# Patient Record
Sex: FEMALE | Race: WHITE | NOT HISPANIC OR LATINO | Employment: OTHER | URBAN - METROPOLITAN AREA
[De-identification: names, ages, dates, MRNs, and addresses within clinical notes are randomized per-mention and may not be internally consistent; named-entity substitution may affect disease eponyms.]

---

## 2017-01-03 ENCOUNTER — TELEPHONE (OUTPATIENT)
Dept: PREOP/PACU | Facility: CLINIC | Age: 72
End: 2017-01-03

## 2017-01-03 NOTE — TELEPHONE ENCOUNTER
"01/03/17      Call not required pass age guideline      Canh \"Gabrielle\" Isaias  Central Scheduler    "

## 2017-02-24 ENCOUNTER — DOCUMENTATION ONLY (OUTPATIENT)
Dept: FAMILY MEDICINE | Facility: CLINIC | Age: 72
End: 2017-02-24

## 2017-03-09 ENCOUNTER — TRANSFERRED RECORDS (OUTPATIENT)
Dept: HEALTH INFORMATION MANAGEMENT | Facility: CLINIC | Age: 72
End: 2017-03-09

## 2017-04-05 ENCOUNTER — DOCUMENTATION ONLY (OUTPATIENT)
Dept: FAMILY MEDICINE | Facility: CLINIC | Age: 72
End: 2017-04-05

## 2017-04-05 NOTE — PROGRESS NOTES
Recd records from [facility] 04/05/2017 and forwarded to Natasha Braun for review and scanning

## 2017-04-14 ENCOUNTER — DOCUMENTATION ONLY (OUTPATIENT)
Dept: OTHER | Facility: CLINIC | Age: 72
End: 2017-04-14

## 2017-04-14 DIAGNOSIS — Z71.89 ADVANCED DIRECTIVES, COUNSELING/DISCUSSION: Chronic | ICD-10-CM

## 2017-05-31 ENCOUNTER — TELEPHONE (OUTPATIENT)
Dept: PEDIATRICS | Facility: CLINIC | Age: 72
End: 2017-05-31

## 2017-05-31 NOTE — TELEPHONE ENCOUNTER
Patient calls.  She developed cough and SOB while in [place] in January.  She came back to [place] and the cough and chest tightness continued. She attributed her symptoms to possible allergies in the spring, but the symptoms never got better.  She has some post nasal drip and congestion in general. Unsure if it is allergy-related, or if there are other causes.  Appointment was advised to get evaluated by provider. Patient agreed, scheduled for patient's next available:    Next 5 appointments (look out 90 days)     Jun 06, 2017  7:30 AM CDT   SHORT with Kelly Clay PA-C   [facility] ([facility])    [address] [address] [phone]                Mariana Dubois RN  Message handled by Nurse Triage.

## 2017-06-06 ENCOUNTER — RADIANT APPOINTMENT (OUTPATIENT)
Dept: GENERAL RADIOLOGY | Facility: CLINIC | Age: 72
End: 2017-06-06
Attending: PHYSICIAN ASSISTANT
Payer: COMMERCIAL

## 2017-06-06 ENCOUNTER — OFFICE VISIT (OUTPATIENT)
Dept: FAMILY MEDICINE | Facility: CLINIC | Age: 72
End: 2017-06-06
Payer: COMMERCIAL

## 2017-06-06 VITALS
TEMPERATURE: 97.8 F | DIASTOLIC BLOOD PRESSURE: 60 MMHG | WEIGHT: 120.13 LBS | HEIGHT: 65 IN | SYSTOLIC BLOOD PRESSURE: 122 MMHG | HEART RATE: 55 BPM | BODY MASS INDEX: 20.01 KG/M2 | OXYGEN SATURATION: 100 % | RESPIRATION RATE: 16 BRPM

## 2017-06-06 DIAGNOSIS — R06.02 SOB (SHORTNESS OF BREATH): ICD-10-CM

## 2017-06-06 DIAGNOSIS — G44.219 EPISODIC TENSION-TYPE HEADACHE, NOT INTRACTABLE: ICD-10-CM

## 2017-06-06 DIAGNOSIS — R00.2 PALPITATIONS: ICD-10-CM

## 2017-06-06 DIAGNOSIS — J30.2 SEASONAL ALLERGIC RHINITIS, UNSPECIFIED ALLERGIC RHINITIS TRIGGER: ICD-10-CM

## 2017-06-06 DIAGNOSIS — R06.02 SOB (SHORTNESS OF BREATH): Primary | ICD-10-CM

## 2017-06-06 PROCEDURE — 71020 XR CHEST 2 VW: CPT

## 2017-06-06 PROCEDURE — 93000 ELECTROCARDIOGRAM COMPLETE: CPT | Performed by: PHYSICIAN ASSISTANT

## 2017-06-06 PROCEDURE — 99214 OFFICE O/P EST MOD 30 MIN: CPT | Performed by: PHYSICIAN ASSISTANT

## 2017-06-06 RX ORDER — ALBUTEROL SULFATE 90 UG/1
2 AEROSOL, METERED RESPIRATORY (INHALATION) EVERY 6 HOURS PRN
Qty: 1 INHALER | Refills: 0 | Status: SHIPPED | OUTPATIENT
Start: 2017-06-06 | End: 2018-04-30

## 2017-06-06 NOTE — PROGRESS NOTES
SUBJECTIVE:                                                    Flora Hogan is a 71 year old female who presents to clinic today for the following health issues:      ALLERGIES      Duration: Ongoing    Description:   Nasal congestion: YES  Sneezing: no   Red, itchy eyes: YES- itching    Accompanying signs and symptoms: Headache, Cough, Intermittent Wheezing & Shortness Of Breath    History (similar episodes/allergy testing): Previous Exam For Shortness Of Breath, Has Seen Cardiology    Precipitating or alleviating factors: None    Therapies tried and outcome: Warm Washcloth On Face, Claritin           Problem list and histories reviewed & adjusted, as indicated.  Additional history: as documented    Patient Active Problem List   Diagnosis     Seasonal allergies     CARDIOVASCULAR SCREENING; LDL GOAL LESS THAN 160     Osteopenia     Advance Care Planning     Peripheral neuropathy (HCC)     Nonintractable headache, unspecified chronicity pattern, unspecified headache type     Claudication of lower extremity (HCC)     Chest tightness     SOB (shortness of breath)     Past Surgical History:   Procedure Laterality Date     CATARACT IOL, RT/LT  2015     COLONOSCOPY  10/14    no repeat needed due to age     COLONOSCOPY N/A 10/7/2014    Procedure: COLONOSCOPY;  Surgeon: Daryl Hanson MD;  Location:  GI       Social History   Substance Use Topics     Smoking status: Never Smoker     Smokeless tobacco: Never Used     Alcohol use Yes      Comment: social      Family History   Problem Relation Age of Onset     Musculoskeletal Disorder Mother      edematous legs     Obesity Mother      Musculoskeletal Disorder Maternal Grandmother      edematous legs; did have amputation     Obesity Maternal Grandmother      Myocardial Infarction Maternal Grandmother          Current Outpatient Prescriptions   Medication Sig Dispense Refill     psyllium 0.52 G capsule Take 1 capsule (0.52 g) by mouth daily 540 capsule       "loratadine (CLARITIN REDITABS) 10 MG dispersible tablet Take 10 mg by mouth daily       multivitamin, therapeutic with minerals (MULTI-VITAMIN) TABS Take 1 tablet by mouth daily 100 tablet 3     Omega-3 Fatty Acids (FISH OIL) 600 MG CAPS Take by mouth 2 times daily       calcium carbonate-vitamin D (CALTRATE 600+D) 600-400 MG-UNIT CHEW Take 1 chew tab by mouth 2 times daily 180 tablet 3     magnesium 250 MG tablet Take 1 tablet by mouth daily 30 tablet      Allergies   Allergen Reactions     Penicillin V      Fredy-1 [Lidocaine] Unknown       Reviewed and updated as needed this visit by clinical staff       Reviewed and updated as needed this visit by Provider         ROS:  Constitutional, HEENT, cardiovascular, pulmonary, gi and gu systems are negative, except as otherwise noted.    OBJECTIVE:                                                    /60 (BP Location: Right arm, Patient Position: Chair, Cuff Size: Adult Regular)  Pulse 55  Temp 97.8  F (36.6  C) (Tympanic)  Resp 16  Ht 5' 5\" (1.651 m)  Wt 120 lb 2 oz (54.5 kg)  SpO2 100%  Breastfeeding? No  BMI 19.99 kg/m2  Body mass index is 19.99 kg/(m^2).  GENERAL: healthy, alert and no distress  EYES: Eyes grossly normal to inspection, PERRL and conjunctivae and sclerae normal  HENT: ear canals and TM's normal, nose and mouth without ulcers or lesions  NECK: no adenopathy, no asymmetry, masses, or scars and thyroid normal to palpation  RESP: lungs clear to auscultation - no rales, rhonchi or wheezes  CV: regular rate and rhythm, normal S1 S2, no S3 or S4, no murmur, click or rub, no peripheral edema and peripheral pulses strong  MS: no gross musculoskeletal defects noted, no edema    Diagnostic Test Results:  Xray - normal  EKG - negative     ASSESSMENT/PLAN:                                                            1. SOB (shortness of breath)  New problem, may be underlying reactive airway, CXR and EKG done today and were normal. Recommend trial of " albuterol inhaler to see if symptoms improve. Also recommend use of allergy medicine and Flonase to see if symptoms improve. Recommend f/u if persistent issue.  - XR Chest 2 Views; Future  - EKG 12-lead complete w/read - Clinics    2. Seasonal allergic rhinitis, unspecified allergic rhinitis trigger  New problem, recommend use of allergy medicine and Flonase to see if symptoms improve. Recommend f/u if persistent issue.    3. Episodic tension-type headache, not intractable  Ongoing issue, new problem to me, may be related to untreated allergies. Recommend treatment of allergies to see if improves, offered Neurology referral, patient declined.    4. Palpitations  Chronic issue, new to me. Am concerned given this has been persistent issue since June, recommend further cardiac work up and evaluation, patient wants to think about it.      Risks, benefits and alternatives were discussed with patient. Agreeable to the plan of care.      Kelly Clay PA-C  [facility]

## 2017-06-06 NOTE — NURSING NOTE
"Chief Complaint   Patient presents with     Cough       Initial /60 (BP Location: Right arm, Patient Position: Chair, Cuff Size: Adult Regular)  Pulse 55  Temp 97.8  F (36.6  C) (Tympanic)  Resp 16  Ht 5' 5\" (1.651 m)  Wt 120 lb 2 oz (54.5 kg)  SpO2 100%  Breastfeeding? No  BMI 19.99 kg/m2 Estimated body mass index is 19.99 kg/(m^2) as calculated from the following:    Height as of this encounter: 5' 5\" (1.651 m).    Weight as of this encounter: 120 lb 2 oz (54.5 kg).  Medication Reconciliation: complete     Patient would like to be notified at the following phone number for results from this visit   [phone] OK to leave message or Juan Souza CMA (AAMA) 6/6/2017 7:37 AM      "

## 2017-06-06 NOTE — MR AVS SNAPSHOT
After Visit Summary   6/6/2017    Flora Hogan    MRN: [id number]           Patient Information     Date Of Birth          1945        Visit Information        Provider Department      6/6/2017 7:30 AM Kelly Clay PA-C [facility]        Today's Diagnoses     SOB (shortness of breath)    -  1    Seasonal allergic rhinitis, unspecified allergic rhinitis trigger        Episodic tension-type headache, not intractable        Palpitations           Follow-ups after your visit        Who to contact     If you have questions or need follow up information about today's clinic visit or your schedule please contact [facility] directly at [phone].  Normal or non-critical lab and imaging results will be communicated to you by MyChart, letter or phone within 4 business days after the clinic has received the results. If you do not hear from us within 7 days, please contact the clinic through [facility] or phone. If you have a critical or abnormal lab result, we will notify you by phone as soon as possible.  Submit refill requests through [facility] or call your pharmacy and they will forward the refill request to us. Please allow 3 business days for your refill to be completed.          Additional Information About Your Visit        MyChart Information     [facility] gives you secure access to your electronic health record. If you see a primary care provider, you can also send messages to your care team and make appointments. If you have questions, please call your primary care clinic.  If you do not have a primary care provider, please call [phone] and they will assist you.        Care EveryWhere ID     This is your Care EveryWhere ID. This could be used by other organizations to access your [place] medical records  [id number]        Your Vitals Were     Pulse Temperature Respirations Height Pulse Oximetry Breastfeeding?    55 97.8  F (36.6  C) (Tympanic)  "16 5' 5\" (1.651 m) 100% No    BMI (Body Mass Index)                   19.99 kg/m2            Blood Pressure from Last 3 Encounters:   06/06/17 122/60   06/01/16 104/64   04/27/16 116/70    Weight from Last 3 Encounters:   06/06/17 120 lb 2 oz (54.5 kg)   06/01/16 122 lb (55.3 kg)   04/27/16 123 lb 12.8 oz (56.2 kg)              We Performed the Following     EKG 12-lead complete w/read - Clinics          Today's Medication Changes          These changes are accurate as of: 6/6/17  8:14 AM.  If you have any questions, ask your nurse or doctor.               Start taking these medicines.        Dose/Directions    albuterol 108 (90 BASE) MCG/ACT Inhaler   Commonly known as:  PROAIR HFA/PROVENTIL HFA/VENTOLIN HFA   Used for:  SOB (shortness of breath)   Started by:  Kelly Clay PA-C        Dose:  2 puff   Inhale 2 puffs into the lungs every 6 hours as needed for shortness of breath / dyspnea or wheezing   Quantity:  1 Inhaler   Refills:  0            Where to get your medicines      [facility] [address] [facility] [address] [address] [place] [address] & [facility]  [address] [facility] [address]     Phone:  [phone]     albuterol 108 (90 BASE) MCG/ACT Inhaler                Primary Care Provider Office Phone # Fax #    Natasha Braun [phone]       [facility] [address] [facility] [address]        Thank you!     Thank you for choosing [facility]  for your care. Our goal is always to provide you with excellent care. Hearing back from our patients is one way we can continue to improve our services. Please take a few minutes to complete the written survey that you may receive in the mail after your visit with us. Thank you!             Your Updated Medication List - Protect others around you: Learn how to safely use, store and throw away your medicines at www.disposemymeds.org.        "   This list is accurate as of: 6/6/17  8:14 AM.  Always use your most recent med list.                   Brand Name Dispense Instructions for use    albuterol 108 (90 BASE) MCG/ACT Inhaler    PROAIR HFA/PROVENTIL HFA/VENTOLIN HFA    1 Inhaler    Inhale 2 puffs into the lungs every 6 hours as needed for shortness of breath / dyspnea or wheezing       CALTRATE 600+D 600-400 MG-UNIT Chew   Generic drug:  calcium carbonate-vitamin D     180 tablet    Take 1 chew tab by mouth 2 times daily       Fish Oil 600 MG Caps      Take by mouth 2 times daily       loratadine 10 MG ODT tab    CLARITIN REDITABS     Take 10 mg by mouth daily       magnesium 250 MG tablet     30 tablet    Take 1 tablet by mouth daily       Multi-vitamin Tabs tablet     100 tablet    Take 1 tablet by mouth daily       psyllium 0.52 G capsule     540 capsule    Take 1 capsule (0.52 g) by mouth daily

## 2017-09-27 ENCOUNTER — OFFICE VISIT (OUTPATIENT)
Dept: FAMILY MEDICINE | Facility: CLINIC | Age: 72
End: 2017-09-27
Payer: COMMERCIAL

## 2017-09-27 VITALS
DIASTOLIC BLOOD PRESSURE: 82 MMHG | WEIGHT: 121.8 LBS | BODY MASS INDEX: 20.27 KG/M2 | OXYGEN SATURATION: 96 % | RESPIRATION RATE: 14 BRPM | SYSTOLIC BLOOD PRESSURE: 122 MMHG | TEMPERATURE: 97.8 F | HEART RATE: 65 BPM

## 2017-09-27 DIAGNOSIS — R42 DIZZINESS: Primary | ICD-10-CM

## 2017-09-27 DIAGNOSIS — R09.82 POST-NASAL DRIP: ICD-10-CM

## 2017-09-27 LAB — HGB BLD-MCNC: 14 G/DL (ref 11.7–15.7)

## 2017-09-27 PROCEDURE — 36415 COLL VENOUS BLD VENIPUNCTURE: CPT | Performed by: PHYSICIAN ASSISTANT

## 2017-09-27 PROCEDURE — 99214 OFFICE O/P EST MOD 30 MIN: CPT | Performed by: PHYSICIAN ASSISTANT

## 2017-09-27 PROCEDURE — 85018 HEMOGLOBIN: CPT | Performed by: PHYSICIAN ASSISTANT

## 2017-09-27 NOTE — PROGRESS NOTES
"  SUBJECTIVE:   Flora Hogan is a 72 year old female who presents to clinic today for the following health issues:      Dizziness/Chills      Duration: today    Description (location/character/radiation): pt is feeling dizziness, very cold, has had ongoing problem of SOB; did pass out at the clinic about 3 years ago    Intensity:  moderate, severe    Accompanying signs and symptoms: patient has not felt herself the last couple months, mostly due to SOB/low BP    History (similar episodes/previous evaluation): None    Precipitating or alleviating factors: None    Therapies tried and outcome: None     Patient is here today complaining of intense chills that started today  She also notes ongoing dizziness and lightheadedness that occurred today, though she truly has had issues with this since early this summer  She admits to her head being stuffy, and her voice being \"goofy\" over the last few weeks  No syncope, no racing heart, no fever, + ringing in the ears and buzzing.  She denies a sore throat, has a slight cough  She just states she hasn't felt her self the last few months  No taking any medication for this  Had been on Albuterol, Flonase and allergy medication over the summer for the dizziness and symptoms which she attributed to allergies.      Problem list and histories reviewed & adjusted, as indicated.  Additional history: as documented    Patient Active Problem List   Diagnosis     Seasonal allergies     CARDIOVASCULAR SCREENING; LDL GOAL LESS THAN 160     Osteopenia     Advance Care Planning     Peripheral neuropathy (HCC)     Nonintractable headache, unspecified chronicity pattern, unspecified headache type     Claudication of lower extremity (HCC)     Chest tightness     SOB (shortness of breath)     Past Surgical History:   Procedure Laterality Date     CATARACT IOL, RT/LT  2015     COLONOSCOPY  10/14    no repeat needed due to age     COLONOSCOPY N/A 10/7/2014    Procedure: COLONOSCOPY;  Surgeon: " Daryl Hanson MD;  Location:  GI       Social History   Substance Use Topics     Smoking status: Never Smoker     Smokeless tobacco: Never Used     Alcohol use Yes      Comment: social      Family History   Problem Relation Age of Onset     Musculoskeletal Disorder Mother      edematous legs     Obesity Mother      Musculoskeletal Disorder Maternal Grandmother      edematous legs; did have amputation     Obesity Maternal Grandmother      Myocardial Infarction Maternal Grandmother          Current Outpatient Prescriptions   Medication Sig Dispense Refill     psyllium 0.52 G capsule Take 1 capsule (0.52 g) by mouth daily 540 capsule      loratadine (CLARITIN REDITABS) 10 MG dispersible tablet Take 10 mg by mouth daily       multivitamin, therapeutic with minerals (MULTI-VITAMIN) TABS Take 1 tablet by mouth daily 100 tablet 3     Omega-3 Fatty Acids (FISH OIL) 600 MG CAPS Take by mouth 2 times daily       calcium carbonate-vitamin D (CALTRATE 600+D) 600-400 MG-UNIT CHEW Take 1 chew tab by mouth 2 times daily 180 tablet 3     magnesium 250 MG tablet Take 1 tablet by mouth daily 30 tablet      albuterol (PROAIR HFA/PROVENTIL HFA/VENTOLIN HFA) 108 (90 BASE) MCG/ACT Inhaler Inhale 2 puffs into the lungs every 6 hours as needed for shortness of breath / dyspnea or wheezing (Patient not taking: Reported on 9/27/2017) 1 Inhaler 0     Allergies   Allergen Reactions     Penicillin V      Fredy-1 [Lidocaine] Unknown         Reviewed and updated as needed this visit by clinical staffTobacco  Allergies  Med Hx  Surg Hx  Fam Hx  Soc Hx      Reviewed and updated as needed this visit by Provider         ROS:  Constitutional, HEENT, cardiovascular, pulmonary, gi and gu systems are negative, except as otherwise noted.      OBJECTIVE:   /82 (BP Location: Right arm, Patient Position: Standing, Cuff Size: Adult Regular)  Pulse 65  Temp 97.8  F (36.6  C) (Oral)  Resp 14  Wt 121 lb 12.8 oz (55.2 kg)  SpO2 96%   Breastfeeding? No  BMI 20.27 kg/m2  Body mass index is 20.27 kg/(m^2).  GENERAL: healthy, alert and no distress  EYES: Eyes grossly normal to inspection, PERRL and conjunctivae and sclerae normal  HENT: ear canals and TM's normal, nose and mouth without ulcers or lesions  NECK: no adenopathy, no asymmetry, masses, or scars and thyroid normal to palpation  RESP: lungs clear to auscultation - no rales, rhonchi or wheezes  CV: regular rate and rhythm, normal S1 S2, no S3 or S4, no murmur, click or rub, no peripheral edema and peripheral pulses strong  ABDOMEN: soft, nontender, no hepatosplenomegaly, no masses and bowel sounds normal  MS: no gross musculoskeletal defects noted, no edema  SKIN: no suspicious lesions or rashes  NEURO: Normal strength and tone, mentation intact and speech normal    Diagnostic Test Results:  Results for orders placed or performed in visit on 09/27/17   Hemoglobin   Result Value Ref Range    Hemoglobin 14.0 11.7 - 15.7 g/dL       ASSESSMENT/PLAN:             1. Dizziness  - Hemoglobin  - OTOLARYNGOLOGY REFERRAL    2. Post-nasal drip  - OTOLARYNGOLOGY REFERRAL    Chronic issue, though recently worsening today. Reviewed chart and she truly has been in a few times for dizziness over the last few years. She has had a cardiac work up that was negative and continues to have symptoms of dizziness. She has tried and failed treatment for allergies, unclear cause of her symptoms. Orthostatic BP today was normal as was her hemoglobin. Recommend ENT referral. If symptoms are persistent despite ENT recommendations, would then consider Neurology referral or [facility].    Advised patient to recheck in 2 weeks or after ENT visit if symptoms persist.    Risks, benefits and alternatives were discussed with patient. Agreeable to the plan of care.      Kelly Clay PA-C  [facility]

## 2017-09-27 NOTE — MR AVS SNAPSHOT
After Visit Summary   9/27/2017    Flora Hogan    MRN: [id number]           Patient Information     Date Of Birth          1945        Visit Information        Provider Department      9/27/2017 3:10 PM Kelly Clay PA-C [facility]        Today's Diagnoses     Dizziness    -  1    Post-nasal drip           Follow-ups after your visit        Additional Services     OTOLARYNGOLOGY REFERRAL       Your provider has referred you to: N: [facility] [phone]   [email or web address]    Please be aware that coverage of these services is subject to the terms and limitations of your health insurance plan.  Call member services at your health plan with any benefit or coverage questions.      Please bring the following to your appointment:  >>   Any x-rays, CTs or MRIs which have been performed.  Contact the facility where they were done to arrange for  prior to your scheduled appointment.  Any new CT, MRI or other procedures ordered by your specialist must be performed at a [facility] facility or coordinated by your clinic's referral office.    >>   List of current medications   >>   This referral request   >>   Any documents/labs given to you for this referral                  Who to contact     If you have questions or need follow up information about today's clinic visit or your schedule please contact [facility] directly at [phone].  Normal or non-critical lab and imaging results will be communicated to you by MyChart, letter or phone within 4 business days after the clinic has received the results. If you do not hear from us within 7 days, please contact the clinic through MyChart or phone. If you have a critical or abnormal lab result, we will notify you by phone as soon as possible.  Submit refill requests through [employer] or call your pharmacy and they will forward the refill request to us. Please  allow 3 business days for your refill to be completed.          Additional Information About Your Visit        MyChart Information     [facility] gives you secure access to your electronic health record. If you see a primary care provider, you can also send messages to your care team and make appointments. If you have questions, please call your primary care clinic.  If you do not have a primary care provider, please call [phone] and they will assist you.        Care EveryWhere ID     This is your Care EveryWhere ID. This could be used by other organizations to access your [place] medical records  [id number]        Your Vitals Were     Pulse Temperature Respirations Pulse Oximetry Breastfeeding? BMI (Body Mass Index)    65 97.8  F (36.6  C) (Oral) 14 96% No 20.27 kg/m2       Blood Pressure from Last 3 Encounters:   09/27/17 122/82   06/06/17 122/60   06/01/16 104/64    Weight from Last 3 Encounters:   09/27/17 121 lb 12.8 oz (55.2 kg)   06/06/17 120 lb 2 oz (54.5 kg)   06/01/16 122 lb (55.3 kg)              We Performed the Following     Hemoglobin     OTOLARYNGOLOGY REFERRAL        Primary Care Provider Office Phone # Fax #    Natasha Braun [phone] [address] [address] [facility] [address]        Equal Access to Services     [facility]: Hadii aad ku hadasho Soomaali, waaxda luqadaha, qaybta kaalmada adeegyada, waxay idiin hayaan zunilda awad . So [facility] [phone].    ATENCIÓN: Si habla español, tiene a bashir disposición servicios gratuitos de asistencia lingüística. Llame al [phone].    We comply with applicable federal civil rights laws and [place] laws. We do not discriminate on the basis of race, color, national origin, age, disability sex, sexual orientation or gender identity.            Thank you!     Thank you for choosing [facility]  for your care. Our goal is always to provide you with excellent care. Hearing back from our patients is one way we  can continue to improve our services. Please take a few minutes to complete the written survey that you may receive in the mail after your visit with us. Thank you!             Your Updated Medication List - Protect others around you: Learn how to safely use, store and throw away your medicines at www.disposemymeds.org.          This list is accurate as of: 9/27/17  4:18 PM.  Always use your most recent med list.                   Brand Name Dispense Instructions for use Diagnosis    albuterol 108 (90 BASE) MCG/ACT Inhaler    PROAIR HFA/PROVENTIL HFA/VENTOLIN HFA    1 Inhaler    Inhale 2 puffs into the lungs every 6 hours as needed for shortness of breath / dyspnea or wheezing    SOB (shortness of breath)       CALTRATE 600+D 600-400 MG-UNIT Chew   Generic drug:  calcium carbonate-vitamin D     180 tablet    Take 1 chew tab by mouth 2 times daily        Fish Oil 600 MG Caps      Take by mouth 2 times daily        loratadine 10 MG ODT tab    CLARITIN REDITABS     Take 10 mg by mouth daily        magnesium 250 MG tablet     30 tablet    Take 1 tablet by mouth daily        Multi-vitamin Tabs tablet     100 tablet    Take 1 tablet by mouth daily        psyllium 0.52 G capsule     540 capsule    Take 1 capsule (0.52 g) by mouth daily

## 2017-09-27 NOTE — NURSING NOTE
"Chief Complaint   Patient presents with     Dizziness     Chills       Initial /80 (BP Location: Right arm, Patient Position: Chair, Cuff Size: Adult Regular)  Pulse 52  Temp 97.8  F (36.6  C) (Oral)  Resp 14  Wt 121 lb 12.8 oz (55.2 kg)  SpO2 96%  Breastfeeding? No  BMI 20.27 kg/m2 Estimated body mass index is 20.27 kg/(m^2) as calculated from the following:    Height as of 6/6/17: 5' 5\" (1.651 m).    Weight as of this encounter: 121 lb 12.8 oz (55.2 kg).  Medication Reconciliation: complete   Tita Jordan MA      "

## 2017-10-02 ENCOUNTER — MYC MEDICAL ADVICE (OUTPATIENT)
Dept: FAMILY MEDICINE | Facility: CLINIC | Age: 72
End: 2017-10-02

## 2017-10-02 ENCOUNTER — TELEPHONE (OUTPATIENT)
Dept: FAMILY MEDICINE | Facility: CLINIC | Age: 72
End: 2017-10-02

## 2017-10-02 NOTE — TELEPHONE ENCOUNTER
Panel Management Review      Patient has the following on her problem list: None      Composite cancer screening  Chart review shows that this patient is due/due soon for the following Mammogram  Summary:    Patient is due/failing the following:   MAMMOGRAM    Action needed:   Patient needs office visit for mammo.    Type of outreach:    Sent zoidu message.    Questions for provider review:    None                                                                                                                                 Tita Jordan MA    Chart routed to Care Team.

## 2017-10-16 ENCOUNTER — TRANSFERRED RECORDS (OUTPATIENT)
Dept: HEALTH INFORMATION MANAGEMENT | Facility: CLINIC | Age: 72
End: 2017-10-16

## 2017-11-09 ENCOUNTER — OFFICE VISIT (OUTPATIENT)
Dept: FAMILY MEDICINE | Facility: CLINIC | Age: 72
End: 2017-11-09
Payer: COMMERCIAL

## 2017-11-09 VITALS
TEMPERATURE: 98 F | OXYGEN SATURATION: 100 % | DIASTOLIC BLOOD PRESSURE: 66 MMHG | RESPIRATION RATE: 16 BRPM | HEIGHT: 65 IN | HEART RATE: 59 BPM | SYSTOLIC BLOOD PRESSURE: 128 MMHG | WEIGHT: 123.6 LBS | BODY MASS INDEX: 20.59 KG/M2

## 2017-11-09 DIAGNOSIS — Z71.89 ADVANCED DIRECTIVES, COUNSELING/DISCUSSION: Primary | ICD-10-CM

## 2017-11-09 DIAGNOSIS — Z23 NEED FOR PROPHYLACTIC VACCINATION AND INOCULATION AGAINST INFLUENZA: ICD-10-CM

## 2017-11-09 PROCEDURE — 90662 IIV NO PRSV INCREASED AG IM: CPT | Performed by: FAMILY MEDICINE

## 2017-11-09 PROCEDURE — G0008 ADMIN INFLUENZA VIRUS VAC: HCPCS | Performed by: FAMILY MEDICINE

## 2017-11-09 PROCEDURE — 99213 OFFICE O/P EST LOW 20 MIN: CPT | Mod: 25 | Performed by: FAMILY MEDICINE

## 2017-11-09 NOTE — PROGRESS NOTES
SUBJECTIVE:   Flora Hogan is a 72 year old female who presents to clinic today for the following health issues:      Here to discuss filling out the POLST        Problem list and histories reviewed & adjusted, as indicated.  Additional history:     Patient Active Problem List   Diagnosis     Seasonal allergies     CARDIOVASCULAR SCREENING; LDL GOAL LESS THAN 160     Osteopenia     Advance Care Planning     Peripheral neuropathy (HCC)     Nonintractable headache, unspecified chronicity pattern, unspecified headache type     Claudication of lower extremity (HCC)     Chest tightness     SOB (shortness of breath)     Current Outpatient Prescriptions   Medication Sig Dispense Refill     psyllium 0.52 G capsule Take 1 capsule (0.52 g) by mouth daily 540 capsule      loratadine (CLARITIN REDITABS) 10 MG dispersible tablet Take 10 mg by mouth as needed        multivitamin, therapeutic with minerals (MULTI-VITAMIN) TABS Take 1 tablet by mouth daily 100 tablet 3     Omega-3 Fatty Acids (FISH OIL) 600 MG CAPS Take by mouth 2 times daily       calcium carbonate-vitamin D (CALTRATE 600+D) 600-400 MG-UNIT CHEW Take 1 chew tab by mouth 2 times daily 180 tablet 3     magnesium 250 MG tablet Take 1 tablet by mouth daily 30 tablet      albuterol (PROAIR HFA/PROVENTIL HFA/VENTOLIN HFA) 108 (90 BASE) MCG/ACT Inhaler Inhale 2 puffs into the lungs every 6 hours as needed for shortness of breath / dyspnea or wheezing (Patient not taking: Reported on 9/27/2017) 1 Inhaler 0           Reviewed and updated as needed this visit by clinical staff  Allergies       Reviewed and updated as needed this visit by Provider         ROS:  Here with .    Constitutional, HEENT, cardiovascular, pulmonary, gi and gu systems are negative, except as otherwise noted.    Updates on health care directive.   Thinking of a DNR order.       OBJECTIVE:     /66 (BP Location: Right arm, Cuff Size: Adult Regular)  Pulse 59  Temp 98  F (36.7  C)  "(Oral)  Resp 16  Ht 5' 5\" (1.651 m)  Wt 123 lb 9.6 oz (56.1 kg)  SpO2 100%  BMI 20.57 kg/m2  Body mass index is 20.57 kg/(m^2).  GENERAL APPEARANCE: alert and no distress  CV: regular rates and rhythm  PSYCH: mentation appears normal and affect normal/bright    Reviewed some of her advanced directive papers.    ASSESSMENT/PLAN:     Advanced directives, counseling/discussion  Counseling around this. Discussed some scenarios.   It appears she does not want to be real aggressive if not much hope of recovery, but she might be willing to go through some things if it would. She will discuss more with her significant other, who his her agent.   She has discussed with her family as well.  Discussed what a POLST is.   She can contact myself or Digna (who I believe she has met with previously) for questions.     Need for prophylactic vaccination and inoculation against influenza    - FLU VACCINE, INCREASED ANTIGEN, PRESV FREE, AGE 65+ [59491]  - Vaccine Administration, Initial [95141]    Follow up prn or as previously directed.    There are no Patient Instructions on file for this visit.    Natasha Braun MD, MD  [facility]      Injectable Influenza Immunization Documentation    1.  Is the person to be vaccinated sick today?   No    2. Does the person to be vaccinated have an allergy to a component   of the vaccine?   No  Egg Allergy Algorithm Link    3. Has the person to be vaccinated ever had a serious reaction   to influenza vaccine in the past?   No    4. Has the person to be vaccinated ever had Guillain-Barré syndrome?   No    Form completed by Joi Johns [facility]           "

## 2017-11-09 NOTE — NURSING NOTE
"Chief Complaint   Patient presents with     POLST       Initial /66 (BP Location: Right arm, Cuff Size: Adult Regular)  Pulse 59  Temp 98  F (36.7  C) (Oral)  Resp 16  Ht 5' 5\" (1.651 m)  Wt 123 lb 9.6 oz (56.1 kg)  SpO2 100%  BMI 20.57 kg/m2 Estimated body mass index is 20.57 kg/(m^2) as calculated from the following:    Height as of this encounter: 5' 5\" (1.651 m).    Weight as of this encounter: 123 lb 9.6 oz (56.1 kg).  Medication Reconciliation: complete   Joi Johns, PASTOR    "

## 2017-11-09 NOTE — MR AVS SNAPSHOT
"              After Visit Summary   11/9/2017    Anali Hogan    MRN: [id number]           Patient Information     Date Of Birth          1945        Visit Information        Provider Department      11/9/2017 1:30 PM Natasha Braun MD [place] [facility]        Today's Diagnoses     Need for prophylactic vaccination and inoculation against influenza    -  1       Follow-ups after your visit        Who to contact     If you have questions or need follow up information about today's clinic visit or your schedule please contact [facility] directly at [phone].  Normal or non-critical lab and imaging results will be communicated to you by InVisioneerhart, letter or phone within 4 business days after the clinic has received the results. If you do not hear from us within 7 days, please contact the clinic through Whatsert or phone. If you have a critical or abnormal lab result, we will notify you by phone as soon as possible.  Submit refill requests through Wummelkiste or call your pharmacy and they will forward the refill request to us. Please allow 3 business days for your refill to be completed.          Additional Information About Your Visit        MyChart Information     Wummelkiste gives you secure access to your electronic health record. If you see a primary care provider, you can also send messages to your care team and make appointments. If you have questions, please call your primary care clinic.  If you do not have a primary care provider, please call [phone] and they will assist you.        Care EveryWhere ID     This is your Care EveryWhere ID. This could be used by other organizations to access your [place] medical records  [id number]        Your Vitals Were     Pulse Temperature Respirations Height Pulse Oximetry BMI (Body Mass Index)    59 98  F (36.7  C) (Oral) 16 5' 5\" (1.651 m) 100% 20.57 kg/m2       Blood Pressure from Last 3 Encounters:   11/09/17 128/66   09/27/17 122/82 "   06/06/17 122/60    Weight from Last 3 Encounters:   11/09/17 123 lb 9.6 oz (56.1 kg)   09/27/17 121 lb 12.8 oz (55.2 kg)   06/06/17 120 lb 2 oz (54.5 kg)              We Performed the Following     FLU VACCINE, INCREASED ANTIGEN, PRESV FREE, AGE 65+ [19423]     Vaccine Administration, Initial [57239]        Primary Care Provider Office Phone # Fax #    Natasha Braun [phone] [address] [facility] [address]        Equal Access to Services     [facility]: Hadii aad ku hadasho Soomaali, waaxda luqadaha, qaybta kaalmada adesherryyajudson, reji awad . So [facility] [phone].    ATENCIÓN: Si habla español, tiene a bashir disposición servicios gratuitos de asistencia lingüística. Ll[facility] [phone].    We comply with applicable federal civil rights laws and [place] laws. We do not discriminate on the basis of race, color, national origin, age, disability, sex, sexual orientation, or gender identity.            Thank you!     Thank you for choosing [facility]  for your care. Our goal is always to provide you with excellent care. Hearing back from our patients is one way we can continue to improve our services. Please take a few minutes to complete the written survey that you may receive in the mail after your visit with us. Thank you!             Your Updated Medication List - Protect others around you: Learn how to safely use, store and throw away your medicines at www.disposemymeds.org.          This list is accurate as of: 11/9/17  2:37 PM.  Always use your most recent med list.                   Brand Name Dispense Instructions for use Diagnosis    albuterol 108 (90 BASE) MCG/ACT Inhaler    PROAIR HFA/PROVENTIL HFA/VENTOLIN HFA    1 Inhaler    Inhale 2 puffs into the lungs every 6 hours as needed for shortness of breath / dyspnea or wheezing    SOB (shortness of breath)       CALTRATE 600+D 600-400 MG-UNIT Chew   Generic drug:  calcium  carbonate-vitamin D     180 tablet    Take 1 chew tab by mouth 2 times daily        Fish Oil 600 MG Caps      Take by mouth 2 times daily        loratadine 10 MG ODT tab    CLARITIN REDITABS     Take 10 mg by mouth as needed        magnesium 250 MG tablet     30 tablet    Take 1 tablet by mouth daily        Multi-vitamin Tabs tablet     100 tablet    Take 1 tablet by mouth daily        psyllium 0.52 G capsule     540 capsule    Take 1 capsule (0.52 g) by mouth daily

## 2018-01-01 NOTE — PROGRESS NOTES
Panel Management Review      Patient has the following on her problem list: None      Composite cancer screening  Chart review shows that this patient is due/due soon for the following Mammogram  Summary:    Patient is due/failing the following:   MAMMOGRAM    Action needed:   Patient needs referral/order: Mammogram    Type of outreach:    Sent [employer] message.    Questions for provider review:    None                                                                                                                                    Jenise Souza CMA (AAMA) 2/24/2017 3:49 PM       Chart routed to  .          
[id number]

## 2018-04-09 ENCOUNTER — TELEPHONE (OUTPATIENT)
Dept: FAMILY MEDICINE | Facility: CLINIC | Age: 73
End: 2018-04-09

## 2018-04-09 ENCOUNTER — MYC MEDICAL ADVICE (OUTPATIENT)
Dept: FAMILY MEDICINE | Facility: CLINIC | Age: 73
End: 2018-04-09

## 2018-04-09 NOTE — TELEPHONE ENCOUNTER
Panel Management Review      Patient has the following on her problem list: None      Composite cancer screening  Chart review shows that this patient is due/due soon for the following Mammogram  Summary:    Patient is due/failing the following:   MAMMOGRAM    Action needed:   Patient needs office visit for mammogram.    Type of outreach:    Sent [employer] message.    Questions for provider review:    None                                                                                                                                 Tita Jordan CMA (AAMA)     Chart routed to Care Team.

## 2018-04-10 ENCOUNTER — TRANSFERRED RECORDS (OUTPATIENT)
Dept: HEALTH INFORMATION MANAGEMENT | Facility: CLINIC | Age: 73
End: 2018-04-10

## 2018-04-20 ENCOUNTER — MYC MEDICAL ADVICE (OUTPATIENT)
Dept: FAMILY MEDICINE | Facility: CLINIC | Age: 73
End: 2018-04-20

## 2018-04-20 NOTE — TELEPHONE ENCOUNTER
2nd attempt, pt is active on [employer].  Sent message.  Tita Jordan CMA ([facility])

## 2018-04-30 ENCOUNTER — OFFICE VISIT (OUTPATIENT)
Dept: FAMILY MEDICINE | Facility: CLINIC | Age: 73
End: 2018-04-30
Payer: COMMERCIAL

## 2018-04-30 VITALS
TEMPERATURE: 97.4 F | RESPIRATION RATE: 14 BRPM | DIASTOLIC BLOOD PRESSURE: 58 MMHG | HEIGHT: 65 IN | SYSTOLIC BLOOD PRESSURE: 110 MMHG | BODY MASS INDEX: 19.83 KG/M2 | HEART RATE: 59 BPM | OXYGEN SATURATION: 99 % | WEIGHT: 119 LBS

## 2018-04-30 DIAGNOSIS — R06.02 SOB (SHORTNESS OF BREATH): Primary | ICD-10-CM

## 2018-04-30 DIAGNOSIS — L01.00 IMPETIGO: ICD-10-CM

## 2018-04-30 PROCEDURE — 99214 OFFICE O/P EST MOD 30 MIN: CPT | Performed by: PHYSICIAN ASSISTANT

## 2018-04-30 RX ORDER — ALBUTEROL SULFATE 90 UG/1
2 AEROSOL, METERED RESPIRATORY (INHALATION) EVERY 6 HOURS PRN
Qty: 1 INHALER | Refills: 0 | Status: SHIPPED | OUTPATIENT
Start: 2018-04-30 | End: 2019-10-29

## 2018-04-30 RX ORDER — MUPIROCIN 20 MG/G
OINTMENT TOPICAL 3 TIMES DAILY
Qty: 22 G | Refills: 1 | Status: SHIPPED | OUTPATIENT
Start: 2018-04-30 | End: 2018-05-05

## 2018-04-30 NOTE — MR AVS SNAPSHOT
After Visit Summary   4/30/2018    Anali Hogan    MRN: [id number]           Patient Information     Date Of Birth          1945        Visit Information        Provider Department      4/30/2018 11:10 AM Kelly Clay PA-C [facility]        Today's Diagnoses     Impetigo    -  1    SOB (shortness of breath)           Follow-ups after your visit        Additional Services     PULMONARY MEDICINE REFERRAL       Your provider has referred you to: N: [facility] [phone]   [email or web address]    Please be aware that coverage of these services is subject to the terms and limitations of your health insurance plan.  Call member services at your health plan with any benefit or coverage questions.      Please bring the following to your appointment:  Any x-rays, CTs or MRIs which have been performed.  Contact the facility where they were done to arrange for  prior to your scheduled appointment.  Any new CT, MRI or other procedures ordered by your specialist must be performed at a [facility] facility or coordinated by your clinic's referral office.    List of current medications   This referral request   Any documents/labs given to you for this referral                  Follow-up notes from your care team     Return in about 3 months (around 7/30/2018) for shortness of breath.      Who to contact     If you have questions or need follow up information about today's clinic visit or your schedule please contact [facility] directly at [phone].  Normal or non-critical lab and imaging results will be communicated to you by MyChart, letter or phone within 4 business days after the clinic has received the results. If you do not hear from us within 7 days, please contact the clinic through MyChart or phone. If you have a critical or abnormal lab result, we will notify you by phone as soon as possible.  Submit refill requests  "through [employer] or call your pharmacy and they will forward the refill request to us. Please allow 3 business days for your refill to be completed.          Additional Information About Your Visit        Blaze healthhart Information     [employer] gives you secure access to your electronic health record. If you see a primary care provider, you can also send messages to your care team and make appointments. If you have questions, please call your primary care clinic.  If you do not have a primary care provider, please call [phone] and they will assist you.        Care EveryWhere ID     This is your Care EveryWhere ID. This could be used by other organizations to access your [place] medical records  [id number]        Your Vitals Were     Pulse Temperature Respirations Height Last Period Pulse Oximetry    59 97.4  F (36.3  C) (Oral) 14 5' 5\" (1.651 m) (LMP Unknown) 99%    Breastfeeding? BMI (Body Mass Index)                No 19.8 kg/m2           Blood Pressure from Last 3 Encounters:   04/30/18 110/58   11/09/17 128/66   09/27/17 122/82    Weight from Last 3 Encounters:   04/30/18 119 lb (54 kg)   11/09/17 123 lb 9.6 oz (56.1 kg)   09/27/17 121 lb 12.8 oz (55.2 kg)              We Performed the Following     PULMONARY MEDICINE REFERRAL          Today's Medication Changes          These changes are accurate as of 4/30/18 11:36 AM.  If you have any questions, ask your nurse or doctor.               Start taking these medicines.        Dose/Directions    mupirocin 2 % ointment   Commonly known as:  BACTROBAN   Used for:  Impetigo   Started by:  Kelly Clay PA-C        Apply topically 3 times daily for 5 days   Quantity:  22 g   Refills:  1            Where to get your medicines      These medications were sent to [facility] [id number]  [place] [address] [facility] [address] [facility]  [address] [place] [address]     Phone:  [phone]     albuterol 108 (90 Base) " MCG/ACT Inhaler    mupirocin 2 % ointment                Primary Care Provider Office Phone # Fax #    Natasha Braun [phone]       [address] [facility] [address]        Equal Access to Services     AFSHANSUSANNE DANNY : Hadii aad ku hadsavannao Soomaali, waaxda luqadaha, qaybta kaalmada adeegyada, waxyohana idiin haylbn adesherry lai latimothycarey doug. So [facility] [phone].    ATENCIÓN: Si habla español, tiene a bashir disposición servicios gratuitos de asistencia lingüística. Llame al [phone].    We comply with applicable federal civil rights laws and [place] laws. We do not discriminate on the basis of race, color, national origin, age, disability, sex, sexual orientation, or gender identity.            Thank you!     Thank you for choosing [facility]  for your care. Our goal is always to provide you with excellent care. Hearing back from our patients is one way we can continue to improve our services. Please take a few minutes to complete the written survey that you may receive in the mail after your visit with us. Thank you!             Your Updated Medication List - Protect others around you: Learn how to safely use, store and throw away your medicines at www.disposemymeds.org.          This list is accurate as of 4/30/18 11:36 AM.  Always use your most recent med list.                   Brand Name Dispense Instructions for use Diagnosis    albuterol 108 (90 Base) MCG/ACT Inhaler    PROAIR HFA/PROVENTIL HFA/VENTOLIN HFA    1 Inhaler    Inhale 2 puffs into the lungs every 6 hours as needed for shortness of breath / dyspnea or wheezing    SOB (shortness of breath)       CALTRATE 600+D 600-400 MG-UNIT Chew   Generic drug:  calcium carbonate-vitamin D     180 tablet    Take 1 chew tab by mouth 2 times daily        Fish Oil 600 MG Caps      Take by mouth 2 times daily        loratadine 10 MG ODT tab    CLARITIN REDITABS     Take 10 mg by mouth as needed        magnesium 250 MG tablet     30  tablet    Take 1 tablet by mouth daily        Multi-vitamin Tabs tablet     100 tablet    Take 1 tablet by mouth daily        mupirocin 2 % ointment    BACTROBAN    22 g    Apply topically 3 times daily for 5 days    Impetigo       psyllium 0.52 g capsule     540 capsule    Take 1 capsule (0.52 g) by mouth daily

## 2018-04-30 NOTE — PROGRESS NOTES
SUBJECTIVE:   Flora Hogan is a 72 year old female who presents to clinic today for the following health issues:      1. Shortness of Breath      Duration: started around December 2017    Description (location/character/radiation): has been getting worse, feels SOB when walking and exercising but also just sitting and not doing anything; is sporadic    Intensity:  Mild-moderate    Accompanying signs and symptoms: stuffy nose, stuffy head, phlegm in throat, occ chest tightness, occ light-headedness    History (similar episodes/previous evaluation): None    Precipitating or alleviating factors: None    Therapies tried and outcome: claritin helps a little but not much     Patient is here today for evaluation of shortness of breath  Ongoing for years, but seems to be coming on more frequent  Lasts for a few minutes  Occurs at rest and with activity  No trouble laying flat  Admits to some chest tightness when this occurs  No wheezing, coughing, weight gain or leg swelling  Initially saw cardiology and has tried inhalers without relief  She is concerned because it is occurring daily  She denies racing heart, syncope associate with the shortness of breath    2. Nose problem      Duration: 4 days    Description (location/character/radiation): dryness and scabbing beneath right nostril    Intensity:  mild    Accompanying signs and symptoms: mild burning    History (similar episodes/previous evaluation): has had the same symptoms 3 other times that started in her nose and became staph infections    Precipitating or alleviating factors: none    Therapies tried and outcome: using hydrocortisone cream which helps with the dryness and possibly slowing the spread     Patient is also concerned about red, yellow scab colored lesion under right nostril  Ongoing for 4 days  Has had this before, told she had staph infection  No fever, chills, some mild yellow/white discharge  Treated with hydrocortisone cream without  relief    Problem list and histories reviewed & adjusted, as indicated.  Additional history: as documented    Patient Active Problem List   Diagnosis     Seasonal allergies     CARDIOVASCULAR SCREENING; LDL GOAL LESS THAN 160     Osteopenia     Advance Care Planning     Peripheral neuropathy (HCC)     Nonintractable headache, unspecified chronicity pattern, unspecified headache type     Claudication of lower extremity (HCC)     Chest tightness     SOB (shortness of breath)     Past Surgical History:   Procedure Laterality Date     CATARACT IOL, RT/LT  2015     COLONOSCOPY  10/14    no repeat needed due to age     COLONOSCOPY N/A 10/7/2014    Procedure: COLONOSCOPY;  Surgeon: Daryl Hanson MD;  Location:  GI       Social History   Substance Use Topics     Smoking status: Never Smoker     Smokeless tobacco: Never Used     Alcohol use Yes      Comment: social      Family History   Problem Relation Age of Onset     Musculoskeletal Disorder Mother      edematous legs     Obesity Mother      Musculoskeletal Disorder Maternal Grandmother      edematous legs; did have amputation     Obesity Maternal Grandmother      Myocardial Infarction Maternal Grandmother          Current Outpatient Prescriptions   Medication Sig Dispense Refill     albuterol (PROAIR HFA/PROVENTIL HFA/VENTOLIN HFA) 108 (90 Base) MCG/ACT Inhaler Inhale 2 puffs into the lungs every 6 hours as needed for shortness of breath / dyspnea or wheezing 1 Inhaler 0     calcium carbonate-vitamin D (CALTRATE 600+D) 600-400 MG-UNIT CHEW Take 1 chew tab by mouth 2 times daily 180 tablet 3     loratadine (CLARITIN REDITABS) 10 MG dispersible tablet Take 10 mg by mouth as needed        magnesium 250 MG tablet Take 1 tablet by mouth daily 30 tablet      multivitamin, therapeutic with minerals (MULTI-VITAMIN) TABS Take 1 tablet by mouth daily 100 tablet 3     mupirocin (BACTROBAN) 2 % ointment Apply topically 3 times daily for 5 days 22 g 1     Omega-3 Fatty Acids  "(FISH OIL) 600 MG CAPS Take by mouth 2 times daily       psyllium 0.52 G capsule Take 1 capsule (0.52 g) by mouth daily 540 capsule      [DISCONTINUED] albuterol (PROAIR HFA/PROVENTIL HFA/VENTOLIN HFA) 108 (90 BASE) MCG/ACT Inhaler Inhale 2 puffs into the lungs every 6 hours as needed for shortness of breath / dyspnea or wheezing (Patient not taking: Reported on 9/27/2017) 1 Inhaler 0     Allergies   Allergen Reactions     Penicillin V      Fredy-1 [Lidocaine] Unknown       Reviewed and updated as needed this visit by clinical staff  Tobacco  Allergies  Meds  Med Hx  Surg Hx  Fam Hx  Soc Hx      Reviewed and updated as needed this visit by Provider         ROS:  Constitutional, HEENT, cardiovascular, pulmonary, gi and gu systems are negative, except as otherwise noted.    OBJECTIVE:     /58 (BP Location: Right arm, Patient Position: Chair, Cuff Size: Adult Regular)  Pulse 59  Temp 97.4  F (36.3  C) (Oral)  Resp 14  Ht 5' 5\" (1.651 m)  Wt 119 lb (54 kg)  LMP  (LMP Unknown)  SpO2 99%  Breastfeeding? No  BMI 19.8 kg/m2  Body mass index is 19.8 kg/(m^2).  GENERAL: healthy, alert and no distress  HENT: normal cephalic/atraumatic, ear canals and TM's normal, nose and mouth without ulcers or lesions, oropharynx clear, oral mucous membranes moist and beneath right nare is small yellow scabbed lesion consistent with impetigo  NECK: no adenopathy, no asymmetry, masses, or scars and thyroid normal to palpation  RESP: lungs clear to auscultation - no rales, rhonchi or wheezes  CV: regular rate and rhythm, normal S1 S2, no S3 or S4, no murmur, click or rub, no peripheral edema and peripheral pulses strong  MS: no gross musculoskeletal defects noted, no edema    Diagnostic Test Results:  none     ASSESSMENT/PLAN:             1. SOB (shortness of breath)  Chronic issue, unclear cause.  Has had work up by cardiology, and CXR and EKG which were normal.  Would recommend f/u with Pulmonology to further " assess.  Refill given of inhaler.  Discussed if chest pain, syncope, irregular heart rate needs to be seen.  - PULMONARY MEDICINE REFERRAL  - albuterol (PROAIR HFA/PROVENTIL HFA/VENTOLIN HFA) 108 (90 Base) MCG/ACT Inhaler; Inhale 2 puffs into the lungs every 6 hours as needed for shortness of breath / dyspnea or wheezing  Dispense: 1 Inhaler; Refill: 0    2. Impetigo  New problem, will treat with Bactroban.  F/U if symptoms worsen or do not improve.  - mupirocin (BACTROBAN) 2 % ointment; Apply topically 3 times daily for 5 days  Dispense: 22 g; Refill: 1    Risks, benefits and alternatives were discussed with patient. Agreeable to the plan of care.      Kelly Clay PA-C  [facility]

## 2018-06-12 ENCOUNTER — HEALTH MAINTENANCE LETTER (OUTPATIENT)
Age: 73
End: 2018-06-12

## 2018-06-20 ENCOUNTER — TRANSFERRED RECORDS (OUTPATIENT)
Dept: HEALTH INFORMATION MANAGEMENT | Facility: CLINIC | Age: 73
End: 2018-06-20

## 2018-06-27 ENCOUNTER — TRANSFERRED RECORDS (OUTPATIENT)
Dept: HEALTH INFORMATION MANAGEMENT | Facility: CLINIC | Age: 73
End: 2018-06-27

## 2018-07-31 ENCOUNTER — OFFICE VISIT (OUTPATIENT)
Dept: FAMILY MEDICINE | Facility: CLINIC | Age: 73
End: 2018-07-31
Payer: COMMERCIAL

## 2018-07-31 VITALS
SYSTOLIC BLOOD PRESSURE: 122 MMHG | DIASTOLIC BLOOD PRESSURE: 74 MMHG | BODY MASS INDEX: 20.29 KG/M2 | HEART RATE: 58 BPM | OXYGEN SATURATION: 100 % | HEIGHT: 65 IN | WEIGHT: 121.8 LBS | RESPIRATION RATE: 16 BRPM | TEMPERATURE: 97.4 F

## 2018-07-31 DIAGNOSIS — R06.02 SOB (SHORTNESS OF BREATH): ICD-10-CM

## 2018-07-31 DIAGNOSIS — J30.2 SEASONAL ALLERGIC RHINITIS, UNSPECIFIED CHRONICITY, UNSPECIFIED TRIGGER: Primary | ICD-10-CM

## 2018-07-31 PROCEDURE — 99213 OFFICE O/P EST LOW 20 MIN: CPT | Performed by: PHYSICIAN ASSISTANT

## 2018-07-31 NOTE — MR AVS SNAPSHOT
After Visit Summary   7/31/2018    Flora Hogan    MRN: [id number]           Patient Information     Date Of Birth          1945        Visit Information        Provider Department      7/31/2018 7:10 AM Kelly Clay PA-C [facility]        Today's Diagnoses     Seasonal allergic rhinitis, unspecified chronicity, unspecified trigger    -  1    SOB (shortness of breath)           Follow-ups after your visit        Follow-up notes from your care team     Return in about 1 year (around 7/31/2019) for Physical Exam.      Who to contact     If you have questions or need follow up information about today's clinic visit or your schedule please contact [facility] directly at [phone].  Normal or non-critical lab and imaging results will be communicated to you by shopatplaceshart, letter or phone within 4 business days after the clinic has received the results. If you do not hear from us within 7 days, please contact the clinic through shopatplaceshart or phone. If you have a critical or abnormal lab result, we will notify you by phone as soon as possible.  Submit refill requests through Flashstarts or call your pharmacy and they will forward the refill request to us. Please allow 3 business days for your refill to be completed.          Additional Information About Your Visit        MyChart Information     Flashstarts gives you secure access to your electronic health record. If you see a primary care provider, you can also send messages to your care team and make appointments. If you have questions, please call your primary care clinic.  If you do not have a primary care provider, please call [phone] and they will assist you.        Care EveryWhere ID     This is your Care EveryWhere ID. This could be used by other organizations to access your [facility] medical records  [id number]        Your Vitals Were     Pulse Temperature Respirations Height Last Period Pulse Oximetry  "   58 97.4  F (36.3  C) (Oral) 16 5' 5\" (1.651 m) (LMP Unknown) 100%    Breastfeeding? BMI (Body Mass Index)                No 20.27 kg/m2           Blood Pressure from Last 3 Encounters:   07/31/18 122/74   04/30/18 110/58   11/09/17 128/66    Weight from Last 3 Encounters:   07/31/18 121 lb 12.8 oz (55.2 kg)   04/30/18 119 lb (54 kg)   11/09/17 123 lb 9.6 oz (56.1 kg)              Today, you had the following     No orders found for display       Primary Care Provider Office Phone # Fax #    Natasha Braun [phone]       [address]  [facility] [address]        Equal Access to Services     [facility] : Hadii ceci ash hadasho Soalejandro, waaxda luqadaha, qaybta kaalmada adeegyada, reji awad . So [facility] [phone].    ATENCIÓN: Si habla español, tiene a bashir disposición servicios gratuitos de asistencia lingüística. Cynthia al [phone].    We comply with applicable federal civil rights laws and [place] laws. We do not discriminate on the basis of race, color, national origin, age, disability, sex, sexual orientation, or gender identity.            Thank you!     Thank you for choosing [facility]  for your care. Our goal is always to provide you with excellent care. Hearing back from our patients is one way we can continue to improve our services. Please take a few minutes to complete the written survey that you may receive in the mail after your visit with us. Thank you!             Your Updated Medication List - Protect others around you: Learn how to safely use, store and throw away your medicines at www.disposemymeds.org.          This list is accurate as of 7/31/18  7:24 AM.  Always use your most recent med list.                   Brand Name Dispense Instructions for use Diagnosis    albuterol 108 (90 Base) MCG/ACT Inhaler    PROAIR HFA/PROVENTIL HFA/VENTOLIN HFA    1 Inhaler    Inhale 2 puffs into the lungs every 6 hours as needed for " shortness of breath / dyspnea or wheezing    SOB (shortness of breath)       CALTRATE 600+D 600-400 MG-UNIT Chew   Generic drug:  calcium carbonate-vitamin D     180 tablet    Take 1 chew tab by mouth 2 times daily        Fish Oil 600 MG Caps      Take by mouth 2 times daily        loratadine 10 MG ODT tab    CLARITIN REDITABS     Take 10 mg by mouth as needed        magnesium 250 MG tablet     30 tablet    Take 1 tablet by mouth daily        Multi-vitamin Tabs tablet     100 tablet    Take 1 tablet by mouth daily        psyllium 0.52 g capsule     540 capsule    Take 1 capsule (0.52 g) by mouth daily

## 2018-08-07 ENCOUNTER — TELEPHONE (OUTPATIENT)
Dept: FAMILY MEDICINE | Facility: CLINIC | Age: 73
End: 2018-08-07

## 2018-08-07 NOTE — TELEPHONE ENCOUNTER
Panel Management Review      Patient has the following on her problem list: None      Composite cancer screening  Chart review shows that this patient is due/due soon for the following Mammogram  Summary:    Patient is due/failing the following:   MAMMOGRAM    Action needed:   Patient needs office visit for mammogram.    Type of outreach:    Phone, left message for patient to call back.     Questions for provider review:    None                                                                                                                                    Leyla CHA     Chart routed to  .

## 2018-12-11 ENCOUNTER — MYC MEDICAL ADVICE (OUTPATIENT)
Dept: FAMILY MEDICINE | Facility: CLINIC | Age: 73
End: 2018-12-11

## 2018-12-11 ENCOUNTER — TELEPHONE (OUTPATIENT)
Dept: FAMILY MEDICINE | Facility: CLINIC | Age: 73
End: 2018-12-11

## 2018-12-11 NOTE — LETTER
December 31, 2018      Flora Hogan  [address] [facility] [address]        Dear Ms. Flora Hogan,      We care about your health and have reviewed your health plan including your medical conditions, medications, and lab results.  Based on this review, it is recommended that you follow up regarding the following health topic(s):     MAMMO Q1 YR due on 07[birth year]26/1964   DEXA SCAN SCREENING (SYSTEM ASSIGNED) due on 07/26/2009   ZOSTER IMMUNIZATION(2 of 3) due on 12/24/2014   INFLUENZA VACCINE(1) due on 09/01/2018     We recommend you take the following action(s):   -schedule a MAMMOGRAM.  This is an important screening for breast cancer.  Please disregard this reminder if you have had this exam elsewhere within the last year.  It would be helpful for us to receive a copy of these results so we can update your records.   To schedule a mammogram, please call our services at [phone].     -schedule a NURSE ONLY APPOINTMENT.  This is an appointment that does not involve a provider.  The needs you currently have can be handled by one of our LPNs or CMAs and is usually much quicker than a regular office visit.         Please call us at the [facility] - [phone] (or use Lingorami) to address the above recommendations.   Thank you for trusting [facility] and we appreciate the opportunity to serve you.  We look forward to supporting your healthcare needs in the future.     Sincerely,   Tita Jordan CMA (MA)

## 2018-12-11 NOTE — TELEPHONE ENCOUNTER
Type of outreach:  Sent Capt'nSocial message.  Health Maintenance Due   Topic Date Due     MAMMO Q1 YR  07/26/1963     DEXA SCAN SCREENING (SYSTEM ASSIGNED)  07/26/2009     ZOSTER IMMUNIZATION (2 of 3) 12/24/2014     DTAP/TDAP/TD IMMUNIZATION (3 - Td) 03/12/2015     FALL RISK ASSESSMENT  06/06/2018     PHQ-2 Q1 YR  06/06/2018     INFLUENZA VACCINE (1) 09/01/2018     Needs to schedule mammo, nurse only for flu vaccine.  Tita Jordan CMA (AAMA)

## 2019-04-12 ENCOUNTER — TELEPHONE (OUTPATIENT)
Dept: FAMILY MEDICINE | Facility: CLINIC | Age: 74
End: 2019-04-12

## 2019-04-15 ENCOUNTER — TRANSFERRED RECORDS (OUTPATIENT)
Dept: HEALTH INFORMATION MANAGEMENT | Facility: CLINIC | Age: 74
End: 2019-04-15

## 2019-04-19 NOTE — TELEPHONE ENCOUNTER
2nd attempt, called and talked to patient.  She Is very busy right now and will call in a couple weeks to schedule.  Tita Jordan CMA ([facility])

## 2019-04-23 ENCOUNTER — TRANSFERRED RECORDS (OUTPATIENT)
Dept: HEALTH INFORMATION MANAGEMENT | Facility: CLINIC | Age: 74
End: 2019-04-23

## 2019-05-06 ENCOUNTER — HOSPITAL ENCOUNTER (OUTPATIENT)
Dept: GENERAL RADIOLOGY | Facility: CLINIC | Age: 74
Discharge: HOME | End: 2019-05-06
Attending: INTERNAL MEDICINE | Admitting: INTERNAL MEDICINE
Payer: COMMERCIAL

## 2019-05-06 DIAGNOSIS — R19.4 CHANGE IN BOWEL HABITS: ICD-10-CM

## 2019-05-06 PROCEDURE — 74019 RADEX ABDOMEN 2 VIEWS: CPT

## 2019-05-07 ENCOUNTER — TRANSFERRED RECORDS (OUTPATIENT)
Dept: HEALTH INFORMATION MANAGEMENT | Facility: CLINIC | Age: 74
End: 2019-05-07

## 2019-05-10 ENCOUNTER — TRANSFERRED RECORDS (OUTPATIENT)
Dept: HEALTH INFORMATION MANAGEMENT | Facility: CLINIC | Age: 74
End: 2019-05-10

## 2019-07-02 ENCOUNTER — TELEPHONE (OUTPATIENT)
Dept: FAMILY MEDICINE | Facility: CLINIC | Age: 74
End: 2019-07-02

## 2019-07-02 DIAGNOSIS — K58.9 IRRITABLE BOWEL SYNDROME, UNSPECIFIED TYPE: Primary | ICD-10-CM

## 2019-07-02 NOTE — TELEPHONE ENCOUNTER
Pt is scheduled to see a Registered Dietician but doesn't have an order.  An order is required for a Nutrition consult, please advise.      [facility] Specialist  Diabetes & Nutrition Scheduling  [address] [place] [address] [phone]

## 2019-07-18 ENCOUNTER — TELEPHONE (OUTPATIENT)
Dept: FAMILY MEDICINE | Facility: CLINIC | Age: 74
End: 2019-07-18

## 2019-07-18 ENCOUNTER — OFFICE VISIT (OUTPATIENT)
Dept: FAMILY MEDICINE | Facility: CLINIC | Age: 74
End: 2019-07-18
Payer: COMMERCIAL

## 2019-07-18 VITALS
TEMPERATURE: 98.2 F | HEIGHT: 66 IN | RESPIRATION RATE: 16 BRPM | OXYGEN SATURATION: 97 % | BODY MASS INDEX: 18.48 KG/M2 | DIASTOLIC BLOOD PRESSURE: 67 MMHG | SYSTOLIC BLOOD PRESSURE: 102 MMHG | HEART RATE: 61 BPM | WEIGHT: 115 LBS

## 2019-07-18 DIAGNOSIS — A49.01 STAPH AUREUS INFECTION: Primary | ICD-10-CM

## 2019-07-18 PROCEDURE — 99213 OFFICE O/P EST LOW 20 MIN: CPT | Performed by: PHYSICIAN ASSISTANT

## 2019-07-18 RX ORDER — MUPIROCIN 20 MG/G
OINTMENT TOPICAL 3 TIMES DAILY
Qty: 30 G | Refills: 0 | Status: SHIPPED | OUTPATIENT
Start: 2019-07-18 | End: 2020-04-19

## 2019-07-18 ASSESSMENT — MIFFLIN-ST. JEOR: SCORE: 1038.39

## 2019-07-18 NOTE — PROGRESS NOTES
"Subjective     Flora Hogan is a 73 year old female who presents to clinic today for the following health issues:    HPI   Patient states that she has a new lesion under her nose, symptoms appeared yesterday.   She has had similar symptoms previously   She is unsure how/why she has had regular occurrence  Symptoms came on last night while working in the yard   -does not think she had any injury or cut to the area  It is not painful  She did put some homemade soap which seemed to dry things out      Patient Active Problem List   Diagnosis     Seasonal allergies     CARDIOVASCULAR SCREENING; LDL GOAL LESS THAN 160     Osteopenia     Advance Care Planning     Peripheral neuropathy (HCC)     Nonintractable headache, unspecified chronicity pattern, unspecified headache type     Chest tightness     SOB (shortness of breath)     Past Surgical History:   Procedure Laterality Date     CATARACT IOL, RT/LT  2015     COLONOSCOPY  10/14    no repeat needed due to age     COLONOSCOPY N/A 10/7/2014    Procedure: COLONOSCOPY;  Surgeon: Daryl Hanson MD;  Location:  GI     COLONOSCOPY  04/23/2019    no further screening       Social History     Tobacco Use     Smoking status: Never Smoker     Smokeless tobacco: Never Used   Substance Use Topics     Alcohol use: Yes     Comment: social      Family History   Problem Relation Age of Onset     Musculoskeletal Disorder Mother         edematous legs     Obesity Mother      Musculoskeletal Disorder Maternal Grandmother         edematous legs; did have amputation     Obesity Maternal Grandmother      Myocardial Infarction Maternal Grandmother              Reviewed and updated as needed this visit by Provider         Review of Systems   ROS COMP: Constitutional, HEENT, cardiovascular, pulmonary, gi and gu systems are negative, except as otherwise noted.      Objective    /67   Pulse 61   Temp 98.2  F (36.8  C) (Tympanic)   Resp 16   Ht 1.676 m (5' 6\")   Wt 52.2 kg (115 " lb)   LMP  (LMP Unknown)   SpO2 97%   BMI 18.56 kg/m    Body mass index is 18.56 kg/m .  Physical Exam   GENERAL: healthy, alert and no distress  SKIN: there is a small honey colored crust at the border of the right nare.    Diagnostic Test Results:  Labs reviewed in Epic        Assessment & Plan     1. Staph aureus infection  Start below. Reviewed consideration of also using within bilateral nares x 5-7 days for attempted eradication. Follow up if not improving  - mupirocin (BACTROBAN) 2 % external ointment; Apply topically 3 times daily  Dispense: 30 g; Refill: 0       Return in about 1 week (around 7/25/2019).    Marcial Holden PA-C  [facility]

## 2019-07-18 NOTE — TELEPHONE ENCOUNTER
Type of outreach:  Discussed with patient at OV on 7/18/19.  Health Maintenance Due   Topic Date Due     DEXA  1945     ZOSTER IMMUNIZATION (2 of 3) 12/24/2014     MEDICARE ANNUAL WELLNESS VISIT  09/12/2015     FALL RISK ASSESSMENT  06/06/2018     MAMMO SCREENING  10/20/2018     Patient is no longer getting mammograms done.   Leyla Johnson MA

## 2019-08-20 ENCOUNTER — OFFICE VISIT (OUTPATIENT)
Dept: NUTRITION | Facility: CLINIC | Age: 74
End: 2019-08-20
Payer: COMMERCIAL

## 2019-08-20 DIAGNOSIS — K58.9 IBS (IRRITABLE BOWEL SYNDROME): Primary | ICD-10-CM

## 2019-08-20 PROCEDURE — 97802 MEDICAL NUTRITION INDIV IN: CPT

## 2019-08-20 NOTE — PROGRESS NOTES
MEDICAL NUTRITION THERAPY  Visit Type:Initial assessment and intervention    SUBJECTIVE:   Flora Hogan presents today for MNT and education related to bowel irregularities.   She is accompanied by self.     PATIENT STATED GOAL(S) FOR THIS VISIT: I am not convinced I have IBS, but want to know if I am missing anything    EATING HABITS:   Generally a high fiber diet    Misses meals? no  Eats out:  never     Previous diet education:  No     Food allergies/intolerances: no    EXERCISE: 4-6 mile walk most days, also 45 minute stretching video daily    SOCIO/ECONOMIC:   Lives with: significant other    OBJECTIVE:   Vitals: LMP  (LMP Unknown)     Weight Change: stable    ASSESSMENT:   Flora is frustrated that she has had two significant episodes of bowel leakage in 2 years, and no one has been able to give her clear cut answers. She has seen IM and GI doctors, but they tell her both a low fiber or a high fiber diet could make her feel better. She feels that she eats a high fiber diet most of the time, and did go 9 months between these bouts of bowel leakage. For this reason, not sure that diet is the trigger for these episodes, as she states that her diet never varies. Colonoscopy and blood work has been negative. Did have some general nutrition questions as well.     Diet is high in: fiber, fruits and vegetables  Diet is low in: calories      VERBAL AND WRITTEN INFORMATION GIVEN TO SUPPORT:  Discussed: general nutrition guidelines and fiber.  Education Materials Provided: Offered IBS nutrition therapy, patient declined  PLAN:   Discussed her diet and nutrition questions, however she will continue her regular high fiber diet at this time    FOLLOW UP:   Follow up with RD as needed.    Allyson Andrade RD LD CDE  Time spent in minutes: 45  Encounter: Individual

## 2019-10-29 ENCOUNTER — OFFICE VISIT (OUTPATIENT)
Dept: FAMILY MEDICINE | Facility: CLINIC | Age: 74
End: 2019-10-29
Payer: COMMERCIAL

## 2019-10-29 VITALS
TEMPERATURE: 97.7 F | BODY MASS INDEX: 19.48 KG/M2 | SYSTOLIC BLOOD PRESSURE: 110 MMHG | HEIGHT: 66 IN | WEIGHT: 121.2 LBS | DIASTOLIC BLOOD PRESSURE: 70 MMHG | OXYGEN SATURATION: 100 % | HEART RATE: 61 BPM | RESPIRATION RATE: 16 BRPM

## 2019-10-29 DIAGNOSIS — R07.89 CHEST TIGHTNESS: ICD-10-CM

## 2019-10-29 DIAGNOSIS — R15.9 INCONTINENCE OF FECES, UNSPECIFIED FECAL INCONTINENCE TYPE: ICD-10-CM

## 2019-10-29 DIAGNOSIS — R06.02 SOB (SHORTNESS OF BREATH): ICD-10-CM

## 2019-10-29 DIAGNOSIS — F43.9 STRESS: ICD-10-CM

## 2019-10-29 DIAGNOSIS — Z00.00 ENCOUNTER FOR MEDICARE ANNUAL WELLNESS EXAM: Primary | ICD-10-CM

## 2019-10-29 DIAGNOSIS — R42 DIZZINESS: ICD-10-CM

## 2019-10-29 DIAGNOSIS — Z91.89 AT RISK FOR CARDIOVASCULAR EVENT: ICD-10-CM

## 2019-10-29 DIAGNOSIS — R25.2 LEG CRAMPS: ICD-10-CM

## 2019-10-29 PROCEDURE — 99213 OFFICE O/P EST LOW 20 MIN: CPT | Mod: 25 | Performed by: FAMILY MEDICINE

## 2019-10-29 PROCEDURE — 99397 PER PM REEVAL EST PAT 65+ YR: CPT | Performed by: FAMILY MEDICINE

## 2019-10-29 RX ORDER — ALBUTEROL SULFATE 90 UG/1
2 AEROSOL, METERED RESPIRATORY (INHALATION) EVERY 6 HOURS PRN
Qty: 1 INHALER | Refills: 0 | COMMUNITY
Start: 2019-10-29 | End: 2022-05-20

## 2019-10-29 ASSESSMENT — ENCOUNTER SYMPTOMS
FREQUENCY: 0
HEMATURIA: 0
ABDOMINAL PAIN: 0
NERVOUS/ANXIOUS: 1
HEMATOCHEZIA: 0
DIZZINESS: 1
DIARRHEA: 0
CONSTIPATION: 1
EYE PAIN: 0
COUGH: 0
FEVER: 0
CHILLS: 1

## 2019-10-29 ASSESSMENT — ACTIVITIES OF DAILY LIVING (ADL): CURRENT_FUNCTION: NO ASSISTANCE NEEDED

## 2019-10-29 ASSESSMENT — MIFFLIN-ST. JEOR: SCORE: 1061.51

## 2019-10-29 NOTE — PATIENT INSTRUCTIONS
Patient Education   Personalized Prevention Plan  You are due for the preventive services outlined below.  Your care team is available to assist you in scheduling these services.  If you have already completed any of these items, please share that information with your care team to update in your medical record.  Health Maintenance Due   Topic Date Due     Osteoporosis Screening  1945     Zoster (Shingles) Vaccine (2 of 3) 12/24/2014     Annual Wellness Visit  09/12/2015     Mammogram  10/20/2018     Flu Vaccine (1) 09/01/2019       Signs of Hearing Loss     Hearing much better with one ear can be a sign of hearing loss.     Hearing loss is a problem shared by many people. In fact, it is one of the most common health conditions, particularly as people age. Most people over age 65 have some hearing loss, and by age 80, almost everyone does. Because hearing loss usually occurs slowly over the years, you may not realize your hearing ability has gotten worse.  Have your hearing checked  Contact your healthcare provider if you:    Have to strain to hear normal conversation    Have to watch other people s faces very carefully to follow what they re saying    Need to ask people to repeat what they ve said    Often misunderstand what people are saying    Turn the volume of the television or radio up so high that others complain    Feel that people are mumbling when they re talking to you    Find that the effort to hear leaves you feeling tired and irritated    Notice, when using the phone, that you hear better with one ear than the other  Date Last Reviewed: 12/1/2016 2000-2018 [employer] [address] [place] [address], [place] [address]. All rights reserved. This information is not intended as a substitute for professional medical care. Always follow your healthcare professional's instructions.

## 2019-10-29 NOTE — PROGRESS NOTES
"SUBJECTIVE:   Flora Hogan is a 74 year old female who presents for Preventive Visit.      Are you in the first 12 months of your Medicare coverage?  No    Healthy Habits:     In general, how would you rate your overall health?  Good    Frequency of exercise:  4-5 days/week    Duration of exercise:  45-60 minutes    Do you usually eat at least 4 servings of fruit and vegetables a day, include whole grains    & fiber and avoid regularly eating high fat or \"junk\" foods?  Yes    Taking medications regularly:  Yes    Medication side effects:  Other    Ability to successfully perform activities of daily living:  No assistance needed    Home Safety:  Lack of grab bars in the bathroom    Hearing Impairment:  Difficulty following a conversation in a noisy restaurant or crowded room    In the past 6 months, have you been bothered by leaking of urine?  No    In general, how would you rate your overall mental or emotional health?  Good      PHQ-2 Total Score: 0    Additional concerns today:  Yes    Do you feel safe in your environment? Yes    Do you have a Health Care Directive? Yes: Patient states has Advance Directive and will bring in a copy to clinic.      Fall risk  Fallen 2 or more times in the past year?: No  Any fall with injury in the past year?: No    Cognitive Screening   1) Repeat 3 items (Leader, Season, Table)    2) Clock draw: NORMAL  3) 3 item recall: Recalls 2 objects   Results: NORMAL clock, 1-2 items recalled: COGNITIVE IMPAIRMENT LESS LIKELY    Mini-CogTM Copyright ROBERT Devlin. Licensed by the author for use in [facility]; reprinted with permission ([email or web address]@.[facility]). All rights reserved.      Do you have sleep apnea, excessive snoring or daytime drowsiness?: no    Reviewed and updated as needed this visit by clinical staff         Reviewed and updated as needed this visit by Provider        Social History     Tobacco Use     Smoking status: Never Smoker     Smokeless tobacco: Never Used "   Substance Use Topics     Alcohol use: Yes     Comment: social          Alcohol Use 10/29/2019   Prescreen: >3 drinks/day or >7 drinks/week? No   Prescreen: >3 drinks/day or >7 drinks/week? -               Current providers sharing in care for this patient include:   Patient Care Team:  Natasha Braun MD as PCP - General (Family Practice)  Marcial Holden PA-C as Assigned PCP    The following health maintenance items are reviewed in Epic and correct as of today:  Health Maintenance   Topic Date Due     DEXA  1945     ZOSTER IMMUNIZATION (2 of 3) 12/24/2014     MEDICARE ANNUAL WELLNESS VISIT  09/12/2015     MAMMO SCREENING  10/20/2018     INFLUENZA VACCINE (1) 09/01/2019     LIPID  05/26/2020     FALL RISK ASSESSMENT  07/18/2020     ADVANCE CARE PLANNING  11/09/2022     DTAP/TDAP/TD IMMUNIZATION (2 - Td) 09/12/2024     COLONOSCOPY  04/23/2029     PHQ-2  Completed     PNEUMOCOCCAL IMMUNIZATION 65+ LOW/MEDIUM RISK  Completed     IPV IMMUNIZATION  Aged Out     MENINGITIS IMMUNIZATION  Aged Out       Patient Active Problem List   Diagnosis     Seasonal allergies     CARDIOVASCULAR SCREENING; LDL GOAL LESS THAN 160     Osteopenia     Advance Care Planning     Peripheral neuropathy (HCC)     Nonintractable headache, unspecified chronicity pattern, unspecified headache type     Chest tightness     SOB (shortness of breath)       Past Medical History:   Diagnosis Date     Chest tightness     had suspected allergies     Seasonal allergies      SOB (shortness of breath)        Past Surgical History:   Procedure Laterality Date     CATARACT IOL, RT/LT  2015     COLONOSCOPY  10/14    no repeat needed due to age     COLONOSCOPY N/A 10/7/2014    Procedure: COLONOSCOPY;  Surgeon: Daryl Hanson MD;  Location:  GI     COLONOSCOPY  04/23/2019    no further screening       OB History   No obstetric history on file.       Current Outpatient Medications   Medication Sig Dispense Refill     calcium carbonate-vitamin D  (CALTRATE 600+D) 600-400 MG-UNIT CHEW Take 1 chew tab by mouth 2 times daily 180 tablet 3     loratadine (CLARITIN REDITABS) 10 MG dispersible tablet Take 10 mg by mouth as needed        magnesium 250 MG tablet Take 1 tablet by mouth daily 30 tablet      multivitamin, therapeutic with minerals (MULTI-VITAMIN) TABS Take 1 tablet by mouth daily 100 tablet 3     Omega-3 Fatty Acids (FISH OIL) 600 MG CAPS Take by mouth 2 times daily       POTASSIUM PO        psyllium 0.52 G capsule Take 1 capsule (0.52 g) by mouth daily 540 capsule      albuterol (PROAIR HFA/PROVENTIL HFA/VENTOLIN HFA) 108 (90 Base) MCG/ACT inhaler Inhale 2 puffs into the lungs every 6 hours as needed for shortness of breath / dyspnea or wheezing 1 Inhaler 0     mupirocin (BACTROBAN) 2 % external ointment Apply topically 3 times daily 30 g 0       Family History   Problem Relation Age of Onset     Musculoskeletal Disorder Mother         edematous legs     Obesity Mother      Musculoskeletal Disorder Maternal Grandmother         edematous legs; did have amputation     Obesity Maternal Grandmother      Myocardial Infarction Maternal Grandmother        Social History     Tobacco Use     Smoking status: Never Smoker     Smokeless tobacco: Never Used   Substance Use Topics     Alcohol use: Yes     Comment: social        Immunization History   Administered Date(s) Administered     DT (PEDS <7y) 09/14/1989     Hep B, Peds or Adolescent 06/24/1997     Influenza (High Dose) 3 valent vaccine 12/08/2015, 10/17/2016, 11/09/2017, 10/04/2019     Influenza (IIV3) PF 10/16/2013, 10/29/2014     Pneumo Conj 13-V (2010&after) 05/19/2015     Pneumococcal 23 valent 01/12/2011     TDAP Vaccine (Adacel) 09/12/2014     Td (Adult), Adsorbed 12/27/1999     Zoster vaccine recombinant adjuvanted (SHINGRIX) 10/04/2019     Zoster vaccine, live 10/29/2014         Review of Systems   Constitutional: Positive for chills (Always cold; not new.). Negative for fever.   HENT: Negative for  "congestion and ear pain.    Eyes: Negative for pain.   Respiratory: Negative for cough.    Cardiovascular: Positive for chest pain (See below.).   Gastrointestinal: Positive for constipation (see below). Negative for abdominal pain, diarrhea and hematochezia.   Genitourinary: Negative for frequency and hematuria.   Neurological: Positive for dizziness (See below).   Psychiatric/Behavioral: The patient is nervous/anxious.      Having chest tightness more frequently. Not so sure related to allergy as in the past.  Does walk almost 4 miles per day. Will notice the tightness when walking up hill. Also short of breath at the time. Feels the chest tightness is related to exertion.  Stress will sometimes do this as well.    Stressed with folks asking her to do things and needs to drop what she is doing to attend to that.     Will have a bm q 3 days. Sometimes strains. Works at getting in fluids. Does eat a lot of fiber.    Dizziness for about a year.   Recent light headed when walking.  There are other times, will feel short of breath and lightheaded. Needs to sit for a minute before getting up; more at night.    She notes she has done stress test a couple times.  Also had worn a monitor in the past; things were OK.    She notes when in [place], towards the end of the trip; has some bowel leakage. Saw some doctors there.   In Winter.  She now believes it is the water    ? Vitamins. Are they necessary    Leg cramps; notes someone suggested Vinegar; she notes this did not help.  Was told to take magnesium. Then when picked this up, pharmacist told should also take potassium        OBJECTIVE:   /70 (BP Location: Right arm, Cuff Size: Adult Regular)   Pulse 61   Temp 97.7  F (36.5  C) (Oral)   Resp 16   Ht 1.676 m (5' 6\")   Wt 55 kg (121 lb 3.2 oz)   LMP  (LMP Unknown)   SpO2 100%   BMI 19.56 kg/m   Estimated body mass index is 18.56 kg/m  as calculated from the following:    Height as of 7/18/19: 1.676 m (5' 6\").    " Weight as of 7/18/19: 52.2 kg (115 lb).  Physical Exam  GENERAL APPEARANCE: alert and no distress  EYES: Eyes grossly normal to inspection, PERRL and conjunctivae and sclerae normal  HENT: ear canals and TM's normal, nose and mouth without ulcers or lesions, oropharynx clear and oral mucous membranes moist  NECK: no adenopathy, no asymmetry, masses, or scars and thyroid normal to palpation  RESP: lungs clear to auscultation - no rales, rhonchi or wheezes  BREAST: normal without masses, tenderness or nipple discharge and no palpable axillary masses or adenopathy  CV: regular rates and rhythm, no peripheral edema and peripheral pulses strong  ABDOMEN: soft, nontender, no hepatosplenomegaly, no masses and bowel sounds normal  MS: no musculoskeletal defects are noted and gait is age appropriate without ataxia  SKIN: no suspicious lesions or rashes  NEURO: Normal strength and tone, sensory exam grossly normal, mentation intact and speech normal  PSYCH: mentation appears normal and affect normal/bright    TSH   Date Value Ref Range Status   04/28/2016 0.88 0.40 - 4.00 mU/L Final     Recent Labs   Lab Test 05/26/15  0915   CHOL 163   HDL 75   LDL 78   TRIG 48   CHOLHDLRATIO 2.2     The ASCVD Risk score (Leonel DC Jr., et al., 2013) failed to calculate for the following reasons:    Cannot find a previous HDL lab    Cannot find a previous total cholesterol lab     12% = CV risk from gokul. Using above values.        ASSESSMENT / PLAN:   1. Encounter for Medicare annual wellness exam  Defers mammo and DXA.    2. Chest tightness  There is some concern for an anginal equivalent. Discussed consideration for stress test or visit with Cardiology. She defers at this time. Discussed with exertion, there is cardiac concern. She is aware, but not ready to pursue currently; concerns around increasing overall costs of health care, especially at her age.   Did discuss to be seen emergently if symptoms do not go away, if they progress;  "urgently if increasing.     3. SOB (shortness of breath)  As above.   She notes she will take the inhaler with her to [place], but has not used.   - albuterol (PROAIR HFA/PROVENTIL HFA/VENTOLIN HFA) 108 (90 Base) MCG/ACT inhaler; Inhale 2 puffs into the lungs every 6 hours as needed for shortness of breath / dyspnea or wheezing  Dispense: 1 Inhaler; Refill: 0    4. Dizziness  Uncertain etiology. More of a light headedness.  Discussed taking time to get up. Keep hydrated.    5. Leg cramps  Discussed I do check potassium, magnesium, calcium. Would treat if low, but not recommending otherwise.   Encourage fluids. Discussed stretching.     6. Incontinence of feces, unspecified fecal incontinence type  She feels it may be the water in [place] towards the end of her stay.   Also suggested perhaps it is some leakage around constipation; she does eat differently there.     7. At risk for cardiovascular event  We did discuss around possible consideration for statin. She defers.     8. Stress  We did discuss some.     Discussed vitamins; my thoughts are to go with vitamin D.   Discussed calcium; she does no dairy; may be good to take one. Better in diet.  Regarding magnesium and potassium, would check levels to see if truly low. She will consider if leg cramps continue.  MVI; discussed no study has confirmed beneficial, especially if eating a healthy diet.      End of Life Planning:  Patient currently has an advanced directive: Yes.  Practitioner is supportive of decision.    COUNSELING:  Reviewed preventive health counseling, as reflected in patient instructions       Regular exercise       Healthy diet/nutrition       Vision screening       Dental care    Estimated body mass index is 19.56 kg/m  as calculated from the following:    Height as of this encounter: 1.676 m (5' 6\").    Weight as of this encounter: 55 kg (121 lb 3.2 oz).    Weight management plan noted, stable and monitoring     reports that she has never smoked. She has " never used smokeless tobacco.      Appropriate preventive services were discussed with this patient, including applicable screening as appropriate for cardiovascular disease, diabetes, osteopenia/osteoporosis, and glaucoma.  As appropriate for age/gender, discussed screening for colorectal cancer, prostate cancer, breast cancer, and cervical cancer. Checklist reviewing preventive services available has been given to the patient.    Reviewed patients plan of care and provided an AVS. The Basic Care Plan (routine screening as documented in Health Maintenance) for Flora meets the Care Plan requirement. This Care Plan has been established and reviewed with the Patient.    Counseling Resources:  ATP IV Guidelines  Pooled Cohorts Equation Calculator  Breast Cancer Risk Calculator  FRAX Risk Assessment  ICSI Preventive Guidelines  Dietary Guidelines for Americans, 2010  USDA's MyPlate  ASA Prophylaxis  Lung CA Screening    Natasha Braun MD, MD  [facility]    Identified Health Risks:    The patient was provided with written information regarding signs of hearing loss.

## 2019-12-05 ENCOUNTER — DOCUMENTATION ONLY (OUTPATIENT)
Dept: OTHER | Facility: CLINIC | Age: 74
End: 2019-12-05

## 2020-03-02 ENCOUNTER — HEALTH MAINTENANCE LETTER (OUTPATIENT)
Age: 75
End: 2020-03-02

## 2020-04-08 ENCOUNTER — TRANSFERRED RECORDS (OUTPATIENT)
Dept: HEALTH INFORMATION MANAGEMENT | Facility: CLINIC | Age: 75
End: 2020-04-08

## 2020-04-19 ENCOUNTER — HOSPITAL ENCOUNTER (INPATIENT)
Facility: CLINIC | Age: 75
LOS: 2 days | Discharge: HOME | DRG: 445 | End: 2020-04-21
Attending: EMERGENCY MEDICINE | Admitting: INTERNAL MEDICINE
Payer: COMMERCIAL

## 2020-04-19 ENCOUNTER — APPOINTMENT (OUTPATIENT)
Dept: CT IMAGING | Facility: CLINIC | Age: 75
DRG: 445 | End: 2020-04-19
Attending: EMERGENCY MEDICINE
Payer: COMMERCIAL

## 2020-04-19 DIAGNOSIS — C80.1 BILIARY OBSTRUCTION DUE TO MALIGNANT NEOPLASM (H): ICD-10-CM

## 2020-04-19 DIAGNOSIS — R91.8 PULMONARY NODULES: ICD-10-CM

## 2020-04-19 DIAGNOSIS — R74.8 ELEVATED LIVER ENZYMES: ICD-10-CM

## 2020-04-19 DIAGNOSIS — R17 JAUNDICE: ICD-10-CM

## 2020-04-19 DIAGNOSIS — K83.1 BILIARY OBSTRUCTION DUE TO MALIGNANT NEOPLASM (H): ICD-10-CM

## 2020-04-19 LAB
ALBUMIN SERPL-MCNC: 3.6 G/DL (ref 3.4–5)
ALBUMIN UR-MCNC: NEGATIVE MG/DL
ALP SERPL-CCNC: 633 U/L (ref 40–150)
ALT SERPL W P-5'-P-CCNC: 775 U/L (ref 0–50)
ANION GAP SERPL CALCULATED.3IONS-SCNC: 3 MMOL/L (ref 3–14)
APPEARANCE UR: CLEAR
AST SERPL W P-5'-P-CCNC: 363 U/L (ref 0–45)
BASOPHILS # BLD AUTO: 0.1 10E9/L (ref 0–0.2)
BASOPHILS NFR BLD AUTO: 1.4 %
BILIRUB DIRECT SERPL-MCNC: 8.3 MG/DL (ref 0–0.2)
BILIRUB SERPL-MCNC: 11 MG/DL (ref 0.2–1.3)
BILIRUB UR QL STRIP: ABNORMAL
BUN SERPL-MCNC: 11 MG/DL (ref 7–30)
CALCIUM SERPL-MCNC: 9.2 MG/DL (ref 8.5–10.1)
CHLORIDE SERPL-SCNC: 103 MMOL/L (ref 94–109)
CO2 SERPL-SCNC: 30 MMOL/L (ref 20–32)
COLOR UR AUTO: YELLOW
CREAT SERPL-MCNC: 0.87 MG/DL (ref 0.52–1.04)
DIFFERENTIAL METHOD BLD: ABNORMAL
EOSINOPHIL # BLD AUTO: 0.1 10E9/L (ref 0–0.7)
EOSINOPHIL NFR BLD AUTO: 3.1 %
ERYTHROCYTE [DISTWIDTH] IN BLOOD BY AUTOMATED COUNT: 14.8 % (ref 10–15)
GFR SERPL CREATININE-BSD FRML MDRD: 65 ML/MIN/{1.73_M2}
GLUCOSE SERPL-MCNC: 86 MG/DL (ref 70–99)
GLUCOSE UR STRIP-MCNC: NEGATIVE MG/DL
HCT VFR BLD AUTO: 42.5 % (ref 35–47)
HGB BLD-MCNC: 13.9 G/DL (ref 11.7–15.7)
HGB UR QL STRIP: NEGATIVE
IMM GRANULOCYTES # BLD: 0 10E9/L (ref 0–0.4)
IMM GRANULOCYTES NFR BLD: 0 %
KETONES UR STRIP-MCNC: NEGATIVE MG/DL
LEUKOCYTE ESTERASE UR QL STRIP: NEGATIVE
LIPASE SERPL-CCNC: 393 U/L (ref 73–393)
LYMPHOCYTES # BLD AUTO: 1.1 10E9/L (ref 0.8–5.3)
LYMPHOCYTES NFR BLD AUTO: 31.8 %
MCH RBC QN AUTO: 29.1 PG (ref 26.5–33)
MCHC RBC AUTO-ENTMCNC: 32.7 G/DL (ref 31.5–36.5)
MCV RBC AUTO: 89 FL (ref 78–100)
MONOCYTES # BLD AUTO: 0.4 10E9/L (ref 0–1.3)
MONOCYTES NFR BLD AUTO: 10.3 %
MUCOUS THREADS #/AREA URNS LPF: PRESENT /LPF
NEUTROPHILS # BLD AUTO: 1.9 10E9/L (ref 1.6–8.3)
NEUTROPHILS NFR BLD AUTO: 53.4 %
NITRATE UR QL: NEGATIVE
NRBC # BLD AUTO: 0 10*3/UL
NRBC BLD AUTO-RTO: 0 /100
PH UR STRIP: 6.5 PH (ref 5–7)
PLATELET # BLD AUTO: 218 10E9/L (ref 150–450)
POTASSIUM SERPL-SCNC: 4.1 MMOL/L (ref 3.4–5.3)
PROT SERPL-MCNC: 7.4 G/DL (ref 6.8–8.8)
RBC # BLD AUTO: 4.78 10E12/L (ref 3.8–5.2)
RBC #/AREA URNS AUTO: <1 /HPF (ref 0–2)
SODIUM SERPL-SCNC: 136 MMOL/L (ref 133–144)
SOURCE: ABNORMAL
SP GR UR STRIP: 1.01 (ref 1–1.03)
TROPONIN I SERPL-MCNC: <0.015 UG/L (ref 0–0.04)
UROBILINOGEN UR STRIP-MCNC: NORMAL MG/DL (ref 0–2)
WBC # BLD AUTO: 3.6 10E9/L (ref 4–11)
WBC #/AREA URNS AUTO: 1 /HPF (ref 0–5)

## 2020-04-19 PROCEDURE — 99285 EMERGENCY DEPT VISIT HI MDM: CPT | Mod: 25

## 2020-04-19 PROCEDURE — 81001 URINALYSIS AUTO W/SCOPE: CPT | Performed by: EMERGENCY MEDICINE

## 2020-04-19 PROCEDURE — 86301 IMMUNOASSAY TUMOR CA 19-9: CPT | Performed by: EMERGENCY MEDICINE

## 2020-04-19 PROCEDURE — 99207 ZZC CDG-CHARGE REQUIRED MANUAL ENTRY: CPT | Performed by: INTERNAL MEDICINE

## 2020-04-19 PROCEDURE — 25000128 H RX IP 250 OP 636: Performed by: EMERGENCY MEDICINE

## 2020-04-19 PROCEDURE — 85025 COMPLETE CBC W/AUTO DIFF WBC: CPT | Performed by: EMERGENCY MEDICINE

## 2020-04-19 PROCEDURE — 74177 CT ABD & PELVIS W/CONTRAST: CPT

## 2020-04-19 PROCEDURE — 25000132 ZZH RX MED GY IP 250 OP 250 PS 637: Performed by: INTERNAL MEDICINE

## 2020-04-19 PROCEDURE — 99207 ZZC APP CREDIT; MD BILLING SHARED VISIT: CPT | Performed by: PHYSICIAN ASSISTANT

## 2020-04-19 PROCEDURE — 93005 ELECTROCARDIOGRAM TRACING: CPT

## 2020-04-19 PROCEDURE — 80048 BASIC METABOLIC PNL TOTAL CA: CPT | Performed by: EMERGENCY MEDICINE

## 2020-04-19 PROCEDURE — 25000125 ZZHC RX 250: Performed by: EMERGENCY MEDICINE

## 2020-04-19 PROCEDURE — 83690 ASSAY OF LIPASE: CPT | Performed by: EMERGENCY MEDICINE

## 2020-04-19 PROCEDURE — 84484 ASSAY OF TROPONIN QUANT: CPT | Performed by: EMERGENCY MEDICINE

## 2020-04-19 PROCEDURE — 12000000 ZZH R&B MED SURG/OB

## 2020-04-19 PROCEDURE — 80076 HEPATIC FUNCTION PANEL: CPT | Performed by: EMERGENCY MEDICINE

## 2020-04-19 PROCEDURE — 99223 1ST HOSP IP/OBS HIGH 75: CPT | Mod: AI | Performed by: INTERNAL MEDICINE

## 2020-04-19 RX ORDER — IBUPROFEN 600 MG/1
600 TABLET, FILM COATED ORAL EVERY 6 HOURS PRN
Status: DISCONTINUED | OUTPATIENT
Start: 2020-04-19 | End: 2020-04-21 | Stop reason: HOSPADM

## 2020-04-19 RX ORDER — NALOXONE HYDROCHLORIDE 0.4 MG/ML
.1-.4 INJECTION, SOLUTION INTRAMUSCULAR; INTRAVENOUS; SUBCUTANEOUS
Status: DISCONTINUED | OUTPATIENT
Start: 2020-04-19 | End: 2020-04-21 | Stop reason: HOSPADM

## 2020-04-19 RX ORDER — TEMAZEPAM 7.5 MG/1
7.5 CAPSULE ORAL
Status: DISCONTINUED | OUTPATIENT
Start: 2020-04-19 | End: 2020-04-21 | Stop reason: HOSPADM

## 2020-04-19 RX ORDER — AMOXICILLIN 250 MG
2 CAPSULE ORAL 2 TIMES DAILY PRN
Status: DISCONTINUED | OUTPATIENT
Start: 2020-04-19 | End: 2020-04-21 | Stop reason: HOSPADM

## 2020-04-19 RX ORDER — PROCHLORPERAZINE 25 MG
12.5 SUPPOSITORY, RECTAL RECTAL EVERY 12 HOURS PRN
Status: DISCONTINUED | OUTPATIENT
Start: 2020-04-19 | End: 2020-04-21 | Stop reason: HOSPADM

## 2020-04-19 RX ORDER — ONDANSETRON 4 MG/1
4 TABLET, ORALLY DISINTEGRATING ORAL EVERY 6 HOURS PRN
Status: DISCONTINUED | OUTPATIENT
Start: 2020-04-19 | End: 2020-04-21 | Stop reason: HOSPADM

## 2020-04-19 RX ORDER — ONDANSETRON 2 MG/ML
4 INJECTION INTRAMUSCULAR; INTRAVENOUS EVERY 6 HOURS PRN
Status: DISCONTINUED | OUTPATIENT
Start: 2020-04-19 | End: 2020-04-21 | Stop reason: HOSPADM

## 2020-04-19 RX ORDER — ALBUTEROL SULFATE 90 UG/1
2 AEROSOL, METERED RESPIRATORY (INHALATION) EVERY 6 HOURS PRN
Status: DISCONTINUED | OUTPATIENT
Start: 2020-04-19 | End: 2020-04-19 | Stop reason: RX

## 2020-04-19 RX ORDER — AMOXICILLIN 250 MG
1 CAPSULE ORAL 2 TIMES DAILY PRN
Status: DISCONTINUED | OUTPATIENT
Start: 2020-04-19 | End: 2020-04-21 | Stop reason: HOSPADM

## 2020-04-19 RX ORDER — POLYETHYLENE GLYCOL 3350 17 G/17G
17 POWDER, FOR SOLUTION ORAL DAILY PRN
Status: DISCONTINUED | OUTPATIENT
Start: 2020-04-19 | End: 2020-04-21 | Stop reason: HOSPADM

## 2020-04-19 RX ORDER — IOPAMIDOL 755 MG/ML
500 INJECTION, SOLUTION INTRAVASCULAR ONCE
Status: COMPLETED | OUTPATIENT
Start: 2020-04-19 | End: 2020-04-19

## 2020-04-19 RX ORDER — PROCHLORPERAZINE MALEATE 5 MG
5 TABLET ORAL EVERY 6 HOURS PRN
Status: DISCONTINUED | OUTPATIENT
Start: 2020-04-19 | End: 2020-04-21 | Stop reason: HOSPADM

## 2020-04-19 RX ORDER — LIDOCAINE 40 MG/G
CREAM TOPICAL
Status: DISCONTINUED | OUTPATIENT
Start: 2020-04-19 | End: 2020-04-21 | Stop reason: HOSPADM

## 2020-04-19 RX ADMIN — IOPAMIDOL 61 ML: 755 INJECTION, SOLUTION INTRAVENOUS at 08:33

## 2020-04-19 RX ADMIN — Medication 5 MG: at 22:42

## 2020-04-19 RX ADMIN — SODIUM CHLORIDE 55 ML: 9 INJECTION, SOLUTION INTRAVENOUS at 08:33

## 2020-04-19 ASSESSMENT — ENCOUNTER SYMPTOMS
FEVER: 0
UNEXPECTED WEIGHT CHANGE: 0
COLOR CHANGE: 1
CONSTIPATION: 1

## 2020-04-19 ASSESSMENT — ACTIVITIES OF DAILY LIVING (ADL)
ADLS_ACUITY_SCORE: 10
ADLS_ACUITY_SCORE: 10

## 2020-04-19 NOTE — ED NOTES
[facility]  ED Nurse Handoff Report    Flora Hogan is a 74 year old female   ED Chief complaint: Jaundice  . 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 Activity Level - Current:   Independent. Lift room needed: No. Bariatric: No   Needed: No   Isolation: No. Infection: Not Applicable.     Vital Signs:   Vitals:    04/19/20 0719 04/19/20 0815 04/19/20 0900   BP: 125/79 (!) 129/96 125/83   Pulse: 65 51 (!) 49   Resp: 18     Temp: 97.7  F (36.5  C)     TempSrc: Oral     SpO2: 99% 100% 100%       Cardiac Rhythm:  ,   Cardiac  Cardiac Rhythm: Normal sinus rhythm  Pain level:    Patient confused: No. Patient Falls Risk: No.   Elimination Status: Has voided   Patient Report - Initial Complaint: jaundice. Focused Assessment: pt reports noticing her skin and eyes looking jaundiced yesterday. Denies abd. Pain, denies any hx of liver problems.   Tests Performed: labs, CT chest/abd./pelvis. Abnormal Results:   Abnormal Labs Reviewed   CBC WITH PLATELETS DIFFERENTIAL - Abnormal; Notable for the following components:       Result Value    WBC 3.6 (*)     All other components within normal limits   ROUTINE UA WITH MICROSCOPIC - Abnormal; Notable for the following components:    Bilirubin Urine Small (*)     Mucous Urine Present (*)     All other components within normal limits   HEPATIC PANEL - Abnormal; Notable for the following components:    Bilirubin Direct 8.3 (*)     Bilirubin Total 11.0 (*)     Alkaline Phosphatase 633 (*)      (*)      (*)     All other components within normal limits     CT Chest/Abdomen/Pelvis w Contrast   Final Result   IMPRESSION:   1.  Occluded distal common bile duct with dilation of the intra and   extrahepatic  "biliary tree along with the pancreatic duct. Although   imaging is limited, the \"birds beak appearance\" of the occlusion with   surrounding 1.2 cm hypodensity in the pancreatic head is concerning   for malignancy. Gastroenterology referral recommended.   2.  0.8 cm enhancing nodule inferior right lobe of the liver is   nonspecific, but likely related to a flash filling hemangioma.   3.  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   4.  Noncalcified 3 mm pulmonary nodule.      Recommendations for one or multiple incidental lung nodules < 6mm :     Low risk patients: No routine follow-up.     High risk patients: Optional follow-up CT at 12 months; if   unchanged, no further follow-up.      *Low Risk: Minimal or absent history of smoking or other known risk   factors.   *Nonsolid (ground glass) or partly solid nodules may require longer   follow-up to exclude indolent adenocarcinoma.   *Recommendations based on Guidelines for the Management of Incidental   Pulmonary Nodules Detected at CT: From the Fleischner Society 2017,   Radiology 2017.      CHEMA LI MD      .   Treatments provided: observation  Family Comments: n/a  OBS brochure/video discussed/provided to patient:  n/a  ED Medications:   Medications   CT Scan Flush (55 mLs Intravenous Given 4/19/20 0833)   iopamidol (ISOVUE-370) solution 500 mL (61 mLs Intravenous Given 4/19/20 0833)     Drips infusing:  No  For the majority of the shift, the patient's behavior Green. Interventions performed were n/a.    Sepsis treatment initiated: No       ED Nurse Name/Phone Number: Va Gracia RN,   9:18 AM    RECEIVING UNIT ED HANDOFF REVIEW    Above ED Nurse Handoff Report was reviewed: Yes  Reviewed by: Mercedes Nunez RN on April 19, 2020 at 11:48 AM     "

## 2020-04-19 NOTE — ED TRIAGE NOTES
ABCs intact. Pt c/o yellowing eyes, dark urine for the past few weeks. Denies fever, cough, sob, sore throat, diarrhea.

## 2020-04-19 NOTE — PLAN OF CARE
VS bp soft. HR bradycardic (50s) No pain reported. Jaundice in the eyes and skin. LS-clear. Continent of bowel and bladder. Regular diet w/ appetite. NPO @ midnight for EUS tomorrow. Pt transfers independently in room. No bed alarm. Left forearm PIV saline locked. GI following. Melatonin given for sleep.

## 2020-04-19 NOTE — H&P
"[facility]    Hospitalist History and Physical    Name: Flora Hogan    MRN: [id number]  YOB: 1945    Age: 74 year old  Date of Admission:  4/19/2020  Date of Service (when I saw the patient): 04/19/20    Assessment & Plan   Flora Hogan is a 74 year old female with PMH significant for chronic constipation and seasonal allergies who presents for evaluation of jaundice.     ED workup reveals: bradycardic otherwise VSS, BMP unremarkable, total bilirubin of 11.0 with direct of 8.3, alkaline phosphatase of 633, ALT of 775, AST is 363, normal lipase, negative troponin, leukopenia of 3.6, UA shows small bilirubin otherwise unremarkable, EKG shows rate of 49 bpm in sinus bradycardia with left axis deviation, and CT of chest/abdomen/pelvis shows fluid in distal common bile duct with dilation of the intra-and extrahepatic biliary tree along with the pancreatic duct, although imaging is limited, the \"birds peak appearance\" of the occlusion with a surrounding 1.2 cm hypodensity in the pancreatic head is concerning for malignancy, 0.8 cm enhancing nodule inferior right lobe of the liver is nonspecific but likely related to a flash filling hemangioma, noncalcified 3 mm pulmonary nodule of left lower lobe.    #Painless jaundice, concern for obstruction from pancreatitic/biliary tumor: worsening abdominal cramping, pressure, and bloating over the last 3-4 days in setting of what patient thought was constipation and new jaundice as well scleral icterus she noticed over the last 2 days. Most recent stool has approved tan in color. No recent weight loss. Total bilirubin of 11.0 with direct of 8.3, alkaline phosphatase of 633, ALT of 775, and AST of 363. CT of abdomen/pelvis shows fluid in distal common bile duct with dilation of the intra-and extrahepatic biliary tree along with the pancreatic duct, although imaging is limited, the \"birds peak appearance\" of the occlusion with a surrounding 1.2 " "cm hypodensity in the pancreatic head is concerning for malignancy. No personal or family history of malignancy.   - Plan for EUS in AM per GI  - NPO at midnight  - Follow LFTs  - Add on   - GI saw patient in ED, consulted and following   - Oncology consult will likely be needed after EUS    #Chronic constipation: intermittent issue over the last year with most recent episode starting on 3/21 that the patient underwent two separate cleanses with use of Gatorade, Miralax, and Bisacodyl based on recommendations by her GI provider without improvement. She just recently used 3 soap suds enemas at home with some relief of oatmeal colored stool.   - PRN laxatives, enemas, and suppository     #Left lower lobe pulmonary nodule: 3 mm in size, incidental finding on CT scan of chest today, no prior history of tobacco abuse. No routine follow up since no risk factors but may reconsider with possible malignancy.     DVT Prophylaxis: Pneumatic Compression Devices  Code Status: Full, per discussion with patient   Disposition: Expected stay >2 midnights, will admit to inpatient     Primary Care Physician   Natasha Braun MD    Chief Complaint   Jaundice    History obtained from discussion with ED provider, chart review, and interview with patient.    History of Present Illness   Flora Hogan is a 74 year old female who presents for evaluation of jaundice.  The patient has a history of treatment constipation and \"leaky bowel\" for the past year. The patient states that she has been dealing with increased constipation since 3/21.  She has been in contact with her GI provider with recommendation to undergo cleanse and use Gatorade, MiraLAX, and bisacodyl in order to attempt to have a bowel movement.  The patient states that she is gone through this twice with only able to pass very small hard stool.  The patient has tried 3 enemas back-to-back which gave her some results of soft grainy, \"oatmeal\" colored stools.  With her " "increased constipation she reports feeling as if her abdomen has been \"rock hard\" some intermittent abdominal cramping, pressure, and bloating for the last 3-4 days.  Since having stool output this is improved.  Patient states that yesterday she just noticed that her skin appeared jaundiced as well as she had new scleral icterus.  She initially question if this was related to lighting in the room she was in but noticed that it got progressively darker as the day went on.  She notes recent increase fatigue and lightheadedness with position changes.  She has some chronic shortness of breath that is unchanged.  She notes an ongoing cough with allergies that has not changed. Denies associated nausea, vomiting, diarrhea, weight loss, fever, chills, chest pain, or urinary symptoms.  Patient states she last traveled to [place] and returned to [place] on 3/21. Denies any known exposure to COVID-19.     Past Medical History    Past Medical History:   Diagnosis Date     Chest tightness     had suspected allergies     Seasonal allergies      SOB (shortness of breath)      Past Surgical History   Past Surgical History:   Procedure Laterality Date     CATARACT IOL, RT/LT  2015     COLONOSCOPY  10/14    no repeat needed due to age     COLONOSCOPY N/A 10/7/2014    Procedure: COLONOSCOPY;  Surgeon: Daryl Hanson MD;  Location:  GI     COLONOSCOPY  04/23/2019    no further screening     Prior to Admission Medications   Prior to Admission Medications   Prescriptions Last Dose Informant Patient Reported? Taking?   albuterol (PROAIR HFA/PROVENTIL HFA/VENTOLIN HFA) 108 (90 Base) MCG/ACT inhaler prn  Yes Yes   Sig: Inhale 2 puffs into the lungs every 6 hours as needed for shortness of breath / dyspnea or wheezing   calcium carbonate-vitamin D (CALTRATE 600+D) 600-400 MG-UNIT chewable tablet 4/18/2020 at 2000  Yes Yes   Sig: Take 1 chew tab by mouth 2 times daily    loratadine (CLARITIN REDITABS) 10 MG dispersible tablet prn  Yes " Yes   Sig: Take 10 mg by mouth as needed       Facility-Administered Medications: None     Allergies   Allergies   Allergen Reactions     Penicillin V      Fredy-1 [Lidocaine] Unknown     Social History   Social History     Tobacco Use     Smoking status: Never Smoker     Smokeless tobacco: Never Used   Substance Use Topics     Alcohol use: Yes     Comment: social      Social History     Social History Narrative     Not on file     Family History   Family history reviewed with patient and is noncontributory.    Review of Systems   A Comprehensive greater than 10 system review of systems was carried out.  Pertinent positives and negatives are noted above.  Otherwise negative for contributory information.    Physical Exam   Temp: 97.7  F (36.5  C) Temp src: Oral BP: (!) 154/88 Pulse: 59 Heart Rate: 50 Resp: 18 SpO2: 90 % O2 Device: None (Room air)    Vital Signs with Ranges  Temp:  [97.7  F (36.5  C)] 97.7  F (36.5  C)  Pulse:  [49-65] 59  Heart Rate:  [50] 50  Resp:  [18] 18  BP: (123-154)/(79-96) 154/88  SpO2:  [90 %-100 %] 90 %  0 lbs 0 oz    GEN:  Alert, oriented x 3, appears comfortable, no overt distress  HEENT:  Normocephalic/atraumatic, bilateral scleral icterus, no nasal discharge, mouth moist.  CV:  Regular rate and rhythm, no murmur or JVD.  S1 + S2 noted, no S3 or S4.  LUNGS:  Clear to auscultation bilaterally without rales/rhonchi/wheezing/retractions.  Symmetric chest rise on inhalation noted.  ABD:  Active bowel sounds, soft, non-tender/non-distended.  No rebound/guarding/rigidity.  EXT:  No edema.  No cyanosis.  No acute joint synovitis noted.  SKIN:  Dry to touch, jaundice  NEURO:  Symmetric muscle strength, sensation to touch grossly intact.  Coordination symmetric on general exam.  No new focal deficits appreciated.    Data   Data reviewed today:  I personally reviewed the EKG tracing showing rate of 49 bpm in sinus bradycardia with left axis deviation .    Results for orders placed or performed  "during the hospital encounter of 04/19/20   CT Chest/Abdomen/Pelvis w Contrast     Status: None    Narrative    CT CHEST/ABDOMEN/PELVIS WITH CONTRAST April 19, 2020 8:43 AM    CLINICAL HISTORY: Jaundice, chest pain/abdominal pain.    TECHNIQUE: CT scan of the chest, abdomen, and pelvis was performed  following injection of IV contrast. Multiplanar reformats were  obtained. Dose reduction techniques were used.     CONTRAST: 61mL Isovue-370    COMPARISON: None.    FINDINGS:   LUNGS AND PLEURA: 3 mm noncalcified pulmonary nodule in the left lower  lobe (series 5, image 256). No other noncalcified pulmonary nodule or  mass. No pneumothorax or pleural effusion.    MEDIASTINUM/AXILLAE: No pathologically enlarged thoracic or axillary  lymph nodes.    HEPATOBILIARY: There is intrahepatic and extrahepatic biliary ductal  dilation. The common hepatic duct measures up to 1.4 cm. The dilated  common bile duct is abruptly occluded in the head of the pancreas with  a \"bird's beak\" appearance (series 7, image 30). The gallbladder is  also distended. There are a few simple cysts in the liver. An  enhancing nodule is noted in the inferior right lobe measuring 0.8 cm  (series 2, image 73).    PANCREAS: 1.2 cm hypodensity surrounding the initial narrowed  component of the common bile duct (series 2, image 69) concerning for  mass. The pancreatic duct is mildly dilated measuring 4 mm.    SPLEEN: Normal.    ADRENAL GLANDS: Normal.    KIDNEYS/BLADDER: Subcentimeter hypodensities in the kidneys are  statistically likely to be benign and require no further follow-up. No  hydronephrosis or evidence of enhancing renal mass.    BOWEL: No evidence of bowel obstruction. There is a large volume of  retained colonic stool. No free intraperitoneal air.    PELVIC ORGANS: Parametrial varicosities.    ADDITIONAL FINDINGS: No ascites.    MUSCULOSKELETAL: Degenerative changes without suspicious lytic or  blastic lesion.      Impression    " "IMPRESSION:  1.  Occluded distal common bile duct with dilation of the intra and  extrahepatic biliary tree along with the pancreatic duct. Although  imaging is limited, the \"birds beak appearance\" of the occlusion with  surrounding 1.2 cm hypodensity in the pancreatic head is concerning  for malignancy. Gastroenterology referral recommended.  2.  0.8 cm enhancing nodule inferior right lobe of the liver is  nonspecific, but likely related to a flash filling hemangioma.  3.  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  4.  Noncalcified 3 mm pulmonary nodule.    Recommendations for one or multiple incidental lung nodules < 6mm :    Low risk patients: No routine follow-up.    High risk patients: Optional follow-up CT at 12 months; if  unchanged, no further follow-up.    *Low Risk: Minimal or absent history of smoking or other known risk  factors.  *Nonsolid (ground glass) or partly solid nodules may require longer  follow-up to exclude indolent adenocarcinoma.  *Recommendations based on Guidelines for the Management of Incidental  Pulmonary Nodules Detected at CT: From the Fleischner Society 2017,  Radiology 2017.    CHEMA LI MD   CBC + differential     Status: Abnormal   Result Value Ref Range    WBC 3.6 (L) 4.0 - 11.0 10e9/L    RBC Count 4.78 3.8 - 5.2 10e12/L    Hemoglobin 13.9 11.7 - 15.7 g/dL    Hematocrit 42.5 35.0 - 47.0 %    MCV 89 78 - 100 fl    MCH 29.1 26.5 - 33.0 pg    MCHC 32.7 31.5 - 36.5 g/dL    RDW 14.8 10.0 - 15.0 %    Platelet Count 218 150 - 450 10e9/L    Diff Method Automated Method     % Neutrophils 53.4 %    % Lymphocytes 31.8 %    % Monocytes 10.3 %    % Eosinophils 3.1 %    % Basophils 1.4 %    % Immature Granulocytes 0.0 %    Nucleated RBCs 0 0 /100    Absolute Neutrophil 1.9 1.6 - 8.3 10e9/L    Absolute Lymphocytes 1.1 0.8 - 5.3 10e9/L    Absolute Monocytes 0.4 0.0 - 1.3 10e9/L    Absolute Eosinophils 0.1 0.0 - 0.7 10e9/L    " Absolute Basophils 0.1 0.0 - 0.2 10e9/L    Abs Immature Granulocytes 0.0 0 - 0.4 10e9/L    Absolute Nucleated RBC 0.0    Basic metabolic panel (BMP)     Status: None   Result Value Ref Range    Sodium 136 133 - 144 mmol/L    Potassium 4.1 3.4 - 5.3 mmol/L    Chloride 103 94 - 109 mmol/L    Carbon Dioxide 30 20 - 32 mmol/L    Anion Gap 3 3 - 14 mmol/L    Glucose 86 70 - 99 mg/dL    Urea Nitrogen 11 7 - 30 mg/dL    Creatinine 0.87 0.52 - 1.04 mg/dL    GFR Estimate 65 >60 mL/min/[1.73_m2]    GFR Estimate If Black 75 >60 mL/min/[1.73_m2]    Calcium 9.2 8.5 - 10.1 mg/dL   UA with Microscopic     Status: Abnormal   Result Value Ref Range    Color Urine Yellow     Appearance Urine Clear     Glucose Urine Negative NEG^Negative mg/dL    Bilirubin Urine Small (A) NEG^Negative    Ketones Urine Negative NEG^Negative mg/dL    Specific Gravity Urine 1.009 1.003 - 1.035    Blood Urine Negative NEG^Negative    pH Urine 6.5 5.0 - 7.0 pH    Protein Albumin Urine Negative NEG^Negative mg/dL    Urobilinogen mg/dL Normal 0.0 - 2.0 mg/dL    Nitrite Urine Negative NEG^Negative    Leukocyte Esterase Urine Negative NEG^Negative    Source Midstream Urine     WBC Urine 1 0 - 5 /HPF    RBC Urine <1 0 - 2 /HPF    Mucous Urine Present (A) NEG^Negative /LPF   Troponin I     Status: None   Result Value Ref Range    Troponin I ES <0.015 0.000 - 0.045 ug/L   Hepatic panel     Status: Abnormal   Result Value Ref Range    Bilirubin Direct 8.3 (H) 0.0 - 0.2 mg/dL    Bilirubin Total 11.0 (H) 0.2 - 1.3 mg/dL    Albumin 3.6 3.4 - 5.0 g/dL    Protein Total 7.4 6.8 - 8.8 g/dL    Alkaline Phosphatase 633 (H) 40 - 150 U/L     (HH) 0 - 50 U/L     (H) 0 - 45 U/L   Lipase     Status: None   Result Value Ref Range    Lipase 393 73 - 393 U/L   EKG 12 lead     Status: None (Preliminary result)   Result Value Ref Range    Interpretation ECG Click View Image link to view waveform and result      Va Medeiros PA-C    I discussed the patient with Dr. Salas  and she agrees with the above plan.

## 2020-04-19 NOTE — PLAN OF CARE
Pt admitted to the floor at 1300. Denies pain. Up - independently. Diet - regular. NPO after midnight for endoscopic ultrasound tomorrow. VSS ex bradycardic. A&Ox4. Discharge plan TBD.

## 2020-04-19 NOTE — PHARMACY-ADMISSION MEDICATION HISTORY
Admission medication history interview status for this patient is complete. See [facility] admission navigator for allergy information, prior to admission medications and immunization status.     Medication history interview source(s):Patient  Medication history resources (including written lists, pill bottles, clinic record):None  [facility]    Changes made to PTA medication list:  Added:  None  Deleted:  None  Changed:  Ca+D daily ---> BID    Actions taken by pharmacist (provider contacted, etc):None     Additional medication history information:None    Medication reconciliation/reorder completed by provider prior to medication history?  No    For patients on insulin therapy:  No    Prior to Admission medications    Medication Sig Last Dose Taking? Auth Provider   albuterol (PROAIR HFA/PROVENTIL HFA/VENTOLIN HFA) 108 (90 Base) MCG/ACT inhaler Inhale 2 puffs into the lungs every 6 hours as needed for shortness of breath / dyspnea or wheezing prn Yes Natasha Braun MD   calcium carbonate-vitamin D (CALTRATE 600+D) 600-400 MG-UNIT chewable tablet Take 1 chew tab by mouth 2 times daily  4/18/2020 at 2000 Yes Natasha Braun MD   loratadine (CLARITIN REDITABS) 10 MG dispersible tablet Take 10 mg by mouth as needed  prn Yes Reported, Patient

## 2020-04-19 NOTE — CONSULTS
"[facility] Consultation    Consultation Assessment/Plan:    1.) Painless Jaundice:  - recent onset jaundice with minimal symptoms (non-specific dyspeptic complaints, concurrent chronic constipation)  - labs and imaging concerning for pancreatic/biliary tumor  - she will benefit from EUS, as she is going to be admitted to the hospital we will tentatively plan for this tomorrow  - findings and plan discussed with the patient and her  (via phone)  - NPO after midnight mare Foy MD  [facility]    ---------------------------------------------------------------------------------------------------------------------------  Patient Name: Flora Hogan      YOB: 1945 (Age: 74 year old)   Medical Record #: [id number]       Primary Physician: Natasha Braun   Referring Provider: Ceferino Randall MD   Admit Date/Time: 4/19/2020  7:23 AM       I was asked to see this patient by Ceferino Randall MD for evaluation of painless jaundice.    History of Present Illness:  73 y/o woman with a history of constipation who presents with painless jaundice over the past 2 days.  She has been struggling with constipation over the past year or so.  Over the past 1 month or so she's been experiencing epigastric discomfort or fullness.  More recently stools have been lighter in color.  Her weight has been stable.  She denies nausea/vomiting.    Upon presentation to ER she was noted to have significantly elevated LFTs and CT noting distal CBD obstruction with dilated bile ducts, gallbladder and pancreatic duct.  There was a \"birds beak appearance\" of the occlusion with hypodensity in the surrounding pancreatic head.  Findings concerning for obstructing tumor.    CT Abd (4/19/20):  1.  Occluded distal common bile duct with dilation of the intra and extrahepatic biliary tree along with the pancreatic duct. Although imaging is limited, the \"birds beak appearance\" of " the occlusion with surrounding 1.2 cm hypodensity in the pancreatic head is concerning for malignancy. Gastroenterology referral recommended.  2.  0.8 cm enhancing nodule inferior right lobe of the liver is nonspecific, but likely related to a flash filling hemangioma.  3.  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  4.  Noncalcified 3 mm pulmonary nodule.    Colonoscopy (April 2019):  -indication: change in bowel habits  1.) Sigmoid diverticulosis, moderate  2.) Otherwise normal exam to the level of the terminal ileum    Past Medical History:  Past Medical History:   Diagnosis Date     Chest tightness     had suspected allergies     Seasonal allergies      SOB (shortness of breath)      Past Surgical History:   Procedure Laterality Date     CATARACT IOL, RT/LT  2015     COLONOSCOPY  10/14    no repeat needed due to age     COLONOSCOPY N/A 10/7/2014    Procedure: COLONOSCOPY;  Surgeon: Daryl Hanson MD;  Location:  GI     COLONOSCOPY  04/23/2019    no further screening       Review of Systems: A comprehensive review of systems was negative except for items noted in HPI/Subjective.    Current Medications:  Current Outpatient Medications   Medication Sig Dispense Refill     albuterol (PROAIR HFA/PROVENTIL HFA/VENTOLIN HFA) 108 (90 Base) MCG/ACT inhaler Inhale 2 puffs into the lungs every 6 hours as needed for shortness of breath / dyspnea or wheezing 1 Inhaler 0     calcium carbonate-vitamin D (CALTRATE 600+D) 600-400 MG-UNIT chewable tablet Take 1 chew tab by mouth daily 180 tablet 3     loratadine (CLARITIN REDITABS) 10 MG dispersible tablet Take 10 mg by mouth as needed          Allergies/Sensitivities:   Allergies   Allergen Reactions     Penicillin V      Fredy-1 [Lidocaine] Unknown          Social History:   Social History     Socioeconomic History     Marital status:      Spouse name: Not on file     Number of children: Not on file      Years of education: Not on file     Highest education level: Not on file   Occupational History     Employer: RETIRED     Comment: previous taught special ed   Social Needs     Financial resource strain: Not on file     Food insecurity     Worry: Not on file     Inability: Not on file     Transportation needs     Medical: Not on file     Non-medical: Not on file   Tobacco Use     Smoking status: Never Smoker     Smokeless tobacco: Never Used   Substance and Sexual Activity     Alcohol use: Yes     Comment: social      Drug use: No     Sexual activity: Yes   Lifestyle     Physical activity     Days per week: Not on file     Minutes per session: Not on file     Stress: Not on file   Relationships     Social connections     Talks on phone: Not on file     Gets together: Not on file     Attends [employer] service: Not on file     Active member of club or organization: Not on file     Attends meetings of clubs or organizations: Not on file     Relationship status: Not on file     Intimate partner violence     Fear of current or ex partner: Not on file     Emotionally abused: Not on file     Physically abused: Not on file     Forced sexual activity: Not on file   Other Topics Concern     Parent/sibling w/ CABG, MI or angioplasty before 65F 55M? Not Asked      Service Not Asked     Blood Transfusions Not Asked     Caffeine Concern Yes     Comment: 2 cups     Occupational Exposure Not Asked     Hobby Hazards Not Asked     Sleep Concern Not Asked     Stress Concern Not Asked     Weight Concern Not Asked     Special Diet No     Back Care Not Asked     Exercise Yes     Comment: walking workout      Bike Helmet Not Asked     Seat Belt Not Asked     Self-Exams Not Asked   Social History Narrative     Not on file     Family History:   Family History   Problem Relation Age of Onset     Musculoskeletal Disorder Mother         edematous legs     Obesity Mother      Musculoskeletal Disorder Maternal Grandmother          edematous legs; did have amputation     Obesity Maternal Grandmother      Myocardial Infarction Maternal Grandmother        Physical Exam:  /83   Pulse (!) 49   Temp 97.7  F (36.5  C) (Oral)   Resp 18   LMP  (LMP Unknown)   SpO2 100%    General Appearance: Comfortable, laying in bed  Eyes: Normal  HEENT: Normal  Neck: Normal, no palpable lymph nodes  Respiratory: Normal  Cardiovascular: Normal  Gastrointestinal: Normal bowel sounds  Musculoskeletal: Normal  Lymphatic: Normal  Skin: Normal  Neurologic: Normal     Labs/Imaging:  Recent Labs   Lab Test 04/19/20  0748 09/27/17  1610 04/28/16  0907 05/26/15  0915   WBC 3.6*  --  3.2* 3.0*   HGB 13.9 14.0 13.8 13.8   MCV 89  --  88 88     --  179 176     No lab results found.    Invalid input(s): FERRITIN  Recent Labs   Lab Test 04/19/20  0748 04/28/16  0907 05/26/15  0915   POTASSIUM 4.1 4.1 3.9   CHLORIDE 103 103 104   BUN 11 12 15     Recent Labs   Lab Test 04/19/20  0830 04/19/20  0748 04/28/16  0907   PROTEIN Negative  --   --    ALBUMIN  --  3.6 3.9   BILITOTAL  --  11.0* 0.7   AST  --  363* 15   ALT  --  775* 24   LIPASE  --  393  --

## 2020-04-19 NOTE — ED PROVIDER NOTES
History     Chief Complaint:  Angel Hogan is a 74 year old female with a history of chronic constipation who presents with juandice of the skin and eyes over the last 2 days. The patient states that on 3/22 she noticed some abdominal pain and bloating describing her abdomen as rock hard. She states that she was dealing with her constipation over the last month and treating with enemas, and magnesium citrate. She states that she was having very pale stools when having a bowel movement. The patient also reports a burning sensation in her chest and a foul taste in her mouth. The patient denies any weight change or other changes. She denies any fever, cough. She denies any alcohol or drug use. The patient denies any surgical history apart from a colonoscopy her last on 4/23/2019.     Allergies:  penicillin   Lidocaine      Medications:    The patient is not currently taking any prescribed medications.      Past Medical History:    Seasonal allergies   Chronic constipation     Past Surgical History:    The patient does not have any pertinent past surgical history.     Family History:    No past pertinent family history.    Social History:  Smoking Status: Never Smoker  Smokeless Tobacco: Never Used  Alcohol Use: Positive  Marital Status:      Review of Systems   Constitutional: Negative for fever and unexpected weight change.   Cardiovascular: Positive for chest pain.   Gastrointestinal: Positive for constipation.        Pale stools    Skin: Positive for color change.   All other systems reviewed and are negative.      Physical Exam     Patient Vitals for the past 24 hrs:   BP Temp Temp src Pulse Heart Rate Resp SpO2 Weight   04/19/20 1604 104/61 98.7  F (37.1  C) Oral -- 50 16 100 % --   04/19/20 1246 121/72 97.3  F (36.3  C) Oral -- 59 16 100 % --   04/19/20 1230 -- -- -- -- -- -- -- 52.7 kg (116 lb 1.6 oz)   04/19/20 1130 128/79 -- -- 61 -- -- 92 % --   04/19/20 1100 126/77 -- -- 53  "-- -- 100 % --   04/19/20 1030 (!) 154/88 -- -- 59 -- -- 90 % --   04/19/20 0930 123/80 -- -- 51 -- -- 100 % --   04/19/20 0900 125/83 -- -- (!) 49 -- -- 100 % --   04/19/20 0815 (!) 129/96 -- -- 51 50 -- 100 % --   04/19/20 0719 125/79 97.7  F (36.5  C) Oral 65 -- 18 99 % --       Physical Exam  General: Alert, no acute distress; nontoxic appearing  Neuro:  PERRL.  EOMI.  No focal deficits  HEENT:  Moist mucous membranes.  Posterior oropharynx clear, no exudates.  Scleral icterus bilaterally.   CV:  RRR, no m/r/g, skin warm and well perfused  Pulm:  CTAB, no wheezes/ronchi/rales.  No acute distress, breathing comfortably  GI:  Soft, nontender, nondistended.  No rebound or guarding.  Normal bowel sounds  MSK:  Moving all extremities.  No focal areas of edema, erythema, or tenderness  Skin:  WWP, no rashes, no lower extremity edema, skin color jaundiced, no diaphoresis  Psych:  Well-appearing, normal affect, regular speech    Emergency Department Course     ECG:  ECG taken at 0804, ECG read at 0807  Sinus bradycardia   Left axis deviation   abnormal ECG  Rate 49 bpm. WA interval 162 ms. QRS duration 90 ms. QT/QTc 464/419 ms. P-R-T axes 66 -30 31.    Imaging:  Radiology findings were communicated with the patient who voiced understanding of the findings.    CT Chest/ abdomen/ Pelvis w contrast:   1.  Occluded distal common bile duct with dilation of the intra and   extrahepatic biliary tree along with the pancreatic duct. Although   imaging is limited, the \"birds beak appearance\" of the occlusion with   surrounding 1.2 cm hypodensity in the pancreatic head is concerning   for malignancy. Gastroenterology referral recommended.   2.  0.8 cm enhancing nodule inferior right lobe of the liver is   nonspecific, but likely related to a flash filling hemangioma.   3.  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   4.  Noncalcified 3 mm pulmonary nodule. "     Recommendations for one or multiple incidental lung nodules < 6mm :     Low risk patients: No routine follow-up.     High risk patients: Optional follow-up CT at 12 months; if   unchanged, no further follow-up.     *Low Risk: Minimal or absent history of smoking or other known risk   factors.   *Nonsolid (ground glass) or partly solid nodules may require longer   follow-up to exclude indolent adenocarcinoma.   *Recommendations based on Guidelines for the Management of Incidental   Pulmonary Nodules Detected at CT: From the Fleischner Society 2017,   Radiology 2017.   Reading per radiology    Laboratory:  Laboratory findings were communicated with the patient who voiced understanding of the findings.    UA with micro: urinebilrubin small (A) mucus present (A) o/w negative    CBC: WBC 3.6(L), HGB 13.9,   BMP: WNL (Creatinine 0.87)    Troponin 0748: <0.015     Hepatic panel: bilirubin 8.3 (H) bilirubin total 11.0 (H) alkphos 633 (H)  (HH)  (H)    Lipase: 393     Emergency Department Course:     Nursing notes and vitals reviewed.    0740 I performed an exam of the patient as documented above.     0748 IV was inserted and blood was drawn for laboratory testing, results above.    0830 The patient provided a urine sample here in the emergency department. This was sent for laboratory testing, findings above.    0831 The patient was sent for a CT while in the emergency department, results above.     0943 I returned to check on patient.  I personally reviewed the laboratory and imaging results with the patient and answered all related questions prior to admit.     0956  I spoke with Va Medeiros of the Hospitalist service from [facility] regarding patient's presentation, findings, and plan of care.    1048 I spoke with Dr. Foy of [facility] regarding patient's presentation, findings, and plan of care.     1107 I returned to check on patient.      Impression & Plan      Medical Decision  Making:  Flora Hogan is a 74 year old female without any significant past medical history who presents to the emergency department today for evaluation of painless juandice. She has been having off and on abdominal pain for the last few weeks. Her workup is concerning for mechanical biliary obstruction with high suspicion for malignant neoplasm. Her laboratory studies are consistent with an obstructive pattern. The labs and imaging studies were discussed with the patient at bedside.  We will admit to medicine for continued monitoring and care.  Also spoke with Dr. Foy of GI who also came to see patient with plans to do EUS tomorrow.    Diagnosis:    ICD-10-CM    1. 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 Jaundice  R17    3. Pulmonary nodules  R91.8    4. Elevated liver enzymes  R74.8      Disposition:   Findings and plan explained to the Patient who consents to admission. Discussed the patient with Dr. Salas, who will admit the patient to a medical bed for further monitoring, evaluation, and treatment.    Scribe Disclosure:  Ning OLMSTEAD, am serving as a scribe at 7:29 AM on 4/19/2020 to document services personally performed by Ceferino Randall MD based on my observations and the provider's statements to me.    [facility] EMERGENCY DEPARTMENT       Ceferino Randall MD  04/19/20 1951

## 2020-04-20 ENCOUNTER — ANESTHESIA (OUTPATIENT)
Dept: SURGERY | Facility: CLINIC | Age: 75
DRG: 445 | End: 2020-04-20
Payer: COMMERCIAL

## 2020-04-20 ENCOUNTER — ANESTHESIA EVENT (OUTPATIENT)
Dept: SURGERY | Facility: CLINIC | Age: 75
DRG: 445 | End: 2020-04-20
Payer: COMMERCIAL

## 2020-04-20 ENCOUNTER — APPOINTMENT (OUTPATIENT)
Dept: INTERVENTIONAL RADIOLOGY/VASCULAR | Facility: CLINIC | Age: 75
DRG: 445 | End: 2020-04-20
Attending: INTERNAL MEDICINE
Payer: COMMERCIAL

## 2020-04-20 LAB
ALBUMIN SERPL-MCNC: 3 G/DL (ref 3.4–5)
ALP SERPL-CCNC: 552 U/L (ref 40–150)
ALT SERPL W P-5'-P-CCNC: 653 U/L (ref 0–50)
ANION GAP SERPL CALCULATED.3IONS-SCNC: 2 MMOL/L (ref 3–14)
AST SERPL W P-5'-P-CCNC: 303 U/L (ref 0–45)
BASOPHILS # BLD AUTO: 0.1 10E9/L (ref 0–0.2)
BASOPHILS NFR BLD AUTO: 1.5 %
BILIRUB SERPL-MCNC: 9.5 MG/DL (ref 0.2–1.3)
BUN SERPL-MCNC: 11 MG/DL (ref 7–30)
CALCIUM SERPL-MCNC: 8.8 MG/DL (ref 8.5–10.1)
CHLORIDE SERPL-SCNC: 106 MMOL/L (ref 94–109)
CO2 SERPL-SCNC: 29 MMOL/L (ref 20–32)
CREAT SERPL-MCNC: 1.02 MG/DL (ref 0.52–1.04)
DIFFERENTIAL METHOD BLD: ABNORMAL
EOSINOPHIL # BLD AUTO: 0.2 10E9/L (ref 0–0.7)
EOSINOPHIL NFR BLD AUTO: 3.8 %
ERCP: NORMAL
ERYTHROCYTE [DISTWIDTH] IN BLOOD BY AUTOMATED COUNT: 15.3 % (ref 10–15)
GFR SERPL CREATININE-BSD FRML MDRD: 53 ML/MIN/{1.73_M2}
GLUCOSE SERPL-MCNC: 90 MG/DL (ref 70–99)
HCT VFR BLD AUTO: 40 % (ref 35–47)
HGB BLD-MCNC: 13 G/DL (ref 11.7–15.7)
IMM GRANULOCYTES # BLD: 0 10E9/L (ref 0–0.4)
IMM GRANULOCYTES NFR BLD: 0.3 %
INR PPP: 0.95 (ref 0.86–1.14)
INTERPRETATION ECG - MUSE: NORMAL
LYMPHOCYTES # BLD AUTO: 1 10E9/L (ref 0.8–5.3)
LYMPHOCYTES NFR BLD AUTO: 24.3 %
MCH RBC QN AUTO: 29.5 PG (ref 26.5–33)
MCHC RBC AUTO-ENTMCNC: 32.5 G/DL (ref 31.5–36.5)
MCV RBC AUTO: 91 FL (ref 78–100)
MONOCYTES # BLD AUTO: 0.4 10E9/L (ref 0–1.3)
MONOCYTES NFR BLD AUTO: 8.9 %
NEUTROPHILS # BLD AUTO: 2.4 10E9/L (ref 1.6–8.3)
NEUTROPHILS NFR BLD AUTO: 61.2 %
NRBC # BLD AUTO: 0 10*3/UL
NRBC BLD AUTO-RTO: 0 /100
PLATELET # BLD AUTO: 204 10E9/L (ref 150–450)
POTASSIUM SERPL-SCNC: 4.7 MMOL/L (ref 3.4–5.3)
PROT SERPL-MCNC: 6.5 G/DL (ref 6.8–8.8)
RBC # BLD AUTO: 4.4 10E12/L (ref 3.8–5.2)
SODIUM SERPL-SCNC: 137 MMOL/L (ref 133–144)
UPPER EUS: NORMAL
WBC # BLD AUTO: 4 10E9/L (ref 4–11)

## 2020-04-20 PROCEDURE — 25000132 ZZH RX MED GY IP 250 OP 250 PS 637: Performed by: INTERNAL MEDICINE

## 2020-04-20 PROCEDURE — C1726 CATH, BAL DIL, NON-VASCULAR: HCPCS | Performed by: INTERNAL MEDICINE

## 2020-04-20 PROCEDURE — 88172 CYTP DX EVAL FNA 1ST EA SITE: CPT | Performed by: INTERNAL MEDICINE

## 2020-04-20 PROCEDURE — 40000799 ZZH STATISTIC EUS (OR PROCEDURE): Performed by: INTERNAL MEDICINE

## 2020-04-20 PROCEDURE — C1894 INTRO/SHEATH, NON-LASER: HCPCS | Performed by: INTERNAL MEDICINE

## 2020-04-20 PROCEDURE — C1877 STENT, NON-COAT/COV W/O DEL: HCPCS | Performed by: INTERNAL MEDICINE

## 2020-04-20 PROCEDURE — 0F9G3ZX DRAINAGE OF PANCREAS, PERCUTANEOUS APPROACH, DIAGNOSTIC: ICD-10-PCS | Performed by: INTERNAL MEDICINE

## 2020-04-20 PROCEDURE — 36415 COLL VENOUS BLD VENIPUNCTURE: CPT | Performed by: PHYSICIAN ASSISTANT

## 2020-04-20 PROCEDURE — 80053 COMPREHEN METABOLIC PANEL: CPT | Performed by: PHYSICIAN ASSISTANT

## 2020-04-20 PROCEDURE — 36000060 ZZH SURGERY LEVEL 3 W FLUORO 1ST 30 MIN: Performed by: INTERNAL MEDICINE

## 2020-04-20 PROCEDURE — 0F998ZX DRAINAGE OF COMMON BILE DUCT, VIA NATURAL OR ARTIFICIAL OPENING ENDOSCOPIC, DIAGNOSTIC: ICD-10-PCS | Performed by: INTERNAL MEDICINE

## 2020-04-20 PROCEDURE — 37000009 ZZH ANESTHESIA TECHNICAL FEE, EACH ADDTL 15 MIN: Performed by: INTERNAL MEDICINE

## 2020-04-20 PROCEDURE — 88305 TISSUE EXAM BY PATHOLOGIST: CPT | Performed by: INTERNAL MEDICINE

## 2020-04-20 PROCEDURE — 37000008 ZZH ANESTHESIA TECHNICAL FEE, 1ST 30 MIN: Performed by: INTERNAL MEDICINE

## 2020-04-20 PROCEDURE — 40000306 ZZH STATISTIC PRE PROC ASSESS II: Performed by: INTERNAL MEDICINE

## 2020-04-20 PROCEDURE — 25800030 ZZH RX IP 258 OP 636: Performed by: NURSE ANESTHETIST, CERTIFIED REGISTERED

## 2020-04-20 PROCEDURE — 25000132 ZZH RX MED GY IP 250 OP 250 PS 637: Performed by: PHYSICIAN ASSISTANT

## 2020-04-20 PROCEDURE — 99232 SBSQ HOSP IP/OBS MODERATE 35: CPT | Performed by: INTERNAL MEDICINE

## 2020-04-20 PROCEDURE — 88112 CYTOPATH CELL ENHANCE TECH: CPT | Mod: 26 | Performed by: INTERNAL MEDICINE

## 2020-04-20 PROCEDURE — 36415 COLL VENOUS BLD VENIPUNCTURE: CPT | Performed by: INTERNAL MEDICINE

## 2020-04-20 PROCEDURE — 12000000 ZZH R&B MED SURG/OB

## 2020-04-20 PROCEDURE — 25000125 ZZHC RX 250: Performed by: INTERNAL MEDICINE

## 2020-04-20 PROCEDURE — 25800030 ZZH RX IP 258 OP 636: Performed by: ANESTHESIOLOGY

## 2020-04-20 PROCEDURE — 0F798DZ DILATION OF COMMON BILE DUCT WITH INTRALUMINAL DEVICE, VIA NATURAL OR ARTIFICIAL OPENING ENDOSCOPIC: ICD-10-PCS | Performed by: INTERNAL MEDICINE

## 2020-04-20 PROCEDURE — 00000155 ZZHCL STATISTIC H-CELL BLOCK W/STAIN: Performed by: INTERNAL MEDICINE

## 2020-04-20 PROCEDURE — 85610 PROTHROMBIN TIME: CPT | Performed by: INTERNAL MEDICINE

## 2020-04-20 PROCEDURE — 40000067 ZZH STATISTIC ERCP (OR PROCEDURE): Performed by: INTERNAL MEDICINE

## 2020-04-20 PROCEDURE — 36000058 ZZH SURGERY LEVEL 3 EA 15 ADDTL MIN: Performed by: INTERNAL MEDICINE

## 2020-04-20 PROCEDURE — 25000128 H RX IP 250 OP 636: Performed by: PHYSICIAN ASSISTANT

## 2020-04-20 PROCEDURE — C1769 GUIDE WIRE: HCPCS | Performed by: INTERNAL MEDICINE

## 2020-04-20 PROCEDURE — 88342 IMHCHEM/IMCYTCHM 1ST ANTB: CPT | Performed by: INTERNAL MEDICINE

## 2020-04-20 PROCEDURE — 25000125 ZZHC RX 250: Performed by: NURSE ANESTHETIST, CERTIFIED REGISTERED

## 2020-04-20 PROCEDURE — 85025 COMPLETE CBC W/AUTO DIFF WBC: CPT | Performed by: PHYSICIAN ASSISTANT

## 2020-04-20 PROCEDURE — 88173 CYTOPATH EVAL FNA REPORT: CPT | Performed by: INTERNAL MEDICINE

## 2020-04-20 PROCEDURE — 88112 CYTOPATH CELL ENHANCE TECH: CPT | Performed by: INTERNAL MEDICINE

## 2020-04-20 PROCEDURE — 00000102 ZZHCL STATISTIC CYTO WRIGHT STAIN TC: Performed by: INTERNAL MEDICINE

## 2020-04-20 PROCEDURE — 27210794 ZZH OR GENERAL SUPPLY STERILE: Performed by: INTERNAL MEDICINE

## 2020-04-20 PROCEDURE — 25000128 H RX IP 250 OP 636: Performed by: NURSE ANESTHETIST, CERTIFIED REGISTERED

## 2020-04-20 PROCEDURE — 99207 ZZC CDG-MDM COMPONENT: MEETS LOW - DOWN CODED: CPT | Performed by: INTERNAL MEDICINE

## 2020-04-20 PROCEDURE — 71000012 ZZH RECOVERY PHASE 1 LEVEL 1 FIRST HR: Performed by: INTERNAL MEDICINE

## 2020-04-20 PROCEDURE — 0F7D8DZ DILATION OF PANCREATIC DUCT WITH INTRALUMINAL DEVICE, VIA NATURAL OR ARTIFICIAL OPENING ENDOSCOPIC: ICD-10-PCS | Performed by: INTERNAL MEDICINE

## 2020-04-20 DEVICE — STENT COTTON LEUNG (AMSTERDAM) BIL 10FRX07CM CLSO-10-7: Type: IMPLANTABLE DEVICE | Site: BILE DUCT | Status: FUNCTIONAL

## 2020-04-20 DEVICE — STENT GEENEN PANCREA SOF-FLEX 5FRX5CM G49669: Type: IMPLANTABLE DEVICE | Site: PANCREATIC DUCT | Status: FUNCTIONAL

## 2020-04-20 RX ORDER — ONDANSETRON 2 MG/ML
4 INJECTION INTRAMUSCULAR; INTRAVENOUS EVERY 30 MIN PRN
Status: DISCONTINUED | OUTPATIENT
Start: 2020-04-20 | End: 2020-04-20 | Stop reason: HOSPADM

## 2020-04-20 RX ORDER — SODIUM CHLORIDE, SODIUM LACTATE, POTASSIUM CHLORIDE, CALCIUM CHLORIDE 600; 310; 30; 20 MG/100ML; MG/100ML; MG/100ML; MG/100ML
INJECTION, SOLUTION INTRAVENOUS CONTINUOUS
Status: DISCONTINUED | OUTPATIENT
Start: 2020-04-20 | End: 2020-04-20 | Stop reason: HOSPADM

## 2020-04-20 RX ORDER — ONDANSETRON 4 MG/1
4 TABLET, ORALLY DISINTEGRATING ORAL EVERY 30 MIN PRN
Status: DISCONTINUED | OUTPATIENT
Start: 2020-04-20 | End: 2020-04-20 | Stop reason: HOSPADM

## 2020-04-20 RX ORDER — MEPERIDINE HYDROCHLORIDE 50 MG/ML
12.5 INJECTION INTRAMUSCULAR; INTRAVENOUS; SUBCUTANEOUS
Status: DISCONTINUED | OUTPATIENT
Start: 2020-04-20 | End: 2020-04-20 | Stop reason: HOSPADM

## 2020-04-20 RX ORDER — NALOXONE HYDROCHLORIDE 0.4 MG/ML
.1-.4 INJECTION, SOLUTION INTRAMUSCULAR; INTRAVENOUS; SUBCUTANEOUS
Status: DISCONTINUED | OUTPATIENT
Start: 2020-04-20 | End: 2020-04-20 | Stop reason: HOSPADM

## 2020-04-20 RX ORDER — PROPOFOL 10 MG/ML
INJECTION, EMULSION INTRAVENOUS PRN
Status: DISCONTINUED | OUTPATIENT
Start: 2020-04-20 | End: 2020-04-20

## 2020-04-20 RX ORDER — FENTANYL CITRATE 50 UG/ML
25-50 INJECTION, SOLUTION INTRAMUSCULAR; INTRAVENOUS EVERY 5 MIN PRN
Status: DISCONTINUED | OUTPATIENT
Start: 2020-04-20 | End: 2020-04-20 | Stop reason: HOSPADM

## 2020-04-20 RX ORDER — FENTANYL CITRATE 50 UG/ML
INJECTION, SOLUTION INTRAMUSCULAR; INTRAVENOUS PRN
Status: DISCONTINUED | OUTPATIENT
Start: 2020-04-20 | End: 2020-04-20

## 2020-04-20 RX ORDER — GLYCOPYRROLATE 0.2 MG/ML
INJECTION, SOLUTION INTRAMUSCULAR; INTRAVENOUS PRN
Status: DISCONTINUED | OUTPATIENT
Start: 2020-04-20 | End: 2020-04-20

## 2020-04-20 RX ORDER — INDOMETHACIN 50 MG/1
SUPPOSITORY RECTAL PRN
Status: DISCONTINUED | OUTPATIENT
Start: 2020-04-20 | End: 2020-04-20 | Stop reason: HOSPADM

## 2020-04-20 RX ORDER — ALBUTEROL SULFATE 0.83 MG/ML
2.5 SOLUTION RESPIRATORY (INHALATION) EVERY 4 HOURS PRN
Status: DISCONTINUED | OUTPATIENT
Start: 2020-04-20 | End: 2020-04-20 | Stop reason: HOSPADM

## 2020-04-20 RX ORDER — FENTANYL CITRATE 50 UG/ML
25-50 INJECTION, SOLUTION INTRAMUSCULAR; INTRAVENOUS
Status: DISCONTINUED | OUTPATIENT
Start: 2020-04-20 | End: 2020-04-20 | Stop reason: HOSPADM

## 2020-04-20 RX ORDER — NALOXONE HYDROCHLORIDE 0.4 MG/ML
.1-.4 INJECTION, SOLUTION INTRAMUSCULAR; INTRAVENOUS; SUBCUTANEOUS
Status: DISCONTINUED | OUTPATIENT
Start: 2020-04-20 | End: 2020-04-21 | Stop reason: HOSPADM

## 2020-04-20 RX ORDER — FLUMAZENIL 0.1 MG/ML
0.2 INJECTION, SOLUTION INTRAVENOUS
Status: ACTIVE | OUTPATIENT
Start: 2020-04-20 | End: 2020-04-21

## 2020-04-20 RX ORDER — DEXAMETHASONE SODIUM PHOSPHATE 4 MG/ML
INJECTION, SOLUTION INTRA-ARTICULAR; INTRALESIONAL; INTRAMUSCULAR; INTRAVENOUS; SOFT TISSUE PRN
Status: DISCONTINUED | OUTPATIENT
Start: 2020-04-20 | End: 2020-04-20

## 2020-04-20 RX ORDER — HYDROMORPHONE HYDROCHLORIDE 1 MG/ML
.3-.5 INJECTION, SOLUTION INTRAMUSCULAR; INTRAVENOUS; SUBCUTANEOUS EVERY 10 MIN PRN
Status: DISCONTINUED | OUTPATIENT
Start: 2020-04-20 | End: 2020-04-20 | Stop reason: HOSPADM

## 2020-04-20 RX ORDER — NEOSTIGMINE METHYLSULFATE 1 MG/ML
VIAL (ML) INJECTION PRN
Status: DISCONTINUED | OUTPATIENT
Start: 2020-04-20 | End: 2020-04-20

## 2020-04-20 RX ADMIN — PHENYLEPHRINE HYDROCHLORIDE 100 MCG: 10 INJECTION INTRAVENOUS at 15:06

## 2020-04-20 RX ADMIN — IBUPROFEN 600 MG: 600 TABLET, FILM COATED ORAL at 03:16

## 2020-04-20 RX ADMIN — PHENYLEPHRINE HYDROCHLORIDE 150 MCG: 10 INJECTION INTRAVENOUS at 15:27

## 2020-04-20 RX ADMIN — ROCURONIUM BROMIDE 30 MG: 10 INJECTION INTRAVENOUS at 14:44

## 2020-04-20 RX ADMIN — PHENYLEPHRINE HYDROCHLORIDE 100 MCG: 10 INJECTION INTRAVENOUS at 15:13

## 2020-04-20 RX ADMIN — Medication 2 MG: at 16:09

## 2020-04-20 RX ADMIN — PROPOFOL 150 MG: 10 INJECTION, EMULSION INTRAVENOUS at 14:43

## 2020-04-20 RX ADMIN — FENTANYL CITRATE 100 MCG: 50 INJECTION, SOLUTION INTRAMUSCULAR; INTRAVENOUS at 14:43

## 2020-04-20 RX ADMIN — Medication 1 LOZENGE: at 19:43

## 2020-04-20 RX ADMIN — PHENYLEPHRINE HYDROCHLORIDE 100 MCG: 10 INJECTION INTRAVENOUS at 15:18

## 2020-04-20 RX ADMIN — PHENOL 1 ML: 1.5 LIQUID ORAL at 19:43

## 2020-04-20 RX ADMIN — PHENYLEPHRINE HYDROCHLORIDE 100 MCG: 10 INJECTION INTRAVENOUS at 16:01

## 2020-04-20 RX ADMIN — SODIUM CHLORIDE, POTASSIUM CHLORIDE, SODIUM LACTATE AND CALCIUM CHLORIDE: 600; 310; 30; 20 INJECTION, SOLUTION INTRAVENOUS at 15:07

## 2020-04-20 RX ADMIN — GLYCOPYRROLATE 0.2 MG: 0.2 INJECTION, SOLUTION INTRAMUSCULAR; INTRAVENOUS at 14:57

## 2020-04-20 RX ADMIN — PHENYLEPHRINE HYDROCHLORIDE 100 MCG: 10 INJECTION INTRAVENOUS at 15:52

## 2020-04-20 RX ADMIN — SODIUM CHLORIDE, POTASSIUM CHLORIDE, SODIUM LACTATE AND CALCIUM CHLORIDE: 600; 310; 30; 20 INJECTION, SOLUTION INTRAVENOUS at 14:39

## 2020-04-20 RX ADMIN — ONDANSETRON 4 MG: 2 INJECTION INTRAMUSCULAR; INTRAVENOUS at 14:44

## 2020-04-20 RX ADMIN — FENTANYL CITRATE 50 MCG: 50 INJECTION, SOLUTION INTRAMUSCULAR; INTRAVENOUS at 15:45

## 2020-04-20 RX ADMIN — PHENYLEPHRINE HYDROCHLORIDE 100 MCG: 10 INJECTION INTRAVENOUS at 15:36

## 2020-04-20 RX ADMIN — DEXAMETHASONE SODIUM PHOSPHATE 4 MG: 4 INJECTION, SOLUTION INTRA-ARTICULAR; INTRALESIONAL; INTRAMUSCULAR; INTRAVENOUS; SOFT TISSUE at 14:44

## 2020-04-20 RX ADMIN — GLYCOPYRROLATE 0.3 MG: 0.2 INJECTION, SOLUTION INTRAMUSCULAR; INTRAVENOUS at 16:09

## 2020-04-20 RX ADMIN — PHENYLEPHRINE HYDROCHLORIDE 100 MCG: 10 INJECTION INTRAVENOUS at 14:53

## 2020-04-20 SDOH — HEALTH STABILITY: MENTAL HEALTH: CURRENT SMOKER: 0

## 2020-04-20 ASSESSMENT — ACTIVITIES OF DAILY LIVING (ADL)
ADLS_ACUITY_SCORE: 10

## 2020-04-20 ASSESSMENT — ENCOUNTER SYMPTOMS: SEIZURES: 0

## 2020-04-20 NOTE — ANESTHESIA PREPROCEDURE EVALUATION
Anesthesia Pre-Procedure Evaluation    Patient: Flora Hogan   MRN: [id number] : 1945          Preoperative Diagnosis: Jaundice [R17]  Mass [R22.9]    Procedure(s):  ESOPHAGOGASTRODUODENOSCOPY, WITH FINE NEEDLE ASPIRATION BIOPSY, WITH ENDOSCOPIC ULTRASOUND GUIDANCE, Endoscopic retrograde cholangiopancreatography  ENDOSCOPIC RETROGRADE CHOLANGIOPANCREATOGRAPHY    Past Medical History:   Diagnosis Date     Chest tightness     had suspected allergies     Seasonal allergies      SOB (shortness of breath)      Past Surgical History:   Procedure Laterality Date     CATARACT IOL, RT/LT       COLONOSCOPY  10/14    no repeat needed due to age     COLONOSCOPY N/A 10/7/2014    Procedure: COLONOSCOPY;  Surgeon: Daryl Hanson MD;  Location:  GI     COLONOSCOPY  2019    no further screening     Anesthesia Evaluation     . Pt has had prior anesthetic. Type: MAC    No history of anesthetic complications          ROS/MED HX    ENT/Pulmonary:     (+)Intermittent asthma ( seasonal with allergies) , . .    Neurologic:  - neg neurologic ROS    (-) seizures, Other neuro hx and Neuropathy   Cardiovascular:  - neg cardiovascular ROS      (-) hypertension, CAD, taking anticoagulants/antiplatelets and syncope   METS/Exercise Tolerance:  >4 METS   Hematologic:  - neg hematologic  ROS       Musculoskeletal:  - neg musculoskeletal ROS       GI/Hepatic:     (+) Other GI/Hepatic jaundice, ? pancreatic mass     (-) GERD   Renal/Genitourinary:     (+) chronic renal disease, type: CRI, Pt does not require dialysis, Pt has no history of transplant,       Endo:  - neg endo ROS       Psychiatric:         Infectious Disease:  - neg infectious disease ROS       Malignancy:   (+) Malignancy History of GI  GI CA Active status post,         Other:    (+) No chance of pregnancy no H/O Chronic Pain,                        Physical Exam  Normal systems: cardiovascular, pulmonary and dental    Airway   Mallampati: I  TM distance:  ">3 FB  Neck ROM: full    Dental     Cardiovascular       Pulmonary             Lab Results   Component Value Date    WBC 4.0 04/20/2020    HGB 13.0 04/20/2020    HCT 40.0 04/20/2020     04/20/2020     04/20/2020    POTASSIUM 4.7 04/20/2020    CHLORIDE 106 04/20/2020    CO2 29 04/20/2020    BUN 11 04/20/2020    CR 1.02 04/20/2020    GLC 90 04/20/2020    CAMERON 8.8 04/20/2020    MAG 2.1 05/26/2015    ALBUMIN 3.0 (L) 04/20/2020    PROTTOTAL 6.5 (L) 04/20/2020     (HH) 04/20/2020     (H) 04/20/2020    ALKPHOS 552 (H) 04/20/2020    BILITOTAL 9.5 (H) 04/20/2020    LIPASE 393 04/19/2020    INR 0.95 04/20/2020    TSH 0.88 04/28/2016       Preop Vitals  BP Readings from Last 3 Encounters:   04/20/20 118/74   10/29/19 110/70   07/18/19 102/67    Pulse Readings from Last 3 Encounters:   04/20/20 62   10/29/19 61   07/18/19 61      Resp Readings from Last 3 Encounters:   04/20/20 16   10/29/19 16   07/18/19 16    SpO2 Readings from Last 3 Encounters:   04/20/20 100%   10/29/19 100%   07/18/19 97%      Temp Readings from Last 1 Encounters:   04/20/20 97.5  F (36.4  C) (Temporal)    Ht Readings from Last 1 Encounters:   10/29/19 1.676 m (5' 6\")      Wt Readings from Last 1 Encounters:   04/19/20 52.7 kg (116 lb 1.6 oz)    Estimated body mass index is 18.74 kg/m  as calculated from the following:    Height as of 10/29/19: 1.676 m (5' 6\").    Weight as of this encounter: 52.7 kg (116 lb 1.6 oz).       Anesthesia Plan      History & Physical Review  History and physical reviewed and following examination; no interval change.    ASA Status:  2 .    NPO Status:  > 8 hours    Plan for General (Possible MAC if no ERCP) with Intravenous induction. Maintenance will be Balanced.    PONV prophylaxis:  Ondansetron (or other 5HT-3) and Dexamethasone or Solumedrol    The patient is not a current smoker  and patient did not smoke on day of surgery     Postoperative Care  Postoperative pain management:  IV analgesics.  "     Consents  Anesthetic plan, risks, benefits and alternatives discussed with:  Patient..                 Que Pace MD                    .

## 2020-04-20 NOTE — PLAN OF CARE
/61 (BP Location: Right arm)   Pulse 61   Temp 96.7  F (35.9  C) (Oral)   Resp 16   Wt 52.7 kg (116 lb 1.6 oz)   LMP  (LMP Unknown)   SpO2 99%   BMI 18.74 kg/m       A0x4. Complaining of headache, Ibuprofen given.  NPO at Midnight, ambulating to the bathroom, aware of AM  EUS. No SOB complains.

## 2020-04-20 NOTE — ANESTHESIA CARE TRANSFER NOTE
Patient: Flora Hogan    Procedure(s):  ESOPHAGOGASTRODUODENOSCOPY, WITH FINE NEEDLE ASPIRATION BIOPSY, WITH ENDOSCOPIC ULTRASOUND GUIDANCE, Endoscopic retrograde cholangiopancreatography  ENDOSCOPIC RETROGRADE CHOLANGIOPANCREATOGRAPHY, PLACEMENT OF PANCREATIC STENT, PLACEMENT OF BILIARY STENT    Diagnosis: Jaundice [R17]  Mass [R22.9]  Diagnosis Additional Information: No value filed.    Anesthesia Type:   General     Note:  Airway :Face Mask  Patient transferred to:PACU  Comments: VSS.Handoff Report: Identifed the Patient, Identified the Reponsible Provider, Reviewed the pertinent medical history, Discussed the surgical course, Reviewed Intra-OP anesthesia mangement and issues during anesthesia, Set expectations for post-procedure period and Allowed opportunity for questions and acknowledgement of understanding      Vitals: (Last set prior to Anesthesia Care Transfer)    CRNA VITALS  4/20/2020 1601 - 4/20/2020 1637      4/20/2020             Pulse:  53    SpO2:  100 %                Electronically Signed By: MICAELA Campo CRNA  April 20, 2020  4:37 PM

## 2020-04-20 NOTE — ANESTHESIA POSTPROCEDURE EVALUATION
Patient: Flora Hogan    Procedure(s):  ESOPHAGOGASTRODUODENOSCOPY, WITH FINE NEEDLE ASPIRATION BIOPSY, WITH ENDOSCOPIC ULTRASOUND GUIDANCE, Endoscopic retrograde cholangiopancreatography  ENDOSCOPIC RETROGRADE CHOLANGIOPANCREATOGRAPHY, PLACEMENT OF PANCREATIC STENT, PLACEMENT OF BILIARY STENT    Diagnosis:Jaundice [R17]  Mass [R22.9]  Diagnosis Additional Information: No value filed.    Anesthesia Type:  General    Note:  Anesthesia Post Evaluation    Patient location during evaluation: PACU  Patient participation: Able to fully participate in evaluation  Level of consciousness: awake and alert  Pain management: adequate  Airway patency: patent  Cardiovascular status: acceptable  Respiratory status: acceptable  Hydration status: acceptable  PONV: controlled             Last vitals:  Vitals:    04/20/20 0834 04/20/20 1110 04/20/20 1116   BP: 108/66 118/74    Pulse:  62    Resp: 16 16    Temp: 96.3  F (35.7  C) 97.5  F (36.4  C)    SpO2: 98%  100%         Electronically Signed By: Que Pace MD  April 20, 2020  4:36 PM

## 2020-04-20 NOTE — PLAN OF CARE
A&Ox4. VSS ex bradycardic. Denies pain. Up - independently. NPO prior to endoscopic ultrasound. Yellow skin and sclera. No tele. Possible discharge 1-2 days.

## 2020-04-20 NOTE — PROGRESS NOTES
"Patient requested that wallet be sent to security for the safe during her stay.  Has her \"purse with her\" but black colored wallet with gold colored zipper sent to security in envelope.  Pt given yellow copy of valuables inventory in envelope.    "

## 2020-04-20 NOTE — PROGRESS NOTES
[facility]  Hospitalist Progress Note  Melodie Salas MD 04/20/2020    Reason for Stay (Diagnosis):          Assessment and Plan:      Summary of Stay: Flora Hogan is a 74 year old female with a history of constipation, 1-2 year hx of vague abdominal symptoms admitted on 4/19/2020 with progressive painless jaundice with e/o biliary obstruction concerning for pancreatic neoplasm     She underwent EUS and FNA 4/20/20, CA 19-9 ordered on admission and results still pending at this time    She underwent EUS and ERCP today with findings of a cystic mass, T2 NO staging by EUS for presumed pancreatic malignancy.  FNA obtained. ERCP completed showing dilated common biliary ducts 2/2 diffusely irregular pancreatic duct, she under went sphincterotomy and pancreatic stent placement, cells collected and sent for cytology.         Problem List:   1. Painless jaundice concerning for pancreatic mass complicated by biliary obstruction: EUS consistent with obstructed bilary obstruction and pancreatic mass.  Then underwent ERCP for sphincterotomy and biliary and temporary pancreatic stent placement.  CA 19-9 still pending.  At discharge will need KUB in 2 weeks to ensure pancreatic stent passage, and f/u ERCP in one month for biliary stent exchange. Clears tonight.  2. Constipation:  I wonder if this and some of her vague GI complaints were actually related to above.   DVT Prophylaxis: Pneumatic Compression Devices  Code Status: Full Code  Functional Status:independent  Diet:regular diet  Arnett:not required      Disposition Plan   Expected discharge in tbd days to prior living arrangement once plan in place for biliary obstruction .     Entered: Melodie Salas 04/20/2020, 3:35 PM               Interval History (Subjective):      Doing ok after procedures although with some sore throat.  Denies any nausea or abdominal pain.  No cp or sob                  Physical Exam:      Last Vital Signs:  /74   Pulse 62    Temp 97.5  F (36.4  C) (Temporal)   Resp 16   Wt 52.7 kg (116 lb 1.6 oz)   LMP  (LMP Unknown)   SpO2 100%   BMI 18.74 kg/m      Pleasant nad looks stated age head nc/at sclera clear lungs ctab nl effort rrr no mrg no le edema skin w/d no c/c alert and oriented affect appropriate albarran belly s/nt/nd           Medications:      All current medications were reviewed with changes reflected in problem list.         Data:      All new lab and imaging data was reviewed.   Labs:  Recent Labs   Lab 04/20/20  0709      POTASSIUM 4.7   CHLORIDE 106   CO2 29   ANIONGAP 2*   GLC 90   BUN 11   CR 1.02   GFRESTIMATED 53*   GFRESTBLACK 62   CAMERON 8.8     Recent Labs   Lab 04/20/20  0709   WBC 4.0   HGB 13.0   HCT 40.0   MCV 91        Recent Labs   Lab 04/20/20  0709 04/19/20  0748   * 363*   * 775*   ALKPHOS 552* 633*   BILITOTAL 9.5* 11.0*      Imaging:   EUS 4/20/20  No gross lesions in the stomach.                             - Normal ampulla.                             - There was dilation in the common bile duct which                             measured up to 14 mm with abrupt tapering in                             supra-papillary area where there was a poorly                             defined hypoechoic area measuring 20 x 19 mm. This                             was staged T2 N0 Mx by endosonographic criteria.                             The staging applies if malignancy is confirmed.                             Fine needle aspiration performed x 6 (                             differentials: pancreatic cancer vs                             cholangiocarcinoma vs autoimmune pancreatitis vs                             chronic pancreatitis.                             - A 5 x 3.5 mm cystic lesion was seen in the                             pancreatic body. Area of celiac trunc appeared                             normal.                             - Normal visualized mediastinum.                              - The pancreatic duct had a mildly dilated                             endosonographic appearance in the genu of the                             pancreas, body of the pancreas and tail of the                             pancreas. The pancreatic duct measured up to 4.5 mm     ERCP 4/20/20                          - The entire main bile duct was markedly dilated,                             secondary to a stricture at suprapapillary area.                             - Mild dilatation and irregularity of the entire                             opacified area of the pancreatic duct was found                             diffusely.                             - A biliary sphincterotomy was performed.                             - Cells for cytology obtained in the lower third of                             the main duct.                             - One temporary 5 Fr Sof-Flex stent was placed into                             the ventral pancreatic duct.                             - One 10 Fr x 7 cm plastic stent was placed into                             the common bile duct. Dark bile flowed.   Recommendation:           - Return patient to hospital [facility] for ongoing care.                             - Clear liquid diet today.                             - Observe patient's clinical course.                             - Await cytology results.                             - KUB in 2 weeks to ensure passage of the                             pancreatic stent.                             - Return to endoscopist for stent exchange at ERCP                             in 1 month.

## 2020-04-20 NOTE — PROGRESS NOTES
GI progress note:      Obstructive jaundice: No evidence of cholangitis, ? Pancreatic head mass vs ampullary lesion. Bilirubin > 10 mg/dl.     -- EUS/FNA + ERCP today.  -- Check INR.   -- NPO.     S:Denies pain, N/V.     Physical Exam:  GEN:NAD  HEENT: + Scelera icteric  CVS: RRR  Lungs: non-labored respiration  Abd: ND  Ext: No deformity.         Yarely Vann MD

## 2020-04-21 ENCOUNTER — TELEPHONE (OUTPATIENT)
Dept: FAMILY MEDICINE | Facility: CLINIC | Age: 75
End: 2020-04-21

## 2020-04-21 VITALS
OXYGEN SATURATION: 97 % | HEART RATE: 47 BPM | BODY MASS INDEX: 18.74 KG/M2 | RESPIRATION RATE: 16 BRPM | WEIGHT: 116.1 LBS | TEMPERATURE: 97.6 F | DIASTOLIC BLOOD PRESSURE: 65 MMHG | SYSTOLIC BLOOD PRESSURE: 115 MMHG

## 2020-04-21 LAB
ALBUMIN SERPL-MCNC: 2.8 G/DL (ref 3.4–5)
ALP SERPL-CCNC: 499 U/L (ref 40–150)
ALT SERPL W P-5'-P-CCNC: 558 U/L (ref 0–50)
ANION GAP SERPL CALCULATED.3IONS-SCNC: 3 MMOL/L (ref 3–14)
AST SERPL W P-5'-P-CCNC: 213 U/L (ref 0–45)
BASOPHILS # BLD AUTO: 0 10E9/L (ref 0–0.2)
BASOPHILS NFR BLD AUTO: 0.4 %
BILIRUB SERPL-MCNC: 4.8 MG/DL (ref 0.2–1.3)
BUN SERPL-MCNC: 11 MG/DL (ref 7–30)
CALCIUM SERPL-MCNC: 8.8 MG/DL (ref 8.5–10.1)
CANCER AG19-9 SERPL-ACNC: 109 U/ML (ref 0–37)
CHLORIDE SERPL-SCNC: 103 MMOL/L (ref 94–109)
CO2 SERPL-SCNC: 29 MMOL/L (ref 20–32)
CREAT SERPL-MCNC: 0.94 MG/DL (ref 0.52–1.04)
DIFFERENTIAL METHOD BLD: NORMAL
EOSINOPHIL # BLD AUTO: 0 10E9/L (ref 0–0.7)
EOSINOPHIL NFR BLD AUTO: 0.3 %
ERYTHROCYTE [DISTWIDTH] IN BLOOD BY AUTOMATED COUNT: 14.7 % (ref 10–15)
GFR SERPL CREATININE-BSD FRML MDRD: 59 ML/MIN/{1.73_M2}
GLUCOSE SERPL-MCNC: 96 MG/DL (ref 70–99)
HCT VFR BLD AUTO: 38.6 % (ref 35–47)
HGB BLD-MCNC: 12.6 G/DL (ref 11.7–15.7)
IMM GRANULOCYTES # BLD: 0 10E9/L (ref 0–0.4)
IMM GRANULOCYTES NFR BLD: 0.3 %
LYMPHOCYTES # BLD AUTO: 1.2 10E9/L (ref 0.8–5.3)
LYMPHOCYTES NFR BLD AUTO: 17.6 %
MCH RBC QN AUTO: 29 PG (ref 26.5–33)
MCHC RBC AUTO-ENTMCNC: 32.6 G/DL (ref 31.5–36.5)
MCV RBC AUTO: 89 FL (ref 78–100)
MONOCYTES # BLD AUTO: 0.6 10E9/L (ref 0–1.3)
MONOCYTES NFR BLD AUTO: 8.3 %
NEUTROPHILS # BLD AUTO: 5 10E9/L (ref 1.6–8.3)
NEUTROPHILS NFR BLD AUTO: 73.1 %
NRBC # BLD AUTO: 0 10*3/UL
NRBC BLD AUTO-RTO: 0 /100
PLATELET # BLD AUTO: 206 10E9/L (ref 150–450)
POTASSIUM SERPL-SCNC: 4.4 MMOL/L (ref 3.4–5.3)
PROT SERPL-MCNC: 6.2 G/DL (ref 6.8–8.8)
RBC # BLD AUTO: 4.34 10E12/L (ref 3.8–5.2)
SODIUM SERPL-SCNC: 135 MMOL/L (ref 133–144)
WBC # BLD AUTO: 6.9 10E9/L (ref 4–11)

## 2020-04-21 PROCEDURE — 36415 COLL VENOUS BLD VENIPUNCTURE: CPT | Performed by: INTERNAL MEDICINE

## 2020-04-21 PROCEDURE — 80053 COMPREHEN METABOLIC PANEL: CPT | Performed by: INTERNAL MEDICINE

## 2020-04-21 PROCEDURE — 85025 COMPLETE CBC W/AUTO DIFF WBC: CPT | Performed by: INTERNAL MEDICINE

## 2020-04-21 PROCEDURE — 99239 HOSP IP/OBS DSCHRG MGMT >30: CPT | Performed by: INTERNAL MEDICINE

## 2020-04-21 ASSESSMENT — ACTIVITIES OF DAILY LIVING (ADL)
ADLS_ACUITY_SCORE: 10

## 2020-04-21 NOTE — PLAN OF CARE
Patient discharged home via private car, transported with support staff.  Patient verbalized and received copy of discharge instructions.  Personal belongings gathered and sent with patient. Wallet received back from security.   VSS. IV removed prior to discharge.

## 2020-04-21 NOTE — TELEPHONE ENCOUNTER
Patient calling to setup a hospital F/U with PCP. Please advise what type of appointment would be appropriate for patient (e-visit, video or telephone or in clinic).  -Hannah Rawls

## 2020-04-21 NOTE — DISCHARGE SUMMARY
[facility]  Hospitalist Discharge Summary      Date of Admission:  4/19/2020  Date of Discharge:  4/21/2020  Discharging Provider: Shruti Rodríguez MD      Discharge Diagnoses     1. Pain less jaundice due to pancreatic mass and biliary obstruction s/p EUS and ERCP with biliary + pancreatic stent placement.     Follow-ups Needed After Discharge   Follow-up Appointments     Follow-up and recommended labs and tests       Follow up with primary care provider, Natasha Braun MD, within 7 days for   hospital follow- up.  The following labs/tests are recommended: f/u of CT   C/A/P and results of biopsy.  Follow up with [facility] as scheduled.             Unresulted Labs Ordered in the Past 30 Days of this Admission     Date and Time Order Name Status Description    4/20/2020 1551 Cytology non gyn In process     4/20/2020 1504 Fine needle aspiration In process       These results will be followed up by PCP/[facility].    Discharge Disposition   Discharged to home  Condition at discharge: Stable      Hospital Course      Flora Hogan is a 74 year old female with a history of constipation, 1-2 year hx of vague abdominal symptoms admitted on 4/19/2020 with progressive painless jaundice with e/o biliary obstruction concerning for pancreatic neoplasm      She underwent EUS and FNA 4/20/20, CA 19-9 ordered on admission and results still pending at this time     She underwent EUS and ERCP today with findings of a cystic mass, T2 NO staging by EUS for presumed pancreatic malignancy.  FNA obtained. ERCP completed showing dilated common biliary ducts 2/2 diffusely irregular pancreatic duct, she under went sphincterotomy and pancreatic stent placement, cells collected and sent for cytology.       Afebrile. Will need KUB in 2 weeks to ensure pancreatic stent passage and repeat ERCP in 1 month for biliary stent exchange.    Consultations This Hospital Stay   GASTROENTEROLOGY IP CONSULT    Code Status   Full Code    Time Spent  on this Encounter   I, Shruti Rodríguez MD, personally saw the patient today and spent greater than 30 minutes discharging this patient.       Shruti Rodríguez MD  [facility]  ______________________________________________________________________    Physical Exam   Vital Signs: Temp: 97.6  F (36.4  C) Temp src: Oral BP: 115/65 Pulse: (!) 47 Heart Rate: 52 Resp: 16 SpO2: 97 % O2 Device: None (Room air) Oxygen Delivery: 10 LPM  Weight: 116 lbs 1.6 oz    Gen - AAO x 3 in NAD.  Lungs - CTA B.  Heart - RR,S1+S2 nml, no m/g/r.  Abd - soft, NT, ND, + BS.  Ext - no edema.       Primary Care Physician   Natasha Braun MD    Discharge Orders      Follow-up and recommended labs and tests     Follow up with primary care provider, Natasha Braun MD, within 7 days for hospital follow- up.  The following labs/tests are recommended: f/u of CT C/A/P and results of biopsy.  Follow up with [facility] as scheduled.     Activity    Your activity upon discharge: activity as tolerated     Full Code     Diet    Follow this diet upon discharge: Advance to a regular diet as tolerated       Significant Results and Procedures   Results for orders placed or performed during the hospital encounter of 04/19/20   CT Chest/Abdomen/Pelvis w Contrast    Narrative    CT CHEST/ABDOMEN/PELVIS WITH CONTRAST April 19, 2020 8:43 AM    CLINICAL HISTORY: Jaundice, chest pain/abdominal pain.    TECHNIQUE: CT scan of the chest, abdomen, and pelvis was performed  following injection of IV contrast. Multiplanar reformats were  obtained. Dose reduction techniques were used.     CONTRAST: 61mL [id number]-370    COMPARISON: None.    FINDINGS:   LUNGS AND PLEURA: 3 mm noncalcified pulmonary nodule in the left lower  lobe (series 5, image 256). No other noncalcified pulmonary nodule or  mass. No pneumothorax or pleural effusion.    MEDIASTINUM/AXILLAE: No pathologically enlarged thoracic or axillary  lymph nodes.    HEPATOBILIARY: There is intrahepatic and  "extrahepatic biliary ductal  dilation. The common hepatic duct measures up to 1.4 cm. The dilated  common bile duct is abruptly occluded in the head of the pancreas with  a \"bird's beak\" appearance (series 7, image 30). The gallbladder is  also distended. There are a few simple cysts in the liver. An  enhancing nodule is noted in the inferior right lobe measuring 0.8 cm  (series 2, image 73).    PANCREAS: 1.2 cm hypodensity surrounding the initial narrowed  component of the common bile duct (series 2, image 69) concerning for  mass. The pancreatic duct is mildly dilated measuring 4 mm.    SPLEEN: Normal.    ADRENAL GLANDS: Normal.    KIDNEYS/BLADDER: Subcentimeter hypodensities in the kidneys are  statistically likely to be benign and require no further follow-up. No  hydronephrosis or evidence of enhancing renal mass.    BOWEL: No evidence of bowel obstruction. There is a large volume of  retained colonic stool. No free intraperitoneal air.    PELVIC ORGANS: Parametrial varicosities.    ADDITIONAL FINDINGS: No ascites.    MUSCULOSKELETAL: Degenerative changes without suspicious lytic or  blastic lesion.      Impression    IMPRESSION:  1.  Occluded distal common bile duct with dilation of the intra and  extrahepatic biliary tree along with the pancreatic duct. Although  imaging is limited, the \"birds beak appearance\" of the occlusion with  surrounding 1.2 cm hypodensity in the pancreatic head is concerning  for malignancy. Gastroenterology referral recommended.  2.  0.8 cm enhancing nodule inferior right lobe of the liver is  nonspecific, but likely related to a flash filling hemangioma.  3.  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  4.  Noncalcified 3 mm pulmonary nodule.    Recommendations for one or multiple incidental lung nodules < 6mm :    Low risk patients: No routine follow-up.    High risk patients: Optional follow-up CT at 12 months; " if  unchanged, no further follow-up.    *Low Risk: Minimal or absent history of smoking or other known risk  factors.  *Nonsolid (ground glass) or partly solid nodules may require longer  follow-up to exclude indolent adenocarcinoma.  *Recommendations based on Guidelines for the Management of Incidental  Pulmonary Nodules Detected at CT: From the Fleischner Society 2017,  Radiology 2017.    CHEMA LI MD   XR ERCP    Narrative    This exam was marked as non-reportable because it will not be read by a   radiologist or a [place] non-radiologist provider.               Discharge Medications   Current Discharge Medication List      CONTINUE these medications which have NOT CHANGED    Details   albuterol (PROAIR HFA/PROVENTIL HFA/VENTOLIN HFA) 108 (90 Base) MCG/ACT inhaler Inhale 2 puffs into the lungs every 6 hours as needed for shortness of breath / dyspnea or wheezing  Qty: 1 Inhaler, Refills: 0    Comments: Pharmacy may dispense brand covered by insurance (Proair, or proventil or ventolin or generic albuterol inhaler)  Associated Diagnoses: SOB (shortness of breath)      calcium carbonate-vitamin D (CALTRATE 600+D) 600-400 MG-UNIT chewable tablet Take 1 chew tab by mouth 2 times daily   Qty: 180 tablet, Refills: 3      loratadine (CLARITIN REDITABS) 10 MG dispersible tablet Take 10 mg by mouth as needed            Allergies   Allergies   Allergen Reactions     Penicillin V      Fredy-1 [Lidocaine] Unknown

## 2020-04-21 NOTE — PLAN OF CARE
Pt alert and oriented.  At 17:20 came back to [facility] from ERCP with temporary pancreatic stent placement.  Clear liquid diet.  Had mild throat discomfort after procedure.  MD ordered lozenges and throat spray.  Pt also had ice chips, popscicle and warm broth. Ambulating independently in room. Lungs are clear.  97% RA. No tele.  Has c/o chronic constipation.  No nausea. Voiding without difficulty.  PIV saline locked.  Skin  and sclera are jaundiced.

## 2020-04-21 NOTE — PROGRESS NOTES
GI progress note:     Obstructive jaundice s/p EUS/FNA and ERCP/S with biliary and pancreatic stent placement. Bilirubin is trending down. No evidence of post-procedure complication.     -- ADAT.  -- Patient can be discharged home today.  -- I will call her when I received the Bx results.     S:Doing well.     Physical Exam:  GEN:NAD  HEENT: Scelera icteric  CVS: RRR  Lungs: non-labored respiration  Abd: ND   Ext: No deformity.     Assessment and Plan    Yarely Vann MD

## 2020-04-21 NOTE — TELEPHONE ENCOUNTER
My preference if she is willing is to check in office...    I am thinking it may be good to recheck some lab work.  Not sure when her appointment at [facility] is supposed to be for and if they will do it.    But if she really does not want to come in, we can do virutally; video preferred.   Biopsy results are not there yet; it might be good to wait a couple days for that to come in.    Thanks!

## 2020-04-21 NOTE — PLAN OF CARE
/59 (BP Location: Right arm)   Pulse (!) 47   Temp 98.1  F (36.7  C) (Oral)   Resp 16   Wt 52.7 kg (116 lb 1.6 oz)   LMP  (LMP Unknown)   SpO2 97%   BMI 18.74 kg/m      AOx4. Denies pain, resting, no SOB, lungs are clear, requested to have PIV removed.  Moderate throught discomfort, sips of water, offered broth, but refused. Ambulating w/o dizziness, jaundice skin appearance. States she is passing flatus, voiding  Adequately. Continue POC.

## 2020-04-22 LAB — COPATH REPORT: NORMAL

## 2020-04-24 NOTE — TELEPHONE ENCOUNTER
Pt returned call stating they will be speaking with the surgeon first today, then calling back to schedule the follow up. They prefer a video visit.    Cecile Hernández on 4/24/2020 at 9:24 AM

## 2020-04-27 ENCOUNTER — TRANSCRIBE ORDERS (OUTPATIENT)
Dept: OTHER | Age: 75
End: 2020-04-27

## 2020-04-27 DIAGNOSIS — K86.89 PANCREATIC MASS: Primary | ICD-10-CM

## 2020-04-27 LAB — COPATH REPORT: NORMAL

## 2020-04-28 ENCOUNTER — TELEPHONE (OUTPATIENT)
Dept: SURGERY | Facility: CLINIC | Age: 75
End: 2020-04-28

## 2020-04-28 ENCOUNTER — HOSPITAL ENCOUNTER (OUTPATIENT)
Facility: CLINIC | Age: 75
End: 2020-04-28
Attending: INTERNAL MEDICINE | Admitting: INTERNAL MEDICINE
Payer: COMMERCIAL

## 2020-04-28 DIAGNOSIS — R17 PAINLESS JAUNDICE: ICD-10-CM

## 2020-04-28 DIAGNOSIS — Z20.822 ENCOUNTER FOR LABORATORY TESTING FOR COVID-19 VIRUS: Primary | ICD-10-CM

## 2020-04-28 DIAGNOSIS — K76.89 LIVER NODULE: Primary | ICD-10-CM

## 2020-04-28 NOTE — TELEPHONE ENCOUNTER
Oncology/Surgical Oncology Referral Request:     Specialty Requested: Hepatobiliary / GI Cancers    Referring Provider: Stefanie Vann MD    Referring Clinic/Organization: Other - [facility]    Records location: Faxed     Requested Provider (if specified): Dr.Eric Chaves

## 2020-04-28 NOTE — TELEPHONE ENCOUNTER
Surgical Oncology RN Care Coordination Note:     Referral reviewed, initial biopsies show no malignancy, pt is scheduled for repeat EUS with Dr. Rosario and repeat ERCP as well pending results for possible stent exchange.     Also mention of enhancing liver nodule, nonspecific but likely flash filling hemanioma.     Message sent to referring MD as well as Dr. Rosario regarding referral to confirm timing of consult with surgery and will confirm with Dr. Chaves if any additional imaging just as MRI needed to further evaluation the liver. Will await their response regarding timing of visit.     Nay Pace, RN, BSN  Care Coordinator   [phone]

## 2020-04-30 ENCOUNTER — DOCUMENTATION ONLY (OUTPATIENT)
Dept: OTHER | Facility: CLINIC | Age: 75
End: 2020-04-30

## 2020-04-30 ENCOUNTER — PATIENT OUTREACH (OUTPATIENT)
Dept: SURGERY | Facility: CLINIC | Age: 75
End: 2020-04-30

## 2020-04-30 NOTE — TELEPHONE ENCOUNTER
Surgical Oncology RN Care Coordination Note:     Called and spoke with patient regarding referral and plan to see her after the repeat EUS procedure. Discussed the need for the MRI prior to the visit and that we will try and arrange for this to be done at [facility] but if unable to she may have to come to the  for the scan. Informed her that we would plan for a virtual video visit on 5/11 at 930 am and that our scheduling team would reach out to there to discuss the instructions and confirm appointment. Informed her that they will also arrange for the MRI to be done as well. All questions answered and patient has my direct line should she have any questions prior to appointment.     Nay Pace, RN, BSN  Care Coordinator   [phone]

## 2020-04-30 NOTE — TELEPHONE ENCOUNTER
Surgical Oncology RN Care Coordination Note:     Per review with Dr. Chaves, ok to schedule for consult, preferably after repeat EUS but ok if prior. Will need MRI with eovist to be completed prior to consult d/t indeterminate nodule noted on the CT scan. Orders placed and will plan for patient to see Dr. Chaves on 5/11.     04/30/2020 10:25 AM - Called and left a message for patient informing her that we received the referral and that we are just awaiting the further work up with GI. Informed her that our team will be in touch regarding scheduling an appointment. Left my direct number for patient to call with questions.      Nay Pace, RN, BSN  Care Coordinator   [phone]

## 2020-05-05 ENCOUNTER — HOSPITAL ENCOUNTER (OUTPATIENT)
Facility: CLINIC | Age: 75
Discharge: HOME | End: 2020-05-05
Attending: INTERNAL MEDICINE | Admitting: INTERNAL MEDICINE
Payer: COMMERCIAL

## 2020-05-05 ENCOUNTER — ANESTHESIA EVENT (OUTPATIENT)
Dept: GASTROENTEROLOGY | Facility: CLINIC | Age: 75
End: 2020-05-05
Payer: COMMERCIAL

## 2020-05-05 ENCOUNTER — ANESTHESIA (OUTPATIENT)
Dept: GASTROENTEROLOGY | Facility: CLINIC | Age: 75
End: 2020-05-05
Payer: COMMERCIAL

## 2020-05-05 VITALS
BODY MASS INDEX: 18.74 KG/M2 | HEIGHT: 66 IN | RESPIRATION RATE: 18 BRPM | DIASTOLIC BLOOD PRESSURE: 69 MMHG | SYSTOLIC BLOOD PRESSURE: 111 MMHG | HEART RATE: 44 BPM | OXYGEN SATURATION: 100 %

## 2020-05-05 LAB
ALBUMIN SERPL-MCNC: 3.5 G/DL (ref 3.4–5)
ALP SERPL-CCNC: 235 U/L (ref 40–150)
ALT SERPL W P-5'-P-CCNC: 70 U/L (ref 0–50)
AST SERPL W P-5'-P-CCNC: 40 U/L (ref 0–45)
BILIRUB DIRECT SERPL-MCNC: 0.9 MG/DL (ref 0–0.2)
BILIRUB SERPL-MCNC: 1.9 MG/DL (ref 0.2–1.3)
PROT SERPL-MCNC: 7.3 G/DL (ref 6.8–8.8)
UPPER EUS: NORMAL

## 2020-05-05 PROCEDURE — 25000125 ZZHC RX 250: Performed by: NURSE ANESTHETIST, CERTIFIED REGISTERED

## 2020-05-05 PROCEDURE — 88172 CYTP DX EVAL FNA 1ST EA SITE: CPT | Performed by: INTERNAL MEDICINE

## 2020-05-05 PROCEDURE — 00000155 ZZHCL STATISTIC H-CELL BLOCK W/STAIN: Performed by: INTERNAL MEDICINE

## 2020-05-05 PROCEDURE — 37000009 ZZH ANESTHESIA TECHNICAL FEE, EACH ADDTL 15 MIN: Performed by: INTERNAL MEDICINE

## 2020-05-05 PROCEDURE — 88305 TISSUE EXAM BY PATHOLOGIST: CPT | Mod: 26 | Performed by: INTERNAL MEDICINE

## 2020-05-05 PROCEDURE — 25800030 ZZH RX IP 258 OP 636: Performed by: NURSE ANESTHETIST, CERTIFIED REGISTERED

## 2020-05-05 PROCEDURE — 25000128 H RX IP 250 OP 636: Performed by: INTERNAL MEDICINE

## 2020-05-05 PROCEDURE — 86301 IMMUNOASSAY TUMOR CA 19-9: CPT | Performed by: INTERNAL MEDICINE

## 2020-05-05 PROCEDURE — 88173 CYTOPATH EVAL FNA REPORT: CPT | Mod: 26 | Performed by: INTERNAL MEDICINE

## 2020-05-05 PROCEDURE — 37000008 ZZH ANESTHESIA TECHNICAL FEE, 1ST 30 MIN: Performed by: INTERNAL MEDICINE

## 2020-05-05 PROCEDURE — 80076 HEPATIC FUNCTION PANEL: CPT | Performed by: INTERNAL MEDICINE

## 2020-05-05 PROCEDURE — 82784 ASSAY IGA/IGD/IGG/IGM EACH: CPT | Performed by: INTERNAL MEDICINE

## 2020-05-05 PROCEDURE — 40000010 ZZH STATISTIC ANES STAT CODE-CRNA PER MINUTE: Performed by: INTERNAL MEDICINE

## 2020-05-05 PROCEDURE — 82787 IGG 1 2 3 OR 4 EACH: CPT | Performed by: INTERNAL MEDICINE

## 2020-05-05 PROCEDURE — 88172 CYTP DX EVAL FNA 1ST EA SITE: CPT | Mod: 26 | Performed by: INTERNAL MEDICINE

## 2020-05-05 PROCEDURE — 43242 EGD US FINE NEEDLE BX/ASPIR: CPT | Performed by: INTERNAL MEDICINE

## 2020-05-05 PROCEDURE — 88173 CYTOPATH EVAL FNA REPORT: CPT | Performed by: INTERNAL MEDICINE

## 2020-05-05 PROCEDURE — 25000128 H RX IP 250 OP 636: Performed by: NURSE ANESTHETIST, CERTIFIED REGISTERED

## 2020-05-05 PROCEDURE — 88305 TISSUE EXAM BY PATHOLOGIST: CPT | Performed by: INTERNAL MEDICINE

## 2020-05-05 RX ORDER — FENTANYL CITRATE 50 UG/ML
25-50 INJECTION, SOLUTION INTRAMUSCULAR; INTRAVENOUS
Status: DISCONTINUED | OUTPATIENT
Start: 2020-05-05 | End: 2020-05-05 | Stop reason: HOSPADM

## 2020-05-05 RX ORDER — EPHEDRINE SULFATE 50 MG/ML
INJECTION, SOLUTION INTRAMUSCULAR; INTRAVENOUS; SUBCUTANEOUS PRN
Status: DISCONTINUED | OUTPATIENT
Start: 2020-05-05 | End: 2020-05-05

## 2020-05-05 RX ORDER — SODIUM CHLORIDE, SODIUM LACTATE, POTASSIUM CHLORIDE, CALCIUM CHLORIDE 600; 310; 30; 20 MG/100ML; MG/100ML; MG/100ML; MG/100ML
INJECTION, SOLUTION INTRAVENOUS CONTINUOUS
Status: DISCONTINUED | OUTPATIENT
Start: 2020-05-05 | End: 2020-05-05 | Stop reason: HOSPADM

## 2020-05-05 RX ORDER — NALOXONE HYDROCHLORIDE 0.4 MG/ML
.1-.4 INJECTION, SOLUTION INTRAMUSCULAR; INTRAVENOUS; SUBCUTANEOUS
Status: DISCONTINUED | OUTPATIENT
Start: 2020-05-05 | End: 2020-05-05 | Stop reason: HOSPADM

## 2020-05-05 RX ORDER — ONDANSETRON 2 MG/ML
INJECTION INTRAMUSCULAR; INTRAVENOUS PRN
Status: DISCONTINUED | OUTPATIENT
Start: 2020-05-05 | End: 2020-05-05

## 2020-05-05 RX ORDER — ONDANSETRON 2 MG/ML
4 INJECTION INTRAMUSCULAR; INTRAVENOUS EVERY 30 MIN PRN
Status: DISCONTINUED | OUTPATIENT
Start: 2020-05-05 | End: 2020-05-05 | Stop reason: HOSPADM

## 2020-05-05 RX ORDER — HYDROMORPHONE HYDROCHLORIDE 1 MG/ML
.3-.5 INJECTION, SOLUTION INTRAMUSCULAR; INTRAVENOUS; SUBCUTANEOUS EVERY 10 MIN PRN
Status: DISCONTINUED | OUTPATIENT
Start: 2020-05-05 | End: 2020-05-05 | Stop reason: HOSPADM

## 2020-05-05 RX ORDER — SODIUM CHLORIDE, SODIUM LACTATE, POTASSIUM CHLORIDE, CALCIUM CHLORIDE 600; 310; 30; 20 MG/100ML; MG/100ML; MG/100ML; MG/100ML
INJECTION, SOLUTION INTRAVENOUS CONTINUOUS PRN
Status: DISCONTINUED | OUTPATIENT
Start: 2020-05-05 | End: 2020-05-05

## 2020-05-05 RX ORDER — PROPOFOL 10 MG/ML
INJECTION, EMULSION INTRAVENOUS CONTINUOUS PRN
Status: DISCONTINUED | OUTPATIENT
Start: 2020-05-05 | End: 2020-05-05

## 2020-05-05 RX ORDER — ONDANSETRON 2 MG/ML
INJECTION INTRAMUSCULAR; INTRAVENOUS PRN
Status: DISCONTINUED | OUTPATIENT
Start: 2020-05-05 | End: 2020-05-05 | Stop reason: HOSPADM

## 2020-05-05 RX ORDER — ONDANSETRON 4 MG/1
4 TABLET, ORALLY DISINTEGRATING ORAL EVERY 30 MIN PRN
Status: DISCONTINUED | OUTPATIENT
Start: 2020-05-05 | End: 2020-05-05 | Stop reason: HOSPADM

## 2020-05-05 RX ORDER — MEPERIDINE HYDROCHLORIDE 25 MG/ML
12.5 INJECTION INTRAMUSCULAR; INTRAVENOUS; SUBCUTANEOUS
Status: DISCONTINUED | OUTPATIENT
Start: 2020-05-05 | End: 2020-05-05 | Stop reason: HOSPADM

## 2020-05-05 RX ORDER — FLUMAZENIL 0.1 MG/ML
0.2 INJECTION, SOLUTION INTRAVENOUS
Status: DISCONTINUED | OUTPATIENT
Start: 2020-05-05 | End: 2020-05-05 | Stop reason: HOSPADM

## 2020-05-05 RX ADMIN — Medication 5 MG: at 12:27

## 2020-05-05 RX ADMIN — DEXMEDETOMIDINE HYDROCHLORIDE 12 MCG: 100 INJECTION, SOLUTION INTRAVENOUS at 12:33

## 2020-05-05 RX ADMIN — DEXMEDETOMIDINE HYDROCHLORIDE 8 MCG: 100 INJECTION, SOLUTION INTRAVENOUS at 12:23

## 2020-05-05 RX ADMIN — PROPOFOL 150 MCG/KG/MIN: 10 INJECTION, EMULSION INTRAVENOUS at 12:23

## 2020-05-05 RX ADMIN — SODIUM CHLORIDE, POTASSIUM CHLORIDE, SODIUM LACTATE AND CALCIUM CHLORIDE: 600; 310; 30; 20 INJECTION, SOLUTION INTRAVENOUS at 12:20

## 2020-05-05 RX ADMIN — ONDANSETRON 4 MG: 2 INJECTION INTRAMUSCULAR; INTRAVENOUS at 12:23

## 2020-05-05 SDOH — HEALTH STABILITY: MENTAL HEALTH: CURRENT SMOKER: 0

## 2020-05-05 ASSESSMENT — ENCOUNTER SYMPTOMS: SEIZURES: 0

## 2020-05-05 ASSESSMENT — LIFESTYLE VARIABLES: TOBACCO_USE: 0

## 2020-05-05 NOTE — ANESTHESIA CARE TRANSFER NOTE
Patient: Flora Hogan    Procedure(s):  ESOPHAGOGASTRODUODENOSCOPY, WITH FINE NEEDLE ASPIRATION BIOPSY, WITH ENDOSCOPIC ULTRASOUND GUIDANCE    Diagnosis: Pancreatic mass [K86.89]  Diagnosis Additional Information: No value filed.    Anesthesia Type:   General     Note:  Airway :Nasal Cannula  Patient transferred to:Phase II  Comments: At end of procedure, spontaneous respirations, patient alert to voice, able to follow commands. Oxygen via nasal cannula at 3 liters per minute to PACU. Oxygen tubing connected to wall O2 in PACU, SpO2, NiBP, and EKG monitors and alarms on and functioning, Sergio Hugger warmer connected to patient gown, report on patient's clinical status given to PACU RN, RN questions answered.  Handoff Report: Identifed the Patient, Identified the Reponsible Provider, Reviewed the pertinent medical history, Discussed the surgical course, Reviewed Intra-OP anesthesia mangement and issues during anesthesia, Set expectations for post-procedure period and Allowed opportunity for questions and acknowledgement of understanding      Vitals: (Last set prior to Anesthesia Care Transfer)    CRNA VITALS  5/5/2020 1249 - 5/5/2020 1323      5/5/2020             Pulse:  56    SpO2:  99 %    Resp Rate (set):  10                Electronically Signed By: MICAELA Abdalla CRNA  May 5, 2020  1:23 PM

## 2020-05-05 NOTE — ANESTHESIA PREPROCEDURE EVALUATION
Anesthesia Pre-Procedure Evaluation    Patient: Flora Hogan   MRN: [id number] : 1945          Preoperative Diagnosis: Pancreatic mass [K86.89]    Procedure(s):  ESOPHAGOGASTRODUODENOSCOPY, WITH FINE NEEDLE ASPIRATION BIOPSY, WITH ENDOSCOPIC ULTRASOUND GUIDANCE    Past Medical History:   Diagnosis Date     Chest tightness     had suspected allergies     Seasonal allergies      SOB (shortness of breath)      Past Surgical History:   Procedure Laterality Date     CATARACT IOL, RT/LT       COLONOSCOPY  10/14    no repeat needed due to age     COLONOSCOPY N/A 10/7/2014    Procedure: COLONOSCOPY;  Surgeon: Daryl Hanson MD;  Location:  GI     COLONOSCOPY  2019    no further screening     ENDOSCOPIC RETROGRADE CHOLANGIOPANCREATOGRAM N/A 2020    Procedure: ENDOSCOPIC RETROGRADE CHOLANGIOPANCREATOGRAPHY, PLACEMENT OF PANCREATIC STENT, PLACEMENT OF BILIARY STENT;  Surgeon: Yarely Vann MD;  Location:  OR     ESOPHAGOSCOPY, GASTROSCOPY, DUODENOSCOPY (EGD), COMBINED N/A 2020    Procedure: ESOPHAGOGASTRODUODENOSCOPY, WITH FINE NEEDLE ASPIRATION BIOPSY, WITH ENDOSCOPIC ULTRASOUND GUIDANCE, Endoscopic retrograde cholangiopancreatography;  Surgeon: Yarely Vann MD;  Location:  OR       Anesthesia Evaluation     . Pt has had prior anesthetic. Type: MAC    No history of anesthetic complications          ROS/MED HX    ENT/Pulmonary:     (+)allergic rhinitis, Intermittent asthma ( seasonal with allergies) , . .   (-) tobacco use   Neurologic:     (+)neuropathy - Peripheral,    (-) seizures, CVA and Other neuro hx   Cardiovascular:  - neg cardiovascular ROS   (+) ----. : . . . :. . Previous cardiac testing date:results:date: results:ECG reviewed date:20 results:Sinus mikael date: results:         (-) hypertension, CAD, taking anticoagulants/antiplatelets and syncope   METS/Exercise Tolerance:  >4 METS   Hematologic:  - neg hematologic  ROS       Musculoskeletal:  - neg  "musculoskeletal ROS       GI/Hepatic:     (+) Other GI/Hepatic jaundice; pancreatic mass     (-) GERD   Renal/Genitourinary:     (+) chronic renal disease, type: CRI, Pt does not require dialysis, Pt has no history of transplant,       Endo:  - neg endo ROS       Psychiatric:         Infectious Disease:  - neg infectious disease ROS       Malignancy:   (+) Malignancy History of GI  GI CA Active status post,         Other:    (+) No chance of pregnancy no H/O Chronic Pain,                        Physical Exam  Normal systems: cardiovascular, pulmonary and dental    Airway   Mallampati: II  TM distance: >3 FB  Neck ROM: full    Dental     Cardiovascular       Pulmonary             Lab Results   Component Value Date    WBC 6.9 04/21/2020    HGB 12.6 04/21/2020    HCT 38.6 04/21/2020     04/21/2020     04/21/2020    POTASSIUM 4.4 04/21/2020    CHLORIDE 103 04/21/2020    CO2 29 04/21/2020    BUN 11 04/21/2020    CR 0.94 04/21/2020    GLC 96 04/21/2020    CAMERON 8.8 04/21/2020    MAG 2.1 05/26/2015    ALBUMIN 2.8 (L) 04/21/2020    PROTTOTAL 6.2 (L) 04/21/2020     (HH) 04/21/2020     (H) 04/21/2020    ALKPHOS 499 (H) 04/21/2020    BILITOTAL 4.8 (H) 04/21/2020    LIPASE 393 04/19/2020    INR 0.95 04/20/2020    TSH 0.88 04/28/2016       Preop Vitals  BP Readings from Last 3 Encounters:   04/21/20 115/65   10/29/19 110/70   07/18/19 102/67    Pulse Readings from Last 3 Encounters:   04/20/20 (!) 47   10/29/19 61   07/18/19 61      Resp Readings from Last 3 Encounters:   04/21/20 16   10/29/19 16   07/18/19 16    SpO2 Readings from Last 3 Encounters:   04/21/20 97%   10/29/19 100%   07/18/19 97%      Temp Readings from Last 1 Encounters:   04/21/20 36.4  C (97.6  F) (Oral)    Ht Readings from Last 1 Encounters:   10/29/19 1.676 m (5' 6\")      Wt Readings from Last 1 Encounters:   04/19/20 52.7 kg (116 lb 1.6 oz)    Estimated body mass index is 18.74 kg/m  as calculated from the following:    Height as " "of 10/29/19: 1.676 m (5' 6\").    Weight as of 4/19/20: 52.7 kg (116 lb 1.6 oz).       Anesthesia Plan      History & Physical Review  History and physical reviewed and following examination; no interval change.    ASA Status:  2 .    NPO Status:  > 8 hours    Plan for General with Intravenous induction.   PONV prophylaxis:  Ondansetron (or other 5HT-3)    The patient is not a current smoker  and patient did not smoke on day of surgery     Postoperative Care  Postoperative pain management:  IV analgesics.      Consents  Anesthetic plan, risks, benefits and alternatives discussed with:  Patient..                 Rich Astudillo MD  "

## 2020-05-05 NOTE — ANESTHESIA POSTPROCEDURE EVALUATION
Patient: Flora Hogan    Procedure(s):  ESOPHAGOGASTRODUODENOSCOPY, WITH FINE NEEDLE ASPIRATION BIOPSY, WITH ENDOSCOPIC ULTRASOUND GUIDANCE    Diagnosis:Pancreatic mass [K86.89]  Diagnosis Additional Information: No value filed.    Anesthesia Type:  General    Note:  Anesthesia Post Evaluation    Patient location during evaluation: PACU  Patient participation: Able to fully participate in evaluation  Level of consciousness: awake  Pain management: adequate  Airway patency: patent  Cardiovascular status: acceptable  Respiratory status: acceptable  Hydration status: acceptable  PONV: none             Last vitals:  Vitals:    05/05/20 1112 05/05/20 1321 05/05/20 1326   BP:  111/76    Pulse:  59    Resp:   (!) 7   SpO2: 96%  100%         Electronically Signed By: Evelio Tan MD  May 5, 2020  1:34 PM

## 2020-05-05 NOTE — TELEPHONE ENCOUNTER
Currently in hospital again today.  Will follow-up after that visit.  Tita Jordan CMA ([facility])

## 2020-05-06 ENCOUNTER — HOSPITAL ENCOUNTER (OUTPATIENT)
Dept: MRI IMAGING | Facility: CLINIC | Age: 75
Discharge: HOME | End: 2020-05-06
Attending: SURGERY | Admitting: SURGERY
Payer: COMMERCIAL

## 2020-05-06 DIAGNOSIS — K76.89 LIVER NODULE: ICD-10-CM

## 2020-05-06 DIAGNOSIS — R17 PAINLESS JAUNDICE: ICD-10-CM

## 2020-05-06 LAB
CANCER AG19-9 SERPL-ACNC: 93 U/ML (ref 0–37)
CREAT BLD-MCNC: 1.1 MG/DL (ref 0.52–1.04)
GFR SERPL CREATININE-BSD FRML MDRD: 48 ML/MIN/{1.73_M2}

## 2020-05-06 PROCEDURE — 25500064 ZZH RX 255 OP 636: Performed by: SURGERY

## 2020-05-06 PROCEDURE — 74183 MRI ABD W/O CNTR FLWD CNTR: CPT

## 2020-05-06 PROCEDURE — A9581 GADOXETATE DISODIUM INJ: HCPCS | Performed by: SURGERY

## 2020-05-06 PROCEDURE — 82565 ASSAY OF CREATININE: CPT

## 2020-05-06 RX ADMIN — GADOXETATE DISODIUM 8 ML: 181.43 INJECTION, SOLUTION INTRAVENOUS at 10:11

## 2020-05-07 LAB — COPATH REPORT: NORMAL

## 2020-05-08 LAB
IGG SERPL-MCNC: 912 MG/DL (ref 610–1616)
IGG1 SER-MCNC: 371 MG/DL (ref 382–929)
IGG2 SER-MCNC: 475 MG/DL (ref 242–700)
IGG3 SER-MCNC: 66 MG/DL (ref 22–176)
IGG4 SER-MCNC: 35 MG/DL (ref 4–86)

## 2020-05-11 ENCOUNTER — VIRTUAL VISIT (OUTPATIENT)
Dept: SURGERY | Facility: CLINIC | Age: 75
End: 2020-05-11
Attending: SURGERY
Payer: COMMERCIAL

## 2020-05-11 ENCOUNTER — TELEPHONE (OUTPATIENT)
Dept: SURGERY | Facility: CLINIC | Age: 75
End: 2020-05-11

## 2020-05-11 DIAGNOSIS — Z11.59 ENCOUNTER FOR SCREENING FOR OTHER VIRAL DISEASES: Primary | ICD-10-CM

## 2020-05-11 DIAGNOSIS — K86.89 MASS OF PANCREAS: ICD-10-CM

## 2020-05-11 DIAGNOSIS — R17 PAINLESS JAUNDICE: Primary | ICD-10-CM

## 2020-05-11 PROCEDURE — 40000114 ZZH STATISTIC NO CHARGE CLINIC VISIT

## 2020-05-11 RX ORDER — MULTIVITAMIN WITH IRON
1 TABLET ORAL 2 TIMES DAILY
COMMUNITY

## 2020-05-11 NOTE — TELEPHONE ENCOUNTER
Surgery is scheduled with Dr. Chaves on 5/22 at [place].  Scheduled per availability.    H&P: to be completed by PCP.  POST-OP: 6/15 at 9:30 AM    The RN completed the education regarding the surgery.     The surgery packet was provided that contains surgical instructions and a map.     They are aware that they will receive a call  ~2 days prior to the scheduled procedure and will be given an exact arrival/start time.    I contacted the patient and was able to confirm the scheduled dates.

## 2020-05-11 NOTE — PROGRESS NOTES
"Flora Hogan is a 74 year old female who is being evaluated via a billable video visit.      The patient has been notified of following:     \"This video visit will be conducted via a call between you and your physician/provider. We have found that certain health care needs can be provided without the need for an in-person physical exam.  This service lets us provide the care you need with a video conversation.  If a prescription is necessary we can send it directly to your pharmacy.  If lab work is needed we can place an order for that and you can then stop by our lab to have the test done at a later time.    Video visits are billed at different rates depending on your insurance coverage.  Please reach out to your insurance provider with any questions.    If during the course of the call the physician/provider feels a video visit is not appropriate, you will not be charged for this service.\"    Patient has given verbal consent for Video visit? Yes    How would you like to obtain your AVS? MyChart    Patient would like the video invitation sent by: Text to cell phone: [phone]    Will anyone else be joining your video visit? No         Secondary Video Option (Doximity), send text message to:  [phone]    I have reviewed and updated the patient's allergies and medication list.    Concerns: Patient has no new concerns.    Refills: None      CARINE Brown          Video-Visit Details    Type of service:  Video Visit    Video Start Time: 9:31 AM  Video End Time: 10:19 AM    Originating Location (pt. Location): Home    Distant Location (provider location):  [facility]     Platform used for Video Visit: [email or web address]    VIDEO VISIT       REASON FOR EVALUATION:  Pancreatic mass with double duct obstruction.      HISTORY OF PRESENT ILLNESS:  Ms. Hogan is a 74-year-old female who is generally in good health who recently developed jaundice.  She was evaluated with an endoscopic ultrasound " and FNA, which revealed atypical cells, but no definitive evidence of malignancy.  She also underwent biliary stenting for decompression of her biliary tree.  She had a second EUS performed in an effort to obtain a definitive diagnosis, but again revealed atypical cells.  I was asked to see Ms. Hogan for consideration of surgical therapy in light of an obstructing mass in the head of her pancreas, despite the fact that we have not been able to prove malignancy.  She has had a CT scan as well as an MRI.  The MRI shows multiple benign lesions in her liver; however, there is one area in segment 7/8, which is indeterminate.  Additionally, she has a , which is 93.      I met with Ms. Hogan and her  via video visit today.  We spent a total of approximately 50 minutes on our phone call today.  I discussed with Ms. Hogan that we are not certain about the etiology of her obstruction and the mass in her pancreas.  I discussed that the most common etiology is pancreatic cancer; however, benign things can also look like this on rare occasions.  Given the fact that she has double duct obstruction, I discussed that surgical resection is in her best interest even in the event that this turns out to be something benign, as it is not likely to resolve on its own.      I recommended that Ms. Hogan undergo a diagnostic laparoscopy with intraoperative ultrasound to evaluate the lesion in segment 7/8 of her liver.  I offered that we would plan to do frozen sections on biopsies and then proceed with an open Whipple, assuming that no evidence of metastatic malignancy is identified.      I spent the majority of our conversation discussing the details of the planned Whipple procedure.  I discussed risks that include, but are not limited to, bleeding, infection, anastomotic leak, delayed gastric emptying, diabetes, pancreatic insufficiency, venous thromboembolism.  Overall, Ms. Hogan seems to be in quite  good shape.  She does have a history of COPD, but she walks at least 6 miles a day with her  and really has no limitations.      We will get Ms. Hogan set up in the operating room certainly within the next 4 weeks for a planned exploratory laparoscopy with intraoperative ultrasound and liver biopsy followed by an open Whipple procedure.      A total of over an hour was spent on this case.  More than half that time was in video time with the patient and her , and the remainder was in review of her records, imaging and coordination of her care.  This should be scheduled as a tier 2 case and must be done within the next 4 weeks, preferably much sooner than that, ideally within the next 2-3 weeks.         Duarte Chaves MD

## 2020-05-13 ENCOUNTER — OFFICE VISIT (OUTPATIENT)
Dept: FAMILY MEDICINE | Facility: CLINIC | Age: 75
End: 2020-05-13
Payer: COMMERCIAL

## 2020-05-13 VITALS
TEMPERATURE: 97.7 F | RESPIRATION RATE: 14 BRPM | WEIGHT: 113.1 LBS | HEIGHT: 64 IN | BODY MASS INDEX: 19.31 KG/M2 | HEART RATE: 60 BPM | DIASTOLIC BLOOD PRESSURE: 76 MMHG | SYSTOLIC BLOOD PRESSURE: 122 MMHG | OXYGEN SATURATION: 100 %

## 2020-05-13 DIAGNOSIS — K86.89 PANCREATIC MASS: ICD-10-CM

## 2020-05-13 DIAGNOSIS — R79.89 ELEVATED SERUM CREATININE: ICD-10-CM

## 2020-05-13 DIAGNOSIS — Z01.818 PREOP GENERAL PHYSICAL EXAM: Primary | ICD-10-CM

## 2020-05-13 PROCEDURE — 36415 COLL VENOUS BLD VENIPUNCTURE: CPT | Performed by: PHYSICIAN ASSISTANT

## 2020-05-13 PROCEDURE — 80048 BASIC METABOLIC PNL TOTAL CA: CPT | Performed by: PHYSICIAN ASSISTANT

## 2020-05-13 PROCEDURE — 99214 OFFICE O/P EST MOD 30 MIN: CPT | Performed by: PHYSICIAN ASSISTANT

## 2020-05-13 ASSESSMENT — MIFFLIN-ST. JEOR: SCORE: 993.02

## 2020-05-13 NOTE — PROGRESS NOTES
[facility]  [address] [id number] [phone]  Dept: [phone]    PRE-OP EVALUATION:  Today's date: 2020    Flora Hogan (: 1945) presents for pre-operative evaluation assessment as requested by Dr. Yarely Vann and Dr. Duarte Chaves ().  She requires evaluation and anesthesia risk assessment prior to undergoing surgery/procedure for treatment of   1. ENDOSCOPIC RETROGRADE CHOLANGIOPANCREATOGRAPHY   2. Diagnostic Laparoscopy with Ultrasound Guided Liver Biopsy  3. open Whipple procedure     Proposed Surgery/ Procedure: : ENDOSCOPIC RETROGRADE CHOLANGIOPANCREATOGRAPHY [[id number]]   : Diagnostic Laparoscopy with Ultrasound Guided Liver Biopsy; open Whipple procedure   Date of Surgery/ Procedure:   Time of Surgery/ Procedure: : 10:00 AM  Hospital/Surgical Facility: : [facility]  Is available electronically, does not need to be faxed  Primary Physician: Natasha Braun  Type of Anesthesia Anticipated: : Combined General with Block and General    Patient has a Health Care Directive or Living Will:  YES    1. NO - Do you have a history of heart attack, stroke, stent, bypass or surgery on an artery in the head, neck, heart or legs?  2. YES - Do you ever have any pain or discomfort in your chest?  3. NO - Do you have a history of  Heart Failure?  4. YES- Are you troubled by shortness of breath when: walking on the level, up a slight hill or at night?  5. NO - Do you currently have a cold, bronchitis or other respiratory infection?  6. NO - Do you have a cough, shortness of breath or wheezing?  7. NO - Do you sometimes get pains in the calves of your legs when you walk?  8. NO - Do you or anyone in your family have previous history of blood clots?  9. NO - Do you or does anyone in your family have a serious bleeding problem such as prolonged bleeding following surgeries or cuts?  10. NO - Have you ever had problems with anemia or been  told to take iron pills?  11. NO - Have you had any abnormal blood loss such as black, tarry or bloody stools, or abnormal vaginal bleeding?  12. NO - Have you ever had a blood transfusion?  13. NO - Have you or any of your relatives ever had problems with anesthesia?  14.YES - Do you have sleep apnea, excessive snoring or daytime drowsiness?  15. NO - Do you have any prosthetic heart valves?  16. NO - Do you have prosthetic joints?  17. NO - Is there any chance that you may be pregnant?      HPI:     HPI related to upcoming procedure: Patient with acute jaundice noted in mid April. Imaging showed pancreatic mass; labs abnormal. Will be undergoing open whipple, diagnostic laparoscopy and likely liver biopsy      See problem list for active medical problems.  Problems all longstanding and stable, except as noted/documented.  See ROS for pertinent symptoms related to these conditions.      MEDICAL HISTORY:     Patient Active Problem List    Diagnosis Date Noted     Mass of pancreas 05/11/2020     Priority: Medium     Added automatically from request for surgery [id number]       Painless jaundice 04/19/2020     Priority: Medium     Chest tightness      Priority: Medium     SOB (shortness of breath)      Priority: Medium     Peripheral neuropathy (HCC) 04/28/2016     Priority: Medium     Nonintractable headache, unspecified chronicity pattern, unspecified headache type 04/28/2016     Priority: Medium     Osteopenia 09/13/2014     Priority: Medium     CARDIOVASCULAR SCREENING; LDL GOAL LESS THAN 160 09/12/2014     Priority: Medium     Seasonal allergies      Priority: Medium      Past Medical History:   Diagnosis Date     Chest tightness     had suspected allergies     Seasonal allergies      SOB (shortness of breath)      Past Surgical History:   Procedure Laterality Date     CATARACT IOL, RT/LT  2015     COLONOSCOPY  10/14    no repeat needed due to age     COLONOSCOPY N/A 10/7/2014    Procedure: COLONOSCOPY;  Surgeon:  Daryl Hanson MD;  Location:  GI     COLONOSCOPY  04/23/2019    no further screening     ENDOSCOPIC RETROGRADE CHOLANGIOPANCREATOGRAM N/A 4/20/2020    Procedure: ENDOSCOPIC RETROGRADE CHOLANGIOPANCREATOGRAPHY, PLACEMENT OF PANCREATIC STENT, PLACEMENT OF BILIARY STENT;  Surgeon: Yarely Vann MD;  Location:  OR     ESOPHAGOSCOPY, GASTROSCOPY, DUODENOSCOPY (EGD), COMBINED N/A 4/20/2020    Procedure: ESOPHAGOGASTRODUODENOSCOPY, WITH FINE NEEDLE ASPIRATION BIOPSY, WITH ENDOSCOPIC ULTRASOUND GUIDANCE, Endoscopic retrograde cholangiopancreatography;  Surgeon: Yarely Vann MD;  Location:  OR     ESOPHAGOSCOPY, GASTROSCOPY, DUODENOSCOPY (EGD), COMBINED N/A 5/5/2020    Procedure: ESOPHAGOGASTRODUODENOSCOPY, WITH FINE NEEDLE ASPIRATION BIOPSY, WITH ENDOSCOPIC ULTRASOUND GUIDANCE;  Surgeon: Romina Rosario MD;  Location:  GI     Current Outpatient Medications   Medication Sig Dispense Refill     albuterol (PROAIR HFA/PROVENTIL HFA/VENTOLIN HFA) 108 (90 Base) MCG/ACT inhaler Inhale 2 puffs into the lungs every 6 hours as needed for shortness of breath / dyspnea or wheezing 1 Inhaler 0     calcium carbonate-vitamin D (CALTRATE 600+D) 600-400 MG-UNIT chewable tablet Take 1 chew tab by mouth 2 times daily  180 tablet 3     loratadine (CLARITIN REDITABS) 10 MG dispersible tablet Take 10 mg by mouth as needed        magnesium 250 MG tablet Take 1 tablet by mouth daily       OTC products: None, except as noted above    Allergies   Allergen Reactions     Penicillin V      Fredy-1 [Lidocaine] Unknown      Latex Allergy: NO    Social History     Tobacco Use     Smoking status: Never Smoker     Smokeless tobacco: Never Used   Substance Use Topics     Alcohol use: Yes     Comment: social      History   Drug Use No       REVIEW OF SYSTEMS:   Constitutional, neuro, ENT, endocrine, pulmonary, cardiac, gastrointestinal, genitourinary, musculoskeletal, integument and psychiatric systems are negative,  "except as otherwise noted.    She does note feeling BLOATED, some dull ache/burn in the abdomen    EXAM:   /76 (BP Location: Right arm, Patient Position: Chair, Cuff Size: Adult Regular)   Pulse 60   Temp 97.7  F (36.5  C) (Oral)   Resp 14   Ht 1.626 m (5' 4\")   Wt 51.3 kg (113 lb 1.6 oz)   LMP  (LMP Unknown)   SpO2 100%   BMI 19.41 kg/m      GENERAL APPEARANCE: healthy, alert and no distress     EYES: EOMI, PERRL     HENT: ear canals and TM's normal and nose and mouth without ulcers or lesions     NECK: no adenopathy, no asymmetry, masses, or scars and thyroid normal to palpation     RESP: lungs clear to auscultation - no rales, rhonchi or wheezes     CV: regular rates and rhythm, normal S1 S2, no S3 or S4 and no murmur, click or rub     ABDOMEN:  soft, nontender, no HSM or masses and bowel sounds normal     MS: extremities normal- no gross deformities noted, no evidence of inflammation in joints, FROM in all extremities.     SKIN: no suspicious lesions or rashes     NEURO: Normal strength and tone, sensory exam grossly normal, mentation intact and speech normal     PSYCH: mentation appears normal. and affect normal/bright     LYMPHATICS: No cervical adenopathy    DIAGNOSTICS:   Labs/EKG from recent ED/HOSP visits reviewed. Planning on BMP repeat today. COVID test later this week    Recent Labs   Lab Test 04/21/20  0628 04/20/20  1017 04/20/20  0709   HGB 12.6  --  13.0     --  204   INR  --  0.95  --      --  137   POTASSIUM 4.4  --  4.7   CR 0.94  --  1.02        IMPRESSION:   Reason for surgery/procedure: Whipple/Diag lap  Diagnosis/reason for consult: pre-operative consult    The proposed surgical procedure is considered INTERMEDIATE risk.    REVISED CARDIAC RISK INDEX  The patient has the following serious cardiovascular risks for perioperative complications such as (MI, PE, VFib and 3  AV Block):  No serious cardiac risks  INTERPRETATION: 0 risks: Class I (very low risk - 0.4% " complication rate)    The patient has the following additional risks for perioperative complications:  No identified additional risks      ICD-10-CM    1. Preop general physical exam  Z01.818    2. Pancreatic mass  K86.89    3. Elevated serum creatinine  R79.89 Basic metabolic panel       RECOMMENDATIONS:     --Consult hospital rounder / IM to assist post-op medical management    --Patient is on no chronic medications    APPROVAL GIVEN to proceed with proposed procedure, without further diagnostic evaluation       Signed Electronically by: Marcial Holden PA-C    Copy of this evaluation report is provided to requesting physician.    [facility] Preop Guidelines    Revised Cardiac Risk Index

## 2020-05-14 LAB
ANION GAP SERPL CALCULATED.3IONS-SCNC: 6 MMOL/L (ref 3–14)
BUN SERPL-MCNC: 11 MG/DL (ref 7–30)
CALCIUM SERPL-MCNC: 9.1 MG/DL (ref 8.5–10.1)
CHLORIDE SERPL-SCNC: 101 MMOL/L (ref 94–109)
CO2 SERPL-SCNC: 29 MMOL/L (ref 20–32)
CREAT SERPL-MCNC: 0.82 MG/DL (ref 0.52–1.04)
GFR SERPL CREATININE-BSD FRML MDRD: 70 ML/MIN/{1.73_M2}
GLUCOSE SERPL-MCNC: 96 MG/DL (ref 70–99)
POTASSIUM SERPL-SCNC: 3.9 MMOL/L (ref 3.4–5.3)
SODIUM SERPL-SCNC: 136 MMOL/L (ref 133–144)

## 2020-05-14 PROCEDURE — 99000 SPECIMEN HANDLING OFFICE-LAB: CPT | Performed by: INTERNAL MEDICINE

## 2020-05-14 PROCEDURE — 87635 SARS-COV-2 COVID-19 AMP PRB: CPT | Performed by: INTERNAL MEDICINE

## 2020-05-15 ENCOUNTER — PATIENT OUTREACH (OUTPATIENT)
Dept: ONCOLOGY | Facility: CLINIC | Age: 75
End: 2020-05-15

## 2020-05-15 ENCOUNTER — TELEPHONE (OUTPATIENT)
Dept: SURGERY | Facility: CLINIC | Age: 75
End: 2020-05-15

## 2020-05-15 RX ORDER — CEFOXITIN 2 G/1
2 INJECTION, POWDER, FOR SOLUTION INTRAVENOUS
Status: CANCELLED | OUTPATIENT
Start: 2020-05-15

## 2020-05-15 NOTE — TELEPHONE ENCOUNTER
Surgical Oncology RN Care Coordination Note:     Called and spoke with patient regarding questions about pre operative clearance. Confirmed with patient she relayed concerns and discussed with her PCP during preop. She verbalized understanding and had no further questions.     Also informed her I left a message with [facility] that we sent a message earlier in the week that procedure should be canceled.       Nay Pace RN, BSN  Care Coordinator   [phone]

## 2020-05-15 NOTE — TELEPHONE ENCOUNTER
Patient called in and left a message wondering about her surgery with Anastacio on 5/22. She noted on 5/11 during her virtual visit with Dr. Chaves, he told her to cancel her 5/19 surgery because he would put in a new stent on 5/22 during his Whipple procedure. Patient noted she received a call to talk about her 5/19 surgery, and was confused because she thought it was going to be cancelled. Sent staff message to RN to follow up.         RN reached out to the team handling her surgery on 5/19 and asked them to cancel it. Writer reached back out to the patient and let her know her surgery on 5/19 will be cancelled. Patient let the writer know she had some concerns that she wanted the writer to pass on to the nurse/MD.    1. Patient is having leg cramping in the middle of the night. She said it can happen up to 4x a night and she is unsure if this would be an issue when she is admitted into the hospital.    2. Patient noted someone asked her if she has sleep apnea. She said she has never seen a doctor for it, but she is noticing that she is getting tightness in her chest, and yesterday while out for a walk she started to feel the tightness in her chest as well.     Patient thinks the tightness possibly was caused by allergies. She is concerned these two things may interfere with her surgery and would like a call back. Routed to RN again.

## 2020-05-21 ENCOUNTER — OFFICE VISIT (OUTPATIENT)
Dept: URGENT CARE | Facility: URGENT CARE | Age: 75
End: 2020-05-21
Attending: SURGERY
Payer: COMMERCIAL

## 2020-05-21 ENCOUNTER — ANESTHESIA EVENT (OUTPATIENT)
Dept: SURGERY | Facility: CLINIC | Age: 75
DRG: 406 | End: 2020-05-21
Payer: COMMERCIAL

## 2020-05-21 DIAGNOSIS — Z11.59 ENCOUNTER FOR SCREENING FOR OTHER VIRAL DISEASES: ICD-10-CM

## 2020-05-21 LAB
SARS-COV-2 PCR COMMENT: NORMAL
SARS-COV-2 RNA SPEC QL NAA+PROBE: NEGATIVE
SARS-COV-2 RNA SPEC QL NAA+PROBE: NORMAL
SPECIMEN SOURCE: NORMAL
SPECIMEN SOURCE: NORMAL

## 2020-05-21 PROCEDURE — U0003 INFECTIOUS AGENT DETECTION BY NUCLEIC ACID (DNA OR RNA); SEVERE ACUTE RESPIRATORY SYNDROME CORONAVIRUS 2 (SARS-COV-2) (CORONAVIRUS DISEASE [COVID-19]), AMPLIFIED PROBE TECHNIQUE, MAKING USE OF HIGH THROUGHPUT TECHNOLOGIES AS DESCRIBED BY CMS-2020-01-R: HCPCS | Performed by: SURGERY

## 2020-05-21 PROCEDURE — 99207 ZZC NO BILLABLE SERVICE THIS VISIT: CPT

## 2020-05-21 RX ORDER — GABAPENTIN 300 MG/1
300 CAPSULE ORAL ONCE
Status: CANCELLED | OUTPATIENT
Start: 2020-05-21 | End: 2020-05-21

## 2020-05-22 ENCOUNTER — ANCILLARY PROCEDURE (OUTPATIENT)
Dept: ULTRASOUND IMAGING | Facility: CLINIC | Age: 75
End: 2020-05-22
Payer: COMMERCIAL

## 2020-05-22 ENCOUNTER — ANESTHESIA (OUTPATIENT)
Dept: SURGERY | Facility: CLINIC | Age: 75
DRG: 406 | End: 2020-05-22
Payer: COMMERCIAL

## 2020-05-22 ENCOUNTER — HOSPITAL ENCOUNTER (INPATIENT)
Facility: CLINIC | Age: 75
LOS: 4 days | Discharge: HOME | DRG: 406 | End: 2020-05-26
Attending: SURGERY | Admitting: SURGERY
Payer: COMMERCIAL

## 2020-05-22 DIAGNOSIS — Z78.9 DEEP VEIN THROMBOSIS (DVT) PROPHYLAXIS PRESCRIBED AT DISCHARGE: Primary | ICD-10-CM

## 2020-05-22 DIAGNOSIS — K86.89 MASS OF PANCREAS: ICD-10-CM

## 2020-05-22 DIAGNOSIS — G89.18 POSTOPERATIVE PAIN: ICD-10-CM

## 2020-05-22 DIAGNOSIS — R17 PAINLESS JAUNDICE: ICD-10-CM

## 2020-05-22 LAB
ABO + RH BLD: NORMAL
ABO + RH BLD: NORMAL
ALBUMIN SERPL-MCNC: 2.5 G/DL (ref 3.4–5)
ALP SERPL-CCNC: 243 U/L (ref 40–150)
ALT SERPL W P-5'-P-CCNC: 202 U/L (ref 0–50)
ANION GAP SERPL CALCULATED.3IONS-SCNC: 6 MMOL/L (ref 3–14)
AST SERPL W P-5'-P-CCNC: 107 U/L (ref 0–45)
BASE DEFICIT BLDA-SCNC: 1.8 MMOL/L
BASE DEFICIT BLDA-SCNC: 3.1 MMOL/L
BILIRUB SERPL-MCNC: 2.2 MG/DL (ref 0.2–1.3)
BLD GP AB SCN SERPL QL: NORMAL
BLOOD BANK CMNT PATIENT-IMP: NORMAL
BUN SERPL-MCNC: 11 MG/DL (ref 7–30)
CA-I BLD-MCNC: 4.4 MG/DL (ref 4.4–5.2)
CA-I BLD-MCNC: 4.5 MG/DL (ref 4.4–5.2)
CALCIUM SERPL-MCNC: 7.8 MG/DL (ref 8.5–10.1)
CHLORIDE SERPL-SCNC: 103 MMOL/L (ref 94–109)
CO2 SERPL-SCNC: 24 MMOL/L (ref 20–32)
CREAT SERPL-MCNC: 0.74 MG/DL (ref 0.52–1.04)
ERYTHROCYTE [DISTWIDTH] IN BLOOD BY AUTOMATED COUNT: 12.8 % (ref 10–15)
GFR SERPL CREATININE-BSD FRML MDRD: 79 ML/MIN/{1.73_M2}
GLUCOSE BLD-MCNC: 112 MG/DL (ref 70–99)
GLUCOSE BLD-MCNC: 130 MG/DL (ref 70–99)
GLUCOSE BLDC GLUCOMTR-MCNC: 84 MG/DL (ref 70–99)
GLUCOSE SERPL-MCNC: 138 MG/DL (ref 70–99)
GRAM STN SPEC: ABNORMAL
GRAM STN SPEC: NORMAL
GRAM STN SPEC: NORMAL
HCO3 BLD-SCNC: 21 MMOL/L (ref 21–28)
HCO3 BLD-SCNC: 22 MMOL/L (ref 21–28)
HCT VFR BLD AUTO: 32.8 % (ref 35–47)
HGB BLD-MCNC: 11.1 G/DL (ref 11.7–15.7)
HGB BLD-MCNC: 12 G/DL (ref 11.7–15.7)
HGB BLD-MCNC: 12.5 G/DL (ref 11.7–15.7)
HGB BLD-MCNC: 12.6 G/DL (ref 11.7–15.7)
INR PPP: 1.34 (ref 0.86–1.14)
LACTATE BLD-SCNC: 0.6 MMOL/L (ref 0.7–2)
LACTATE BLD-SCNC: 0.7 MMOL/L (ref 0.7–2)
Lab: NORMAL
MAGNESIUM SERPL-MCNC: 1.8 MG/DL (ref 1.6–2.3)
MCH RBC QN AUTO: 29.1 PG (ref 26.5–33)
MCHC RBC AUTO-ENTMCNC: 33.8 G/DL (ref 31.5–36.5)
MCV RBC AUTO: 86 FL (ref 78–100)
O2/TOTAL GAS SETTING VFR VENT: 40 %
O2/TOTAL GAS SETTING VFR VENT: 40 %
PCO2 BLD: 33 MM HG (ref 35–45)
PCO2 BLD: 35 MM HG (ref 35–45)
PH BLD: 7.41 PH (ref 7.35–7.45)
PH BLD: 7.41 PH (ref 7.35–7.45)
PLATELET # BLD AUTO: 128 10E9/L (ref 150–450)
PO2 BLD: 147 MM HG (ref 80–105)
PO2 BLD: 177 MM HG (ref 80–105)
POTASSIUM BLD-SCNC: 3.2 MMOL/L (ref 3.4–5.3)
POTASSIUM BLD-SCNC: 3.5 MMOL/L (ref 3.4–5.3)
POTASSIUM SERPL-SCNC: 3.6 MMOL/L (ref 3.4–5.3)
PROT SERPL-MCNC: 5.2 G/DL (ref 6.8–8.8)
RBC # BLD AUTO: 3.81 10E12/L (ref 3.8–5.2)
SODIUM BLD-SCNC: 133 MMOL/L (ref 133–144)
SODIUM BLD-SCNC: 134 MMOL/L (ref 133–144)
SODIUM SERPL-SCNC: 133 MMOL/L (ref 133–144)
SPECIMEN EXP DATE BLD: NORMAL
SPECIMEN SOURCE: ABNORMAL
SPECIMEN SOURCE: NORMAL
WBC # BLD AUTO: 7.2 10E9/L (ref 4–11)

## 2020-05-22 PROCEDURE — 85610 PROTHROMBIN TIME: CPT | Performed by: NEUROLOGICAL SURGERY

## 2020-05-22 PROCEDURE — 87070 CULTURE OTHR SPECIMN AEROBIC: CPT | Performed by: SURGERY

## 2020-05-22 PROCEDURE — 37000009 ZZH ANESTHESIA TECHNICAL FEE, EACH ADDTL 15 MIN: Performed by: SURGERY

## 2020-05-22 PROCEDURE — 88342 IMHCHEM/IMCYTCHM 1ST ANTB: CPT | Performed by: SURGERY

## 2020-05-22 PROCEDURE — 40000014 ZZH STATISTIC ARTERIAL MONITORING DAILY

## 2020-05-22 PROCEDURE — 25000128 H RX IP 250 OP 636: Performed by: NURSE ANESTHETIST, CERTIFIED REGISTERED

## 2020-05-22 PROCEDURE — 88305 TISSUE EXAM BY PATHOLOGIST: CPT | Performed by: SURGERY

## 2020-05-22 PROCEDURE — C1877 STENT, NON-COAT/COV W/O DEL: HCPCS | Performed by: SURGERY

## 2020-05-22 PROCEDURE — 85018 HEMOGLOBIN: CPT | Performed by: ANESTHESIOLOGY

## 2020-05-22 PROCEDURE — 25800030 ZZH RX IP 258 OP 636: Performed by: ANESTHESIOLOGY

## 2020-05-22 PROCEDURE — 25000125 ZZHC RX 250: Performed by: NURSE ANESTHETIST, CERTIFIED REGISTERED

## 2020-05-22 PROCEDURE — 85027 COMPLETE CBC AUTOMATED: CPT | Performed by: NEUROLOGICAL SURGERY

## 2020-05-22 PROCEDURE — 87075 CULTR BACTERIA EXCEPT BLOOD: CPT | Performed by: SURGERY

## 2020-05-22 PROCEDURE — 36000070 ZZH SURGERY LEVEL 5 EA 15 ADDTL MIN - UMMC: Performed by: SURGERY

## 2020-05-22 PROCEDURE — 84132 ASSAY OF SERUM POTASSIUM: CPT

## 2020-05-22 PROCEDURE — 25000128 H RX IP 250 OP 636: Performed by: ANESTHESIOLOGY

## 2020-05-22 PROCEDURE — 88307 TISSUE EXAM BY PATHOLOGIST: CPT | Performed by: SURGERY

## 2020-05-22 PROCEDURE — 25000128 H RX IP 250 OP 636: Performed by: NEUROLOGICAL SURGERY

## 2020-05-22 PROCEDURE — 40000170 ZZH STATISTIC PRE-PROCEDURE ASSESSMENT II: Performed by: SURGERY

## 2020-05-22 PROCEDURE — 71000015 ZZH RECOVERY PHASE 1 LEVEL 2 EA ADDTL HR: Performed by: SURGERY

## 2020-05-22 PROCEDURE — 83605 ASSAY OF LACTIC ACID: CPT

## 2020-05-22 PROCEDURE — 25800030 ZZH RX IP 258 OP 636: Performed by: NURSE ANESTHETIST, CERTIFIED REGISTERED

## 2020-05-22 PROCEDURE — 25000128 H RX IP 250 OP 636

## 2020-05-22 PROCEDURE — 88313 SPECIAL STAINS GROUP 2: CPT | Performed by: SURGERY

## 2020-05-22 PROCEDURE — 37000008 ZZH ANESTHESIA TECHNICAL FEE, 1ST 30 MIN: Performed by: SURGERY

## 2020-05-22 PROCEDURE — 86901 BLOOD TYPING SEROLOGIC RH(D): CPT | Performed by: ANESTHESIOLOGY

## 2020-05-22 PROCEDURE — 36415 COLL VENOUS BLD VENIPUNCTURE: CPT | Performed by: ANESTHESIOLOGY

## 2020-05-22 PROCEDURE — 00000146 ZZHCL STATISTIC GLUCOSE BY METER IP

## 2020-05-22 PROCEDURE — 84295 ASSAY OF SERUM SODIUM: CPT

## 2020-05-22 PROCEDURE — 25800030 ZZH RX IP 258 OP 636: Performed by: SURGERY

## 2020-05-22 PROCEDURE — P9041 ALBUMIN (HUMAN),5%, 50ML: HCPCS | Performed by: NURSE ANESTHETIST, CERTIFIED REGISTERED

## 2020-05-22 PROCEDURE — 86900 BLOOD TYPING SEROLOGIC ABO: CPT | Performed by: ANESTHESIOLOGY

## 2020-05-22 PROCEDURE — 25000132 ZZH RX MED GY IP 250 OP 250 PS 637

## 2020-05-22 PROCEDURE — 83735 ASSAY OF MAGNESIUM: CPT | Performed by: SURGERY

## 2020-05-22 PROCEDURE — 88309 TISSUE EXAM BY PATHOLOGIST: CPT | Performed by: SURGERY

## 2020-05-22 PROCEDURE — 82330 ASSAY OF CALCIUM: CPT

## 2020-05-22 PROCEDURE — 88331 PATH CONSLTJ SURG 1 BLK 1SPC: CPT | Performed by: SURGERY

## 2020-05-22 PROCEDURE — 71000014 ZZH RECOVERY PHASE 1 LEVEL 2 FIRST HR: Performed by: SURGERY

## 2020-05-22 PROCEDURE — 25000125 ZZHC RX 250: Performed by: SURGERY

## 2020-05-22 PROCEDURE — 12000001 ZZH R&B MED SURG/OB UMMC

## 2020-05-22 PROCEDURE — 86850 RBC ANTIBODY SCREEN: CPT | Performed by: ANESTHESIOLOGY

## 2020-05-22 PROCEDURE — 88304 TISSUE EXAM BY PATHOLOGIST: CPT | Performed by: SURGERY

## 2020-05-22 PROCEDURE — 40000275 ZZH STATISTIC RCP TIME EA 10 MIN

## 2020-05-22 PROCEDURE — 82947 ASSAY GLUCOSE BLOOD QUANT: CPT

## 2020-05-22 PROCEDURE — 82803 BLOOD GASES ANY COMBINATION: CPT

## 2020-05-22 PROCEDURE — 87186 SC STD MICRODIL/AGAR DIL: CPT | Performed by: SURGERY

## 2020-05-22 PROCEDURE — C9290 INJ, BUPIVACAINE LIPOSOME: HCPCS | Performed by: ANESTHESIOLOGY

## 2020-05-22 PROCEDURE — 27210794 ZZH OR GENERAL SUPPLY STERILE: Performed by: SURGERY

## 2020-05-22 PROCEDURE — 25000565 ZZH ISOFLURANE, EA 15 MIN: Performed by: SURGERY

## 2020-05-22 PROCEDURE — 88341 IMHCHEM/IMCYTCHM EA ADD ANTB: CPT | Performed by: SURGERY

## 2020-05-22 PROCEDURE — 25800030 ZZH RX IP 258 OP 636: Performed by: NEUROLOGICAL SURGERY

## 2020-05-22 PROCEDURE — 87205 SMEAR GRAM STAIN: CPT | Performed by: SURGERY

## 2020-05-22 PROCEDURE — 87102 FUNGUS ISOLATION CULTURE: CPT | Performed by: SURGERY

## 2020-05-22 PROCEDURE — 87077 CULTURE AEROBIC IDENTIFY: CPT | Performed by: SURGERY

## 2020-05-22 PROCEDURE — 87076 CULTURE ANAEROBE IDENT EACH: CPT | Performed by: SURGERY

## 2020-05-22 PROCEDURE — 88332 PATH CONSLTJ SURG EA ADD BLK: CPT | Performed by: SURGERY

## 2020-05-22 PROCEDURE — 80053 COMPREHEN METABOLIC PANEL: CPT | Performed by: NEUROLOGICAL SURGERY

## 2020-05-22 PROCEDURE — 36000068 ZZH SURGERY LEVEL 5 1ST 30 MIN - UMMC: Performed by: SURGERY

## 2020-05-22 DEVICE — STENT FREEMAN PANCREA FLEX 5FRX5CM SGL PIGTAIL: Type: IMPLANTABLE DEVICE | Site: BILE DUCT | Status: FUNCTIONAL

## 2020-05-22 RX ORDER — ACETAMINOPHEN 325 MG/1
975 TABLET ORAL ONCE
Status: COMPLETED | OUTPATIENT
Start: 2020-05-22 | End: 2020-05-22

## 2020-05-22 RX ORDER — ONDANSETRON 2 MG/ML
INJECTION INTRAMUSCULAR; INTRAVENOUS PRN
Status: DISCONTINUED | OUTPATIENT
Start: 2020-05-22 | End: 2020-05-22

## 2020-05-22 RX ORDER — NALOXONE HYDROCHLORIDE 0.4 MG/ML
.1-.4 INJECTION, SOLUTION INTRAMUSCULAR; INTRAVENOUS; SUBCUTANEOUS
Status: DISCONTINUED | OUTPATIENT
Start: 2020-05-22 | End: 2020-05-22 | Stop reason: HOSPADM

## 2020-05-22 RX ORDER — ALBUMIN, HUMAN INJ 5% 5 %
SOLUTION INTRAVENOUS CONTINUOUS PRN
Status: DISCONTINUED | OUTPATIENT
Start: 2020-05-22 | End: 2020-05-22

## 2020-05-22 RX ORDER — CEFAZOLIN SODIUM 1 G/3ML
INJECTION, POWDER, FOR SOLUTION INTRAMUSCULAR; INTRAVENOUS EVERY 12 HOURS
Status: CANCELLED | OUTPATIENT
Start: 2020-05-22

## 2020-05-22 RX ORDER — FLUMAZENIL 0.1 MG/ML
0.2 INJECTION, SOLUTION INTRAVENOUS
Status: DISCONTINUED | OUTPATIENT
Start: 2020-05-22 | End: 2020-05-22 | Stop reason: HOSPADM

## 2020-05-22 RX ORDER — HYDROMORPHONE HYDROCHLORIDE 1 MG/ML
.3-.5 INJECTION, SOLUTION INTRAMUSCULAR; INTRAVENOUS; SUBCUTANEOUS EVERY 5 MIN PRN
Status: DISCONTINUED | OUTPATIENT
Start: 2020-05-22 | End: 2020-05-22 | Stop reason: HOSPADM

## 2020-05-22 RX ORDER — CEFOXITIN 2 G/1
INJECTION, POWDER, FOR SOLUTION INTRAVENOUS PRN
Status: DISCONTINUED | OUTPATIENT
Start: 2020-05-22 | End: 2020-05-22

## 2020-05-22 RX ORDER — SODIUM CHLORIDE, SODIUM LACTATE, POTASSIUM CHLORIDE, CALCIUM CHLORIDE 600; 310; 30; 20 MG/100ML; MG/100ML; MG/100ML; MG/100ML
INJECTION, SOLUTION INTRAVENOUS CONTINUOUS
Status: DISCONTINUED | OUTPATIENT
Start: 2020-05-22 | End: 2020-05-23

## 2020-05-22 RX ORDER — PROPOFOL 10 MG/ML
INJECTION, EMULSION INTRAVENOUS PRN
Status: DISCONTINUED | OUTPATIENT
Start: 2020-05-22 | End: 2020-05-22

## 2020-05-22 RX ORDER — EPHEDRINE SULFATE 50 MG/ML
INJECTION, SOLUTION INTRAMUSCULAR; INTRAVENOUS; SUBCUTANEOUS PRN
Status: DISCONTINUED | OUTPATIENT
Start: 2020-05-22 | End: 2020-05-22

## 2020-05-22 RX ORDER — ONDANSETRON 4 MG/1
4 TABLET, ORALLY DISINTEGRATING ORAL EVERY 6 HOURS PRN
Status: DISCONTINUED | OUTPATIENT
Start: 2020-05-22 | End: 2020-05-26 | Stop reason: HOSPADM

## 2020-05-22 RX ORDER — FENTANYL CITRATE 50 UG/ML
INJECTION, SOLUTION INTRAMUSCULAR; INTRAVENOUS PRN
Status: DISCONTINUED | OUTPATIENT
Start: 2020-05-22 | End: 2020-05-22

## 2020-05-22 RX ORDER — LEVOFLOXACIN 5 MG/ML
INJECTION, SOLUTION INTRAVENOUS PRN
Status: DISCONTINUED | OUTPATIENT
Start: 2020-05-22 | End: 2020-05-22

## 2020-05-22 RX ORDER — LIDOCAINE HYDROCHLORIDE 20 MG/ML
INJECTION, SOLUTION INFILTRATION; PERINEURAL PRN
Status: DISCONTINUED | OUTPATIENT
Start: 2020-05-22 | End: 2020-05-22

## 2020-05-22 RX ORDER — FENTANYL CITRATE 50 UG/ML
25-50 INJECTION, SOLUTION INTRAMUSCULAR; INTRAVENOUS
Status: DISCONTINUED | OUTPATIENT
Start: 2020-05-22 | End: 2020-05-22 | Stop reason: HOSPADM

## 2020-05-22 RX ORDER — HYDRALAZINE HYDROCHLORIDE 20 MG/ML
2.5-5 INJECTION INTRAMUSCULAR; INTRAVENOUS EVERY 10 MIN PRN
Status: DISCONTINUED | OUTPATIENT
Start: 2020-05-22 | End: 2020-05-22 | Stop reason: HOSPADM

## 2020-05-22 RX ORDER — ONDANSETRON 2 MG/ML
4 INJECTION INTRAMUSCULAR; INTRAVENOUS EVERY 6 HOURS PRN
Status: DISCONTINUED | OUTPATIENT
Start: 2020-05-22 | End: 2020-05-26 | Stop reason: HOSPADM

## 2020-05-22 RX ORDER — NALOXONE HYDROCHLORIDE 0.4 MG/ML
.1-.4 INJECTION, SOLUTION INTRAMUSCULAR; INTRAVENOUS; SUBCUTANEOUS
Status: DISCONTINUED | OUTPATIENT
Start: 2020-05-22 | End: 2020-05-26 | Stop reason: HOSPADM

## 2020-05-22 RX ORDER — ONDANSETRON 4 MG/1
4 TABLET, ORALLY DISINTEGRATING ORAL EVERY 30 MIN PRN
Status: DISCONTINUED | OUTPATIENT
Start: 2020-05-22 | End: 2020-05-22 | Stop reason: HOSPADM

## 2020-05-22 RX ORDER — LIDOCAINE 40 MG/G
CREAM TOPICAL
Status: DISCONTINUED | OUTPATIENT
Start: 2020-05-22 | End: 2020-05-22 | Stop reason: HOSPADM

## 2020-05-22 RX ORDER — SODIUM CHLORIDE, SODIUM LACTATE, POTASSIUM CHLORIDE, CALCIUM CHLORIDE 600; 310; 30; 20 MG/100ML; MG/100ML; MG/100ML; MG/100ML
INJECTION, SOLUTION INTRAVENOUS CONTINUOUS
Status: DISCONTINUED | OUTPATIENT
Start: 2020-05-22 | End: 2020-05-22 | Stop reason: HOSPADM

## 2020-05-22 RX ORDER — BUPIVACAINE HYDROCHLORIDE 2.5 MG/ML
INJECTION, SOLUTION EPIDURAL; INFILTRATION; INTRACAUDAL PRN
Status: DISCONTINUED | OUTPATIENT
Start: 2020-05-22 | End: 2020-05-22

## 2020-05-22 RX ORDER — DEXMEDETOMIDINE HYDROCHLORIDE 4 UG/ML
0.2-1.2 INJECTION, SOLUTION INTRAVENOUS CONTINUOUS
Status: DISCONTINUED | OUTPATIENT
Start: 2020-05-22 | End: 2020-05-22 | Stop reason: HOSPADM

## 2020-05-22 RX ORDER — HYDROMORPHONE HYDROCHLORIDE 1 MG/ML
.3-.5 INJECTION, SOLUTION INTRAMUSCULAR; INTRAVENOUS; SUBCUTANEOUS
Status: DISCONTINUED | OUTPATIENT
Start: 2020-05-22 | End: 2020-05-26 | Stop reason: HOSPADM

## 2020-05-22 RX ORDER — LABETALOL 20 MG/4 ML (5 MG/ML) INTRAVENOUS SYRINGE
10
Status: DISCONTINUED | OUTPATIENT
Start: 2020-05-22 | End: 2020-05-22 | Stop reason: HOSPADM

## 2020-05-22 RX ORDER — ONDANSETRON 2 MG/ML
4 INJECTION INTRAMUSCULAR; INTRAVENOUS EVERY 30 MIN PRN
Status: DISCONTINUED | OUTPATIENT
Start: 2020-05-22 | End: 2020-05-22 | Stop reason: HOSPADM

## 2020-05-22 RX ORDER — NALOXONE HYDROCHLORIDE 0.4 MG/ML
.1-.4 INJECTION, SOLUTION INTRAMUSCULAR; INTRAVENOUS; SUBCUTANEOUS
Status: DISCONTINUED | OUTPATIENT
Start: 2020-05-22 | End: 2020-05-22

## 2020-05-22 RX ADMIN — FENTANYL CITRATE 50 MCG: 50 INJECTION, SOLUTION INTRAMUSCULAR; INTRAVENOUS at 08:47

## 2020-05-22 RX ADMIN — PHENYLEPHRINE HYDROCHLORIDE 100 MCG: 10 INJECTION INTRAVENOUS at 15:44

## 2020-05-22 RX ADMIN — FENTANYL CITRATE 50 MCG: 50 INJECTION, SOLUTION INTRAMUSCULAR; INTRAVENOUS at 08:21

## 2020-05-22 RX ADMIN — ROCURONIUM BROMIDE 20 MG: 10 INJECTION INTRAVENOUS at 12:35

## 2020-05-22 RX ADMIN — SODIUM CHLORIDE, POTASSIUM CHLORIDE, SODIUM LACTATE AND CALCIUM CHLORIDE: 600; 310; 30; 20 INJECTION, SOLUTION INTRAVENOUS at 17:00

## 2020-05-22 RX ADMIN — BUPIVACAINE 20 ML: 13.3 INJECTION, SUSPENSION, LIPOSOMAL INFILTRATION at 08:31

## 2020-05-22 RX ADMIN — METRONIDAZOLE 500 MG: 500 INJECTION, SOLUTION INTRAVENOUS at 09:17

## 2020-05-22 RX ADMIN — FENTANYL CITRATE 25 MCG: 50 INJECTION, SOLUTION INTRAMUSCULAR; INTRAVENOUS at 18:05

## 2020-05-22 RX ADMIN — BUPIVACAINE HYDROCHLORIDE 40 ML: 2.5 INJECTION, SOLUTION EPIDURAL; INFILTRATION; INTRACAUDAL; PERINEURAL at 08:31

## 2020-05-22 RX ADMIN — ONDANSETRON 4 MG: 2 INJECTION INTRAMUSCULAR; INTRAVENOUS at 15:57

## 2020-05-22 RX ADMIN — FENTANYL CITRATE 25 MCG: 50 INJECTION, SOLUTION INTRAMUSCULAR; INTRAVENOUS at 18:00

## 2020-05-22 RX ADMIN — FENTANYL CITRATE 50 MCG: 50 INJECTION, SOLUTION INTRAMUSCULAR; INTRAVENOUS at 09:21

## 2020-05-22 RX ADMIN — FENTANYL CITRATE 25 MCG: 50 INJECTION, SOLUTION INTRAMUSCULAR; INTRAVENOUS at 18:09

## 2020-05-22 RX ADMIN — HYDROMORPHONE HYDROCHLORIDE 0.5 MG: 1 INJECTION, SOLUTION INTRAMUSCULAR; INTRAVENOUS; SUBCUTANEOUS at 11:57

## 2020-05-22 RX ADMIN — HYDROMORPHONE HYDROCHLORIDE 0.5 MG: 1 INJECTION, SOLUTION INTRAMUSCULAR; INTRAVENOUS; SUBCUTANEOUS at 19:04

## 2020-05-22 RX ADMIN — METRONIDAZOLE 500 MG: 500 INJECTION, SOLUTION INTRAVENOUS at 15:18

## 2020-05-22 RX ADMIN — FENTANYL CITRATE 50 MCG: 50 INJECTION, SOLUTION INTRAMUSCULAR; INTRAVENOUS at 11:09

## 2020-05-22 RX ADMIN — SODIUM CHLORIDE, POTASSIUM CHLORIDE, SODIUM LACTATE AND CALCIUM CHLORIDE: 600; 310; 30; 20 INJECTION, SOLUTION INTRAVENOUS at 08:35

## 2020-05-22 RX ADMIN — Medication 10 MG: at 09:14

## 2020-05-22 RX ADMIN — ACETAMINOPHEN 975 MG: 325 TABLET, FILM COATED ORAL at 08:14

## 2020-05-22 RX ADMIN — PROPOFOL 50 MG: 10 INJECTION, EMULSION INTRAVENOUS at 08:47

## 2020-05-22 RX ADMIN — PHENYLEPHRINE HYDROCHLORIDE 100 MCG: 10 INJECTION INTRAVENOUS at 10:20

## 2020-05-22 RX ADMIN — DEXMEDETOMIDINE 0.5 MCG/KG/HR: 100 INJECTION, SOLUTION, CONCENTRATE INTRAVENOUS at 11:30

## 2020-05-22 RX ADMIN — ROCURONIUM BROMIDE 20 MG: 10 INJECTION INTRAVENOUS at 09:16

## 2020-05-22 RX ADMIN — FENTANYL CITRATE 50 MCG: 50 INJECTION, SOLUTION INTRAMUSCULAR; INTRAVENOUS at 09:20

## 2020-05-22 RX ADMIN — HYDROMORPHONE HYDROCHLORIDE 0.5 MG: 1 INJECTION, SOLUTION INTRAMUSCULAR; INTRAVENOUS; SUBCUTANEOUS at 18:19

## 2020-05-22 RX ADMIN — ROCURONIUM BROMIDE 20 MG: 10 INJECTION INTRAVENOUS at 11:07

## 2020-05-22 RX ADMIN — ALBUMIN HUMAN: 0.05 INJECTION, SOLUTION INTRAVENOUS at 16:08

## 2020-05-22 RX ADMIN — HYDROMORPHONE HYDROCHLORIDE 0.5 MG: 1 INJECTION, SOLUTION INTRAMUSCULAR; INTRAVENOUS; SUBCUTANEOUS at 22:20

## 2020-05-22 RX ADMIN — ENOXAPARIN SODIUM 40 MG: 40 INJECTION SUBCUTANEOUS at 08:14

## 2020-05-22 RX ADMIN — FENTANYL CITRATE 25 MCG: 50 INJECTION, SOLUTION INTRAMUSCULAR; INTRAVENOUS at 18:15

## 2020-05-22 RX ADMIN — SUGAMMADEX 100 MG: 100 INJECTION, SOLUTION INTRAVENOUS at 16:00

## 2020-05-22 RX ADMIN — LEVOFLOXACIN 500 MG: 5 INJECTION, SOLUTION INTRAVENOUS at 09:17

## 2020-05-22 RX ADMIN — FENTANYL CITRATE 50 MCG: 50 INJECTION, SOLUTION INTRAMUSCULAR; INTRAVENOUS at 15:36

## 2020-05-22 RX ADMIN — ROCURONIUM BROMIDE 50 MG: 10 INJECTION INTRAVENOUS at 08:47

## 2020-05-22 RX ADMIN — LIDOCAINE HYDROCHLORIDE 60 MG: 20 INJECTION, SOLUTION INFILTRATION; PERINEURAL at 08:47

## 2020-05-22 RX ADMIN — CEFOXITIN SODIUM 0.5 G: 2 POWDER, FOR SOLUTION INTRAVENOUS at 09:09

## 2020-05-22 RX ADMIN — PHENYLEPHRINE HYDROCHLORIDE 100 MCG: 10 INJECTION INTRAVENOUS at 10:04

## 2020-05-22 RX ADMIN — ROCURONIUM BROMIDE 20 MG: 10 INJECTION INTRAVENOUS at 13:59

## 2020-05-22 RX ADMIN — PHENYLEPHRINE HYDROCHLORIDE 200 MCG: 10 INJECTION INTRAVENOUS at 15:53

## 2020-05-22 RX ADMIN — ONDANSETRON 4 MG: 2 INJECTION INTRAMUSCULAR; INTRAVENOUS at 17:57

## 2020-05-22 ASSESSMENT — PAIN DESCRIPTION - DESCRIPTORS: DESCRIPTORS: ACHING;SORE

## 2020-05-22 ASSESSMENT — MIFFLIN-ST. JEOR: SCORE: 986

## 2020-05-22 ASSESSMENT — LIFESTYLE VARIABLES: TOBACCO_USE: 0

## 2020-05-22 ASSESSMENT — ACTIVITIES OF DAILY LIVING (ADL): ADLS_ACUITY_SCORE: 13

## 2020-05-22 NOTE — ANESTHESIA POSTPROCEDURE EVALUATION
Anesthesia POST Procedure Evaluation    Patient: Flora Hogan   MRN:     [id number] Gender:   female   Age:    74 year old :      1945        Preoperative Diagnosis: Painless jaundice [R17]  Mass of pancreas [K86.89]   Procedure(s):  Diagnostic Laparoscopy with intraoperative ultrasound and Liver Biopsy X2  Non Pylorus Preserving Whipple procedure   Postop Comments: No value filed.     Anesthesia Type: General          Postop Pain Control: Uneventful            Sign Out: Well controlled pain   PONV: No   Neuro/Psych: Uneventful            Sign Out: Acceptable/Baseline neuro status   Airway/Respiratory: Uneventful            Sign Out: Acceptable/Baseline resp. status   CV/Hemodynamics: Uneventful            Sign Out: Acceptable CV status   Other NRE: NONE   DID A NON-ROUTINE EVENT OCCUR? No         Last Anesthesia Record Vitals:  CRNA VITALS  2020 1608 - 2020 1708      2020             NIBP:  108/69    Ht Rate:  58          Last PACU Vitals:  Vitals Value Taken Time   /77 2020  6:30 PM   Temp 36.8  C (98.3  F) 2020  5:30 PM   Pulse 59 2020  6:30 PM   Resp 8 2020  6:32 PM   SpO2 99 % 2020  6:32 PM   Temp src     NIBP     Pulse     SpO2     Resp     Temp     Ht Rate     Temp 2     Vitals shown include unvalidated device data.      Electronically Signed By: Maggy San MD, May 22, 2020, 6:34 PM

## 2020-05-22 NOTE — ANESTHESIA PROCEDURE NOTES
Peripheral Nerve Block Procedure Note  Staff -   Anesthesiologist:  Esteban Fowler MD  Resident/Fellow: Destin Hernandez MD    Performed By: resident        Location: Pre-op  Procedure Start/Stop TImes:     patient identified, IV checked, site marked, risks and benefits discussed, informed consent, monitors and equipment checked, pre-op evaluation, at physician/surgeon's request and post-op pain management      Correct Patient: Yes      Correct Position: Yes      Correct Site: Yes      Correct Procedure: Yes      Correct Laterality:  Yes    Site Marked:  Yes  Procedure details:     Procedure:  TAP (4 quadrant tap block )    Diagnosis:  Post operative pain    Laterality:  Bilateral    Position:  Supine    Sterile Prep: chloraprep, mask and sterile gloves      Local skin infiltration:  None    Needle:  Short bevel    Needle gauge:  21    Needle length (mm):  110    Ultrasound: Yes      Ultrasound used to identify targeted nerve, plexus, or vascular structure and placed a needle adjacent to it      Permanent Image entered into patiient's record      Abnormal pain on injection: No      Blood Aspirated: No      Paresthesias:  No    Bleeding at site: No      Bolus via:  Needle    Infusion Method:  Single Shot    Complications:  None  Assessment/Narrative:     Injection made incrementally with aspirations every (mL):  5

## 2020-05-22 NOTE — ANESTHESIA PREPROCEDURE EVALUATION
"Anesthesia Pre-Procedure Evaluation    Patient: Flora Hogan   MRN:     [id number] Gender:   female   Age:    74 year old :      1945        Preoperative Diagnosis: Painless jaundice [R17]  Mass of pancreas [K86.89]   Procedure(s):  Diagnostic Laparoscopy with Ultrasound Guided Liver Biopsy  open Whipple procedure     LABS:  CBC:   Lab Results   Component Value Date    WBC 6.9 2020    WBC 4.0 2020    HGB 12.6 2020    HGB 13.0 2020    HCT 38.6 2020    HCT 40.0 2020     2020     2020     BMP:   Lab Results   Component Value Date     2020     2020    POTASSIUM 3.9 2020    POTASSIUM 4.4 2020    CHLORIDE 101 2020    CHLORIDE 103 2020    CO2 29 2020    CO2 29 2020    BUN 11 2020    BUN 11 2020    CR 0.82 2020    CR 0.94 2020    GLC 96 2020    GLC 96 2020     COAGS:   Lab Results   Component Value Date    INR 0.95 2020     POC:   Lab Results   Component Value Date    BGM 84 2020     OTHER:   Lab Results   Component Value Date    CAMERON 9.1 2020    MAG 2.1 2015    ALBUMIN 3.5 2020    PROTTOTAL 7.3 2020    ALT 70 (H) 2020    AST 40 2020    ALKPHOS 235 (H) 2020    BILITOTAL 1.9 (H) 2020    LIPASE 393 2020    TSH 0.88 2016        Preop Vitals    BP Readings from Last 3 Encounters:   20 106/65   20 122/76   20 111/69    Pulse Readings from Last 3 Encounters:   20 60   20 (!) 44   20 (!) 47      Resp Readings from Last 3 Encounters:   20 16   20 14   20 18    SpO2 Readings from Last 3 Encounters:   20 98%   20 100%   20 100%      Temp Readings from Last 1 Encounters:   20 37.1  C (98.7  F) (Oral)    Ht Readings from Last 1 Encounters:   20 1.626 m (5' 4\")      Wt Readings from Last 1 Encounters:   20 50.6 " "kg (111 lb 8.8 oz)    Estimated body mass index is 19.15 kg/m  as calculated from the following:    Height as of this encounter: 1.626 m (5' 4\").    Weight as of this encounter: 50.6 kg (111 lb 8.8 oz).     LDA:  ETT (Active)   Number of days: 0        Past Medical History:   Diagnosis Date     Chest tightness     had suspected allergies     Seasonal allergies      SOB (shortness of breath)       Past Surgical History:   Procedure Laterality Date     CATARACT IOL, RT/LT  2015     COLONOSCOPY  10/14    no repeat needed due to age     COLONOSCOPY N/A 10/7/2014    Procedure: COLONOSCOPY;  Surgeon: Daryl Hanson MD;  Location:  GI     COLONOSCOPY  04/23/2019    no further screening     ENDOSCOPIC RETROGRADE CHOLANGIOPANCREATOGRAM N/A 4/20/2020    Procedure: ENDOSCOPIC RETROGRADE CHOLANGIOPANCREATOGRAPHY, PLACEMENT OF PANCREATIC STENT, PLACEMENT OF BILIARY STENT;  Surgeon: Yarely Vann MD;  Location:  OR     ESOPHAGOSCOPY, GASTROSCOPY, DUODENOSCOPY (EGD), COMBINED N/A 4/20/2020    Procedure: ESOPHAGOGASTRODUODENOSCOPY, WITH FINE NEEDLE ASPIRATION BIOPSY, WITH ENDOSCOPIC ULTRASOUND GUIDANCE, Endoscopic retrograde cholangiopancreatography;  Surgeon: Yarely Vann MD;  Location:  OR     ESOPHAGOSCOPY, GASTROSCOPY, DUODENOSCOPY (EGD), COMBINED N/A 5/5/2020    Procedure: ESOPHAGOGASTRODUODENOSCOPY, WITH FINE NEEDLE ASPIRATION BIOPSY, WITH ENDOSCOPIC ULTRASOUND GUIDANCE;  Surgeon: Romina Rosario MD;  Location:  GI      Allergies   Allergen Reactions     Penicillin V      Fredy-1 [Lidocaine] Unknown        Anesthesia Evaluation     . Pt has had prior anesthetic. Type: General    No history of anesthetic complications          ROS/MED HX    ENT/Pulmonary: Comment: Recent seasonal allergies causing coughing     (-) tobacco use and HILTON risk factors   Neurologic:  - neg neurologic ROS     Cardiovascular:  - neg cardiovascular ROS       METS/Exercise Tolerance:     Hematologic:  - neg " hematologic  ROS       Musculoskeletal:  - neg musculoskeletal ROS       GI/Hepatic: Comment: Pancreatic mass        Renal/Genitourinary:  - ROS Renal section negative       Endo:  - neg endo ROS       Psychiatric:  - neg psychiatric ROS       Infectious Disease:         Malignancy:         Other:                         PHYSICAL EXAM:   Mental Status/Neuro: A/A/O   Airway: Facies: Feasible  Mallampati: I  Mouth/Opening: Full  TM distance: > 6 cm  Neck ROM: Full   Respiratory: Auscultation: CTAB     Resp. Rate: Normal     Resp. Effort: Normal      CV: Rhythm: Regular  Rate: Age appropriate  Heart: Normal Sounds  Edema: None   Comments:      Dental: Normal Dentition                Assessment:   ASA SCORE: 3    H&P: History and physical reviewed and following examination; no interval change.   Smoking Status:  Non-Smoker/Unknown   NPO Status: NPO Appropriate     Plan:   Anes. Type:  General   Pre-Medication: None   Induction:  IV (Standard)   Airway: ETT; Oral   Access/Monitoring: PIV; 2nd PIV; A-Line   Maintenance: Balanced     Postop Plan:   Postop Pain: Opioids  Postop Sedation/Airway: Not planned     PONV Management:   Adult Risk Factors: Female, Non-Smoker, Postop Opioids   Prevention: Ondansetron, Dexamethasone     CONSENT: Direct conversation   Plan and risks discussed with: Patient   Blood Products: Consented (ALL Blood Products)                   Michelle Soriano MD

## 2020-05-22 NOTE — OR NURSING
Dr Ramsay here at bedside. Labs are still pending, Dr Ramsay stated no need to call with labs unless abnormal. VSS, handoff to Moe Barrett RN.

## 2020-05-22 NOTE — ANESTHESIA PROCEDURE NOTES
Peripheral Nerve Block Procedure Note  Staff -   Anesthesiologist:  Esteban Fowler MD  Resident/Fellow: Destin Hernandez MD    Performed By: resident        Location: Pre-op  Procedure Start/Stop TImes:     patient identified, IV checked, site marked, risks and benefits discussed, informed consent, monitors and equipment checked, pre-op evaluation, at physician/surgeon's request and post-op pain management      Correct Patient: Yes      Correct Position: Yes      Correct Site: Yes      Correct Procedure: Yes      Correct Laterality:  Yes    Site Marked:  Yes  Procedure details:     Procedure:  Rectus Sheath    Diagnosis:  Post operative pain    Laterality:  Bilateral    Position:  Supine    Sterile Prep: chloraprep, mask and sterile gloves      Local skin infiltration:  None    Needle:  Short bevel    Needle gauge:  21    Needle length (mm):  110    Ultrasound: Yes      Ultrasound used to identify targeted nerve, plexus, or vascular structure and placed a needle adjacent to it      Permanent Image entered into patiient's record      Abnormal pain on injection: No      Blood Aspirated: No      Paresthesias:  No    Bleeding at site: No      Bolus via:  Needle    Infusion Method:  Single Shot    Complications:  None  Assessment/Narrative:     Injection made incrementally with aspirations every (mL):  5

## 2020-05-22 NOTE — ANESTHESIA PROCEDURE NOTES
Arterial Line Procedure Note  Staff -   Anesthesiologist:  Michelle Soriano MD      Performed By: anesthesiologist          Location: In OR After Induction  Procedure Start/Stop Times:     patient identified, IV checked, site marked, risks and benefits discussed, informed consent, monitors and equipment checked, pre-op evaluation and at physician/surgeon's request      Correct Patient: Yes      Correct Position: Yes      Correct Site: Yes      Correct Procedure: Yes      Correct Laterality:  Yes    Site Marked:  Yes  Line Placement:     Procedure:  Arterial Line    Insertion Site:  Radial    Insertion laterality:  Left    Skin Prep: Chloraprep      Patient Prep: patient draped      Ultrasound Guided?: No      Cath secured with: suture      Dressing:  Tegaderm    Complications:  None obvious    Arterial waveform: Yes      IBP within 10% of NIBP: Yes

## 2020-05-22 NOTE — PROGRESS NOTES
RECOVERY RN NOTE:  Assigned as pt nurse report from Melodie @ pt bedside. Take over pt care @ 1800  Dr. San called for sign out @ 1815. States she will place one when she has time.   Art line removed from left radial site @ 1820. Manual pressure held. No bleeding or hematoma noted. Transparent dressing placed.   Pt has mask on properly   Pt denies nausea. Aromatherapy utilized for prevention   Pt lying in bed wakes and opens eyes spontaneously, appears to drift off to sleep and wake intermittently states she has minimal pain when breathing in but it well controled at this time and has improved with medication.   Pt appears comfortable   Tana ROCA given report. Will not transfer pt until after 1930. Plan for NST to transfer pt @ 1930.  Mitul  called and updated. Provided 7C phone number. States he will call unit around 10pm for update.     TOTAL MEDS GIVEN  4 Zofran   100 Fentanyl   1 mg of Dilaudid last dose 0.5 @ 1904    Pt pain has improved significantly with IV pain medication administration from a 7/10 @ 1800 to 2/10 by 1930    Delfina ROCA on 7C given update @ 1938

## 2020-05-22 NOTE — ANESTHESIA CARE TRANSFER NOTE
Patient: Flora Hogan    Procedure(s):  Diagnostic Laparoscopy with intraoperative ultrasound and Liver Biopsy X2  Non Pylorus Preserving Whipple procedure    Diagnosis: Painless jaundice [R17]  Mass of pancreas [K86.89]  Diagnosis Additional Information: No value filed.    Anesthesia Type:   General     Note:  Airway :Face Mask  Patient transferred to:PACU  Handoff Report: Identifed the Patient, Identified the Reponsible Provider, Reviewed the pertinent medical history, Discussed the surgical course, Reviewed Intra-OP anesthesia mangement and issues during anesthesia, Set expectations for post-procedure period and Allowed opportunity for questions and acknowledgement of understanding      Vitals: (Last set prior to Anesthesia Care Transfer)    CRNA VITALS  5/22/2020 1608 - 5/22/2020 1639      5/22/2020             SpO2:  100 %    Resp Rate (observed):  16    EKG:  NSR                Electronically Signed By: MICAELA Degroot CRNA  May 22, 2020  4:39 PM

## 2020-05-22 NOTE — BRIEF OP NOTE
[facility], [place]    Brief Operative Note    Pre-operative diagnosis: Painless jaundice [R17]  Mass of pancreas [K86.89]  Post-operative diagnosis Same as pre-operative diagnosis    Procedure: Procedure(s):  Diagnostic Laparoscopy with intraoperative ultrasound and Liver Biopsy X2  Non Pylorus Preserving Whipple procedure  Surgeon: Surgeon(s) and Role:     * Duarte Chaves MD - Primary     * Figueroa Ramsay MD - Resident - Assisting     * Neena Kirby MD - Resident - Assisting  Anesthesia: Combined General with Block   Estimated blood loss: 200 ml  Drains: Frank-Suarez  Specimens:   ID Type Source Tests Collected by Time Destination   1 : Bile duct stent Other (specify in comments) Other ANAEROBIC BACTERIAL CULTURE, GRAM STAIN, MISCELLANEOUS CULTURE AEROBIC BACTERIAL Duarte Chaves MD 5/22/2020 11:22 AM    2 : Bile for culture Fluid Other ANAEROBIC BACTERIAL CULTURE, FLUID CULTURE AEROBIC BACTERIAL, FUNGUS CULTURE, GRAM STAIN Duarte Chaves MD 5/22/2020  1:16 PM    A : Liver Mass Segment 8 Tissue Liver SURGICAL PATHOLOGY EXAM Duarte Chaves MD 5/22/2020  9:26 AM    B : Segment 3 Liver Lesion Tissue Liver SURGICAL PATHOLOGY EXAM Duarte Chaves MD 5/22/2020  9:30 AM    C : gallbladder Tissue Gallbladder and Contents SURGICAL PATHOLOGY EXAM Duarte Chaves MD 5/22/2020 11:05 AM    D : Non-Pylorus preserving Whipple Tissue Pancreas SURGICAL PATHOLOGY EXAM Duarte Chaves MD 5/22/2020 11:48 AM    E : Jejunum Tissue Small Intestine, Jejunum SURGICAL PATHOLOGY EXAM Duarte Chaves MD 5/22/2020  1:03 PM    F : Final Pancreas Margin Tissue Pancreas SURGICAL PATHOLOGY EXAM Duarte Chaves MD 5/22/2020  1:07 PM      Findings:   2 nodules visible on surface of the liver. Biopsied but appeared benign on pathology. Laparoscopic US notable for deep cystic nodule. Whipple performed. 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 Negative frozen  section margins.   Complications: None.  Implants:   Implant Name Type Inv. Item Serial No.  Lot No. LRB No. Used Action   STENT ALLISON PANCREA FLEX [id number] SGL PIGTAIL Stent STENT ALLISON PANCREA FLEX [id number] SGL PIGTAIL  [employer] [id number] N/A 1 Implanted

## 2020-05-23 ENCOUNTER — APPOINTMENT (OUTPATIENT)
Dept: OCCUPATIONAL THERAPY | Facility: CLINIC | Age: 75
DRG: 406 | End: 2020-05-23
Attending: NEUROLOGICAL SURGERY
Payer: COMMERCIAL

## 2020-05-23 LAB
ALBUMIN SERPL-MCNC: 2.6 G/DL (ref 3.4–5)
ALP SERPL-CCNC: 237 U/L (ref 40–150)
ALT SERPL W P-5'-P-CCNC: 170 U/L (ref 0–50)
AMYLASE FLD-CCNC: 268 U/L
AMYLASE FLD-CCNC: 327 U/L
AMYLASE SERPL-CCNC: 61 U/L (ref 30–110)
ANION GAP SERPL CALCULATED.3IONS-SCNC: 6 MMOL/L (ref 3–14)
AST SERPL W P-5'-P-CCNC: 62 U/L (ref 0–45)
BASOPHILS # BLD AUTO: 0 10E9/L (ref 0–0.2)
BASOPHILS NFR BLD AUTO: 0.2 %
BILIRUB SERPL-MCNC: 1.6 MG/DL (ref 0.2–1.3)
BUN SERPL-MCNC: 12 MG/DL (ref 7–30)
CALCIUM SERPL-MCNC: 8.4 MG/DL (ref 8.5–10.1)
CHLORIDE SERPL-SCNC: 103 MMOL/L (ref 94–109)
CO2 SERPL-SCNC: 25 MMOL/L (ref 20–32)
CREAT SERPL-MCNC: 0.76 MG/DL (ref 0.52–1.04)
DIFFERENTIAL METHOD BLD: ABNORMAL
EOSINOPHIL # BLD AUTO: 0 10E9/L (ref 0–0.7)
EOSINOPHIL NFR BLD AUTO: 0 %
ERYTHROCYTE [DISTWIDTH] IN BLOOD BY AUTOMATED COUNT: 12.9 % (ref 10–15)
GFR SERPL CREATININE-BSD FRML MDRD: 76 ML/MIN/{1.73_M2}
GLUCOSE BLDC GLUCOMTR-MCNC: 113 MG/DL (ref 70–99)
GLUCOSE BLDC GLUCOMTR-MCNC: 115 MG/DL (ref 70–99)
GLUCOSE BLDC GLUCOMTR-MCNC: 126 MG/DL (ref 70–99)
GLUCOSE BLDC GLUCOMTR-MCNC: 137 MG/DL (ref 70–99)
GLUCOSE BLDC GLUCOMTR-MCNC: 137 MG/DL (ref 70–99)
GLUCOSE BLDC GLUCOMTR-MCNC: 138 MG/DL (ref 70–99)
GLUCOSE SERPL-MCNC: 103 MG/DL (ref 70–99)
HBA1C MFR BLD: 5.5 % (ref 0–5.6)
HCT VFR BLD AUTO: 37.8 % (ref 35–47)
HGB BLD-MCNC: 12.6 G/DL (ref 11.7–15.7)
IMM GRANULOCYTES # BLD: 0 10E9/L (ref 0–0.4)
IMM GRANULOCYTES NFR BLD: 0.4 %
LYMPHOCYTES # BLD AUTO: 0.8 10E9/L (ref 0.8–5.3)
LYMPHOCYTES NFR BLD AUTO: 7.5 %
MAGNESIUM SERPL-MCNC: 1.8 MG/DL (ref 1.6–2.3)
MCH RBC QN AUTO: 29.3 PG (ref 26.5–33)
MCHC RBC AUTO-ENTMCNC: 33.3 G/DL (ref 31.5–36.5)
MCV RBC AUTO: 88 FL (ref 78–100)
MONOCYTES # BLD AUTO: 0.7 10E9/L (ref 0–1.3)
MONOCYTES NFR BLD AUTO: 6.1 %
NEUTROPHILS # BLD AUTO: 9.1 10E9/L (ref 1.6–8.3)
NEUTROPHILS NFR BLD AUTO: 85.8 %
NRBC # BLD AUTO: 0 10*3/UL
NRBC BLD AUTO-RTO: 0 /100
PHOSPHATE SERPL-MCNC: 3 MG/DL (ref 2.5–4.5)
PLATELET # BLD AUTO: 164 10E9/L (ref 150–450)
POTASSIUM SERPL-SCNC: 3.7 MMOL/L (ref 3.4–5.3)
PROT SERPL-MCNC: 5.7 G/DL (ref 6.8–8.8)
RBC # BLD AUTO: 4.3 10E12/L (ref 3.8–5.2)
SODIUM SERPL-SCNC: 134 MMOL/L (ref 133–144)
SPECIMEN SOURCE FLD: NORMAL
SPECIMEN SOURCE FLD: NORMAL
WBC # BLD AUTO: 10.6 10E9/L (ref 4–11)

## 2020-05-23 PROCEDURE — 84100 ASSAY OF PHOSPHORUS: CPT | Performed by: NEUROLOGICAL SURGERY

## 2020-05-23 PROCEDURE — 83036 HEMOGLOBIN GLYCOSYLATED A1C: CPT | Performed by: NEUROLOGICAL SURGERY

## 2020-05-23 PROCEDURE — 97535 SELF CARE MNGMENT TRAINING: CPT | Mod: GO

## 2020-05-23 PROCEDURE — 97165 OT EVAL LOW COMPLEX 30 MIN: CPT | Mod: GO

## 2020-05-23 PROCEDURE — 82150 ASSAY OF AMYLASE: CPT | Performed by: NEUROLOGICAL SURGERY

## 2020-05-23 PROCEDURE — C9113 INJ PANTOPRAZOLE SODIUM, VIA: HCPCS | Performed by: NEUROLOGICAL SURGERY

## 2020-05-23 PROCEDURE — 00000146 ZZHCL STATISTIC GLUCOSE BY METER IP

## 2020-05-23 PROCEDURE — 25000132 ZZH RX MED GY IP 250 OP 250 PS 637: Performed by: STUDENT IN AN ORGANIZED HEALTH CARE EDUCATION/TRAINING PROGRAM

## 2020-05-23 PROCEDURE — 25800030 ZZH RX IP 258 OP 636: Performed by: NEUROLOGICAL SURGERY

## 2020-05-23 PROCEDURE — 25000128 H RX IP 250 OP 636: Performed by: SURGERY

## 2020-05-23 PROCEDURE — 25800030 ZZH RX IP 258 OP 636: Performed by: SURGERY

## 2020-05-23 PROCEDURE — 85025 COMPLETE CBC W/AUTO DIFF WBC: CPT | Performed by: NEUROLOGICAL SURGERY

## 2020-05-23 PROCEDURE — 83735 ASSAY OF MAGNESIUM: CPT | Performed by: NEUROLOGICAL SURGERY

## 2020-05-23 PROCEDURE — 12000001 ZZH R&B MED SURG/OB UMMC

## 2020-05-23 PROCEDURE — 25000128 H RX IP 250 OP 636: Performed by: NEUROLOGICAL SURGERY

## 2020-05-23 PROCEDURE — 80053 COMPREHEN METABOLIC PANEL: CPT | Performed by: NEUROLOGICAL SURGERY

## 2020-05-23 PROCEDURE — 36415 COLL VENOUS BLD VENIPUNCTURE: CPT | Performed by: NEUROLOGICAL SURGERY

## 2020-05-23 RX ORDER — DEXTROSE MONOHYDRATE, SODIUM CHLORIDE, AND POTASSIUM CHLORIDE 50; 1.49; 4.5 G/1000ML; G/1000ML; G/1000ML
INJECTION, SOLUTION INTRAVENOUS CONTINUOUS
Status: DISCONTINUED | OUTPATIENT
Start: 2020-05-23 | End: 2020-05-24

## 2020-05-23 RX ORDER — DEXTROSE MONOHYDRATE 25 G/50ML
25-50 INJECTION, SOLUTION INTRAVENOUS
Status: DISCONTINUED | OUTPATIENT
Start: 2020-05-23 | End: 2020-05-26 | Stop reason: HOSPADM

## 2020-05-23 RX ORDER — NICOTINE POLACRILEX 4 MG
15-30 LOZENGE BUCCAL
Status: DISCONTINUED | OUTPATIENT
Start: 2020-05-23 | End: 2020-05-26 | Stop reason: HOSPADM

## 2020-05-23 RX ORDER — ALBUTEROL SULFATE 90 UG/1
2 AEROSOL, METERED RESPIRATORY (INHALATION) EVERY 6 HOURS PRN
Status: DISCONTINUED | OUTPATIENT
Start: 2020-05-23 | End: 2020-05-26 | Stop reason: HOSPADM

## 2020-05-23 RX ORDER — KETOROLAC TROMETHAMINE 30 MG/ML
15 INJECTION, SOLUTION INTRAMUSCULAR; INTRAVENOUS EVERY 6 HOURS PRN
Status: DISCONTINUED | OUTPATIENT
Start: 2020-05-23 | End: 2020-05-26

## 2020-05-23 RX ADMIN — KETOROLAC TROMETHAMINE 15 MG: 30 INJECTION, SOLUTION INTRAMUSCULAR at 09:11

## 2020-05-23 RX ADMIN — SODIUM CHLORIDE, POTASSIUM CHLORIDE, SODIUM LACTATE AND CALCIUM CHLORIDE: 600; 310; 30; 20 INJECTION, SOLUTION INTRAVENOUS at 03:13

## 2020-05-23 RX ADMIN — KETOROLAC TROMETHAMINE 15 MG: 30 INJECTION, SOLUTION INTRAMUSCULAR at 15:26

## 2020-05-23 RX ADMIN — KETOROLAC TROMETHAMINE 15 MG: 30 INJECTION, SOLUTION INTRAMUSCULAR at 21:36

## 2020-05-23 RX ADMIN — HYDROMORPHONE HYDROCHLORIDE 0.2 MG: 1 INJECTION, SOLUTION INTRAMUSCULAR; INTRAVENOUS; SUBCUTANEOUS at 23:55

## 2020-05-23 RX ADMIN — HYDROMORPHONE HYDROCHLORIDE 0.5 MG: 1 INJECTION, SOLUTION INTRAMUSCULAR; INTRAVENOUS; SUBCUTANEOUS at 03:16

## 2020-05-23 RX ADMIN — HYDROMORPHONE HYDROCHLORIDE 0.3 MG: 1 INJECTION, SOLUTION INTRAMUSCULAR; INTRAVENOUS; SUBCUTANEOUS at 23:33

## 2020-05-23 RX ADMIN — PANTOPRAZOLE SODIUM 40 MG: 40 INJECTION, POWDER, FOR SOLUTION INTRAVENOUS at 08:11

## 2020-05-23 RX ADMIN — POTASSIUM CHLORIDE, DEXTROSE MONOHYDRATE AND SODIUM CHLORIDE: 150; 5; 450 INJECTION, SOLUTION INTRAVENOUS at 19:19

## 2020-05-23 RX ADMIN — POTASSIUM CHLORIDE, DEXTROSE MONOHYDRATE AND SODIUM CHLORIDE: 150; 5; 450 INJECTION, SOLUTION INTRAVENOUS at 09:08

## 2020-05-23 RX ADMIN — Medication 1 MG: at 21:36

## 2020-05-23 ASSESSMENT — PAIN DESCRIPTION - DESCRIPTORS
DESCRIPTORS: DISCOMFORT
DESCRIPTORS: CRAMPING;BURNING
DESCRIPTORS: SHARP
DESCRIPTORS: SHARP
DESCRIPTORS: CRAMPING;BURNING
DESCRIPTORS: SHARP

## 2020-05-23 ASSESSMENT — MIFFLIN-ST. JEOR: SCORE: 1012.07

## 2020-05-23 ASSESSMENT — ACTIVITIES OF DAILY LIVING (ADL)
ADLS_ACUITY_SCORE: 13
ADLS_ACUITY_SCORE: 15
ADLS_ACUITY_SCORE: 14
PREVIOUS_RESPONSIBILITIES: MEAL PREP;HOUSEKEEPING;LAUNDRY;SHOPPING;YARDWORK;MEDICATION MANAGEMENT;FINANCES;DRIVING
ADLS_ACUITY_SCORE: 14
ADLS_ACUITY_SCORE: 15
ADLS_ACUITY_SCORE: 14

## 2020-05-23 NOTE — PLAN OF CARE
VSS on room air. Patient complaining of sharp left shoulder pain-MD aware. Pain tolerable with toradol. NPO except ice chips and hard candy. Blood sugars stable, no insulin needed today. Urine output adequate via adler. Not passing gas or stool. Bilateral abdominal JPs with serosanguinous output. Midline incision CDI and STEPHEN. Up with SBA.

## 2020-05-23 NOTE — PLAN OF CARE
7C PT Defer: per OT patient is functioning near baseline for all mobility and will be appropriate to discharge home when medically stable with support from spouse. PT to sign off at this time, OT to continue to follow in house.     PT -   Discharge Planner PT   Patient plan for discharge: home with SO  Current status: near baseline for functional mobility  Barriers to return to prior living situation: medical status  Recommendations for discharge: defer to OT  Rationale for recommendations: per OT, no acute PT needs  Entered by: Santos Ramirze 05/23/2020 9:03 AM

## 2020-05-23 NOTE — PROGRESS NOTES
Admitted/transferred from:   2 RN full   skin assessment completed by Delfina San, ABELINO and Ani Parada RN.  Skin assessment finding: skin intact, no problems   Interventions/actions:  None at this time.    Will continue to monitor.

## 2020-05-23 NOTE — PROGRESS NOTES
"CROSSCOVER    Called for sharp left shoulder pain, new after working with PT. Patient worked with PT earlier today and then upon getting back in bed noticed she had new sharp left anterior shoulder pain. Thinks may have hurt it when she boosted herself up with her elbows. No prior similar episodes, no worsening pain with active ROM. No chest pain, shortness of breath, nausea, diaphoresis.     BP (!) 147/81 (BP Location: Right arm)   Pulse 58   Temp 98.1  F (36.7  C) (Oral)   Resp 20   Ht 1.626 m (5' 4\")   Wt 53.2 kg (117 lb 4.8 oz)   LMP  (LMP Unknown)   SpO2 96%   BMI 20.13 kg/m    NAD  RRR  Non-cyanotic on RA  No pain with active or passive ROM. Mild point tenderness to palpation over anterior shoulder. Worsening pain with resistance to internal rotation.     Patient is POD1 s/p whipple. Hemodynamically normal. Pain is likely musculoskeletal in nature given history and exam. No concern for more insidious cardiac process. Continue to monitor. If continues to have pain, recommend shoulder exercises per PT.    Elina Elias MD  Surgery, PGY1  [phone]  "

## 2020-05-23 NOTE — CONSULTS
Brief Social Work Note:    SW consult for discharge planning received and acknowledged. PT/OT have recommended a discharge to home with assist from spouse and indicate this is pt's plan. RNCC will assist with any home discharge needs, therefore SW will sign off at this time. Please re-consult SW if additional SW needs arise.     ALAN Reddy, [facility]     Units 4A, 4C, 4E, 5A, 5B pager [phone]  Units 6A, 6B, 6C, 6D pager [phone]  Units 7A, 7B, 7C, 7D pager [phone]

## 2020-05-23 NOTE — PLAN OF CARE
POD 1 s/p diagnostic laparoscopy with US guided liver biopsy and open whipple procedure (per MD note). VSS on 2L NC, capno in place, alert and oriented x4. Midline incision intact with liquid bandaged and open to air. Lap site C/D/I with liquid bandage. Bilateral abdominal JPs C/D/I with bloody output, to bulb suction. Pain managed with PRN IV dilaudid. Arnett in place with adequate UOP. BS hypoactive, pt denies passing flatus. NPO, denies nausea/vomiting. Q4hr BG monitorin and 115. C/o dizziness, improved with time. Pt tolerated dangling and standing at bedside well. PCDs in place. Continue with POC.

## 2020-05-23 NOTE — PROGRESS NOTES
05/23/20 0959   Quick Adds   Type of Visit Initial Occupational Therapy Evaluation   Living Environment   Lives With spouse   Living Arrangements house   Home Accessibility no concerns   Transportation Anticipated car, drives self;family or friend will provide   Living Environment Comment Pt lives with spouse, walk in shower present. All needs available on main floor. Spouse retired, able to assist as needed.    Self-Care   Usual Activity Tolerance good   Current Activity Tolerance moderate   Regular Exercise Yes   Activity/Exercise Type walking   Exercise Amount/Frequency greater than 1 hr;3-5 times/wk   Equipment Currently Used at Home none   Activity/Exercise/Self-Care Comment Pt walks 5 miles per day, weather permitting. IND with ADLs at baseline   Functional Level   Ambulation 0-->independent   Transferring 0-->independent   Toileting 0-->independent   Bathing 0-->independent   Dressing 0-->independent   Eating 0-->independent   Communication 0-->understands/communicates without difficulty   Swallowing 0-->swallows foods/liquids without difficulty   Cognition 0 - no cognition issues reported   Fall history within last six months no   Which of the above functional risks had a recent onset or change? ambulation;transferring;toileting;bathing;dressing   Prior Functional Level Comment Pt IND at baseline   General Information   Onset of Illness/Injury or Date of Surgery - Date 05/22/20   Referring Physician Figueroa Ramsay MD    Patient/Family Goals Statement to return to [facility]   Additional Occupational Profile Info/Pertinent History of Current Problem 75 yo female POD 1 s/p diagnostic laparoscopy with US guided liver biopsy and open whipple procedure    Precautions/Limitations abdominal precautions   Weight-Bearing Status - LUE other (see comments)  (10# lifting restriction)   Weight-Bearing Status - RUE other (see comments)  (10# lifting restriction)   Weight-Bearing Status - LLE full weight-bearing    Weight-Bearing Status - RLE full weight-bearing   Heart Disease Risk Factors Age;Medical history   General Observations Pt pleasant and agreeable to therapy   General Info Comments Activity: up with assist   Cognitive Status Examination   Orientation orientation to person, place and time   Level of Consciousness alert   Follows Commands (Cognition) WNL   Memory intact   Attention No deficits were identified   Cognitive Comment Pt alert, oriented x 4 and appropriate during conversation. 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 Will continue to monitor.   Visual Perception   Visual Perception Wears glasses   Sensory Examination   Sensory Quick Adds No deficits were identified   Pain Assessment   Patient Currently in Pain Yes, see Vital Sign flowsheet   Range of Motion (ROM)   ROM Quick Adds No deficits were identified   Strength   Strength Comments NT 2/2 precautions   Hand Strength   Hand Strength Comments WNL   Instrumental Activities of Daily Living (IADL)   Previous Responsibilities meal prep;housekeeping;laundry;shopping;yardwork;medication management;finances;driving   IADL Comments Pt IND with all IADLs at baseline   Activities of Daily Living Analysis   Impairments Contributing to Impaired Activities of Daily Living fear and anxiety;pain;post surgical precautions;strength decreased   General Therapy Interventions   Planned Therapy Interventions ADL retraining;IADL retraining;risk factor education;progressive activity/exercise;home program guidelines;strengthening   Clinical Impression   Criteria for Skilled Therapeutic Interventions Met yes, treatment indicated   OT Diagnosis decreased IND with ADLs   Influenced by the following impairments pain, post surgical precautions, deconditioning   Assessment of Occupational Performance 3-5 Performance Deficits   Identified Performance Deficits home management, dressing, bathing, leisure   Clinical Decision Making (Complexity) Low complexity  "  Therapy Frequency 5x/week   Predicted Duration of Therapy Intervention (days/wks) 1 week   Anticipated Equipment Needs at Discharge   (TBD)   Anticipated Discharge Disposition Home with Assist   Risks and Benefits of Treatment have been explained. Yes   Patient, Family & other staff in agreement with plan of care Yes   [facility] TM \"6 Clicks\"   2016, Trustees of [facility], under license to [employer].  All rights reserved.   6 Clicks Short Forms Daily Activity Inpatient Short Form   [facility]  \"6 Clicks\" Daily Activity Inpatient Short Form   1. Putting on and taking off regular lower body clothing? 2 - A Lot   2. Bathing (including washing, rinsing, drying)? 3 - A Little   3. Toileting, which includes using toilet, bedpan or urinal? 3 - A Little   4. Putting on and taking off regular upper body clothing? 3 - A Little   5. Taking care of personal grooming such as brushing teeth? 4 - None   6. Eating meals? 4 - None   Daily Activity Raw Score (Score out of 24.Lower scores equate to lower levels of function) 19   Total Evaluation Time   Total Evaluation Time (Minutes) 5     "

## 2020-05-23 NOTE — PLAN OF CARE
Discharge Planner OT   Patient plan for discharge: home with A from spouse  Current status: Pt min A supine > sitting EOB. Pt SBA for functional transfers and mobility. Pt ambulating ~30ft in room with SBA. Pt reporting weakness and lightheadedness upon standing that resolved quickly. Pt max A for LB dressing, completing basic g/h seated in chair with set up. All VSS on 2L O2 throughout session.  Barriers to return to prior living situation: deconditioning, pain, post surgical precautions, medical status  Recommendations for discharge: home with A from spouse  Rationale for recommendations: Pt with good support at home for assist as needed, pt to require assist for ADL/IADLs requiring lifting >10lbs.        Entered by: Mela Mejias 05/23/2020 10:18 AM

## 2020-05-23 NOTE — PROGRESS NOTES
"Surgical Oncology Progress Note  05/23/2020    Stable overnight. Pain controlled most of the time. No n/v. Adequate UOP. Dangled yesterday.     BP (!) 143/84 (BP Location: Right arm)   Pulse 58   Temp 98.2  F (36.8  C) (Oral)   Resp 15   Ht 1.626 m (5' 4\")   Wt 50.6 kg (111 lb 8.8 oz)   LMP  (LMP Unknown)   SpO2 95%   BMI 19.15 kg/m      PE  NAD  NLB on RA  Abd soft, nondistended, URSULA drains serosanguinous  Ext wwp    Labs  WBC 10.6  Hg 12.6  Glucose   Tbili 1.6 from 2.2  Alk phos 237 from 243   from 202  AST 62 from 107  Amylase 61    A/P: 74F with pancreatic mass, POD1 s/p open Whipple procedure. Stable. Whipple ERAS.     -Tylenol 100 TID, Toradol   -D5W1/2NS20K@100  -ice chips, hard candy  -daily CBC, CMP  -protonix 40 IV  -continue adler for today  -lovenox ppx, needs teaching  -appreciate PT/OT  -drain amylase today  -encourage ambulation as tolerated    Seen with Dr. Acharya.    Neena Martin MD PGY3  General Surgery      "

## 2020-05-24 ENCOUNTER — APPOINTMENT (OUTPATIENT)
Dept: GENERAL RADIOLOGY | Facility: CLINIC | Age: 75
DRG: 406 | End: 2020-05-24
Attending: NEUROLOGICAL SURGERY
Payer: COMMERCIAL

## 2020-05-24 LAB
ALBUMIN SERPL-MCNC: 2.3 G/DL (ref 3.4–5)
ALP SERPL-CCNC: 185 U/L (ref 40–150)
ALT SERPL W P-5'-P-CCNC: 112 U/L (ref 0–50)
ANION GAP SERPL CALCULATED.3IONS-SCNC: 3 MMOL/L (ref 3–14)
AST SERPL W P-5'-P-CCNC: 34 U/L (ref 0–45)
BILIRUB SERPL-MCNC: 1.2 MG/DL (ref 0.2–1.3)
BUN SERPL-MCNC: 8 MG/DL (ref 7–30)
CALCIUM SERPL-MCNC: 8.2 MG/DL (ref 8.5–10.1)
CHLORIDE SERPL-SCNC: 102 MMOL/L (ref 94–109)
CO2 SERPL-SCNC: 27 MMOL/L (ref 20–32)
CREAT SERPL-MCNC: 0.67 MG/DL (ref 0.52–1.04)
ERYTHROCYTE [DISTWIDTH] IN BLOOD BY AUTOMATED COUNT: 12.9 % (ref 10–15)
GFR SERPL CREATININE-BSD FRML MDRD: 85 ML/MIN/{1.73_M2}
GLUCOSE BLDC GLUCOMTR-MCNC: 112 MG/DL (ref 70–99)
GLUCOSE BLDC GLUCOMTR-MCNC: 118 MG/DL (ref 70–99)
GLUCOSE BLDC GLUCOMTR-MCNC: 129 MG/DL (ref 70–99)
GLUCOSE BLDC GLUCOMTR-MCNC: 133 MG/DL (ref 70–99)
GLUCOSE BLDC GLUCOMTR-MCNC: 150 MG/DL (ref 70–99)
GLUCOSE SERPL-MCNC: 133 MG/DL (ref 70–99)
HCT VFR BLD AUTO: 35 % (ref 35–47)
HGB BLD-MCNC: 11.5 G/DL (ref 11.7–15.7)
MCH RBC QN AUTO: 28.9 PG (ref 26.5–33)
MCHC RBC AUTO-ENTMCNC: 32.9 G/DL (ref 31.5–36.5)
MCV RBC AUTO: 88 FL (ref 78–100)
PLATELET # BLD AUTO: 198 10E9/L (ref 150–450)
POTASSIUM SERPL-SCNC: 3.9 MMOL/L (ref 3.4–5.3)
PROT SERPL-MCNC: 5.4 G/DL (ref 6.8–8.8)
RBC # BLD AUTO: 3.98 10E12/L (ref 3.8–5.2)
SODIUM SERPL-SCNC: 132 MMOL/L (ref 133–144)
WBC # BLD AUTO: 7 10E9/L (ref 4–11)

## 2020-05-24 PROCEDURE — 25000131 ZZH RX MED GY IP 250 OP 636 PS 637: Performed by: NEUROLOGICAL SURGERY

## 2020-05-24 PROCEDURE — C9113 INJ PANTOPRAZOLE SODIUM, VIA: HCPCS | Performed by: NEUROLOGICAL SURGERY

## 2020-05-24 PROCEDURE — 25000128 H RX IP 250 OP 636: Performed by: SURGERY

## 2020-05-24 PROCEDURE — 25000128 H RX IP 250 OP 636: Performed by: NEUROLOGICAL SURGERY

## 2020-05-24 PROCEDURE — 85027 COMPLETE CBC AUTOMATED: CPT | Performed by: SURGERY

## 2020-05-24 PROCEDURE — 25800030 ZZH RX IP 258 OP 636: Performed by: NEUROLOGICAL SURGERY

## 2020-05-24 PROCEDURE — 80053 COMPREHEN METABOLIC PANEL: CPT | Performed by: SURGERY

## 2020-05-24 PROCEDURE — 36415 COLL VENOUS BLD VENIPUNCTURE: CPT | Performed by: SURGERY

## 2020-05-24 PROCEDURE — 25800030 ZZH RX IP 258 OP 636: Performed by: SURGERY

## 2020-05-24 PROCEDURE — 74018 RADEX ABDOMEN 1 VIEW: CPT

## 2020-05-24 PROCEDURE — 25800030 ZZH RX IP 258 OP 636: Performed by: STUDENT IN AN ORGANIZED HEALTH CARE EDUCATION/TRAINING PROGRAM

## 2020-05-24 PROCEDURE — 25000132 ZZH RX MED GY IP 250 OP 250 PS 637: Performed by: NEUROLOGICAL SURGERY

## 2020-05-24 PROCEDURE — 12000001 ZZH R&B MED SURG/OB UMMC

## 2020-05-24 PROCEDURE — 00000146 ZZHCL STATISTIC GLUCOSE BY METER IP

## 2020-05-24 RX ORDER — BISACODYL 10 MG
10 SUPPOSITORY, RECTAL RECTAL ONCE
Status: COMPLETED | OUTPATIENT
Start: 2020-05-24 | End: 2020-05-24

## 2020-05-24 RX ADMIN — INSULIN ASPART 1 UNITS: 100 INJECTION, SOLUTION INTRAVENOUS; SUBCUTANEOUS at 03:57

## 2020-05-24 RX ADMIN — BISACODYL 10 MG: 10 SUPPOSITORY RECTAL at 09:56

## 2020-05-24 RX ADMIN — KETOROLAC TROMETHAMINE 15 MG: 30 INJECTION, SOLUTION INTRAMUSCULAR at 12:04

## 2020-05-24 RX ADMIN — POTASSIUM CHLORIDE, DEXTROSE MONOHYDRATE AND SODIUM CHLORIDE: 150; 5; 450 INJECTION, SOLUTION INTRAVENOUS at 05:22

## 2020-05-24 RX ADMIN — KETOROLAC TROMETHAMINE 15 MG: 30 INJECTION, SOLUTION INTRAMUSCULAR at 23:59

## 2020-05-24 RX ADMIN — DEXTROSE AND SODIUM CHLORIDE: 5; 900 INJECTION, SOLUTION INTRAVENOUS at 09:55

## 2020-05-24 RX ADMIN — KETOROLAC TROMETHAMINE 15 MG: 30 INJECTION, SOLUTION INTRAMUSCULAR at 18:01

## 2020-05-24 RX ADMIN — HYDROMORPHONE HYDROCHLORIDE 0.5 MG: 1 INJECTION, SOLUTION INTRAMUSCULAR; INTRAVENOUS; SUBCUTANEOUS at 06:48

## 2020-05-24 RX ADMIN — DEXTROSE AND SODIUM CHLORIDE: 5; 900 INJECTION, SOLUTION INTRAVENOUS at 20:03

## 2020-05-24 RX ADMIN — KETOROLAC TROMETHAMINE 15 MG: 30 INJECTION, SOLUTION INTRAMUSCULAR at 03:48

## 2020-05-24 RX ADMIN — HYDROMORPHONE HYDROCHLORIDE 0.5 MG: 1 INJECTION, SOLUTION INTRAMUSCULAR; INTRAVENOUS; SUBCUTANEOUS at 17:40

## 2020-05-24 RX ADMIN — ENOXAPARIN SODIUM 40 MG: 40 INJECTION SUBCUTANEOUS at 08:38

## 2020-05-24 RX ADMIN — PANTOPRAZOLE SODIUM 40 MG: 40 INJECTION, POWDER, FOR SOLUTION INTRAVENOUS at 08:11

## 2020-05-24 RX ADMIN — SODIUM CHLORIDE 1000 ML: 9 INJECTION, SOLUTION INTRAVENOUS at 18:29

## 2020-05-24 ASSESSMENT — ACTIVITIES OF DAILY LIVING (ADL)
ADLS_ACUITY_SCORE: 14

## 2020-05-24 ASSESSMENT — PAIN DESCRIPTION - DESCRIPTORS
DESCRIPTORS: CRAMPING;TIGHTNESS
DESCRIPTORS: CRAMPING;TIGHTNESS
DESCRIPTORS: SHARP
DESCRIPTORS: TIGHTNESS
DESCRIPTORS: BURNING;TIGHTNESS

## 2020-05-24 NOTE — PROGRESS NOTES
Surg Onc note    Doing ok. Having periods of crampy abdominal pain. No nausea. Not yet passing flatus. Burping.    AF, VS WNL on RA  UOP: 180/975  Drain: R 30/350; L 0/60 Serosanguinous output    NAD laying in bed  Abd soft, moderately distended, mildly tender, Incision c/d/i with skin glue overlying.   WWP    Labs: Pending.    A/P: 74 year-old now POD 2 from whipple for pancreatic mass. Doing ok awaiting return of bowel function.  -Will obtain an AXR this AM to look for a distended stomach  -Keep on sips of clears today. Ok for a popcicle. Advised patient to go slow  -Pain control with tylenol/toradol/dilaudid  -PT/OT  -Drain amylases tomorrow  -Arnett out today  -PPx lovenox    Figueroa Ramsay  PGY 7

## 2020-05-24 NOTE — PLAN OF CARE
[id number]  VSS on room air. Up with SBA NPO with ice chips and hard candy. Pain noted in left shoulder and abdomen, Toradol given for pain management. Arnett in place, adequate output. Jpx2 to lower abdomen, serosanguinous output. Waiting for return of bowel function. Resting between cares. Continue with POC.

## 2020-05-24 NOTE — PROVIDER NOTIFICATION
Notified MD at 6:00 PM regarding low urine output.      Spoke with: Dr. Elias    Orders were obtained.    Comments: Patient has only voided 60 ml since adler was removed this morning at 0830. Bladder scan showed 3 ml. 1000 ml bolus ordered.

## 2020-05-24 NOTE — PLAN OF CARE
VSS on room air. Not getting much pain relief from toradol and dilaudid. NPO except ice chips and sips of clears. Blood sugars stable, no insulin needed today. Has only voided 60 ml since adler was removed this morning at 0830. 1000 ml bolus currently infusing. Not passing gas or stool. Abdomen firm and distended. Bilateral abdominal JPs with serosanguinous output. Midline incision CDI and STEPHEN. Ambulating with SBA.

## 2020-05-24 NOTE — PLAN OF CARE
[id number] Midline abdominal incision C/D/I with dermabond. Ecchymosis around midline incision. Bilateral URSULA drains to thumbprint suction. Abdomen distended, MD aware. +bowel sounds. Denies passing flatus, no BM. Pt reporting cramping, sharp pain and gas discomfort. Pain managed with IV Toradol, IV Dilaudid, and heat. NPO and ice chips, denies nausea. BG q 4 hrs, sliding scale insulin given per parameters. Arnett in place with marginal urine output. Up with SBA, ambulated hallways x1 overnight. VSS on room air.

## 2020-05-24 NOTE — PROGRESS NOTES
Cross-cover note: 11:30 PM 5/23/2020 05/23/2020    General Surgery Cross Cover Note    Called by nursing regarding abdominal distention and firmness    Per patient feels bloated, with discomfort moving up underneath ribs and possibly exacerbating L shoulder pain.  Very rarely becomes nauseated, not nauseous right now, but has been belching.  No flatus yet.      B/P: 129/78, T: 98, P: 58, R: 18    PE:  A&O x3, NAD  Breathing non-labored  Abd soft, appropriately tender, distended and tympanic worst bilateral lower quadrants.  No guarding, rebound, rigidity.    Incisions clean, dry, intact, drains serosanguinous     Plan:  Likely postop ileus, given pt is POD 1 from [facility] to continue ice chips but will make strict NPO and reevaluate for NGT if becomes nauseated     Brianne Stark MD  Surgery Resident PGY-1  Pg [phone]

## 2020-05-24 NOTE — PROVIDER NOTIFICATION
Writer paged provider Brianne Stark regarding patients report of abdominal bloating and discomfort. Patient denies nausea and reports belching. Abdomen is rounded and firm to touch.    Waiting for reply.    Call returned, Brianne Satrk will come to assess patient at bedside.

## 2020-05-25 LAB
ALBUMIN SERPL-MCNC: 2.4 G/DL (ref 3.4–5)
ALP SERPL-CCNC: 194 U/L (ref 40–150)
ALT SERPL W P-5'-P-CCNC: 98 U/L (ref 0–50)
AMYLASE FLD-CCNC: 47 U/L
AMYLASE FLD-CCNC: 48 U/L
AMYLASE SERPL-CCNC: 44 U/L (ref 30–110)
ANION GAP SERPL CALCULATED.3IONS-SCNC: 6 MMOL/L (ref 3–14)
AST SERPL W P-5'-P-CCNC: 46 U/L (ref 0–45)
BILIRUB SERPL-MCNC: 1 MG/DL (ref 0.2–1.3)
BUN SERPL-MCNC: 3 MG/DL (ref 7–30)
CALCIUM SERPL-MCNC: 8.3 MG/DL (ref 8.5–10.1)
CHLORIDE SERPL-SCNC: 107 MMOL/L (ref 94–109)
CO2 SERPL-SCNC: 25 MMOL/L (ref 20–32)
CREAT SERPL-MCNC: 0.69 MG/DL (ref 0.52–1.04)
ERYTHROCYTE [DISTWIDTH] IN BLOOD BY AUTOMATED COUNT: 12.9 % (ref 10–15)
GFR SERPL CREATININE-BSD FRML MDRD: 85 ML/MIN/{1.73_M2}
GLUCOSE BLDC GLUCOMTR-MCNC: 105 MG/DL (ref 70–99)
GLUCOSE BLDC GLUCOMTR-MCNC: 106 MG/DL (ref 70–99)
GLUCOSE BLDC GLUCOMTR-MCNC: 116 MG/DL (ref 70–99)
GLUCOSE BLDC GLUCOMTR-MCNC: 77 MG/DL (ref 70–99)
GLUCOSE BLDC GLUCOMTR-MCNC: 80 MG/DL (ref 70–99)
GLUCOSE BLDC GLUCOMTR-MCNC: 83 MG/DL (ref 70–99)
GLUCOSE BLDC GLUCOMTR-MCNC: 87 MG/DL (ref 70–99)
GLUCOSE SERPL-MCNC: 85 MG/DL (ref 70–99)
HCT VFR BLD AUTO: 35.6 % (ref 35–47)
HGB BLD-MCNC: 11.1 G/DL (ref 11.7–15.7)
MCH RBC QN AUTO: 27.8 PG (ref 26.5–33)
MCHC RBC AUTO-ENTMCNC: 31.2 G/DL (ref 31.5–36.5)
MCV RBC AUTO: 89 FL (ref 78–100)
PLATELET # BLD AUTO: 218 10E9/L (ref 150–450)
POTASSIUM SERPL-SCNC: 3.5 MMOL/L (ref 3.4–5.3)
PROT SERPL-MCNC: 5.8 G/DL (ref 6.8–8.8)
RBC # BLD AUTO: 3.99 10E12/L (ref 3.8–5.2)
SODIUM SERPL-SCNC: 138 MMOL/L (ref 133–144)
SPECIMEN SOURCE FLD: NORMAL
SPECIMEN SOURCE FLD: NORMAL
WBC # BLD AUTO: 4.9 10E9/L (ref 4–11)

## 2020-05-25 PROCEDURE — C9113 INJ PANTOPRAZOLE SODIUM, VIA: HCPCS | Performed by: NEUROLOGICAL SURGERY

## 2020-05-25 PROCEDURE — 85027 COMPLETE CBC AUTOMATED: CPT | Performed by: SURGERY

## 2020-05-25 PROCEDURE — 36415 COLL VENOUS BLD VENIPUNCTURE: CPT | Performed by: SURGERY

## 2020-05-25 PROCEDURE — 80053 COMPREHEN METABOLIC PANEL: CPT | Performed by: SURGERY

## 2020-05-25 PROCEDURE — 25800030 ZZH RX IP 258 OP 636: Performed by: NEUROLOGICAL SURGERY

## 2020-05-25 PROCEDURE — 25000132 ZZH RX MED GY IP 250 OP 250 PS 637: Performed by: SURGERY

## 2020-05-25 PROCEDURE — 25000128 H RX IP 250 OP 636: Performed by: NEUROLOGICAL SURGERY

## 2020-05-25 PROCEDURE — 82150 ASSAY OF AMYLASE: CPT | Performed by: NEUROLOGICAL SURGERY

## 2020-05-25 PROCEDURE — 00000146 ZZHCL STATISTIC GLUCOSE BY METER IP

## 2020-05-25 PROCEDURE — 82150 ASSAY OF AMYLASE: CPT | Performed by: SURGERY

## 2020-05-25 PROCEDURE — 25000128 H RX IP 250 OP 636: Performed by: SURGERY

## 2020-05-25 PROCEDURE — 12000001 ZZH R&B MED SURG/OB UMMC

## 2020-05-25 PROCEDURE — 25000132 ZZH RX MED GY IP 250 OP 250 PS 637: Performed by: NEUROLOGICAL SURGERY

## 2020-05-25 RX ORDER — PANTOPRAZOLE SODIUM 40 MG/1
40 TABLET, DELAYED RELEASE ORAL
Status: DISCONTINUED | OUTPATIENT
Start: 2020-05-26 | End: 2020-05-26 | Stop reason: HOSPADM

## 2020-05-25 RX ORDER — POLYETHYLENE GLYCOL 3350 17 G/17G
17 POWDER, FOR SOLUTION ORAL DAILY
Status: DISCONTINUED | OUTPATIENT
Start: 2020-05-25 | End: 2020-05-26 | Stop reason: HOSPADM

## 2020-05-25 RX ORDER — OXYCODONE HYDROCHLORIDE 5 MG/1
5-10 TABLET ORAL EVERY 4 HOURS PRN
Status: DISCONTINUED | OUTPATIENT
Start: 2020-05-25 | End: 2020-05-26 | Stop reason: HOSPADM

## 2020-05-25 RX ORDER — ACETAMINOPHEN 325 MG/1
975 TABLET ORAL EVERY 8 HOURS
Status: DISCONTINUED | OUTPATIENT
Start: 2020-05-25 | End: 2020-05-25

## 2020-05-25 RX ADMIN — KETOROLAC TROMETHAMINE 15 MG: 30 INJECTION, SOLUTION INTRAMUSCULAR at 05:43

## 2020-05-25 RX ADMIN — PANTOPRAZOLE SODIUM 40 MG: 40 INJECTION, POWDER, FOR SOLUTION INTRAVENOUS at 08:00

## 2020-05-25 RX ADMIN — POLYETHYLENE GLYCOL 3350 17 G: 17 POWDER, FOR SOLUTION ORAL at 11:57

## 2020-05-25 RX ADMIN — ENOXAPARIN SODIUM 40 MG: 40 INJECTION SUBCUTANEOUS at 08:00

## 2020-05-25 RX ADMIN — KETOROLAC TROMETHAMINE 15 MG: 30 INJECTION, SOLUTION INTRAMUSCULAR at 20:06

## 2020-05-25 RX ADMIN — ACETAMINOPHEN 975 MG: 325 TABLET, FILM COATED ORAL at 08:00

## 2020-05-25 RX ADMIN — DEXTROSE AND SODIUM CHLORIDE: 5; 900 INJECTION, SOLUTION INTRAVENOUS at 05:43

## 2020-05-25 ASSESSMENT — ACTIVITIES OF DAILY LIVING (ADL)
ADLS_ACUITY_SCORE: 14
ADLS_ACUITY_SCORE: 14
ADLS_ACUITY_SCORE: 15
ADLS_ACUITY_SCORE: 14

## 2020-05-25 ASSESSMENT — PAIN DESCRIPTION - DESCRIPTORS
DESCRIPTORS: ACHING;DISCOMFORT;SORE
DESCRIPTORS: TIGHTNESS
DESCRIPTORS: ACHING;DISCOMFORT

## 2020-05-25 NOTE — PROGRESS NOTES
"Surgical Oncology Progress Note  05/25/20    Stable overnight. Has passed gas. Had low UOP but responded well to bolus.     BP (!) 140/81 (BP Location: Right arm)   Pulse 58   Temp 98.7  F (37.1  C) (Oral)   Resp 16   Ht 1.626 m (5' 4\")   Wt 53.2 kg (117 lb 4.8 oz)   LMP  (LMP Unknown)   SpO2 97%   BMI 20.13 kg/m      UOP 1120, 700 since midnight  URSULA , 20 since midnight  URSULA R 15, 10 since midnight    PE  NAD  NLB RA  Abd soft, distended, incision cdi  URSULA drains serosanguinous  Ext no edema    Labs   CBC, CMP pending  Drain amylase 47, 48    A/P: 74F POD3 s/p Whipple procedure for pancreatic mass.    -continue dilaudid, toradol, Tylenol, oxycodone  -discontinue IVF, OK for clears  -will discuss abx  -R mid URSULA drain removed  -protonix  -lovenox  -encourage ambulation    Discussed with staff.    Neena Martin MD PGY3  General Surgery    "

## 2020-05-25 NOTE — PROGRESS NOTES
CLINICAL NUTRITION SERVICES - ASSESSMENT NOTE     Nutrition Prescription    RECOMMENDATIONS FOR MDs/PROVIDERS TO ORDER:  1. Recommend slow diet advancement to low fiber + small frequent meals. Would defer fat restriction unless pancreatic insufficiency strongly suspected, may need simple carbohydrate restriction as pt had Non Pylorus Preserving Whipple procedure.    2. Consider MVI w/ minerals to meet patients micronutrient needs    3. Once patient diet advance to full liquid patient may order any supplements available.    4. If patient's diet unable to advance to full liquids within the next 2-3 days recommend starting nutrition support to meet needs (TPN).     Malnutrition Status:    Non-severe malnutrition in the context of acute on chronic condition     Recommendations already ordered by Registered Dietitian (RD):  None today     Future/Additional Recommendations:  1. Post gastrectomy / whipple diet ed.   2. Once diet advances to full liquids, consider Boost glucose control with meals        REASON FOR ASSESSMENT  Flora Hogan is a/an 74 year old female assessed by the dietitian for Provider Order - Post partial gastrectomy diet counseling , Positive risk screen for inadequate oral intake over the past month     Obtained from chart review, MD note/ surgery note:  --POD3 s/p Whipple procedure for pancreatic mass, ( Diagnostic Laparoscopy with intraoperative ultrasound and Liver Biopsy X2, Non Pylorus Preserving Whipple procedure on 5/22/20).    NUTRITION HISTORY  Unable to obtain, attempted twice.     CURRENT NUTRITION ORDERS  Diet: Clear Liquid ( Awaiting return of bowel function post op status)  Intake/Tolerance: Kept NPO/CL diet status since admit 5/22 ( 4 days )   Factors affecting nutrition intake include: slow diet advancement post-whipple, bloating     LABS  BUN: 3 (L) - may reflect inadequate recent protein intake  LFT's: elevated  K+: normal today  Glucose: 77  "(normal)    MEDICATIONS  D5W1/2NS20K@100 for Whipple ERAS -> discontinued today  Started on insulin, Protonix    ANTHROPOMETRICS  Height: 162.6 cm (5' 4\")  Most Recent Weight: 50.6 kg dry admit wt on 5/22/20  IBW: 54.6 kg ( 93% IBW)  BMI: 19.1 kg/m2  Normal BMI  Weight History: 2.1 kg wt loss over the past month ( 3.9% net wt loss)  Wt Readings from Last 10 Encounters:   05/23/20 53.2 kg (117 lb 4.8 oz)   05/13/20 51.3 kg (113 lb 1.6 oz)   04/19/20 52.7 kg (116 lb 1.6 oz)   10/29/19 55 kg (121 lb 3.2 oz)   07/18/19 52.2 kg (115 lb)   07/31/18 55.2 kg (121 lb 12.8 oz)   04/30/18 54 kg (119 lb)   11/09/17 56.1 kg (123 lb 9.6 oz)   09/27/17 55.2 kg (121 lb 12.8 oz)   06/06/17 54.5 kg (120 lb 2 oz)       Dosing Weight: 51 kg dry admit wt     ASSESSED NUTRITION NEEDS  Estimated Energy Needs: 1606-2690 kcals/day (25 - 30 ++ kcals/kg)  Justification: Maintenance, ++ post op repletion  Estimated Protein Needs: 61-77 grams protein/day (1.2 - 1.5 grams of pro/kg)  Justification: Post-op  Estimated Fluid Needs: (1 mL/kcal)   Justification: Maintenance    PHYSICAL FINDINGS  Ext no edema    MALNUTRITION  % Intake: </= 50% for >/= 5 days (severe)  % Weight Loss: Up to 5% in 1 month (non-severe)  Subcutaneous Fat Loss: Unable to assess  Muscle Loss: Unable to assess  Fluid Accumulation/Edema: None noted  Malnutrition Diagnosis: Non-severe malnutrition in the context of acute on chronic condition     NUTRITION DIAGNOSIS  Inadequate oral intake related to pre and post op status with slow diet advancement post whipple, as evidenced by Kept NPO/CL diet status since admit x 4 days      INTERVENTIONS  Implementation  Nutrition Education: Received provider consult for nutrition education with comments post partial Gastrectomy diet. Patient S/P whipple on 5/22.   --Unable to contact patient, attempted x2. Nutrition education to be completed as able/appropriate.     Medical food supplement therapy- none today     Goals  Diet adv v " nutrition support within 2-3 days.     Monitoring/Evaluation  Progress toward goals will be monitored and evaluated per protocol.    Jacey Jung RD/BENJIE  Weekend Pager [phone]

## 2020-05-25 NOTE — PLAN OF CARE
VSS on room air. Pain tolerable with toradol. Tolerating clear liquids. Blood sugars stable, no insulin needed today. Voiding with adequate urine output. Passing gas. Abdomen still distended. Abdominal URSULA with serosanguinous output. Old drain site leaking-covered with gauze. Midline incision CDI and STEPHEN. Ambulating independently.

## 2020-05-25 NOTE — PLAN OF CARE
"BP (!) 140/82 (BP Location: Right arm)   Pulse 70   Temp 97.9  F (36.6  C) (Oral)   Resp 16   Ht 1.626 m (5' 4\")   Wt 53.2 kg (117 lb 4.8 oz)   LMP  (LMP Unknown)   SpO2 98%   BMI 20.13 kg/m      VSS on room air. Up with SBA. NPO with ice chips and sips of clears. Pain noted in left shoulder and abdomen, Toradol given for pain management. Adequate urinary output. JPx2 to lower abdomen, serosanguinous output. Waiting for return of bowel function. Abdomen distended, denies flatus. Resting between cares. Continue with POC.        "

## 2020-05-26 ENCOUNTER — PATIENT OUTREACH (OUTPATIENT)
Dept: CARE COORDINATION | Facility: CLINIC | Age: 75
End: 2020-05-26

## 2020-05-26 ENCOUNTER — APPOINTMENT (OUTPATIENT)
Dept: OCCUPATIONAL THERAPY | Facility: CLINIC | Age: 75
DRG: 406 | End: 2020-05-26
Attending: SURGERY
Payer: COMMERCIAL

## 2020-05-26 VITALS
WEIGHT: 117.3 LBS | BODY MASS INDEX: 20.03 KG/M2 | SYSTOLIC BLOOD PRESSURE: 124 MMHG | HEIGHT: 64 IN | TEMPERATURE: 95.2 F | RESPIRATION RATE: 16 BRPM | HEART RATE: 58 BPM | DIASTOLIC BLOOD PRESSURE: 66 MMHG | OXYGEN SATURATION: 97 %

## 2020-05-26 LAB
ALBUMIN SERPL-MCNC: 2.2 G/DL (ref 3.4–5)
ALP SERPL-CCNC: 185 U/L (ref 40–150)
ALT SERPL W P-5'-P-CCNC: 80 U/L (ref 0–50)
ANION GAP SERPL CALCULATED.3IONS-SCNC: 6 MMOL/L (ref 3–14)
AST SERPL W P-5'-P-CCNC: 41 U/L (ref 0–45)
BACTERIA SPEC CULT: ABNORMAL
BILIRUB SERPL-MCNC: 1 MG/DL (ref 0.2–1.3)
BUN SERPL-MCNC: 5 MG/DL (ref 7–30)
CALCIUM SERPL-MCNC: 8.4 MG/DL (ref 8.5–10.1)
CHLORIDE SERPL-SCNC: 107 MMOL/L (ref 94–109)
CO2 SERPL-SCNC: 26 MMOL/L (ref 20–32)
CREAT SERPL-MCNC: 0.76 MG/DL (ref 0.52–1.04)
ERYTHROCYTE [DISTWIDTH] IN BLOOD BY AUTOMATED COUNT: 13 % (ref 10–15)
GFR SERPL CREATININE-BSD FRML MDRD: 77 ML/MIN/{1.73_M2}
GLUCOSE BLDC GLUCOMTR-MCNC: 76 MG/DL (ref 70–99)
GLUCOSE BLDC GLUCOMTR-MCNC: 86 MG/DL (ref 70–99)
GLUCOSE SERPL-MCNC: 75 MG/DL (ref 70–99)
HCT VFR BLD AUTO: 32.8 % (ref 35–47)
HGB BLD-MCNC: 10.7 G/DL (ref 11.7–15.7)
MCH RBC QN AUTO: 28.7 PG (ref 26.5–33)
MCHC RBC AUTO-ENTMCNC: 32.6 G/DL (ref 31.5–36.5)
MCV RBC AUTO: 88 FL (ref 78–100)
PLATELET # BLD AUTO: 206 10E9/L (ref 150–450)
POTASSIUM SERPL-SCNC: 3.4 MMOL/L (ref 3.4–5.3)
PROT SERPL-MCNC: 5.3 G/DL (ref 6.8–8.8)
RBC # BLD AUTO: 3.73 10E12/L (ref 3.8–5.2)
SODIUM SERPL-SCNC: 138 MMOL/L (ref 133–144)
SPECIMEN SOURCE: ABNORMAL
WBC # BLD AUTO: 4.5 10E9/L (ref 4–11)

## 2020-05-26 PROCEDURE — 0FB24ZX EXCISION OF LEFT LOBE LIVER, PERCUTANEOUS ENDOSCOPIC APPROACH, DIAGNOSTIC: ICD-10-PCS | Performed by: SURGERY

## 2020-05-26 PROCEDURE — 0FB14ZX EXCISION OF RIGHT LOBE LIVER, PERCUTANEOUS ENDOSCOPIC APPROACH, DIAGNOSTIC: ICD-10-PCS | Performed by: SURGERY

## 2020-05-26 PROCEDURE — 00000146 ZZHCL STATISTIC GLUCOSE BY METER IP

## 2020-05-26 PROCEDURE — 0FBG0ZZ EXCISION OF PANCREAS, OPEN APPROACH: ICD-10-PCS | Performed by: SURGERY

## 2020-05-26 PROCEDURE — 0FB90ZZ EXCISION OF COMMON BILE DUCT, OPEN APPROACH: ICD-10-PCS | Performed by: SURGERY

## 2020-05-26 PROCEDURE — 0FT40ZZ RESECTION OF GALLBLADDER, OPEN APPROACH: ICD-10-PCS | Performed by: SURGERY

## 2020-05-26 PROCEDURE — 25000128 H RX IP 250 OP 636: Performed by: NEUROLOGICAL SURGERY

## 2020-05-26 PROCEDURE — 25000132 ZZH RX MED GY IP 250 OP 250 PS 637: Performed by: NEUROLOGICAL SURGERY

## 2020-05-26 PROCEDURE — 99024 POSTOP FOLLOW-UP VISIT: CPT | Performed by: PHYSICIAN ASSISTANT

## 2020-05-26 PROCEDURE — 80053 COMPREHEN METABOLIC PANEL: CPT | Performed by: SURGERY

## 2020-05-26 PROCEDURE — 97530 THERAPEUTIC ACTIVITIES: CPT | Mod: GO

## 2020-05-26 PROCEDURE — 85027 COMPLETE CBC AUTOMATED: CPT | Performed by: SURGERY

## 2020-05-26 PROCEDURE — 36415 COLL VENOUS BLD VENIPUNCTURE: CPT | Performed by: SURGERY

## 2020-05-26 PROCEDURE — 97535 SELF CARE MNGMENT TRAINING: CPT | Mod: GO

## 2020-05-26 PROCEDURE — 25000128 H RX IP 250 OP 636: Performed by: SURGERY

## 2020-05-26 RX ORDER — POLYETHYLENE GLYCOL 3350 17 G/17G
17 POWDER, FOR SOLUTION ORAL DAILY PRN
Qty: 12 PACKET | Refills: 0 | Status: SHIPPED | OUTPATIENT
Start: 2020-05-26 | End: 2021-03-25

## 2020-05-26 RX ORDER — IBUPROFEN 200 MG
200 TABLET ORAL EVERY 6 HOURS PRN
Status: DISCONTINUED | OUTPATIENT
Start: 2020-05-26 | End: 2020-05-26 | Stop reason: HOSPADM

## 2020-05-26 RX ORDER — PANTOPRAZOLE SODIUM 40 MG/1
40 TABLET, DELAYED RELEASE ORAL
Qty: 90 TABLET | Refills: 5 | Status: SHIPPED | OUTPATIENT
Start: 2020-05-26 | End: 2020-10-06

## 2020-05-26 RX ORDER — ACETAMINOPHEN 325 MG/1
325-650 TABLET ORAL EVERY 6 HOURS PRN
Qty: 50 TABLET | Refills: 0 | Status: SHIPPED | OUTPATIENT
Start: 2020-05-26

## 2020-05-26 RX ORDER — ACETAMINOPHEN 325 MG/1
325-650 TABLET ORAL EVERY 6 HOURS PRN
Status: DISCONTINUED | OUTPATIENT
Start: 2020-05-26 | End: 2020-05-26 | Stop reason: HOSPADM

## 2020-05-26 RX ORDER — OXYCODONE HYDROCHLORIDE 5 MG/1
5-10 TABLET ORAL EVERY 6 HOURS PRN
Qty: 20 TABLET | Refills: 0 | Status: SHIPPED | OUTPATIENT
Start: 2020-05-26 | End: 2021-05-03

## 2020-05-26 RX ADMIN — POLYETHYLENE GLYCOL 3350 17 G: 17 POWDER, FOR SOLUTION ORAL at 08:18

## 2020-05-26 RX ADMIN — KETOROLAC TROMETHAMINE 15 MG: 30 INJECTION, SOLUTION INTRAMUSCULAR at 11:23

## 2020-05-26 RX ADMIN — ENOXAPARIN SODIUM 40 MG: 40 INJECTION SUBCUTANEOUS at 09:15

## 2020-05-26 RX ADMIN — HYDROMORPHONE HYDROCHLORIDE 0.5 MG: 1 INJECTION, SOLUTION INTRAMUSCULAR; INTRAVENOUS; SUBCUTANEOUS at 00:01

## 2020-05-26 RX ADMIN — PANTOPRAZOLE SODIUM 40 MG: 40 TABLET, DELAYED RELEASE ORAL at 08:18

## 2020-05-26 ASSESSMENT — ACTIVITIES OF DAILY LIVING (ADL)
ADLS_ACUITY_SCORE: 15

## 2020-05-26 ASSESSMENT — PAIN DESCRIPTION - DESCRIPTORS: DESCRIPTORS: ACHING

## 2020-05-26 NOTE — DISCHARGE SUMMARY
[facility] Discharge Summary    Flora Hogan MRN# [id number]   Age: 74 year old YOB: 1945     Date of Admission:  5/22/2020  Date of Discharge::  5/26/20  Admitting Physician:  Duarte Chaves MD  Discharge Physician:  Duarte Chaves MD     PCP:  Natasha Braun    Disposition: Patient discharged to home in stable condition.    Admission Diagnosis:  Painless jaundice, mass of pancrease  Peripheral neuropathy  Osteopenia  Seasonal allergies    Discharge Diagnosis:  Painless jaundice, mass of pancrease  Ileus  Non-severe malnutrition  Peripheral neuropathy  Osteopenia  Seasonal allergies    Discharge medications  Current Discharge Medication List      START taking these medications    Details   enoxaparin ANTICOAGULANT (LOVENOX) 40 MG/0.4ML syringe Inject 0.4 mLs (40 mg) Subcutaneous every 24 hours for 24 days  Qty: 9.6 mL, Refills: 0    Associated Diagnoses: Deep vein thrombosis (DVT) prophylaxis prescribed at discharge      oxyCODONE (ROXICODONE) 5 MG tablet Take 1-2 tablets (5-10 mg) by mouth every 6 hours as needed for moderate to severe pain  Qty: 20 tablet, Refills: 0    Associated Diagnoses: Postoperative pain      pantoprazole (PROTONIX) 40 MG EC tablet Take 1 tablet (40 mg) by mouth every morning (before breakfast)  Qty: 90 tablet, Refills: 5    Associated Diagnoses: Mass of pancreas      polyethylene glycol (MIRALAX) 17 g packet Take 17 g by mouth daily as needed for constipation  Qty: 12 packet, Refills: 0    Associated Diagnoses: Mass of pancreas         CONTINUE these medications which have NOT CHANGED    Details   albuterol (PROAIR HFA/PROVENTIL HFA/VENTOLIN HFA) 108 (90 Base) MCG/ACT inhaler Inhale 2 puffs into the lungs every 6 hours as needed for shortness of breath / dyspnea or wheezing  Qty: 1 Inhaler, Refills: 0    Comments: Pharmacy may dispense brand covered by insurance (Proair, or proventil or ventolin or generic albuterol inhaler)  Associated  Diagnoses: SOB (shortness of breath)      calcium carbonate-vitamin D (CALTRATE 600+D) 600-400 MG-UNIT chewable tablet Take 1 chew tab by mouth 2 times daily   Qty: 180 tablet, Refills: 3      loratadine (CLARITIN REDITABS) 10 MG dispersible tablet Take 10 mg by mouth as needed       magnesium 250 MG tablet Take 1 tablet by mouth daily           Follow up, Special Instructions:  After Care     Future Labs/Procedures    Activity     Comments:    Your activity upon discharge: No lifting more than 10-15 lbs for 6 weeks. You should ambulate frequently.    Diet     Comments:    Follow this diet upon discharge: Full liquid diet    Discharge Instructions     Comments:    If your travel plans upon discharge include prolonged periods of sitting (a lengthy car or plane ride), it is highly beneficial to get up and walk at least once per hour to help prevent swelling and blood clots.     You may shower after operation, let warm soapy water run over incision and pat dry. Do not submerge, soak, or scrub incision.    Take incentive spirometer home for continued frequent use    You will be discharged with narcotic pain medications. Please take them only as needed and do not operate a car or heavy machinery for 24 hours after taking them.  Narcotic pain medications like oxycodone are constipating. Please ensure that you are drinking adequate amounts of fluids and taking stool softeners while you are taking narcotics. Take Miralax as needed for constipation.     Do not drive until you can with stand pressing the brake pedal quickly and fully without pain and you are not distracted by pain.     Call for fever greater than 101.5, chills, red skin around incision, drainage from incision, increased swelling from the incision, bleeding from the incision that is not controlled with light pressure, inability to tolerate diet,new nausea/vomiting or other new/worsening symptoms.   You may also call the surgical oncology nurse care coordinator  "from 8:00am-4:30pm WENRobertoANGELA Pace at [phone]. For after hours questions or concerns you can contact the on-call surgical oncology resident (nights and weekends [phone] and ask \"I would like to page the Surgical Oncology Resident on call.\"). In emergencies, call 911    Diet:  - 4-6 small meals a day. You will need to snack throughout the day rather than 3 big meals a day.   - Once you begin to feel full, you should STOP. Do not over eat, this can lead to abdominal pain and vomiting.   - Make sure to sit up when you are eating. Do not lay down after eating for at least 1 hour.   - Any time you are eating you should be sitting up in a chair, do not eat in bed.     - Avoid red meat at this time. This is difficult to digest.   - If you do get nauseous during eating or after you should stop. Give it time and only proceed with liquid diet for a day before trying solids again.     If patient is sent home on liquid diet:   - Daily nutrition shakes, high in protein, high in calorie. These help meet daily calorie intake.   - Broths/soups. If soup as noodles, vegetables or meats he should avoid these. Eat broth only. - Be cautious with broths as they can cause reflux.   - Make sure to drink plenty of water. Best to carry a water bottle with you and sip throughout the day.   - Food examples: Cream of wheat, broths, juices, smoothies, nutritions shakes, teas, popsicles, ice cream.     If patient is sent home on modified whipple diet/soft diet:   - Daily nutrition shakes, high in protein, high in calorie. These help meet daily calorie intake.   - Can try everything included in the liquid diet above as well.   - Stick to foods that can be mashed with a fork EASILY.   - Avoid foods that are high in acidity or spices. These are high risk for causing reflux.   - Avoid raw vegetables. These are difficult to digest.   Examples: Mashed potatoes, eggs, pancakes, bread, toast, salmon/fish (not fried), steamed veggies, " puddings, ice cream, oatmeal.     Follow Up:  Follow up in clinic with Dr. Chaves in 2 weeks. You should be called to make an appointment within 3 business days. If you are not contacted, call [phone] to make an appointment.        Procedures:  5/22/20 Dr. Chaves  Diagnostic Laparoscopy with intraoperative ultrasound and Liver Biopsy X2  Non Pylorus Preserving Whipple procedure    Consultations:  OCCUPATIONAL THERAPY ADULT IP CONSULT  PHYSICAL THERAPY ADULT IP CONSULT  SOCIAL WORK IP CONSULT  NUTRITION SERVICES ADULT IP CONSULT  NUTRITION SERVICES ADULT IP CONSULT    Brief HPI:  Flora Hogan is a 74 year old female who developed painless jaundice and was found to have a mass in the head of the pancreas.     Hospital Course:  The patient was admitted and underwent the above procedure. The patient tolerated the procedure well. The patient recovered well with no post-operative complications. She did have an abdominal xray that demonstrated a colonic ileus. Her diet was advanced as bowel function returned and as tolerated to a full liquid diet. Protein supplement were started once she was tolerating a diet as she did have evidence of non-severe malnutrition. Nutrition was consulted and she received education about a post gastrectomy diet. She will continue a full liquid diet with protein supplements at discharge. Will follow up with her as an outpatient for further diet advancement. Prior to discharge pain was controlled with oral pain medication and the patient was able to ambulate and void without difficulty. The patient received appropriate education post operatively including Lovenox teaching. On POD #4 the patient was discharged to home. She will continue Lovenox for a total of 28 days for DVT prophylaxis.     Surgical pathology  Pending     Jeanna Bassett PA-C

## 2020-05-26 NOTE — PROGRESS NOTES
Care Coordinator Progress Note    Admission Date/Time:  5/22/2020  Attending MD:  Duarte Chaves MD    Data  Chart reviewed, discussed with interdisciplinary team.   Patient was admitted for:    Mass of pancreas  Painless jaundice  Mass of pancreas  Deep vein thrombosis (DVT) prophylaxis prescribed at discharge  Postoperative pain.    Concerns with insurance coverage for discharge needs: None.  Current Living Situation: Patient lives with family.  Support System: Supportive  Services Involved: None  Transportation at Discharge: Car and Family or friend will provide  Transportation to Medical Appointments:   - Not applicable  Barriers to Discharge: None     Assessment  D: Plan of care discussed with Medical Team at Interdisciplinary Rounds, plan for patient to discharge today..      I/A: Chart reviewed; spoke with patient via phone and introduced role. Writer confirmed home support, living arrangements and transportation.     IMM reviewed with patient by phone on 5/26/20 by KALA Young       P: No further RNCC needs at this time. Care Coordinator will remain available for discharge needs that may arise.     Plan  Anticipated Discharge Date:  5/26/20  Anticipated Discharge Plan:  Home    Patience Sanchez RN, A  Care Coordinator  Phone: [phone] Pager: [phone]  [facility]     To contact Weekend RNCC, page [phone]

## 2020-05-26 NOTE — PLAN OF CARE
OT/7C:   Discharge Planner OT   Patient plan for discharge: Home with assist from family today.   Current status: Session focused on thorough education on abdominal precautions and application/modifications for ADL, functional transfers, etc. Progressing flat bed mobility x3; initially with Min A and fair from. Improves to SBA with good form. Patient declines need for shower assessment. Verbalizes understanding of incision safety and plans to take shower upon return home.   Barriers to return to prior living situation: Medical Status   Recommendations for discharge: Home with family assist.   Rationale for recommendations: Patient completing ADL tasks SBA-IND. Has good support at home and understanding of modifications for abdominal precautions. Would benefit from assist for heavy ADL/IADL to maintain precautions.        Entered by: Di Fu 05/26/2020 9:28 AM

## 2020-05-26 NOTE — PLAN OF CARE
Abdominal incision dry/intact with bruising, good pain relief with Toradol. Voiding spont, passing gas. Tolerating full liquid diet, patient gave self Lovenox injection with supervision, did well.Up ad tl, scant drainage from old URSULA sites, dressing supplies given to patient. Discharge instructions reviewed with patient and discharged home.

## 2020-05-26 NOTE — OP NOTE
Procedure Date: 05/22/2020      PREOPERATIVE DIAGNOSIS:  Pancreatic mass with obstruction of the bile duct as well as pancreatic duct.      POSTOPERATIVE DIAGNOSIS:  Adenocarcinoma of the pancreas.      PROCEDURES PERFORMED:     1.  Diagnostic  laparoscopy.    2.  Laparoscopic ultrasound of the liver.     3.  Laparoscopic wedge liver biopsy x 2.   4.  Open non-pylorus preserving Whipple procedure.      ANESTHESIA:  General.      ESTIMATED BLOOD LOSS:  200 mL.      SPECIMENS:   1.  Liver mass segment 8.   2.  Liver mass segment 3.   3.  Gallbladder.   4.  Non-pylorus-preserving Whipple procedure.   5.  Jejunum.   6.  Final pancreas margin.   7.  Biliary stent for culture.   8.  Bile fluid for culture.      FINDINGS:   1.  Two nodules were visualized on the surface of the liver.  These were biopsied and found to be benign on pathology.   2.  Laparoscopic ultrasound also noted hepatic cysts.        Based on the benign frozen section of the liver lesion, open Whipple procedure was pursued today.      DESCRIPTION OF PROCEDURE:  Ms. Hogan was put to sleep by Anesthesia, prepped and draped sterilely.  Pause for the cause was performed and was accurate.  We entered the abdomen using a Veress technique and insufflated the abdomen without difficulty.  We inspected the serosal surfaces of the abdomen laparoscopically and generally the peritoneum looked free of disease.  The liver itself was dark consistent with biliary stasis from her known biliary pathology.  In addition, however, there were a couple of white nodules on the liver, one in segment 8 and one in segment 3.  These were concerning for possible metastatic disease.  A small wedge of tissue was removed from both areas and sent for frozen section.  These both returned findings consistent with inflammation, potentially microabscess.  No evidence of malignancy was seen.        Satisfied with that, we went ahead with our plans for an open Whipple procedure.  We  opened the midline fascia, placed a self-retaining retractor and began with kocherization of the duodenum.  Once the duodenum was widely kocherized, we were able to feel the head of the pancreas which did feel like it had a mass within it.  We then opened the gastrocolic ligament using the LigaSure device and followed the gastroepiploic vein down to the confluence with the superior mesenteric vein.  We then followed the superior mesenteric vein under the neck of the pancreas and created our plane there.        Once that was accomplished, we turned our attention to the portal dissection.  We identified the hepatic artery, dissected it along its course, identified the gastroduodenal artery, encircled it, ligated it twice proximally, once distally and then divided it.  This was after ensuring distal hepatic arterial flow with obstruction of the GDA.  We then dissected out the cystic duct and artery.  We ligated them both twice proximally, once distally and then divided them.  The gallbladder was removed from the fossa and sent for permanent pathologic evaluation.  We then were able to [place] the common bile duct just distal to the confluence with the cystic duct.  We placed a yellow vessel loop around it.  At this point, this tumor did appear to be resectable and therefore, we began dividing critical structures.  We divided the gastroepiploic trunk using a white load stapling device adjacent to the pylorus.  We then divided the stomach just proximal to the pylorus using a green load stapling device and finally we ligated the right gastric artery using 2-0 silk sutures.  The stomach was packed up into the left upper quadrant.  We then divided the neck of the pancreas after placing 4-quadrant 3-0 silk sutures. We then divided the common bile duct.  We placed a bulldog clamp on the proximal duct and oversewed the distal duct using 3-0 silk suture.  We then divided the proximal jejunum using a blue load stapling device and  took down the short mesenteric vessels using the LigaSure device.  We then retracted the patient's duodenum and jejunum to the patient's right after passing them behind the mesentery.  We then were able to finalize our portal dissection.  The superior mesenteric vein and portal vein were dissected free of the mass in the head of the pancreas without too much difficulty.  We extended this dissection all the way to the level of the superior mesenteric artery beyond the uncinate process.  We then divided the final retroperitoneal tissues after firing a TA-60 blue load stapling device.  The specimen was passed off for frozen section of the bile duct as well as the pancreatic neck.  The frozen sections to go over an hour, but ultimately revealed a few atypical cells at the margin even though the main tumor which was confirmed to be adenocarcinoma appeared well away from the margin.  Given this concern, I did resect additional pancreas which was resent for frozen section and confirmed to be without any evidence of malignancy.        Satisfied with that, we went ahead with reconstruction.  We brought up our proximal jejunum in a gentle C loop in a retromesenteric fashion.  We created a 2-layer end-to-side pancreatojejunostomy in my usual fashion using interrupted 3-0 silk suture followed by 4-0 duct to mucosa anastomosis.  We left a 5-Welsh 5 cm Bone stent across the anastomosis with its flange cut off.  We then created an end-to-side hepaticojejunostomy using interrupted 4-0 PDS sutures.  Our anastomosis also included the cystic duct, which was alongside the common duct where we had transected it.  That anastomosis measured about 1 cm in size.  The pancreatic duct was quite small in this patient measuring perhaps 3 mm across.  The pancreatic parenchyma itself was extremely soft.  We then created a side-to-side antecolic gastrojejunostomy using an Endo-MURTAZA blue load stapling device.  We placed a crotch stitch and then  closed the common enterotomy using interrupted 3-0 silk suture.  The previous gastric staple line was oversewn using interrupted 3-0 silk suture as well.  The abdomen was then thoroughly irrigated.  Hemostasis was excellent throughout the case.  Of note, when we divided the bile duct, we also did remove the stent as well as took a fluid sample for culture.  Because of the patient's extremely soft pancreas, I placed two 15 fluted drains, 1 from the left side, 1 from the right.  The right-sided drain was placed posterior to our reconstructions and the left-sided drain was placed anterior.  They were both tacked in place using 3-0 nylon suture.        The midline fascia was then closed using 0 looped PDS.  The subcutaneous tissues were irrigated thoroughly, and the skin was closed using 3-0 Vicryl followed by 4-0 Monocryl followed by Dermabond.  The patient tolerated the procedure well, was extubated in the operating room and transferred to recovery room in stable condition.  I was present throughout the entire procedure as described above.         GIOVANNI ANDERSON MD             D: 2020   T: 2020   MT:       Name:     ANALI PRUITT   MRN:      [id number]        Account:        [id number]   :      1945           Procedure Date: 2020      Document: [id number]

## 2020-05-26 NOTE — PLAN OF CARE
"[id number]    BP (!) 140/73 (BP Location: Right arm)   Pulse 58   Temp 97.5  F (36.4  C) (Oral)   Resp 16   Ht 1.626 m (5' 4\")   Wt 53.2 kg (117 lb 4.8 oz)   LMP  (LMP Unknown)   SpO2 98%   BMI 20.13 kg/m      POD#4.  Alert and oriented x 4.  Pain to abdominal incision controlled with dilaudid x 1.  Pt reports passing flatus, denies nausea.  Midline incision STEPHEN.  Left old URSULA site covered with dressing, intact.  Right URSULA with clear pink tinged drainage.  Bed swapped for new bed, pt reports new bed is much more comfortable.  BG 86 and 76.       POC: Awaiting ROBF.    "

## 2020-05-26 NOTE — PROGRESS NOTES
"CLINICAL NUTRITION SERVICES  Reason for Assessment: Received Provider order consult: \"post gastrectomy diet, whipple diet  Diet History: Patient has no previous history of post-whipple diet education.  Pt is on a full liquid diet currently, pt reports understanding of full liquid diet.    Nutrition Diagnosis:  Food- and nutrition-related knowledge deficit r/t no previous knowledge of post whipple diet AEB patient verbalization.   Interventions:  Provided instruction on post whipple diet. Discussed importance of small frequent meals/snacks/supplements to increase kcals/protein. Encouraged protein at meals. Recommend lower fat foods at first to assess tolerance. Also recommend low fiber/soft foods for first few weeks for best tolerance and the slowly liberalize diet per pt tolerance. Patient to monitor weights and PO intake and notify team if not tolerating foods or having weight loss.  Provided handout: Whipple Surgery Nutrition Therapy.  Also provided Post-Gastrectomy diet menu for when pt's diet advances beyond full liquids.    Goals:   Patient will verbalize understanding of post whipple diet   Follow-up:    Patient to ask any further nutrition-related questions before discharge.  In addition, pt may request outpatient RD appointment.    Cecile Goel RD, LD  7C RD pager: [phone]   "

## 2020-05-26 NOTE — PLAN OF CARE
Occupational Therapy Discharge Summary    Reason for therapy discharge:    Discharged to home.    Progress towards therapy goal(s). See goals on Care Plan in [facility] electronic health record for goal details.  Goals partially met.  Barriers to achieving goals:   discharge from facility.    Therapy recommendation(s):    Assist for heavy ADL/IADL tasks as needed to maintain post-op precautions.

## 2020-05-26 NOTE — PLAN OF CARE
POD 3. HR mikael, OVSS on RA. Pt taking prn IV toradol for pain. Midline incision STEPHEN, c/d/i. R URSULA w/ serosanguinous output. Old drain site on L abd w/ dressing, c/d/i. CLD, denies nausea. Hypo BS, passing gas. Abd slightly distended. Voiding spont. UAL. PIV SL. Waiting for ROBF before discharge. Cont w/ POC.

## 2020-05-26 NOTE — PROGRESS NOTES
Surgical Oncology Progress Note    Interval History:  No acute events overnight. Pain controlled. Tolerating diet without nausea or vomiting. Passing flatus but no stool yet.     Physical Exam:   Temp:  [97.5  F (36.4  C)] 97.5  F (36.4  C)  Heart Rate:  [57-58] 58  Resp:  [16] 16  BP: (138-140)/(71-73) 140/73  SpO2:  [98 %] 98 %  General: Alert, oriented, appears comfortable, NAD.  Respiratory: breathing non labored  Abdomen: Abdomen is soft, non-tender, non-distended. Incision is clean, dry and intact. URSULA drain with serosanguinous output, URSULA drain removed.     Data:   All laboratory and imaging data in the past 24 hours reviewed  I/O last 3 completed shifts:  In: 926.67 [P.O.:720; I.V.:206.67]  Out: 2180 [Urine:2025; Drains:155]  Recent Labs   Lab Test 05/25/20  0852 05/24/20  0758 05/23/20  0754 05/22/20  1710  04/20/20  1017   WBC 4.9 7.0 10.6  --    < >  --    HGB 11.1* 11.5* 12.6  --    < >  --     198 164  --    < >  --    INR  --   --   --  1.34*  --  0.95    < > = values in this interval not displayed.      Recent Labs   Lab Test 05/25/20  0852 05/24/20  0758 05/23/20  0754    132* 134   POTASSIUM 3.5 3.9 3.7   CHLORIDE 107 102 103   CO2 25 27 25   BUN 3* 8 12   CR 0.69 0.67 0.76   ANIONGAP 6 3 6   CAMERON 8.3* 8.2* 8.4*   GLC 85 133* 103*      Recent Labs   Lab Test 05/25/20 0852   PROTTOTAL 5.8*   ALBUMIN 2.4*   BILITOTAL 1.0   ALKPHOS 194*   AST 46*   ALT 98*     Assessment and Plan:     Flora Hogan is a 74 year old female with pancreatic mass now POD3 s/p Whipple procedure. Left URSULA drain amylase was 327 and 47 and has been removed. Right URSULA drain amylase was 268 and 48 and was removed.      - Pain control: oxycodone prn, dilaudid prn, toradol prn, Tylenol  - Advance to full liquid diet. Protein supplements. Nutrition consult for post gastrectomy whipple diet.   - Left URSULA drain removed  -protonix po  -lovenox. Teaching needed.   -PT/OT/IS   - Discharge to home today if tolerates diet  advancement.     Seen with Chief resident who will discuss with Staff.     Jeanna Bassett PA-C   Surgical Oncology

## 2020-05-27 ENCOUNTER — PATIENT OUTREACH (OUTPATIENT)
Dept: SURGERY | Facility: CLINIC | Age: 75
End: 2020-05-27

## 2020-05-27 DIAGNOSIS — C25.9 PANCREAS CANCER (H): Primary | ICD-10-CM

## 2020-05-27 DIAGNOSIS — C25.0 MALIGNANT NEOPLASM OF HEAD OF PANCREAS (H): ICD-10-CM

## 2020-05-27 LAB
BACTERIA SPEC CULT: NO GROWTH
Lab: NORMAL
SPECIMEN SOURCE: NORMAL

## 2020-05-27 NOTE — TELEPHONE ENCOUNTER
ONCOLOGY INTAKE: Records Information      APPT INFORMATION:  Referring provider:  Dr. Duarte Chaves MD  Referring provider s clinic:  [facility]  Reason for visit/diagnosis:  Malignant neoplasm of head of pancreas (H) [C25.0]  Has patient been notified of appointment date and time?: Yes    RECORDS INFORMATION:  Were the records received with the referral (via [employer])? No,Internal Referral      Has patient been seen for any external appt for this diagnosis? No    If yes, where? NA      ADDITIONAL INFORMATION:  None

## 2020-05-27 NOTE — TELEPHONE ENCOUNTER
Post Op Discharge Call    Surgery: Open Whipple     Surgery date: 5/22/2020    Discharge Date:  5/26/20    Immediate Concerns: None at this time.     Pain: No concerns regarding pain manamgent. She reports she is just using tylenol at this time.     Incision:  No concerns, healing well, no redness, drainage or edema reported.      Drains: No drains    Diet: Regular, tolerating well. Denies nausea, vomiting.     Bowels:  Passing gas but this has slowed. She has not had a bowel movement yet but is using the MiraLAX. She is going to continue to monitor and if she develops worsening symptoms and issues with constipation, cramping or stops passing gas she will call.     Activity: No difficulty with ADLs reported. Patient up independently at home.     Post op/follow up plans: Post op appointment scheduled, confirmed date and time with patient.     Will also initiate referral to Dr. Coronado at [facility] for new consult.     Patient has our direct number for any questions or concerns that may arise.      Nay Pace RN, BSN  Care Coordinator - Dr. Chaves  [phone]

## 2020-05-27 NOTE — TELEPHONE ENCOUNTER
Surgical Oncology RN Care Coordination Note:     Post op discharge call attempted. Called and left a message for patient to call back. Left my direct number.     Nay Pace RN, BSN  Care Coordinator   [phone]

## 2020-05-27 NOTE — PROGRESS NOTES
"Patient was contacted for a post-hospital call and reports she is \"trying to sort through all the paperwork I was sent home with. My  has some questions but he isn't home yet\". Patient is requesting a call back anytime after 10:15am to discuss further.   "

## 2020-05-27 NOTE — PROGRESS NOTES
[facility]: Post-Discharge Note  SITUATION                                                      Admission:    Admission Date: 05/22/20   Reason for Admission: Painless jaundice, mass of pancrease  Discharge:   Discharge Date: 05/26/20  Discharge Diagnosis: Painless jaundice, mass of pancrease  Discharge Service: Surgery    BACKGROUND                                                      Brief HPI:  Flora Hogan is a 74 year old female who developed painless jaundice and was found to have a mass in the head of the pancreas.      Hospital Course:  The patient was admitted and underwent the above procedure. The patient tolerated the procedure well. The patient recovered well with no post-operative complications. She did have an abdominal xray that demonstrated a colonic ileus. Her diet was advanced as bowel function returned and as tolerated to a full liquid diet. She received education about a post gastrectomy diet. She will continue a full liquid diet with protein supplements at discharge. Will follow up with her as an outpatient for further diet advancement. Prior to discharge pain was controlled with oral pain medication and the patient was able to ambulate and void without difficulty. The patient received appropriate education post operatively including Lovenox teaching. On POD #4 the patient was discharged to home. She will continue Lovenox for a total of 28 days for DVT prophylaxis.     ASSESSMENT      Discharge Assessment  Patient reports symptoms are: Unchanged  Does the patient have all of their medications?: Yes  Does patient know what their new medications are for?: Other(Stevie reports her  has questions but isn't home currently. They are requesting a call back.)  Does patient have a follow-up appointment scheduled?: Yes  Does patient have any other questions or concerns?: No    Post-op  Did the patient have surgery or a procedure: Yes  Incision: healing(Open to air per  "patient. She thinks it \"looks good\".)  Drainage: No  Bleeding: none  Fever: No  Chills: No  Redness: No  Warmth: No  Swelling: No  Incision site pain: Yes  Eating & Drinking: eating and drinking without complaints/concerns  PO Intake: full liquids    PLAN                                                      Outpatient Plan:      Follow up in clinic with Dr. Chaves in 2 weeks.    Future Appointments   Date Time Provider Department Center   6/15/2020  9:30 AM Duarte Chaves MD [facility]           Steffanie Stapleton CMA                "

## 2020-05-28 ENCOUNTER — TELEPHONE (OUTPATIENT)
Dept: SURGERY | Facility: CLINIC | Age: 75
End: 2020-05-28

## 2020-05-28 NOTE — TELEPHONE ENCOUNTER
Patient let the RN know she cannot make her appointment on 6/8. Moved appointment to 6/1 at 10:30 AM. Informed the RN and sent an updated MyChart.

## 2020-05-28 NOTE — TELEPHONE ENCOUNTER
RECORDS STATUS - ALL OTHER DIAGNOSIS      RECORDS RECEIVED FROM: Epic/   DATE RECEIVED: 6/24/2020    NOTES STATUS DETAILS   OFFICE NOTE from referring provider Complete  Dr. Duarte Chaves MD   OFFICE NOTE from medical oncologist Complete 5/22/2020    DISCHARGE SUMMARY from hospital Complete [facility]- Mas of Pancreas 5/22/2020 5/5/2020- Esophagogastroduodenoscopy   4/19/2020    DISCHARGE REPORT from the ER     OPERATIVE REPORT complete [facility]- Upper EUS 5/5/2020     Endoscopic 4/19/2020     Upper EUS 4/19/2020    MEDICATION LIST Complete [facility]   CLINICAL TRIAL TREATMENTS TO DATE     LABS     PATHOLOGY REPORTS Complete- Pathology  Specimen #: [id number]     Non-pylorus preserving Whipple:   - Pancreatic neck margin: Focal atypia, cannot rule out malignancy.   - Bile duct margin: Benign.     Specimen #: [id number]   A1- 3. Pancreas, head, aspirate   - Rare atypical cells present (see description    Specimen #: [id number]   Pancreas, head mass , endoscopic ultrasound guided fine needle aspiration        Pap-Cyto x 1, Welsh's stain-cyto x 1, Diff Quick Stain-cyto x 4, H&E    Rapid, FNA FR[facility] Cyto x 4, Cell    ANYTHING RELATED TO DIAGNOSIS Complete Epic- 5/26/2020    GENONOMIC TESTING     TYPE:     IMAGING (NEED IMAGES & REPORT)     CT SCANS Complete CT Chest/Abdomen 4/19/2020    MRI Complete MRI Liver 5/6/2020    Xray Abdomen  Complete 5/24/2020, 5/6/2019    MAMMO     ULTRASOUND     PET

## 2020-05-29 ENCOUNTER — PATIENT OUTREACH (OUTPATIENT)
Dept: ONCOLOGY | Facility: CLINIC | Age: 75
End: 2020-05-29

## 2020-05-29 LAB
BACTERIA SPEC CULT: ABNORMAL
Lab: ABNORMAL
Lab: ABNORMAL
SPECIMEN SOURCE: ABNORMAL
SPECIMEN SOURCE: ABNORMAL

## 2020-05-29 NOTE — TELEPHONE ENCOUNTER
Surgical Oncology RN Care Coordination Note:     Called to check in with patient prior to the weekend and make sure bowel function has improved. She confirmed she is now passing gas and had return of bowel function. She reports she has had some diarrhea now as well, she is going to monitor and if it doesn't resolve she will update us at visit on Monday.       Nay Pace RN, BSN  Care Coordinator   [phone]

## 2020-05-30 LAB — COPATH REPORT: NORMAL

## 2020-06-01 ENCOUNTER — VIRTUAL VISIT (OUTPATIENT)
Dept: SURGERY | Facility: CLINIC | Age: 75
End: 2020-06-01
Attending: SURGERY
Payer: COMMERCIAL

## 2020-06-01 DIAGNOSIS — C25.0 MALIGNANT NEOPLASM OF HEAD OF PANCREAS (H): Primary | ICD-10-CM

## 2020-06-01 PROCEDURE — 40000114 ZZH STATISTIC NO CHARGE CLINIC VISIT

## 2020-06-01 NOTE — PROGRESS NOTES
"Flora Hogan is a 74 year old female who is being evaluated via a billable video visit.      The patient has been notified of following:     \"This video visit will be conducted via a call between you and your physician/provider. We have found that certain health care needs can be provided without the need for an in-person physical exam.  This service lets us provide the care you need with a video conversation.  If a prescription is necessary we can send it directly to your pharmacy.  If lab work is needed we can place an order for that and you can then stop by our lab to have the test done at a later time.    Video visits are billed at different rates depending on your insurance coverage.  Please reach out to your insurance provider with any questions.    If during the course of the call the physician/provider feels a video visit is not appropriate, you will not be charged for this service.\"    Patient has given verbal consent for Video visit? Yes    How would you like to obtain your AVS? Juan    Patient would like the video invitation sent by: Send to e-mail at: [email or web address]    Will anyone else be joining your video visit? No      Pt has no new needs or concerns  Jacquelin Mcallister CMA on 6/1/2020 at 9:58 AM      Video-Visit Details    Type of service:  Telephone visit performed due to Amwell not functioning.    Doing great. No complaints. Path discussed. Has a T2N0 cancer. Eating well. Normal bowel movements. Incision looks good.    Will be seeing Dr. Coronado from medical oncology to discuss adjuvant therapy. Patient requested sooner visit - currently set for 6/24.    Will request sooner med onc visit.  Follow-up with me PRN.    Duarte Chaves MD        "

## 2020-06-01 NOTE — LETTER
"    6/1/2020         RE: Flora Hogan  [id number]  [place] [id number]        Dear Colleague,    Thank you for referring your patient, Flora Hogan, to the [facility]. Please see a copy of my visit note below.    Flora Hogan is a 74 year old female who is being evaluated via a billable video visit.      The patient has been notified of following:     \"This video visit will be conducted via a call between you and your physician/provider. We have found that certain health care needs can be provided without the need for an in-person physical exam.  This service lets us provide the care you need with a video conversation.  If a prescription is necessary we can send it directly to your pharmacy.  If lab work is needed we can place an order for that and you can then stop by our lab to have the test done at a later time.    Video visits are billed at different rates depending on your insurance coverage.  Please reach out to your insurance provider with any questions.    If during the course of the call the physician/provider feels a video visit is not appropriate, you will not be charged for this service.\"    Patient has given verbal consent for Video visit? Yes    How would you like to obtain your AVS? MyChart    Patient would like the video invitation sent by: Send to e-mail at: [email or web address]    Will anyone else be joining your video visit? No      Pt has no new needs or concerns  Jacquelin Mcallister CMA on 6/1/2020 at 9:58 AM      Video-Visit Details    Type of service:  Telephone visit performed due to Amwell not functioning.    Doing great. No complaints. Path discussed. Has a T2N0 cancer. Eating well. Normal bowel movements. Incision looks good.    Will be seeing Dr. Coronado from medical oncology to discuss adjuvant therapy. Patient requested sooner visit - currently set for 6/24.    Will request sooner med onc visit.  Follow-up with me PRN.    Duarte Chaves, " MD

## 2020-06-02 NOTE — TELEPHONE ENCOUNTER
RECORDS STATUS - ALL OTHER DIAGNOSIS      RECORDS RECEIVED FROM: Epic/   DATE RECEIVED: 6/9/2020    NOTES STATUS DETAILS   OFFICE NOTE from referring provider Complete EPIC   OFFICE NOTE from medical oncologist Complete [facility]- Surgery 5/29/2020 Malignant Neoplasm of the pancreas     [facility] Records in [facility]   DISCHARGE SUMMARY from hospital Complete [facility]-  5/22/2020    Mass of pancreas   Painless Jaundice     5/5/2020 - Esophagogastroduodenoscopy with fine needle aspiration biopsy     4/19/2020 Biliary obstruction    DISCHARGE REPORT from the ER Complete Epic- 4/19/2020    OPERATIVE REPORT Complete [facility] (see Hospital Visits Above)    MEDICATION LIST Complete [facility]   CLINICAL TRIAL TREATMENTS TO DATE     LABS     PATHOLOGY REPORTS Complete- Internal  Internal Epic- 5/22/2020   Specimen #: [id number]  Tumor Site:         - Pancreatic head     Histologic Type:         - Ductal adenocarcinoma   ANYTHING RELATED TO DIAGNOSIS Complete Labs last updated on 5/26/2020    GENONOMIC TESTING     TYPE:     IMAGING (NEED IMAGES & REPORT)     CT SCANS Complete CT Chest Abdomen 4/19/2020    MRI Complete MRI Liver 5/6/2020    Xray Chest Complete 6/6/2017    MAMMO     Xray Abdomen  Complete  5/24/2020, 5/6/2019   ULTRASOUND     PET

## 2020-06-08 ENCOUNTER — PATIENT OUTREACH (OUTPATIENT)
Dept: SURGERY | Facility: CLINIC | Age: 75
End: 2020-06-08

## 2020-06-08 NOTE — TELEPHONE ENCOUNTER
Surgical Oncology RN Care Coordination Note:     Attempted to reach patient back to discuss her questions regarding nutrition and support group. Unable to reach patient and will plan to reach out again.     Nay Pace RN, BSN  Care Coordinator   [phone]

## 2020-06-09 ENCOUNTER — PRE VISIT (OUTPATIENT)
Dept: ONCOLOGY | Facility: CLINIC | Age: 75
End: 2020-06-09

## 2020-06-09 ENCOUNTER — VIRTUAL VISIT (OUTPATIENT)
Dept: ONCOLOGY | Facility: CLINIC | Age: 75
End: 2020-06-09
Attending: SURGERY
Payer: COMMERCIAL

## 2020-06-09 DIAGNOSIS — C25.0 MALIGNANT NEOPLASM OF HEAD OF PANCREAS (H): ICD-10-CM

## 2020-06-09 PROCEDURE — 99214 OFFICE O/P EST MOD 30 MIN: CPT | Mod: 95 | Performed by: INTERNAL MEDICINE

## 2020-06-09 NOTE — PROGRESS NOTES
"Flroa Hogan is a 74 year old female who is being evaluated via a billable telephone visit.      The patient has been notified of following:     \"This telephone visit will be conducted via a call between you and your physician/provider. We have found that certain health care needs can be provided without the need for a physical exam.  This service lets us provide the care you need with a short phone conversation.  If a prescription is necessary we can send it directly to your pharmacy.  If lab work is needed we can place an order for that and you can then stop by our lab to have the test done at a later time.    Telephone visits are billed at different rates depending on your insurance coverage. During this emergency period, for some insurers they may be billed the same as an in-person visit.  Please reach out to your insurance provider with any questions.    If during the course of the call the physician/provider feels a telephone visit is not appropriate, you will not be charged for this service.\"    Patient has given verbal consent for Telephone visit?  Yes    What phone number would you like to be contacted at? [phone]    How would you like to obtain your AVS? Juan Brito CMA on 6/9/2020 at 10:20 AM      Phone call duration: 28 minutes      [facility]    Hematology/Oncology New Patient Note      Today's Date: 06/09/20    Reason for Consult: pancreatic cancer      HISTORY OF PRESENT ILLNESS: Flora Hogan is a 74 year old female, who presents with adenocarcinoma of the pancreas.  She initially presented with jaundice.  She was evaluated for EUS and FNA, which revealed atypical cells, but no definitive evidence of malignancy.  She also underwent biliary stenting for decompression of her biliary tree.  She had a second EUS done, but again revealed atypical cells.  She then saw Dr. Duarte Chaves at [facility].  She had CT scan and MRI.  MRI showed " multiple benign lesions in her liver; there as one area that was indeterminate.  Her CA 19-9 was 93.  On 5/26/20, she underwent Whipple procedure.  Two nodules were visualized on the surface of the liver, which were biopsied and found to be benign on pathology.  There were also hepatic cysts seen.  Pathology showed pancreatic ductal adenocarcinoma, well-differentiated (grade 1), tumor size 2.6 x 2.4 x 2.0 cm, negative margins, +LVI, 0 of 4 lymph nodes involved, staged pT2N0 (stage IB).      Currently, patient feels well.  She says that she is healing well.  She is eating okay, still figuring out what works for her.  She tends to alternate between diarrhea and constipation.          REVIEW OF SYSTEMS:   14 point ROS was reviewed and is negative other than as noted above in HPI.       HOME MEDICATIONS:  Current Outpatient Medications   Medication Sig Dispense Refill     acetaminophen (TYLENOL) 325 MG tablet Take 1-2 tablets (325-650 mg) by mouth every 6 hours as needed for mild pain or fever 50 tablet 0     albuterol (PROAIR HFA/PROVENTIL HFA/VENTOLIN HFA) 108 (90 Base) MCG/ACT inhaler Inhale 2 puffs into the lungs every 6 hours as needed for shortness of breath / dyspnea or wheezing 1 Inhaler 0     calcium carbonate-vitamin D (CALTRATE 600+D) 600-400 MG-UNIT chewable tablet Take 1 chew tab by mouth 2 times daily  180 tablet 3     enoxaparin ANTICOAGULANT (LOVENOX) 40 MG/0.4ML syringe Inject 0.4 mLs (40 mg) Subcutaneous every 24 hours for 24 days 9.6 mL 0     loratadine (CLARITIN REDITABS) 10 MG dispersible tablet Take 10 mg by mouth as needed        magnesium 250 MG tablet Take 1 tablet by mouth daily       pantoprazole (PROTONIX) 40 MG EC tablet Take 1 tablet (40 mg) by mouth every morning (before breakfast) 90 tablet 5     polyethylene glycol (MIRALAX) 17 g packet Take 17 g by mouth daily as needed for constipation 12 packet 0     oxyCODONE (ROXICODONE) 5 MG tablet Take 1-2 tablets (5-10 mg) by mouth every 6 hours  as needed for moderate to severe pain (Patient not taking: Reported on 6/9/2020) 20 tablet 0         ALLERGIES:  Allergies   Allergen Reactions     Penicillin V      Fredy-1 [Lidocaine] Unknown         PAST MEDICAL HISTORY:  Past Medical History:   Diagnosis Date     Chest tightness     had suspected allergies     Malignant neoplasm of head of pancreas (H) 6/9/2020     Seasonal allergies      SOB (shortness of breath)          PAST SURGICAL HISTORY:  Past Surgical History:   Procedure Laterality Date     CATARACT IOL, RT/LT  2015     COLONOSCOPY  10/14    no repeat needed due to age     COLONOSCOPY N/A 10/7/2014    Procedure: COLONOSCOPY;  Surgeon: Daryl Hanson MD;  Location:  GI     COLONOSCOPY  04/23/2019    no further screening     ENDOSCOPIC RETROGRADE CHOLANGIOPANCREATOGRAM N/A 4/20/2020    Procedure: ENDOSCOPIC RETROGRADE CHOLANGIOPANCREATOGRAPHY, PLACEMENT OF PANCREATIC STENT, PLACEMENT OF BILIARY STENT;  Surgeon: Yarely Vann MD;  Location:  OR     ESOPHAGOSCOPY, GASTROSCOPY, DUODENOSCOPY (EGD), COMBINED N/A 4/20/2020    Procedure: ESOPHAGOGASTRODUODENOSCOPY, WITH FINE NEEDLE ASPIRATION BIOPSY, WITH ENDOSCOPIC ULTRASOUND GUIDANCE, Endoscopic retrograde cholangiopancreatography;  Surgeon: Yarely Vann MD;  Location:  OR     ESOPHAGOSCOPY, GASTROSCOPY, DUODENOSCOPY (EGD), COMBINED N/A 5/5/2020    Procedure: ESOPHAGOGASTRODUODENOSCOPY, WITH FINE NEEDLE ASPIRATION BIOPSY, WITH ENDOSCOPIC ULTRASOUND GUIDANCE;  Surgeon: Romina Rosario MD;  Location:  GI     LAPAROSCOPIC BIOPSY LIVER N/A 5/22/2020    Procedure: Diagnostic Laparoscopy with intraoperative ultrasound and Liver Biopsy X2;  Surgeon: Duarte Chaves MD;  Location: [facility] OR     WHIPPLE PROCEDURE N/A 5/22/2020    Procedure: Non Pylorus Preserving Whipple procedure;  Surgeon: Duarte Chaves MD;  Location: [facility] OR         SOCIAL HISTORY:  Social History     Socioeconomic History     Marital status:       Spouse name: Not on file     Number of children: Not on file     Years of education: Not on file     Highest education level: Not on file   Occupational History     Employer: RETIRED     Comment: previous taught special ed   Social Needs     Financial resource strain: Not on file     Food insecurity     Worry: Not on file     Inability: Not on file     Transportation needs     Medical: Not on file     Non-medical: Not on file   Tobacco Use     Smoking status: Never Smoker     Smokeless tobacco: Never Used   Substance and Sexual Activity     Alcohol use: Yes     Comment: social      Drug use: No     Sexual activity: Yes   Lifestyle     Physical activity     Days per week: Not on file     Minutes per session: Not on file     Stress: Not on file   Relationships     Social connections     Talks on phone: Not on file     Gets together: Not on file     Attends Gnosticism service: Not on file     Active member of club or organization: Not on file     Attends meetings of clubs or organizations: Not on file     Relationship status: Not on file     Intimate partner violence     Fear of current or ex partner: Not on file     Emotionally abused: Not on file     Physically abused: Not on file     Forced sexual activity: Not on file   Other Topics Concern     Parent/sibling w/ CABG, MI or angioplasty before 65F 55M? Not Asked      Service Not Asked     Blood Transfusions Not Asked     Caffeine Concern Yes     Comment: 2 cups     Occupational Exposure Not Asked     Hobby Hazards Not Asked     Sleep Concern Not Asked     Stress Concern Not Asked     Weight Concern Not Asked     Special Diet No     Back Care Not Asked     Exercise Yes     Comment: walking workout      Bike Helmet Not Asked     Seat Belt Not Asked     Self-Exams Not Asked   Social History Narrative     Not on file         FAMILY HISTORY:  Family History   Problem Relation Age of Onset     Musculoskeletal Disorder Mother         edematous legs     Obesity  Mother      Musculoskeletal Disorder Maternal Grandmother         edematous legs; did have amputation     Obesity Maternal Grandmother      Myocardial Infarction Maternal Grandmother          PHYSICAL EXAM:  Vital signs:  LMP  (LMP Unknown)    Phone visit.      LABS:  CBC RESULTS:   Recent Labs   Lab Test 05/26/20  0712   WBC 4.5   RBC 3.73*   HGB 10.7*   HCT 32.8*   MCV 88   MCH 28.7   MCHC 32.6   RDW 13.0          Recent Labs   Lab Test 05/26/20  0712 05/25/20  0852    138   POTASSIUM 3.4 3.5   CHLORIDE 107 107   CO2 26 25   ANIONGAP 6 6   GLC 75 85   BUN 5* 3*   CR 0.76 0.69   CAMERON 8.4* 8.3*     Lab Results   Component Value Date    AST 41 05/26/2020     Lab Results   Component Value Date    ALT 80 05/26/2020     No results found for: BILICONJ   Lab Results   Component Value Date    BILITOTAL 1.0 05/26/2020     Lab Results   Component Value Date    ALBUMIN 2.2 05/26/2020     Lab Results   Component Value Date    PROTTOTAL 5.3 05/26/2020      Lab Results   Component Value Date    ALKPHOS 185 05/26/2020     Component      Latest Ref Rng & Units 4/19/2020 5/5/2020   Cancer Antigen 19-9      0 - 37 U/mL 109 (H) 93 (H)       PATHOLOGY:  5/22/20:  FINAL DIAGNOSIS:   A. Liver, segment 8, mass, biopsy:   - Abscess     B. Liver, segment 3 lesion, biopsy:   - Subcapsular fibrosis   - No evidence of malignancy     C. Gallbladder, cholecystectomy:   - Chronic cholecystitis changes     D. Non-pylorus preserving Whipple (pancreaticoduodenectomy):   - Pancreatic ductal adenocarcinoma, well-differentiated (grade 1)   - Tumor size: 2.6 x 2.4 x 2.0 cm   - Tumor extent: Confined to pancreas   - Margin status: Negative for invasive adenocarcinoma        - Closest margin: Uncinate margin (7 mm)   - Surface involvement: Not identified        - Closest tumor: 1 mm from posterior pancreas   - Lymphovascular invasion: Present   - Four lymph nodes negative for metastatic carcinoma (0/4)   - Additional findings: Focal chronic  pancreatitis   - Pathologic stage (AJCC 8th edition): pT2N0   - See synoptic report below     E. Jejunum, excision:   - Small intestine mucosa with no significant histologic abnormality     F. Pancreas, final margin, re-excision:   - Pancreatic parenchyma negative for malignancy     COMMENT:   The final diagnosis confirms the interpretation provided intraoperatively.     Part(s) Involved:   D: Non-pylorus preserving whipple     Synoptic Report:     SPECIMEN     Procedure:         - Pancreaticoduodenectomy (Whipple resection), partial   pancreatectomy     TUMOR     Tumor Site:         - Pancreatic head     Histologic Type:         - Ductal adenocarcinoma     Histologic Grade:         - G1: Well differentiated     Tumor Size: 2.6 cm     Tumor Extension:         - Tumor is confined to pancreas     Treatment Effect:         - No known presurgical therapy     Lymphovascular Invasion:         - Present     Perineural Invasion:         - Present     MARGINS     Margins:         - All margins are uninvolved by invasive carcinoma and high-grade         intraepithelial neoplasia       Margins Examined:           - Pancreatic neck / parenchymal           - Uncinate (retroperitoneal / superior mesenteric artery)           - Bile duct           - Proximal (gastric or duodenal)           - Distal (duodenal or jejunal)       Distance of Invasive Carcinoma from Closest Margin:           7 mm       Closest Margin:           - Uncinate (retroperitoneal / superior mesenteric artery)     LYMPH NODES     Number of Lymph Nodes Involved: 0     Number of Lymph Nodes Examined: 4     PATHOLOGIC STAGE CLASSIFICATION (PTNM, AJCC 8TH EDITION)     Primary Tumor (pT):         - pT2     Regional Lymph Nodes (pN):         - pN0     ADDITIONAL FINDINGS     Additional Pathologic Findings:         - Chronic pancreatitis       IMAGIN20:  1.  Occluded distal common bile duct with dilation of the intra and  extrahepatic biliary tree along with  "the pancreatic duct. Although  imaging is limited, the \"birds beak appearance\" of the occlusion with  surrounding 1.2 cm hypodensity in the pancreatic head is concerning  for malignancy. Gastroenterology referral recommended.  2.  0.8 cm enhancing nodule inferior right lobe of the liver is  nonspecific, but likely related to a flash filling hemangioma.  3.  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  4.  Noncalcified 3 mm pulmonary nodule.    MRI liver 5/6/20:  1. Pancreatic head mass with upstream dilatation of the pancreatic  ducts and biliary system. No evidence of invasion of the adjacent  critical vascular structures as described above.  2. Pancreas divisum.  3. The lesion seen on most recent CT at segment 6 is consistent with  benign hemangioma.   3a. A second more discrete lesion is found on MRI at segment 7/8  junction at the hepatic dome. This lesion is indeterminate. The  presence of T1 slightly hyperintense signal and isointensity on  delayed phase would suggest against this being a metastatic disease,  although this is not entirely excluded. The signal characteristics of  this lesion also does not fit with hemangioma (T1 slight  hyperintensity and T2 isointensity). Recommend 3 month follow-up MRI  exam to document stability.  4. No peripancreatic worrisome lymphadenopathy.  5. No evidence of metastatic disease elsewhere in the field-of-view.      ASSESSMENT/PLAN:  Flora Hogan is a 74 year old female with:    1) Adenocarcinoma of the pancreas head: s/p Whipple procedure on 5/26/20; pathology showed pancreatic ductal adenocarcinoma, well-differentiated (grade 1), tumor size 2.6 x 2.4 x 2.0 cm, negative margins, +LVI, 0 of 4 lymph nodes involved, staged pT2N0 (stage IB).      We reviewed the pathology results.  We will get a new post-op restaging scan in a couple of weeks, as well as labs/CA 19-9.  I recommended adjuvant chemotherapy.  " With her age, I would not recommend FOLFIRINOX as adjuvant therapy.  We discussed gemcitabine or gemcitabine+capecitabine.  We decided on single-agent gemcitabine due to age and tolerability.      Gemcitabine 1000 mg/m2 IV on days 1, 8, 15  Every 4 week cycles x 6 cycles    -She just had surgery on 5/26/20, and will need some more time to heal from her Whipple surgery  -plan for CT c/a/p in the next couple of weeks  -she would like a port - will place referral  -chemotherapy teaching on gemcitabine  -possible side effects may include, but not limited to: fever/chills, nausea/vomiting, diarrhea/constipation, rash, edema, fatigue, hair loss, myelosuppression, anemia, bleeding, shortness of breath, chest pain, mucositis  -I discussed the schedule, regimen, dose, possible side effects, the benefits and risks, and patient agrees to treatment plan.  -will plan to start C1D1 on 7/7/20, with in-person clinic appointment with michael Coronado MD  Hematology/Oncology  [facility]

## 2020-06-09 NOTE — LETTER
"    6/9/2020         RE: Flora Hogan  [address] [id number]        Dear Colleague,    Thank you for referring your patient, Flora Hogan, to the [facility]. Please see a copy of my visit note below.    Flora Hogan is a 74 year old female who is being evaluated via a billable telephone visit.      The patient has been notified of following:     \"This telephone visit will be conducted via a call between you and your physician/provider. We have found that certain health care needs can be provided without the need for a physical exam.  This service lets us provide the care you need with a short phone conversation.  If a prescription is necessary we can send it directly to your pharmacy.  If lab work is needed we can place an order for that and you can then stop by our lab to have the test done at a later time.    Telephone visits are billed at different rates depending on your insurance coverage. During this emergency period, for some insurers they may be billed the same as an in-person visit.  Please reach out to your insurance provider with any questions.    If during the course of the call the physician/provider feels a telephone visit is not appropriate, you will not be charged for this service.\"    Patient has given verbal consent for Telephone visit?  Yes    What phone number would you like to be contacted at? [phone]    How would you like to obtain your AVS? Juan Brito CMA on 6/9/2020 at 10:20 AM      Phone call duration: 28 minutes      [facility]    Hematology/Oncology New Patient Note      Today's Date: 06/09/20    Reason for Consult: pancreatic cancer      HISTORY OF PRESENT ILLNESS: Flora Hogan is a 74 year old female, who presents with adenocarcinoma of the pancreas.  She initially presented with jaundice.  She was evaluated for EUS and FNA, which revealed atypical cells, but no definitive evidence of " malignancy.  She also underwent biliary stenting for decompression of her biliary tree.  She had a second EUS done, but again revealed atypical cells.  She then saw Dr. Duarte Chaves at [facility].  She had CT scan and MRI.  MRI showed multiple benign lesions in her liver; there as one area that was indeterminate.  Her CA 19-9 was 93.  On 5/26/20, she underwent Whipple procedure.  Two nodules were visualized on the surface of the liver, which were biopsied and found to be benign on pathology.  There were also hepatic cysts seen.  Pathology showed pancreatic ductal adenocarcinoma, well-differentiated (grade 1), tumor size 2.6 x 2.4 x 2.0 cm, negative margins, +LVI, 0 of 4 lymph nodes involved, staged pT2N0 (stage IB).      Currently, patient feels well.  She says that she is healing well.  She is eating okay, still figuring out what works for her.  She tends to alternate between diarrhea and constipation.          REVIEW OF SYSTEMS:   14 point ROS was reviewed and is negative other than as noted above in HPI.       HOME MEDICATIONS:  Current Outpatient Medications   Medication Sig Dispense Refill     acetaminophen (TYLENOL) 325 MG tablet Take 1-2 tablets (325-650 mg) by mouth every 6 hours as needed for mild pain or fever 50 tablet 0     albuterol (PROAIR HFA/PROVENTIL HFA/VENTOLIN HFA) 108 (90 Base) MCG/ACT inhaler Inhale 2 puffs into the lungs every 6 hours as needed for shortness of breath / dyspnea or wheezing 1 Inhaler 0     calcium carbonate-vitamin D (CALTRATE 600+D) 600-400 MG-UNIT chewable tablet Take 1 chew tab by mouth 2 times daily  180 tablet 3     enoxaparin ANTICOAGULANT (LOVENOX) 40 MG/0.4ML syringe Inject 0.4 mLs (40 mg) Subcutaneous every 24 hours for 24 days 9.6 mL 0     loratadine (CLARITIN REDITABS) 10 MG dispersible tablet Take 10 mg by mouth as needed        magnesium 250 MG tablet Take 1 tablet by mouth daily       pantoprazole (PROTONIX) 40 MG EC tablet Take 1 tablet (40 mg) by  mouth every morning (before breakfast) 90 tablet 5     polyethylene glycol (MIRALAX) 17 g packet Take 17 g by mouth daily as needed for constipation 12 packet 0     oxyCODONE (ROXICODONE) 5 MG tablet Take 1-2 tablets (5-10 mg) by mouth every 6 hours as needed for moderate to severe pain (Patient not taking: Reported on 6/9/2020) 20 tablet 0         ALLERGIES:  Allergies   Allergen Reactions     Penicillin V      Fredy-1 [Lidocaine] Unknown         PAST MEDICAL HISTORY:  Past Medical History:   Diagnosis Date     Chest tightness     had suspected allergies     Malignant neoplasm of head of pancreas (H) 6/9/2020     Seasonal allergies      SOB (shortness of breath)          PAST SURGICAL HISTORY:  Past Surgical History:   Procedure Laterality Date     CATARACT IOL, RT/LT  2015     COLONOSCOPY  10/14    no repeat needed due to age     COLONOSCOPY N/A 10/7/2014    Procedure: COLONOSCOPY;  Surgeon: Daryl Hanson MD;  Location:  GI     COLONOSCOPY  04/23/2019    no further screening     ENDOSCOPIC RETROGRADE CHOLANGIOPANCREATOGRAM N/A 4/20/2020    Procedure: ENDOSCOPIC RETROGRADE CHOLANGIOPANCREATOGRAPHY, PLACEMENT OF PANCREATIC STENT, PLACEMENT OF BILIARY STENT;  Surgeon: Yarely Vann MD;  Location:  OR     ESOPHAGOSCOPY, GASTROSCOPY, DUODENOSCOPY (EGD), COMBINED N/A 4/20/2020    Procedure: ESOPHAGOGASTRODUODENOSCOPY, WITH FINE NEEDLE ASPIRATION BIOPSY, WITH ENDOSCOPIC ULTRASOUND GUIDANCE, Endoscopic retrograde cholangiopancreatography;  Surgeon: Yarely Vann MD;  Location:  OR     ESOPHAGOSCOPY, GASTROSCOPY, DUODENOSCOPY (EGD), COMBINED N/A 5/5/2020    Procedure: ESOPHAGOGASTRODUODENOSCOPY, WITH FINE NEEDLE ASPIRATION BIOPSY, WITH ENDOSCOPIC ULTRASOUND GUIDANCE;  Surgeon: Romina Rosario MD;  Location: [facility]     LAPAROSCOPIC BIOPSY LIVER N/A 5/22/2020    Procedure: Diagnostic Laparoscopy with intraoperative ultrasound and Liver Biopsy X2;  Surgeon: Duarte Chaves MD;  Location:  [facility] OR     WHIPPLE PROCEDURE N/A 5/22/2020    Procedure: Non Pylorus Preserving Whipple procedure;  Surgeon: Duarte Chaves MD;  Location: [facility] OR         SOCIAL HISTORY:  Social History     Socioeconomic History     Marital status:      Spouse name: Not on file     Number of children: Not on file     Years of education: Not on file     Highest education level: Not on file   Occupational History     Employer: RETIRED     Comment: previous taught special ed   Social Needs     Financial resource strain: Not on file     Food insecurity     Worry: Not on file     Inability: Not on file     Transportation needs     Medical: Not on file     Non-medical: Not on file   Tobacco Use     Smoking status: Never Smoker     Smokeless tobacco: Never Used   Substance and Sexual Activity     Alcohol use: Yes     Comment: social      Drug use: No     Sexual activity: Yes   Lifestyle     Physical activity     Days per week: Not on file     Minutes per session: Not on file     Stress: Not on file   Relationships     Social connections     Talks on phone: Not on file     Gets together: Not on file     Attends Jehovah's witness service: Not on file     Active member of club or organization: Not on file     Attends meetings of clubs or organizations: Not on file     Relationship status: Not on file     Intimate partner violence     Fear of current or ex partner: Not on file     Emotionally abused: Not on file     Physically abused: Not on file     Forced sexual activity: Not on file   Other Topics Concern     Parent/sibling w/ CABG, MI or angioplasty before 65F 55M? Not Asked      Service Not Asked     Blood Transfusions Not Asked     Caffeine Concern Yes     Comment: 2 cups     Occupational Exposure Not Asked     Hobby Hazards Not Asked     Sleep Concern Not Asked     Stress Concern Not Asked     Weight Concern Not Asked     Special Diet No     Back Care Not Asked     Exercise Yes     Comment: walking workout      Bike Helmet Not  Asked     Seat Belt Not Asked     Self-Exams Not Asked   Social History Narrative     Not on file         FAMILY HISTORY:  Family History   Problem Relation Age of Onset     Musculoskeletal Disorder Mother         edematous legs     Obesity Mother      Musculoskeletal Disorder Maternal Grandmother         edematous legs; did have amputation     Obesity Maternal Grandmother      Myocardial Infarction Maternal Grandmother          PHYSICAL EXAM:  Vital signs:  LMP  (LMP Unknown)    Phone visit.      LABS:  CBC RESULTS:   Recent Labs   Lab Test 05/26/20  0712   WBC 4.5   RBC 3.73*   HGB 10.7*   HCT 32.8*   MCV 88   MCH 28.7   MCHC 32.6   RDW 13.0          Recent Labs   Lab Test 05/26/20  0712 05/25/20  0852    138   POTASSIUM 3.4 3.5   CHLORIDE 107 107   CO2 26 25   ANIONGAP 6 6   GLC 75 85   BUN 5* 3*   CR 0.76 0.69   CAMERON 8.4* 8.3*     Lab Results   Component Value Date    AST 41 05/26/2020     Lab Results   Component Value Date    ALT 80 05/26/2020     No results found for: BILICONJ   Lab Results   Component Value Date    BILITOTAL 1.0 05/26/2020     Lab Results   Component Value Date    ALBUMIN 2.2 05/26/2020     Lab Results   Component Value Date    PROTTOTAL 5.3 05/26/2020      Lab Results   Component Value Date    ALKPHOS 185 05/26/2020     Component      Latest Ref Rng & Units 4/19/2020 5/5/2020   Cancer Antigen 19-9      0 - 37 U/mL 109 (H) 93 (H)       PATHOLOGY:  5/22/20:  FINAL DIAGNOSIS:   A. Liver, segment 8, mass, biopsy:   - Abscess     B. Liver, segment 3 lesion, biopsy:   - Subcapsular fibrosis   - No evidence of malignancy     C. Gallbladder, cholecystectomy:   - Chronic cholecystitis changes     D. Non-pylorus preserving Whipple (pancreaticoduodenectomy):   - Pancreatic ductal adenocarcinoma, well-differentiated (grade 1)   - Tumor size: 2.6 x 2.4 x 2.0 cm   - Tumor extent: Confined to pancreas   - Margin status: Negative for invasive adenocarcinoma        - Closest margin: Uncinate  margin (7 mm)   - Surface involvement: Not identified        - Closest tumor: 1 mm from posterior pancreas   - Lymphovascular invasion: Present   - Four lymph nodes negative for metastatic carcinoma (0/4)   - Additional findings: Focal chronic pancreatitis   - Pathologic stage (AJCC 8th edition): pT2N0   - See synoptic report below     E. Jejunum, excision:   - Small intestine mucosa with no significant histologic abnormality     F. Pancreas, final margin, re-excision:   - Pancreatic parenchyma negative for malignancy     COMMENT:   The final diagnosis confirms the interpretation provided intraoperatively.     Part(s) Involved:   D: Non-pylorus preserving whipple     Synoptic Report:     SPECIMEN     Procedure:         - Pancreaticoduodenectomy (Whipple resection), partial   pancreatectomy     TUMOR     Tumor Site:         - Pancreatic head     Histologic Type:         - Ductal adenocarcinoma     Histologic Grade:         - G1: Well differentiated     Tumor Size: 2.6 cm     Tumor Extension:         - Tumor is confined to pancreas     Treatment Effect:         - No known presurgical therapy     Lymphovascular Invasion:         - Present     Perineural Invasion:         - Present     MARGINS     Margins:         - All margins are uninvolved by invasive carcinoma and high-grade         intraepithelial neoplasia       Margins Examined:           - Pancreatic neck / parenchymal           - Uncinate (retroperitoneal / superior mesenteric artery)           - Bile duct           - Proximal (gastric or duodenal)           - Distal (duodenal or jejunal)       Distance of Invasive Carcinoma from Closest Margin:           7 mm       Closest Margin:           - Uncinate (retroperitoneal / superior mesenteric artery)     LYMPH NODES     Number of Lymph Nodes Involved: 0     Number of Lymph Nodes Examined: 4     PATHOLOGIC STAGE CLASSIFICATION (PTNM, AJCC 8TH EDITION)     Primary Tumor (pT):         - pT2     Regional Lymph Nodes  "(pN):         - pN0     ADDITIONAL FINDINGS     Additional Pathologic Findings:         - Chronic pancreatitis       IMAGIN20:  1.  Occluded distal common bile duct with dilation of the intra and  extrahepatic biliary tree along with the pancreatic duct. Although  imaging is limited, the \"birds beak appearance\" of the occlusion with  surrounding 1.2 cm hypodensity in the pancreatic head is concerning  for malignancy. Gastroenterology referral recommended.  2.  0.8 cm enhancing nodule inferior right lobe of the liver is  nonspecific, but likely related to a flash filling hemangioma.  3.  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  4.  Noncalcified 3 mm pulmonary nodule.    MRI liver 20:  1. Pancreatic head mass with upstream dilatation of the pancreatic  ducts and biliary system. No evidence of invasion of the adjacent  critical vascular structures as described above.  2. Pancreas divisum.  3. The lesion seen on most recent CT at segment 6 is consistent with  benign hemangioma.   3a. A second more discrete lesion is found on MRI at segment 7/8  junction at the hepatic dome. This lesion is indeterminate. The  presence of T1 slightly hyperintense signal and isointensity on  delayed phase would suggest against this being a metastatic disease,  although this is not entirely excluded. The signal characteristics of  this lesion also does not fit with hemangioma (T1 slight  hyperintensity and T2 isointensity). Recommend 3 month follow-up MRI  exam to document stability.  4. No peripancreatic worrisome lymphadenopathy.  5. No evidence of metastatic disease elsewhere in the field-of-view.      ASSESSMENT/PLAN:  Flora Hogan is a 74 year old female with:    1) Adenocarcinoma of the pancreas head: s/p Whipple procedure on 20; pathology showed pancreatic ductal adenocarcinoma, well-differentiated (grade 1), tumor size 2.6 x 2.4 x 2.0 cm, " negative margins, +LVI, 0 of 4 lymph nodes involved, staged pT2N0 (stage IB).      We reviewed the pathology results.  We will get a new post-op restaging scan in a couple of weeks, as well as labs/CA 19-9.  I recommended adjuvant chemotherapy.  With her age, I would not recommend FOLFIRINOX as adjuvant therapy.  We discussed gemcitabine or gemcitabine+capecitabine.  We decided on single-agent gemcitabine due to age and tolerability.      Gemcitabine 1000 mg/m2 IV on days 1, 8, 15  Every 4 week cycles x 6 cycles    -She just had surgery on 5/26/20, and will need some more time to heal from her Whipple surgery  -plan for CT c/a/p in the next couple of weeks  -she would like a port - will place referral  -chemotherapy teaching on gemcitabine  -possible side effects may include, but not limited to: fever/chills, nausea/vomiting, diarrhea/constipation, rash, edema, fatigue, hair loss, myelosuppression, anemia, bleeding, shortness of breath, chest pain, mucositis  -I discussed the schedule, regimen, dose, possible side effects, the benefits and risks, and patient agrees to treatment plan.  -will plan to start C1D1 on 7/7/20, with in-person clinic appointment with me        Jeannie Coronado MD  Hematology/Oncology  [facility]      Again, thank you for allowing me to participate in the care of your patient.        Sincerely,        Jeannie Coronado MD

## 2020-06-09 NOTE — PATIENT INSTRUCTIONS
Ct scheduled 9/30 9am , port placement left message to call and scheduled visit and gonzales abraham     Please see patient outreach encounter for gemzar teaching  CT scheduled 6/30  Port placement still needs to be scheduled, as of 6/10/20  Chemo scheduled 7/7, 7/14, 7/21  Appt with Dr. Coronado scheduled 7/7  Leyla Layton, RN, BSN, [facility]  Patient Care Coordinator  [facility]  [phone]

## 2020-06-09 NOTE — LETTER
"    6/9/2020         RE: Flora Hogan  [address] [facility] [address]        Dear Colleague,    Thank you for referring your patient, Flora Hogan, to the [facility]. Please see a copy of my visit note below.    Flora Hogan is a 74 year old female who is being evaluated via a billable telephone visit.      The patient has been notified of following:     \"This telephone visit will be conducted via a call between you and your physician/provider. We have found that certain health care needs can be provided without the need for a physical exam.  This service lets us provide the care you need with a short phone conversation.  If a prescription is necessary we can send it directly to your pharmacy.  If lab work is needed we can place an order for that and you can then stop by our lab to have the test done at a later time.    Telephone visits are billed at different rates depending on your insurance coverage. During this emergency period, for some insurers they may be billed the same as an in-person visit.  Please reach out to your insurance provider with any questions.    If during the course of the call the physician/provider feels a telephone visit is not appropriate, you will not be charged for this service.\"    Patient has given verbal consent for Telephone visit?  Yes    What phone number would you like to be contacted at? [phone]    How would you like to obtain your AVS? Juan Brito CMA on 6/9/2020 at 10:20 AM      Phone call duration: 28 minutes      [facility]    Hematology/Oncology New Patient Note      Today's Date: 06/09/20    Reason for Consult: pancreatic cancer      HISTORY OF PRESENT ILLNESS: Flora Hogan is a 74 year old female, who presents with adenocarcinoma of the pancreas.  She initially presented with jaundice.  She was evaluated for EUS and FNA, which revealed atypical cells, but no definitive evidence of " malignancy.  She also underwent biliary stenting for decompression of her biliary tree.  She had a second EUS done, but again revealed atypical cells.  She then saw Dr. Duarte Chaves at [facility].  She had CT scan and MRI.  MRI showed multiple benign lesions in her liver; there as one area that was indeterminate.  Her CA 19-9 was 93.  On 5/26/20, she underwent Whipple procedure.  Two nodules were visualized on the surface of the liver, which were biopsied and found to be benign on pathology.  There were also hepatic cysts seen.  Pathology showed pancreatic ductal adenocarcinoma, well-differentiated (grade 1), tumor size 2.6 x 2.4 x 2.0 cm, negative margins, +LVI, 0 of 4 lymph nodes involved, staged pT2N0 (stage IB).      Currently, patient feels well.  She says that she is healing well.  She is eating okay, still figuring out what works for her.  She tends to alternate between diarrhea and constipation.          REVIEW OF SYSTEMS:   14 point ROS was reviewed and is negative other than as noted above in HPI.       HOME MEDICATIONS:  Current Outpatient Medications   Medication Sig Dispense Refill     acetaminophen (TYLENOL) 325 MG tablet Take 1-2 tablets (325-650 mg) by mouth every 6 hours as needed for mild pain or fever 50 tablet 0     albuterol (PROAIR HFA/PROVENTIL HFA/VENTOLIN HFA) 108 (90 Base) MCG/ACT inhaler Inhale 2 puffs into the lungs every 6 hours as needed for shortness of breath / dyspnea or wheezing 1 Inhaler 0     calcium carbonate-vitamin D (CALTRATE 600+D) 600-400 MG-UNIT chewable tablet Take 1 chew tab by mouth 2 times daily  180 tablet 3     enoxaparin ANTICOAGULANT (LOVENOX) 40 MG/0.4ML syringe Inject 0.4 mLs (40 mg) Subcutaneous every 24 hours for 24 days 9.6 mL 0     loratadine (CLARITIN REDITABS) 10 MG dispersible tablet Take 10 mg by mouth as needed        magnesium 250 MG tablet Take 1 tablet by mouth daily       pantoprazole (PROTONIX) 40 MG EC tablet Take 1 tablet (40 mg) by  mouth every morning (before breakfast) 90 tablet 5     polyethylene glycol (MIRALAX) 17 g packet Take 17 g by mouth daily as needed for constipation 12 packet 0     oxyCODONE (ROXICODONE) 5 MG tablet Take 1-2 tablets (5-10 mg) by mouth every 6 hours as needed for moderate to severe pain (Patient not taking: Reported on 6/9/2020) 20 tablet 0         ALLERGIES:  Allergies   Allergen Reactions     Penicillin V      Fredy-1 [Lidocaine] Unknown         PAST MEDICAL HISTORY:  Past Medical History:   Diagnosis Date     Chest tightness     had suspected allergies     Malignant neoplasm of head of pancreas (H) 6/9/2020     Seasonal allergies      SOB (shortness of breath)          PAST SURGICAL HISTORY:  Past Surgical History:   Procedure Laterality Date     CATARACT IOL, RT/LT  2015     COLONOSCOPY  10/14    no repeat needed due to age     COLONOSCOPY N/A 10/7/2014    Procedure: COLONOSCOPY;  Surgeon: Daryl Hanson MD;  Location:  GI     COLONOSCOPY  04/23/2019    no further screening     ENDOSCOPIC RETROGRADE CHOLANGIOPANCREATOGRAM N/A 4/20/2020    Procedure: ENDOSCOPIC RETROGRADE CHOLANGIOPANCREATOGRAPHY, PLACEMENT OF PANCREATIC STENT, PLACEMENT OF BILIARY STENT;  Surgeon: Yarely Vann MD;  Location:  OR     ESOPHAGOSCOPY, GASTROSCOPY, DUODENOSCOPY (EGD), COMBINED N/A 4/20/2020    Procedure: ESOPHAGOGASTRODUODENOSCOPY, WITH FINE NEEDLE ASPIRATION BIOPSY, WITH ENDOSCOPIC ULTRASOUND GUIDANCE, Endoscopic retrograde cholangiopancreatography;  Surgeon: Yarely Vann MD;  Location:  OR     ESOPHAGOSCOPY, GASTROSCOPY, DUODENOSCOPY (EGD), COMBINED N/A 5/5/2020    Procedure: ESOPHAGOGASTRODUODENOSCOPY, WITH FINE NEEDLE ASPIRATION BIOPSY, WITH ENDOSCOPIC ULTRASOUND GUIDANCE;  Surgeon: Romina Rosario MD;  Location: [facility]     LAPAROSCOPIC BIOPSY LIVER N/A 5/22/2020    Procedure: Diagnostic Laparoscopy with intraoperative ultrasound and Liver Biopsy X2;  Surgeon: Duarte Chaves MD;  Location:  [facility] OR     WHIPPLE PROCEDURE N/A 5/22/2020    Procedure: Non Pylorus Preserving Whipple procedure;  Surgeon: Duarte Chaves MD;  Location: [facility] OR         SOCIAL HISTORY:  Social History     Socioeconomic History     Marital status:      Spouse name: Not on file     Number of children: Not on file     Years of education: Not on file     Highest education level: Not on file   Occupational History     Employer: RETIRED     Comment: previous taught special ed   Social Needs     Financial resource strain: Not on file     Food insecurity     Worry: Not on file     Inability: Not on file     Transportation needs     Medical: Not on file     Non-medical: Not on file   Tobacco Use     Smoking status: Never Smoker     Smokeless tobacco: Never Used   Substance and Sexual Activity     Alcohol use: Yes     Comment: social      Drug use: No     Sexual activity: Yes   Lifestyle     Physical activity     Days per week: Not on file     Minutes per session: Not on file     Stress: Not on file   Relationships     Social connections     Talks on phone: Not on file     Gets together: Not on file     Attends Sabianism service: Not on file     Active member of club or organization: Not on file     Attends meetings of clubs or organizations: Not on file     Relationship status: Not on file     Intimate partner violence     Fear of current or ex partner: Not on file     Emotionally abused: Not on file     Physically abused: Not on file     Forced sexual activity: Not on file   Other Topics Concern     Parent/sibling w/ CABG, MI or angioplasty before 65F 55M? Not Asked      Service Not Asked     Blood Transfusions Not Asked     Caffeine Concern Yes     Comment: 2 cups     Occupational Exposure Not Asked     Hobby Hazards Not Asked     Sleep Concern Not Asked     Stress Concern Not Asked     Weight Concern Not Asked     Special Diet No     Back Care Not Asked     Exercise Yes     Comment: walking workout      Bike Helmet Not  Asked     Seat Belt Not Asked     Self-Exams Not Asked   Social History Narrative     Not on file         FAMILY HISTORY:  Family History   Problem Relation Age of Onset     Musculoskeletal Disorder Mother         edematous legs     Obesity Mother      Musculoskeletal Disorder Maternal Grandmother         edematous legs; did have amputation     Obesity Maternal Grandmother      Myocardial Infarction Maternal Grandmother          PHYSICAL EXAM:  Vital signs:  LMP  (LMP Unknown)    Phone visit.      LABS:  CBC RESULTS:   Recent Labs   Lab Test 05/26/20  0712   WBC 4.5   RBC 3.73*   HGB 10.7*   HCT 32.8*   MCV 88   MCH 28.7   MCHC 32.6   RDW 13.0          Recent Labs   Lab Test 05/26/20  0712 05/25/20  0852    138   POTASSIUM 3.4 3.5   CHLORIDE 107 107   CO2 26 25   ANIONGAP 6 6   GLC 75 85   BUN 5* 3*   CR 0.76 0.69   CAMERON 8.4* 8.3*     Lab Results   Component Value Date    AST 41 05/26/2020     Lab Results   Component Value Date    ALT 80 05/26/2020     No results found for: BILICONJ   Lab Results   Component Value Date    BILITOTAL 1.0 05/26/2020     Lab Results   Component Value Date    ALBUMIN 2.2 05/26/2020     Lab Results   Component Value Date    PROTTOTAL 5.3 05/26/2020      Lab Results   Component Value Date    ALKPHOS 185 05/26/2020     Component      Latest Ref Rng & Units 4/19/2020 5/5/2020   Cancer Antigen 19-9      0 - 37 U/mL 109 (H) 93 (H)       PATHOLOGY:  5/22/20:  FINAL DIAGNOSIS:   A. Liver, segment 8, mass, biopsy:   - Abscess     B. Liver, segment 3 lesion, biopsy:   - Subcapsular fibrosis   - No evidence of malignancy     C. Gallbladder, cholecystectomy:   - Chronic cholecystitis changes     D. Non-pylorus preserving Whipple (pancreaticoduodenectomy):   - Pancreatic ductal adenocarcinoma, well-differentiated (grade 1)   - Tumor size: 2.6 x 2.4 x 2.0 cm   - Tumor extent: Confined to pancreas   - Margin status: Negative for invasive adenocarcinoma        - Closest margin: Uncinate  margin (7 mm)   - Surface involvement: Not identified        - Closest tumor: 1 mm from posterior pancreas   - Lymphovascular invasion: Present   - Four lymph nodes negative for metastatic carcinoma (0/4)   - Additional findings: Focal chronic pancreatitis   - Pathologic stage (AJCC 8th edition): pT2N0   - See synoptic report below     E. Jejunum, excision:   - Small intestine mucosa with no significant histologic abnormality     F. Pancreas, final margin, re-excision:   - Pancreatic parenchyma negative for malignancy     COMMENT:   The final diagnosis confirms the interpretation provided intraoperatively.     Part(s) Involved:   D: Non-pylorus preserving whipple     Synoptic Report:     SPECIMEN     Procedure:         - Pancreaticoduodenectomy (Whipple resection), partial   pancreatectomy     TUMOR     Tumor Site:         - Pancreatic head     Histologic Type:         - Ductal adenocarcinoma     Histologic Grade:         - G1: Well differentiated     Tumor Size: 2.6 cm     Tumor Extension:         - Tumor is confined to pancreas     Treatment Effect:         - No known presurgical therapy     Lymphovascular Invasion:         - Present     Perineural Invasion:         - Present     MARGINS     Margins:         - All margins are uninvolved by invasive carcinoma and high-grade         intraepithelial neoplasia       Margins Examined:           - Pancreatic neck / parenchymal           - Uncinate (retroperitoneal / superior mesenteric artery)           - Bile duct           - Proximal (gastric or duodenal)           - Distal (duodenal or jejunal)       Distance of Invasive Carcinoma from Closest Margin:           7 mm       Closest Margin:           - Uncinate (retroperitoneal / superior mesenteric artery)     LYMPH NODES     Number of Lymph Nodes Involved: 0     Number of Lymph Nodes Examined: 4     PATHOLOGIC STAGE CLASSIFICATION (PTNM, AJCC 8TH EDITION)     Primary Tumor (pT):         - pT2     Regional Lymph Nodes  "(pN):         - pN0     ADDITIONAL FINDINGS     Additional Pathologic Findings:         - Chronic pancreatitis       IMAGIN20:  1.  Occluded distal common bile duct with dilation of the intra and  extrahepatic biliary tree along with the pancreatic duct. Although  imaging is limited, the \"birds beak appearance\" of the occlusion with  surrounding 1.2 cm hypodensity in the pancreatic head is concerning  for malignancy. Gastroenterology referral recommended.  2.  0.8 cm enhancing nodule inferior right lobe of the liver is  nonspecific, but likely related to a flash filling hemangioma.  3.  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  4.  Noncalcified 3 mm pulmonary nodule.    MRI liver 20:  1. Pancreatic head mass with upstream dilatation of the pancreatic  ducts and biliary system. No evidence of invasion of the adjacent  critical vascular structures as described above.  2. Pancreas divisum.  3. The lesion seen on most recent CT at segment 6 is consistent with  benign hemangioma.   3a. A second more discrete lesion is found on MRI at segment 7/8  junction at the hepatic dome. This lesion is indeterminate. The  presence of T1 slightly hyperintense signal and isointensity on  delayed phase would suggest against this being a metastatic disease,  although this is not entirely excluded. The signal characteristics of  this lesion also does not fit with hemangioma (T1 slight  hyperintensity and T2 isointensity). Recommend 3 month follow-up MRI  exam to document stability.  4. No peripancreatic worrisome lymphadenopathy.  5. No evidence of metastatic disease elsewhere in the field-of-view.      ASSESSMENT/PLAN:  Flora Hogan is a 74 year old female with:    1) Adenocarcinoma of the pancreas head: s/p Whipple procedure on 20; pathology showed pancreatic ductal adenocarcinoma, well-differentiated (grade 1), tumor size 2.6 x 2.4 x 2.0 cm, " negative margins, +LVI, 0 of 4 lymph nodes involved, staged pT2N0 (stage IB).      We reviewed the pathology results.  We will get a new post-op restaging scan in a couple of weeks, as well as labs/CA 19-9.  I recommended adjuvant chemotherapy.  With her age, I would not recommend FOLFIRINOX as adjuvant therapy.  We discussed gemcitabine or gemcitabine+capecitabine.  We decided on single-agent gemcitabine due to age and tolerability.      Gemcitabine 1000 mg/m2 IV on days 1, 8, 15  Every 4 week cycles x 6 cycles    -She just had surgery on 5/26/20, and will need some more time to heal from her Whipple surgery  -plan for CT c/a/p in the next couple of weeks  -she would like a port - will place referral  -chemotherapy teaching on gemcitabine  -possible side effects may include, but not limited to: fever/chills, nausea/vomiting, diarrhea/constipation, rash, edema, fatigue, hair loss, myelosuppression, anemia, bleeding, shortness of breath, chest pain, mucositis  -I discussed the schedule, regimen, dose, possible side effects, the benefits and risks, and patient agrees to treatment plan.  -will plan to start C1D1 on 7/7/20, with in-person clinic appointment with me        Jeannie Coronado MD  Hematology/Oncology  [facility]      Again, thank you for allowing me to participate in the care of your patient.        Sincerely,        Jeannie Coronado MD

## 2020-06-10 ENCOUNTER — MYC MEDICAL ADVICE (OUTPATIENT)
Dept: ONCOLOGY | Facility: CLINIC | Age: 75
End: 2020-06-10

## 2020-06-10 ENCOUNTER — PATIENT OUTREACH (OUTPATIENT)
Dept: ONCOLOGY | Facility: CLINIC | Age: 75
End: 2020-06-10

## 2020-06-10 ENCOUNTER — PATIENT OUTREACH (OUTPATIENT)
Dept: SURGERY | Facility: CLINIC | Age: 75
End: 2020-06-10

## 2020-06-10 NOTE — TELEPHONE ENCOUNTER
Surgical Oncology RN Care Coordination Note:     Called and spoke with patient regarding support group details. Informed her that currently we are not meeting d/t COVID 19 but that if we are able to the group up and running virtually I will send her the information. Also informed her that we would send her the info for when we start meeting in person at the [facility] again in the future. She was appreciative of the call and had no further questions.     Nay Pace RN, BSN  Care Coordinator   [phone]

## 2020-06-10 NOTE — PROGRESS NOTES
"Writer called Flora to introduce myself and go over her schedule, and to discuss getting Gemzar information to her.     Flora reports her printer at home is broken but we can try and send the Gemzar information through My Chart.      mailed Flora the Gemzar information and port information.  has \"My Cancer Guidebook\" on my desk to give to patient when she comes in for her CT scan on 6/30.      will call the patient on 6/26 at 8:30am to discuss peatient education over the phone. Flora verbalized understanding and is in agreement with the plan. She had no further questions or concerns at this time.     Leyla Layton, RN, BSN, MAOM  Patient Care Coordinator  [facility]  [phone]            "

## 2020-06-12 NOTE — TELEPHONE ENCOUNTER
Writer emailed port information to the patient from RN Pool email account. Writer will plan to discuss port information when we discuss Gemzar education on 6/26.    Leyla Layton RN, BSN, MAOM  Patient Care Coordinator  [facility]  [phone]

## 2020-06-16 DIAGNOSIS — Z11.59 ENCOUNTER FOR SCREENING FOR OTHER VIRAL DISEASES: Primary | ICD-10-CM

## 2020-06-19 LAB
FUNGUS SPEC CULT: NORMAL
Lab: NORMAL
SPECIMEN SOURCE: NORMAL

## 2020-06-23 ENCOUNTER — DOCUMENTATION ONLY (OUTPATIENT)
Dept: OTHER | Facility: CLINIC | Age: 75
End: 2020-06-23

## 2020-06-24 ENCOUNTER — PRE VISIT (OUTPATIENT)
Dept: ONCOLOGY | Facility: CLINIC | Age: 75
End: 2020-06-24

## 2020-06-26 ENCOUNTER — PATIENT OUTREACH (OUTPATIENT)
Dept: ONCOLOGY | Facility: CLINIC | Age: 75
End: 2020-06-26

## 2020-06-26 NOTE — PROGRESS NOTES
"Writer called and spoke with Flora and her , Mitul, to discuss chemotherapy education. Writer went over the chemocare information on Gemzar, port education, and My Cancer Guidebook contents. Discussed how to prepare for chemotherapy, when to call in to the doctor, common side effects, resources available for our cancer patients, and clinic location and phone number for nurse line.     Please see education tab for details.     Flora and Mitul verbalized understanding and all questions were answered. They had no further questions or concerns at this time. Flora will  her \"My Cancer Guidebook\" on 6/30 [facility]  will have her binder at the  for her to .     Leyla Layton, RN, BSN, [facility]  Patient Care Coordinator  [facility]  [phone]      "

## 2020-06-30 ENCOUNTER — HOSPITAL ENCOUNTER (OUTPATIENT)
Dept: CT IMAGING | Facility: CLINIC | Age: 75
Discharge: HOME | End: 2020-06-30
Attending: INTERNAL MEDICINE | Admitting: INTERNAL MEDICINE
Payer: COMMERCIAL

## 2020-06-30 DIAGNOSIS — C25.0 MALIGNANT NEOPLASM OF HEAD OF PANCREAS (H): ICD-10-CM

## 2020-06-30 LAB
CREAT BLD-MCNC: 0.8 MG/DL (ref 0.52–1.04)
GFR SERPL CREATININE-BSD FRML MDRD: 70 ML/MIN/{1.73_M2}

## 2020-06-30 PROCEDURE — 82565 ASSAY OF CREATININE: CPT

## 2020-06-30 PROCEDURE — 71260 CT THORAX DX C+: CPT

## 2020-06-30 PROCEDURE — 25000128 H RX IP 250 OP 636: Performed by: INTERNAL MEDICINE

## 2020-06-30 PROCEDURE — 25000125 ZZHC RX 250: Performed by: INTERNAL MEDICINE

## 2020-06-30 RX ORDER — IOPAMIDOL 755 MG/ML
500 INJECTION, SOLUTION INTRAVASCULAR ONCE
Status: COMPLETED | OUTPATIENT
Start: 2020-06-30 | End: 2020-06-30

## 2020-06-30 RX ADMIN — IOPAMIDOL 56 ML: 755 INJECTION, SOLUTION INTRAVENOUS at 08:55

## 2020-06-30 RX ADMIN — SODIUM CHLORIDE 51 ML: 9 INJECTION, SOLUTION INTRAVENOUS at 08:55

## 2020-07-03 DIAGNOSIS — Z11.59 ENCOUNTER FOR SCREENING FOR OTHER VIRAL DISEASES: ICD-10-CM

## 2020-07-03 PROCEDURE — U0003 INFECTIOUS AGENT DETECTION BY NUCLEIC ACID (DNA OR RNA); SEVERE ACUTE RESPIRATORY SYNDROME CORONAVIRUS 2 (SARS-COV-2) (CORONAVIRUS DISEASE [COVID-19]), AMPLIFIED PROBE TECHNIQUE, MAKING USE OF HIGH THROUGHPUT TECHNOLOGIES AS DESCRIBED BY CMS-2020-01-R: HCPCS | Performed by: INTERNAL MEDICINE

## 2020-07-04 LAB
SARS-COV-2 RNA SPEC QL NAA+PROBE: NOT DETECTED
SPECIMEN SOURCE: NORMAL

## 2020-07-06 ENCOUNTER — APPOINTMENT (OUTPATIENT)
Dept: INTERVENTIONAL RADIOLOGY/VASCULAR | Facility: CLINIC | Age: 75
End: 2020-07-06
Attending: INTERNAL MEDICINE
Payer: COMMERCIAL

## 2020-07-06 ENCOUNTER — HOSPITAL ENCOUNTER (OUTPATIENT)
Facility: CLINIC | Age: 75
Discharge: HOME | End: 2020-07-06
Attending: RADIOLOGY | Admitting: RADIOLOGY
Payer: COMMERCIAL

## 2020-07-06 VITALS
SYSTOLIC BLOOD PRESSURE: 117 MMHG | TEMPERATURE: 97.9 F | RESPIRATION RATE: 13 BRPM | DIASTOLIC BLOOD PRESSURE: 76 MMHG | OXYGEN SATURATION: 95 % | HEART RATE: 50 BPM

## 2020-07-06 DIAGNOSIS — C25.0 MALIGNANT NEOPLASM OF HEAD OF PANCREAS (H): ICD-10-CM

## 2020-07-06 LAB
APTT PPP: 30 SEC (ref 22–37)
ERYTHROCYTE [DISTWIDTH] IN BLOOD BY AUTOMATED COUNT: 13.6 % (ref 10–15)
HCT VFR BLD AUTO: 42.8 % (ref 35–47)
HGB BLD-MCNC: 13.8 G/DL (ref 11.7–15.7)
INR PPP: 1.05 (ref 0.86–1.14)
MCH RBC QN AUTO: 29 PG (ref 26.5–33)
MCHC RBC AUTO-ENTMCNC: 32.2 G/DL (ref 31.5–36.5)
MCV RBC AUTO: 90 FL (ref 78–100)
PLATELET # BLD AUTO: 203 10E9/L (ref 150–450)
RBC # BLD AUTO: 4.76 10E12/L (ref 3.8–5.2)
WBC # BLD AUTO: 3.7 10E9/L (ref 4–11)

## 2020-07-06 PROCEDURE — 27210820 ZZH WOUND GLUE CR3

## 2020-07-06 PROCEDURE — 25000125 ZZHC RX 250

## 2020-07-06 PROCEDURE — 76937 US GUIDE VASCULAR ACCESS: CPT

## 2020-07-06 PROCEDURE — 27210908 ZZH NEEDLE CR4

## 2020-07-06 PROCEDURE — C1788 PORT, INDWELLING, IMP: HCPCS

## 2020-07-06 PROCEDURE — 25000125 ZZHC RX 250: Performed by: RADIOLOGY

## 2020-07-06 PROCEDURE — 85610 PROTHROMBIN TIME: CPT | Performed by: RADIOLOGY

## 2020-07-06 PROCEDURE — 85027 COMPLETE CBC AUTOMATED: CPT | Performed by: RADIOLOGY

## 2020-07-06 PROCEDURE — 36561 INSERT TUNNELED CV CATH: CPT

## 2020-07-06 PROCEDURE — 85730 THROMBOPLASTIN TIME PARTIAL: CPT | Mod: GZ | Performed by: RADIOLOGY

## 2020-07-06 PROCEDURE — 25000128 H RX IP 250 OP 636

## 2020-07-06 PROCEDURE — 77001 FLUOROGUIDE FOR VEIN DEVICE: CPT

## 2020-07-06 PROCEDURE — 99152 MOD SED SAME PHYS/QHP 5/>YRS: CPT

## 2020-07-06 RX ORDER — NICOTINE POLACRILEX 4 MG
15-30 LOZENGE BUCCAL
Status: DISCONTINUED | OUTPATIENT
Start: 2020-07-06 | End: 2020-07-06 | Stop reason: HOSPADM

## 2020-07-06 RX ORDER — HEPARIN SODIUM 1000 [USP'U]/ML
INJECTION, SOLUTION INTRAVENOUS; SUBCUTANEOUS
Status: COMPLETED
Start: 2020-07-06 | End: 2020-07-06

## 2020-07-06 RX ORDER — LIDOCAINE HYDROCHLORIDE 10 MG/ML
INJECTION, SOLUTION EPIDURAL; INFILTRATION; INTRACAUDAL; PERINEURAL
Status: COMPLETED
Start: 2020-07-06 | End: 2020-07-06

## 2020-07-06 RX ORDER — DEXTROSE MONOHYDRATE 25 G/50ML
25-50 INJECTION, SOLUTION INTRAVENOUS
Status: DISCONTINUED | OUTPATIENT
Start: 2020-07-06 | End: 2020-07-06 | Stop reason: HOSPADM

## 2020-07-06 RX ORDER — LIDOCAINE HYDROCHLORIDE AND EPINEPHRINE 10; 10 MG/ML; UG/ML
INJECTION, SOLUTION INFILTRATION; PERINEURAL
Status: DISCONTINUED
Start: 2020-07-06 | End: 2020-07-06 | Stop reason: WASHOUT

## 2020-07-06 RX ORDER — CLINDAMYCIN PHOSPHATE 900 MG/50ML
INJECTION, SOLUTION INTRAVENOUS
Status: DISCONTINUED
Start: 2020-07-06 | End: 2020-07-06 | Stop reason: HOSPADM

## 2020-07-06 RX ORDER — FENTANYL CITRATE 50 UG/ML
INJECTION, SOLUTION INTRAMUSCULAR; INTRAVENOUS
Status: COMPLETED
Start: 2020-07-06 | End: 2020-07-06

## 2020-07-06 RX ORDER — CLINDAMYCIN PHOSPHATE 900 MG/50ML
900 INJECTION, SOLUTION INTRAVENOUS
Status: COMPLETED | OUTPATIENT
Start: 2020-07-06 | End: 2020-07-06

## 2020-07-06 RX ADMIN — HEPARIN SODIUM 1000 UNITS: 1000 INJECTION INTRAVENOUS; SUBCUTANEOUS at 10:13

## 2020-07-06 RX ADMIN — CLINDAMYCIN IN 5 PERCENT DEXTROSE 900 MG: 18 INJECTION, SOLUTION INTRAVENOUS at 09:51

## 2020-07-06 RX ADMIN — FENTANYL CITRATE 50 MCG: 50 INJECTION INTRAMUSCULAR; INTRAVENOUS at 10:02

## 2020-07-06 RX ADMIN — MIDAZOLAM 1 MG: 1 INJECTION INTRAMUSCULAR; INTRAVENOUS at 10:02

## 2020-07-06 RX ADMIN — LIDOCAINE HYDROCHLORIDE 120 MG: 10 INJECTION, SOLUTION EPIDURAL; INFILTRATION; INTRACAUDAL; PERINEURAL at 10:01

## 2020-07-06 NOTE — IP AVS SNAPSHOT
MRN:[id number]                      After Visit Summary   7/6/2020    Flora Hogan    MRN: [id number]           Visit Information        Department      7/6/2020  8:49 AM [facility]          Review of your medicines      UNREVIEWED medicines. Ask your doctor about these medicines       Dose / Directions   acetaminophen 325 MG tablet  Commonly known as:  TYLENOL  Used for:  Postoperative pain      Dose:  325-650 mg  Take 1-2 tablets (325-650 mg) by mouth every 6 hours as needed for mild pain or fever  Quantity:  50 tablet  Refills:  0     albuterol 108 (90 Base) MCG/ACT inhaler  Commonly known as:  PROAIR HFA/PROVENTIL HFA/VENTOLIN HFA  Used for:  SOB (shortness of breath)      Dose:  2 puff  Inhale 2 puffs into the lungs every 6 hours as needed for shortness of breath / dyspnea or wheezing  Quantity:  1 Inhaler  Refills:  0     Caltrate 600+D 600-400 MG-UNIT chewable tablet  Generic drug:  calcium carbonate-vitamin D      Dose:  1 chew tab  Take 1 chew tab by mouth 2 times daily  Quantity:  180 tablet  Refills:  3     loratadine 10 MG ODT  Commonly known as:  CLARITIN REDITABS      Dose:  10 mg  Take 10 mg by mouth as needed  Refills:  0     magnesium 250 MG tablet      Dose:  1 tablet  Take 1 tablet by mouth daily  Refills:  0     oxyCODONE 5 MG tablet  Commonly known as:  ROXICODONE  Used for:  Postoperative pain      Dose:  5-10 mg  Take 1-2 tablets (5-10 mg) by mouth every 6 hours as needed for moderate to severe pain  Quantity:  20 tablet  Refills:  0     pantoprazole 40 MG EC tablet  Commonly known as:  PROTONIX  Used for:  Mass of pancreas      Dose:  40 mg  Take 1 tablet (40 mg) by mouth every morning (before breakfast)  Quantity:  90 tablet  Refills:  5     polyethylene glycol 17 g packet  Commonly known as:  MIRALAX  Used for:  Mass of pancreas      Dose:  17 g  Take 17 g by mouth daily as needed for constipation  Quantity:  12 packet  Refills:  0              Protect  others around you: Learn how to safely use, store and throw away your medicines at www.disposemymeds.org.       Follow-ups after your visit       Your next 10 appointments already scheduled    Jul 06, 2020 10:00 AM CDT  (Arrive by 9:00 AM)  IR CHEST PORT PLACEMENT > 5 YRS OF AGE with RHIR11, MEGAN, RH IR RAD  [facility] Interventional Radiology ([facility]) [address] [facility] [id number] [phone]   The day before the exam:    You may eat your regular diet.    You are encouraged to drink at least 8 eight ounce glasses of clear liquids.    Please wear loose clothing, such as a sweat suit or jogging clothes. Avoid snaps, zippers and other metal. We may ask you to undress and put on a hospital gown.    Please bring any scans or X-rays taken at other hospitals, if similar tests were done. Also bring a list of your medicines, including vitamins, minerals and over-the-counter drugs. It is safest to leave personal items at home.    Someone will need to drive you to and from the hospital.    Tell your doctor in advance:    If you have allergies to x-ray contrast or iodine.    If you are or may be pregnant.    If you are taking Coumadin (or any other blood thinners) 5 days prior to the exam for any special instructions.    If you are diabetic to determine if your insulin needs have to be adjusted for the exam.    Your doctor will:    Need to do a history and physical within 30 days before this procedure.    Obtain necessary laboratory tests prior to the exam (Basic Metabolic Panel, CBCP, PTT and INR)    (No labs needed if you are having a tunneled catheter exchange or removal)    If you were given sedation, you cannot drive for 24 hours after the procedure, and an adult must be with you until then.    If you have any questions, please call the Imaging Department where you will have your exam. Directions, parking instructions, and other information are available on our website,  [email or web address].     Jul 07, 2020  7:45 AM CDT  Return Visit with Jeannie Coronado MD  [facility] ([facility]) [address]  [facility] [address]  [phone]      Jul 07, 2020  8:30 AM CDT  Lab with Infusion with RH FAST TRACK PLUS 2  [facility] ([facility]) [facility]  [address]  [facility] [address]  [phone]      Jul 07, 2020  9:00 AM CDT  Level 1 with RH INFUSION CHAIR 1  [facility] ([facility]) [facility]  [address]  [facility] [address]  [phone]      Jul 14, 2020 12:00 PM CDT  Level 2 with RH INFUSION CHAIR 2  [facility] ([facility]) [facility]  [address]  [facility] [address]  [phone]      Jul 21, 2020 12:00 PM CDT  Level 2 with RH INFUSION CHAIR 12  [facility] ([facility]) [facility]  [address]  [facility] [address]  [phone]         Care Instructions       Further instructions from your care team         Going Home after Your Port Is Inserted  _______________________________________    Patient Name: Flora Hogan  Today's Date: July 6, 2020    The doctor who inserted your port was:  Dr. Jenkins at [facility]    Have your port site and/or neck wound checked on:  Next clinic appointment.       Go to:  Interventional Radiology    Your port may be accessed right away. A nurse needs to flush your port every 30 days or after each use.     When you get home:    You should have an adult with you for the first 6 hours.    No driving or drinking alcohol until tomorrow. You may still have side effects from the medicine you received  today (you may feel drowsy, unsteady or forgetful).     You may go back to your regular diet today.     If you take aspirin or Plavix, you may begin taking it again tomorrow. You may restart all other medicines today. Use pain medicine as directed.    Avoid heavy lifting or the overuse of your shoulder for three days.    Caring for the port site and/or neck wound:    Keep the port site clean and dry. Cover the site with plastic before taking a shower. Do this until the site has healed.     Keep the bandage on your port site for three days. Change it if it gets wet or dirty. After three days, you may use Band-Aids until the wound has healed.    For a neck wound, leave the tape on for three days. You may cover it with a Band-Aid for comfort.     If you have oozing or bleeding from the port site or from the cut in your neck:     Put direct pressure on the wound for 5 to 10 minutes with a gauze pad.  If you still have bleeding after 10 minutes, call your doctor.    If you are bleeding a lot and can't control it with direct pressure, call 911.    Call your doctor at  Oncology clinic if you have:    Bleeding from a wound after 10 minutes of direct pressure.    Swelling in your neck or over your port site.    Signs of infection: a fever over 100 F (37.8 C) under the tongue; the site is red, tender or draining.      Additional Information About Your Visit       MyChart Information    [facility] gives you secure access to your electronic health record. If you see a primary care provider, you can also send messages to your care team and make appointments. If you have questions, please call your primary care clinic.  If you do not have a primary care provider, please call [phone] and they will assist you.       Care EveryWhere ID    This is your Care EveryWhere ID. This could be used by other organizations to access your [facility] medical records  [id number]       Your Vitals Were  Most recent update: 7/6/2020  9:06 AM     Blood Pressure   123/81 (BP Location: Right arm)          Pulse   50          Temperature   98.2  F (36.8  C) (Temporal)          Respirations   14          Last Period    (LMP Unknown)             Pulse Oximetry   100%          Primary Care Provider Office Phone # Fax #    Natasha Braun [phone]      Equal Access to Services    ALEXANDER DELGADO : Hadii aad ku hadasho Soomaali, waaxda luqadaha, qaybta kaalmada adeegyada, waxyohana yolandein hayaan adesherry lai latimothycarey . So [facility] [phone].    ATENCIÓN: Si habla español, tiene a bashir disposición servicios gratuitos de asistencia lingüística. Llame al [phone].    We comply with applicable federal and state civil rights laws, including the [place] Human Rights Act. We do not discriminate on the basis of race, color, creed, Catholic, national origin, marital status, age, disability, sex, sexual orientation, or gender identity.       Thank you!    Thank you for choosing [facility] for your care. Our goal is always to provide you with excellent care. Hearing back from our patients is one way we can continue to improve our services. Please take a few minutes to complete the written survey that you may receive in the mail after you visit. If you would like to speak to someone directly about your visit please contact Patient Relations at [phone]. Thank you!              Medication List      ASK your doctor about these medications          Morning Afternoon Evening Bedtime As Needed    acetaminophen 325 MG tablet  Also known as:  TYLENOL  INSTRUCTIONS:  Take 1-2 tablets (325-650 mg) by mouth every 6 hours as needed for mild pain or fever                     albuterol 108 (90 Base) MCG/ACT inhaler  Also known as:  PROAIR HFA/PROVENTIL HFA/VENTOLIN HFA  INSTRUCTIONS:  Inhale 2 puffs into the lungs every 6 hours as needed for shortness of breath / dyspnea or wheezing  Doctor's comments:  Pharmacy may dispense brand covered by insurance (Proair,  or proventil or ventolin or generic albuterol inhaler)                     Caltrate 600+D 600-400 MG-UNIT chewable tablet  INSTRUCTIONS:  Take 1 chew tab by mouth 2 times daily  Generic drug:  calcium carbonate-vitamin D                     loratadine 10 MG ODT  Also known as:  CLARITIN REDITABS  INSTRUCTIONS:  Take 10 mg by mouth as needed                     magnesium 250 MG tablet  INSTRUCTIONS:  Take 1 tablet by mouth daily                     oxyCODONE 5 MG tablet  Also known as:  ROXICODONE  INSTRUCTIONS:  Take 1-2 tablets (5-10 mg) by mouth every 6 hours as needed for moderate to severe pain                     pantoprazole 40 MG EC tablet  Also known as:  PROTONIX  INSTRUCTIONS:  Take 1 tablet (40 mg) by mouth every morning (before breakfast)                     polyethylene glycol 17 g packet  Also known as:  MIRALAX  INSTRUCTIONS:  Take 17 g by mouth daily as needed for constipation

## 2020-07-06 NOTE — IP AVS SNAPSHOT
[facility]  [address]  [facility] [address]  Phone:  [phone]                                    After Visit Summary   7/6/2020    Flora Hogan    MRN: [id number]           After Visit Summary Signature Page    I have received my discharge instructions, and my questions have been answered. I have discussed any challenges I see with this plan with the nurse or doctor.    ..........................................................................................................................................  Patient/Patient Representative Signature      ..........................................................................................................................................  Patient Representative Print Name and Relationship to Patient    ..................................................               ................................................  Date                                   Time    ..........................................................................................................................................  Reviewed by Signature/Title    ...................................................              ..............................................  Date                                               Time          22EPIC Rev 08/18

## 2020-07-06 NOTE — DISCHARGE INSTRUCTIONS
Going Home after Your Port Is Inserted  _______________________________________    Patient Name: Flora Hogan  Today's Date: July 6, 2020    The doctor who inserted your port was:  Dr. Jenkins at [facility]    Have your port site and/or neck wound checked on:  Next clinic appointment.       Go to:  Interventional Radiology    Your port may be accessed right away. A nurse needs to flush your port every 30 days or after each use.     When you get home:    You should have an adult with you for the first 6 hours.    No driving or drinking alcohol until tomorrow. You may still have side effects from the medicine you received today (you may feel drowsy, unsteady or forgetful).     You may go back to your regular diet today.     If you take aspirin or Plavix, you may begin taking it again tomorrow. You may restart all other medicines today. Use pain medicine as directed.    Avoid heavy lifting or the overuse of your shoulder for three days.    Caring for the port site and/or neck wound:    Keep the port site clean and dry. Cover the site with plastic before taking a shower. Do this until the site has healed.     Keep the bandage on your port site for three days. Change it if it gets wet or dirty. After three days, you may use Band-Aids until the wound has healed.    For a neck wound, leave the tape on for three days. You may cover it with a Band-Aid for comfort.     If you have oozing or bleeding from the port site or from the cut in your neck:     Put direct pressure on the wound for 5 to 10 minutes with a gauze pad.  If you still have bleeding after 10 minutes, call your doctor.    If you are bleeding a lot and can't control it with direct pressure, call 911.    Call your doctor at  Oncology clinic if you have:    Bleeding from a wound after 10 minutes of direct pressure.    Swelling in your neck or over your port site.    Signs of infection: a fever over 100 F (37.8 C) under the tongue; the site  is red, tender or draining.

## 2020-07-07 ENCOUNTER — HOSPITAL ENCOUNTER (OUTPATIENT)
Facility: CLINIC | Age: 75
Setting detail: SPECIMEN
Discharge: HOME | End: 2020-07-07
Attending: INTERNAL MEDICINE | Admitting: INTERNAL MEDICINE
Payer: COMMERCIAL

## 2020-07-07 ENCOUNTER — INFUSION THERAPY VISIT (OUTPATIENT)
Dept: INFUSION THERAPY | Facility: CLINIC | Age: 75
End: 2020-07-07
Attending: INTERNAL MEDICINE
Payer: COMMERCIAL

## 2020-07-07 ENCOUNTER — ONCOLOGY VISIT (OUTPATIENT)
Dept: ONCOLOGY | Facility: CLINIC | Age: 75
End: 2020-07-07
Attending: INTERNAL MEDICINE
Payer: COMMERCIAL

## 2020-07-07 VITALS
RESPIRATION RATE: 16 BRPM | WEIGHT: 110.31 LBS | TEMPERATURE: 97.5 F | BODY MASS INDEX: 18.94 KG/M2 | HEART RATE: 57 BPM | SYSTOLIC BLOOD PRESSURE: 113 MMHG | OXYGEN SATURATION: 99 % | DIASTOLIC BLOOD PRESSURE: 68 MMHG

## 2020-07-07 DIAGNOSIS — C25.0 MALIGNANT NEOPLASM OF HEAD OF PANCREAS (H): Primary | ICD-10-CM

## 2020-07-07 LAB
ALBUMIN SERPL-MCNC: 3.4 G/DL (ref 3.4–5)
ALP SERPL-CCNC: 100 U/L (ref 40–150)
ALT SERPL W P-5'-P-CCNC: 29 U/L (ref 0–50)
ANION GAP SERPL CALCULATED.3IONS-SCNC: 3 MMOL/L (ref 3–14)
AST SERPL W P-5'-P-CCNC: 20 U/L (ref 0–45)
BASOPHILS # BLD AUTO: 0.1 10E9/L (ref 0–0.2)
BASOPHILS NFR BLD AUTO: 1.3 %
BILIRUB SERPL-MCNC: 0.7 MG/DL (ref 0.2–1.3)
BUN SERPL-MCNC: 14 MG/DL (ref 7–30)
CALCIUM SERPL-MCNC: 8.2 MG/DL (ref 8.5–10.1)
CHLORIDE SERPL-SCNC: 105 MMOL/L (ref 94–109)
CO2 SERPL-SCNC: 30 MMOL/L (ref 20–32)
CREAT SERPL-MCNC: 0.76 MG/DL (ref 0.52–1.04)
DIFFERENTIAL METHOD BLD: ABNORMAL
EOSINOPHIL # BLD AUTO: 0.3 10E9/L (ref 0–0.7)
EOSINOPHIL NFR BLD AUTO: 6.5 %
ERYTHROCYTE [DISTWIDTH] IN BLOOD BY AUTOMATED COUNT: 13.7 % (ref 10–15)
GFR SERPL CREATININE-BSD FRML MDRD: 76 ML/MIN/{1.73_M2}
GLUCOSE SERPL-MCNC: 77 MG/DL (ref 70–99)
HCT VFR BLD AUTO: 41.5 % (ref 35–47)
HGB BLD-MCNC: 12.8 G/DL (ref 11.7–15.7)
IMM GRANULOCYTES # BLD: 0 10E9/L (ref 0–0.4)
IMM GRANULOCYTES NFR BLD: 0.3 %
LYMPHOCYTES # BLD AUTO: 0.9 10E9/L (ref 0.8–5.3)
LYMPHOCYTES NFR BLD AUTO: 22.4 %
MAGNESIUM SERPL-MCNC: 2.3 MG/DL (ref 1.6–2.3)
MCH RBC QN AUTO: 28.3 PG (ref 26.5–33)
MCHC RBC AUTO-ENTMCNC: 30.8 G/DL (ref 31.5–36.5)
MCV RBC AUTO: 92 FL (ref 78–100)
MONOCYTES # BLD AUTO: 0.4 10E9/L (ref 0–1.3)
MONOCYTES NFR BLD AUTO: 9.3 %
NEUTROPHILS # BLD AUTO: 2.4 10E9/L (ref 1.6–8.3)
NEUTROPHILS NFR BLD AUTO: 60.2 %
NRBC # BLD AUTO: 0 10*3/UL
NRBC BLD AUTO-RTO: 0 /100
PLATELET # BLD AUTO: 199 10E9/L (ref 150–450)
POTASSIUM SERPL-SCNC: 4.1 MMOL/L (ref 3.4–5.3)
PROT SERPL-MCNC: 6.6 G/DL (ref 6.8–8.8)
RBC # BLD AUTO: 4.53 10E12/L (ref 3.8–5.2)
SODIUM SERPL-SCNC: 138 MMOL/L (ref 133–144)
WBC # BLD AUTO: 4 10E9/L (ref 4–11)

## 2020-07-07 PROCEDURE — 25800030 ZZH RX IP 258 OP 636: Performed by: INTERNAL MEDICINE

## 2020-07-07 PROCEDURE — 85025 COMPLETE CBC W/AUTO DIFF WBC: CPT | Performed by: INTERNAL MEDICINE

## 2020-07-07 PROCEDURE — G0463 HOSPITAL OUTPT CLINIC VISIT: HCPCS | Mod: 25

## 2020-07-07 PROCEDURE — 96367 TX/PROPH/DG ADDL SEQ IV INF: CPT

## 2020-07-07 PROCEDURE — 96413 CHEMO IV INFUSION 1 HR: CPT

## 2020-07-07 PROCEDURE — 25000128 H RX IP 250 OP 636: Performed by: INTERNAL MEDICINE

## 2020-07-07 PROCEDURE — 86301 IMMUNOASSAY TUMOR CA 19-9: CPT | Performed by: INTERNAL MEDICINE

## 2020-07-07 PROCEDURE — 80053 COMPREHEN METABOLIC PANEL: CPT | Performed by: INTERNAL MEDICINE

## 2020-07-07 PROCEDURE — 83735 ASSAY OF MAGNESIUM: CPT | Performed by: INTERNAL MEDICINE

## 2020-07-07 PROCEDURE — 99215 OFFICE O/P EST HI 40 MIN: CPT | Performed by: INTERNAL MEDICINE

## 2020-07-07 RX ORDER — DIPHENHYDRAMINE HYDROCHLORIDE 50 MG/ML
50 INJECTION INTRAMUSCULAR; INTRAVENOUS
Status: CANCELLED
Start: 2020-07-28

## 2020-07-07 RX ORDER — HEPARIN SODIUM (PORCINE) LOCK FLUSH IV SOLN 100 UNIT/ML 100 UNIT/ML
5 SOLUTION INTRAVENOUS
Status: DISCONTINUED | OUTPATIENT
Start: 2020-07-07 | End: 2020-07-07 | Stop reason: HOSPADM

## 2020-07-07 RX ORDER — SODIUM CHLORIDE 9 MG/ML
1000 INJECTION, SOLUTION INTRAVENOUS CONTINUOUS PRN
Status: CANCELLED
Start: 2020-07-28

## 2020-07-07 RX ORDER — HEPARIN SODIUM,PORCINE 10 UNIT/ML
5 VIAL (ML) INTRAVENOUS
Status: CANCELLED | OUTPATIENT
Start: 2020-07-14

## 2020-07-07 RX ORDER — LORAZEPAM 2 MG/ML
0.5 INJECTION INTRAMUSCULAR EVERY 4 HOURS PRN
Status: CANCELLED
Start: 2020-07-14

## 2020-07-07 RX ORDER — ALBUTEROL SULFATE 0.83 MG/ML
2.5 SOLUTION RESPIRATORY (INHALATION)
Status: CANCELLED | OUTPATIENT
Start: 2020-07-07

## 2020-07-07 RX ORDER — METHYLPREDNISOLONE SODIUM SUCCINATE 125 MG/2ML
125 INJECTION, POWDER, LYOPHILIZED, FOR SOLUTION INTRAMUSCULAR; INTRAVENOUS
Status: CANCELLED
Start: 2020-07-14

## 2020-07-07 RX ORDER — ALBUTEROL SULFATE 0.83 MG/ML
2.5 SOLUTION RESPIRATORY (INHALATION)
Status: CANCELLED | OUTPATIENT
Start: 2020-07-28

## 2020-07-07 RX ORDER — EPINEPHRINE 1 MG/ML
0.3 INJECTION, SOLUTION INTRAMUSCULAR; SUBCUTANEOUS EVERY 5 MIN PRN
Status: CANCELLED | OUTPATIENT
Start: 2020-07-14

## 2020-07-07 RX ORDER — EPINEPHRINE 1 MG/ML
0.3 INJECTION, SOLUTION INTRAMUSCULAR; SUBCUTANEOUS EVERY 5 MIN PRN
Status: CANCELLED | OUTPATIENT
Start: 2020-07-07

## 2020-07-07 RX ORDER — MEPERIDINE HYDROCHLORIDE 25 MG/ML
25 INJECTION INTRAMUSCULAR; INTRAVENOUS; SUBCUTANEOUS EVERY 30 MIN PRN
Status: CANCELLED | OUTPATIENT
Start: 2020-07-28

## 2020-07-07 RX ORDER — SODIUM CHLORIDE 9 MG/ML
1000 INJECTION, SOLUTION INTRAVENOUS CONTINUOUS PRN
Status: CANCELLED
Start: 2020-07-07

## 2020-07-07 RX ORDER — HEPARIN SODIUM (PORCINE) LOCK FLUSH IV SOLN 100 UNIT/ML 100 UNIT/ML
5 SOLUTION INTRAVENOUS
Status: CANCELLED | OUTPATIENT
Start: 2020-07-07

## 2020-07-07 RX ORDER — METHYLPREDNISOLONE SODIUM SUCCINATE 125 MG/2ML
125 INJECTION, POWDER, LYOPHILIZED, FOR SOLUTION INTRAMUSCULAR; INTRAVENOUS
Status: CANCELLED
Start: 2020-07-07

## 2020-07-07 RX ORDER — LORAZEPAM 2 MG/ML
0.5 INJECTION INTRAMUSCULAR EVERY 4 HOURS PRN
Status: CANCELLED
Start: 2020-07-07

## 2020-07-07 RX ORDER — METHYLPREDNISOLONE SODIUM SUCCINATE 125 MG/2ML
125 INJECTION, POWDER, LYOPHILIZED, FOR SOLUTION INTRAMUSCULAR; INTRAVENOUS
Status: CANCELLED
Start: 2020-07-28

## 2020-07-07 RX ORDER — NALOXONE HYDROCHLORIDE 0.4 MG/ML
.1-.4 INJECTION, SOLUTION INTRAMUSCULAR; INTRAVENOUS; SUBCUTANEOUS
Status: CANCELLED | OUTPATIENT
Start: 2020-07-07

## 2020-07-07 RX ORDER — NALOXONE HYDROCHLORIDE 0.4 MG/ML
.1-.4 INJECTION, SOLUTION INTRAMUSCULAR; INTRAVENOUS; SUBCUTANEOUS
Status: CANCELLED | OUTPATIENT
Start: 2020-07-14

## 2020-07-07 RX ORDER — ALBUTEROL SULFATE 0.83 MG/ML
2.5 SOLUTION RESPIRATORY (INHALATION)
Status: CANCELLED | OUTPATIENT
Start: 2020-07-14

## 2020-07-07 RX ORDER — LORAZEPAM 2 MG/ML
0.5 INJECTION INTRAMUSCULAR EVERY 4 HOURS PRN
Status: CANCELLED
Start: 2020-07-28

## 2020-07-07 RX ORDER — HEPARIN SODIUM,PORCINE 10 UNIT/ML
5 VIAL (ML) INTRAVENOUS
Status: CANCELLED | OUTPATIENT
Start: 2020-07-07

## 2020-07-07 RX ORDER — DIPHENHYDRAMINE HYDROCHLORIDE 50 MG/ML
50 INJECTION INTRAMUSCULAR; INTRAVENOUS
Status: CANCELLED
Start: 2020-07-14

## 2020-07-07 RX ORDER — HEPARIN SODIUM (PORCINE) LOCK FLUSH IV SOLN 100 UNIT/ML 100 UNIT/ML
5 SOLUTION INTRAVENOUS
Status: CANCELLED | OUTPATIENT
Start: 2020-07-14

## 2020-07-07 RX ORDER — ALBUTEROL SULFATE 90 UG/1
1-2 AEROSOL, METERED RESPIRATORY (INHALATION)
Status: CANCELLED
Start: 2020-07-28

## 2020-07-07 RX ORDER — HEPARIN SODIUM,PORCINE 10 UNIT/ML
5 VIAL (ML) INTRAVENOUS
Status: CANCELLED | OUTPATIENT
Start: 2020-07-28

## 2020-07-07 RX ORDER — NALOXONE HYDROCHLORIDE 0.4 MG/ML
.1-.4 INJECTION, SOLUTION INTRAMUSCULAR; INTRAVENOUS; SUBCUTANEOUS
Status: CANCELLED | OUTPATIENT
Start: 2020-07-28

## 2020-07-07 RX ORDER — ALBUTEROL SULFATE 90 UG/1
1-2 AEROSOL, METERED RESPIRATORY (INHALATION)
Status: CANCELLED
Start: 2020-07-07

## 2020-07-07 RX ORDER — EPINEPHRINE 1 MG/ML
0.3 INJECTION, SOLUTION INTRAMUSCULAR; SUBCUTANEOUS EVERY 5 MIN PRN
Status: CANCELLED | OUTPATIENT
Start: 2020-07-28

## 2020-07-07 RX ORDER — MEPERIDINE HYDROCHLORIDE 25 MG/ML
25 INJECTION INTRAMUSCULAR; INTRAVENOUS; SUBCUTANEOUS EVERY 30 MIN PRN
Status: CANCELLED | OUTPATIENT
Start: 2020-07-07

## 2020-07-07 RX ORDER — PROCHLORPERAZINE MALEATE 10 MG
10 TABLET ORAL EVERY 6 HOURS PRN
Qty: 30 TABLET | Refills: 1 | Status: SHIPPED | OUTPATIENT
Start: 2020-07-07 | End: 2022-09-09

## 2020-07-07 RX ORDER — HEPARIN SODIUM (PORCINE) LOCK FLUSH IV SOLN 100 UNIT/ML 100 UNIT/ML
5 SOLUTION INTRAVENOUS
Status: CANCELLED | OUTPATIENT
Start: 2020-07-28

## 2020-07-07 RX ORDER — SODIUM CHLORIDE 9 MG/ML
1000 INJECTION, SOLUTION INTRAVENOUS CONTINUOUS PRN
Status: CANCELLED
Start: 2020-07-14

## 2020-07-07 RX ORDER — ALBUTEROL SULFATE 90 UG/1
1-2 AEROSOL, METERED RESPIRATORY (INHALATION)
Status: CANCELLED
Start: 2020-07-14

## 2020-07-07 RX ORDER — DIPHENHYDRAMINE HYDROCHLORIDE 50 MG/ML
50 INJECTION INTRAMUSCULAR; INTRAVENOUS
Status: CANCELLED
Start: 2020-07-07

## 2020-07-07 RX ORDER — MEPERIDINE HYDROCHLORIDE 25 MG/ML
25 INJECTION INTRAMUSCULAR; INTRAVENOUS; SUBCUTANEOUS EVERY 30 MIN PRN
Status: CANCELLED | OUTPATIENT
Start: 2020-07-14

## 2020-07-07 RX ADMIN — Medication 5 ML: at 10:35

## 2020-07-07 RX ADMIN — SODIUM CHLORIDE 250 ML: 9 INJECTION, SOLUTION INTRAVENOUS at 09:29

## 2020-07-07 RX ADMIN — DEXAMETHASONE SODIUM PHOSPHATE 12 MG: 10 INJECTION, SOLUTION INTRAMUSCULAR; INTRAVENOUS at 09:29

## 2020-07-07 RX ADMIN — GEMCITABINE 1600 MG: 38 INJECTION, SOLUTION INTRAVENOUS at 09:57

## 2020-07-07 ASSESSMENT — PAIN SCALES - GENERAL: PAINLEVEL: NO PAIN (0)

## 2020-07-07 NOTE — PROGRESS NOTES
[facility]    Hematology/Oncology Established Patient Note      Today's Date: 07/07/20    Reason for Follow-up: pancreatic cancer      HISTORY OF PRESENT ILLNESS: Flora Hogan is a 74 year old female, who presents with adenocarcinoma of the pancreas.  She initially presented with jaundice.  She was evaluated for EUS and FNA, which revealed atypical cells, but no definitive evidence of malignancy.  She also underwent biliary stenting for decompression of her biliary tree.  She had a second EUS done, but again revealed atypical cells.  She then saw Dr. Duarte Chaves at [facility].  She had CT scan and MRI.  MRI showed multiple benign lesions in her liver; there as one area that was indeterminate.  Her CA 19-9 was 93.  On 5/26/20, she underwent Whipple procedure.  Two nodules were visualized on the surface of the liver, which were biopsied and found to be benign on pathology.  There were also hepatic cysts seen.  Pathology showed pancreatic ductal adenocarcinoma, well-differentiated (grade 1), tumor size 2.6 x 2.4 x 2.0 cm, negative margins, +LVI, 0 of 4 lymph nodes involved, staged pT2N0 (stage IB).      Port was placed by IR on 7/6/20.    She started adjuvant chemotherapy with single-agent gemcitabine on 7/7/20.      INTERIM HISTORY: Flora says that she is feeling well.  Her surgery site has healed well.  She is eating and drinking well.  She has chronic constipation and has a bowel movement about every 3 days.          REVIEW OF SYSTEMS:   14 point ROS was reviewed and is negative other than as noted above in HPI.       HOME MEDICATIONS:  Current Outpatient Medications   Medication Sig Dispense Refill     acetaminophen (TYLENOL) 325 MG tablet Take 1-2 tablets (325-650 mg) by mouth every 6 hours as needed for mild pain or fever 50 tablet 0     calcium carbonate-vitamin D (CALTRATE 600+D) 600-400 MG-UNIT chewable tablet Take 1 chew tab by mouth 2 times daily  180 tablet 3      magnesium 250 MG tablet Take 1 tablet by mouth daily       polyethylene glycol (MIRALAX) 17 g packet Take 17 g by mouth daily as needed for constipation 12 packet 0     albuterol (PROAIR HFA/PROVENTIL HFA/VENTOLIN HFA) 108 (90 Base) MCG/ACT inhaler Inhale 2 puffs into the lungs every 6 hours as needed for shortness of breath / dyspnea or wheezing 1 Inhaler 0     loratadine (CLARITIN REDITABS) 10 MG dispersible tablet Take 10 mg by mouth as needed        oxyCODONE (ROXICODONE) 5 MG tablet Take 1-2 tablets (5-10 mg) by mouth every 6 hours as needed for moderate to severe pain (Patient not taking: Reported on 6/9/2020) 20 tablet 0     pantoprazole (PROTONIX) 40 MG EC tablet Take 1 tablet (40 mg) by mouth every morning (before breakfast) (Patient not taking: Reported on 7/7/2020) 90 tablet 5     prochlorperazine (COMPAZINE) 10 MG tablet Take 1 tablet (10 mg) by mouth every 6 hours as needed (Nausea/Vomiting) 30 tablet 1         ALLERGIES:  Allergies   Allergen Reactions     Penicillin V      Fredy-1 [Lidocaine] Unknown         PAST MEDICAL HISTORY:  Past Medical History:   Diagnosis Date     Chest tightness     had suspected allergies     Malignant neoplasm of head of pancreas (H) 6/9/2020     Seasonal allergies      SOB (shortness of breath)          PAST SURGICAL HISTORY:  Past Surgical History:   Procedure Laterality Date     CATARACT IOL, RT/LT  2015     COLONOSCOPY  10/14    no repeat needed due to age     COLONOSCOPY N/A 10/7/2014    Procedure: COLONOSCOPY;  Surgeon: Daryl Hanson MD;  Location:  GI     COLONOSCOPY  04/23/2019    no further screening     ENDOSCOPIC RETROGRADE CHOLANGIOPANCREATOGRAM N/A 4/20/2020    Procedure: ENDOSCOPIC RETROGRADE CHOLANGIOPANCREATOGRAPHY, PLACEMENT OF PANCREATIC STENT, PLACEMENT OF BILIARY STENT;  Surgeon: Yarely Vann MD;  Location:  OR     ESOPHAGOSCOPY, GASTROSCOPY, DUODENOSCOPY (EGD), COMBINED N/A 4/20/2020    Procedure: ESOPHAGOGASTRODUODENOSCOPY, WITH FINE  NEEDLE ASPIRATION BIOPSY, WITH ENDOSCOPIC ULTRASOUND GUIDANCE, Endoscopic retrograde cholangiopancreatography;  Surgeon: Yarely Vann MD;  Location: [facility] OR     ESOPHAGOSCOPY, GASTROSCOPY, DUODENOSCOPY (EGD), COMBINED N/A 5/5/2020    Procedure: ESOPHAGOGASTRODUODENOSCOPY, WITH FINE NEEDLE ASPIRATION BIOPSY, WITH ENDOSCOPIC ULTRASOUND GUIDANCE;  Surgeon: Romina Rosario MD;  Location: [facility] GI     IR CHEST PORT PLACEMENT > 5 YRS OF AGE  7/6/2020     LAPAROSCOPIC BIOPSY LIVER N/A 5/22/2020    Procedure: Diagnostic Laparoscopy with intraoperative ultrasound and Liver Biopsy X2;  Surgeon: Duarte Chaves MD;  Location: [facility] OR     WHIPPLE PROCEDURE N/A 5/22/2020    Procedure: Non Pylorus Preserving Whipple procedure;  Surgeon: Duarte Chaves MD;  Location: [facility] OR         SOCIAL HISTORY:  Social History     Socioeconomic History     Marital status:      Spouse name: Not on file     Number of children: Not on file     Years of education: Not on file     Highest education level: Not on file   Occupational History     Employer: RETIRED     Comment: previous taught special ed   Social Needs     Financial resource strain: Not on file     Food insecurity     Worry: Not on file     Inability: Not on file     Transportation needs     Medical: Not on file     Non-medical: Not on file   Tobacco Use     Smoking status: Never Smoker     Smokeless tobacco: Never Used   Substance and Sexual Activity     Alcohol use: Yes     Comment: social      Drug use: No     Sexual activity: Yes   Lifestyle     Physical activity     Days per week: Not on file     Minutes per session: Not on file     Stress: Not on file   Relationships     Social connections     Talks on phone: Not on file     Gets together: Not on file     Attends Mosque service: Not on file     Active member of club or organization: Not on file     Attends meetings of clubs or organizations: Not on file     Relationship status: Not on file     Intimate  partner violence     Fear of current or ex partner: Not on file     Emotionally abused: Not on file     Physically abused: Not on file     Forced sexual activity: Not on file   Other Topics Concern     Parent/sibling w/ CABG, MI or angioplasty before 65F 55M? Not Asked      Service Not Asked     Blood Transfusions Not Asked     Caffeine Concern Yes     Comment: 2 cups     Occupational Exposure Not Asked     Hobby Hazards Not Asked     Sleep Concern Not Asked     Stress Concern Not Asked     Weight Concern Not Asked     Special Diet No     Back Care Not Asked     Exercise Yes     Comment: walking workout      Bike Helmet Not Asked     Seat Belt Not Asked     Self-Exams Not Asked   Social History Narrative     Not on file         FAMILY HISTORY:  Family History   Problem Relation Age of Onset     Musculoskeletal Disorder Mother         edematous legs     Obesity Mother      Musculoskeletal Disorder Maternal Grandmother         edematous legs; did have amputation     Obesity Maternal Grandmother      Myocardial Infarction Maternal Grandmother          PHYSICAL EXAM:  Vital signs:  /68   Pulse 57   Temp 97.5  F (36.4  C) (Tympanic)   Resp 16   Wt 50 kg (110 lb 5 oz)   LMP  (LMP Unknown)   SpO2 99%   BMI 18.94 kg/m     ECO  GENERAL/CONSTITUTIONAL: No acute distress.  EYES: No scleral icterus.  RESPIRATORY: Clear to auscultation bilaterally. No crackles or wheezing.   CARDIOVASCULAR: Regular rate and rhythm without murmurs, gallops, or rubs.  GASTROINTESTINAL: No tenderness. The patient has normal bowel sounds. No guarding.  No distention. Surgical scar healed.  MUSCULOSKELETAL: Warm and well-perfused, no cyanosis, clubbing, or edema.  NEUROLOGIC: Alert, oriented, answers questions appropriately.  INTEGUMENTARY: No jaundice.  GAIT: Steady, does not use assistive device  Port in place.        LABS:  CBC RESULTS:   Recent Labs   Lab Test 20  0854   WBC 4.0   RBC 4.53   HGB 12.8   HCT 41.5    MCV 92   MCH 28.3   MCHC 30.8*   RDW 13.7        Recent Labs   Lab Test 07/07/20  0854 05/26/20  0712    138   POTASSIUM 4.1 3.4   CHLORIDE 105 107   CO2 30 26   ANIONGAP 3 6   GLC 77 75   BUN 14 5*   CR 0.76 0.76   CAMERON 8.2* 8.4*     Lab Results   Component Value Date    AST 20 07/07/2020     Lab Results   Component Value Date    ALT 29 07/07/2020     No results found for: BILICONJ   Lab Results   Component Value Date    BILITOTAL 0.7 07/07/2020     Lab Results   Component Value Date    ALBUMIN 3.4 07/07/2020     Lab Results   Component Value Date    PROTTOTAL 6.6 07/07/2020      Lab Results   Component Value Date    ALKPHOS 100 07/07/2020         Component      Latest Ref Rng & Units 4/19/2020 5/5/2020   Cancer Antigen 19-9      0 - 37 U/mL 109 (H) 93 (H)         IMAGING:  CT c/a/p 6/30/20:  1.  Interval postoperative changes of Whipple. Mild soft tissue  thickening at the operative bed is likely postoperative.  2.  No convincing evidence of recurrence or metastatic disease.  3.  Stable 3 mm left lower lobe pulmonary nodule.  4.  Large stool volume throughout the colon.      ASSESSMENT/PLAN:  Flora Hogan is a 74 year old female with:    1) Adenocarcinoma of the pancreas head: s/p Whipple procedure on 5/26/20; pathology showed pancreatic ductal adenocarcinoma, well-differentiated (grade 1), tumor size 2.6 x 2.4 x 2.0 cm, negative margins, +LVI, 0 of 4 lymph nodes involved, staged pT2N0 (stage IB).      Post-op restaging scan shows post-operative changes with no convincing evidence of recurrence or metastatic disease.      We had previously discussed various options for adjuvant chemotherapy.  We decided on single-agent gemcitabine.      Gemcitabine 1000 mg/m2 IV on days 1, 8, 15  Every 4 week cycles x 6 cycles    -C1D1 of chemotherapy today.  C1D8 on 7/14/20.  C1D15 on 7/21/20.  -appointment with Lacey on 7/21/20 for toxicity check.  -appointment with me on 8/4/20, with C2D1 of chemotherapy  same day  -will repeat scans after adjuvant chemotherapy is completed    2) Pulmonary nodule: stable 3 mm left lower lobe pulmonary nodule  -will monitor on future scans    3) Chronic constipation: Patient says that this has been an issue for her for many years.  She controls it with diet, and she does have stool softeners at home as needed.      4) Chronic leg cramping: She has had this for many years.  She takes magnesium at home.    -will check magnesium level today      I spent a total of 40 minutes with the patient, with over >50% of the time in counseling and/or coordination of care.       Jeannie Coronado MD  Hematology/Oncology  [facility]

## 2020-07-07 NOTE — NURSING NOTE
"Oncology Rooming Note    July 7, 2020 8:08 AM   Flora Hogan is a 74 year old female who presents for:    Chief Complaint   Patient presents with     Oncology Clinic Visit     Malignant neoplasm of head of pancreas     Initial Vitals: /68   Pulse 57   Temp 97.5  F (36.4  C) (Tympanic)   Resp 16   Wt 50 kg (110 lb 5 oz)   LMP  (LMP Unknown)   SpO2 99%   BMI 18.94 kg/m   Estimated body mass index is 18.94 kg/m  as calculated from the following:    Height as of 5/22/20: 1.626 m (5' 4\").    Weight as of this encounter: 50 kg (110 lb 5 oz). Body surface area is 1.5 meters squared.  No Pain (0) Comment: Data Unavailable   No LMP recorded (lmp unknown). Patient is postmenopausal.  Allergies reviewed: Yes  Medications reviewed: Yes    Medications: Medication refills not needed today.  Pharmacy name entered into [employer]:    [id number] - [place], [place] - [address] [facility] [id number] [place], [place] - [address] [facility] AT [place] [id number] & [facility] [id number] [facility]    Clinical concerns: follow up -       Angi Walker CMA              "

## 2020-07-07 NOTE — PROGRESS NOTES
Infusion Nursing Note:  Flora Hogan presents today for port labs.    Patient seen by provider today: No   present during visit today: Not Applicable.    Note: N/A.    Intravenous Access:  Lab draw site port, Needle type port, Gauge 20 3/4in.  Labs drawn without difficulty.  Implanted Port.    Treatment Conditions:  Not Applicable.      Post Infusion Assessment:  Patient tolerated procedure without incident.  Site patent and intact, free from redness, edema or discomfort.  No evidence of extravasations.       Discharge Plan:   To infusion for treatment.    POONAM GALICIA RN

## 2020-07-07 NOTE — LETTER
7/7/2020         RE: Flora Hogan  [id number]  [place] [address]        Dear Colleague,    Thank you for referring your patient, Flora Hogan, to the [facility]. Please see a copy of my visit note below.    [facility]    Hematology/Oncology Established Patient Note      Today's Date: 07/07/20    Reason for Follow-up: pancreatic cancer      HISTORY OF PRESENT ILLNESS: Flora Hogan is a 74 year old female, who presents with adenocarcinoma of the pancreas.  She initially presented with jaundice.  She was evaluated for EUS and FNA, which revealed atypical cells, but no definitive evidence of malignancy.  She also underwent biliary stenting for decompression of her biliary tree.  She had a second EUS done, but again revealed atypical cells.  She then saw Dr. Duarte Chaves at [facility].  She had CT scan and MRI.  MRI showed multiple benign lesions in her liver; there as one area that was indeterminate.  Her CA 19-9 was 93.  On 5/26/20, she underwent Whipple procedure.  Two nodules were visualized on the surface of the liver, which were biopsied and found to be benign on pathology.  There were also hepatic cysts seen.  Pathology showed pancreatic ductal adenocarcinoma, well-differentiated (grade 1), tumor size 2.6 x 2.4 x 2.0 cm, negative margins, +LVI, 0 of 4 lymph nodes involved, staged pT2N0 (stage IB).      Port was placed by IR on 7/6/20.    She started adjuvant chemotherapy with single-agent gemcitabine on 7/7/20.      INTERIM HISTORY: Flora says that she is feeling well.  Her surgery site has healed well.  She is eating and drinking well.  She has chronic constipation and has a bowel movement about every 3 days.          REVIEW OF SYSTEMS:   14 point ROS was reviewed and is negative other than as noted above in HPI.       HOME MEDICATIONS:  Current Outpatient Medications   Medication Sig Dispense Refill     acetaminophen (TYLENOL) 325  MG tablet Take 1-2 tablets (325-650 mg) by mouth every 6 hours as needed for mild pain or fever 50 tablet 0     calcium carbonate-vitamin D (CALTRATE 600+D) 600-400 MG-UNIT chewable tablet Take 1 chew tab by mouth 2 times daily  180 tablet 3     magnesium 250 MG tablet Take 1 tablet by mouth daily       polyethylene glycol (MIRALAX) 17 g packet Take 17 g by mouth daily as needed for constipation 12 packet 0     albuterol (PROAIR HFA/PROVENTIL HFA/VENTOLIN HFA) 108 (90 Base) MCG/ACT inhaler Inhale 2 puffs into the lungs every 6 hours as needed for shortness of breath / dyspnea or wheezing 1 Inhaler 0     loratadine (CLARITIN REDITABS) 10 MG dispersible tablet Take 10 mg by mouth as needed        oxyCODONE (ROXICODONE) 5 MG tablet Take 1-2 tablets (5-10 mg) by mouth every 6 hours as needed for moderate to severe pain (Patient not taking: Reported on 6/9/2020) 20 tablet 0     pantoprazole (PROTONIX) 40 MG EC tablet Take 1 tablet (40 mg) by mouth every morning (before breakfast) (Patient not taking: Reported on 7/7/2020) 90 tablet 5     prochlorperazine (COMPAZINE) 10 MG tablet Take 1 tablet (10 mg) by mouth every 6 hours as needed (Nausea/Vomiting) 30 tablet 1         ALLERGIES:  Allergies   Allergen Reactions     Penicillin V      Fredy-1 [Lidocaine] Unknown         PAST MEDICAL HISTORY:  Past Medical History:   Diagnosis Date     Chest tightness     had suspected allergies     Malignant neoplasm of head of pancreas (H) 6/9/2020     Seasonal allergies      SOB (shortness of breath)          PAST SURGICAL HISTORY:  Past Surgical History:   Procedure Laterality Date     CATARACT IOL, RT/LT  2015     COLONOSCOPY  10/14    no repeat needed due to age     COLONOSCOPY N/A 10/7/2014    Procedure: COLONOSCOPY;  Surgeon: Daryl Hanson MD;  Location:  GI     COLONOSCOPY  04/23/2019    no further screening     ENDOSCOPIC RETROGRADE CHOLANGIOPANCREATOGRAM N/A 4/20/2020    Procedure: ENDOSCOPIC RETROGRADE  CHOLANGIOPANCREATOGRAPHY, PLACEMENT OF PANCREATIC STENT, PLACEMENT OF BILIARY STENT;  Surgeon: Yarely Vann MD;  Location: [facility] OR     ESOPHAGOSCOPY, GASTROSCOPY, DUODENOSCOPY (EGD), COMBINED N/A 4/20/2020    Procedure: ESOPHAGOGASTRODUODENOSCOPY, WITH FINE NEEDLE ASPIRATION BIOPSY, WITH ENDOSCOPIC ULTRASOUND GUIDANCE, Endoscopic retrograde cholangiopancreatography;  Surgeon: Yarely Vann MD;  Location: [facility] OR     ESOPHAGOSCOPY, GASTROSCOPY, DUODENOSCOPY (EGD), COMBINED N/A 5/5/2020    Procedure: ESOPHAGOGASTRODUODENOSCOPY, WITH FINE NEEDLE ASPIRATION BIOPSY, WITH ENDOSCOPIC ULTRASOUND GUIDANCE;  Surgeon: Romina Rosario MD;  Location:  GI     IR CHEST PORT PLACEMENT > 5 YRS OF AGE  7/6/2020     LAPAROSCOPIC BIOPSY LIVER N/A 5/22/2020    Procedure: Diagnostic Laparoscopy with intraoperative ultrasound and Liver Biopsy X2;  Surgeon: Duarte Chaves MD;  Location: [facility] OR     WHIPPLE PROCEDURE N/A 5/22/2020    Procedure: Non Pylorus Preserving Whipple procedure;  Surgeon: Duarte Chaves MD;  Location: [facility] OR         SOCIAL HISTORY:  Social History     Socioeconomic History     Marital status:      Spouse name: Not on file     Number of children: Not on file     Years of education: Not on file     Highest education level: Not on file   Occupational History     Employer: RETIRED     Comment: previous taught special ed   Social Needs     Financial resource strain: Not on file     Food insecurity     Worry: Not on file     Inability: Not on file     Transportation needs     Medical: Not on file     Non-medical: Not on file   Tobacco Use     Smoking status: Never Smoker     Smokeless tobacco: Never Used   Substance and Sexual Activity     Alcohol use: Yes     Comment: social      Drug use: No     Sexual activity: Yes   Lifestyle     Physical activity     Days per week: Not on file     Minutes per session: Not on file     Stress: Not on file   Relationships     Social connections      Talks on phone: Not on file     Gets together: Not on file     Attends Voodoo service: Not on file     Active member of club or organization: Not on file     Attends meetings of clubs or organizations: Not on file     Relationship status: Not on file     Intimate partner violence     Fear of current or ex partner: Not on file     Emotionally abused: Not on file     Physically abused: Not on file     Forced sexual activity: Not on file   Other Topics Concern     Parent/sibling w/ CABG, MI or angioplasty before 65F 55M? Not Asked      Service Not Asked     Blood Transfusions Not Asked     Caffeine Concern Yes     Comment: 2 cups     Occupational Exposure Not Asked     Hobby Hazards Not Asked     Sleep Concern Not Asked     Stress Concern Not Asked     Weight Concern Not Asked     Special Diet No     Back Care Not Asked     Exercise Yes     Comment: walking workout      Bike Helmet Not Asked     Seat Belt Not Asked     Self-Exams Not Asked   Social History Narrative     Not on file         FAMILY HISTORY:  Family History   Problem Relation Age of Onset     Musculoskeletal Disorder Mother         edematous legs     Obesity Mother      Musculoskeletal Disorder Maternal Grandmother         edematous legs; did have amputation     Obesity Maternal Grandmother      Myocardial Infarction Maternal Grandmother          PHYSICAL EXAM:  Vital signs:  /68   Pulse 57   Temp 97.5  F (36.4  C) (Tympanic)   Resp 16   Wt 50 kg (110 lb 5 oz)   LMP  (LMP Unknown)   SpO2 99%   BMI 18.94 kg/m     ECO  GENERAL/CONSTITUTIONAL: No acute distress.  EYES: No scleral icterus.  RESPIRATORY: Clear to auscultation bilaterally. No crackles or wheezing.   CARDIOVASCULAR: Regular rate and rhythm without murmurs, gallops, or rubs.  GASTROINTESTINAL: No tenderness. The patient has normal bowel sounds. No guarding.  No distention. Surgical scar healed.  MUSCULOSKELETAL: Warm and well-perfused, no cyanosis, clubbing, or  edema.  NEUROLOGIC: Alert, oriented, answers questions appropriately.  INTEGUMENTARY: No jaundice.  GAIT: Steady, does not use assistive device  Port in place.        LABS:  CBC RESULTS:   Recent Labs   Lab Test 07/07/20  0854   WBC 4.0   RBC 4.53   HGB 12.8   HCT 41.5   MCV 92   MCH 28.3   MCHC 30.8*   RDW 13.7        Recent Labs   Lab Test 07/07/20  0854 05/26/20  0712    138   POTASSIUM 4.1 3.4   CHLORIDE 105 107   CO2 30 26   ANIONGAP 3 6   GLC 77 75   BUN 14 5*   CR 0.76 0.76   CAMERON 8.2* 8.4*     Lab Results   Component Value Date    AST 20 07/07/2020     Lab Results   Component Value Date    ALT 29 07/07/2020     No results found for: BILICONJ   Lab Results   Component Value Date    BILITOTAL 0.7 07/07/2020     Lab Results   Component Value Date    ALBUMIN 3.4 07/07/2020     Lab Results   Component Value Date    PROTTOTAL 6.6 07/07/2020      Lab Results   Component Value Date    ALKPHOS 100 07/07/2020         Component      Latest Ref Rng & Units 4/19/2020 5/5/2020   Cancer Antigen 19-9      0 - 37 U/mL 109 (H) 93 (H)         IMAGING:  CT c/a/p 6/30/20:  1.  Interval postoperative changes of Whipple. Mild soft tissue  thickening at the operative bed is likely postoperative.  2.  No convincing evidence of recurrence or metastatic disease.  3.  Stable 3 mm left lower lobe pulmonary nodule.  4.  Large stool volume throughout the colon.      ASSESSMENT/PLAN:  Flora Hogan is a 74 year old female with:    1) Adenocarcinoma of the pancreas head: s/p Whipple procedure on 5/26/20; pathology showed pancreatic ductal adenocarcinoma, well-differentiated (grade 1), tumor size 2.6 x 2.4 x 2.0 cm, negative margins, +LVI, 0 of 4 lymph nodes involved, staged pT2N0 (stage IB).      Post-op restaging scan shows post-operative changes with no convincing evidence of recurrence or metastatic disease.      We had previously discussed various options for adjuvant chemotherapy.  We decided on single-agent  gemcitabine.      Gemcitabine 1000 mg/m2 IV on days 1, 8, 15  Every 4 week cycles x 6 cycles    -C1D1 of chemotherapy today.  C1D8 on 7/14/20.  C1D15 on 7/21/20.  -appointment with Laecy on 7/21/20 for toxicity check.  -appointment with me on 8/4/20, with C2D1 of chemotherapy same day  -will repeat scans after adjuvant chemotherapy is completed    2) Pulmonary nodule: stable 3 mm left lower lobe pulmonary nodule  -will monitor on future scans    3) Chronic constipation: Patient says that this has been an issue for her for many years.  She controls it with diet, and she does have stool softeners at home as needed.      4) Chronic leg cramping: She has had this for many years.  She takes magnesium at home.    -will check magnesium level today      I spent a total of 40 minutes with the patient, with over >50% of the time in counseling and/or coordination of care.       Jeannie Coronado MD  Hematology/Oncology  [facility]    Again, thank you for allowing me to participate in the care of your patient.        Sincerely,        Jeannie Coronado MD

## 2020-07-08 ENCOUNTER — PATIENT OUTREACH (OUTPATIENT)
Dept: ONCOLOGY | Facility: CLINIC | Age: 75
End: 2020-07-08

## 2020-07-08 LAB — CANCER AG19-9 SERPL-ACNC: 7 U/ML (ref 0–37)

## 2020-07-08 NOTE — PATIENT INSTRUCTIONS
Gemzar infusion scheduled 7/14, 7/21, 8/4  Appt with Lacey scheduled 7/21  Appt with Dr. Coronado (in-person) scheduled 8/4  Leyla Layton, RN, BSN, [facility]  Patient Care Coordinator  [facility]  [phone]

## 2020-07-08 NOTE — PROGRESS NOTES
Flora received C1D1 of Gemzar yesterday. Writer called Flora to follow up on patient symptoms, s/p chemo.     Writer called and spoke with Flora. Flora denies any symptoms at this time.     Writer verified Flora has the clinic nurse line number. Flora understands she should called in to clinic if she has any new symptoms or concerns. She had no further questions or concerns at this time.    Leyla Layton RN, BSN, MAOM  Patient Care Coordinator  [facility]  [phone]

## 2020-07-14 ENCOUNTER — HOSPITAL ENCOUNTER (OUTPATIENT)
Facility: CLINIC | Age: 75
Setting detail: SPECIMEN
Discharge: HOME | End: 2020-07-14
Attending: INTERNAL MEDICINE | Admitting: INTERNAL MEDICINE
Payer: COMMERCIAL

## 2020-07-14 ENCOUNTER — INFUSION THERAPY VISIT (OUTPATIENT)
Dept: INFUSION THERAPY | Facility: CLINIC | Age: 75
End: 2020-07-14
Attending: INTERNAL MEDICINE
Payer: COMMERCIAL

## 2020-07-14 VITALS
DIASTOLIC BLOOD PRESSURE: 66 MMHG | TEMPERATURE: 98.3 F | OXYGEN SATURATION: 99 % | SYSTOLIC BLOOD PRESSURE: 109 MMHG | RESPIRATION RATE: 16 BRPM | WEIGHT: 111 LBS | BODY MASS INDEX: 19.05 KG/M2 | HEART RATE: 61 BPM

## 2020-07-14 DIAGNOSIS — C25.0 MALIGNANT NEOPLASM OF HEAD OF PANCREAS (H): Primary | ICD-10-CM

## 2020-07-14 LAB
BASOPHILS # BLD AUTO: 0 10E9/L (ref 0–0.2)
BASOPHILS NFR BLD AUTO: 1.6 %
DIFFERENTIAL METHOD BLD: ABNORMAL
EOSINOPHIL # BLD AUTO: 0 10E9/L (ref 0–0.7)
EOSINOPHIL NFR BLD AUTO: 2.2 %
ERYTHROCYTE [DISTWIDTH] IN BLOOD BY AUTOMATED COUNT: 13.2 % (ref 10–15)
HCT VFR BLD AUTO: 40.3 % (ref 35–47)
HGB BLD-MCNC: 12.8 G/DL (ref 11.7–15.7)
IMM GRANULOCYTES # BLD: 0 10E9/L (ref 0–0.4)
IMM GRANULOCYTES NFR BLD: 0 %
LYMPHOCYTES # BLD AUTO: 1.1 10E9/L (ref 0.8–5.3)
LYMPHOCYTES NFR BLD AUTO: 58.8 %
MCH RBC QN AUTO: 28.6 PG (ref 26.5–33)
MCHC RBC AUTO-ENTMCNC: 31.8 G/DL (ref 31.5–36.5)
MCV RBC AUTO: 90 FL (ref 78–100)
MONOCYTES # BLD AUTO: 0.2 10E9/L (ref 0–1.3)
MONOCYTES NFR BLD AUTO: 8.8 %
NEUTROPHILS # BLD AUTO: 0.5 10E9/L (ref 1.6–8.3)
NEUTROPHILS NFR BLD AUTO: 28.6 %
NRBC # BLD AUTO: 0 10*3/UL
NRBC BLD AUTO-RTO: 0 /100
PLATELET # BLD AUTO: 151 10E9/L (ref 150–450)
RBC # BLD AUTO: 4.47 10E12/L (ref 3.8–5.2)
WBC # BLD AUTO: 1.8 10E9/L (ref 4–11)

## 2020-07-14 PROCEDURE — 36591 DRAW BLOOD OFF VENOUS DEVICE: CPT

## 2020-07-14 PROCEDURE — 36415 COLL VENOUS BLD VENIPUNCTURE: CPT | Performed by: INTERNAL MEDICINE

## 2020-07-14 PROCEDURE — 99207 ZZC NO CHARGE NURSE ONLY: CPT

## 2020-07-14 PROCEDURE — 85025 COMPLETE CBC W/AUTO DIFF WBC: CPT | Performed by: INTERNAL MEDICINE

## 2020-07-14 PROCEDURE — 25000128 H RX IP 250 OP 636: Performed by: INTERNAL MEDICINE

## 2020-07-14 RX ORDER — HEPARIN SODIUM (PORCINE) LOCK FLUSH IV SOLN 100 UNIT/ML 100 UNIT/ML
500 SOLUTION INTRAVENOUS ONCE
Status: COMPLETED | OUTPATIENT
Start: 2020-07-14 | End: 2020-07-14

## 2020-07-14 RX ADMIN — Medication 500 UNITS: at 13:40

## 2020-07-14 NOTE — PROGRESS NOTES
Infusion Nursing Note:  Flora Hogan presents today for Cycle 1, Day 8 Gemzar.    Patient seen by provider today: No   present during visit today: Not Applicable.    Note:  Patient denies fevers, chills or signs of infection.  Patient reports is feeling well and offers no new issues or complaints today.    Intravenous Access:  Labs drawn without difficulty.  Implanted Port.  Port site C/D/I.  Port flushes well + excellent blood return.    Treatment Conditions:  Lab Results   Component Value Date    HGB 12.8 07/14/2020     Lab Results   Component Value Date    WBC 1.8 07/14/2020      Lab Results   Component Value Date    ANEU 0.5 07/14/2020     Lab Results   Component Value Date     07/14/2020      Results reviewed, labs did NOT meet treatment parameters: Please see order below per Dr. Coronado.    Jeannie Coronado MD Burns, Kadi CHA, RN               Hold treatment today and defer to next week.  No Neupogen today.  Repeat CBC/diff prior to appointment on 7/21.      Writer reviewed plan with patient who verbalized understanding of plan.    Post Infusion Assessment: N/A    Discharge Plan:   Discharge instructions reviewed with: Patient.  -Neutropenic Precautions reviewed and added to to AVS today.  -Writer reviewed with patient to contact our clinic immediately if develops fever of 100.5 or greater, shaking chills (with or without fever), signs of infection or any other problems or concerns.  Patient confirms that she has a thermometer at home.  Patient and/or family verbalized understanding of discharge instructions and all questions answered.  Copy of AVS reviewed with patient and/or family.    Departure Mode: Ambulatory.  7/21/20: Labs + Cycle 1, Day 8 Gemzar.  Writer placed request on spindle on scheduling to schedule the following appointments:  7/28/20: Labs + Cycle 1, Day 15 Gemzar. Cancel appointments on 8/4/20 (Labs, appointment with Dr. Coronado + infusion. This will be patient's off  week). 8/11/20: Labs, appointment with Dr. Coronado + Cycle 2, Day 1 Gemzar.    Kadi Teixeira RN, BSN, OCN on 7/14/2020 at 3:05 PM

## 2020-07-14 NOTE — PATIENT INSTRUCTIONS
Flora,    Your chemotherapy is being held today (7/14) because your Neutrophil count is too low.  Neutrophils are a type of white blood cell which helps your body fight off infections (bacteria/virus).    Please practice the following guidelines:  -Good handwashing  -Avoid large crowds  -Avoid sick people  -Call our clinic if fever of 100.5 or higher OR shaking chills (with or without fever) OR any sign of infection  -Wash fruits and vegetable well  -Good personal hygeine    Please contact our clinic with any questions or concerns.    Kadi Teixeira, RN, BSN, OCN on 7/14/2020 at 1:24 PM

## 2020-07-15 ENCOUNTER — PATIENT OUTREACH (OUTPATIENT)
Dept: ONCOLOGY | Facility: CLINIC | Age: 75
End: 2020-07-15

## 2020-07-15 DIAGNOSIS — C25.0 MALIGNANT NEOPLASM OF HEAD OF PANCREAS (H): Primary | ICD-10-CM

## 2020-07-15 NOTE — PROGRESS NOTES
Patient called to ask about an appointment with the Dietician. She would like to further discuss protein intake. After review of the chart an order was entered and the request was made for scheduling to set up the appointment. Sandi was also informed.Vikki Cam RN on 7/15/2020 at 11:28 AM

## 2020-07-16 NOTE — PROGRESS NOTES
Flora had called clinic this morning at 8:19am and left a voicemail. She is wondering where we are with scheduling her appointment with the nutritionist.    Writer called Flora back and there was no answer. Writer left voicemail explained we have placed the referral and the schedulers will call her to schedule the appt with our nutritionist.     Writer instructed Flora to call back to clinic if she has any further questions or concerns.     Leyla Layton RN, BSN, [facility]  Patient Care Coordinator  [facility]  [phone]

## 2020-07-21 ENCOUNTER — INFUSION THERAPY VISIT (OUTPATIENT)
Dept: INFUSION THERAPY | Facility: CLINIC | Age: 75
End: 2020-07-21
Attending: INTERNAL MEDICINE
Payer: COMMERCIAL

## 2020-07-21 ENCOUNTER — VIRTUAL VISIT (OUTPATIENT)
Dept: ONCOLOGY | Facility: CLINIC | Age: 75
End: 2020-07-21
Attending: PHYSICIAN ASSISTANT
Payer: COMMERCIAL

## 2020-07-21 ENCOUNTER — HOSPITAL ENCOUNTER (OUTPATIENT)
Facility: CLINIC | Age: 75
Setting detail: SPECIMEN
Discharge: HOME | End: 2020-07-21
Attending: INTERNAL MEDICINE | Admitting: PHYSICIAN ASSISTANT
Payer: COMMERCIAL

## 2020-07-21 VITALS
RESPIRATION RATE: 16 BRPM | DIASTOLIC BLOOD PRESSURE: 65 MMHG | SYSTOLIC BLOOD PRESSURE: 105 MMHG | TEMPERATURE: 98 F | HEART RATE: 60 BPM | WEIGHT: 110.2 LBS | BODY MASS INDEX: 18.92 KG/M2

## 2020-07-21 DIAGNOSIS — C25.0 MALIGNANT NEOPLASM OF HEAD OF PANCREAS (H): Primary | ICD-10-CM

## 2020-07-21 LAB
BASOPHILS # BLD AUTO: 0 10E9/L (ref 0–0.2)
BASOPHILS NFR BLD AUTO: 1 %
DIFFERENTIAL METHOD BLD: NORMAL
EOSINOPHIL # BLD AUTO: 0.1 10E9/L (ref 0–0.7)
EOSINOPHIL NFR BLD AUTO: 3 %
ERYTHROCYTE [DISTWIDTH] IN BLOOD BY AUTOMATED COUNT: 13.6 % (ref 10–15)
HCT VFR BLD AUTO: 39.4 % (ref 35–47)
HGB BLD-MCNC: 12.8 G/DL (ref 11.7–15.7)
IMM GRANULOCYTES # BLD: 0 10E9/L (ref 0–0.4)
IMM GRANULOCYTES NFR BLD: 0.2 %
LYMPHOCYTES # BLD AUTO: 1.1 10E9/L (ref 0.8–5.3)
LYMPHOCYTES NFR BLD AUTO: 27.5 %
MCH RBC QN AUTO: 29 PG (ref 26.5–33)
MCHC RBC AUTO-ENTMCNC: 32.5 G/DL (ref 31.5–36.5)
MCV RBC AUTO: 89 FL (ref 78–100)
MONOCYTES # BLD AUTO: 0.3 10E9/L (ref 0–1.3)
MONOCYTES NFR BLD AUTO: 7.2 %
NEUTROPHILS # BLD AUTO: 2.5 10E9/L (ref 1.6–8.3)
NEUTROPHILS NFR BLD AUTO: 61.1 %
NRBC # BLD AUTO: 0 10*3/UL
NRBC BLD AUTO-RTO: 0 /100
PLATELET # BLD AUTO: 171 10E9/L (ref 150–450)
RBC # BLD AUTO: 4.41 10E12/L (ref 3.8–5.2)
WBC # BLD AUTO: 4 10E9/L (ref 4–11)

## 2020-07-21 PROCEDURE — 40001009 ZZH VIDEO/TELEPHONE VISIT; NO CHARGE

## 2020-07-21 PROCEDURE — 25000128 H RX IP 250 OP 636: Performed by: PHYSICIAN ASSISTANT

## 2020-07-21 PROCEDURE — 25800030 ZZH RX IP 258 OP 636: Performed by: PHYSICIAN ASSISTANT

## 2020-07-21 PROCEDURE — 96367 TX/PROPH/DG ADDL SEQ IV INF: CPT

## 2020-07-21 PROCEDURE — 96365 THER/PROPH/DIAG IV INF INIT: CPT

## 2020-07-21 PROCEDURE — 99214 OFFICE O/P EST MOD 30 MIN: CPT | Mod: 95 | Performed by: PHYSICIAN ASSISTANT

## 2020-07-21 PROCEDURE — 96413 CHEMO IV INFUSION 1 HR: CPT

## 2020-07-21 PROCEDURE — 85025 COMPLETE CBC W/AUTO DIFF WBC: CPT | Performed by: PHYSICIAN ASSISTANT

## 2020-07-21 RX ORDER — LORAZEPAM 2 MG/ML
0.5 INJECTION INTRAMUSCULAR EVERY 4 HOURS PRN
Status: CANCELLED
Start: 2020-07-21

## 2020-07-21 RX ORDER — MEPERIDINE HYDROCHLORIDE 25 MG/ML
25 INJECTION INTRAMUSCULAR; INTRAVENOUS; SUBCUTANEOUS EVERY 30 MIN PRN
Status: CANCELLED | OUTPATIENT
Start: 2020-07-21

## 2020-07-21 RX ORDER — ALBUTEROL SULFATE 90 UG/1
1-2 AEROSOL, METERED RESPIRATORY (INHALATION)
Status: CANCELLED
Start: 2020-07-21

## 2020-07-21 RX ORDER — HEPARIN SODIUM (PORCINE) LOCK FLUSH IV SOLN 100 UNIT/ML 100 UNIT/ML
5 SOLUTION INTRAVENOUS
Status: CANCELLED | OUTPATIENT
Start: 2020-07-21

## 2020-07-21 RX ORDER — HEPARIN SODIUM,PORCINE 10 UNIT/ML
5 VIAL (ML) INTRAVENOUS
Status: CANCELLED | OUTPATIENT
Start: 2020-07-21

## 2020-07-21 RX ORDER — DIPHENHYDRAMINE HYDROCHLORIDE 50 MG/ML
50 INJECTION INTRAMUSCULAR; INTRAVENOUS
Status: CANCELLED
Start: 2020-07-21

## 2020-07-21 RX ORDER — SODIUM CHLORIDE 9 MG/ML
1000 INJECTION, SOLUTION INTRAVENOUS CONTINUOUS PRN
Status: CANCELLED
Start: 2020-07-21

## 2020-07-21 RX ORDER — NALOXONE HYDROCHLORIDE 0.4 MG/ML
.1-.4 INJECTION, SOLUTION INTRAMUSCULAR; INTRAVENOUS; SUBCUTANEOUS
Status: CANCELLED | OUTPATIENT
Start: 2020-07-21

## 2020-07-21 RX ORDER — METHYLPREDNISOLONE SODIUM SUCCINATE 125 MG/2ML
125 INJECTION, POWDER, LYOPHILIZED, FOR SOLUTION INTRAMUSCULAR; INTRAVENOUS
Status: CANCELLED
Start: 2020-07-21

## 2020-07-21 RX ORDER — EPINEPHRINE 1 MG/ML
0.3 INJECTION, SOLUTION INTRAMUSCULAR; SUBCUTANEOUS EVERY 5 MIN PRN
Status: CANCELLED | OUTPATIENT
Start: 2020-07-21

## 2020-07-21 RX ORDER — ALBUTEROL SULFATE 0.83 MG/ML
2.5 SOLUTION RESPIRATORY (INHALATION)
Status: CANCELLED | OUTPATIENT
Start: 2020-07-21

## 2020-07-21 RX ADMIN — DEXAMETHASONE SODIUM PHOSPHATE 12 MG: 10 INJECTION, SOLUTION INTRAMUSCULAR; INTRAVENOUS at 12:16

## 2020-07-21 RX ADMIN — SODIUM CHLORIDE 250 ML: 9 INJECTION, SOLUTION INTRAVENOUS at 12:16

## 2020-07-21 RX ADMIN — GEMCITABINE 1600 MG: 38 INJECTION, SOLUTION INTRAVENOUS at 12:56

## 2020-07-21 NOTE — PROGRESS NOTES
"Flora Hogan is a 74 year old female who is being evaluated via a billable telephone visit.      The patient has been notified of following:     \"This telephone visit will be conducted via a call between you and your physician/provider. We have found that certain health care needs can be provided without the need for a physical exam.  This service lets us provide the care you need with a short phone conversation.  If a prescription is necessary we can send it directly to your pharmacy.  If lab work is needed we can place an order for that and you can then stop by our lab to have the test done at a later time.    Telephone visits are billed at different rates depending on your insurance coverage. During this emergency period, for some insurers they may be billed the same as an in-person visit.  Please reach out to your insurance provider with any questions.    If during the course of the call the physician/provider feels a telephone visit is not appropriate, you will not be charged for this service.\"    Patient has given verbal consent for Telephone visit?  Yes    What phone number would you like to be contacted at? [phone]    How would you like to obtain your AVS? Juan Brito CMA on 7/21/2020 at 8:37 AM        Oncology/Hematology Visit Note    Jul 21, 2020    Reason for visit: Follow up pancreatic cancer    Oncology HPI: Flora Hogan is a 74 year old female with adenocarcinoma of the pancreas. She presented with jaundice and underwent biliary stenting and EUS x 2 with atypical cells. CT and MRI revealed benign liver lesions and CA was elevated at 19-9, therefore she underwent a Whipple procedure on 5/26/2020. Pathology showed pancreatic ductal adenocarcinoma, well-differentiated, grade 1 and 0/4 lymph nodes involved. She established care with Dr Coronado on 6/9/2020 and she suggested adjuvant chemotherapy with single agent gemcitabine, 3 weeks on and 1 week off, C1D1 on 7/7/2020. "     She was scheduled for C1D8 on 7/15/2020, but was neutropenic and deferred.     Virtual visit today for deferred C1D8.    Interval History: Flora is on telephone today as we are unable to get the video connection to work.  She seems to have tolerated gemcitabine fairly well.  She was neutropenic last week and therefore deferred to this week.  She has had no fever or chills, nausea or vomiting, rash or bleeding.  She has been fluctuating between constipation and diarrhea over the last several years, nothing new recently.  Appetite has been pretty good.  No other new complaints.    Review of Systems: See interval hx. Denies fevers, chills, HA, dizziness, n/t, changes in vision, cough, sore throat, CP, SOB, abdominal pain, N/V, changes in urination, bleeding, bruising, rash.     PMHx and Social Hx reviewed per EPIC.      Medications:  Current Outpatient Medications   Medication Sig Dispense Refill     acetaminophen (TYLENOL) 325 MG tablet Take 1-2 tablets (325-650 mg) by mouth every 6 hours as needed for mild pain or fever 50 tablet 0     albuterol (PROAIR HFA/PROVENTIL HFA/VENTOLIN HFA) 108 (90 Base) MCG/ACT inhaler Inhale 2 puffs into the lungs every 6 hours as needed for shortness of breath / dyspnea or wheezing 1 Inhaler 0     calcium carbonate-vitamin D (CALTRATE 600+D) 600-400 MG-UNIT chewable tablet Take 1 chew tab by mouth 2 times daily  180 tablet 3     loratadine (CLARITIN REDITABS) 10 MG dispersible tablet Take 10 mg by mouth as needed        magnesium 250 MG tablet Take 1 tablet by mouth daily       oxyCODONE (ROXICODONE) 5 MG tablet Take 1-2 tablets (5-10 mg) by mouth every 6 hours as needed for moderate to severe pain (Patient not taking: Reported on 6/9/2020) 20 tablet 0     pantoprazole (PROTONIX) 40 MG EC tablet Take 1 tablet (40 mg) by mouth every morning (before breakfast) 90 tablet 5     polyethylene glycol (MIRALAX) 17 g packet Take 17 g by mouth daily as needed for constipation 12 packet 0      prochlorperazine (COMPAZINE) 10 MG tablet Take 1 tablet (10 mg) by mouth every 6 hours as needed (Nausea/Vomiting) (Patient not taking: Reported on 7/14/2020) 30 tablet 1       Allergies   Allergen Reactions     Penicillin V      Fredy-1 [Lidocaine] Unknown         EXAM:    LMP  (LMP Unknown)  Telephone visit, therefore exam and vital signs were not performed.    Labs:   Results for ANALI HOGAN (MRN [id number]) as of 7/21/2020 15:36   7/21/2020 11:50   WBC 4.0   Hemoglobin 12.8   Hematocrit 39.4   Platelet Count 171   RBC Count 4.41   MCV 89   MCH 29.0   MCHC 32.5   RDW 13.6   Diff Method Automated Method   % Neutrophils 61.1   % Lymphocytes 27.5   % Monocytes 7.2   % Eosinophils 3.0   % Basophils 1.0   % Immature Granulocytes 0.2   Nucleated RBCs 0   Absolute Neutrophil 2.5   Absolute Lymphocytes 1.1   Absolute Monocytes 0.3   Absolute Eosinophils 0.1   Absolute Basophils 0.0   Abs Immature Granulocytes 0.0   Absolute Nucleated RBC 0.0       Imaging: n/a    Impression/Plan: Anali Hogan is a 74 year old female with pancreatic cancer status post Whipple's procedure currently on adjuvant chemotherapy with single agent gemcitabine.    Adenocarcinoma of the pancreas head: s/p Whipple procedure and restaging scan with no evidence of metastatic disease.  She started adjuvant chemotherapy with C1 D1 on 7/7/2020.  She returned the following week for day 8, however was noted to be neutropenic, therefore delayed by 1 week.  Labs are within treatment parameters to proceed with C1 D8 today.  She will continue 3 weeks on, one-week off with single agent gemcitabine.  --7/28 gemcitabine C1D15  --8/11 Dr. Coronado, C2 D1    Pulmonary nodule: stable 3 mm left lower lobe pulmonary nodule. We will monitor on future scans.     GI: Intermittent diarrhea and constipation.  This is been ongoing for many years, per patient.  She has seen GI and tries to control with diet, but she does have Imodium and stool softeners at home if  needed.  No new symptoms or concerns.     Chronic leg cramping: She has had this for many years.    Electrolytes were normal on 7/7.    Chart documentation with Dragon Voice recognition Software. Although reviewed after completion, some words and grammatical errors may remain.      Phone call duration: 15 minutes      Lacey Castillo PA-C  Hematology/Oncology  [facility]

## 2020-07-21 NOTE — LETTER
"    7/21/2020         RE: Flora Hogan  [address] [address] [place] [address]        Dear Colleague,    Thank you for referring your patient, Flora Hogan, to the [facility]. Please see a copy of my visit note below.    Flora Hogan is a 74 year old female who is being evaluated via a billable telephone visit.      The patient has been notified of following:     \"This telephone visit will be conducted via a call between you and your physician/provider. We have found that certain health care needs can be provided without the need for a physical exam.  This service lets us provide the care you need with a short phone conversation.  If a prescription is necessary we can send it directly to your pharmacy.  If lab work is needed we can place an order for that and you can then stop by our lab to have the test done at a later time.    Telephone visits are billed at different rates depending on your insurance coverage. During this emergency period, for some insurers they may be billed the same as an in-person visit.  Please reach out to your insurance provider with any questions.    If during the course of the call the physician/provider feels a telephone visit is not appropriate, you will not be charged for this service.\"    Patient has given verbal consent for Telephone visit?  Yes    What phone number would you like to be contacted at? [phone]    How would you like to obtain your AVS? Juan Brito CMA on 7/21/2020 at 8:37 AM        Oncology/Hematology Visit Note    Jul 21, 2020    Reason for visit: Follow up pancreatic cancer    Oncology HPI: Flora Hogan is a 74 year old female with adenocarcinoma of the pancreas. She presented with jaundice and underwent biliary stenting and EUS x 2 with atypical cells. CT and MRI revealed benign liver lesions and CA was elevated at 19-9, therefore she underwent a Whipple procedure on 5/26/2020. Pathology showed pancreatic " ductal adenocarcinoma, well-differentiated, grade 1 and 0/4 lymph nodes involved. She established care with Dr Coronado on 6/9/2020 and she suggested adjuvant chemotherapy with single agent gemcitabine, 3 weeks on and 1 week off, C1D1 on 7/7/2020.     She was scheduled for C1D8 on 7/15/2020, but was neutropenic and deferred.     Virtual visit today for deferred C1D8.    Interval History: Flora is on telephone today as we are unable to get the video connection to work.  She seems to have tolerated gemcitabine fairly well.  She was neutropenic last week and therefore deferred to this week.  She has had no fever or chills, nausea or vomiting, rash or bleeding.  She has been fluctuating between constipation and diarrhea over the last several years, nothing new recently.  Appetite has been pretty good.  No other new complaints.    Review of Systems: See interval hx. Denies fevers, chills, HA, dizziness, n/t, changes in vision, cough, sore throat, CP, SOB, abdominal pain, N/V, changes in urination, bleeding, bruising, rash.     PMHx and Social Hx reviewed per EPIC.      Medications:  Current Outpatient Medications   Medication Sig Dispense Refill     acetaminophen (TYLENOL) 325 MG tablet Take 1-2 tablets (325-650 mg) by mouth every 6 hours as needed for mild pain or fever 50 tablet 0     albuterol (PROAIR HFA/PROVENTIL HFA/VENTOLIN HFA) 108 (90 Base) MCG/ACT inhaler Inhale 2 puffs into the lungs every 6 hours as needed for shortness of breath / dyspnea or wheezing 1 Inhaler 0     calcium carbonate-vitamin D (CALTRATE 600+D) 600-400 MG-UNIT chewable tablet Take 1 chew tab by mouth 2 times daily  180 tablet 3     loratadine (CLARITIN REDITABS) 10 MG dispersible tablet Take 10 mg by mouth as needed        magnesium 250 MG tablet Take 1 tablet by mouth daily       oxyCODONE (ROXICODONE) 5 MG tablet Take 1-2 tablets (5-10 mg) by mouth every 6 hours as needed for moderate to severe pain (Patient not taking: Reported on 6/9/2020) 20  tablet 0     pantoprazole (PROTONIX) 40 MG EC tablet Take 1 tablet (40 mg) by mouth every morning (before breakfast) 90 tablet 5     polyethylene glycol (MIRALAX) 17 g packet Take 17 g by mouth daily as needed for constipation 12 packet 0     prochlorperazine (COMPAZINE) 10 MG tablet Take 1 tablet (10 mg) by mouth every 6 hours as needed (Nausea/Vomiting) (Patient not taking: Reported on 7/14/2020) 30 tablet 1       Allergies   Allergen Reactions     Penicillin V      Fredy-1 [Lidocaine] Unknown         EXAM:    LMP  (LMP Unknown)  Telephone visit, therefore exam and vital signs were not performed.    Labs:   Results for ANALI OHGAN (MRN [id number]) as of 7/21/2020 15:36   7/21/2020 11:50   WBC 4.0   Hemoglobin 12.8   Hematocrit 39.4   Platelet Count 171   RBC Count 4.41   MCV 89   MCH 29.0   MCHC 32.5   RDW 13.6   Diff Method Automated Method   % Neutrophils 61.1   % Lymphocytes 27.5   % Monocytes 7.2   % Eosinophils 3.0   % Basophils 1.0   % Immature Granulocytes 0.2   Nucleated RBCs 0   Absolute Neutrophil 2.5   Absolute Lymphocytes 1.1   Absolute Monocytes 0.3   Absolute Eosinophils 0.1   Absolute Basophils 0.0   Abs Immature Granulocytes 0.0   Absolute Nucleated RBC 0.0       Imaging: n/a    Impression/Plan: Anali Hogan is a 74 year old female with pancreatic cancer status post Whipple's procedure currently on adjuvant chemotherapy with single agent gemcitabine.    Adenocarcinoma of the pancreas head: s/p Whipple procedure and restaging scan with no evidence of metastatic disease.  She started adjuvant chemotherapy with C1 D1 on 7/7/2020.  She returned the following week for day 8, however was noted to be neutropenic, therefore delayed by 1 week.  Labs are within treatment parameters to proceed with C1 D8 today.  She will continue 3 weeks on, one-week off with single agent gemcitabine.  --7/28 gemcitabine C1D15  --8/11 Dr. Coronado, C2 D1    Pulmonary nodule: stable 3 mm left lower lobe pulmonary  nodule. We will monitor on future scans.     GI: Intermittent diarrhea and constipation.  This is been ongoing for many years, per patient.  She has seen GI and tries to control with diet, but she does have Imodium and stool softeners at home if needed.  No new symptoms or concerns.     Chronic leg cramping: She has had this for many years.    Electrolytes were normal on 7/7.    Chart documentation with Dragon Voice recognition Software. Although reviewed after completion, some words and grammatical errors may remain.      Phone call duration: 15 minutes      Lacey Castillo PA-C  Hematology/Oncology  [facility]                  Again, thank you for allowing me to participate in the care of your patient.        Sincerely,        Lacey Castillo PA-C

## 2020-07-21 NOTE — PROGRESS NOTES
Infusion Nursing Note:  Flora CARVER Coandreajusticekayce presents today for Gemzar.    Patient seen by provider today: No   present during visit today: Not Applicable.    Note: Patient examined by NP today. No further questions or concerns. Chemotherapy deferred last week.     Intravenous Access:  Labs drawn without difficulty.  Implanted Port.    Treatment Conditions:  Lab Results   Component Value Date    HGB 12.8 07/21/2020     Lab Results   Component Value Date    WBC 4.0 07/21/2020      Lab Results   Component Value Date    ANEU 2.5 07/21/2020     Lab Results   Component Value Date     07/21/2020      Results reviewed, labs MET treatment parameters, ok to proceed with treatment.      Post Infusion Assessment:  Patient tolerated infusion without incident.  Blood return noted pre and post infusion.  Site patent and intact, free from redness, edema or discomfort.  No evidence of extravasations.  Access discontinued per protocol.       Discharge Plan:   Patient declined prescription refills.  Discharge instructions reviewed with: Patient.  Patient and/or family verbalized understanding of discharge instructions and all questions answered.  Copy of AVS reviewed with patient and/or family.  Patient will return 7/28/2020 for next appointment.  Patient discharged in stable condition accompanied by: self.  Departure Mode: Ambulatory.    Tali Arthur RN

## 2020-07-21 NOTE — LETTER
"    7/21/2020         RE: Flora Hogan  [address] [facility] [address]        Dear Colleague,    Thank you for referring your patient, Flora Hogan, to the [facility]. Please see a copy of my visit note below.    Flora Hogan is a 74 year old female who is being evaluated via a billable telephone visit.      The patient has been notified of following:     \"This telephone visit will be conducted via a call between you and your physician/provider. We have found that certain health care needs can be provided without the need for a physical exam.  This service lets us provide the care you need with a short phone conversation.  If a prescription is necessary we can send it directly to your pharmacy.  If lab work is needed we can place an order for that and you can then stop by our lab to have the test done at a later time.    Telephone visits are billed at different rates depending on your insurance coverage. During this emergency period, for some insurers they may be billed the same as an in-person visit.  Please reach out to your insurance provider with any questions.    If during the course of the call the physician/provider feels a telephone visit is not appropriate, you will not be charged for this service.\"    Patient has given verbal consent for Telephone visit?  Yes    What phone number would you like to be contacted at? [phone]    How would you like to obtain your AVS? Juan Brito CMA on 7/21/2020 at 8:37 AM        Oncology/Hematology Visit Note    Jul 21, 2020    Reason for visit: Follow up pancreatic cancer    Oncology HPI: Flora Hogan is a 74 year old female with adenocarcinoma of the pancreas. She presented with jaundice and underwent biliary stenting and EUS x 2 with atypical cells. CT and MRI revealed benign liver lesions and CA was elevated at 19-9, therefore she underwent a Whipple procedure on 5/26/2020. Pathology showed pancreatic " ductal adenocarcinoma, well-differentiated, grade 1 and 0/4 lymph nodes involved. She established care with Dr Coronado on 6/9/2020 and she suggested adjuvant chemotherapy with single agent gemcitabine, 3 weeks on and 1 week off, C1D1 on 7/7/2020.     She was scheduled for C1D8 on 7/15/2020, but was neutropenic and deferred.     Virtual visit today for deferred C1D8.    Interval History: Flora is on telephone today as we are unable to get the video connection to work.  She seems to have tolerated gemcitabine fairly well.  She was neutropenic last week and therefore deferred to this week.  She has had no fever or chills, nausea or vomiting, rash or bleeding.  She has been fluctuating between constipation and diarrhea over the last several years, nothing new recently.  Appetite has been pretty good.  No other new complaints.    Review of Systems: See interval hx. Denies fevers, chills, HA, dizziness, n/t, changes in vision, cough, sore throat, CP, SOB, abdominal pain, N/V, changes in urination, bleeding, bruising, rash.     PMHx and Social Hx reviewed per EPIC.      Medications:  Current Outpatient Medications   Medication Sig Dispense Refill     acetaminophen (TYLENOL) 325 MG tablet Take 1-2 tablets (325-650 mg) by mouth every 6 hours as needed for mild pain or fever 50 tablet 0     albuterol (PROAIR HFA/PROVENTIL HFA/VENTOLIN HFA) 108 (90 Base) MCG/ACT inhaler Inhale 2 puffs into the lungs every 6 hours as needed for shortness of breath / dyspnea or wheezing 1 Inhaler 0     calcium carbonate-vitamin D (CALTRATE 600+D) 600-400 MG-UNIT chewable tablet Take 1 chew tab by mouth 2 times daily  180 tablet 3     loratadine (CLARITIN REDITABS) 10 MG dispersible tablet Take 10 mg by mouth as needed        magnesium 250 MG tablet Take 1 tablet by mouth daily       oxyCODONE (ROXICODONE) 5 MG tablet Take 1-2 tablets (5-10 mg) by mouth every 6 hours as needed for moderate to severe pain (Patient not taking: Reported on 6/9/2020) 20  tablet 0     pantoprazole (PROTONIX) 40 MG EC tablet Take 1 tablet (40 mg) by mouth every morning (before breakfast) 90 tablet 5     polyethylene glycol (MIRALAX) 17 g packet Take 17 g by mouth daily as needed for constipation 12 packet 0     prochlorperazine (COMPAZINE) 10 MG tablet Take 1 tablet (10 mg) by mouth every 6 hours as needed (Nausea/Vomiting) (Patient not taking: Reported on 7/14/2020) 30 tablet 1       Allergies   Allergen Reactions     Penicillin V      Fredy-1 [Lidocaine] Unknown         EXAM:    LMP  (LMP Unknown)  Telephone visit, therefore exam and vital signs were not performed.    Labs:   Results for ANALI HOGAN (MRN [id number]) as of 7/21/2020 15:36   7/21/2020 11:50   WBC 4.0   Hemoglobin 12.8   Hematocrit 39.4   Platelet Count 171   RBC Count 4.41   MCV 89   MCH 29.0   MCHC 32.5   RDW 13.6   Diff Method Automated Method   % Neutrophils 61.1   % Lymphocytes 27.5   % Monocytes 7.2   % Eosinophils 3.0   % Basophils 1.0   % Immature Granulocytes 0.2   Nucleated RBCs 0   Absolute Neutrophil 2.5   Absolute Lymphocytes 1.1   Absolute Monocytes 0.3   Absolute Eosinophils 0.1   Absolute Basophils 0.0   Abs Immature Granulocytes 0.0   Absolute Nucleated RBC 0.0       Imaging: n/a    Impression/Plan: Anali Hogan is a 74 year old female with pancreatic cancer status post Whipple's procedure currently on adjuvant chemotherapy with single agent gemcitabine.    Adenocarcinoma of the pancreas head: s/p Whipple procedure and restaging scan with no evidence of metastatic disease.  She started adjuvant chemotherapy with C1 D1 on 7/7/2020.  She returned the following week for day 8, however was noted to be neutropenic, therefore delayed by 1 week.  Labs are within treatment parameters to proceed with C1 D8 today.  She will continue 3 weeks on, one-week off with single agent gemcitabine.  --7/28 gemcitabine C1D15  --8/11 Dr. Coronado, C2 D1    Pulmonary nodule: stable 3 mm left lower lobe pulmonary  nodule. We will monitor on future scans.     GI: Intermittent diarrhea and constipation.  This is been ongoing for many years, per patient.  She has seen GI and tries to control with diet, but she does have Imodium and stool softeners at home if needed.  No new symptoms or concerns.     Chronic leg cramping: She has had this for many years.    Electrolytes were normal on 7/7.    Chart documentation with Dragon Voice recognition Software. Although reviewed after completion, some words and grammatical errors may remain.      Phone call duration: 15 minutes      Lacey Castillo PA-C  Hematology/Oncology  [facility]                  Again, thank you for allowing me to participate in the care of your patient.        Sincerely,        Lacey Castillo PA-C

## 2020-07-28 ENCOUNTER — VIRTUAL VISIT (OUTPATIENT)
Dept: ONCOLOGY | Facility: CLINIC | Age: 75
End: 2020-07-28
Attending: INTERNAL MEDICINE
Payer: COMMERCIAL

## 2020-07-28 ENCOUNTER — INFUSION THERAPY VISIT (OUTPATIENT)
Dept: INFUSION THERAPY | Facility: CLINIC | Age: 75
End: 2020-07-28
Attending: INTERNAL MEDICINE
Payer: COMMERCIAL

## 2020-07-28 ENCOUNTER — PATIENT OUTREACH (OUTPATIENT)
Dept: ONCOLOGY | Facility: CLINIC | Age: 75
End: 2020-07-28

## 2020-07-28 ENCOUNTER — HOSPITAL ENCOUNTER (OUTPATIENT)
Facility: CLINIC | Age: 75
Setting detail: SPECIMEN
Discharge: HOME | End: 2020-07-28
Attending: INTERNAL MEDICINE | Admitting: INTERNAL MEDICINE
Payer: COMMERCIAL

## 2020-07-28 VITALS
TEMPERATURE: 98.4 F | WEIGHT: 112.1 LBS | RESPIRATION RATE: 16 BRPM | SYSTOLIC BLOOD PRESSURE: 113 MMHG | BODY MASS INDEX: 19.24 KG/M2 | DIASTOLIC BLOOD PRESSURE: 69 MMHG | HEART RATE: 65 BPM | OXYGEN SATURATION: 96 %

## 2020-07-28 DIAGNOSIS — C25.0 MALIGNANT NEOPLASM OF HEAD OF PANCREAS (H): Primary | ICD-10-CM

## 2020-07-28 DIAGNOSIS — C25.0 MALIGNANT NEOPLASM OF HEAD OF PANCREAS (H): ICD-10-CM

## 2020-07-28 LAB
BASOPHILS # BLD AUTO: 0 10E9/L (ref 0–0.2)
BASOPHILS NFR BLD AUTO: 1.9 %
DIFFERENTIAL METHOD BLD: ABNORMAL
EOSINOPHIL # BLD AUTO: 0 10E9/L (ref 0–0.7)
EOSINOPHIL NFR BLD AUTO: 2.5 %
ERYTHROCYTE [DISTWIDTH] IN BLOOD BY AUTOMATED COUNT: 13.6 % (ref 10–15)
HCT VFR BLD AUTO: 37 % (ref 35–47)
HGB BLD-MCNC: 11.8 G/DL (ref 11.7–15.7)
IMM GRANULOCYTES # BLD: 0 10E9/L (ref 0–0.4)
IMM GRANULOCYTES NFR BLD: 0.6 %
LYMPHOCYTES # BLD AUTO: 1 10E9/L (ref 0.8–5.3)
LYMPHOCYTES NFR BLD AUTO: 65 %
MCH RBC QN AUTO: 28.7 PG (ref 26.5–33)
MCHC RBC AUTO-ENTMCNC: 31.9 G/DL (ref 31.5–36.5)
MCV RBC AUTO: 90 FL (ref 78–100)
MONOCYTES # BLD AUTO: 0.1 10E9/L (ref 0–1.3)
MONOCYTES NFR BLD AUTO: 8.8 %
NEUTROPHILS # BLD AUTO: 0.3 10E9/L (ref 1.6–8.3)
NEUTROPHILS NFR BLD AUTO: 21.2 %
NRBC # BLD AUTO: 0 10*3/UL
NRBC BLD AUTO-RTO: 0 /100
PLATELET # BLD AUTO: 262 10E9/L (ref 150–450)
PLATELET # BLD EST: ABNORMAL 10*3/UL
RBC # BLD AUTO: 4.11 10E12/L (ref 3.8–5.2)
RBC MORPH BLD: ABNORMAL
WBC # BLD AUTO: 1.6 10E9/L (ref 4–11)

## 2020-07-28 PROCEDURE — 85025 COMPLETE CBC W/AUTO DIFF WBC: CPT | Performed by: INTERNAL MEDICINE

## 2020-07-28 PROCEDURE — 97802 MEDICAL NUTRITION INDIV IN: CPT | Mod: 95 | Performed by: DIETITIAN, REGISTERED

## 2020-07-28 PROCEDURE — 25000128 H RX IP 250 OP 636: Performed by: INTERNAL MEDICINE

## 2020-07-28 RX ORDER — HEPARIN SODIUM,PORCINE 10 UNIT/ML
5 VIAL (ML) INTRAVENOUS
Status: CANCELLED | OUTPATIENT
Start: 2020-07-28

## 2020-07-28 RX ORDER — HEPARIN SODIUM (PORCINE) LOCK FLUSH IV SOLN 100 UNIT/ML 100 UNIT/ML
5 SOLUTION INTRAVENOUS
Status: CANCELLED | OUTPATIENT
Start: 2020-07-28

## 2020-07-28 RX ORDER — HEPARIN SODIUM (PORCINE) LOCK FLUSH IV SOLN 100 UNIT/ML 100 UNIT/ML
5 SOLUTION INTRAVENOUS
Status: DISCONTINUED | OUTPATIENT
Start: 2020-07-28 | End: 2020-07-28 | Stop reason: HOSPADM

## 2020-07-28 RX ADMIN — Medication 5 ML: at 13:22

## 2020-07-28 ASSESSMENT — PAIN SCALES - GENERAL: PAINLEVEL: NO PAIN (0)

## 2020-07-28 NOTE — PROGRESS NOTES
Infusion Nursing Note:  Flora Hogan presents today for Cycle 1, Day 15 Gemzar cancelled due to ANC 0.3.  Patient seen by provider today: No   present during visit today: Not Applicable.    Note: Patient continues to alternate between diarrhea and constipation. This is ongoing for a few years. Has video visit with Dietary today.    ANC 0.3. Per Dr. Cliff MD Cancel today's treatment. Have patient return as scheduled on 8/11 for Cycle 2, Day 1.    Intravenous Access:  Labs drawn without difficulty.  Implanted Port.    Treatment Conditions:  Lab Results   Component Value Date    HGB 11.8 07/28/2020     Lab Results   Component Value Date    WBC 1.6 07/28/2020      Lab Results   Component Value Date    ANEU 0.3 07/28/2020     Lab Results   Component Value Date     07/28/2020      Results reviewed, labs did NOT meet treatment parameters: Cancel treatment today.      Post Infusion Assessment:  Patient tolerated port draw without incident.  Blood return noted pre and post infusion.  Site patent and intact, free from redness, edema or discomfort.  No evidence of extravasations.  Access discontinued per protocol.       Discharge Plan:   Patient declined prescription refills.  Copy of AVS reviewed with patient and/or family.  Patient will return 8/11 for next appointment.  Patient discharged in stable condition accompanied by: self.  Departure Mode: Ambulatory.    Karma Gilbert RN

## 2020-07-28 NOTE — PROGRESS NOTES
Writer returned voicemail phone call. Message left for patient to call back.  Vikki Cam RN on 7/28/2020 at 4:18 PM

## 2020-07-28 NOTE — PROGRESS NOTES
"Flora Hogan is a 75 year old female who is being evaluated via a billable telephone visit.      The patient has been notified of following:     \"This telephone visit will be conducted via a call between you and your physician/provider. We have found that certain health care needs can be provided without the need for a physical exam.  This service lets us provide the care you need with a short phone conversation.  If a prescription is necessary we can send it directly to your pharmacy.  If lab work is needed we can place an order for that and you can then stop by our lab to have the test done at a later time.    Telephone visits are billed at different rates depending on your insurance coverage. During this emergency period, for some insurers they may be billed the same as an in-person visit.  Please reach out to your insurance provider with any questions.    If during the course of the call the physician/provider feels a telephone visit is not appropriate, you will not be charged for this service.\"    Patient has given verbal consent for Telephone visit?  Yes (converted to telephone visit as patient was unable to access [facility])    CLINICAL NUTRITION SERVICES - ASSESSMENT NOTE    Flora Hogan 75 year old referred for MNT related to pancreatic cancer    Time Spent: 46 minutes  Visit Type: Initial   Referring Physician: Dr. Coronado  Pt accompanied by: self    Nutrition Prescription   Recommendations Suggested by Registered Dietitian (RD):   1. Eat 5-6 meals per day  2. Aim for 9324-6558 kcals and 60-75 grams protein per day    Malnutrition: Non severe malnutrition in the context of chronic disease      NUTRITION HISTORY  Factors affecting nutrition intake include:constipation and diarrhea  Patient s/p Whipple procedure.  Patient states has had issues with her digestive system for 3-4 years.  Patient thought she had leaky bowel. Patient states her stools are brown in color.  Patient eating multiple times per " "day.  Patient avoiding high sugar foods, greasy foods and alcohol.  Patient previously walked 4-5 miles per day along with biking and golfing.  Patient currently able to walk 2 miles per day.      Current diet: regular  Current appetite/intake: fair-good  Chemotherapy: Gemcitabine     Diet Recall  Breakfast Scrambled egg with Pace picante sauce+ toast; Greek yogurt + fruit (apples no skin); oatmeal with raisins 4 times per week    Lunch Homemade chicken soup or slice ham with piece of lettuce or leftovers    Dinner Chicken, fish, lean pork + vegetables; spaghetti or chili with beans; pasta or rice; rice with chicken with charlie sauce + cream of mushroom soup + salad or frozen vegetables    Snacks Fruit (apple with peanut butter) or almonds; chocolate kezia crackers with peanut butter (AM snacks) hummus with fresh vegetables  (afternoon); oatmeal raisin cookies; saltine crackers with gouda or cheddar; 3/4 cottage cheese + forrest    Beverages Water; 2 cups coffee; tea-light; flavored water-white grapes (64-72 oz)     ANTHROPOMETRICS  Height: 5'4\"  Weight: 110 lbs   BMI: 18.8 kg/m2   Weight Status:  Normal BMI  IBW: 120 lbs +/-10% (91%)  Weight History: Patient with 10% weight loss in 9 months.  Wt Readings from Last 10 Encounters:   07/21/20 50 kg (110 lb 3.2 oz)   07/14/20 50.3 kg (111 lb)   07/07/20 50 kg (110 lb 5 oz)   05/23/20 53.2 kg (117 lb 4.8 oz)   05/13/20 51.3 kg (113 lb 1.6 oz)   04/19/20 52.7 kg (116 lb 1.6 oz)   10/29/19 55 kg (121 lb 3.2 oz)   07/18/19 52.2 kg (115 lb)   07/31/18 55.2 kg (121 lb 12.8 oz)   04/30/18 54 kg (119 lb)     Dosing Weight: 50 kg    Medications:   Reviewed    Labs:   Labs reviewed    NUTRITION FOCUSED PHYSICAL ASSESSMENT FOR DIAGNOSING MALNUTRITION:  No:  Unable due to telephone visit due to COVID-19 pandemic     Observed:  Unable due to telephone visit due to COVID-19 pandemic     Observed from interdisciplinary team:  None noted     ASSESSED NUTRITION NEEDS:  Estimated " Energy Needs: 4191-4437 kcals (35-40 Kcal/Kg)  Justification: repletion  Estimated Protein Needs: 60-75 grams protein (1.2-1.5 g pro/Kg)  Justification: Repletion  Estimated Fluid Needs: 0585-3815  mL   Justification: 0413-0223 mL maintenance    MALNUTRITION:  % Weight Loss:  Up to 10% in 6 months (non-severe malnutrition)  % Intake:  <75% for > 7 days (non-severe malnutrition)  Subcutaneous Fat Loss:  None observed due to telephone visit due to COVID-19 pandemic   Muscle Loss:  None observed due to telephone visit due to COVID-19 pandemic   Fluid Retention:  None noted due to telephone visit due to COVID-19 pandemic     Malnutrition Diagnosis: Non-Severe malnutrition  In Context of:  Chronic illness or disease    NUTRITION DIAGNOSIS:  Unintended weight loss related to decreased intake as evidenced by 10% weight loss in 8 months    INTERVENTIONS  Provided written & verbal education:   - Discussed ways to maximize and fortify foods with calories and protein via small frequent meals.  Suggest adding small quantities of olive oil to add calories to foods.    - Advised pt to aim for at least 5167-4859 kcal and 60-75 g protein daily.   - Reviewed common barriers to eating with treatment and as treatment progresses. Discussed ways to cope with decreased appetite.  - Reviewed ONS options (whey protein powder, Ensure Enlive, Ensure Plus/Boost Plus, CIB, etc). Encouraged utilizing these ONS in home made shakes/smoothies to prevent flavor fatigue.  Encouraged pt to start consuming 1 ONS or home made shakes/smoothies daily.       Provided pt with corresponding education materials/handouts on:  Academy of Nutrition and Dietetics [place] Nutrition Therapy handout and Academy of Nutrition and Dietetics High Protein list      Pt verbalize understanding of materials provided during consult.   Patient Understanding: good  Expected Compliance: good     Implementation  Composition of Meals and Snacks and General/healthful  diet    Goals  1.  Aim for 5-6 small frequent meals  2.  Aim for 1077-0283 kcal and 60-75 g protein/day    Follow-Up Plans:   RD to follow up in 2 weeks  RD name and number provided     MONITORING AND EVALUATION:  -Food intake  -Fluid/beverage intake  -Liquid meal replacement or supplement  -Weight trends    Sakina Cedeño, RD, LD  Clinical Dietitian  [facility]  [phone] (direct)       .

## 2020-07-28 NOTE — LETTER
"    7/28/2020         RE: Flora Hogan  [address]  [place] [address]        Dear Colleague,    Thank you for referring your patient, Flora Hogan, to the [facility]. Please see a copy of my visit note below.    Flora Hogan is a 75 year old female who is being evaluated via a billable telephone visit.      The patient has been notified of following:     \"This telephone visit will be conducted via a call between you and your physician/provider. We have found that certain health care needs can be provided without the need for a physical exam.  This service lets us provide the care you need with a short phone conversation.  If a prescription is necessary we can send it directly to your pharmacy.  If lab work is needed we can place an order for that and you can then stop by our lab to have the test done at a later time.    Telephone visits are billed at different rates depending on your insurance coverage. During this emergency period, for some insurers they may be billed the same as an in-person visit.  Please reach out to your insurance provider with any questions.    If during the course of the call the physician/provider feels a telephone visit is not appropriate, you will not be charged for this service.\"    Patient has given verbal consent for Telephone visit?  Yes (converted to telephone visit as patient was unable to access [employer])    CLINICAL NUTRITION SERVICES - ASSESSMENT NOTE    Flora Hogan 75 year old referred for MNT related to pancreatic cancer    Time Spent: 46 minutes  Visit Type: Initial   Referring Physician: Dr. Coronado  Pt accompanied by: self    Nutrition Prescription   Recommendations Suggested by Registered Dietitian (RD):   1. Eat 5-6 meals per day  2. Aim for 5082-3309 kcals and 60-75 grams protein per day    Malnutrition: Non severe malnutrition in the context of chronic disease      NUTRITION HISTORY  Factors affecting nutrition intake " "include:constipation and diarrhea  Patient s/p Whipple procedure.  Patient states has had issues with her digestive system for 3-4 years.  Patient thought she had leaky bowel. Patient states her stools are brown in color.  Patient eating multiple times per day.  Patient avoiding high sugar foods, greasy foods and alcohol.  Patient previously walked 4-5 miles per day along with biking and golfing.  Patient currently able to walk 2 miles per day.      Current diet: regular  Current appetite/intake: fair-good  Chemotherapy: Gemcitabine     Diet Recall  Breakfast Scrambled egg with Pace picante sauce+ toast; Greek yogurt + fruit (apples no skin); oatmeal with raisins 4 times per week    Lunch Homemade chicken soup or slice ham with piece of lettuce or leftovers    Dinner Chicken, fish, lean pork + vegetables; spaghetti or chili with beans; pasta or rice; rice with chicken with charlie sauce + cream of mushroom soup + salad or frozen vegetables    Snacks Fruit (apple with peanut butter) or almonds; chocolate kezia crackers with peanut butter (AM snacks) hummus with fresh vegetables  (afternoon); oatmeal raisin cookies; saltine crackers with gouda or cheddar; 3/4 cottage cheese + forrest    Beverages Water; 2 cups coffee; tea-light; flavored water-white grapes (64-72 oz)     ANTHROPOMETRICS  Height: 5'4\"  Weight: 110 lbs   BMI: 18.8 kg/m2   Weight Status:  Normal BMI  IBW: 120 lbs +/-10% (91%)  Weight History: Patient with 10% weight loss in 9 months.  Wt Readings from Last 10 Encounters:   07/21/20 50 kg (110 lb 3.2 oz)   07/14/20 50.3 kg (111 lb)   07/07/20 50 kg (110 lb 5 oz)   05/23/20 53.2 kg (117 lb 4.8 oz)   05/13/20 51.3 kg (113 lb 1.6 oz)   04/19/20 52.7 kg (116 lb 1.6 oz)   10/29/19 55 kg (121 lb 3.2 oz)   07/18/19 52.2 kg (115 lb)   07/31/18 55.2 kg (121 lb 12.8 oz)   04/30/18 54 kg (119 lb)     Dosing Weight: 50 kg    Medications:   Reviewed    Labs:   Labs reviewed    NUTRITION FOCUSED PHYSICAL ASSESSMENT " FOR DIAGNOSING MALNUTRITION:  No:  Unable due to telephone visit due to COVID-19 pandemic     Observed:  Unable due to telephone visit due to COVID-19 pandemic     Observed from interdisciplinary team:  None noted     ASSESSED NUTRITION NEEDS:  Estimated Energy Needs: 4081-0074 kcals (35-40 Kcal/Kg)  Justification: repletion  Estimated Protein Needs: 60-75 grams protein (1.2-1.5 g pro/Kg)  Justification: Repletion  Estimated Fluid Needs: 5003-7960  mL   Justification: 4346-6628 mL maintenance    MALNUTRITION:  % Weight Loss:  Up to 10% in 6 months (non-severe malnutrition)  % Intake:  <75% for > 7 days (non-severe malnutrition)  Subcutaneous Fat Loss:  None observed due to telephone visit due to COVID-19 pandemic   Muscle Loss:  None observed due to telephone visit due to COVID-19 pandemic   Fluid Retention:  None noted due to telephone visit due to COVID-19 pandemic     Malnutrition Diagnosis: Non-Severe malnutrition  In Context of:  Chronic illness or disease    NUTRITION DIAGNOSIS:  Unintended weight loss related to decreased intake as evidenced by 10% weight loss in 8 months    INTERVENTIONS  Provided written & verbal education:   - Discussed ways to maximize and fortify foods with calories and protein via small frequent meals.  Suggest adding small quantities of olive oil to add calories to foods.    - Advised pt to aim for at least 3408-1596 kcal and 60-75 g protein daily.   - Reviewed common barriers to eating with treatment and as treatment progresses. Discussed ways to cope with decreased appetite.  - Reviewed ONS options (whey protein powder, Ensure Enlive, Ensure Plus/Boost Plus, CIB, etc). Encouraged utilizing these ONS in home made shakes/smoothies to prevent flavor fatigue.  Encouraged pt to start consuming 1 ONS or home made shakes/smoothies daily.       Provided pt with corresponding education materials/handouts on:  Academy of Nutrition and Dietetics [place] Nutrition Therapy handout and Academy of  Nutrition and Dietetics High Protein list      Pt verbalize understanding of materials provided during consult.   Patient Understanding: good  Expected Compliance: good     Implementation  Composition of Meals and Snacks and General/healthful diet    Goals  1.  Aim for 5-6 small frequent meals  2.  Aim for 6809-8430 kcal and 60-75 g protein/day    Follow-Up Plans:   RD to follow up in 2 weeks  RD name and number provided     MONITORING AND EVALUATION:  -Food intake  -Fluid/beverage intake  -Liquid meal replacement or supplement  -Weight trends    Sakina Cedeño, RD, LD  Clinical Dietitian  [facility]  [phone] (direct)       .        Again, thank you for allowing me to participate in the care of your patient.        Sincerely,        Calin Zheng RD, LD

## 2020-07-28 NOTE — LETTER
"    7/28/2020         RE: Flora Hogan  [address]  [place] [address]        Dear Colleague,    Thank you for referring your patient, Flora Hogan, to the [facility]. Please see a copy of my visit note below.    Flora Hogan is a 75 year old female who is being evaluated via a billable telephone visit.      The patient has been notified of following:     \"This telephone visit will be conducted via a call between you and your physician/provider. We have found that certain health care needs can be provided without the need for a physical exam.  This service lets us provide the care you need with a short phone conversation.  If a prescription is necessary we can send it directly to your pharmacy.  If lab work is needed we can place an order for that and you can then stop by our lab to have the test done at a later time.    Telephone visits are billed at different rates depending on your insurance coverage. During this emergency period, for some insurers they may be billed the same as an in-person visit.  Please reach out to your insurance provider with any questions.    If during the course of the call the physician/provider feels a telephone visit is not appropriate, you will not be charged for this service.\"    Patient has given verbal consent for Telephone visit?  Yes (converted to telephone visit as patient was unable to access Appetite+)    CLINICAL NUTRITION SERVICES - ASSESSMENT NOTE    Flora Hogan 75 year old referred for MNT related to pancreatic cancer    Time Spent: 46 minutes  Visit Type: Initial   Referring Physician: Dr. Coronado  Pt accompanied by: self    Nutrition Prescription   Recommendations Suggested by Registered Dietitian (RD):   1. Eat 5-6 meals per day  2. Aim for 1806-3815 kcals and 60-75 grams protein per day    Malnutrition: Non severe malnutrition in the context of chronic disease      NUTRITION HISTORY  Factors affecting nutrition intake " "include:constipation and diarrhea  Patient s/p Whipple procedure.  Patient states has had issues with her digestive system for 3-4 years.  Patient thought she had leaky bowel. Patient states her stools are brown in color.  Patient eating multiple times per day.  Patient avoiding high sugar foods, greasy foods and alcohol.  Patient previously walked 4-5 miles per day along with biking and golfing.  Patient currently able to walk 2 miles per day.      Current diet: regular  Current appetite/intake: fair-good  Chemotherapy: Gemcitabine     Diet Recall  Breakfast Scrambled egg with Pace picante sauce+ toast; Greek yogurt + fruit (apples no skin); oatmeal with raisins 4 times per week    Lunch Homemade chicken soup or slice ham with piece of lettuce or leftovers    Dinner Chicken, fish, lean pork + vegetables; spaghetti or chili with beans; pasta or rice; rice with chicken with charlie sauce + cream of mushroom soup + salad or frozen vegetables    Snacks Fruit (apple with peanut butter) or almonds; chocolate kezia crackers with peanut butter (AM snacks) hummus with fresh vegetables  (afternoon); oatmeal raisin cookies; saltine crackers with gouda or cheddar; 3/4 cottage cheese + forrest    Beverages Water; 2 cups coffee; tea-light; flavored water-white grapes (64-72 oz)     ANTHROPOMETRICS  Height: 5'4\"  Weight: 110 lbs   BMI: 18.8 kg/m2   Weight Status:  Normal BMI  IBW: 120 lbs +/-10% (91%)  Weight History: Patient with 10% weight loss in 9 months.  Wt Readings from Last 10 Encounters:   07/21/20 50 kg (110 lb 3.2 oz)   07/14/20 50.3 kg (111 lb)   07/07/20 50 kg (110 lb 5 oz)   05/23/20 53.2 kg (117 lb 4.8 oz)   05/13/20 51.3 kg (113 lb 1.6 oz)   04/19/20 52.7 kg (116 lb 1.6 oz)   10/29/19 55 kg (121 lb 3.2 oz)   07/18/19 52.2 kg (115 lb)   07/31/18 55.2 kg (121 lb 12.8 oz)   04/30/18 54 kg (119 lb)     Dosing Weight: 50 kg    Medications:   Reviewed    Labs:   Labs reviewed    NUTRITION FOCUSED PHYSICAL ASSESSMENT " FOR DIAGNOSING MALNUTRITION:  No:  Unable due to telephone visit due to COVID-19 pandemic     Observed:  Unable due to telephone visit due to COVID-19 pandemic     Observed from interdisciplinary team:  None noted     ASSESSED NUTRITION NEEDS:  Estimated Energy Needs: 4718-6066 kcals (35-40 Kcal/Kg)  Justification: repletion  Estimated Protein Needs: 60-75 grams protein (1.2-1.5 g pro/Kg)  Justification: Repletion  Estimated Fluid Needs: 3452-5117  mL   Justification: 1861-6916 mL maintenance    MALNUTRITION:  % Weight Loss:  Up to 10% in 6 months (non-severe malnutrition)  % Intake:  <75% for > 7 days (non-severe malnutrition)  Subcutaneous Fat Loss:  None observed due to telephone visit due to COVID-19 pandemic   Muscle Loss:  None observed due to telephone visit due to COVID-19 pandemic   Fluid Retention:  None noted due to telephone visit due to COVID-19 pandemic     Malnutrition Diagnosis: Non-Severe malnutrition  In Context of:  Chronic illness or disease    NUTRITION DIAGNOSIS:  Unintended weight loss related to decreased intake as evidenced by 10% weight loss in 8 months    INTERVENTIONS  Provided written & verbal education:   - Discussed ways to maximize and fortify foods with calories and protein via small frequent meals.  Suggest adding small quantities of olive oil to add calories to foods.    - Advised pt to aim for at least 1216-0777 kcal and 60-75 g protein daily.   - Reviewed common barriers to eating with treatment and as treatment progresses. Discussed ways to cope with decreased appetite.  - Reviewed ONS options (whey protein powder, Ensure Enlive, Ensure Plus/Boost Plus, CIB, etc). Encouraged utilizing these ONS in home made shakes/smoothies to prevent flavor fatigue.  Encouraged pt to start consuming 1 ONS or home made shakes/smoothies daily.       Provided pt with corresponding education materials/handouts on:  Academy of [employer] Dietetics [place] Nutrition Therapy handout and Academy of  Nutrition and Dietetics High Protein list      Pt verbalize understanding of materials provided during consult.   Patient Understanding: good  Expected Compliance: good     Implementation  Composition of Meals and Snacks and General/healthful diet    Goals  1.  Aim for 5-6 small frequent meals  2.  Aim for 8361-9024 kcal and 60-75 g protein/day    Follow-Up Plans:   RD to follow up in 2 weeks  RD name and number provided     MONITORING AND EVALUATION:  -Food intake  -Fluid/beverage intake  -Liquid meal replacement or supplement  -Weight trends    Sakina Cedeño, RD, LD  Clinical Dietitian  [facility]  [phone] (direct)       .        Again, thank you for allowing me to participate in the care of your patient.        Sincerely,        Calin Zheng RD, LD

## 2020-08-11 ENCOUNTER — INFUSION THERAPY VISIT (OUTPATIENT)
Dept: INFUSION THERAPY | Facility: CLINIC | Age: 75
End: 2020-08-11
Attending: INTERNAL MEDICINE
Payer: COMMERCIAL

## 2020-08-11 ENCOUNTER — HOSPITAL ENCOUNTER (OUTPATIENT)
Facility: CLINIC | Age: 75
Setting detail: SPECIMEN
Discharge: HOME | End: 2020-08-11
Attending: INTERNAL MEDICINE | Admitting: INTERNAL MEDICINE
Payer: COMMERCIAL

## 2020-08-11 ENCOUNTER — ONCOLOGY VISIT (OUTPATIENT)
Dept: ONCOLOGY | Facility: CLINIC | Age: 75
End: 2020-08-11
Attending: INTERNAL MEDICINE
Payer: COMMERCIAL

## 2020-08-11 VITALS
BODY MASS INDEX: 19.16 KG/M2 | OXYGEN SATURATION: 94 % | RESPIRATION RATE: 16 BRPM | DIASTOLIC BLOOD PRESSURE: 74 MMHG | HEART RATE: 70 BPM | WEIGHT: 111.6 LBS | TEMPERATURE: 98.1 F | SYSTOLIC BLOOD PRESSURE: 121 MMHG

## 2020-08-11 DIAGNOSIS — C25.0 MALIGNANT NEOPLASM OF HEAD OF PANCREAS (H): Primary | ICD-10-CM

## 2020-08-11 LAB
ALBUMIN SERPL-MCNC: 3.6 G/DL (ref 3.4–5)
ALP SERPL-CCNC: 100 U/L (ref 40–150)
ALT SERPL W P-5'-P-CCNC: 32 U/L (ref 0–50)
ANION GAP SERPL CALCULATED.3IONS-SCNC: 2 MMOL/L (ref 3–14)
AST SERPL W P-5'-P-CCNC: 24 U/L (ref 0–45)
BASOPHILS # BLD AUTO: 0.1 10E9/L (ref 0–0.2)
BASOPHILS NFR BLD AUTO: 1.4 %
BILIRUB SERPL-MCNC: 0.5 MG/DL (ref 0.2–1.3)
BUN SERPL-MCNC: 23 MG/DL (ref 7–30)
CALCIUM SERPL-MCNC: 8.9 MG/DL (ref 8.5–10.1)
CHLORIDE SERPL-SCNC: 105 MMOL/L (ref 94–109)
CO2 SERPL-SCNC: 29 MMOL/L (ref 20–32)
CREAT SERPL-MCNC: 0.88 MG/DL (ref 0.52–1.04)
DIFFERENTIAL METHOD BLD: ABNORMAL
EOSINOPHIL # BLD AUTO: 0.1 10E9/L (ref 0–0.7)
EOSINOPHIL NFR BLD AUTO: 2.7 %
ERYTHROCYTE [DISTWIDTH] IN BLOOD BY AUTOMATED COUNT: 14.5 % (ref 10–15)
GFR SERPL CREATININE-BSD FRML MDRD: 63 ML/MIN/{1.73_M2}
GLUCOSE SERPL-MCNC: 86 MG/DL (ref 70–99)
HCT VFR BLD AUTO: 40.4 % (ref 35–47)
HGB BLD-MCNC: 12.8 G/DL (ref 11.7–15.7)
IMM GRANULOCYTES # BLD: 0 10E9/L (ref 0–0.4)
IMM GRANULOCYTES NFR BLD: 0 %
LYMPHOCYTES # BLD AUTO: 1 10E9/L (ref 0.8–5.3)
LYMPHOCYTES NFR BLD AUTO: 28.3 %
MCH RBC QN AUTO: 29 PG (ref 26.5–33)
MCHC RBC AUTO-ENTMCNC: 31.7 G/DL (ref 31.5–36.5)
MCV RBC AUTO: 92 FL (ref 78–100)
MONOCYTES # BLD AUTO: 0.4 10E9/L (ref 0–1.3)
MONOCYTES NFR BLD AUTO: 11.1 %
NEUTROPHILS # BLD AUTO: 2.1 10E9/L (ref 1.6–8.3)
NEUTROPHILS NFR BLD AUTO: 56.5 %
NRBC # BLD AUTO: 0 10*3/UL
NRBC BLD AUTO-RTO: 0 /100
PLATELET # BLD AUTO: 266 10E9/L (ref 150–450)
POTASSIUM SERPL-SCNC: 4.4 MMOL/L (ref 3.4–5.3)
PROT SERPL-MCNC: 6.9 G/DL (ref 6.8–8.8)
RBC # BLD AUTO: 4.41 10E12/L (ref 3.8–5.2)
SODIUM SERPL-SCNC: 136 MMOL/L (ref 133–144)
WBC # BLD AUTO: 3.7 10E9/L (ref 4–11)

## 2020-08-11 PROCEDURE — 96367 TX/PROPH/DG ADDL SEQ IV INF: CPT

## 2020-08-11 PROCEDURE — 85025 COMPLETE CBC W/AUTO DIFF WBC: CPT | Performed by: INTERNAL MEDICINE

## 2020-08-11 PROCEDURE — G0463 HOSPITAL OUTPT CLINIC VISIT: HCPCS | Mod: 25

## 2020-08-11 PROCEDURE — 25800030 ZZH RX IP 258 OP 636: Performed by: INTERNAL MEDICINE

## 2020-08-11 PROCEDURE — 99215 OFFICE O/P EST HI 40 MIN: CPT | Performed by: INTERNAL MEDICINE

## 2020-08-11 PROCEDURE — 80053 COMPREHEN METABOLIC PANEL: CPT | Performed by: INTERNAL MEDICINE

## 2020-08-11 PROCEDURE — 96413 CHEMO IV INFUSION 1 HR: CPT

## 2020-08-11 PROCEDURE — 25000128 H RX IP 250 OP 636: Performed by: INTERNAL MEDICINE

## 2020-08-11 RX ORDER — SODIUM CHLORIDE 9 MG/ML
1000 INJECTION, SOLUTION INTRAVENOUS CONTINUOUS PRN
Status: CANCELLED
Start: 2020-08-25

## 2020-08-11 RX ORDER — HEPARIN SODIUM,PORCINE 10 UNIT/ML
5 VIAL (ML) INTRAVENOUS
Status: CANCELLED | OUTPATIENT
Start: 2020-08-11

## 2020-08-11 RX ORDER — MEPERIDINE HYDROCHLORIDE 25 MG/ML
25 INJECTION INTRAMUSCULAR; INTRAVENOUS; SUBCUTANEOUS EVERY 30 MIN PRN
Status: CANCELLED | OUTPATIENT
Start: 2020-08-11

## 2020-08-11 RX ORDER — DIPHENHYDRAMINE HYDROCHLORIDE 50 MG/ML
50 INJECTION INTRAMUSCULAR; INTRAVENOUS
Status: CANCELLED
Start: 2020-08-25

## 2020-08-11 RX ORDER — ALBUTEROL SULFATE 90 UG/1
1-2 AEROSOL, METERED RESPIRATORY (INHALATION)
Status: CANCELLED
Start: 2020-08-11

## 2020-08-11 RX ORDER — HEPARIN SODIUM,PORCINE 10 UNIT/ML
5 VIAL (ML) INTRAVENOUS
Status: CANCELLED | OUTPATIENT
Start: 2020-08-25

## 2020-08-11 RX ORDER — SODIUM CHLORIDE 9 MG/ML
1000 INJECTION, SOLUTION INTRAVENOUS CONTINUOUS PRN
Status: CANCELLED
Start: 2020-08-11

## 2020-08-11 RX ORDER — EPINEPHRINE 1 MG/ML
0.3 INJECTION, SOLUTION INTRAMUSCULAR; SUBCUTANEOUS EVERY 5 MIN PRN
Status: CANCELLED | OUTPATIENT
Start: 2020-08-11

## 2020-08-11 RX ORDER — EPINEPHRINE 1 MG/ML
0.3 INJECTION, SOLUTION INTRAMUSCULAR; SUBCUTANEOUS EVERY 5 MIN PRN
Status: CANCELLED | OUTPATIENT
Start: 2020-08-25

## 2020-08-11 RX ORDER — HEPARIN SODIUM (PORCINE) LOCK FLUSH IV SOLN 100 UNIT/ML 100 UNIT/ML
5 SOLUTION INTRAVENOUS
Status: CANCELLED | OUTPATIENT
Start: 2020-08-25

## 2020-08-11 RX ORDER — METHYLPREDNISOLONE SODIUM SUCCINATE 125 MG/2ML
125 INJECTION, POWDER, LYOPHILIZED, FOR SOLUTION INTRAMUSCULAR; INTRAVENOUS
Status: CANCELLED
Start: 2020-08-25

## 2020-08-11 RX ORDER — HEPARIN SODIUM (PORCINE) LOCK FLUSH IV SOLN 100 UNIT/ML 100 UNIT/ML
5 SOLUTION INTRAVENOUS
Status: CANCELLED | OUTPATIENT
Start: 2020-08-11

## 2020-08-11 RX ORDER — ALBUTEROL SULFATE 90 UG/1
1-2 AEROSOL, METERED RESPIRATORY (INHALATION)
Status: CANCELLED
Start: 2020-08-25

## 2020-08-11 RX ORDER — LORAZEPAM 2 MG/ML
0.5 INJECTION INTRAMUSCULAR EVERY 4 HOURS PRN
Status: CANCELLED
Start: 2020-08-11

## 2020-08-11 RX ORDER — NALOXONE HYDROCHLORIDE 0.4 MG/ML
.1-.4 INJECTION, SOLUTION INTRAMUSCULAR; INTRAVENOUS; SUBCUTANEOUS
Status: CANCELLED | OUTPATIENT
Start: 2020-08-25

## 2020-08-11 RX ORDER — HEPARIN SODIUM (PORCINE) LOCK FLUSH IV SOLN 100 UNIT/ML 100 UNIT/ML
5 SOLUTION INTRAVENOUS
Status: DISCONTINUED | OUTPATIENT
Start: 2020-08-11 | End: 2020-08-11 | Stop reason: HOSPADM

## 2020-08-11 RX ORDER — METHYLPREDNISOLONE SODIUM SUCCINATE 125 MG/2ML
125 INJECTION, POWDER, LYOPHILIZED, FOR SOLUTION INTRAMUSCULAR; INTRAVENOUS
Status: CANCELLED
Start: 2020-08-11

## 2020-08-11 RX ORDER — NALOXONE HYDROCHLORIDE 0.4 MG/ML
.1-.4 INJECTION, SOLUTION INTRAMUSCULAR; INTRAVENOUS; SUBCUTANEOUS
Status: CANCELLED | OUTPATIENT
Start: 2020-08-11

## 2020-08-11 RX ORDER — ALBUTEROL SULFATE 0.83 MG/ML
2.5 SOLUTION RESPIRATORY (INHALATION)
Status: CANCELLED | OUTPATIENT
Start: 2020-08-25

## 2020-08-11 RX ORDER — MEPERIDINE HYDROCHLORIDE 25 MG/ML
25 INJECTION INTRAMUSCULAR; INTRAVENOUS; SUBCUTANEOUS EVERY 30 MIN PRN
Status: CANCELLED | OUTPATIENT
Start: 2020-08-25

## 2020-08-11 RX ORDER — LORAZEPAM 2 MG/ML
0.5 INJECTION INTRAMUSCULAR EVERY 4 HOURS PRN
Status: CANCELLED
Start: 2020-08-25

## 2020-08-11 RX ORDER — DIPHENHYDRAMINE HYDROCHLORIDE 50 MG/ML
50 INJECTION INTRAMUSCULAR; INTRAVENOUS
Status: CANCELLED
Start: 2020-08-11

## 2020-08-11 RX ORDER — ALBUTEROL SULFATE 0.83 MG/ML
2.5 SOLUTION RESPIRATORY (INHALATION)
Status: CANCELLED | OUTPATIENT
Start: 2020-08-11

## 2020-08-11 RX ADMIN — SODIUM CHLORIDE 250 ML: 9 INJECTION, SOLUTION INTRAVENOUS at 10:29

## 2020-08-11 RX ADMIN — DEXAMETHASONE SODIUM PHOSPHATE 12 MG: 10 INJECTION, SOLUTION INTRAMUSCULAR; INTRAVENOUS at 10:28

## 2020-08-11 RX ADMIN — GEMCITABINE 1600 MG: 38 INJECTION, SOLUTION INTRAVENOUS at 10:50

## 2020-08-11 RX ADMIN — Medication 5 ML: at 11:32

## 2020-08-11 ASSESSMENT — PAIN SCALES - GENERAL: PAINLEVEL: NO PAIN (0)

## 2020-08-11 NOTE — PROGRESS NOTES
Infusion Nursing Note:  Flora Hogan presents today for Gemzar.    Patient seen by provider today: Yes: Dr Coronado   present during visit today: Not Applicable.    Note: N/A.    Intravenous Access:  Labs drawn without difficulty.  Implanted Port.    Treatment Conditions:  Lab Results   Component Value Date    HGB 12.8 08/11/2020     Lab Results   Component Value Date    WBC 3.7 08/11/2020      Lab Results   Component Value Date    ANEU 2.1 08/11/2020     Lab Results   Component Value Date     08/11/2020      Results reviewed, labs MET treatment parameters, ok to proceed with treatment.      Post Infusion Assessment:  Patient tolerated infusion without incident.  Site patent and intact, free from redness, edema or discomfort.  No evidence of extravasations.  Access discontinued per protocol.       Discharge Plan:   [facility]  Patient will return 8/25 for next appointment.   Patient discharged in stable condition accompanied by: self.  Departure Mode: Ambulatory.    Vikki Green RN

## 2020-08-11 NOTE — LETTER
8/11/2020         RE: Flora Hogan  [address]  [place] [address]        Dear Colleague,    Thank you for referring your patient, Flora Hogan, to the [facility] CANCER [facility]. Please see a copy of my visit note below.    [facility]    Hematology/Oncology Established Patient Note      Today's Date: 08/11/20    Reason for Follow-up: pancreatic cancer      HISTORY OF PRESENT ILLNESS: Flora Hogan is a 75 year old female, who presents with adenocarcinoma of the pancreas.  She initially presented with jaundice.  She was evaluated for EUS and FNA, which revealed atypical cells, but no definitive evidence of malignancy.  She also underwent biliary stenting for decompression of her biliary tree.  She had a second EUS done, but again revealed atypical cells.  She then saw Dr. Duarte Chaves at [facility].  She had CT scan and MRI.  MRI showed multiple benign lesions in her liver; there as one area that was indeterminate.  Her CA 19-9 was 93.  On 5/26/20, she underwent Whipple procedure.  Two nodules were visualized on the surface of the liver, which were biopsied and found to be benign on pathology.  There were also hepatic cysts seen.  Pathology showed pancreatic ductal adenocarcinoma, well-differentiated (grade 1), tumor size 2.6 x 2.4 x 2.0 cm, negative margins, +LVI, 0 of 4 lymph nodes involved, staged pT2N0 (stage IB).      Port was placed by IR on 7/6/20.    She started adjuvant chemotherapy with single-agent gemcitabine on 7/7/20.      INTERIM HISTORY: Flora started cycle 1 of chemotherapy, but day 8 had to be delayed due to neutropenia.  Day 15 was also canceled due to neutropenia.  She has not had fever or nausea.  She continues to have constipation issues though, but that has been ongoing for the past 3 years.        REVIEW OF SYSTEMS:   14 point ROS was reviewed and is negative other than as noted above in HPI.       HOME MEDICATIONS:  Current  Outpatient Medications   Medication Sig Dispense Refill     acetaminophen (TYLENOL) 325 MG tablet Take 1-2 tablets (325-650 mg) by mouth every 6 hours as needed for mild pain or fever 50 tablet 0     albuterol (PROAIR HFA/PROVENTIL HFA/VENTOLIN HFA) 108 (90 Base) MCG/ACT inhaler Inhale 2 puffs into the lungs every 6 hours as needed for shortness of breath / dyspnea or wheezing 1 Inhaler 0     calcium carbonate-vitamin D (CALTRATE 600+D) 600-400 MG-UNIT chewable tablet Take 1 chew tab by mouth 2 times daily  180 tablet 3     loratadine (CLARITIN REDITABS) 10 MG dispersible tablet Take 10 mg by mouth as needed        magnesium 250 MG tablet Take 1 tablet by mouth daily       pantoprazole (PROTONIX) 40 MG EC tablet Take 1 tablet (40 mg) by mouth every morning (before breakfast) 90 tablet 5     polyethylene glycol (MIRALAX) 17 g packet Take 17 g by mouth daily as needed for constipation 12 packet 0     oxyCODONE (ROXICODONE) 5 MG tablet Take 1-2 tablets (5-10 mg) by mouth every 6 hours as needed for moderate to severe pain (Patient not taking: Reported on 6/9/2020) 20 tablet 0     prochlorperazine (COMPAZINE) 10 MG tablet Take 1 tablet (10 mg) by mouth every 6 hours as needed (Nausea/Vomiting) (Patient not taking: Reported on 7/14/2020) 30 tablet 1         ALLERGIES:  Allergies   Allergen Reactions     Penicillin V      Fredy-1 [Lidocaine] Unknown         PAST MEDICAL HISTORY:  Past Medical History:   Diagnosis Date     Chest tightness     had suspected allergies     Malignant neoplasm of head of pancreas (H) 6/9/2020     Seasonal allergies      SOB (shortness of breath)          PAST SURGICAL HISTORY:  Past Surgical History:   Procedure Laterality Date     CATARACT IOL, RT/LT  2015     COLONOSCOPY  10/14    no repeat needed due to age     COLONOSCOPY N/A 10/7/2014    Procedure: COLONOSCOPY;  Surgeon: Daryl Hanson MD;  Location:  GI     COLONOSCOPY  04/23/2019    no further screening     ENDOSCOPIC RETROGRADE  CHOLANGIOPANCREATOGRAM N/A 4/20/2020    Procedure: ENDOSCOPIC RETROGRADE CHOLANGIOPANCREATOGRAPHY, PLACEMENT OF PANCREATIC STENT, PLACEMENT OF BILIARY STENT;  Surgeon: Yarely Vann MD;  Location: [facility] OR     ESOPHAGOSCOPY, GASTROSCOPY, DUODENOSCOPY (EGD), COMBINED N/A 4/20/2020    Procedure: ESOPHAGOGASTRODUODENOSCOPY, WITH FINE NEEDLE ASPIRATION BIOPSY, WITH ENDOSCOPIC ULTRASOUND GUIDANCE, Endoscopic retrograde cholangiopancreatography;  Surgeon: Yarely Vann MD;  Location: [facility] OR     ESOPHAGOSCOPY, GASTROSCOPY, DUODENOSCOPY (EGD), COMBINED N/A 5/5/2020    Procedure: ESOPHAGOGASTRODUODENOSCOPY, WITH FINE NEEDLE ASPIRATION BIOPSY, WITH ENDOSCOPIC ULTRASOUND GUIDANCE;  Surgeon: Romina Rosario MD;  Location:  GI     IR CHEST PORT PLACEMENT > 5 YRS OF AGE  7/6/2020     LAPAROSCOPIC BIOPSY LIVER N/A 5/22/2020    Procedure: Diagnostic Laparoscopy with intraoperative ultrasound and Liver Biopsy X2;  Surgeon: Duarte Chaves MD;  Location: [facility] OR     WHIPPLE PROCEDURE N/A 5/22/2020    Procedure: Non Pylorus Preserving Whipple procedure;  Surgeon: Duarte Chaves MD;  Location: [facility] OR         SOCIAL HISTORY:  Social History     Socioeconomic History     Marital status:      Spouse name: Not on file     Number of children: Not on file     Years of education: Not on file     Highest education level: Not on file   Occupational History     Employer: RETIRED     Comment: previous taught special ed   Social Needs     Financial resource strain: Not on file     Food insecurity     Worry: Not on file     Inability: Not on file     Transportation needs     Medical: Not on file     Non-medical: Not on file   Tobacco Use     Smoking status: Never Smoker     Smokeless tobacco: Never Used   Substance and Sexual Activity     Alcohol use: Yes     Comment: social      Drug use: No     Sexual activity: Yes   Lifestyle     Physical activity     Days per week: Not on file     Minutes per session: Not on  file     Stress: Not on file   Relationships     Social connections     Talks on phone: Not on file     Gets together: Not on file     Attends Gnosticist service: Not on file     Active member of club or organization: Not on file     Attends meetings of clubs or organizations: Not on file     Relationship status: Not on file     Intimate partner violence     Fear of current or ex partner: Not on file     Emotionally abused: Not on file     Physically abused: Not on file     Forced sexual activity: Not on file   Other Topics Concern     Parent/sibling w/ CABG, MI or angioplasty before 65F 55M? Not Asked      Service Not Asked     Blood Transfusions Not Asked     Caffeine Concern Yes     Comment: 2 cups     Occupational Exposure Not Asked     Hobby Hazards Not Asked     Sleep Concern Not Asked     Stress Concern Not Asked     Weight Concern Not Asked     Special Diet No     Back Care Not Asked     Exercise Yes     Comment: walking workout      Bike Helmet Not Asked     Seat Belt Not Asked     Self-Exams Not Asked   Social History Narrative     Not on file         FAMILY HISTORY:  Family History   Problem Relation Age of Onset     Musculoskeletal Disorder Mother         edematous legs     Obesity Mother      Musculoskeletal Disorder Maternal Grandmother         edematous legs; did have amputation     Obesity Maternal Grandmother      Myocardial Infarction Maternal Grandmother          PHYSICAL EXAM:  Vital signs:  /74   Pulse 70   Temp 98.1  F (36.7  C) (Tympanic)   Resp 16   Wt 50.6 kg (111 lb 9.6 oz)   LMP  (LMP Unknown)   SpO2 94%   BMI 19.16 kg/m     ECO  GENERAL/CONSTITUTIONAL: No acute distress.  EYES: No scleral icterus.  MUSCULOSKELETAL: Warm and well-perfused, no cyanosis, clubbing, or edema.  NEUROLOGIC: Alert, oriented, answers questions appropriately.  INTEGUMENTARY: No jaundice.  Port in place.        LABS:  CBC RESULTS:   Recent Labs   Lab Test 20  0957   WBC 3.7*   RBC 4.41    HGB 12.8   HCT 40.4   MCV 92   MCH 29.0   MCHC 31.7   RDW 14.5        Recent Labs   Lab Test 08/11/20  0957 07/07/20  0854    138   POTASSIUM 4.4 4.1   CHLORIDE 105 105   CO2 29 30   ANIONGAP 2* 3   GLC 86 77   BUN 23 14   CR 0.88 0.76   CAMERON 8.9 8.2*     Lab Results   Component Value Date    AST 24 08/11/2020     Lab Results   Component Value Date    ALT 32 08/11/2020     No results found for: BILICONJ   Lab Results   Component Value Date    BILITOTAL 0.5 08/11/2020     Lab Results   Component Value Date    ALBUMIN 3.6 08/11/2020     Lab Results   Component Value Date    PROTTOTAL 6.9 08/11/2020      Lab Results   Component Value Date    ALKPHOS 100 08/11/2020       Component      Latest Ref Rng & Units 4/19/2020 5/5/2020 7/7/2020   Cancer Antigen 19-9      0 - 37 U/mL 109 (H) 93 (H) 7         IMAGING:  CT c/a/p 6/30/20:  1.  Interval postoperative changes of Whipple. Mild soft tissue  thickening at the operative bed is likely postoperative.  2.  No convincing evidence of recurrence or metastatic disease.  3.  Stable 3 mm left lower lobe pulmonary nodule.  4.  Large stool volume throughout the colon.      ASSESSMENT/PLAN:  Flora Hogan is a 75 year old female with:    1) Adenocarcinoma of the pancreas head: s/p Whipple procedure on 5/26/20; pathology showed pancreatic ductal adenocarcinoma, well-differentiated (grade 1), tumor size 2.6 x 2.4 x 2.0 cm, negative margins, +LVI, 0 of 4 lymph nodes involved, staged pT2N0 (stage IB).      Post-op restaging scan shows post-operative changes with no convincing evidence of recurrence or metastatic disease.      She is on adjuvant chemotherapy with single-agent gemcitabine.  She had severe neutropenia after just one dose of gemcitabine.  She is going to need dose-reduction.  We discussed options of reducing dose of neutropenia to 800 mg/m2 and staying on the says schedule or switching to every other week schedule.  Suspect that she will continue to have  issues with neutropenia, so we decided to switch to every other week schedule.  She is agreeable with this.      -C2D1 of chemotherapy today.  C2D15 on 8/25/20.   -appointment with Lacey on 8/25/20 for toxicity check.  -appointment with me on 9/8/20, with C3D1 of chemotherapy same day  -will repeat scans after adjuvant chemotherapy is completed    2) Pulmonary nodule: stable 3 mm left lower lobe pulmonary nodule  -will monitor on future scans    3) Chronic constipation: Patient says that this has been an issue for her for many years.  She controls it with diet, and she does have stool softeners at home as needed.  She asked about pancreatic insufficiency after Whipple, which is possible.  We discussed trying Creon prior to meals, and she agrees.  -Creon prescribed    4) Neutropenia: Secondary to chemotherapy.  ANC is recovered now, and will proceed with cycle 2 of gemcitabine today.      I spent a total of 40 minutes with the patient, with over >50% of the time in counseling and/or coordination of care.       Jeannie Coronado MD  Hematology/Oncology  [facility]    Again, thank you for allowing me to participate in the care of your patient.        Sincerely,        Jeannie Coronado MD

## 2020-08-11 NOTE — NURSING NOTE
"Oncology Rooming Note    August 11, 2020 9:18 AM   Flora Hogan is a 75 year old female who presents for:    Chief Complaint   Patient presents with     Oncology Clinic Visit     malignant neoplasm of head of pancreas     Initial Vitals: /74   Pulse 70   Temp 98.1  F (36.7  C) (Tympanic)   Resp 16   Wt 50.6 kg (111 lb 9.6 oz)   LMP  (LMP Unknown)   SpO2 94%   BMI 19.16 kg/m   Estimated body mass index is 19.16 kg/m  as calculated from the following:    Height as of 5/22/20: 1.626 m (5' 4\").    Weight as of this encounter: 50.6 kg (111 lb 9.6 oz). Body surface area is 1.51 meters squared.  No Pain (0) Comment: Data Unavailable   No LMP recorded (lmp unknown). Patient is postmenopausal.  Allergies reviewed: Yes  Medications reviewed: Yes    Medications: Medication refills not needed today.  Pharmacy name entered into [facility]:    [id number] - [place], [place] - [address] [id number], [place] - [address] [address] AT [place] [address] & [address] [address] [address]    Clinical concerns: bloating and occasional discomfort left upper abdomen Dr. Coronado was notified.      Vikki Riddle RN            "

## 2020-08-11 NOTE — PROGRESS NOTES
[facility]    Hematology/Oncology Established Patient Note      Today's Date: 08/11/20    Reason for Follow-up: pancreatic cancer      HISTORY OF PRESENT ILLNESS: Flora Hogan is a 75 year old female, who presents with adenocarcinoma of the pancreas.  She initially presented with jaundice.  She was evaluated for EUS and FNA, which revealed atypical cells, but no definitive evidence of malignancy.  She also underwent biliary stenting for decompression of her biliary tree.  She had a second EUS done, but again revealed atypical cells.  She then saw Dr. Duarte Chaves at [facility].  She had CT scan and MRI.  MRI showed multiple benign lesions in her liver; there as one area that was indeterminate.  Her CA 19-9 was 93.  On 5/26/20, she underwent Whipple procedure.  Two nodules were visualized on the surface of the liver, which were biopsied and found to be benign on pathology.  There were also hepatic cysts seen.  Pathology showed pancreatic ductal adenocarcinoma, well-differentiated (grade 1), tumor size 2.6 x 2.4 x 2.0 cm, negative margins, +LVI, 0 of 4 lymph nodes involved, staged pT2N0 (stage IB).      Port was placed by IR on 7/6/20.    She started adjuvant chemotherapy with single-agent gemcitabine on 7/7/20.      INTERIM HISTORY: Flora started cycle 1 of chemotherapy, but day 8 had to be delayed due to neutropenia.  Day 15 was also canceled due to neutropenia.  She has not had fever or nausea.  She continues to have constipation issues though, but that has been ongoing for the past 3 years.        REVIEW OF SYSTEMS:   14 point ROS was reviewed and is negative other than as noted above in HPI.       HOME MEDICATIONS:  Current Outpatient Medications   Medication Sig Dispense Refill     acetaminophen (TYLENOL) 325 MG tablet Take 1-2 tablets (325-650 mg) by mouth every 6 hours as needed for mild pain or fever 50 tablet 0     albuterol (PROAIR HFA/PROVENTIL HFA/VENTOLIN HFA) 108  (90 Base) MCG/ACT inhaler Inhale 2 puffs into the lungs every 6 hours as needed for shortness of breath / dyspnea or wheezing 1 Inhaler 0     calcium carbonate-vitamin D (CALTRATE 600+D) 600-400 MG-UNIT chewable tablet Take 1 chew tab by mouth 2 times daily  180 tablet 3     loratadine (CLARITIN REDITABS) 10 MG dispersible tablet Take 10 mg by mouth as needed        magnesium 250 MG tablet Take 1 tablet by mouth daily       pantoprazole (PROTONIX) 40 MG EC tablet Take 1 tablet (40 mg) by mouth every morning (before breakfast) 90 tablet 5     polyethylene glycol (MIRALAX) 17 g packet Take 17 g by mouth daily as needed for constipation 12 packet 0     oxyCODONE (ROXICODONE) 5 MG tablet Take 1-2 tablets (5-10 mg) by mouth every 6 hours as needed for moderate to severe pain (Patient not taking: Reported on 6/9/2020) 20 tablet 0     prochlorperazine (COMPAZINE) 10 MG tablet Take 1 tablet (10 mg) by mouth every 6 hours as needed (Nausea/Vomiting) (Patient not taking: Reported on 7/14/2020) 30 tablet 1         ALLERGIES:  Allergies   Allergen Reactions     Penicillin V      Fredy-1 [Lidocaine] Unknown         PAST MEDICAL HISTORY:  Past Medical History:   Diagnosis Date     Chest tightness     had suspected allergies     Malignant neoplasm of head of pancreas (H) 6/9/2020     Seasonal allergies      SOB (shortness of breath)          PAST SURGICAL HISTORY:  Past Surgical History:   Procedure Laterality Date     CATARACT IOL, RT/LT  2015     COLONOSCOPY  10/14    no repeat needed due to age     COLONOSCOPY N/A 10/7/2014    Procedure: COLONOSCOPY;  Surgeon: Daryl Hanson MD;  Location:  GI     COLONOSCOPY  04/23/2019    no further screening     ENDOSCOPIC RETROGRADE CHOLANGIOPANCREATOGRAM N/A 4/20/2020    Procedure: ENDOSCOPIC RETROGRADE CHOLANGIOPANCREATOGRAPHY, PLACEMENT OF PANCREATIC STENT, PLACEMENT OF BILIARY STENT;  Surgeon: Yarely Vann MD;  Location:  OR     ESOPHAGOSCOPY, GASTROSCOPY, DUODENOSCOPY  (EGD), COMBINED N/A 4/20/2020    Procedure: ESOPHAGOGASTRODUODENOSCOPY, WITH FINE NEEDLE ASPIRATION BIOPSY, WITH ENDOSCOPIC ULTRASOUND GUIDANCE, Endoscopic retrograde cholangiopancreatography;  Surgeon: Yarely Vann MD;  Location:  OR     ESOPHAGOSCOPY, GASTROSCOPY, DUODENOSCOPY (EGD), COMBINED N/A 5/5/2020    Procedure: ESOPHAGOGASTRODUODENOSCOPY, WITH FINE NEEDLE ASPIRATION BIOPSY, WITH ENDOSCOPIC ULTRASOUND GUIDANCE;  Surgeon: Romina Rosario MD;  Location:  GI     IR CHEST PORT PLACEMENT > 5 YRS OF AGE  7/6/2020     LAPAROSCOPIC BIOPSY LIVER N/A 5/22/2020    Procedure: Diagnostic Laparoscopy with intraoperative ultrasound and Liver Biopsy X2;  Surgeon: Duarte Chaves MD;  Location: [facility] OR     WHIPPLE PROCEDURE N/A 5/22/2020    Procedure: Non Pylorus Preserving Whipple procedure;  Surgeon: Duarte Chaves MD;  Location: [facility] OR         SOCIAL HISTORY:  Social History     Socioeconomic History     Marital status:      Spouse name: Not on file     Number of children: Not on file     Years of education: Not on file     Highest education level: Not on file   Occupational History     Employer: RETIRED     Comment: previous taught special ed   Social Needs     Financial resource strain: Not on file     Food insecurity     Worry: Not on file     Inability: Not on file     Transportation needs     Medical: Not on file     Non-medical: Not on file   Tobacco Use     Smoking status: Never Smoker     Smokeless tobacco: Never Used   Substance and Sexual Activity     Alcohol use: Yes     Comment: social      Drug use: No     Sexual activity: Yes   Lifestyle     Physical activity     Days per week: Not on file     Minutes per session: Not on file     Stress: Not on file   Relationships     Social connections     Talks on phone: Not on file     Gets together: Not on file     Attends Restoration service: Not on file     Active member of club or organization: Not on file     Attends meetings of clubs  or organizations: Not on file     Relationship status: Not on file     Intimate partner violence     Fear of current or ex partner: Not on file     Emotionally abused: Not on file     Physically abused: Not on file     Forced sexual activity: Not on file   Other Topics Concern     Parent/sibling w/ CABG, MI or angioplasty before 65F 55M? Not Asked      Service Not Asked     Blood Transfusions Not Asked     Caffeine Concern Yes     Comment: 2 cups     Occupational Exposure Not Asked     Hobby Hazards Not Asked     Sleep Concern Not Asked     Stress Concern Not Asked     Weight Concern Not Asked     Special Diet No     Back Care Not Asked     Exercise Yes     Comment: walking workout      Bike Helmet Not Asked     Seat Belt Not Asked     Self-Exams Not Asked   Social History Narrative     Not on file         FAMILY HISTORY:  Family History   Problem Relation Age of Onset     Musculoskeletal Disorder Mother         edematous legs     Obesity Mother      Musculoskeletal Disorder Maternal Grandmother         edematous legs; did have amputation     Obesity Maternal Grandmother      Myocardial Infarction Maternal Grandmother          PHYSICAL EXAM:  Vital signs:  /74   Pulse 70   Temp 98.1  F (36.7  C) (Tympanic)   Resp 16   Wt 50.6 kg (111 lb 9.6 oz)   LMP  (LMP Unknown)   SpO2 94%   BMI 19.16 kg/m     ECO  GENERAL/CONSTITUTIONAL: No acute distress.  EYES: No scleral icterus.  MUSCULOSKELETAL: Warm and well-perfused, no cyanosis, clubbing, or edema.  NEUROLOGIC: Alert, oriented, answers questions appropriately.  INTEGUMENTARY: No jaundice.  Port in place.        LABS:  CBC RESULTS:   Recent Labs   Lab Test 20  0957   WBC 3.7*   RBC 4.41   HGB 12.8   HCT 40.4   MCV 92   MCH 29.0   MCHC 31.7   RDW 14.5        Recent Labs   Lab Test 20  0957 20  0854    138   POTASSIUM 4.4 4.1   CHLORIDE 105 105   CO2 29 30   ANIONGAP 2* 3   GLC 86 77   BUN 23 14   CR 0.88 0.76   CAMERON  8.9 8.2*     Lab Results   Component Value Date    AST 24 08/11/2020     Lab Results   Component Value Date    ALT 32 08/11/2020     No results found for: BILICONJ   Lab Results   Component Value Date    BILITOTAL 0.5 08/11/2020     Lab Results   Component Value Date    ALBUMIN 3.6 08/11/2020     Lab Results   Component Value Date    PROTTOTAL 6.9 08/11/2020      Lab Results   Component Value Date    ALKPHOS 100 08/11/2020       Component      Latest Ref Rng & Units 4/19/2020 5/5/2020 7/7/2020   Cancer Antigen 19-9      0 - 37 U/mL 109 (H) 93 (H) 7         IMAGING:  CT c/a/p 6/30/20:  1.  Interval postoperative changes of Whipple. Mild soft tissue  thickening at the operative bed is likely postoperative.  2.  No convincing evidence of recurrence or metastatic disease.  3.  Stable 3 mm left lower lobe pulmonary nodule.  4.  Large stool volume throughout the colon.      ASSESSMENT/PLAN:  Flora Hogan is a 75 year old female with:    1) Adenocarcinoma of the pancreas head: s/p Whipple procedure on 5/26/20; pathology showed pancreatic ductal adenocarcinoma, well-differentiated (grade 1), tumor size 2.6 x 2.4 x 2.0 cm, negative margins, +LVI, 0 of 4 lymph nodes involved, staged pT2N0 (stage IB).      Post-op restaging scan shows post-operative changes with no convincing evidence of recurrence or metastatic disease.      She is on adjuvant chemotherapy with single-agent gemcitabine.  She had severe neutropenia after just one dose of gemcitabine.  She is going to need dose-reduction.  We discussed options of reducing dose of neutropenia to 800 mg/m2 and staying on the says schedule or switching to every other week schedule.  Suspect that she will continue to have issues with neutropenia, so we decided to switch to every other week schedule.  She is agreeable with this.      -C2D1 of chemotherapy today.  C2D15 on 8/25/20.   -appointment with Lacey on 8/25/20 for toxicity check.  -appointment with me on 9/8/20, with  C3D1 of chemotherapy same day  -will repeat scans after adjuvant chemotherapy is completed    2) Pulmonary nodule: stable 3 mm left lower lobe pulmonary nodule  -will monitor on future scans    3) Chronic constipation: Patient says that this has been an issue for her for many years.  She controls it with diet, and she does have stool softeners at home as needed.  She asked about pancreatic insufficiency after Whipple, which is possible.  We discussed trying Creon prior to meals, and she agrees.  -Creon prescribed    4) Neutropenia: Secondary to chemotherapy.  ANC is recovered now, and will proceed with cycle 2 of gemcitabine today.      I spent a total of 40 minutes with the patient, with over >50% of the time in counseling and/or coordination of care.       Jeannie Coronado MD  Hematology/Oncology  [facility]

## 2020-08-11 NOTE — PATIENT INSTRUCTIONS
Scheduled next two follow-up appt and chemo leigh     Labs, chemo and appt with Lacey scheduled 8/25  Labs, chemo and appt with Dr. Coronado scheduled 9/8  Leyla Layton, RN, BSN, SHARMIN  RN Care Coordinator  [facility]  [phone]

## 2020-08-12 ENCOUNTER — PATIENT OUTREACH (OUTPATIENT)
Dept: ONCOLOGY | Facility: CLINIC | Age: 75
End: 2020-08-12

## 2020-08-12 ENCOUNTER — TELEPHONE (OUTPATIENT)
Dept: ONCOLOGY | Facility: CLINIC | Age: 75
End: 2020-08-12

## 2020-08-12 NOTE — PROGRESS NOTES
Jeannie Coronado MD Anderson, Amanda E, RN    Caller: Unspecified (Today,  1:23 PM)               No, I feel the benefit outweighs the risk in terms of neutropenia       Writer called Flora back and updated her with Dr. Coronado's message. Per Flora she is wondering what Dr. Coronado thinks will happen after she has completed the 13 infusions. Writer explained to Flora that she may have to wait to see how her treatments go and how she tolerates them. Writer encouraged Flora to call in or write a My Chart message if she has any further questions or concerns.   Leyla Layton, RN, BSN, SHARMIN  RN Care Coordinator  [facility]  [phone]

## 2020-08-12 NOTE — TELEPHONE ENCOUNTER
"Social Work Progress Note      Data/Intervention:  Patient Name:  Flora Hogan  /Age:  1945 (75 year old)    Reason for Follow-Up:  Flora is a 75-year-old woman with a new diagnosis of pancreatic cancer who came to clinic 20 for C2D1 Gemzar. This clinician called Flora today with goal of introducing psychosocial services and support as she is coping with new treatment.     Intervention:   Flora is a  woman, who reports that her , Mitul, coped with prostate cancer in the past, and whose first wife  of cancer. 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     Extensive discussion surrounding systems of support and projects that she finds fulfilling. Flora reports that she is the oldest of 8 children, who all supported her mother by providing care at end-of-life and executing her wishes at her . Flora reports that she feels very close to siblings, who check-in with her frequently. Flora also reports a strong [place] community and neighbors (as [place] lives in a community of [place] are very helpful. Flora reports that she is, \"comfortable with where I am in life\" and is not fearful about end-of-life, having paid for  and worked closely with  in the past. Flora reports that she finds great enjoyment in creating photo book for granddaughter, and is looking forward to engagement in quilting projects in the coming weeks. Flora's greatest concern at present is granddaughter's coping, SW discussed resource of [employer] for support, providing information on [employer].     Flora receptive to hearing about SW support available as part of team, supporting practical and emotional needs. Denied need at present, but was receptive to taking SW " contact information down if in need in the future.     Resources Provided:  [employer] [facility]  Onc SW contact information    Plan:  1) SW will continue to be available as needed in ongoing way. Flora knows to reach out in the case of distress or concern. No SW outreach needed at present time.   2) Ongoing collaboration with multidisciplinary care team.     Please call or page if needs or concerns arise.     ALAN Mathew, LICSW  Direct Phone: [phone]  Pager: [phone]

## 2020-08-12 NOTE — PROGRESS NOTES
"S-(situation): Flora called in to clinic asking about Dr. Coronado's changes to the chemotherapy frequency. She reports Dr. Coronado is recommending every other week infusions instead of 3 weeks on and 1 week off.     Flora reports she and her  were \"counting it out last night\" and it appears she will be getting 13 infusions now of gemzar instead of 18 like originally planned.     Flora is wondering if getting 5 fewer infusions would impact her outcomes.    B-(background):     A-(assessment):     R-(recommendations): Writer will reach out to Dr. Coronado for input.       Leyla Layton, RN, BSN, SHARMIN  RN Care Coordinator  [facility]  [phone]      "

## 2020-08-14 ENCOUNTER — PATIENT OUTREACH (OUTPATIENT)
Dept: ONCOLOGY | Facility: CLINIC | Age: 75
End: 2020-08-14

## 2020-08-14 NOTE — PROGRESS NOTES
"S-(situation): Flora called in to clinic reporting she just picked up her Creon and there are several warnings all over the prescription stating \"follow the instructions given by your doctor on how to take this medication.\"     B-(background): Flora sees Dr. Coronado for pancreatic cancer and is receiving gemcitabine for chemotherapy.    Flora reports she has been suffering from loose stools for a long time. She has 5 to 6 BMs per day with undigested food in the stools.     A-(assessment): Writer discussed patient instructions that Dr. Coronado had written on the Creon prescriptions: \"Take 1 capsule (24,000 Units) by mouth 3 times daily (with meals).\"     Flora asked for clarification because the package says she should take it with meals AND snacks.     Writer discussed with TEREZA Valdez. José Miguel explained to have pt follow Dr. Coronado's instructions and if she continues to have loose stools then the dose can be titrated.     R-(recommendations): Writer encouraged Flora to take the medication as instructed by Dr. Coronado and if she has any issues or questions to please contact clinic. Writer also sent Flora a My Chart message with Creon information. Flora verbalized understanding and is in agreement with the plan. She has no further questions or concerns at this time.     Leyla Layton, RN, BSN, SHARMIN  RN Care Coordinator  [facility]  [phone]        "

## 2020-08-18 ENCOUNTER — TELEPHONE (OUTPATIENT)
Dept: ONCOLOGY | Facility: CLINIC | Age: 75
End: 2020-08-18

## 2020-08-18 NOTE — PROGRESS NOTES
Follow up call to patient.  Patient states struggling with constipation and diarrhea.  Patient has not started Creon as unsure about medication.  Patient's diarrhea impacting life as patient is not able to leave house.  Patient expressed interest in support groups but knows only meeting online and that would not be helpful for her.  Patient did not have any specific nutrition concerns and did not want to review po intake past few days. Patient did state when she has constipation she drinks more smoothies and Ensure and takes supplements for constipation.  Encouraged patient to contact RD if does have nutrition concerns.  Patient has RD contact information.  Patient verbalized understanding of plan.    Sakina Cedeño, RD, LD  Clinical Dietitian  [facility]  [phone] (direct)

## 2020-08-24 NOTE — PROGRESS NOTES
Oncology/Hematology Visit Note    Aug 25, 2020    Reason for visit: Follow up pancreatic cancer    Oncology HPI: Flora Hogan is a 75 year old female with adenocarcinoma of the pancreas. She presented with jaundice and underwent biliary stenting and EUS x 2 with atypical cells. CT and MRI revealed benign liver lesions and CA was elevated at 19-9, therefore she underwent a Whipple procedure on 5/26/2020. Pathology showed pancreatic ductal adenocarcinoma, well-differentiated, grade 1 and 0/4 lymph nodes involved. She established care with Dr Coronado on 6/9/2020 and she suggested adjuvant chemotherapy with single agent gemcitabine, 3 weeks on and 1 week off, C1D1 on 7/7/2020.     She was scheduled for C1D8 on 7/15/2020, but developed severe neutropenia.  Dr. Coronado changed the plan to days 1, 15.    Video visit today for C2 D 15.    Interval History: Flora is doing pretty well today.  She has tolerated chemotherapy pretty well with minimal side effects.  She admits that for several years she has had difficulty with diarrhea, bloating, gas and light-colored stools.  She was given a prescription for Creon, but she was nervous to try it as she read some bad things online.  She has been made EPN a lot of adjustments to her diet.  She and her  typically go to [place] for the winter and this is been difficult.  Denies recent fever or chills, vomiting, bleeding, cough or shortness of breath.  She is happy with how chemotherapy is going so far    Review of Systems: See interval hx. Denies fevers, chills, HA, dizziness, n/t, changes in vision, cough, sore throat, CP, SOB, N/V, changes in urination, bleeding, bruising, rash.      PMHx and Social Hx reviewed per EPIC.      Medications:  Current Outpatient Medications   Medication Sig Dispense Refill     acetaminophen (TYLENOL) 325 MG tablet Take 1-2 tablets (325-650 mg) by mouth every 6 hours as needed for mild pain or fever 50 tablet 0     albuterol (PROAIR HFA/PROVENTIL  HFA/VENTOLIN HFA) 108 (90 Base) MCG/ACT inhaler Inhale 2 puffs into the lungs every 6 hours as needed for shortness of breath / dyspnea or wheezing 1 Inhaler 0     amylase-lipase-protease (CREON) 20362-12702 units CPEP per EC capsule Take 1 capsule (24,000 Units) by mouth 3 times daily (with meals) 90 capsule 3     calcium carbonate-vitamin D (CALTRATE 600+D) 600-400 MG-UNIT chewable tablet Take 1 chew tab by mouth 2 times daily  180 tablet 3     loratadine (CLARITIN REDITABS) 10 MG dispersible tablet Take 10 mg by mouth as needed        magnesium 250 MG tablet Take 1 tablet by mouth daily       oxyCODONE (ROXICODONE) 5 MG tablet Take 1-2 tablets (5-10 mg) by mouth every 6 hours as needed for moderate to severe pain (Patient not taking: Reported on 6/9/2020) 20 tablet 0     pantoprazole (PROTONIX) 40 MG EC tablet Take 1 tablet (40 mg) by mouth every morning (before breakfast) 90 tablet 5     polyethylene glycol (MIRALAX) 17 g packet Take 17 g by mouth daily as needed for constipation 12 packet 0     prochlorperazine (COMPAZINE) 10 MG tablet Take 1 tablet (10 mg) by mouth every 6 hours as needed (Nausea/Vomiting) (Patient not taking: Reported on 7/14/2020) 30 tablet 1       Allergies   Allergen Reactions     Penicillin V      Fredy-1 [Lidocaine] Unknown         EXAM:    /68   Pulse 57   Temp 98  F (36.7  C) (Tympanic)   Resp 16   LMP  (LMP Unknown)   SpO2 98%  Telephone visit, therefore exam and vital signs were not performed.    Labs:   Results for ANALI PRUITT (MRN [id number]) as of 8/25/2020 16:12   8/25/2020 09:35   WBC 3.6 (L)   Hemoglobin 11.9   Hematocrit 37.1   Platelet Count 157   RBC Count 4.11   MCV 90   MCH 29.0   MCHC 32.1   RDW 14.5   Diff Method Automated Method   % Neutrophils 58.7   % Lymphocytes 24.4   % Monocytes 11.9   % Eosinophils 3.9   % Basophils 0.8   % Immature Granulocytes 0.3   Nucleated RBCs 0   Absolute Neutrophil 2.1   Absolute Lymphocytes 0.9   Absolute Monocytes 0.4    Absolute Eosinophils 0.1   Absolute Basophils 0.0   Abs Immature Granulocytes 0.0   Absolute Nucleated RBC 0.0       Imaging: n/a    Impression/Plan: Flora Hogan is a 74 year old female with pancreatic cancer status post Whipple's procedure currently on adjuvant chemotherapy with single agent gemcitabine.    Adenocarcinoma of the pancreas head: s/p Whipple procedure and restaging scan with no evidence of metastatic disease.  She started adjuvant chemotherapy with C1 D1 on 7/7/2020 and became severely neutropenic, therefore her schedule was changed to day 1, 15.  She is completed 1 full cycle and labs are within parameters to proceed with C2 D 15 today.  --9/8 Dr. Coronado, C3D1    Pulmonary nodule: stable 3 mm left lower lobe pulmonary nodule. We will monitor on future scans.     GI: Intermittent diarrhea and constipation.  This is been ongoing for many years, per patient.  She has seen GI and tries to control with diet, but she does have Imodium and stool softeners at home if needed.  She has not started Creon yet and I highly suggested that she start that as soon as possible.  I suspect this will improve the bloating, cramping and digestive issues.     Chronic leg cramping: She has had this for many years.    Electrolytes were normal on 8/11    Chart documentation with Dragon Voice recognition Software. Although reviewed after completion, some words and grammatical errors may remain.      Lacey Castillo PA-C  Hematology/Oncology  [facility]

## 2020-08-25 ENCOUNTER — INFUSION THERAPY VISIT (OUTPATIENT)
Dept: INFUSION THERAPY | Facility: CLINIC | Age: 75
End: 2020-08-25
Attending: INTERNAL MEDICINE
Payer: COMMERCIAL

## 2020-08-25 ENCOUNTER — HOSPITAL ENCOUNTER (OUTPATIENT)
Facility: CLINIC | Age: 75
Setting detail: SPECIMEN
Discharge: HOME | End: 2020-08-25
Attending: INTERNAL MEDICINE | Admitting: INTERNAL MEDICINE
Payer: COMMERCIAL

## 2020-08-25 ENCOUNTER — ONCOLOGY VISIT (OUTPATIENT)
Dept: ONCOLOGY | Facility: CLINIC | Age: 75
End: 2020-08-25
Attending: PHYSICIAN ASSISTANT
Payer: COMMERCIAL

## 2020-08-25 VITALS
SYSTOLIC BLOOD PRESSURE: 108 MMHG | HEART RATE: 57 BPM | DIASTOLIC BLOOD PRESSURE: 68 MMHG | RESPIRATION RATE: 16 BRPM | TEMPERATURE: 98 F | OXYGEN SATURATION: 98 %

## 2020-08-25 VITALS
TEMPERATURE: 98 F | DIASTOLIC BLOOD PRESSURE: 68 MMHG | OXYGEN SATURATION: 98 % | RESPIRATION RATE: 16 BRPM | HEART RATE: 57 BPM | SYSTOLIC BLOOD PRESSURE: 108 MMHG

## 2020-08-25 DIAGNOSIS — C25.0 MALIGNANT NEOPLASM OF HEAD OF PANCREAS (H): Primary | ICD-10-CM

## 2020-08-25 LAB
BASOPHILS # BLD AUTO: 0 10E9/L (ref 0–0.2)
BASOPHILS NFR BLD AUTO: 0.8 %
DIFFERENTIAL METHOD BLD: ABNORMAL
EOSINOPHIL # BLD AUTO: 0.1 10E9/L (ref 0–0.7)
EOSINOPHIL NFR BLD AUTO: 3.9 %
ERYTHROCYTE [DISTWIDTH] IN BLOOD BY AUTOMATED COUNT: 14.5 % (ref 10–15)
HCT VFR BLD AUTO: 37.1 % (ref 35–47)
HGB BLD-MCNC: 11.9 G/DL (ref 11.7–15.7)
IMM GRANULOCYTES # BLD: 0 10E9/L (ref 0–0.4)
IMM GRANULOCYTES NFR BLD: 0.3 %
LYMPHOCYTES # BLD AUTO: 0.9 10E9/L (ref 0.8–5.3)
LYMPHOCYTES NFR BLD AUTO: 24.4 %
MCH RBC QN AUTO: 29 PG (ref 26.5–33)
MCHC RBC AUTO-ENTMCNC: 32.1 G/DL (ref 31.5–36.5)
MCV RBC AUTO: 90 FL (ref 78–100)
MONOCYTES # BLD AUTO: 0.4 10E9/L (ref 0–1.3)
MONOCYTES NFR BLD AUTO: 11.9 %
NEUTROPHILS # BLD AUTO: 2.1 10E9/L (ref 1.6–8.3)
NEUTROPHILS NFR BLD AUTO: 58.7 %
NRBC # BLD AUTO: 0 10*3/UL
NRBC BLD AUTO-RTO: 0 /100
PLATELET # BLD AUTO: 157 10E9/L (ref 150–450)
RBC # BLD AUTO: 4.11 10E12/L (ref 3.8–5.2)
WBC # BLD AUTO: 3.6 10E9/L (ref 4–11)

## 2020-08-25 PROCEDURE — 25000128 H RX IP 250 OP 636: Performed by: INTERNAL MEDICINE

## 2020-08-25 PROCEDURE — 96375 TX/PRO/DX INJ NEW DRUG ADDON: CPT

## 2020-08-25 PROCEDURE — 96413 CHEMO IV INFUSION 1 HR: CPT

## 2020-08-25 PROCEDURE — 25800030 ZZH RX IP 258 OP 636: Performed by: INTERNAL MEDICINE

## 2020-08-25 PROCEDURE — 85025 COMPLETE CBC W/AUTO DIFF WBC: CPT | Performed by: INTERNAL MEDICINE

## 2020-08-25 PROCEDURE — 99214 OFFICE O/P EST MOD 30 MIN: CPT | Performed by: PHYSICIAN ASSISTANT

## 2020-08-25 RX ORDER — HEPARIN SODIUM (PORCINE) LOCK FLUSH IV SOLN 100 UNIT/ML 100 UNIT/ML
5 SOLUTION INTRAVENOUS
Status: DISCONTINUED | OUTPATIENT
Start: 2020-08-25 | End: 2020-08-25 | Stop reason: HOSPADM

## 2020-08-25 RX ADMIN — GEMCITABINE 1600 MG: 38 INJECTION, SOLUTION INTRAVENOUS at 11:26

## 2020-08-25 RX ADMIN — SODIUM CHLORIDE 250 ML: 9 INJECTION, SOLUTION INTRAVENOUS at 10:41

## 2020-08-25 RX ADMIN — Medication 5 ML: at 12:00

## 2020-08-25 RX ADMIN — DEXAMETHASONE SODIUM PHOSPHATE 12 MG: 10 INJECTION, SOLUTION INTRAMUSCULAR; INTRAVENOUS at 10:35

## 2020-08-25 NOTE — LETTER
8/25/2020         RE: Flora Hogan  [address]  [place] [address]        Dear Colleague,    Thank you for referring your patient, Flora Hogan, to the [facility]. Please see a copy of my visit note below.    Oncology/Hematology Visit Note    Aug 25, 2020    Reason for visit: Follow up pancreatic cancer    Oncology HPI: Flora Hogan is a 75 year old female with adenocarcinoma of the pancreas. She presented with jaundice and underwent biliary stenting and EUS x 2 with atypical cells. CT and MRI revealed benign liver lesions and CA was elevated at 19-9, therefore she underwent a Whipple procedure on 5/26/2020. Pathology showed pancreatic ductal adenocarcinoma, well-differentiated, grade 1 and 0/4 lymph nodes involved. She established care with Dr Coronado on 6/9/2020 and she suggested adjuvant chemotherapy with single agent gemcitabine, 3 weeks on and 1 week off, C1D1 on 7/7/2020.     She was scheduled for C1D8 on 7/15/2020, but developed severe neutropenia.  Dr. Coronado changed the plan to days 1, 15.    Video visit today for C2 D 15.    Interval History: Flora is doing pretty well today.  She has tolerated chemotherapy pretty well with minimal side effects.  She admits that for several years she has had difficulty with diarrhea, bloating, gas and light-colored stools.  She was given a prescription for Creon, but she was nervous to try it as she read some bad things online.  She has been made EPN a lot of adjustments to her diet.  She and her  typically go to [place] for the winter and this is been difficult.  Denies recent fever or chills, vomiting, bleeding, cough or shortness of breath.  She is happy with how chemotherapy is going so far    Review of Systems: See interval hx. Denies fevers, chills, HA, dizziness, n/t, changes in vision, cough, sore throat, CP, SOB, N/V, changes in urination, bleeding, bruising, rash.      PMHx and Social Hx reviewed per  EPIC.      Medications:  Current Outpatient Medications   Medication Sig Dispense Refill     acetaminophen (TYLENOL) 325 MG tablet Take 1-2 tablets (325-650 mg) by mouth every 6 hours as needed for mild pain or fever 50 tablet 0     albuterol (PROAIR HFA/PROVENTIL HFA/VENTOLIN HFA) 108 (90 Base) MCG/ACT inhaler Inhale 2 puffs into the lungs every 6 hours as needed for shortness of breath / dyspnea or wheezing 1 Inhaler 0     amylase-lipase-protease (CREON) 85793-59350 units CPEP per EC capsule Take 1 capsule (24,000 Units) by mouth 3 times daily (with meals) 90 capsule 3     calcium carbonate-vitamin D (CALTRATE 600+D) 600-400 MG-UNIT chewable tablet Take 1 chew tab by mouth 2 times daily  180 tablet 3     loratadine (CLARITIN REDITABS) 10 MG dispersible tablet Take 10 mg by mouth as needed        magnesium 250 MG tablet Take 1 tablet by mouth daily       oxyCODONE (ROXICODONE) 5 MG tablet Take 1-2 tablets (5-10 mg) by mouth every 6 hours as needed for moderate to severe pain (Patient not taking: Reported on 6/9/2020) 20 tablet 0     pantoprazole (PROTONIX) 40 MG EC tablet Take 1 tablet (40 mg) by mouth every morning (before breakfast) 90 tablet 5     polyethylene glycol (MIRALAX) 17 g packet Take 17 g by mouth daily as needed for constipation 12 packet 0     prochlorperazine (COMPAZINE) 10 MG tablet Take 1 tablet (10 mg) by mouth every 6 hours as needed (Nausea/Vomiting) (Patient not taking: Reported on 7/14/2020) 30 tablet 1       Allergies   Allergen Reactions     Penicillin V      Fredy-1 [Lidocaine] Unknown         EXAM:    /68   Pulse 57   Temp 98  F (36.7  C) (Tympanic)   Resp 16   LMP  (LMP Unknown)   SpO2 98%  Telephone visit, therefore exam and vital signs were not performed.    Labs:   Results for ANALI PRUITT (MRN [id number]) as of 8/25/2020 16:12   8/25/2020 09:35   WBC 3.6 (L)   Hemoglobin 11.9   Hematocrit 37.1   Platelet Count 157   RBC Count 4.11   MCV 90   MCH 29.0   MCHC 32.1    RDW 14.5   Diff Method Automated Method   % Neutrophils 58.7   % Lymphocytes 24.4   % Monocytes 11.9   % Eosinophils 3.9   % Basophils 0.8   % Immature Granulocytes 0.3   Nucleated RBCs 0   Absolute Neutrophil 2.1   Absolute Lymphocytes 0.9   Absolute Monocytes 0.4   Absolute Eosinophils 0.1   Absolute Basophils 0.0   Abs Immature Granulocytes 0.0   Absolute Nucleated RBC 0.0       Imaging: n/a    Impression/Plan: Flora Hogan is a 74 year old female with pancreatic cancer status post Whipple's procedure currently on adjuvant chemotherapy with single agent gemcitabine.    Adenocarcinoma of the pancreas head: s/p Whipple procedure and restaging scan with no evidence of metastatic disease.  She started adjuvant chemotherapy with C1 D1 on 7/7/2020 and became severely neutropenic, therefore her schedule was changed to day 1, 15.  She is completed 1 full cycle and labs are within parameters to proceed with C2 D 15 today.  --9/8 Dr. Coronado, C3D1    Pulmonary nodule: stable 3 mm left lower lobe pulmonary nodule. We will monitor on future scans.     GI: Intermittent diarrhea and constipation.  This is been ongoing for many years, per patient.  She has seen GI and tries to control with diet, but she does have Imodium and stool softeners at home if needed.  She has not started Creon yet and I highly suggested that she start that as soon as possible.  I suspect this will improve the bloating, cramping and digestive issues.     Chronic leg cramping: She has had this for many years.    Electrolytes were normal on 8/11    Chart documentation with Dragon Voice recognition Software. Although reviewed after completion, some words and grammatical errors may remain.      Lacey Castillo PA-C  Hematology/Oncology  [facility]                Again, thank you for allowing me to participate in the care of your patient.        Sincerely,        Lacey Castillo PA-C

## 2020-08-25 NOTE — PROGRESS NOTES
Infusion Nursing Note:  Flora Hogan presents today for Gemzar.    Patient seen by provider today: Yes: Lacey, in Infusion   present during visit today: Not Applicable.    Note: N/A.    Intravenous Access:  Lab draw site R chest, Needle type brandt, Gauge 20.  Labs drawn without difficulty.  Implanted Port.    Treatment Conditions:  Lab Results   Component Value Date    HGB 11.9 08/25/2020     Lab Results   Component Value Date    WBC 3.6 08/25/2020      Lab Results   Component Value Date    ANEU 2.1 08/25/2020     Lab Results   Component Value Date     08/25/2020      Results reviewed, labs MET treatment parameters, ok to proceed with treatment.      Post Infusion Assessment:  Patient tolerated infusion without incident.  Blood return noted pre and post infusion.  Site patent and intact, free from redness, edema or discomfort.  No evidence of extravasations.  Access discontinued per protocol.       Discharge Plan:   Discharge instructions reviewed with: Patient.  Patient and/or family verbalized understanding of discharge instructions and all questions answered.  AVS to patient via [employer].  Patient will return 9/8 for next appointment.   Patient discharged in stable condition accompanied by: self.  Departure Mode: Ambulatory.    Deb Farmer RN

## 2020-09-08 ENCOUNTER — HOSPITAL ENCOUNTER (OUTPATIENT)
Facility: CLINIC | Age: 75
Setting detail: SPECIMEN
Discharge: HOME | End: 2020-09-08
Attending: INTERNAL MEDICINE | Admitting: INTERNAL MEDICINE
Payer: COMMERCIAL

## 2020-09-08 ENCOUNTER — INFUSION THERAPY VISIT (OUTPATIENT)
Dept: INFUSION THERAPY | Facility: CLINIC | Age: 75
End: 2020-09-08
Attending: INTERNAL MEDICINE
Payer: COMMERCIAL

## 2020-09-08 ENCOUNTER — ONCOLOGY VISIT (OUTPATIENT)
Dept: ONCOLOGY | Facility: CLINIC | Age: 75
End: 2020-09-08
Attending: INTERNAL MEDICINE
Payer: COMMERCIAL

## 2020-09-08 VITALS
HEART RATE: 59 BPM | DIASTOLIC BLOOD PRESSURE: 67 MMHG | BODY MASS INDEX: 19.91 KG/M2 | SYSTOLIC BLOOD PRESSURE: 114 MMHG | WEIGHT: 116 LBS | TEMPERATURE: 96.6 F | RESPIRATION RATE: 16 BRPM | OXYGEN SATURATION: 99 %

## 2020-09-08 DIAGNOSIS — C25.0 MALIGNANT NEOPLASM OF HEAD OF PANCREAS (H): Primary | ICD-10-CM

## 2020-09-08 LAB
ALBUMIN SERPL-MCNC: 3.6 G/DL (ref 3.4–5)
ALP SERPL-CCNC: 116 U/L (ref 40–150)
ALT SERPL W P-5'-P-CCNC: 59 U/L (ref 0–50)
ANION GAP SERPL CALCULATED.3IONS-SCNC: 5 MMOL/L (ref 3–14)
AST SERPL W P-5'-P-CCNC: 33 U/L (ref 0–45)
BASOPHILS # BLD AUTO: 0 10E9/L (ref 0–0.2)
BASOPHILS NFR BLD AUTO: 1.1 %
BILIRUB SERPL-MCNC: 0.5 MG/DL (ref 0.2–1.3)
BUN SERPL-MCNC: 20 MG/DL (ref 7–30)
CALCIUM SERPL-MCNC: 9 MG/DL (ref 8.5–10.1)
CHLORIDE SERPL-SCNC: 105 MMOL/L (ref 94–109)
CO2 SERPL-SCNC: 28 MMOL/L (ref 20–32)
CREAT SERPL-MCNC: 0.88 MG/DL (ref 0.52–1.04)
DIFFERENTIAL METHOD BLD: ABNORMAL
EOSINOPHIL # BLD AUTO: 0.2 10E9/L (ref 0–0.7)
EOSINOPHIL NFR BLD AUTO: 4.7 %
ERYTHROCYTE [DISTWIDTH] IN BLOOD BY AUTOMATED COUNT: 14.9 % (ref 10–15)
GFR SERPL CREATININE-BSD FRML MDRD: 64 ML/MIN/{1.73_M2}
GLUCOSE SERPL-MCNC: 98 MG/DL (ref 70–99)
HCT VFR BLD AUTO: 39.5 % (ref 35–47)
HGB BLD-MCNC: 12.4 G/DL (ref 11.7–15.7)
IMM GRANULOCYTES # BLD: 0 10E9/L (ref 0–0.4)
IMM GRANULOCYTES NFR BLD: 0.3 %
LYMPHOCYTES # BLD AUTO: 1 10E9/L (ref 0.8–5.3)
LYMPHOCYTES NFR BLD AUTO: 26.6 %
MCH RBC QN AUTO: 28.8 PG (ref 26.5–33)
MCHC RBC AUTO-ENTMCNC: 31.4 G/DL (ref 31.5–36.5)
MCV RBC AUTO: 92 FL (ref 78–100)
MONOCYTES # BLD AUTO: 0.3 10E9/L (ref 0–1.3)
MONOCYTES NFR BLD AUTO: 8.2 %
NEUTROPHILS # BLD AUTO: 2.2 10E9/L (ref 1.6–8.3)
NEUTROPHILS NFR BLD AUTO: 59.1 %
NRBC # BLD AUTO: 0 10*3/UL
NRBC BLD AUTO-RTO: 0 /100
PLATELET # BLD AUTO: 168 10E9/L (ref 150–450)
POTASSIUM SERPL-SCNC: 4.2 MMOL/L (ref 3.4–5.3)
PROT SERPL-MCNC: 7.1 G/DL (ref 6.8–8.8)
RBC # BLD AUTO: 4.3 10E12/L (ref 3.8–5.2)
SODIUM SERPL-SCNC: 138 MMOL/L (ref 133–144)
WBC # BLD AUTO: 3.7 10E9/L (ref 4–11)

## 2020-09-08 PROCEDURE — 85025 COMPLETE CBC W/AUTO DIFF WBC: CPT | Performed by: INTERNAL MEDICINE

## 2020-09-08 PROCEDURE — 96366 THER/PROPH/DIAG IV INF ADDON: CPT

## 2020-09-08 PROCEDURE — 25000128 H RX IP 250 OP 636: Performed by: INTERNAL MEDICINE

## 2020-09-08 PROCEDURE — 96413 CHEMO IV INFUSION 1 HR: CPT

## 2020-09-08 PROCEDURE — 25800030 ZZH RX IP 258 OP 636: Performed by: INTERNAL MEDICINE

## 2020-09-08 PROCEDURE — G0463 HOSPITAL OUTPT CLINIC VISIT: HCPCS | Mod: 25

## 2020-09-08 PROCEDURE — 80053 COMPREHEN METABOLIC PANEL: CPT | Performed by: INTERNAL MEDICINE

## 2020-09-08 PROCEDURE — 99215 OFFICE O/P EST HI 40 MIN: CPT | Performed by: INTERNAL MEDICINE

## 2020-09-08 PROCEDURE — 96365 THER/PROPH/DIAG IV INF INIT: CPT

## 2020-09-08 RX ORDER — HEPARIN SODIUM (PORCINE) LOCK FLUSH IV SOLN 100 UNIT/ML 100 UNIT/ML
5 SOLUTION INTRAVENOUS
Status: DISCONTINUED | OUTPATIENT
Start: 2020-09-08 | End: 2020-09-08 | Stop reason: HOSPADM

## 2020-09-08 RX ORDER — DIPHENHYDRAMINE HYDROCHLORIDE 50 MG/ML
50 INJECTION INTRAMUSCULAR; INTRAVENOUS
Status: CANCELLED
Start: 2020-09-22

## 2020-09-08 RX ORDER — MEPERIDINE HYDROCHLORIDE 25 MG/ML
25 INJECTION INTRAMUSCULAR; INTRAVENOUS; SUBCUTANEOUS EVERY 30 MIN PRN
Status: CANCELLED | OUTPATIENT
Start: 2020-09-22

## 2020-09-08 RX ORDER — EPINEPHRINE 1 MG/ML
0.3 INJECTION, SOLUTION INTRAMUSCULAR; SUBCUTANEOUS EVERY 5 MIN PRN
Status: CANCELLED | OUTPATIENT
Start: 2020-09-08

## 2020-09-08 RX ORDER — SODIUM CHLORIDE 9 MG/ML
1000 INJECTION, SOLUTION INTRAVENOUS CONTINUOUS PRN
Status: CANCELLED
Start: 2020-09-08

## 2020-09-08 RX ORDER — SODIUM CHLORIDE 9 MG/ML
1000 INJECTION, SOLUTION INTRAVENOUS CONTINUOUS PRN
Status: CANCELLED
Start: 2020-09-22

## 2020-09-08 RX ORDER — NALOXONE HYDROCHLORIDE 0.4 MG/ML
.1-.4 INJECTION, SOLUTION INTRAMUSCULAR; INTRAVENOUS; SUBCUTANEOUS
Status: CANCELLED | OUTPATIENT
Start: 2020-09-08

## 2020-09-08 RX ORDER — ALBUTEROL SULFATE 0.83 MG/ML
2.5 SOLUTION RESPIRATORY (INHALATION)
Status: CANCELLED | OUTPATIENT
Start: 2020-09-08

## 2020-09-08 RX ORDER — HEPARIN SODIUM,PORCINE 10 UNIT/ML
5 VIAL (ML) INTRAVENOUS
Status: CANCELLED | OUTPATIENT
Start: 2020-09-08

## 2020-09-08 RX ORDER — LORAZEPAM 2 MG/ML
0.5 INJECTION INTRAMUSCULAR EVERY 4 HOURS PRN
Status: CANCELLED
Start: 2020-09-08

## 2020-09-08 RX ORDER — HEPARIN SODIUM,PORCINE 10 UNIT/ML
5 VIAL (ML) INTRAVENOUS
Status: CANCELLED | OUTPATIENT
Start: 2020-09-22

## 2020-09-08 RX ORDER — DIPHENHYDRAMINE HYDROCHLORIDE 50 MG/ML
50 INJECTION INTRAMUSCULAR; INTRAVENOUS
Status: CANCELLED
Start: 2020-09-08

## 2020-09-08 RX ORDER — ALBUTEROL SULFATE 90 UG/1
1-2 AEROSOL, METERED RESPIRATORY (INHALATION)
Status: CANCELLED
Start: 2020-09-08

## 2020-09-08 RX ORDER — METHYLPREDNISOLONE SODIUM SUCCINATE 125 MG/2ML
125 INJECTION, POWDER, LYOPHILIZED, FOR SOLUTION INTRAMUSCULAR; INTRAVENOUS
Status: CANCELLED
Start: 2020-09-22

## 2020-09-08 RX ORDER — NALOXONE HYDROCHLORIDE 0.4 MG/ML
.1-.4 INJECTION, SOLUTION INTRAMUSCULAR; INTRAVENOUS; SUBCUTANEOUS
Status: CANCELLED | OUTPATIENT
Start: 2020-09-22

## 2020-09-08 RX ORDER — ALBUTEROL SULFATE 0.83 MG/ML
2.5 SOLUTION RESPIRATORY (INHALATION)
Status: CANCELLED | OUTPATIENT
Start: 2020-09-22

## 2020-09-08 RX ORDER — HEPARIN SODIUM (PORCINE) LOCK FLUSH IV SOLN 100 UNIT/ML 100 UNIT/ML
5 SOLUTION INTRAVENOUS
Status: CANCELLED | OUTPATIENT
Start: 2020-09-22

## 2020-09-08 RX ORDER — MEPERIDINE HYDROCHLORIDE 25 MG/ML
25 INJECTION INTRAMUSCULAR; INTRAVENOUS; SUBCUTANEOUS EVERY 30 MIN PRN
Status: CANCELLED | OUTPATIENT
Start: 2020-09-08

## 2020-09-08 RX ORDER — ALBUTEROL SULFATE 90 UG/1
1-2 AEROSOL, METERED RESPIRATORY (INHALATION)
Status: CANCELLED
Start: 2020-09-22

## 2020-09-08 RX ORDER — EPINEPHRINE 1 MG/ML
0.3 INJECTION, SOLUTION INTRAMUSCULAR; SUBCUTANEOUS EVERY 5 MIN PRN
Status: CANCELLED | OUTPATIENT
Start: 2020-09-22

## 2020-09-08 RX ORDER — LORAZEPAM 2 MG/ML
0.5 INJECTION INTRAMUSCULAR EVERY 4 HOURS PRN
Status: CANCELLED
Start: 2020-09-22

## 2020-09-08 RX ORDER — METHYLPREDNISOLONE SODIUM SUCCINATE 125 MG/2ML
125 INJECTION, POWDER, LYOPHILIZED, FOR SOLUTION INTRAMUSCULAR; INTRAVENOUS
Status: CANCELLED
Start: 2020-09-08

## 2020-09-08 RX ORDER — HEPARIN SODIUM (PORCINE) LOCK FLUSH IV SOLN 100 UNIT/ML 100 UNIT/ML
5 SOLUTION INTRAVENOUS
Status: CANCELLED | OUTPATIENT
Start: 2020-09-08

## 2020-09-08 RX ADMIN — DEXAMETHASONE SODIUM PHOSPHATE 12 MG: 10 INJECTION, SOLUTION INTRAMUSCULAR; INTRAVENOUS at 10:02

## 2020-09-08 RX ADMIN — SODIUM CHLORIDE, PRESERVATIVE FREE 5 ML: 5 INJECTION INTRAVENOUS at 11:14

## 2020-09-08 RX ADMIN — GEMCITABINE 1600 MG: 38 INJECTION, SOLUTION INTRAVENOUS at 10:35

## 2020-09-08 RX ADMIN — SODIUM CHLORIDE 250 ML: 9 INJECTION, SOLUTION INTRAVENOUS at 10:02

## 2020-09-08 ASSESSMENT — PAIN SCALES - GENERAL: PAINLEVEL: NO PAIN (0)

## 2020-09-08 NOTE — PROGRESS NOTES
Infusion Nursing Note:  Flora CARVER Brittnikayce presents today for C3D1 Gemzar.    Patient seen by provider today: Yes: Cliff   present during visit today: Not Applicable.    Note: Assessment done by MD at appointment.    Intravenous Access:  Implanted Port accessed in fast track.    Treatment Conditions:  Lab Results   Component Value Date    HGB 12.4 09/08/2020     Lab Results   Component Value Date    WBC 3.7 09/08/2020      Lab Results   Component Value Date    ANEU 2.2 09/08/2020     Lab Results   Component Value Date     09/08/2020      Results reviewed, labs MET treatment parameters, ok to proceed with treatment.      Post Infusion Assessment:  Patient tolerated infusion without incident.  Blood return noted pre and post infusion.  Site patent and intact, free from redness, edema or discomfort.  No evidence of extravasations.  Access discontinued per protocol.       Discharge Plan:   Discharge instructions reviewed with: Patient.  Patient discharged in stable condition accompanied by: self.  Departure Mode: Ambulatory.  Scheduling will call to schedule future appointments.    POONAM GALICIA RN

## 2020-09-08 NOTE — NURSING NOTE
"Oncology Rooming Note    September 8, 2020 9:16 AM   Anali Hogan is a 75 year old female who presents for:    Chief Complaint   Patient presents with     Oncology Clinic Visit     Malignant neoplasm of head of pancreas      Initial Vitals: /67   Pulse 59   Temp 96.6  F (35.9  C) (Tympanic)   Resp 16   Wt 52.6 kg (116 lb)   LMP  (LMP Unknown)   SpO2 99%   BMI 19.91 kg/m   Estimated body mass index is 19.91 kg/m  as calculated from the following:    Height as of 5/22/20: 1.626 m (5' 4\").    Weight as of this encounter: 52.6 kg (116 lb). Body surface area is 1.54 meters squared.  No Pain (0) Comment: Data Unavailable   No LMP recorded (lmp unknown). Patient is postmenopausal.  Allergies reviewed: Yes  Medications reviewed: Yes    Medications: Medication refills not needed today.  Pharmacy name entered into NSC:    [id number] - [place], [place] - [address] [id number] - [place], [place] - [address] [address] AT [address] [address] & [facility]    Clinical concerns: f/u       Tessie Shelton CMA              "

## 2020-09-08 NOTE — PROGRESS NOTES
[facility]    Hematology/Oncology Established Patient Note      Today's Date: 09/08/20    Reason for Follow-up: pancreatic cancer      HISTORY OF PRESENT ILLNESS: Flora Hogan is a 75 year old female, who presents with adenocarcinoma of the pancreas.  She initially presented with jaundice.  She was evaluated for EUS and FNA, which revealed atypical cells, but no definitive evidence of malignancy.  She also underwent biliary stenting for decompression of her biliary tree.  She had a second EUS done, but again revealed atypical cells.  She then saw Dr. Duarte Chaves at [facility].  She had CT scan and MRI.  MRI showed multiple benign lesions in her liver; there as one area that was indeterminate.  Her CA 19-9 was 93.  On 5/26/20, she underwent Whipple procedure.  Two nodules were visualized on the surface of the liver, which were biopsied and found to be benign on pathology.  There were also hepatic cysts seen.  Pathology showed pancreatic ductal adenocarcinoma, well-differentiated (grade 1), tumor size 2.6 x 2.4 x 2.0 cm, negative margins, +LVI, 0 of 4 lymph nodes involved, staged pT2N0 (stage IB).      Port was placed by IR on 7/6/20.    She started adjuvant chemotherapy with single-agent gemcitabine on 7/7/20.      INTERIM HISTORY: Flora says that she is doing well.  She is tolerating the chemotherapy every other week well.  She was afraid to take Creon after our last visit after looking on the internet, but after speaking to 2 pharmacists, she decided to try it.  She says that it is helping, and she feels quite well.        REVIEW OF SYSTEMS:   14 point ROS was reviewed and is negative other than as noted above in HPI.       HOME MEDICATIONS:  Current Outpatient Medications   Medication Sig Dispense Refill     acetaminophen (TYLENOL) 325 MG tablet Take 1-2 tablets (325-650 mg) by mouth every 6 hours as needed for mild pain or fever 50 tablet 0     albuterol (PROAIR  HFA/PROVENTIL HFA/VENTOLIN HFA) 108 (90 Base) MCG/ACT inhaler Inhale 2 puffs into the lungs every 6 hours as needed for shortness of breath / dyspnea or wheezing 1 Inhaler 0     amylase-lipase-protease (CREON) 70939-79638 units CPEP per EC capsule Take 1 capsule (24,000 Units) by mouth 3 times daily (with meals) 90 capsule 3     calcium carbonate-vitamin D (CALTRATE 600+D) 600-400 MG-UNIT chewable tablet Take 1 chew tab by mouth 2 times daily  180 tablet 3     loratadine (CLARITIN REDITABS) 10 MG dispersible tablet Take 10 mg by mouth as needed        magnesium 250 MG tablet Take 1 tablet by mouth daily       oxyCODONE (ROXICODONE) 5 MG tablet Take 1-2 tablets (5-10 mg) by mouth every 6 hours as needed for moderate to severe pain 20 tablet 0     polyethylene glycol (MIRALAX) 17 g packet Take 17 g by mouth daily as needed for constipation 12 packet 0     prochlorperazine (COMPAZINE) 10 MG tablet Take 1 tablet (10 mg) by mouth every 6 hours as needed (Nausea/Vomiting) 30 tablet 1     pantoprazole (PROTONIX) 40 MG EC tablet Take 1 tablet (40 mg) by mouth every morning (before breakfast) (Patient not taking: Reported on 9/8/2020) 90 tablet 5         ALLERGIES:  Allergies   Allergen Reactions     Penicillin V      Fredy-1 [Lidocaine] Unknown         PAST MEDICAL HISTORY:  Past Medical History:   Diagnosis Date     Chest tightness     had suspected allergies     Malignant neoplasm of head of pancreas (H) 6/9/2020     Seasonal allergies      SOB (shortness of breath)          PAST SURGICAL HISTORY:  Past Surgical History:   Procedure Laterality Date     CATARACT IOL, RT/LT  2015     COLONOSCOPY  10/14    no repeat needed due to age     COLONOSCOPY N/A 10/7/2014    Procedure: COLONOSCOPY;  Surgeon: Daryl Hanson MD;  Location:  GI     COLONOSCOPY  04/23/2019    no further screening     ENDOSCOPIC RETROGRADE CHOLANGIOPANCREATOGRAM N/A 4/20/2020    Procedure: ENDOSCOPIC RETROGRADE CHOLANGIOPANCREATOGRAPHY, PLACEMENT OF  PANCREATIC STENT, PLACEMENT OF BILIARY STENT;  Surgeon: Yarely Vann MD;  Location: [facility] OR     ESOPHAGOSCOPY, GASTROSCOPY, DUODENOSCOPY (EGD), COMBINED N/A 4/20/2020    Procedure: ESOPHAGOGASTRODUODENOSCOPY, WITH FINE NEEDLE ASPIRATION BIOPSY, WITH ENDOSCOPIC ULTRASOUND GUIDANCE, Endoscopic retrograde cholangiopancreatography;  Surgeon: Yarely Vann MD;  Location: [facility] OR     ESOPHAGOSCOPY, GASTROSCOPY, DUODENOSCOPY (EGD), COMBINED N/A 5/5/2020    Procedure: ESOPHAGOGASTRODUODENOSCOPY, WITH FINE NEEDLE ASPIRATION BIOPSY, WITH ENDOSCOPIC ULTRASOUND GUIDANCE;  Surgeon: Romina Rosario MD;  Location:  GI     IR CHEST PORT PLACEMENT > 5 YRS OF AGE  7/6/2020     LAPAROSCOPIC BIOPSY LIVER N/A 5/22/2020    Procedure: Diagnostic Laparoscopy with intraoperative ultrasound and Liver Biopsy X2;  Surgeon: Duarte Chaves MD;  Location: [facility] OR     WHIPPLE PROCEDURE N/A 5/22/2020    Procedure: Non Pylorus Preserving Whipple procedure;  Surgeon: Duarte Chaves MD;  Location: [facility] OR         SOCIAL HISTORY:  Social History     Socioeconomic History     Marital status:      Spouse name: Not on file     Number of children: Not on file     Years of education: Not on file     Highest education level: Not on file   Occupational History     Employer: RETIRED     Comment: previous taught special ed   Social Needs     Financial resource strain: Not on file     Food insecurity     Worry: Not on file     Inability: Not on file     Transportation needs     Medical: Not on file     Non-medical: Not on file   Tobacco Use     Smoking status: Never Smoker     Smokeless tobacco: Never Used   Substance and Sexual Activity     Alcohol use: Yes     Comment: social      Drug use: No     Sexual activity: Yes   Lifestyle     Physical activity     Days per week: Not on file     Minutes per session: Not on file     Stress: Not on file   Relationships     Social connections     Talks on phone: Not on file     Gets  together: Not on file     Attends [employer] service: Not on file     Active member of club or organization: Not on file     Attends meetings of clubs or organizations: Not on file     Relationship status: Not on file     Intimate partner violence     Fear of current or ex partner: Not on file     Emotionally abused: Not on file     Physically abused: Not on file     Forced sexual activity: Not on file   Other Topics Concern     Parent/sibling w/ CABG, MI or angioplasty before 65F 55M? Not Asked      Service Not Asked     Blood Transfusions Not Asked     Caffeine Concern Yes     Comment: 2 cups     Occupational Exposure Not Asked     Hobby Hazards Not Asked     Sleep Concern Not Asked     Stress Concern Not Asked     Weight Concern Not Asked     Special Diet No     Back Care Not Asked     Exercise Yes     Comment: walking workout      Bike Helmet Not Asked     Seat Belt Not Asked     Self-Exams Not Asked   Social History Narrative     Not on file         FAMILY HISTORY:  Family History   Problem Relation Age of Onset     Musculoskeletal Disorder Mother         edematous legs     Obesity Mother      Musculoskeletal Disorder Maternal Grandmother         edematous legs; did have amputation     Obesity Maternal Grandmother      Myocardial Infarction Maternal Grandmother          PHYSICAL EXAM:  Vital signs:  /67   Pulse 59   Temp 96.6  F (35.9  C) (Tympanic)   Resp 16   Wt 52.6 kg (116 lb)   LMP  (LMP Unknown)   SpO2 99%   BMI 19.91 kg/m     ECO  GENERAL/CONSTITUTIONAL: No acute distress.  EYES: No scleral icterus.  RESPIRATORY: Clear to auscultation bilaterally.  CARDIOVASCULAR: Regular rate and rhythm.  MUSCULOSKELETAL: Warm and well-perfused, no cyanosis, clubbing, or edema.  NEUROLOGIC: Alert, oriented, answers questions appropriately.  INTEGUMENTARY: No jaundice.  Port in place.        LABS:  CBC RESULTS:   Recent Labs   Lab Test 20  0856   WBC 3.7*   RBC 4.30   HGB 12.4   HCT 39.5    MCV 92   MCH 28.8   MCHC 31.4*   RDW 14.9        Recent Labs   Lab Test 09/08/20  0856 08/11/20  0957    136   POTASSIUM 4.2 4.4   CHLORIDE 105 105   CO2 28 29   ANIONGAP 5 2*   GLC 98 86   BUN 20 23   CR 0.88 0.88   CAMERON 9.0 8.9     Lab Results   Component Value Date    AST 33 09/08/2020     Lab Results   Component Value Date    ALT 59 09/08/2020     No results found for: BILICONJ   Lab Results   Component Value Date    BILITOTAL 0.5 09/08/2020     Lab Results   Component Value Date    ALBUMIN 3.6 09/08/2020     Lab Results   Component Value Date    PROTTOTAL 7.1 09/08/2020      Lab Results   Component Value Date    ALKPHOS 116 09/08/2020       Component      Latest Ref Rng & Units 4/19/2020 5/5/2020 7/7/2020   Cancer Antigen 19-9      0 - 37 U/mL 109 (H) 93 (H) 7       IMAGING:  CT c/a/p 6/30/20:  1.  Interval postoperative changes of Whipple. Mild soft tissue  thickening at the operative bed is likely postoperative.  2.  No convincing evidence of recurrence or metastatic disease.  3.  Stable 3 mm left lower lobe pulmonary nodule.  4.  Large stool volume throughout the colon.      ASSESSMENT/PLAN:  Flora Hogan is a 75 year old female with:    1) Adenocarcinoma of the pancreas head: s/p Whipple procedure on 5/26/20; pathology showed pancreatic ductal adenocarcinoma, well-differentiated (grade 1), tumor size 2.6 x 2.4 x 2.0 cm, negative margins, +LVI, 0 of 4 lymph nodes involved, staged pT2N0 (stage IB).      Post-op restaging scan shows post-operative changes with no convincing evidence of recurrence or metastatic disease.      She is on adjuvant chemotherapy with single-agent gemcitabine.  She had severe neutropenia after just one dose of gemcitabine.  She is going to need dose-reduction.  We discussed options of reducing dose of neutropenia to 800 mg/m2 and staying on the says schedule or switching to every other week schedule.  Suspect that she will continue to have issues with neutropenia,  so we decided to switch to every other week schedule.  She is agreeable with this.      She switched to every other week schedule starting with cycle 2, and she is now tolerating it well.      -C3D1 of chemotherapy today.  C3D15 on 9/22/20.   -appointment with Lacey on 10/6/20, with C4D1 same day.  C4D15 will be on 10/20/20.  -appointment with me on 11/3/20, with C5D1 of chemotherapy same day  -will repeat scans after adjuvant chemotherapy is completed    2) Pulmonary nodule: stable 3 mm left lower lobe pulmonary nodule  -will monitor on future scans    3) Chronic constipation: Patient says that this has been an issue for her for many years.  She controls it with diet, and she does have stool softeners at home as needed.  She asked about pancreatic insufficiency after Whipple, which is possible.  We discussed trying Creon prior to meals, and she agrees.  -Creon prescribed - she has started it and finds it helpful.  She takes it 3 times a day.  She will continue it.      4) Leukpenia: Secondary to chemotherapy.  ANC is normal.  -proceed with chemotherapy today.      I spent a total of 25 minutes with the patient, with over >50% of the time in counseling and/or coordination of care.       Jeannie Coronado MD  Hematology/Oncology  [facility]

## 2020-09-08 NOTE — LETTER
9/8/2020         RE: Flora Hogan  [address]  [place] [address]        Dear Colleague,    Thank you for referring your patient, Flora Hogan, to the [facility]. Please see a copy of my visit note below.    [facility]    Hematology/Oncology Established Patient Note      Today's Date: 09/08/20    Reason for Follow-up: pancreatic cancer      HISTORY OF PRESENT ILLNESS: Flora Hogan is a 75 year old female, who presents with adenocarcinoma of the pancreas.  She initially presented with jaundice.  She was evaluated for EUS and FNA, which revealed atypical cells, but no definitive evidence of malignancy.  She also underwent biliary stenting for decompression of her biliary tree.  She had a second EUS done, but again revealed atypical cells.  She then saw Dr. Duarte Chaves at [facility].  She had CT scan and MRI.  MRI showed multiple benign lesions in her liver; there as one area that was indeterminate.  Her CA 19-9 was 93.  On 5/26/20, she underwent Whipple procedure.  Two nodules were visualized on the surface of the liver, which were biopsied and found to be benign on pathology.  There were also hepatic cysts seen.  Pathology showed pancreatic ductal adenocarcinoma, well-differentiated (grade 1), tumor size 2.6 x 2.4 x 2.0 cm, negative margins, +LVI, 0 of 4 lymph nodes involved, staged pT2N0 (stage IB).      Port was placed by IR on 7/6/20.    She started adjuvant chemotherapy with single-agent gemcitabine on 7/7/20.      INTERIM HISTORY: Flora says that she is doing well.  She is tolerating the chemotherapy every other week well.  She was afraid to take Creon after our last visit after looking on the internet, but after speaking to 2 pharmacists, she decided to try it.  She says that it is helping, and she feels quite well.        REVIEW OF SYSTEMS:   14 point ROS was reviewed and is negative other than as noted above in HPI.       HOME  MEDICATIONS:  Current Outpatient Medications   Medication Sig Dispense Refill     acetaminophen (TYLENOL) 325 MG tablet Take 1-2 tablets (325-650 mg) by mouth every 6 hours as needed for mild pain or fever 50 tablet 0     albuterol (PROAIR HFA/PROVENTIL HFA/VENTOLIN HFA) 108 (90 Base) MCG/ACT inhaler Inhale 2 puffs into the lungs every 6 hours as needed for shortness of breath / dyspnea or wheezing 1 Inhaler 0     amylase-lipase-protease (CREON) 16804-33601 units CPEP per EC capsule Take 1 capsule (24,000 Units) by mouth 3 times daily (with meals) 90 capsule 3     calcium carbonate-vitamin D (CALTRATE 600+D) 600-400 MG-UNIT chewable tablet Take 1 chew tab by mouth 2 times daily  180 tablet 3     loratadine (CLARITIN REDITABS) 10 MG dispersible tablet Take 10 mg by mouth as needed        magnesium 250 MG tablet Take 1 tablet by mouth daily       oxyCODONE (ROXICODONE) 5 MG tablet Take 1-2 tablets (5-10 mg) by mouth every 6 hours as needed for moderate to severe pain 20 tablet 0     polyethylene glycol (MIRALAX) 17 g packet Take 17 g by mouth daily as needed for constipation 12 packet 0     prochlorperazine (COMPAZINE) 10 MG tablet Take 1 tablet (10 mg) by mouth every 6 hours as needed (Nausea/Vomiting) 30 tablet 1     pantoprazole (PROTONIX) 40 MG EC tablet Take 1 tablet (40 mg) by mouth every morning (before breakfast) (Patient not taking: Reported on 9/8/2020) 90 tablet 5         ALLERGIES:  Allergies   Allergen Reactions     Penicillin V      Fredy-1 [Lidocaine] Unknown         PAST MEDICAL HISTORY:  Past Medical History:   Diagnosis Date     Chest tightness     had suspected allergies     Malignant neoplasm of head of pancreas (H) 6/9/2020     Seasonal allergies      SOB (shortness of breath)          PAST SURGICAL HISTORY:  Past Surgical History:   Procedure Laterality Date     CATARACT IOL, RT/LT  2015     COLONOSCOPY  10/14    no repeat needed due to age     COLONOSCOPY N/A 10/7/2014    Procedure: COLONOSCOPY;   Surgeon: Daryl Hanson MD;  Location:  GI     COLONOSCOPY  04/23/2019    no further screening     ENDOSCOPIC RETROGRADE CHOLANGIOPANCREATOGRAM N/A 4/20/2020    Procedure: ENDOSCOPIC RETROGRADE CHOLANGIOPANCREATOGRAPHY, PLACEMENT OF PANCREATIC STENT, PLACEMENT OF BILIARY STENT;  Surgeon: Yarely Vann MD;  Location:  OR     ESOPHAGOSCOPY, GASTROSCOPY, DUODENOSCOPY (EGD), COMBINED N/A 4/20/2020    Procedure: ESOPHAGOGASTRODUODENOSCOPY, WITH FINE NEEDLE ASPIRATION BIOPSY, WITH ENDOSCOPIC ULTRASOUND GUIDANCE, Endoscopic retrograde cholangiopancreatography;  Surgeon: Yarely Vann MD;  Location:  OR     ESOPHAGOSCOPY, GASTROSCOPY, DUODENOSCOPY (EGD), COMBINED N/A 5/5/2020    Procedure: ESOPHAGOGASTRODUODENOSCOPY, WITH FINE NEEDLE ASPIRATION BIOPSY, WITH ENDOSCOPIC ULTRASOUND GUIDANCE;  Surgeon: Romina Rosario MD;  Location:  GI     IR CHEST PORT PLACEMENT > 5 YRS OF AGE  7/6/2020     LAPAROSCOPIC BIOPSY LIVER N/A 5/22/2020    Procedure: Diagnostic Laparoscopy with intraoperative ultrasound and Liver Biopsy X2;  Surgeon: Duarte Chaves MD;  Location: [facility] OR     WHIPPLE PROCEDURE N/A 5/22/2020    Procedure: Non Pylorus Preserving Whipple procedure;  Surgeon: Duarte Chaves MD;  Location: [facility] OR         SOCIAL HISTORY:  Social History     Socioeconomic History     Marital status:      Spouse name: Not on file     Number of children: Not on file     Years of education: Not on file     Highest education level: Not on file   Occupational History     Employer: RETIRED     Comment: previous taught special ed   Social Needs     Financial resource strain: Not on file     Food insecurity     Worry: Not on file     Inability: Not on file     Transportation needs     Medical: Not on file     Non-medical: Not on file   Tobacco Use     Smoking status: Never Smoker     Smokeless tobacco: Never Used   Substance and Sexual Activity     Alcohol use: Yes     Comment: social      Drug use:  No     Sexual activity: Yes   Lifestyle     Physical activity     Days per week: Not on file     Minutes per session: Not on file     Stress: Not on file   Relationships     Social connections     Talks on phone: Not on file     Gets together: Not on file     Attends Catholic service: Not on file     Active member of club or organization: Not on file     Attends meetings of clubs or organizations: Not on file     Relationship status: Not on file     Intimate partner violence     Fear of current or ex partner: Not on file     Emotionally abused: Not on file     Physically abused: Not on file     Forced sexual activity: Not on file   Other Topics Concern     Parent/sibling w/ CABG, MI or angioplasty before 65F 55M? Not Asked      Service Not Asked     Blood Transfusions Not Asked     Caffeine Concern Yes     Comment: 2 cups     Occupational Exposure Not Asked     Hobby Hazards Not Asked     Sleep Concern Not Asked     Stress Concern Not Asked     Weight Concern Not Asked     Special Diet No     Back Care Not Asked     Exercise Yes     Comment: walking workout      Bike Helmet Not Asked     Seat Belt Not Asked     Self-Exams Not Asked   Social History Narrative     Not on file         FAMILY HISTORY:  Family History   Problem Relation Age of Onset     Musculoskeletal Disorder Mother         edematous legs     Obesity Mother      Musculoskeletal Disorder Maternal Grandmother         edematous legs; did have amputation     Obesity Maternal Grandmother      Myocardial Infarction Maternal Grandmother          PHYSICAL EXAM:  Vital signs:  /67   Pulse 59   Temp 96.6  F (35.9  C) (Tympanic)   Resp 16   Wt 52.6 kg (116 lb)   LMP  (LMP Unknown)   SpO2 99%   BMI 19.91 kg/m     ECO  GENERAL/CONSTITUTIONAL: No acute distress.  EYES: No scleral icterus.  RESPIRATORY: Clear to auscultation bilaterally.  CARDIOVASCULAR: Regular rate and rhythm.  MUSCULOSKELETAL: Warm and well-perfused, no cyanosis,  clubbing, or edema.  NEUROLOGIC: Alert, oriented, answers questions appropriately.  INTEGUMENTARY: No jaundice.  Port in place.        LABS:  CBC RESULTS:   Recent Labs   Lab Test 09/08/20  0856   WBC 3.7*   RBC 4.30   HGB 12.4   HCT 39.5   MCV 92   MCH 28.8   MCHC 31.4*   RDW 14.9        Recent Labs   Lab Test 09/08/20  0856 08/11/20  0957    136   POTASSIUM 4.2 4.4   CHLORIDE 105 105   CO2 28 29   ANIONGAP 5 2*   GLC 98 86   BUN 20 23   CR 0.88 0.88   CAMERON 9.0 8.9     Lab Results   Component Value Date    AST 33 09/08/2020     Lab Results   Component Value Date    ALT 59 09/08/2020     No results found for: BILICONJ   Lab Results   Component Value Date    BILITOTAL 0.5 09/08/2020     Lab Results   Component Value Date    ALBUMIN 3.6 09/08/2020     Lab Results   Component Value Date    PROTTOTAL 7.1 09/08/2020      Lab Results   Component Value Date    ALKPHOS 116 09/08/2020       Component      Latest Ref Rng & Units 4/19/2020 5/5/2020 7/7/2020   Cancer Antigen 19-9      0 - 37 U/mL 109 (H) 93 (H) 7       IMAGING:  CT c/a/p 6/30/20:  1.  Interval postoperative changes of Whipple. Mild soft tissue  thickening at the operative bed is likely postoperative.  2.  No convincing evidence of recurrence or metastatic disease.  3.  Stable 3 mm left lower lobe pulmonary nodule.  4.  Large stool volume throughout the colon.      ASSESSMENT/PLAN:  Flora Hogan is a 75 year old female with:    1) Adenocarcinoma of the pancreas head: s/p Whipple procedure on 5/26/20; pathology showed pancreatic ductal adenocarcinoma, well-differentiated (grade 1), tumor size 2.6 x 2.4 x 2.0 cm, negative margins, +LVI, 0 of 4 lymph nodes involved, staged pT2N0 (stage IB).      Post-op restaging scan shows post-operative changes with no convincing evidence of recurrence or metastatic disease.      She is on adjuvant chemotherapy with single-agent gemcitabine.  She had severe neutropenia after just one dose of gemcitabine.  She is  going to need dose-reduction.  We discussed options of reducing dose of neutropenia to 800 mg/m2 and staying on the says schedule or switching to every other week schedule.  Suspect that she will continue to have issues with neutropenia, so we decided to switch to every other week schedule.  She is agreeable with this.      She switched to every other week schedule starting with cycle 2, and she is now tolerating it well.      -C3D1 of chemotherapy today.  C3D15 on 9/22/20.   -appointment with Lacey on 10/6/20, with C4D1 same day.  C4D15 will be on 10/20/20.  -appointment with me on 11/3/20, with C5D1 of chemotherapy same day  -will repeat scans after adjuvant chemotherapy is completed    2) Pulmonary nodule: stable 3 mm left lower lobe pulmonary nodule  -will monitor on future scans    3) Chronic constipation: Patient says that this has been an issue for her for many years.  She controls it with diet, and she does have stool softeners at home as needed.  She asked about pancreatic insufficiency after Whipple, which is possible.  We discussed trying Creon prior to meals, and she agrees.  -Creon prescribed - she has started it and finds it helpful.  She takes it 3 times a day.  She will continue it.      4) Leukpenia: Secondary to chemotherapy.  ANC is normal.  -proceed with chemotherapy today.      I spent a total of 25 minutes with the patient, with over >50% of the time in counseling and/or coordination of care.       Jeannie Coronado MD  Hematology/Oncology  [facility]    Again, thank you for allowing me to participate in the care of your patient.        Sincerely,        Jeannie Coronado MD

## 2020-09-08 NOTE — PROGRESS NOTES
Nursing Note:  Flora Hogan presents today for port labs.    Patient seen by provider today: Yes: Dr. Coronado.   present during visit today: Not Applicable.    Note: N/A.    Intravenous Access:  Labs drawn without difficulty.  Implanted Port.    Discharge Plan:   Patient was sent to [facility] for provider appointment.    Vikki Riddle RN

## 2020-09-09 NOTE — PATIENT INSTRUCTIONS
C3D15 scheduled 9/22  C4D1 scheduled 10/6 with appt with Lacey  C4D15 scheduled on 10/20  C5D1 scheduled on 11/3 with appt with Dr. Cliff Layton RN on 9/9/2020 at 10:00 AM

## 2020-09-22 ENCOUNTER — HOSPITAL ENCOUNTER (OUTPATIENT)
Facility: CLINIC | Age: 75
Setting detail: SPECIMEN
Discharge: HOME | End: 2020-09-22
Attending: INTERNAL MEDICINE | Admitting: INTERNAL MEDICINE
Payer: COMMERCIAL

## 2020-09-22 ENCOUNTER — INFUSION THERAPY VISIT (OUTPATIENT)
Dept: INFUSION THERAPY | Facility: CLINIC | Age: 75
End: 2020-09-22
Attending: INTERNAL MEDICINE
Payer: COMMERCIAL

## 2020-09-22 VITALS
TEMPERATURE: 97.9 F | HEART RATE: 60 BPM | OXYGEN SATURATION: 99 % | SYSTOLIC BLOOD PRESSURE: 110 MMHG | WEIGHT: 115.2 LBS | DIASTOLIC BLOOD PRESSURE: 72 MMHG | BODY MASS INDEX: 19.77 KG/M2

## 2020-09-22 DIAGNOSIS — C25.0 MALIGNANT NEOPLASM OF HEAD OF PANCREAS (H): Primary | ICD-10-CM

## 2020-09-22 LAB
BASOPHILS # BLD AUTO: 0 10E9/L (ref 0–0.2)
BASOPHILS NFR BLD AUTO: 0.9 %
DIFFERENTIAL METHOD BLD: NORMAL
EOSINOPHIL # BLD AUTO: 0.2 10E9/L (ref 0–0.7)
EOSINOPHIL NFR BLD AUTO: 3.5 %
ERYTHROCYTE [DISTWIDTH] IN BLOOD BY AUTOMATED COUNT: 14.6 % (ref 10–15)
HCT VFR BLD AUTO: 38 % (ref 35–47)
HGB BLD-MCNC: 12.1 G/DL (ref 11.7–15.7)
IMM GRANULOCYTES # BLD: 0 10E9/L (ref 0–0.4)
IMM GRANULOCYTES NFR BLD: 0.2 %
LYMPHOCYTES # BLD AUTO: 1 10E9/L (ref 0.8–5.3)
LYMPHOCYTES NFR BLD AUTO: 21.7 %
MCH RBC QN AUTO: 29.2 PG (ref 26.5–33)
MCHC RBC AUTO-ENTMCNC: 31.8 G/DL (ref 31.5–36.5)
MCV RBC AUTO: 92 FL (ref 78–100)
MONOCYTES # BLD AUTO: 0.5 10E9/L (ref 0–1.3)
MONOCYTES NFR BLD AUTO: 10 %
NEUTROPHILS # BLD AUTO: 2.9 10E9/L (ref 1.6–8.3)
NEUTROPHILS NFR BLD AUTO: 63.7 %
NRBC # BLD AUTO: 0 10*3/UL
NRBC BLD AUTO-RTO: 0 /100
PLATELET # BLD AUTO: 156 10E9/L (ref 150–450)
RBC # BLD AUTO: 4.14 10E12/L (ref 3.8–5.2)
WBC # BLD AUTO: 4.5 10E9/L (ref 4–11)

## 2020-09-22 PROCEDURE — 25000128 H RX IP 250 OP 636: Performed by: INTERNAL MEDICINE

## 2020-09-22 PROCEDURE — 96413 CHEMO IV INFUSION 1 HR: CPT

## 2020-09-22 PROCEDURE — 85025 COMPLETE CBC W/AUTO DIFF WBC: CPT | Performed by: INTERNAL MEDICINE

## 2020-09-22 PROCEDURE — 25800030 ZZH RX IP 258 OP 636: Performed by: INTERNAL MEDICINE

## 2020-09-22 PROCEDURE — 96375 TX/PRO/DX INJ NEW DRUG ADDON: CPT

## 2020-09-22 RX ORDER — HEPARIN SODIUM (PORCINE) LOCK FLUSH IV SOLN 100 UNIT/ML 100 UNIT/ML
5 SOLUTION INTRAVENOUS
Status: DISCONTINUED | OUTPATIENT
Start: 2020-09-22 | End: 2020-09-22 | Stop reason: HOSPADM

## 2020-09-22 RX ADMIN — GEMCITABINE 1600 MG: 38 INJECTION, SOLUTION INTRAVENOUS at 12:01

## 2020-09-22 RX ADMIN — SODIUM CHLORIDE 250 ML: 9 INJECTION, SOLUTION INTRAVENOUS at 11:38

## 2020-09-22 RX ADMIN — DEXAMETHASONE SODIUM PHOSPHATE 12 MG: 10 INJECTION, SOLUTION INTRAMUSCULAR; INTRAVENOUS at 11:38

## 2020-09-22 RX ADMIN — Medication 5 ML: at 12:33

## 2020-09-22 NOTE — PROGRESS NOTES
Infusion Nursing Note:  Flora Hogan presents today for C3D15 Gemzar.    Patient seen by provider today: No   present during visit today: Not Applicable.    Note: NA    Intravenous Access:  Labs drawn without difficulty.  Implanted Port.    Treatment Conditions:  Lab Results   Component Value Date    HGB 12.1 09/22/2020     Lab Results   Component Value Date    WBC 4.5 09/22/2020      Lab Results   Component Value Date    ANEU 2.9 09/22/2020     Lab Results   Component Value Date     09/22/2020      Results reviewed, labs MET treatment parameters, ok to proceed with treatment.      Post Infusion Assessment:  Patient tolerated infusion without incident.  Blood return noted pre and post infusion.  Site patent and intact, free from redness, edema or discomfort.  No evidence of extravasations.  Access discontinued per protocol.       Discharge Plan:   Discharge instructions reviewed with: Patient.  Patient and/or family verbalized understanding of discharge instructions and all questions answered.  AVS to patient via [employer].  Patient will return 10/6/20 for in person f/u with MENA Silva/C4D1 Gemzar for next appointment.   Patient discharged in stable condition accompanied by: self.  Departure Mode: Ambulatory.    Karma Capellan RN

## 2020-10-06 ENCOUNTER — ONCOLOGY VISIT (OUTPATIENT)
Dept: ONCOLOGY | Facility: CLINIC | Age: 75
End: 2020-10-06
Attending: INTERNAL MEDICINE
Payer: COMMERCIAL

## 2020-10-06 ENCOUNTER — INFUSION THERAPY VISIT (OUTPATIENT)
Dept: INFUSION THERAPY | Facility: CLINIC | Age: 75
End: 2020-10-06
Attending: INTERNAL MEDICINE
Payer: COMMERCIAL

## 2020-10-06 ENCOUNTER — HOSPITAL ENCOUNTER (OUTPATIENT)
Facility: CLINIC | Age: 75
Setting detail: SPECIMEN
Discharge: HOME | End: 2020-10-06
Attending: INTERNAL MEDICINE | Admitting: INTERNAL MEDICINE
Payer: COMMERCIAL

## 2020-10-06 VITALS
SYSTOLIC BLOOD PRESSURE: 121 MMHG | TEMPERATURE: 97.2 F | DIASTOLIC BLOOD PRESSURE: 70 MMHG | OXYGEN SATURATION: 98 % | HEART RATE: 52 BPM | BODY MASS INDEX: 20.08 KG/M2 | WEIGHT: 117 LBS | RESPIRATION RATE: 16 BRPM

## 2020-10-06 DIAGNOSIS — C25.0 MALIGNANT NEOPLASM OF HEAD OF PANCREAS (H): Primary | ICD-10-CM

## 2020-10-06 LAB
ALBUMIN SERPL-MCNC: 3.4 G/DL (ref 3.4–5)
ALP SERPL-CCNC: 103 U/L (ref 40–150)
ALT SERPL W P-5'-P-CCNC: 58 U/L (ref 0–50)
ANION GAP SERPL CALCULATED.3IONS-SCNC: 3 MMOL/L (ref 3–14)
AST SERPL W P-5'-P-CCNC: 25 U/L (ref 0–45)
BASOPHILS # BLD AUTO: 0.1 10E9/L (ref 0–0.2)
BASOPHILS NFR BLD AUTO: 1.5 %
BILIRUB SERPL-MCNC: 0.5 MG/DL (ref 0.2–1.3)
BUN SERPL-MCNC: 18 MG/DL (ref 7–30)
CALCIUM SERPL-MCNC: 8.6 MG/DL (ref 8.5–10.1)
CHLORIDE SERPL-SCNC: 106 MMOL/L (ref 94–109)
CO2 SERPL-SCNC: 29 MMOL/L (ref 20–32)
CREAT SERPL-MCNC: 0.83 MG/DL (ref 0.52–1.04)
DIFFERENTIAL METHOD BLD: ABNORMAL
EOSINOPHIL # BLD AUTO: 0.2 10E9/L (ref 0–0.7)
EOSINOPHIL NFR BLD AUTO: 6.6 %
ERYTHROCYTE [DISTWIDTH] IN BLOOD BY AUTOMATED COUNT: 14.6 % (ref 10–15)
GFR SERPL CREATININE-BSD FRML MDRD: 68 ML/MIN/{1.73_M2}
GLUCOSE SERPL-MCNC: 69 MG/DL (ref 70–99)
HCT VFR BLD AUTO: 36.9 % (ref 35–47)
HGB BLD-MCNC: 11.9 G/DL (ref 11.7–15.7)
IMM GRANULOCYTES # BLD: 0 10E9/L (ref 0–0.4)
IMM GRANULOCYTES NFR BLD: 0.3 %
LYMPHOCYTES # BLD AUTO: 0.9 10E9/L (ref 0.8–5.3)
LYMPHOCYTES NFR BLD AUTO: 26.3 %
MCH RBC QN AUTO: 29.5 PG (ref 26.5–33)
MCHC RBC AUTO-ENTMCNC: 32.2 G/DL (ref 31.5–36.5)
MCV RBC AUTO: 92 FL (ref 78–100)
MONOCYTES # BLD AUTO: 0.4 10E9/L (ref 0–1.3)
MONOCYTES NFR BLD AUTO: 13 %
NEUTROPHILS # BLD AUTO: 1.7 10E9/L (ref 1.6–8.3)
NEUTROPHILS NFR BLD AUTO: 52.3 %
NRBC # BLD AUTO: 0 10*3/UL
NRBC BLD AUTO-RTO: 0 /100
PLATELET # BLD AUTO: 144 10E9/L (ref 150–450)
POTASSIUM SERPL-SCNC: 4.2 MMOL/L (ref 3.4–5.3)
PROT SERPL-MCNC: 6.7 G/DL (ref 6.8–8.8)
RBC # BLD AUTO: 4.03 10E12/L (ref 3.8–5.2)
SODIUM SERPL-SCNC: 138 MMOL/L (ref 133–144)
WBC # BLD AUTO: 3.3 10E9/L (ref 4–11)

## 2020-10-06 PROCEDURE — 80053 COMPREHEN METABOLIC PANEL: CPT | Performed by: INTERNAL MEDICINE

## 2020-10-06 PROCEDURE — 258N000003 HC RX IP 258 OP 636: Performed by: PHYSICIAN ASSISTANT

## 2020-10-06 PROCEDURE — G0463 HOSPITAL OUTPT CLINIC VISIT: HCPCS | Mod: 25

## 2020-10-06 PROCEDURE — 99207 PR NO CHARGE LOS: CPT

## 2020-10-06 PROCEDURE — 96413 CHEMO IV INFUSION 1 HR: CPT

## 2020-10-06 PROCEDURE — 96367 TX/PROPH/DG ADDL SEQ IV INF: CPT

## 2020-10-06 PROCEDURE — 99214 OFFICE O/P EST MOD 30 MIN: CPT | Performed by: PHYSICIAN ASSISTANT

## 2020-10-06 PROCEDURE — 85025 COMPLETE CBC W/AUTO DIFF WBC: CPT | Performed by: INTERNAL MEDICINE

## 2020-10-06 PROCEDURE — 250N000011 HC RX IP 250 OP 636: Performed by: PHYSICIAN ASSISTANT

## 2020-10-06 RX ORDER — NALOXONE HYDROCHLORIDE 0.4 MG/ML
.1-.4 INJECTION, SOLUTION INTRAMUSCULAR; INTRAVENOUS; SUBCUTANEOUS
Status: CANCELLED | OUTPATIENT
Start: 2020-10-06

## 2020-10-06 RX ORDER — NALOXONE HYDROCHLORIDE 0.4 MG/ML
.1-.4 INJECTION, SOLUTION INTRAMUSCULAR; INTRAVENOUS; SUBCUTANEOUS
Status: CANCELLED | OUTPATIENT
Start: 2020-10-20

## 2020-10-06 RX ORDER — MEPERIDINE HYDROCHLORIDE 25 MG/ML
25 INJECTION INTRAMUSCULAR; INTRAVENOUS; SUBCUTANEOUS EVERY 30 MIN PRN
Status: CANCELLED | OUTPATIENT
Start: 2020-10-06

## 2020-10-06 RX ORDER — SODIUM CHLORIDE 9 MG/ML
1000 INJECTION, SOLUTION INTRAVENOUS CONTINUOUS PRN
Status: CANCELLED
Start: 2020-10-20

## 2020-10-06 RX ORDER — DIPHENHYDRAMINE HYDROCHLORIDE 50 MG/ML
50 INJECTION INTRAMUSCULAR; INTRAVENOUS
Status: CANCELLED
Start: 2020-10-06

## 2020-10-06 RX ORDER — MEPERIDINE HYDROCHLORIDE 25 MG/ML
25 INJECTION INTRAMUSCULAR; INTRAVENOUS; SUBCUTANEOUS EVERY 30 MIN PRN
Status: CANCELLED | OUTPATIENT
Start: 2020-10-20

## 2020-10-06 RX ORDER — HEPARIN SODIUM,PORCINE 10 UNIT/ML
5 VIAL (ML) INTRAVENOUS
Status: CANCELLED | OUTPATIENT
Start: 2020-10-06

## 2020-10-06 RX ORDER — DIPHENHYDRAMINE HYDROCHLORIDE 50 MG/ML
50 INJECTION INTRAMUSCULAR; INTRAVENOUS
Status: CANCELLED
Start: 2020-10-20

## 2020-10-06 RX ORDER — METHYLPREDNISOLONE SODIUM SUCCINATE 125 MG/2ML
125 INJECTION, POWDER, LYOPHILIZED, FOR SOLUTION INTRAMUSCULAR; INTRAVENOUS
Status: CANCELLED
Start: 2020-10-06

## 2020-10-06 RX ORDER — LORAZEPAM 2 MG/ML
0.5 INJECTION INTRAMUSCULAR EVERY 4 HOURS PRN
Status: CANCELLED
Start: 2020-10-06

## 2020-10-06 RX ORDER — HEPARIN SODIUM (PORCINE) LOCK FLUSH IV SOLN 100 UNIT/ML 100 UNIT/ML
5 SOLUTION INTRAVENOUS
Status: DISCONTINUED | OUTPATIENT
Start: 2020-10-06 | End: 2020-10-06 | Stop reason: HOSPADM

## 2020-10-06 RX ORDER — HEPARIN SODIUM (PORCINE) LOCK FLUSH IV SOLN 100 UNIT/ML 100 UNIT/ML
5 SOLUTION INTRAVENOUS
Status: CANCELLED | OUTPATIENT
Start: 2020-10-06

## 2020-10-06 RX ORDER — ALBUTEROL SULFATE 0.83 MG/ML
2.5 SOLUTION RESPIRATORY (INHALATION)
Status: CANCELLED | OUTPATIENT
Start: 2020-10-06

## 2020-10-06 RX ORDER — ALBUTEROL SULFATE 90 UG/1
1-2 AEROSOL, METERED RESPIRATORY (INHALATION)
Status: CANCELLED
Start: 2020-10-06

## 2020-10-06 RX ORDER — LORAZEPAM 2 MG/ML
0.5 INJECTION INTRAMUSCULAR EVERY 4 HOURS PRN
Status: CANCELLED
Start: 2020-10-20

## 2020-10-06 RX ORDER — EPINEPHRINE 1 MG/ML
0.3 INJECTION, SOLUTION INTRAMUSCULAR; SUBCUTANEOUS EVERY 5 MIN PRN
Status: CANCELLED | OUTPATIENT
Start: 2020-10-06

## 2020-10-06 RX ORDER — METHYLPREDNISOLONE SODIUM SUCCINATE 125 MG/2ML
125 INJECTION, POWDER, LYOPHILIZED, FOR SOLUTION INTRAMUSCULAR; INTRAVENOUS
Status: CANCELLED
Start: 2020-10-20

## 2020-10-06 RX ORDER — ALBUTEROL SULFATE 0.83 MG/ML
2.5 SOLUTION RESPIRATORY (INHALATION)
Status: CANCELLED | OUTPATIENT
Start: 2020-10-20

## 2020-10-06 RX ORDER — SODIUM CHLORIDE 9 MG/ML
1000 INJECTION, SOLUTION INTRAVENOUS CONTINUOUS PRN
Status: CANCELLED
Start: 2020-10-06

## 2020-10-06 RX ORDER — ALBUTEROL SULFATE 90 UG/1
1-2 AEROSOL, METERED RESPIRATORY (INHALATION)
Status: CANCELLED
Start: 2020-10-20

## 2020-10-06 RX ORDER — HEPARIN SODIUM,PORCINE 10 UNIT/ML
5 VIAL (ML) INTRAVENOUS
Status: CANCELLED | OUTPATIENT
Start: 2020-10-20

## 2020-10-06 RX ORDER — EPINEPHRINE 1 MG/ML
0.3 INJECTION, SOLUTION INTRAMUSCULAR; SUBCUTANEOUS EVERY 5 MIN PRN
Status: CANCELLED | OUTPATIENT
Start: 2020-10-20

## 2020-10-06 RX ORDER — HEPARIN SODIUM (PORCINE) LOCK FLUSH IV SOLN 100 UNIT/ML 100 UNIT/ML
5 SOLUTION INTRAVENOUS
Status: CANCELLED | OUTPATIENT
Start: 2020-10-20

## 2020-10-06 RX ADMIN — DEXAMETHASONE SODIUM PHOSPHATE 12 MG: 10 INJECTION, SOLUTION INTRAMUSCULAR; INTRAVENOUS at 10:21

## 2020-10-06 RX ADMIN — GEMCITABINE 1600 MG: 38 INJECTION, SOLUTION INTRAVENOUS at 10:48

## 2020-10-06 RX ADMIN — SODIUM CHLORIDE 250 ML: 9 INJECTION, SOLUTION INTRAVENOUS at 10:21

## 2020-10-06 RX ADMIN — Medication 5 ML: at 11:30

## 2020-10-06 ASSESSMENT — PAIN SCALES - GENERAL: PAINLEVEL: NO PAIN (0)

## 2020-10-06 NOTE — PROGRESS NOTES
"Oncology/Hematology Visit Note    Oct 6, 2020    Reason for visit: Follow up pancreatic cancer    Oncology HPI: Flora Hogan is a 75 year old female with adenocarcinoma of the pancreas. She presented with jaundice and underwent biliary stenting and EUS x 2 with atypical cells. CT and MRI revealed benign liver lesions and CA was elevated at 19-9, therefore she underwent a Whipple procedure on 5/26/2020. Pathology showed pancreatic ductal adenocarcinoma, well-differentiated, grade 1 and 0/4 lymph nodes involved. She established care with Dr Coronado on 6/9/2020 and she suggested adjuvant chemotherapy with single agent gemcitabine, 3 weeks on and 1 week off, C1D1 on 7/7/2020.     She was scheduled for C1D8 on 7/15/2020, but developed severe neutropenia.  Dr. Coronado changed the plan to days 1, 15.    Video visit today for C4D1.     Interval History: Flora has been doing really well.  She feels \"blessed\" with how well she is tolerating chemotherapy.  She was started on Creon about a month ago for her digestion and her stools have become more normal in the last 4 to 5 days, which she is thrilled about.  She is always chilled and this is been before chemotherapy, but no recent fever.  She has been eating small meals throughout the day and tolerating this well.  She has had a good appetite, no vomiting or recent diarrhea.  No chest pain, cough, headache or vision changes or urinary symptoms.    Review of Systems: See interval hx. Denies fevers, chills, HA, dizziness, n/t, changes in vision, cough, sore throat, CP, SOB, N/V, changes in urination, bleeding, bruising, rash.      PMHx and Social Hx reviewed per EPIC.      Medications:  Current Outpatient Medications   Medication Sig Dispense Refill     acetaminophen (TYLENOL) 325 MG tablet Take 1-2 tablets (325-650 mg) by mouth every 6 hours as needed for mild pain or fever 50 tablet 0     albuterol (PROAIR HFA/PROVENTIL HFA/VENTOLIN HFA) 108 (90 Base) MCG/ACT inhaler Inhale 2 " puffs into the lungs every 6 hours as needed for shortness of breath / dyspnea or wheezing 1 Inhaler 0     amylase-lipase-protease (CREON) 09115-80665 units CPEP per EC capsule Take 1 capsule (24,000 Units) by mouth 3 times daily (with meals) 90 capsule 3     calcium carbonate-vitamin D (CALTRATE 600+D) 600-400 MG-UNIT chewable tablet Take 1 chew tab by mouth 2 times daily  180 tablet 3     loratadine (CLARITIN REDITABS) 10 MG dispersible tablet Take 10 mg by mouth as needed        magnesium 250 MG tablet Take 1 tablet by mouth daily       oxyCODONE (ROXICODONE) 5 MG tablet Take 1-2 tablets (5-10 mg) by mouth every 6 hours as needed for moderate to severe pain 20 tablet 0     polyethylene glycol (MIRALAX) 17 g packet Take 17 g by mouth daily as needed for constipation 12 packet 0     prochlorperazine (COMPAZINE) 10 MG tablet Take 1 tablet (10 mg) by mouth every 6 hours as needed (Nausea/Vomiting) 30 tablet 1       Allergies   Allergen Reactions     Penicillin V      Fredy-1 [Lidocaine] Unknown       EXAM:    /70   Pulse 52   Temp 97.2  F (36.2  C) (Tympanic)   Resp 16   Wt 53.1 kg (117 lb)   LMP  (LMP Unknown)   SpO2 98%   BMI 20.08 kg/m     GENERAL:  Female, in no acute distress.  Alert and oriented x3.   HEENT:  Normocephalic, atraumatic.  PERRL, oropharynx clear with no sores or thrush.   LYMPH NODES:  No palpable pre/post-auricular, cervical, axillary lymphadenopathy appreciated.  LUNGS: Nonlabored breathing  ABDOMEN:  Soft, nontender and nondistended.  Bowel sounds heard x4.  No apparent hepatosplenomegaly.   EXTREMITIES:  No clubbing, cyanosis, or edema.   SKIN: No rash, port right chest without erythema, pus or tenderness.   PSYCH: Mood stable      Labs:   Results for ANALI PRUITT (MRN [id number]) as of 10/6/2020 11:27   10/6/2020 08:25   Sodium 138   Potassium 4.2   Chloride 106   Carbon Dioxide 29   Urea Nitrogen 18   Creatinine 0.83   GFR Estimate 68   GFR Estimate If Black 79   Calcium  8.6   Anion Gap 3   Albumin 3.4   Protein Total 6.7 (L)   Bilirubin Total 0.5   Alkaline Phosphatase 103   ALT 58 (H)   AST 25   Glucose 69 (L)   WBC 3.3 (L)   Hemoglobin 11.9   Hematocrit 36.9   Platelet Count 144 (L)   RBC Count 4.03   MCV 92   MCH 29.5   MCHC 32.2   RDW 14.6   Diff Method Automated Method   % Neutrophils 52.3   % Lymphocytes 26.3   % Monocytes 13.0   % Eosinophils 6.6   % Basophils 1.5   % Immature Granulocytes 0.3   Nucleated RBCs 0   Absolute Neutrophil 1.7   Absolute Lymphocytes 0.9   Absolute Monocytes 0.4   Absolute Eosinophils 0.2   Absolute Basophils 0.1   Abs Immature Granulocytes 0.0   Absolute Nucleated RBC 0.0       Imaging: n/a    Impression/Plan: Flora Hogan is a 74 year old female with pancreatic cancer status post Whipple's procedure currently on adjuvant chemotherapy with single agent gemcitabine.    Adenocarcinoma of the pancreas head: s/p Whipple procedure and restaging scan with no evidence of metastatic disease.  She started adjuvant chemotherapy with C1 D1 on 7/7/2020 and became severely neutropenic, therefore her schedule was changed to day 1, 15.  She has completed 3 full cycles and is tolerating this extremely well.  Labs are within parameters to proceed with cycle 4, day 1 today.  I am really happy with how well she is doing.  She will call the clinic if any questions or concerns.  --10/20 gem C4D15  --11/3 Dr. Coronado, gem C5D1    Pulmonary nodule: Stable 3 mm left lower lobe pulmonary nodule. We will monitor on future scans.     GI: She has been battling intermittent diarrhea and constipation for several years.  She started Creon about 5 or 6 weeks ago and she is to starting to see some improvement in her stools.     Chronic leg cramping: She has had this for many years.  Symptoms have overall slightly improved recently.    Chart documentation with Dragon Voice recognition Software. Although reviewed after completion, some words and grammatical errors may  remain.      Lacey Castillo PA-C  Hematology/Oncology  [facility]

## 2020-10-06 NOTE — NURSING NOTE
"Oncology Rooming Note    October 6, 2020 8:53 AM   Aanli Hogan is a 75 year old female who presents for:    Chief Complaint   Patient presents with     Oncology Clinic Visit     Malignant neoplasm of head of pancreas     Initial Vitals: /70   Pulse 52   Temp 97.2  F (36.2  C) (Tympanic)   Resp 16   Wt 53.1 kg (117 lb)   LMP  (LMP Unknown)   SpO2 98%   BMI 20.08 kg/m   Estimated body mass index is 20.08 kg/m  as calculated from the following:    Height as of 5/22/20: 1.626 m (5' 4\").    Weight as of this encounter: 53.1 kg (117 lb). Body surface area is 1.55 meters squared.  No Pain (0) Comment: Data Unavailable   No LMP recorded (lmp unknown). Patient is postmenopausal.  Allergies reviewed: Yes  Medications reviewed: Yes    Medications: Medication refills not needed today.  Pharmacy name entered into [facility]:    [id number] - [place], [place] - [address] [id number] - [place], [place] - [address] [facility] [address] [facility] AT [place] [address] & [facility]    Clinical concerns: follow up       Angi Walker CMA              "

## 2020-10-06 NOTE — PROGRESS NOTES
Infusion Nursing Note:  Flora CARVER Cotaras presents today for GEmzar.    Patient seen by provider today: Yes: Lacey   present during visit today: Not Applicable.    Note: N/A.    Intravenous Access:  Implanted Port.    Treatment Conditions:  Lab Results   Component Value Date    HGB 11.9 10/06/2020     Lab Results   Component Value Date    WBC 3.3 10/06/2020      Lab Results   Component Value Date    ANEU 1.7 10/06/2020     Lab Results   Component Value Date     10/06/2020      Lab Results   Component Value Date     10/06/2020                   Lab Results   Component Value Date    POTASSIUM 4.2 10/06/2020           Lab Results   Component Value Date    MAG 2.3 07/07/2020            Lab Results   Component Value Date    CR 0.83 10/06/2020                   Lab Results   Component Value Date    CAMERON 8.6 10/06/2020                Lab Results   Component Value Date    BILITOTAL 0.5 10/06/2020           Lab Results   Component Value Date    ALBUMIN 3.4 10/06/2020                    Lab Results   Component Value Date    ALT 58 10/06/2020           Lab Results   Component Value Date    AST 25 10/06/2020       Results reviewed, labs MET treatment parameters, ok to proceed with treatment.      Post Infusion Assessment:  Patient tolerated infusion without incident.  Blood return noted pre and post infusion.  Site patent and intact, free from redness, edema or discomfort.  No evidence of extravasations.  Access discontinued per protocol.       Discharge Plan:   Patient declined prescription refills.  Discharge instructions reviewed with: Patient.  Patient and/or family verbalized understanding of discharge instructions and all questions answered.  AVS to patient via [employer].  Patient will return 10/20 for next appointment.   Patient discharged in stable condition accompanied by: self.  Departure Mode: Ambulatory.    Deb Farmer RN

## 2020-10-06 NOTE — LETTER
"    10/6/2020         RE: Flora Hogan  [address]  [place] [address]        Dear Colleague,    Thank you for referring your patient, Flora Hogan, to the [facility]. Please see a copy of my visit note below.    Oncology/Hematology Visit Note    Oct 6, 2020    Reason for visit: Follow up pancreatic cancer    Oncology HPI: Flora Hogan is a 75 year old female with adenocarcinoma of the pancreas. She presented with jaundice and underwent biliary stenting and EUS x 2 with atypical cells. CT and MRI revealed benign liver lesions and CA was elevated at 19-9, therefore she underwent a Whipple procedure on 5/26/2020. Pathology showed pancreatic ductal adenocarcinoma, well-differentiated, grade 1 and 0/4 lymph nodes involved. She established care with Dr Coronado on 6/9/2020 and she suggested adjuvant chemotherapy with single agent gemcitabine, 3 weeks on and 1 week off, C1D1 on 7/7/2020.     She was scheduled for C1D8 on 7/15/2020, but developed severe neutropenia.  Dr. Coronado changed the plan to days 1, 15.    Video visit today for C4D1.     Interval History: Flora has been doing really well.  She feels \"blessed\" with how well she is tolerating chemotherapy.  She was started on Creon about a month ago for her digestion and her stools have become more normal in the last 4 to 5 days, which she is thrilled about.  She is always chilled and this is been before chemotherapy, but no recent fever.  She has been eating small meals throughout the day and tolerating this well.  She has had a good appetite, no vomiting or recent diarrhea.  No chest pain, cough, headache or vision changes or urinary symptoms.    Review of Systems: See interval hx. Denies fevers, chills, HA, dizziness, n/t, changes in vision, cough, sore throat, CP, SOB, N/V, changes in urination, bleeding, bruising, rash.      PMHx and Social Hx reviewed per EPIC.      Medications:  Current Outpatient " Medications   Medication Sig Dispense Refill     acetaminophen (TYLENOL) 325 MG tablet Take 1-2 tablets (325-650 mg) by mouth every 6 hours as needed for mild pain or fever 50 tablet 0     albuterol (PROAIR HFA/PROVENTIL HFA/VENTOLIN HFA) 108 (90 Base) MCG/ACT inhaler Inhale 2 puffs into the lungs every 6 hours as needed for shortness of breath / dyspnea or wheezing 1 Inhaler 0     amylase-lipase-protease (CREON) 92991-50263 units CPEP per EC capsule Take 1 capsule (24,000 Units) by mouth 3 times daily (with meals) 90 capsule 3     calcium carbonate-vitamin D (CALTRATE 600+D) 600-400 MG-UNIT chewable tablet Take 1 chew tab by mouth 2 times daily  180 tablet 3     loratadine (CLARITIN REDITABS) 10 MG dispersible tablet Take 10 mg by mouth as needed        magnesium 250 MG tablet Take 1 tablet by mouth daily       oxyCODONE (ROXICODONE) 5 MG tablet Take 1-2 tablets (5-10 mg) by mouth every 6 hours as needed for moderate to severe pain 20 tablet 0     polyethylene glycol (MIRALAX) 17 g packet Take 17 g by mouth daily as needed for constipation 12 packet 0     prochlorperazine (COMPAZINE) 10 MG tablet Take 1 tablet (10 mg) by mouth every 6 hours as needed (Nausea/Vomiting) 30 tablet 1       Allergies   Allergen Reactions     Penicillin V      Fredy-1 [Lidocaine] Unknown       EXAM:    /70   Pulse 52   Temp 97.2  F (36.2  C) (Tympanic)   Resp 16   Wt 53.1 kg (117 lb)   LMP  (LMP Unknown)   SpO2 98%   BMI 20.08 kg/m     GENERAL:  Female, in no acute distress.  Alert and oriented x3.   HEENT:  Normocephalic, atraumatic.  PERRL, oropharynx clear with no sores or thrush.   LYMPH NODES:  No palpable pre/post-auricular, cervical, axillary lymphadenopathy appreciated.  LUNGS: Nonlabored breathing  ABDOMEN:  Soft, nontender and nondistended.  Bowel sounds heard x4.  No apparent hepatosplenomegaly.   EXTREMITIES:  No clubbing, cyanosis, or edema.   SKIN: No rash, port right chest without erythema, pus or tenderness.    PSYCH: Mood stable      Labs:   Results for ANALI HOGAN (MRN [id number]) as of 10/6/2020 11:27   10/6/2020 08:25   Sodium 138   Potassium 4.2   Chloride 106   Carbon Dioxide 29   Urea Nitrogen 18   Creatinine 0.83   GFR Estimate 68   GFR Estimate If Black 79   Calcium 8.6   Anion Gap 3   Albumin 3.4   Protein Total 6.7 (L)   Bilirubin Total 0.5   Alkaline Phosphatase 103   ALT 58 (H)   AST 25   Glucose 69 (L)   WBC 3.3 (L)   Hemoglobin 11.9   Hematocrit 36.9   Platelet Count 144 (L)   RBC Count 4.03   MCV 92   MCH 29.5   MCHC 32.2   RDW 14.6   Diff Method Automated Method   % Neutrophils 52.3   % Lymphocytes 26.3   % Monocytes 13.0   % Eosinophils 6.6   % Basophils 1.5   % Immature Granulocytes 0.3   Nucleated RBCs 0   Absolute Neutrophil 1.7   Absolute Lymphocytes 0.9   Absolute Monocytes 0.4   Absolute Eosinophils 0.2   Absolute Basophils 0.1   Abs Immature Granulocytes 0.0   Absolute Nucleated RBC 0.0       Imaging: n/a    Impression/Plan: Anali Hogan is a 74 year old female with pancreatic cancer status post Whipple's procedure currently on adjuvant chemotherapy with single agent gemcitabine.    Adenocarcinoma of the pancreas head: s/p Whipple procedure and restaging scan with no evidence of metastatic disease.  She started adjuvant chemotherapy with C1 D1 on 7/7/2020 and became severely neutropenic, therefore her schedule was changed to day 1, 15.  She has completed 3 full cycles and is tolerating this extremely well.  Labs are within parameters to proceed with cycle 4, day 1 today.  I am really happy with how well she is doing.  She will call the clinic if any questions or concerns.  --10/20 gem C4D15  --11/3 Dr. Coronado, gem C5D1    Pulmonary nodule: Stable 3 mm left lower lobe pulmonary nodule. We will monitor on future scans.     GI: She has been battling intermittent diarrhea and constipation for several years.  She started Creon about 5 or 6 weeks ago and she is to starting to see some  improvement in her stools.     Chronic leg cramping: She has had this for many years.  Symptoms have overall slightly improved recently.    Chart documentation with Dragon Voice recognition Software. Although reviewed after completion, some words and grammatical errors may remain.      Lacey Castillo PA-C  Hematology/Oncology  [facility]                  Again, thank you for allowing me to participate in the care of your patient.        Sincerely,        Lacey Castillo PA-C

## 2020-10-06 NOTE — PROGRESS NOTES
Nursing Note:  Flora Hogan presents today for labs.    Patient seen by provider today: Yes: in person visit with Castillo   present during visit today: Not Applicable.    Note: N/A.    Intravenous Access:  Labs drawn without difficulty.  Implanted Port.    Discharge Plan:   Patient was sent to clinic for PA appointment.    Karma Capellan RN

## 2020-10-20 ENCOUNTER — HOSPITAL ENCOUNTER (OUTPATIENT)
Facility: CLINIC | Age: 75
Setting detail: SPECIMEN
Discharge: HOME | End: 2020-10-20
Attending: INTERNAL MEDICINE | Admitting: PHYSICIAN ASSISTANT
Payer: COMMERCIAL

## 2020-10-20 ENCOUNTER — INFUSION THERAPY VISIT (OUTPATIENT)
Dept: INFUSION THERAPY | Facility: CLINIC | Age: 75
End: 2020-10-20
Attending: INTERNAL MEDICINE
Payer: COMMERCIAL

## 2020-10-20 VITALS
HEART RATE: 65 BPM | SYSTOLIC BLOOD PRESSURE: 113 MMHG | OXYGEN SATURATION: 99 % | BODY MASS INDEX: 20.17 KG/M2 | DIASTOLIC BLOOD PRESSURE: 68 MMHG | RESPIRATION RATE: 16 BRPM | WEIGHT: 117.5 LBS | TEMPERATURE: 98.3 F

## 2020-10-20 DIAGNOSIS — C25.0 MALIGNANT NEOPLASM OF HEAD OF PANCREAS (H): Primary | ICD-10-CM

## 2020-10-20 LAB
BASOPHILS # BLD AUTO: 0 10E9/L (ref 0–0.2)
BASOPHILS NFR BLD AUTO: 1.2 %
DIFFERENTIAL METHOD BLD: ABNORMAL
EOSINOPHIL # BLD AUTO: 0.2 10E9/L (ref 0–0.7)
EOSINOPHIL NFR BLD AUTO: 6.5 %
ERYTHROCYTE [DISTWIDTH] IN BLOOD BY AUTOMATED COUNT: 14.4 % (ref 10–15)
HCT VFR BLD AUTO: 37 % (ref 35–47)
HGB BLD-MCNC: 11.8 G/DL (ref 11.7–15.7)
IMM GRANULOCYTES # BLD: 0 10E9/L (ref 0–0.4)
IMM GRANULOCYTES NFR BLD: 0 %
LYMPHOCYTES # BLD AUTO: 0.8 10E9/L (ref 0.8–5.3)
LYMPHOCYTES NFR BLD AUTO: 24 %
MCH RBC QN AUTO: 29.6 PG (ref 26.5–33)
MCHC RBC AUTO-ENTMCNC: 31.9 G/DL (ref 31.5–36.5)
MCV RBC AUTO: 93 FL (ref 78–100)
MONOCYTES # BLD AUTO: 0.4 10E9/L (ref 0–1.3)
MONOCYTES NFR BLD AUTO: 12.8 %
NEUTROPHILS # BLD AUTO: 1.9 10E9/L (ref 1.6–8.3)
NEUTROPHILS NFR BLD AUTO: 55.5 %
NRBC # BLD AUTO: 0 10*3/UL
NRBC BLD AUTO-RTO: 0 /100
PLATELET # BLD AUTO: 139 10E9/L (ref 150–450)
RBC # BLD AUTO: 3.99 10E12/L (ref 3.8–5.2)
WBC # BLD AUTO: 3.4 10E9/L (ref 4–11)

## 2020-10-20 PROCEDURE — 258N000003 HC RX IP 258 OP 636: Performed by: PHYSICIAN ASSISTANT

## 2020-10-20 PROCEDURE — 85025 COMPLETE CBC W/AUTO DIFF WBC: CPT | Performed by: PHYSICIAN ASSISTANT

## 2020-10-20 PROCEDURE — 96375 TX/PRO/DX INJ NEW DRUG ADDON: CPT

## 2020-10-20 PROCEDURE — 250N000011 HC RX IP 250 OP 636: Performed by: PHYSICIAN ASSISTANT

## 2020-10-20 PROCEDURE — 96413 CHEMO IV INFUSION 1 HR: CPT

## 2020-10-20 PROCEDURE — 99207 PR NO CHARGE LOS: CPT

## 2020-10-20 RX ORDER — HEPARIN SODIUM (PORCINE) LOCK FLUSH IV SOLN 100 UNIT/ML 100 UNIT/ML
5 SOLUTION INTRAVENOUS
Status: DISCONTINUED | OUTPATIENT
Start: 2020-10-20 | End: 2020-10-20 | Stop reason: HOSPADM

## 2020-10-20 RX ADMIN — SODIUM CHLORIDE 250 ML: 9 INJECTION, SOLUTION INTRAVENOUS at 10:19

## 2020-10-20 RX ADMIN — Medication 5 ML: at 11:12

## 2020-10-20 RX ADMIN — GEMCITABINE 1600 MG: 38 INJECTION, SOLUTION INTRAVENOUS at 10:36

## 2020-10-20 RX ADMIN — DEXAMETHASONE SODIUM PHOSPHATE 12 MG: 10 INJECTION, SOLUTION INTRAMUSCULAR; INTRAVENOUS at 10:19

## 2020-10-20 NOTE — PROGRESS NOTES
Infusion Nursing Note:  Flora Hogan presents today for C4D15 Gemzar.    Patient seen by provider today: No   present during visit today: Not Applicable.    Note: Patient reports tolerating Gemzar well.      Intravenous Access:  Labs drawn without difficulty.  Implanted Port.    Treatment Conditions:  Lab Results   Component Value Date    HGB 11.8 10/20/2020     Lab Results   Component Value Date    WBC 3.4 10/20/2020      Lab Results   Component Value Date    ANEU 1.9 10/20/2020     Lab Results   Component Value Date     10/20/2020      Results reviewed, labs MET treatment parameters, ok to proceed with treatment.      Post Infusion Assessment:  Patient tolerated infusion without incident.  Blood return noted pre and post infusion.  Site patent and intact, free from redness, edema or discomfort.  No evidence of extravasations.  Access discontinued per protocol.       Discharge Plan:   Copy of AVS reviewed with patient and/or family.  Patient will return 11/3/2020 for next appointment.  Patient discharged in stable condition accompanied by: self.  Departure Mode: Ambulatory.    Vikki Riddle RN

## 2020-11-03 ENCOUNTER — ONCOLOGY VISIT (OUTPATIENT)
Dept: ONCOLOGY | Facility: CLINIC | Age: 75
End: 2020-11-03
Attending: INTERNAL MEDICINE
Payer: COMMERCIAL

## 2020-11-03 ENCOUNTER — HOSPITAL ENCOUNTER (OUTPATIENT)
Facility: CLINIC | Age: 75
Setting detail: SPECIMEN
Discharge: HOME | End: 2020-11-03
Attending: INTERNAL MEDICINE | Admitting: INTERNAL MEDICINE
Payer: COMMERCIAL

## 2020-11-03 ENCOUNTER — INFUSION THERAPY VISIT (OUTPATIENT)
Dept: INFUSION THERAPY | Facility: CLINIC | Age: 75
End: 2020-11-03
Attending: INTERNAL MEDICINE
Payer: COMMERCIAL

## 2020-11-03 VITALS
OXYGEN SATURATION: 100 % | RESPIRATION RATE: 16 BRPM | TEMPERATURE: 96.9 F | SYSTOLIC BLOOD PRESSURE: 117 MMHG | DIASTOLIC BLOOD PRESSURE: 65 MMHG | WEIGHT: 116.8 LBS | BODY MASS INDEX: 20.05 KG/M2 | HEART RATE: 56 BPM

## 2020-11-03 DIAGNOSIS — C25.0 MALIGNANT NEOPLASM OF HEAD OF PANCREAS (H): Primary | ICD-10-CM

## 2020-11-03 LAB
ALBUMIN SERPL-MCNC: 3.6 G/DL (ref 3.4–5)
ALP SERPL-CCNC: 105 U/L (ref 40–150)
ALT SERPL W P-5'-P-CCNC: 57 U/L (ref 0–50)
ANION GAP SERPL CALCULATED.3IONS-SCNC: 5 MMOL/L (ref 3–14)
AST SERPL W P-5'-P-CCNC: 32 U/L (ref 0–45)
BASOPHILS # BLD AUTO: 0 10E9/L (ref 0–0.2)
BASOPHILS NFR BLD AUTO: 0.9 %
BILIRUB SERPL-MCNC: 0.6 MG/DL (ref 0.2–1.3)
BUN SERPL-MCNC: 20 MG/DL (ref 7–30)
CALCIUM SERPL-MCNC: 9.1 MG/DL (ref 8.5–10.1)
CHLORIDE SERPL-SCNC: 104 MMOL/L (ref 94–109)
CO2 SERPL-SCNC: 29 MMOL/L (ref 20–32)
CREAT SERPL-MCNC: 0.84 MG/DL (ref 0.52–1.04)
DIFFERENTIAL METHOD BLD: ABNORMAL
EOSINOPHIL # BLD AUTO: 0.2 10E9/L (ref 0–0.7)
EOSINOPHIL NFR BLD AUTO: 6 %
ERYTHROCYTE [DISTWIDTH] IN BLOOD BY AUTOMATED COUNT: 14.4 % (ref 10–15)
GFR SERPL CREATININE-BSD FRML MDRD: 67 ML/MIN/{1.73_M2}
GLUCOSE SERPL-MCNC: 87 MG/DL (ref 70–99)
HCT VFR BLD AUTO: 37.2 % (ref 35–47)
HGB BLD-MCNC: 11.7 G/DL (ref 11.7–15.7)
IMM GRANULOCYTES # BLD: 0 10E9/L (ref 0–0.4)
IMM GRANULOCYTES NFR BLD: 0.3 %
LYMPHOCYTES # BLD AUTO: 0.8 10E9/L (ref 0.8–5.3)
LYMPHOCYTES NFR BLD AUTO: 23.5 %
MCH RBC QN AUTO: 29.1 PG (ref 26.5–33)
MCHC RBC AUTO-ENTMCNC: 31.5 G/DL (ref 31.5–36.5)
MCV RBC AUTO: 93 FL (ref 78–100)
MONOCYTES # BLD AUTO: 0.5 10E9/L (ref 0–1.3)
MONOCYTES NFR BLD AUTO: 15.1 %
NEUTROPHILS # BLD AUTO: 1.8 10E9/L (ref 1.6–8.3)
NEUTROPHILS NFR BLD AUTO: 54.2 %
NRBC # BLD AUTO: 0 10*3/UL
NRBC BLD AUTO-RTO: 0 /100
PLATELET # BLD AUTO: 166 10E9/L (ref 150–450)
POTASSIUM SERPL-SCNC: 4.3 MMOL/L (ref 3.4–5.3)
PROT SERPL-MCNC: 7.1 G/DL (ref 6.8–8.8)
RBC # BLD AUTO: 4.02 10E12/L (ref 3.8–5.2)
SODIUM SERPL-SCNC: 138 MMOL/L (ref 133–144)
WBC # BLD AUTO: 3.3 10E9/L (ref 4–11)

## 2020-11-03 PROCEDURE — 99207 PR NO CHARGE LOS: CPT

## 2020-11-03 PROCEDURE — G0463 HOSPITAL OUTPT CLINIC VISIT: HCPCS

## 2020-11-03 PROCEDURE — 99214 OFFICE O/P EST MOD 30 MIN: CPT | Performed by: INTERNAL MEDICINE

## 2020-11-03 PROCEDURE — 258N000003 HC RX IP 258 OP 636: Performed by: INTERNAL MEDICINE

## 2020-11-03 PROCEDURE — 80053 COMPREHEN METABOLIC PANEL: CPT | Performed by: INTERNAL MEDICINE

## 2020-11-03 PROCEDURE — 96413 CHEMO IV INFUSION 1 HR: CPT

## 2020-11-03 PROCEDURE — 96367 TX/PROPH/DG ADDL SEQ IV INF: CPT

## 2020-11-03 PROCEDURE — 250N000011 HC RX IP 250 OP 636: Performed by: INTERNAL MEDICINE

## 2020-11-03 PROCEDURE — 85025 COMPLETE CBC W/AUTO DIFF WBC: CPT | Performed by: INTERNAL MEDICINE

## 2020-11-03 RX ORDER — ALBUTEROL SULFATE 0.83 MG/ML
2.5 SOLUTION RESPIRATORY (INHALATION)
Status: CANCELLED | OUTPATIENT
Start: 2020-11-17

## 2020-11-03 RX ORDER — DIPHENHYDRAMINE HYDROCHLORIDE 50 MG/ML
50 INJECTION INTRAMUSCULAR; INTRAVENOUS
Status: CANCELLED
Start: 2020-11-17

## 2020-11-03 RX ORDER — MEPERIDINE HYDROCHLORIDE 25 MG/ML
25 INJECTION INTRAMUSCULAR; INTRAVENOUS; SUBCUTANEOUS EVERY 30 MIN PRN
Status: CANCELLED | OUTPATIENT
Start: 2020-11-17

## 2020-11-03 RX ORDER — HEPARIN SODIUM (PORCINE) LOCK FLUSH IV SOLN 100 UNIT/ML 100 UNIT/ML
5 SOLUTION INTRAVENOUS
Status: CANCELLED | OUTPATIENT
Start: 2020-11-03

## 2020-11-03 RX ORDER — HEPARIN SODIUM,PORCINE 10 UNIT/ML
5 VIAL (ML) INTRAVENOUS
Status: CANCELLED | OUTPATIENT
Start: 2020-11-17

## 2020-11-03 RX ORDER — EPINEPHRINE 1 MG/ML
0.3 INJECTION, SOLUTION INTRAMUSCULAR; SUBCUTANEOUS EVERY 5 MIN PRN
Status: CANCELLED | OUTPATIENT
Start: 2020-11-17

## 2020-11-03 RX ORDER — HEPARIN SODIUM (PORCINE) LOCK FLUSH IV SOLN 100 UNIT/ML 100 UNIT/ML
5 SOLUTION INTRAVENOUS
Status: CANCELLED | OUTPATIENT
Start: 2020-11-17

## 2020-11-03 RX ORDER — NALOXONE HYDROCHLORIDE 0.4 MG/ML
.1-.4 INJECTION, SOLUTION INTRAMUSCULAR; INTRAVENOUS; SUBCUTANEOUS
Status: CANCELLED | OUTPATIENT
Start: 2020-11-03

## 2020-11-03 RX ORDER — EPINEPHRINE 1 MG/ML
0.3 INJECTION, SOLUTION INTRAMUSCULAR; SUBCUTANEOUS EVERY 5 MIN PRN
Status: CANCELLED | OUTPATIENT
Start: 2020-11-03

## 2020-11-03 RX ORDER — SODIUM CHLORIDE 9 MG/ML
1000 INJECTION, SOLUTION INTRAVENOUS CONTINUOUS PRN
Status: CANCELLED
Start: 2020-11-17

## 2020-11-03 RX ORDER — SODIUM CHLORIDE 9 MG/ML
1000 INJECTION, SOLUTION INTRAVENOUS CONTINUOUS PRN
Status: CANCELLED
Start: 2020-11-03

## 2020-11-03 RX ORDER — ALBUTEROL SULFATE 90 UG/1
1-2 AEROSOL, METERED RESPIRATORY (INHALATION)
Status: CANCELLED
Start: 2020-11-03

## 2020-11-03 RX ORDER — ALBUTEROL SULFATE 0.83 MG/ML
2.5 SOLUTION RESPIRATORY (INHALATION)
Status: CANCELLED | OUTPATIENT
Start: 2020-11-03

## 2020-11-03 RX ORDER — LORAZEPAM 2 MG/ML
0.5 INJECTION INTRAMUSCULAR EVERY 4 HOURS PRN
Status: CANCELLED
Start: 2020-11-03

## 2020-11-03 RX ORDER — NALOXONE HYDROCHLORIDE 0.4 MG/ML
.1-.4 INJECTION, SOLUTION INTRAMUSCULAR; INTRAVENOUS; SUBCUTANEOUS
Status: CANCELLED | OUTPATIENT
Start: 2020-11-17

## 2020-11-03 RX ORDER — METHYLPREDNISOLONE SODIUM SUCCINATE 125 MG/2ML
125 INJECTION, POWDER, LYOPHILIZED, FOR SOLUTION INTRAMUSCULAR; INTRAVENOUS
Status: CANCELLED
Start: 2020-11-03

## 2020-11-03 RX ORDER — HEPARIN SODIUM,PORCINE 10 UNIT/ML
5 VIAL (ML) INTRAVENOUS
Status: CANCELLED | OUTPATIENT
Start: 2020-11-03

## 2020-11-03 RX ORDER — HEPARIN SODIUM (PORCINE) LOCK FLUSH IV SOLN 100 UNIT/ML 100 UNIT/ML
5 SOLUTION INTRAVENOUS
Status: DISCONTINUED | OUTPATIENT
Start: 2020-11-03 | End: 2020-11-03 | Stop reason: HOSPADM

## 2020-11-03 RX ORDER — LORAZEPAM 2 MG/ML
0.5 INJECTION INTRAMUSCULAR EVERY 4 HOURS PRN
Status: CANCELLED
Start: 2020-11-17

## 2020-11-03 RX ORDER — MEPERIDINE HYDROCHLORIDE 25 MG/ML
25 INJECTION INTRAMUSCULAR; INTRAVENOUS; SUBCUTANEOUS EVERY 30 MIN PRN
Status: CANCELLED | OUTPATIENT
Start: 2020-11-03

## 2020-11-03 RX ORDER — METHYLPREDNISOLONE SODIUM SUCCINATE 125 MG/2ML
125 INJECTION, POWDER, LYOPHILIZED, FOR SOLUTION INTRAMUSCULAR; INTRAVENOUS
Status: CANCELLED
Start: 2020-11-17

## 2020-11-03 RX ORDER — DIPHENHYDRAMINE HYDROCHLORIDE 50 MG/ML
50 INJECTION INTRAMUSCULAR; INTRAVENOUS
Status: CANCELLED
Start: 2020-11-03

## 2020-11-03 RX ORDER — ALBUTEROL SULFATE 90 UG/1
1-2 AEROSOL, METERED RESPIRATORY (INHALATION)
Status: CANCELLED
Start: 2020-11-17

## 2020-11-03 RX ADMIN — SODIUM CHLORIDE 250 ML: 9 INJECTION, SOLUTION INTRAVENOUS at 10:52

## 2020-11-03 RX ADMIN — DEXAMETHASONE SODIUM PHOSPHATE 12 MG: 10 INJECTION, SOLUTION INTRAMUSCULAR; INTRAVENOUS at 10:57

## 2020-11-03 RX ADMIN — Medication 5 ML: at 12:07

## 2020-11-03 RX ADMIN — GEMCITABINE 1600 MG: 38 INJECTION, SOLUTION INTRAVENOUS at 11:35

## 2020-11-03 ASSESSMENT — PAIN SCALES - GENERAL: PAINLEVEL: NO PAIN (0)

## 2020-11-03 NOTE — LETTER
11/3/2020         RE: Flora Hogan  [address]  [place] [address]        Dear Colleague,    Thank you for referring your patient, Flora Hogan, to the [facility]. Please see a copy of my visit note below.    [facility]    Hematology/Oncology Established Patient Note      Today's Date: 11/03/20    Reason for Follow-up: pancreatic cancer      HISTORY OF PRESENT ILLNESS: Flora Hogan is a 75 year old female, who presents with adenocarcinoma of the pancreas.  She initially presented with jaundice.  She was evaluated for EUS and FNA, which revealed atypical cells, but no definitive evidence of malignancy.  She also underwent biliary stenting for decompression of her biliary tree.  She had a second EUS done, but again revealed atypical cells.  She then saw Dr. Duarte Chaves at [facility].  She had CT scan and MRI.  MRI showed multiple benign lesions in her liver; there as one area that was indeterminate.  Her CA 19-9 was 93.  On 5/26/20, she underwent Whipple procedure.  Two nodules were visualized on the surface of the liver, which were biopsied and found to be benign on pathology.  There were also hepatic cysts seen.  Pathology showed pancreatic ductal adenocarcinoma, well-differentiated (grade 1), tumor size 2.6 x 2.4 x 2.0 cm, negative margins, +LVI, 0 of 4 lymph nodes involved, staged pT2N0 (stage IB).      Port was placed by IR on 7/6/20.    She started adjuvant chemotherapy with single-agent gemcitabine on 7/7/20.      INTERIM HISTORY: Flora says that she continues to tolerate chemotherapy well.  She has had digestive issues even prior to starting chemo, but thinks it's maybe getting better.  She continues to have issues with constipation.  She is planning a trip to [place] in December 2020, after she completes adjuvant chemotherapy.  She will then go to [place] for the winter on 12/30/20.      REVIEW OF SYSTEMS:    14 point ROS was reviewed and is negative other than as noted above in HPI.       HOME MEDICATIONS:  Current Outpatient Medications   Medication Sig Dispense Refill     acetaminophen (TYLENOL) 325 MG tablet Take 1-2 tablets (325-650 mg) by mouth every 6 hours as needed for mild pain or fever 50 tablet 0     albuterol (PROAIR HFA/PROVENTIL HFA/VENTOLIN HFA) 108 (90 Base) MCG/ACT inhaler Inhale 2 puffs into the lungs every 6 hours as needed for shortness of breath / dyspnea or wheezing 1 Inhaler 0     amylase-lipase-protease (CREON) 72670-19896 units CPEP per EC capsule Take 1 capsule (24,000 Units) by mouth 3 times daily (with meals) 90 capsule 3     calcium carbonate-vitamin D (CALTRATE 600+D) 600-400 MG-UNIT chewable tablet Take 1 chew tab by mouth 2 times daily  180 tablet 3     loratadine (CLARITIN REDITABS) 10 MG dispersible tablet Take 10 mg by mouth as needed        magnesium 250 MG tablet Take 1 tablet by mouth daily       polyethylene glycol (MIRALAX) 17 g packet Take 17 g by mouth daily as needed for constipation 12 packet 0     oxyCODONE (ROXICODONE) 5 MG tablet Take 1-2 tablets (5-10 mg) by mouth every 6 hours as needed for moderate to severe pain (Patient not taking: Reported on 10/20/2020) 20 tablet 0     prochlorperazine (COMPAZINE) 10 MG tablet Take 1 tablet (10 mg) by mouth every 6 hours as needed (Nausea/Vomiting) (Patient not taking: Reported on 11/3/2020) 30 tablet 1         ALLERGIES:  Allergies   Allergen Reactions     Penicillin V      Fredy-1 [Lidocaine] Unknown         PAST MEDICAL HISTORY:  Past Medical History:   Diagnosis Date     Chest tightness     had suspected allergies     Malignant neoplasm of head of pancreas (H) 6/9/2020     Seasonal allergies      SOB (shortness of breath)          PAST SURGICAL HISTORY:  Past Surgical History:   Procedure Laterality Date     CATARACT IOL, RT/LT  2015     COLONOSCOPY  10/14    no repeat needed due to age     COLONOSCOPY N/A 10/7/2014     Procedure: COLONOSCOPY;  Surgeon: Daryl Hanson MD;  Location: [facility]     COLONOSCOPY  04/23/2019    no further screening     ENDOSCOPIC RETROGRADE CHOLANGIOPANCREATOGRAM N/A 4/20/2020    Procedure: ENDOSCOPIC RETROGRADE CHOLANGIOPANCREATOGRAPHY, PLACEMENT OF PANCREATIC STENT, PLACEMENT OF BILIARY STENT;  Surgeon: Yarely Vann MD;  Location:  OR     ESOPHAGOSCOPY, GASTROSCOPY, DUODENOSCOPY (EGD), COMBINED N/A 4/20/2020    Procedure: ESOPHAGOGASTRODUODENOSCOPY, WITH FINE NEEDLE ASPIRATION BIOPSY, WITH ENDOSCOPIC ULTRASOUND GUIDANCE, Endoscopic retrograde cholangiopancreatography;  Surgeon: Yarely Vann MD;  Location: [facility] OR     ESOPHAGOSCOPY, GASTROSCOPY, DUODENOSCOPY (EGD), COMBINED N/A 5/5/2020    Procedure: ESOPHAGOGASTRODUODENOSCOPY, WITH FINE NEEDLE ASPIRATION BIOPSY, WITH ENDOSCOPIC ULTRASOUND GUIDANCE;  Surgeon: Romina Rosario MD;  Location:  GI     IR CHEST PORT PLACEMENT > 5 YRS OF AGE  7/6/2020     LAPAROSCOPIC BIOPSY LIVER N/A 5/22/2020    Procedure: Diagnostic Laparoscopy with intraoperative ultrasound and Liver Biopsy X2;  Surgeon: Duarte Chaves MD;  Location: [facility] OR     WHIPPLE PROCEDURE N/A 5/22/2020    Procedure: Non Pylorus Preserving Whipple procedure;  Surgeon: Duarte Chaves MD;  Location: [facility] OR         SOCIAL HISTORY:  Social History     Socioeconomic History     Marital status:      Spouse name: Not on file     Number of children: Not on file     Years of education: Not on file     Highest education level: Not on file   Occupational History     Employer: RETIRED     Comment: previous taught special ed   Social Needs     Financial resource strain: Not on file     Food insecurity     Worry: Not on file     Inability: Not on file     Transportation needs     Medical: Not on file     Non-medical: Not on file   Tobacco Use     Smoking status: Never Smoker     Smokeless tobacco: Never Used   Substance and Sexual Activity     Alcohol use: Yes      Comment: social      Drug use: No     Sexual activity: Yes   Lifestyle     Physical activity     Days per week: Not on file     Minutes per session: Not on file     Stress: Not on file   Relationships     Social connections     Talks on phone: Not on file     Gets together: Not on file     Attends Rastafarian service: Not on file     Active member of club or organization: Not on file     Attends meetings of clubs or organizations: Not on file     Relationship status: Not on file     Intimate partner violence     Fear of current or ex partner: Not on file     Emotionally abused: Not on file     Physically abused: Not on file     Forced sexual activity: Not on file   Other Topics Concern     Parent/sibling w/ CABG, MI or angioplasty before 65F 55M? Not Asked      Service Not Asked     Blood Transfusions Not Asked     Caffeine Concern Yes     Comment: 2 cups     Occupational Exposure Not Asked     Hobby Hazards Not Asked     Sleep Concern Not Asked     Stress Concern Not Asked     Weight Concern Not Asked     Special Diet No     Back Care Not Asked     Exercise Yes     Comment: walking workout      Bike Helmet Not Asked     Seat Belt Not Asked     Self-Exams Not Asked   Social History Narrative     Not on file         FAMILY HISTORY:  Family History   Problem Relation Age of Onset     Musculoskeletal Disorder Mother         edematous legs     Obesity Mother      Musculoskeletal Disorder Maternal Grandmother         edematous legs; did have amputation     Obesity Maternal Grandmother      Myocardial Infarction Maternal Grandmother          PHYSICAL EXAM:  Vital signs:  /65   Pulse 56   Temp 96.9  F (36.1  C) (Tympanic)   Resp 16   Wt 53 kg (116 lb 12.8 oz)   LMP  (LMP Unknown)   SpO2 100%   BMI 20.05 kg/m     ECO  GENERAL/CONSTITUTIONAL: No acute distress.  EYES: No scleral icterus.  RESPIRATORY: Clear to auscultation bilaterally.  CARDIOVASCULAR: Regular rate and rhythm.  MUSCULOSKELETAL: Warm  and well-perfused, no cyanosis, clubbing, or edema.  NEUROLOGIC: Alert, oriented, answers questions appropriately.  INTEGUMENTARY: No jaundice.  Port in place.        LABS:  CBC RESULTS:   Recent Labs   Lab Test 11/03/20  0925   WBC 3.3*   RBC 4.02   HGB 11.7   HCT 37.2   MCV 93   MCH 29.1   MCHC 31.5   RDW 14.4        Recent Labs   Lab Test 11/03/20  0925 10/06/20  0825    138   POTASSIUM 4.3 4.2   CHLORIDE 104 106   CO2 29 29   ANIONGAP 5 3   GLC 87 69*   BUN 20 18   CR 0.84 0.83   CAMERON 9.1 8.6     Lab Results   Component Value Date    AST 32 11/03/2020     Lab Results   Component Value Date    ALT 57 11/03/2020     No results found for: BILICONJ   Lab Results   Component Value Date    BILITOTAL 0.6 11/03/2020     Lab Results   Component Value Date    ALBUMIN 3.6 11/03/2020     Lab Results   Component Value Date    PROTTOTAL 7.1 11/03/2020      Lab Results   Component Value Date    ALKPHOS 105 11/03/2020           IMAGING:  CT c/a/p 6/30/20:  1.  Interval postoperative changes of Whipple. Mild soft tissue  thickening at the operative bed is likely postoperative.  2.  No convincing evidence of recurrence or metastatic disease.  3.  Stable 3 mm left lower lobe pulmonary nodule.  4.  Large stool volume throughout the colon.      ASSESSMENT/PLAN:  Flora Hogan is a 75 year old female with:    1) Adenocarcinoma of the pancreas head: s/p Whipple procedure on 5/26/20; pathology showed pancreatic ductal adenocarcinoma, well-differentiated (grade 1), tumor size 2.6 x 2.4 x 2.0 cm, negative margins, +LVI, 0 of 4 lymph nodes involved, staged pT2N0 (stage IB).      Post-op restaging scan shows post-operative changes with no convincing evidence of recurrence or metastatic disease.      She is on adjuvant chemotherapy with single-agent gemcitabine.  She had severe neutropenia after just one dose of gemcitabine.  She is going to need dose-reduction.  We discussed options of reducing dose of neutropenia to 800  mg/m2 and staying on the says schedule or switching to every other week schedule.  Suspect that she will continue to have issues with neutropenia, so we decided to switch to every other week schedule.  She is agreeable with this.      She switched to every other week schedule starting with cycle 2, and she is now tolerating it well.      -C5D1 of chemotherapy today.  C5D15 on 11/17/20.   -appointment with Lacey on 12/1/20, with C6D1 same day.  C6D15 will be on 12/15/20.  -would usually wait a month after last dose of chemotherapy to do post-treatment scans, but she is leaving for [place] on 12/30/20, so will do them earlier on 12/28/20.  I will see her in follow-up on 12/29/20.    -She will return to [place] in March 2021.  I would then plan to see her back in April 2021, and another surveillance CT scan then, which I can order at the next visit.    -RTC with me on 12/29/20, with labs/CA 19-9, and results of CT scan  -she would like port removed after she finishes chemotherapy - ordered    2) Pulmonary nodule: stable 3 mm left lower lobe pulmonary nodule  -will monitor on future scans    3) Chronic constipation: Patient says that this has been an issue for her for many years.  She controls it with diet, and she does have stool softeners at home as needed.  She asked about pancreatic insufficiency after Whipple, which is possible.  We discussed trying Creon prior to meals, and she agrees.  -Creon prescribed - she has started it and finds it helpful.  She takes it 3 times a day.  She will continue it.      4) Leukpenia: Secondary to chemotherapy.  ANC is normal.  -proceed with chemotherapy today.      I spent a total of 25 minutes with the patient, with over >50% of the time in counseling and/or coordination of care.       Jeannie Coronado MD  Hematology/Oncology  [facility]      Again, thank you for allowing me to participate in the care of your patient.        Sincerely,        Jeannie  Bello Coronado MD

## 2020-11-03 NOTE — PROGRESS NOTES
Nursing Note:  Flora Hogan presents today for port labs.    Patient seen by provider today: Yes: Dr. Coronado   present during visit today: Not Applicable.    Note: N/A.    Intravenous Access:  Labs drawn without difficulty.  Implanted Port.    Discharge Plan:   Patient was sent to [facility] for clinic appointment.    Karina Rodriguez RN

## 2020-11-03 NOTE — PROGRESS NOTES
[facility]    Hematology/Oncology Established Patient Note      Today's Date: 11/03/20    Reason for Follow-up: pancreatic cancer      HISTORY OF PRESENT ILLNESS: Flora Hogan is a 75 year old female, who presents with adenocarcinoma of the pancreas.  She initially presented with jaundice.  She was evaluated for EUS and FNA, which revealed atypical cells, but no definitive evidence of malignancy.  She also underwent biliary stenting for decompression of her biliary tree.  She had a second EUS done, but again revealed atypical cells.  She then saw Dr. Duarte Chaves at [facility].  She had CT scan and MRI.  MRI showed multiple benign lesions in her liver; there as one area that was indeterminate.  Her CA 19-9 was 93.  On 5/26/20, she underwent Whipple procedure.  Two nodules were visualized on the surface of the liver, which were biopsied and found to be benign on pathology.  There were also hepatic cysts seen.  Pathology showed pancreatic ductal adenocarcinoma, well-differentiated (grade 1), tumor size 2.6 x 2.4 x 2.0 cm, negative margins, +LVI, 0 of 4 lymph nodes involved, staged pT2N0 (stage IB).      Port was placed by IR on 7/6/20.    She started adjuvant chemotherapy with single-agent gemcitabine on 7/7/20.      INTERIM HISTORY: Flora says that she continues to tolerate chemotherapy well.  She has had digestive issues even prior to starting chemo, but thinks it's maybe getting better.  She continues to have issues with constipation.  She is planning a trip to [place] in December 2020, after she completes adjuvant chemotherapy.  She will then go to [place] for the winter on 12/30/20.      REVIEW OF SYSTEMS:   14 point ROS was reviewed and is negative other than as noted above in HPI.       HOME MEDICATIONS:  Current Outpatient Medications   Medication Sig Dispense Refill     acetaminophen (TYLENOL) 325 MG tablet Take 1-2 tablets (325-650 mg) by mouth every 6 hours as needed  for mild pain or fever 50 tablet 0     albuterol (PROAIR HFA/PROVENTIL HFA/VENTOLIN HFA) 108 (90 Base) MCG/ACT inhaler Inhale 2 puffs into the lungs every 6 hours as needed for shortness of breath / dyspnea or wheezing 1 Inhaler 0     amylase-lipase-protease (CREON) 50470-93778 units CPEP per EC capsule Take 1 capsule (24,000 Units) by mouth 3 times daily (with meals) 90 capsule 3     calcium carbonate-vitamin D (CALTRATE 600+D) 600-400 MG-UNIT chewable tablet Take 1 chew tab by mouth 2 times daily  180 tablet 3     loratadine (CLARITIN REDITABS) 10 MG dispersible tablet Take 10 mg by mouth as needed        magnesium 250 MG tablet Take 1 tablet by mouth daily       polyethylene glycol (MIRALAX) 17 g packet Take 17 g by mouth daily as needed for constipation 12 packet 0     oxyCODONE (ROXICODONE) 5 MG tablet Take 1-2 tablets (5-10 mg) by mouth every 6 hours as needed for moderate to severe pain (Patient not taking: Reported on 10/20/2020) 20 tablet 0     prochlorperazine (COMPAZINE) 10 MG tablet Take 1 tablet (10 mg) by mouth every 6 hours as needed (Nausea/Vomiting) (Patient not taking: Reported on 11/3/2020) 30 tablet 1         ALLERGIES:  Allergies   Allergen Reactions     Penicillin V      Fredy-1 [Lidocaine] Unknown         PAST MEDICAL HISTORY:  Past Medical History:   Diagnosis Date     Chest tightness     had suspected allergies     Malignant neoplasm of head of pancreas (H) 6/9/2020     Seasonal allergies      SOB (shortness of breath)          PAST SURGICAL HISTORY:  Past Surgical History:   Procedure Laterality Date     CATARACT IOL, RT/LT  2015     COLONOSCOPY  10/14    no repeat needed due to age     COLONOSCOPY N/A 10/7/2014    Procedure: COLONOSCOPY;  Surgeon: Daryl Hanson MD;  Location:  GI     COLONOSCOPY  04/23/2019    no further screening     ENDOSCOPIC RETROGRADE CHOLANGIOPANCREATOGRAM N/A 4/20/2020    Procedure: ENDOSCOPIC RETROGRADE CHOLANGIOPANCREATOGRAPHY, PLACEMENT OF PANCREATIC  STENT, PLACEMENT OF BILIARY STENT;  Surgeon: Yarely Vann MD;  Location: [facility] OR     ESOPHAGOSCOPY, GASTROSCOPY, DUODENOSCOPY (EGD), COMBINED N/A 4/20/2020    Procedure: ESOPHAGOGASTRODUODENOSCOPY, WITH FINE NEEDLE ASPIRATION BIOPSY, WITH ENDOSCOPIC ULTRASOUND GUIDANCE, Endoscopic retrograde cholangiopancreatography;  Surgeon: Yarely aVnn MD;  Location: [facility] OR     ESOPHAGOSCOPY, GASTROSCOPY, DUODENOSCOPY (EGD), COMBINED N/A 5/5/2020    Procedure: ESOPHAGOGASTRODUODENOSCOPY, WITH FINE NEEDLE ASPIRATION BIOPSY, WITH ENDOSCOPIC ULTRASOUND GUIDANCE;  Surgeon: Romina Rosario MD;  Location: [facility] GI     IR CHEST PORT PLACEMENT > 5 YRS OF AGE  7/6/2020     LAPAROSCOPIC BIOPSY LIVER N/A 5/22/2020    Procedure: Diagnostic Laparoscopy with intraoperative ultrasound and Liver Biopsy X2;  Surgeon: Duarte Chaves MD;  Location: [facility] OR     WHIPPLE PROCEDURE N/A 5/22/2020    Procedure: Non Pylorus Preserving Whipple procedure;  Surgeon: Duarte Chaves MD;  Location: [facility] OR         SOCIAL HISTORY:  Social History     Socioeconomic History     Marital status:      Spouse name: Not on file     Number of children: Not on file     Years of education: Not on file     Highest education level: Not on file   Occupational History     Employer: RETIRED     Comment: previous taught special ed   Social Needs     Financial resource strain: Not on file     Food insecurity     Worry: Not on file     Inability: Not on file     Transportation needs     Medical: Not on file     Non-medical: Not on file   Tobacco Use     Smoking status: Never Smoker     Smokeless tobacco: Never Used   Substance and Sexual Activity     Alcohol use: Yes     Comment: social      Drug use: No     Sexual activity: Yes   Lifestyle     Physical activity     Days per week: Not on file     Minutes per session: Not on file     Stress: Not on file   Relationships     Social connections     Talks on phone: Not on file     Gets together: Not on  file     Attends [employer] service: Not on file     Active member of club or organization: Not on file     Attends meetings of clubs or organizations: Not on file     Relationship status: Not on file     Intimate partner violence     Fear of current or ex partner: Not on file     Emotionally abused: Not on file     Physically abused: Not on file     Forced sexual activity: Not on file   Other Topics Concern     Parent/sibling w/ CABG, MI or angioplasty before 65F 55M? Not Asked      Service Not Asked     Blood Transfusions Not Asked     Caffeine Concern Yes     Comment: 2 cups     Occupational Exposure Not Asked     Hobby Hazards Not Asked     Sleep Concern Not Asked     Stress Concern Not Asked     Weight Concern Not Asked     Special Diet No     Back Care Not Asked     Exercise Yes     Comment: walking workout      Bike Helmet Not Asked     Seat Belt Not Asked     Self-Exams Not Asked   Social History Narrative     Not on file         FAMILY HISTORY:  Family History   Problem Relation Age of Onset     Musculoskeletal Disorder Mother         edematous legs     Obesity Mother      Musculoskeletal Disorder Maternal Grandmother         edematous legs; did have amputation     Obesity Maternal Grandmother      Myocardial Infarction Maternal Grandmother          PHYSICAL EXAM:  Vital signs:  /65   Pulse 56   Temp 96.9  F (36.1  C) (Tympanic)   Resp 16   Wt 53 kg (116 lb 12.8 oz)   LMP  (LMP Unknown)   SpO2 100%   BMI 20.05 kg/m     ECO  GENERAL/CONSTITUTIONAL: No acute distress.  EYES: No scleral icterus.  RESPIRATORY: Clear to auscultation bilaterally.  CARDIOVASCULAR: Regular rate and rhythm.  MUSCULOSKELETAL: Warm and well-perfused, no cyanosis, clubbing, or edema.  NEUROLOGIC: Alert, oriented, answers questions appropriately.  INTEGUMENTARY: No jaundice.  Port in place.        LABS:  CBC RESULTS:   Recent Labs   Lab Test 20  0925   WBC 3.3*   RBC 4.02   HGB 11.7   HCT 37.2   MCV 93    MCH 29.1   MCHC 31.5   RDW 14.4        Recent Labs   Lab Test 11/03/20  0925 10/06/20  0825    138   POTASSIUM 4.3 4.2   CHLORIDE 104 106   CO2 29 29   ANIONGAP 5 3   GLC 87 69*   BUN 20 18   CR 0.84 0.83   CAMERON 9.1 8.6     Lab Results   Component Value Date    AST 32 11/03/2020     Lab Results   Component Value Date    ALT 57 11/03/2020     No results found for: BILICONJ   Lab Results   Component Value Date    BILITOTAL 0.6 11/03/2020     Lab Results   Component Value Date    ALBUMIN 3.6 11/03/2020     Lab Results   Component Value Date    PROTTOTAL 7.1 11/03/2020      Lab Results   Component Value Date    ALKPHOS 105 11/03/2020           IMAGING:  CT c/a/p 6/30/20:  1.  Interval postoperative changes of Whipple. Mild soft tissue  thickening at the operative bed is likely postoperative.  2.  No convincing evidence of recurrence or metastatic disease.  3.  Stable 3 mm left lower lobe pulmonary nodule.  4.  Large stool volume throughout the colon.      ASSESSMENT/PLAN:  Flora Hogan is a 75 year old female with:    1) Adenocarcinoma of the pancreas head: s/p Whipple procedure on 5/26/20; pathology showed pancreatic ductal adenocarcinoma, well-differentiated (grade 1), tumor size 2.6 x 2.4 x 2.0 cm, negative margins, +LVI, 0 of 4 lymph nodes involved, staged pT2N0 (stage IB).      Post-op restaging scan shows post-operative changes with no convincing evidence of recurrence or metastatic disease.      She is on adjuvant chemotherapy with single-agent gemcitabine.  She had severe neutropenia after just one dose of gemcitabine.  She is going to need dose-reduction.  We discussed options of reducing dose of neutropenia to 800 mg/m2 and staying on the says schedule or switching to every other week schedule.  Suspect that she will continue to have issues with neutropenia, so we decided to switch to every other week schedule.  She is agreeable with this.      She switched to every other week schedule  starting with cycle 2, and she is now tolerating it well.      -C5D1 of chemotherapy today.  C5D15 on 11/17/20.   -appointment with Lacey on 12/1/20, with C6D1 same day.  C6D15 will be on 12/15/20.  -would usually wait a month after last dose of chemotherapy to do post-treatment scans, but she is leaving for [place] on 12/30/20, so will do them earlier on 12/28/20.  I will see her in follow-up on 12/29/20.    -She will return to [place] in March 2021.  I would then plan to see her back in April 2021, and another surveillance CT scan then, which I can order at the next visit.    -RTC with me on 12/29/20, with labs/CA 19-9, and results of CT scan  -she would like port removed after she finishes chemotherapy - ordered    2) Pulmonary nodule: stable 3 mm left lower lobe pulmonary nodule  -will monitor on future scans    3) Chronic constipation: Patient says that this has been an issue for her for many years.  She controls it with diet, and she does have stool softeners at home as needed.  She asked about pancreatic insufficiency after Whipple, which is possible.  We discussed trying Creon prior to meals, and she agrees.  -Creon prescribed - she has started it and finds it helpful.  She takes it 3 times a day.  She will continue it.      4) Leukpenia: Secondary to chemotherapy.  ANC is normal.  -proceed with chemotherapy today.      I spent a total of 25 minutes with the patient, with over >50% of the time in counseling and/or coordination of care.       Jeannie Coronado MD  Hematology/Oncology  [facility]

## 2020-11-03 NOTE — NURSING NOTE
"Oncology Rooming Note    November 3, 2020 9:45 AM   Flora Hogan is a 75 year old female who presents for:    Chief Complaint   Patient presents with     Oncology Clinic Visit     Malignant neoplasm of head of pancreas     Initial Vitals: /65   Pulse 56   Temp 96.9  F (36.1  C) (Tympanic)   Resp 16   Wt 53 kg (116 lb 12.8 oz)   LMP  (LMP Unknown)   SpO2 100%   BMI 20.05 kg/m   Estimated body mass index is 20.05 kg/m  as calculated from the following:    Height as of 5/22/20: 1.626 m (5' 4\").    Weight as of this encounter: 53 kg (116 lb 12.8 oz). Body surface area is 1.55 meters squared.  No Pain (0) Comment: Data Unavailable   No LMP recorded (lmp unknown). Patient is postmenopausal.  Allergies reviewed: Yes  Medications reviewed: Yes    Medications: Medication refills not needed today.  Pharmacy name entered into [facility]:    [id number] - [place], [place] - [address] [id number] - [place], [place] - [address] [facility] AT [place] [address] & [facility]    Clinical concerns: f/u       Tessie Shelton CMA              "

## 2020-11-03 NOTE — PROGRESS NOTES
Infusion Nursing Note:  Flora CARVER Cotaras presents today for C5D1 Gemzar.    Patient seen by provider today: Yes: Cliff   present during visit today: Not Applicable.    Note: NA    Intravenous Access:  Implanted Port.    Treatment Conditions:  Lab Results   Component Value Date    HGB 11.7 11/03/2020     Lab Results   Component Value Date    WBC 3.3 11/03/2020      Lab Results   Component Value Date    ANEU 1.8 11/03/2020     Lab Results   Component Value Date     11/03/2020      Lab Results   Component Value Date     11/03/2020                   Lab Results   Component Value Date    POTASSIUM 4.3 11/03/2020           Lab Results   Component Value Date    MAG 2.3 07/07/2020            Lab Results   Component Value Date    CR 0.84 11/03/2020                   Lab Results   Component Value Date    CAMERON 9.1 11/03/2020                Lab Results   Component Value Date    BILITOTAL 0.6 11/03/2020           Lab Results   Component Value Date    ALBUMIN 3.6 11/03/2020                    Lab Results   Component Value Date    ALT 57 11/03/2020           Lab Results   Component Value Date    AST 32 11/03/2020       Results reviewed, labs MET treatment parameters, ok to proceed with treatment.      Post Infusion Assessment:  Patient tolerated infusion without incident.  Blood return noted pre and post infusion.  Site patent and intact, free from redness, edema or discomfort.  No evidence of extravasations.  Access discontinued per protocol.       Discharge Plan:   Discharge instructions reviewed with: Patient.  Patient and/or family verbalized understanding of discharge instructions and all questions answered.  AVS to patient via [employer].  Patient will return 11/17/20 for C5D15 Gemzar for next appointment.   Patient discharged in stable condition accompanied by: self.  Departure Mode: Ambulatory.    Karma Capellan RN

## 2020-11-04 ENCOUNTER — PATIENT OUTREACH (OUTPATIENT)
Dept: ONCOLOGY | Facility: CLINIC | Age: 75
End: 2020-11-04

## 2020-11-04 NOTE — PROGRESS NOTES
Flora called in to clinic reporting she would like to schedule her port removal for 12/28/20. Writer checked with  schedulers and they were not available to assist her.  called the cath lab at [facility] [phone] and left a voicemail to call Flora to set up an appt in IR for port removal on 12/28/20.     Flora reports she is requesting both the CT and the port removal on the same date because she is planning on traveling shortly afterwards.    Bipinr instructed Flora to call in to clinic if she has not received a call from schedulers about the port removal appointment.     Flora verbalized understanding and is in agreement with the plan.     Leyla Layton, RN, BSN, SHARMIN  RN Care Coordinator  [facility]  [phone]

## 2020-11-05 DIAGNOSIS — Z11.59 ENCOUNTER FOR SCREENING FOR OTHER VIRAL DISEASES: Primary | ICD-10-CM

## 2020-11-05 NOTE — PATIENT INSTRUCTIONS
Gemcitabine scheduled 11/17, 12/1, 12/15  Appt with Lacey scheduled on 12/1  CT and labs scheduled 12/28  Appt with Dr. Coronado (video) scheduled 12/29  Port removal schedule 12/28  Leyla Layton RN on 11/6/2020 at 1:29 PM

## 2020-11-17 ENCOUNTER — INFUSION THERAPY VISIT (OUTPATIENT)
Dept: INFUSION THERAPY | Facility: CLINIC | Age: 75
End: 2020-11-17
Attending: INTERNAL MEDICINE
Payer: COMMERCIAL

## 2020-11-17 ENCOUNTER — HOSPITAL ENCOUNTER (OUTPATIENT)
Facility: CLINIC | Age: 75
Setting detail: SPECIMEN
Discharge: HOME | End: 2020-11-17
Attending: INTERNAL MEDICINE | Admitting: INTERNAL MEDICINE
Payer: COMMERCIAL

## 2020-11-17 VITALS
HEART RATE: 57 BPM | TEMPERATURE: 97.4 F | WEIGHT: 117.9 LBS | DIASTOLIC BLOOD PRESSURE: 74 MMHG | SYSTOLIC BLOOD PRESSURE: 117 MMHG | BODY MASS INDEX: 20.24 KG/M2 | OXYGEN SATURATION: 100 % | RESPIRATION RATE: 16 BRPM

## 2020-11-17 DIAGNOSIS — C25.0 MALIGNANT NEOPLASM OF HEAD OF PANCREAS (H): Primary | ICD-10-CM

## 2020-11-17 LAB
BASOPHILS # BLD AUTO: 0.1 10E9/L (ref 0–0.2)
BASOPHILS NFR BLD AUTO: 1.3 %
DIFFERENTIAL METHOD BLD: ABNORMAL
EOSINOPHIL # BLD AUTO: 0.2 10E9/L (ref 0–0.7)
EOSINOPHIL NFR BLD AUTO: 3.9 %
ERYTHROCYTE [DISTWIDTH] IN BLOOD BY AUTOMATED COUNT: 14.6 % (ref 10–15)
HCT VFR BLD AUTO: 36.3 % (ref 35–47)
HGB BLD-MCNC: 11.1 G/DL (ref 11.7–15.7)
IMM GRANULOCYTES # BLD: 0 10E9/L (ref 0–0.4)
IMM GRANULOCYTES NFR BLD: 0.3 %
LYMPHOCYTES # BLD AUTO: 0.9 10E9/L (ref 0.8–5.3)
LYMPHOCYTES NFR BLD AUTO: 23.3 %
MCH RBC QN AUTO: 29 PG (ref 26.5–33)
MCHC RBC AUTO-ENTMCNC: 30.6 G/DL (ref 31.5–36.5)
MCV RBC AUTO: 95 FL (ref 78–100)
MONOCYTES # BLD AUTO: 0.4 10E9/L (ref 0–1.3)
MONOCYTES NFR BLD AUTO: 10.5 %
NEUTROPHILS # BLD AUTO: 2.3 10E9/L (ref 1.6–8.3)
NEUTROPHILS NFR BLD AUTO: 60.7 %
NRBC # BLD AUTO: 0 10*3/UL
NRBC BLD AUTO-RTO: 0 /100
PLATELET # BLD AUTO: 154 10E9/L (ref 150–450)
RBC # BLD AUTO: 3.83 10E12/L (ref 3.8–5.2)
WBC # BLD AUTO: 3.8 10E9/L (ref 4–11)

## 2020-11-17 PROCEDURE — 96413 CHEMO IV INFUSION 1 HR: CPT

## 2020-11-17 PROCEDURE — 85025 COMPLETE CBC W/AUTO DIFF WBC: CPT | Performed by: INTERNAL MEDICINE

## 2020-11-17 PROCEDURE — 250N000011 HC RX IP 250 OP 636: Performed by: INTERNAL MEDICINE

## 2020-11-17 PROCEDURE — 96367 TX/PROPH/DG ADDL SEQ IV INF: CPT

## 2020-11-17 PROCEDURE — 258N000003 HC RX IP 258 OP 636: Performed by: INTERNAL MEDICINE

## 2020-11-17 RX ORDER — HEPARIN SODIUM (PORCINE) LOCK FLUSH IV SOLN 100 UNIT/ML 100 UNIT/ML
5 SOLUTION INTRAVENOUS
Status: DISCONTINUED | OUTPATIENT
Start: 2020-11-17 | End: 2020-11-17 | Stop reason: HOSPADM

## 2020-11-17 RX ADMIN — Medication 5 ML: at 14:54

## 2020-11-17 RX ADMIN — DEXAMETHASONE SODIUM PHOSPHATE 12 MG: 10 INJECTION, SOLUTION INTRAMUSCULAR; INTRAVENOUS at 13:54

## 2020-11-17 RX ADMIN — SODIUM CHLORIDE 250 ML: 9 INJECTION, SOLUTION INTRAVENOUS at 13:53

## 2020-11-17 RX ADMIN — GEMCITABINE 1600 MG: 38 INJECTION, SOLUTION INTRAVENOUS at 14:18

## 2020-11-17 NOTE — PROGRESS NOTES
Infusion Nursing Note:  Flora CARVER Coandreajusticekayce presents today for C5D15 Gemzar.    Patient seen by provider today: No   present during visit today: Not Applicable.    Note: N/A.    Intravenous Access:  Implanted Port.    Treatment Conditions:  Lab Results   Component Value Date    HGB 11.1 11/17/2020     Lab Results   Component Value Date    WBC 3.8 11/17/2020      Lab Results   Component Value Date    ANEU 2.3 11/17/2020     Lab Results   Component Value Date     11/17/2020      Results reviewed, labs MET treatment parameters, ok to proceed with treatment.      Post Infusion Assessment:  Patient tolerated infusion without incident.       Discharge Plan:   Copy of AVS reviewed with patient and/or family.  Patient will return 12/1/2020 for next appointment.  Patient discharged in stable condition accompanied by: self.  Departure Mode: Ambulatory.    Vikki Cam RN

## 2020-11-27 NOTE — PROGRESS NOTES
"Flora Hogan is a 75 year old female who is being evaluated via a billable telephone visit.      The patient has been notified of following:     \"This telephone visit will be conducted via a call between you and your physician/provider. We have found that certain health care needs can be provided without the need for a physical exam.  This service lets us provide the care you need with a short phone conversation.  If a prescription is necessary we can send it directly to your pharmacy.  If lab work is needed we can place an order for that and you can then stop by our lab to have the test done at a later time.    Telephone visits are billed at different rates depending on your insurance coverage. During this emergency period, for some insurers they may be billed the same as an in-person visit.  Please reach out to your insurance provider with any questions.    If during the course of the call the physician/provider feels a telephone visit is not appropriate, you will not be charged for this service.\"    Patient has given verbal consent for Telephone visit?  Yes    What phone number would you like to be contacted at? [phone]    How would you like to obtain your AVS? [facility]        Oncology/Hematology Visit Note    Nov 30, 2020    Reason for visit: Follow up pancreatic cancer    Oncology HPI: Flora Hogan is a 75 year old female with adenocarcinoma of the pancreas. She presented with jaundice and underwent biliary stenting and EUS x 2 with atypical cells. CT and MRI revealed benign liver lesions and CA was elevated at 19-9, therefore she underwent a Whipple procedure on 5/26/2020. Pathology showed pancreatic ductal adenocarcinoma, well-differentiated, grade 1 and 0/4 lymph nodes involved. She established care with Dr Coronado on 6/9/2020 and she suggested adjuvant chemotherapy with single agent gemcitabine, 3 weeks on and 1 week off, C1D1 on 7/7/2020.     She was scheduled for C1D8 on 7/15/2020, but " developed severe neutropenia.  Dr. Coronado changed the plan to days 1, 15.    Video visit today for C6D1.     Interval History: Flora is doing really well.  She is tolerating chemotherapy remarkably.  She has noticed some fatigue and her digestion has certainly changed, but significantly improved since adding Creon to her regimen.  She and her  are really excited about going to [place] for the summer and they will take necessary precautions.  Mild bilateral neuropathy, left foot greater than right foot, but this is been going on prior to starting chemotherapy.  No fever, vomiting, diarrhea, headache or vision changes, chest pain.  She does occasionally have some intermittent shortness of breath, but nothing significant.  No bleeding, leg swelling.  No other new complaints.    Review of Systems: See interval hx. Denies fevers, HA, dizziness, changes in vision, cough, sore throat, CP, N/V, changes in urination, bleeding, bruising, rash.      PMHx and Social Hx reviewed per EPIC.      Medications:  Current Outpatient Medications   Medication Sig Dispense Refill     acetaminophen (TYLENOL) 325 MG tablet Take 1-2 tablets (325-650 mg) by mouth every 6 hours as needed for mild pain or fever 50 tablet 0     albuterol (PROAIR HFA/PROVENTIL HFA/VENTOLIN HFA) 108 (90 Base) MCG/ACT inhaler Inhale 2 puffs into the lungs every 6 hours as needed for shortness of breath / dyspnea or wheezing 1 Inhaler 0     amylase-lipase-protease (CREON) 87161-12112 units CPEP per EC capsule Take 1 capsule (24,000 Units) by mouth 3 times daily (with meals) 90 capsule 3     calcium carbonate-vitamin D (CALTRATE 600+D) 600-400 MG-UNIT chewable tablet Take 1 chew tab by mouth 2 times daily  180 tablet 3     loratadine (CLARITIN REDITABS) 10 MG dispersible tablet Take 10 mg by mouth as needed        magnesium 250 MG tablet Take 1 tablet by mouth daily       oxyCODONE (ROXICODONE) 5 MG tablet Take 1-2 tablets (5-10 mg) by mouth every 6 hours as  needed for moderate to severe pain (Patient not taking: Reported on 10/20/2020) 20 tablet 0     polyethylene glycol (MIRALAX) 17 g packet Take 17 g by mouth daily as needed for constipation 12 packet 0     prochlorperazine (COMPAZINE) 10 MG tablet Take 1 tablet (10 mg) by mouth every 6 hours as needed (Nausea/Vomiting) (Patient not taking: Reported on 11/3/2020) 30 tablet 1       Allergies   Allergen Reactions     Penicillin V      Fredy-1 [Lidocaine] Unknown       EXAM:    LMP  (LMP Unknown)  Telephone visit, therefore vital signs and exam were not performed.      Labs:   PENDING    Imaging: n/a    Impression/Plan: Flora Hogan is a 74 year old female with pancreatic cancer status post Whipple's procedure currently on adjuvant chemotherapy with single agent gemcitabine.    Adenocarcinoma of the pancreas head: s/p Whipple procedure and restaging scan with no evidence of metastatic disease.  She started adjuvant chemotherapy with C1 D1 on 7/7/2020 and became severely neutropenic, therefore her schedule was changed to day 1, 15.  She has completed 5 full cycles and is tolerating this extremely well.  She will come in later today for labs and if within parameters, plan for C6 D1.  She is tolerating this extremely well and really happy for her.  She plans on going to [place] for the winter. She will call the clinic if any questions or concerns.  --12/15 gem C 6 D15  --12/28 CT and port removal  --12/29 Dr. Coronado    Pulmonary nodule: Stable 3 mm left lower lobe pulmonary nodule. We will monitor on future scans.     GI: She has been battling intermittent diarrhea and constipation for several years.  Since starting Creon, she has noticed significant improvement.  She will continue this on a daily basis and we will make sure to give her a 3-month supply of Creon when she travels to [place].      Chronic leg cramping: She has had this for many years.  Symptoms have overall slightly improved recently.    Social: She and  her  have a condominium in [place], [place].  They plan on traveling for the winter.      Phone call duration: 11 minutes      Chart documentation with Dragon Voice recognition Software. Although reviewed after completion, some words and grammatical errors may remain.      Lacey Castillo PA-C  Hematology/Oncology  [facility]

## 2020-11-30 ENCOUNTER — HOSPITAL ENCOUNTER (OUTPATIENT)
Facility: CLINIC | Age: 75
Setting detail: SPECIMEN
Discharge: HOME | End: 2020-11-30
Attending: PHYSICIAN ASSISTANT | Admitting: PHYSICIAN ASSISTANT
Payer: COMMERCIAL

## 2020-11-30 ENCOUNTER — VIRTUAL VISIT (OUTPATIENT)
Dept: ONCOLOGY | Facility: CLINIC | Age: 75
End: 2020-11-30
Attending: PHYSICIAN ASSISTANT
Payer: COMMERCIAL

## 2020-11-30 ENCOUNTER — OFFICE VISIT (OUTPATIENT)
Dept: INFUSION THERAPY | Facility: CLINIC | Age: 75
End: 2020-11-30
Attending: PHYSICIAN ASSISTANT
Payer: COMMERCIAL

## 2020-11-30 DIAGNOSIS — C25.0 MALIGNANT NEOPLASM OF HEAD OF PANCREAS (H): Primary | ICD-10-CM

## 2020-11-30 LAB
ALBUMIN SERPL-MCNC: 3.5 G/DL (ref 3.4–5)
ALP SERPL-CCNC: 109 U/L (ref 40–150)
ALT SERPL W P-5'-P-CCNC: 77 U/L (ref 0–50)
ANION GAP SERPL CALCULATED.3IONS-SCNC: 2 MMOL/L (ref 3–14)
AST SERPL W P-5'-P-CCNC: 41 U/L (ref 0–45)
BASOPHILS # BLD AUTO: 0 10E9/L (ref 0–0.2)
BASOPHILS NFR BLD AUTO: 1.3 %
BILIRUB SERPL-MCNC: 0.7 MG/DL (ref 0.2–1.3)
BUN SERPL-MCNC: 24 MG/DL (ref 7–30)
CALCIUM SERPL-MCNC: 8.7 MG/DL (ref 8.5–10.1)
CHLORIDE SERPL-SCNC: 106 MMOL/L (ref 94–109)
CO2 SERPL-SCNC: 30 MMOL/L (ref 20–32)
CREAT SERPL-MCNC: 0.92 MG/DL (ref 0.52–1.04)
DIFFERENTIAL METHOD BLD: ABNORMAL
EOSINOPHIL # BLD AUTO: 0 10E9/L (ref 0–0.7)
EOSINOPHIL NFR BLD AUTO: 1.3 %
ERYTHROCYTE [DISTWIDTH] IN BLOOD BY AUTOMATED COUNT: 14.5 % (ref 10–15)
GFR SERPL CREATININE-BSD FRML MDRD: 60 ML/MIN/{1.73_M2}
GLUCOSE SERPL-MCNC: 88 MG/DL (ref 70–99)
HCT VFR BLD AUTO: 37.4 % (ref 35–47)
HGB BLD-MCNC: 11.9 G/DL (ref 11.7–15.7)
IMM GRANULOCYTES # BLD: 0 10E9/L (ref 0–0.4)
IMM GRANULOCYTES NFR BLD: 0.3 %
LYMPHOCYTES # BLD AUTO: 0.8 10E9/L (ref 0.8–5.3)
LYMPHOCYTES NFR BLD AUTO: 26.7 %
MCH RBC QN AUTO: 29.2 PG (ref 26.5–33)
MCHC RBC AUTO-ENTMCNC: 31.8 G/DL (ref 31.5–36.5)
MCV RBC AUTO: 92 FL (ref 78–100)
MONOCYTES # BLD AUTO: 0.4 10E9/L (ref 0–1.3)
MONOCYTES NFR BLD AUTO: 13.2 %
NEUTROPHILS # BLD AUTO: 1.8 10E9/L (ref 1.6–8.3)
NEUTROPHILS NFR BLD AUTO: 57.2 %
NRBC # BLD AUTO: 0 10*3/UL
NRBC BLD AUTO-RTO: 0 /100
PLATELET # BLD AUTO: 163 10E9/L (ref 150–450)
POTASSIUM SERPL-SCNC: 4.5 MMOL/L (ref 3.4–5.3)
PROT SERPL-MCNC: 6.8 G/DL (ref 6.8–8.8)
RBC # BLD AUTO: 4.07 10E12/L (ref 3.8–5.2)
SODIUM SERPL-SCNC: 138 MMOL/L (ref 133–144)
WBC # BLD AUTO: 3.1 10E9/L (ref 4–11)

## 2020-11-30 PROCEDURE — 250N000011 HC RX IP 250 OP 636: Performed by: PHYSICIAN ASSISTANT

## 2020-11-30 PROCEDURE — 80053 COMPREHEN METABOLIC PANEL: CPT | Performed by: PHYSICIAN ASSISTANT

## 2020-11-30 PROCEDURE — 999N001193 HC VIDEO/TELEPHONE VISIT; NO CHARGE

## 2020-11-30 PROCEDURE — 99214 OFFICE O/P EST MOD 30 MIN: CPT | Mod: 95 | Performed by: PHYSICIAN ASSISTANT

## 2020-11-30 PROCEDURE — 36591 DRAW BLOOD OFF VENOUS DEVICE: CPT

## 2020-11-30 PROCEDURE — 85025 COMPLETE CBC W/AUTO DIFF WBC: CPT | Performed by: PHYSICIAN ASSISTANT

## 2020-11-30 RX ORDER — HEPARIN SODIUM (PORCINE) LOCK FLUSH IV SOLN 100 UNIT/ML 100 UNIT/ML
5 SOLUTION INTRAVENOUS
Status: CANCELLED | OUTPATIENT
Start: 2020-12-15

## 2020-11-30 RX ORDER — MEPERIDINE HYDROCHLORIDE 25 MG/ML
25 INJECTION INTRAMUSCULAR; INTRAVENOUS; SUBCUTANEOUS EVERY 30 MIN PRN
Status: CANCELLED | OUTPATIENT
Start: 2020-12-15

## 2020-11-30 RX ORDER — MEPERIDINE HYDROCHLORIDE 25 MG/ML
25 INJECTION INTRAMUSCULAR; INTRAVENOUS; SUBCUTANEOUS EVERY 30 MIN PRN
Status: CANCELLED | OUTPATIENT
Start: 2020-12-01

## 2020-11-30 RX ORDER — HEPARIN SODIUM,PORCINE 10 UNIT/ML
5 VIAL (ML) INTRAVENOUS
Status: CANCELLED | OUTPATIENT
Start: 2020-12-15

## 2020-11-30 RX ORDER — NALOXONE HYDROCHLORIDE 0.4 MG/ML
.1-.4 INJECTION, SOLUTION INTRAMUSCULAR; INTRAVENOUS; SUBCUTANEOUS
Status: CANCELLED | OUTPATIENT
Start: 2020-12-15

## 2020-11-30 RX ORDER — METHYLPREDNISOLONE SODIUM SUCCINATE 125 MG/2ML
125 INJECTION, POWDER, LYOPHILIZED, FOR SOLUTION INTRAMUSCULAR; INTRAVENOUS
Status: CANCELLED
Start: 2020-12-15

## 2020-11-30 RX ORDER — LORAZEPAM 2 MG/ML
0.5 INJECTION INTRAMUSCULAR EVERY 4 HOURS PRN
Status: CANCELLED
Start: 2020-12-15

## 2020-11-30 RX ORDER — EPINEPHRINE 1 MG/ML
0.3 INJECTION, SOLUTION INTRAMUSCULAR; SUBCUTANEOUS EVERY 5 MIN PRN
Status: CANCELLED | OUTPATIENT
Start: 2020-12-01

## 2020-11-30 RX ORDER — ALBUTEROL SULFATE 90 UG/1
1-2 AEROSOL, METERED RESPIRATORY (INHALATION)
Status: CANCELLED
Start: 2020-12-15

## 2020-11-30 RX ORDER — HEPARIN SODIUM (PORCINE) LOCK FLUSH IV SOLN 100 UNIT/ML 100 UNIT/ML
5 SOLUTION INTRAVENOUS EVERY 8 HOURS
Status: DISCONTINUED | OUTPATIENT
Start: 2020-11-30 | End: 2020-11-30 | Stop reason: HOSPADM

## 2020-11-30 RX ORDER — DIPHENHYDRAMINE HYDROCHLORIDE 50 MG/ML
50 INJECTION INTRAMUSCULAR; INTRAVENOUS
Status: CANCELLED
Start: 2020-12-01

## 2020-11-30 RX ORDER — ALBUTEROL SULFATE 0.83 MG/ML
2.5 SOLUTION RESPIRATORY (INHALATION)
Status: CANCELLED | OUTPATIENT
Start: 2020-12-15

## 2020-11-30 RX ORDER — ALBUTEROL SULFATE 0.83 MG/ML
2.5 SOLUTION RESPIRATORY (INHALATION)
Status: CANCELLED | OUTPATIENT
Start: 2020-12-01

## 2020-11-30 RX ORDER — HEPARIN SODIUM (PORCINE) LOCK FLUSH IV SOLN 100 UNIT/ML 100 UNIT/ML
5 SOLUTION INTRAVENOUS
Status: CANCELLED | OUTPATIENT
Start: 2020-12-01

## 2020-11-30 RX ORDER — LORAZEPAM 2 MG/ML
0.5 INJECTION INTRAMUSCULAR EVERY 4 HOURS PRN
Status: CANCELLED
Start: 2020-12-01

## 2020-11-30 RX ORDER — SODIUM CHLORIDE 9 MG/ML
1000 INJECTION, SOLUTION INTRAVENOUS CONTINUOUS PRN
Status: CANCELLED
Start: 2020-12-01

## 2020-11-30 RX ORDER — ALBUTEROL SULFATE 90 UG/1
1-2 AEROSOL, METERED RESPIRATORY (INHALATION)
Status: CANCELLED
Start: 2020-12-01

## 2020-11-30 RX ORDER — EPINEPHRINE 1 MG/ML
0.3 INJECTION, SOLUTION INTRAMUSCULAR; SUBCUTANEOUS EVERY 5 MIN PRN
Status: CANCELLED | OUTPATIENT
Start: 2020-12-15

## 2020-11-30 RX ORDER — DIPHENHYDRAMINE HYDROCHLORIDE 50 MG/ML
50 INJECTION INTRAMUSCULAR; INTRAVENOUS
Status: CANCELLED
Start: 2020-12-15

## 2020-11-30 RX ORDER — NALOXONE HYDROCHLORIDE 0.4 MG/ML
.1-.4 INJECTION, SOLUTION INTRAMUSCULAR; INTRAVENOUS; SUBCUTANEOUS
Status: CANCELLED | OUTPATIENT
Start: 2020-12-01

## 2020-11-30 RX ORDER — SODIUM CHLORIDE 9 MG/ML
1000 INJECTION, SOLUTION INTRAVENOUS CONTINUOUS PRN
Status: CANCELLED
Start: 2020-12-15

## 2020-11-30 RX ORDER — METHYLPREDNISOLONE SODIUM SUCCINATE 125 MG/2ML
125 INJECTION, POWDER, LYOPHILIZED, FOR SOLUTION INTRAMUSCULAR; INTRAVENOUS
Status: CANCELLED
Start: 2020-12-01

## 2020-11-30 RX ORDER — HEPARIN SODIUM,PORCINE 10 UNIT/ML
5 VIAL (ML) INTRAVENOUS
Status: CANCELLED | OUTPATIENT
Start: 2020-12-01

## 2020-11-30 RX ADMIN — Medication 5 ML: at 10:38

## 2020-11-30 NOTE — PROGRESS NOTES
Infusion Nursing Note:  Flora Hogan presents today for port labs.   Patient seen by provider today: No   present during visit today: Not Applicable.    Note: N/A.    Intravenous Access:  Lab draw site port, Needle type port, Gauge 20 3/4in.  Labs drawn without difficulty.  Implanted Port.    Treatment Conditions:  Not Applicable.      Post Infusion Assessment:  Patient tolerated procedure without incident.  Site patent and intact, free from redness, edema or discomfort.  No evidence of extravasations.  Access discontinued per protocol.       Discharge Plan:   Discharge instructions reviewed with: Patient.  Patient discharged in stable condition accompanied by: self.  Departure Mode: Ambulatory.  Scheduled for treatment tomorrow.    POONAM GALICIA RN                       Abdomen soft, non-tender and non-distended, no rebound, no guarding and no masses. no hepatosplenomegaly.

## 2020-11-30 NOTE — LETTER
"    11/30/2020         RE: Flora Hogan  [address] [address] [address]        Dear Colleague,    Thank you for referring your patient, Flora Hogan, to the [facility]. Please see a copy of my visit note below.    Flora Hogan is a 75 year old female who is being evaluated via a billable telephone visit.      The patient has been notified of following:     \"This telephone visit will be conducted via a call between you and your physician/provider. We have found that certain health care needs can be provided without the need for a physical exam.  This service lets us provide the care you need with a short phone conversation.  If a prescription is necessary we can send it directly to your pharmacy.  If lab work is needed we can place an order for that and you can then stop by our lab to have the test done at a later time.    Telephone visits are billed at different rates depending on your insurance coverage. During this emergency period, for some insurers they may be billed the same as an in-person visit.  Please reach out to your insurance provider with any questions.    If during the course of the call the physician/provider feels a telephone visit is not appropriate, you will not be charged for this service.\"    Patient has given verbal consent for Telephone visit?  Yes    What phone number would you like to be contacted at? [phone]    How would you like to obtain your AVS? MyChart        Oncology/Hematology Visit Note    Nov 30, 2020    Reason for visit: Follow up pancreatic cancer    Oncology HPI: Flora Hogan is a 75 year old female with adenocarcinoma of the pancreas. She presented with jaundice and underwent biliary stenting and EUS x 2 with atypical cells. CT and MRI revealed benign liver lesions and CA was elevated at 19-9, therefore she underwent a Whipple procedure on 5/26/2020. Pathology showed pancreatic ductal adenocarcinoma, " well-differentiated, grade 1 and 0/4 lymph nodes involved. She established care with Dr Coronado on 6/9/2020 and she suggested adjuvant chemotherapy with single agent gemcitabine, 3 weeks on and 1 week off, C1D1 on 7/7/2020.     She was scheduled for C1D8 on 7/15/2020, but developed severe neutropenia.  Dr. Coronado changed the plan to days 1, 15.    Video visit today for C6D1.     Interval History: Flora is doing really well.  She is tolerating chemotherapy remarkably.  She has noticed some fatigue and her digestion has certainly changed, but significantly improved since adding Creon to her regimen.  She and her  are really excited about going to [place] for the summer and they will take necessary precautions.  Mild bilateral neuropathy, left foot greater than right foot, but this is been going on prior to starting chemotherapy.  No fever, vomiting, diarrhea, headache or vision changes, chest pain.  She does occasionally have some intermittent shortness of breath, but nothing significant.  No bleeding, leg swelling.  No other new complaints.    Review of Systems: See interval hx. Denies fevers, HA, dizziness, changes in vision, cough, sore throat, CP, N/V, changes in urination, bleeding, bruising, rash.      PMHx and Social Hx reviewed per EPIC.      Medications:  Current Outpatient Medications   Medication Sig Dispense Refill     acetaminophen (TYLENOL) 325 MG tablet Take 1-2 tablets (325-650 mg) by mouth every 6 hours as needed for mild pain or fever 50 tablet 0     albuterol (PROAIR HFA/PROVENTIL HFA/VENTOLIN HFA) 108 (90 Base) MCG/ACT inhaler Inhale 2 puffs into the lungs every 6 hours as needed for shortness of breath / dyspnea or wheezing 1 Inhaler 0     amylase-lipase-protease (CREON) 23195-78291 units CPEP per EC capsule Take 1 capsule (24,000 Units) by mouth 3 times daily (with meals) 90 capsule 3     calcium carbonate-vitamin D (CALTRATE 600+D) 600-400 MG-UNIT chewable tablet Take 1 chew tab by mouth 2  times daily  180 tablet 3     loratadine (CLARITIN REDITABS) 10 MG dispersible tablet Take 10 mg by mouth as needed        magnesium 250 MG tablet Take 1 tablet by mouth daily       oxyCODONE (ROXICODONE) 5 MG tablet Take 1-2 tablets (5-10 mg) by mouth every 6 hours as needed for moderate to severe pain (Patient not taking: Reported on 10/20/2020) 20 tablet 0     polyethylene glycol (MIRALAX) 17 g packet Take 17 g by mouth daily as needed for constipation 12 packet 0     prochlorperazine (COMPAZINE) 10 MG tablet Take 1 tablet (10 mg) by mouth every 6 hours as needed (Nausea/Vomiting) (Patient not taking: Reported on 11/3/2020) 30 tablet 1       Allergies   Allergen Reactions     Penicillin V      Fredy-1 [Lidocaine] Unknown       EXAM:    LMP  (LMP Unknown)  Telephone visit, therefore vital signs and exam were not performed.      Labs:   PENDING    Imaging: n/a    Impression/Plan: Flora Hogan is a 74 year old female with pancreatic cancer status post Whipple's procedure currently on adjuvant chemotherapy with single agent gemcitabine.    Adenocarcinoma of the pancreas head: s/p Whipple procedure and restaging scan with no evidence of metastatic disease.  She started adjuvant chemotherapy with C1 D1 on 7/7/2020 and became severely neutropenic, therefore her schedule was changed to day 1, 15.  She has completed 5 full cycles and is tolerating this extremely well.  She will come in later today for labs and if within parameters, plan for C6 D1.  She is tolerating this extremely well and really happy for her.  She plans on going to [place] for the winter. She will call the clinic if any questions or concerns.  --12/15 gem C 6 D15  --12/28 CT and port removal  --12/29 Dr. Coronado    Pulmonary nodule: Stable 3 mm left lower lobe pulmonary nodule. We will monitor on future scans.     GI: She has been battling intermittent diarrhea and constipation for several years.  Since starting Creon, she has noticed significant  improvement.  She will continue this on a daily basis and we will make sure to give her a 3-month supply of Creon when she travels to [place].      Chronic leg cramping: She has had this for many years.  Symptoms have overall slightly improved recently.    Social: She and her  have a condominium in [place], [place].  They plan on traveling for the winter.      Phone call duration: 11 minutes      Chart documentation with Dragon Voice recognition Software. Although reviewed after completion, some words and grammatical errors may remain.      Lacey Castillo PA-C  Hematology/Oncology  [facility]                  Again, thank you for allowing me to participate in the care of your patient.        Sincerely,        Lacey Castillo PA-C

## 2020-11-30 NOTE — LETTER
11/30/2020         RE: Flora Hogan  [address]  [place] [address]        Dear Colleague,    Thank you for referring your patient, Flora Hogan, to the [facility]. Please see a copy of my visit note below.    Infusion Nursing Note:  Flora Hogan presents today for port labs.   Patient seen by provider today: No   present during visit today: Not Applicable.    Note: N/A.    Intravenous Access:  Lab draw site port, Needle type port, Gauge 20 3/4in.  Labs drawn without difficulty.  Implanted Port.    Treatment Conditions:  Not Applicable.      Post Infusion Assessment:  Patient tolerated procedure without incident.  Site patent and intact, free from redness, edema or discomfort.  No evidence of extravasations.  Access discontinued per protocol.       Discharge Plan:   Discharge instructions reviewed with: Patient.  Patient discharged in stable condition accompanied by: self.  Departure Mode: Ambulatory.  Scheduled for treatment tomorrow.    POONAM GALICIA RN                          Again, thank you for allowing me to participate in the care of your patient.        Sincerely,         Lab Draw 1

## 2020-11-30 NOTE — LETTER
"    11/30/2020         RE: Flora Hogan  [address] [address] [address]        Dear Colleague,    Thank you for referring your patient, Flora Hogan, to the [facility]. Please see a copy of my visit note below.    Flora Hogan is a 75 year old female who is being evaluated via a billable telephone visit.      The patient has been notified of following:     \"This telephone visit will be conducted via a call between you and your physician/provider. We have found that certain health care needs can be provided without the need for a physical exam.  This service lets us provide the care you need with a short phone conversation.  If a prescription is necessary we can send it directly to your pharmacy.  If lab work is needed we can place an order for that and you can then stop by our lab to have the test done at a later time.    Telephone visits are billed at different rates depending on your insurance coverage. During this emergency period, for some insurers they may be billed the same as an in-person visit.  Please reach out to your insurance provider with any questions.    If during the course of the call the physician/provider feels a telephone visit is not appropriate, you will not be charged for this service.\"    Patient has given verbal consent for Telephone visit?  Yes    What phone number would you like to be contacted at? [phone]    How would you like to obtain your AVS? MyChart        Oncology/Hematology Visit Note    Nov 30, 2020    Reason for visit: Follow up pancreatic cancer    Oncology HPI: Flora Hogan is a 75 year old female with adenocarcinoma of the pancreas. She presented with jaundice and underwent biliary stenting and EUS x 2 with atypical cells. CT and MRI revealed benign liver lesions and CA was elevated at 19-9, therefore she underwent a Whipple procedure on 5/26/2020. Pathology showed pancreatic ductal adenocarcinoma, " well-differentiated, grade 1 and 0/4 lymph nodes involved. She established care with Dr Coronado on 6/9/2020 and she suggested adjuvant chemotherapy with single agent gemcitabine, 3 weeks on and 1 week off, C1D1 on 7/7/2020.     She was scheduled for C1D8 on 7/15/2020, but developed severe neutropenia.  Dr. Coronado changed the plan to days 1, 15.    Video visit today for C6D1.     Interval History: Flora is doing really well.  She is tolerating chemotherapy remarkably.  She has noticed some fatigue and her digestion has certainly changed, but significantly improved since adding Creon to her regimen.  She and her  are really excited about going to [place] for the summer and they will take necessary precautions.  Mild bilateral neuropathy, left foot greater than right foot, but this is been going on prior to starting chemotherapy.  No fever, vomiting, diarrhea, headache or vision changes, chest pain.  She does occasionally have some intermittent shortness of breath, but nothing significant.  No bleeding, leg swelling.  No other new complaints.    Review of Systems: See interval hx. Denies fevers, HA, dizziness, changes in vision, cough, sore throat, CP, N/V, changes in urination, bleeding, bruising, rash.      PMHx and Social Hx reviewed per EPIC.      Medications:  Current Outpatient Medications   Medication Sig Dispense Refill     acetaminophen (TYLENOL) 325 MG tablet Take 1-2 tablets (325-650 mg) by mouth every 6 hours as needed for mild pain or fever 50 tablet 0     albuterol (PROAIR HFA/PROVENTIL HFA/VENTOLIN HFA) 108 (90 Base) MCG/ACT inhaler Inhale 2 puffs into the lungs every 6 hours as needed for shortness of breath / dyspnea or wheezing 1 Inhaler 0     amylase-lipase-protease (CREON) 72059-04[address] units CPEP per EC capsule Take 1 capsule (24,000 Units) by mouth 3 times daily (with meals) 90 capsule 3     calcium carbonate-vitamin D (CALTRATE 600+D) 600-400 MG-UNIT chewable tablet Take 1 chew tab by mouth 2  times daily  180 tablet 3     loratadine (CLARITIN REDITABS) 10 MG dispersible tablet Take 10 mg by mouth as needed        magnesium 250 MG tablet Take 1 tablet by mouth daily       oxyCODONE (ROXICODONE) 5 MG tablet Take 1-2 tablets (5-10 mg) by mouth every 6 hours as needed for moderate to severe pain (Patient not taking: Reported on 10/20/2020) 20 tablet 0     polyethylene glycol (MIRALAX) 17 g packet Take 17 g by mouth daily as needed for constipation 12 packet 0     prochlorperazine (COMPAZINE) 10 MG tablet Take 1 tablet (10 mg) by mouth every 6 hours as needed (Nausea/Vomiting) (Patient not taking: Reported on 11/3/2020) 30 tablet 1       Allergies   Allergen Reactions     Penicillin V      Fredy-1 [Lidocaine] Unknown       EXAM:    LMP  (LMP Unknown)  Telephone visit, therefore vital signs and exam were not performed.      Labs:   PENDING    Imaging: n/a    Impression/Plan: Flora Hogan is a 74 year old female with pancreatic cancer status post Whipple's procedure currently on adjuvant chemotherapy with single agent gemcitabine.    Adenocarcinoma of the pancreas head: s/p Whipple procedure and restaging scan with no evidence of metastatic disease.  She started adjuvant chemotherapy with C1 D1 on 7/7/2020 and became severely neutropenic, therefore her schedule was changed to day 1, 15.  She has completed 5 full cycles and is tolerating this extremely well.  She will come in later today for labs and if within parameters, plan for C6 D1.  She is tolerating this extremely well and really happy for her.  She plans on going to [place] for the winter. She will call the clinic if any questions or concerns.  --12/15 gem C 6 D15  --12/28 CT and port removal  --12/29 Dr. Coronado    Pulmonary nodule: Stable 3 mm left lower lobe pulmonary nodule. We will monitor on future scans.     GI: She has been battling intermittent diarrhea and constipation for several years.  Since starting Creon, she has noticed significant  improvement.  She will continue this on a daily basis and we will make sure to give her a 3-month supply of Creon when she travels to [place].      Chronic leg cramping: She has had this for many years.  Symptoms have overall slightly improved recently.    Social: She and her  have a condominium in [place], [place].  They plan on traveling for the winter.      Phone call duration: 11 minutes      Chart documentation with Dragon Voice recognition Software. Although reviewed after completion, some words and grammatical errors may remain.      Lacey Castillo PA-C  Hematology/Oncology  [facility]                  Again, thank you for allowing me to participate in the care of your patient.        Sincerely,        Lacey Castillo PA-C

## 2020-12-01 ENCOUNTER — INFUSION THERAPY VISIT (OUTPATIENT)
Dept: INFUSION THERAPY | Facility: CLINIC | Age: 75
End: 2020-12-01
Attending: PHYSICIAN ASSISTANT
Payer: COMMERCIAL

## 2020-12-01 VITALS
SYSTOLIC BLOOD PRESSURE: 121 MMHG | RESPIRATION RATE: 16 BRPM | OXYGEN SATURATION: 100 % | WEIGHT: 117.9 LBS | TEMPERATURE: 96.8 F | HEART RATE: 55 BPM | DIASTOLIC BLOOD PRESSURE: 73 MMHG | BODY MASS INDEX: 20.24 KG/M2

## 2020-12-01 DIAGNOSIS — C25.0 MALIGNANT NEOPLASM OF HEAD OF PANCREAS (H): Primary | ICD-10-CM

## 2020-12-01 PROCEDURE — 96413 CHEMO IV INFUSION 1 HR: CPT

## 2020-12-01 PROCEDURE — 250N000011 HC RX IP 250 OP 636: Performed by: PHYSICIAN ASSISTANT

## 2020-12-01 PROCEDURE — 96375 TX/PRO/DX INJ NEW DRUG ADDON: CPT

## 2020-12-01 PROCEDURE — 258N000003 HC RX IP 258 OP 636: Performed by: PHYSICIAN ASSISTANT

## 2020-12-01 RX ORDER — HEPARIN SODIUM (PORCINE) LOCK FLUSH IV SOLN 100 UNIT/ML 100 UNIT/ML
5 SOLUTION INTRAVENOUS
Status: DISCONTINUED | OUTPATIENT
Start: 2020-12-01 | End: 2020-12-01 | Stop reason: HOSPADM

## 2020-12-01 RX ADMIN — GEMCITABINE 1600 MG: 38 INJECTION, SOLUTION INTRAVENOUS at 11:06

## 2020-12-01 RX ADMIN — SODIUM CHLORIDE, PRESERVATIVE FREE 5 ML: 5 INJECTION INTRAVENOUS at 11:44

## 2020-12-01 RX ADMIN — DEXAMETHASONE SODIUM PHOSPHATE 12 MG: 10 INJECTION, SOLUTION INTRAMUSCULAR; INTRAVENOUS at 10:26

## 2020-12-01 RX ADMIN — SODIUM CHLORIDE 250 ML: 9 INJECTION, SOLUTION INTRAVENOUS at 10:24

## 2020-12-01 ASSESSMENT — PAIN SCALES - GENERAL: PAINLEVEL: NO PAIN (0)

## 2020-12-01 NOTE — PROGRESS NOTES
Infusion Nursing Note:  Flora CARVER Samira presents today for Gemzar.   Patient seen by provider today: No   present during visit today: Not Applicable.    Note: Assessment done by PA at appointment.    Intravenous Access:  Implanted Port.    Treatment Conditions:  Lab Results   Component Value Date    HGB 11.9 11/30/2020     Lab Results   Component Value Date    WBC 3.1 11/30/2020      Lab Results   Component Value Date    ANEU 1.8 11/30/2020     Lab Results   Component Value Date     11/30/2020      Results reviewed, labs MET treatment parameters, ok to proceed with treatment.      Post Infusion Assessment:  Patient tolerated infusion without incident.  Blood return noted pre and post infusion.  Site patent and intact, free from redness, edema or discomfort.  No evidence of extravasations.  Access discontinued per protocol.       Discharge Plan:   Discharge instructions reviewed with: Patient.  Patient discharged in stable condition accompanied by: self.  Departure Mode: Ambulatory.  Next lab and infusion scheduled for 12/15/2020.    POONAM GALICIA RN

## 2020-12-14 ENCOUNTER — PATIENT OUTREACH (OUTPATIENT)
Dept: ONCOLOGY | Facility: CLINIC | Age: 75
End: 2020-12-14

## 2020-12-14 NOTE — PROGRESS NOTES
Flora called in to clinic reporting she is going to be leaving for [place] Dec 30th and will be gone January through March 2021.     Flora is wondering if her Creon can be filled ahead of time so she doesn't have any issues filling it down in [place].     Writer explained we can call [facility] and see if they are able to do that. Flora reports if she needs to get the prescription transferred to [place] she would get it filled at [facility] in [place], [place] on [address].       Writer called and spoke with Jazyln in [place]. They report the patient was just dispensed a 30 day supply of Creon on 11/27 so she would need a 90 day prescription and it would be up to her insurance if they paid for it. Per [facility], Flora would need a new prescription anyways because this is the end of the script on file for her.     Writer will check with Dr. Coronado if she would be able to write a new RX.     Leyla Layton, RN, BSN, SHARMIN  RN Care Coordinator  [facility]  [phone]

## 2020-12-15 ENCOUNTER — INFUSION THERAPY VISIT (OUTPATIENT)
Dept: INFUSION THERAPY | Facility: CLINIC | Age: 75
End: 2020-12-15
Attending: INTERNAL MEDICINE
Payer: COMMERCIAL

## 2020-12-15 ENCOUNTER — HOSPITAL ENCOUNTER (OUTPATIENT)
Facility: CLINIC | Age: 75
Setting detail: SPECIMEN
Discharge: HOME | End: 2020-12-15
Attending: INTERNAL MEDICINE | Admitting: PHYSICIAN ASSISTANT
Payer: COMMERCIAL

## 2020-12-15 VITALS
HEART RATE: 60 BPM | OXYGEN SATURATION: 100 % | DIASTOLIC BLOOD PRESSURE: 71 MMHG | SYSTOLIC BLOOD PRESSURE: 129 MMHG | TEMPERATURE: 98 F | RESPIRATION RATE: 16 BRPM

## 2020-12-15 DIAGNOSIS — C25.0 MALIGNANT NEOPLASM OF HEAD OF PANCREAS (H): ICD-10-CM

## 2020-12-15 DIAGNOSIS — C25.0 MALIGNANT NEOPLASM OF HEAD OF PANCREAS (H): Primary | ICD-10-CM

## 2020-12-15 LAB
BASOPHILS # BLD AUTO: 0 10E9/L (ref 0–0.2)
BASOPHILS NFR BLD AUTO: 1 %
DIFFERENTIAL METHOD BLD: ABNORMAL
EOSINOPHIL # BLD AUTO: 0.1 10E9/L (ref 0–0.7)
EOSINOPHIL NFR BLD AUTO: 2.9 %
ERYTHROCYTE [DISTWIDTH] IN BLOOD BY AUTOMATED COUNT: 14.9 % (ref 10–15)
HCT VFR BLD AUTO: 35.9 % (ref 35–47)
HGB BLD-MCNC: 11.4 G/DL (ref 11.7–15.7)
IMM GRANULOCYTES # BLD: 0 10E9/L (ref 0–0.4)
IMM GRANULOCYTES NFR BLD: 0.2 %
LYMPHOCYTES # BLD AUTO: 1 10E9/L (ref 0.8–5.3)
LYMPHOCYTES NFR BLD AUTO: 22.6 %
MCH RBC QN AUTO: 29.4 PG (ref 26.5–33)
MCHC RBC AUTO-ENTMCNC: 31.8 G/DL (ref 31.5–36.5)
MCV RBC AUTO: 93 FL (ref 78–100)
MONOCYTES # BLD AUTO: 0.4 10E9/L (ref 0–1.3)
MONOCYTES NFR BLD AUTO: 10.2 %
NEUTROPHILS # BLD AUTO: 2.7 10E9/L (ref 1.6–8.3)
NEUTROPHILS NFR BLD AUTO: 63.1 %
NRBC # BLD AUTO: 0 10*3/UL
NRBC BLD AUTO-RTO: 0 /100
PLATELET # BLD AUTO: 150 10E9/L (ref 150–450)
RBC # BLD AUTO: 3.88 10E12/L (ref 3.8–5.2)
WBC # BLD AUTO: 4.2 10E9/L (ref 4–11)

## 2020-12-15 PROCEDURE — 250N000011 HC RX IP 250 OP 636: Performed by: PHYSICIAN ASSISTANT

## 2020-12-15 PROCEDURE — 258N000003 HC RX IP 258 OP 636: Performed by: PHYSICIAN ASSISTANT

## 2020-12-15 PROCEDURE — 96367 TX/PROPH/DG ADDL SEQ IV INF: CPT

## 2020-12-15 PROCEDURE — 96413 CHEMO IV INFUSION 1 HR: CPT

## 2020-12-15 PROCEDURE — 85025 COMPLETE CBC W/AUTO DIFF WBC: CPT | Performed by: PHYSICIAN ASSISTANT

## 2020-12-15 RX ADMIN — DEXAMETHASONE SODIUM PHOSPHATE 12 MG: 10 INJECTION, SOLUTION INTRAMUSCULAR; INTRAVENOUS at 13:32

## 2020-12-15 RX ADMIN — SODIUM CHLORIDE 250 ML: 9 INJECTION, SOLUTION INTRAVENOUS at 13:32

## 2020-12-15 RX ADMIN — GEMCITABINE 1600 MG: 38 INJECTION, SOLUTION INTRAVENOUS at 13:53

## 2020-12-15 NOTE — PROGRESS NOTES
Infusion Nursing Note:  Flora Hogan presents today for Gemzar.    Patient seen by provider today: No   present during visit today: Not Applicable.    Note: N/A.    Intravenous Access:  Labs drawn without difficulty.  Implanted Port.    Treatment Conditions:  Lab Results   Component Value Date    HGB 11.4 12/15/2020     Lab Results   Component Value Date    WBC 4.2 12/15/2020      Lab Results   Component Value Date    ANEU 2.7 12/15/2020     Lab Results   Component Value Date     12/15/2020      Results reviewed, labs MET treatment parameters, ok to proceed with treatment.      Post Infusion Assessment:  Patient tolerated infusion without incident.  Site patent and intact, free from redness, edema or discomfort.  No evidence of extravasations.  Access discontinued per protocol.       Discharge Plan:   AVS to patient via MYCHART.  Patient will return 12/28 for next appointment.   Patient discharged in stable condition accompanied by: self.  Departure Mode: Ambulatory.    Vikki Green RN

## 2020-12-16 NOTE — PROGRESS NOTES
Jeannie Coronado MD You;  Cancer Clinic Rn Pool 22 hours ago (5:03 PM)     I re-ordered the 90 day supply.    Message text       You  Jeannie Coronado MD;  Cancer Clinic Rn Pool Yesterday (1:37 PM)     Dr. Coronado,   How would you like to proceed? Try prescribing a 90 day supply for Creon or write a new 30 day supply prescription?     -Leyla    Message text       Sheree Ayala; Kate Mcarthur;  Cancer Clinic Rn Pool Yesterday (1:30 PM)     Terrance Mayer,     If the prescription is written for a 90 day supply then the pharmacy will try to bill a 90 day supply. A lot of times insurance only allows one month at a time. Let me know if they will allow a 90 day supply and if not, I can try for a PA but I have had very little success in getting insurance to override their day supply limits for this particular reasoning. They put day supply limits on certain drugs on their formulary in case the patient switches medication, etc.     Thank you, if you have any further questions please feel free to contact me.     Sheree Ayala   Specialty and Oncology [facility]   [email or web address]   Phone: [phone]   Fax: [phone]    Message text        Writer called Flora and there was no answer. Left a voicemail to return call to Dr. Coronado's RN.     Leyla Layton, RN, BSN, MAOM  RN Care Coordinator  [facility]  [phone]

## 2020-12-17 NOTE — PROGRESS NOTES
Flora called writer back. Writer explained Dr. Coronado has prescribed a new 90 day prescription for Creon. If the pharmacy in [place] calls to get the script transferred down to them, Flora can talk with them about ordering it ahead of time and how much they would be able to fill.     Flora verbalized understanding and reports she will call the [facility] in [place], [place]. She had no further questions or concerns at this time.     Leyla Layton, RN, BSN, MAOM  RN Care Coordinator  [facility]  [phone]

## 2020-12-20 ENCOUNTER — HEALTH MAINTENANCE LETTER (OUTPATIENT)
Age: 75
End: 2020-12-20

## 2020-12-24 DIAGNOSIS — Z11.59 ENCOUNTER FOR SCREENING FOR OTHER VIRAL DISEASES: ICD-10-CM

## 2020-12-24 PROCEDURE — U0003 INFECTIOUS AGENT DETECTION BY NUCLEIC ACID (DNA OR RNA); SEVERE ACUTE RESPIRATORY SYNDROME CORONAVIRUS 2 (SARS-COV-2) (CORONAVIRUS DISEASE [COVID-19]), AMPLIFIED PROBE TECHNIQUE, MAKING USE OF HIGH THROUGHPUT TECHNOLOGIES AS DESCRIBED BY CMS-2020-01-R: HCPCS | Performed by: RADIOLOGY

## 2020-12-25 LAB
SARS-COV-2 RNA SPEC QL NAA+PROBE: NOT DETECTED
SPECIMEN SOURCE: NORMAL

## 2020-12-28 ENCOUNTER — OFFICE VISIT (OUTPATIENT)
Dept: INFUSION THERAPY | Facility: CLINIC | Age: 75
End: 2020-12-28
Attending: INTERNAL MEDICINE
Payer: COMMERCIAL

## 2020-12-28 ENCOUNTER — HOSPITAL ENCOUNTER (OUTPATIENT)
Facility: CLINIC | Age: 75
Discharge: HOME | End: 2020-12-28
Attending: RADIOLOGY | Admitting: RADIOLOGY
Payer: COMMERCIAL

## 2020-12-28 ENCOUNTER — HOSPITAL ENCOUNTER (OUTPATIENT)
Dept: CT IMAGING | Facility: CLINIC | Age: 75
End: 2020-12-28
Attending: INTERNAL MEDICINE
Payer: COMMERCIAL

## 2020-12-28 ENCOUNTER — HOSPITAL ENCOUNTER (OUTPATIENT)
Facility: CLINIC | Age: 75
Setting detail: SPECIMEN
Discharge: HOME | End: 2020-12-28
Attending: INTERNAL MEDICINE | Admitting: INTERNAL MEDICINE
Payer: COMMERCIAL

## 2020-12-28 ENCOUNTER — APPOINTMENT (OUTPATIENT)
Dept: INTERVENTIONAL RADIOLOGY/VASCULAR | Facility: CLINIC | Age: 75
End: 2020-12-28
Attending: INTERNAL MEDICINE
Payer: COMMERCIAL

## 2020-12-28 VITALS
TEMPERATURE: 98.8 F | RESPIRATION RATE: 16 BRPM | HEART RATE: 56 BPM | OXYGEN SATURATION: 97 % | DIASTOLIC BLOOD PRESSURE: 73 MMHG | SYSTOLIC BLOOD PRESSURE: 118 MMHG

## 2020-12-28 DIAGNOSIS — C25.0 MALIGNANT NEOPLASM OF HEAD OF PANCREAS (H): Primary | ICD-10-CM

## 2020-12-28 DIAGNOSIS — C25.0 MALIGNANT NEOPLASM OF HEAD OF PANCREAS (H): ICD-10-CM

## 2020-12-28 LAB
ALBUMIN SERPL-MCNC: 3.6 G/DL (ref 3.4–5)
ALP SERPL-CCNC: 129 U/L (ref 40–150)
ALT SERPL W P-5'-P-CCNC: 72 U/L (ref 0–50)
ANION GAP SERPL CALCULATED.3IONS-SCNC: 1 MMOL/L (ref 3–14)
AST SERPL W P-5'-P-CCNC: 33 U/L (ref 0–45)
BASOPHILS # BLD AUTO: 0 10E9/L (ref 0–0.2)
BASOPHILS NFR BLD AUTO: 1.3 %
BILIRUB SERPL-MCNC: 0.6 MG/DL (ref 0.2–1.3)
BUN SERPL-MCNC: 21 MG/DL (ref 7–30)
CALCIUM SERPL-MCNC: 8.8 MG/DL (ref 8.5–10.1)
CHLORIDE SERPL-SCNC: 106 MMOL/L (ref 94–109)
CO2 SERPL-SCNC: 31 MMOL/L (ref 20–32)
CREAT SERPL-MCNC: 0.88 MG/DL (ref 0.52–1.04)
DIFFERENTIAL METHOD BLD: ABNORMAL
EOSINOPHIL # BLD AUTO: 0.1 10E9/L (ref 0–0.7)
EOSINOPHIL NFR BLD AUTO: 4.3 %
ERYTHROCYTE [DISTWIDTH] IN BLOOD BY AUTOMATED COUNT: 15.1 % (ref 10–15)
GFR SERPL CREATININE-BSD FRML MDRD: 63 ML/MIN/{1.73_M2}
GLUCOSE SERPL-MCNC: 85 MG/DL (ref 70–99)
HCT VFR BLD AUTO: 37 % (ref 35–47)
HGB BLD-MCNC: 11.9 G/DL (ref 11.7–15.7)
IMM GRANULOCYTES # BLD: 0 10E9/L (ref 0–0.4)
IMM GRANULOCYTES NFR BLD: 0.3 %
LYMPHOCYTES # BLD AUTO: 1.1 10E9/L (ref 0.8–5.3)
LYMPHOCYTES NFR BLD AUTO: 35.1 %
MCH RBC QN AUTO: 29.2 PG (ref 26.5–33)
MCHC RBC AUTO-ENTMCNC: 32.2 G/DL (ref 31.5–36.5)
MCV RBC AUTO: 91 FL (ref 78–100)
MONOCYTES # BLD AUTO: 0.4 10E9/L (ref 0–1.3)
MONOCYTES NFR BLD AUTO: 13.7 %
NEUTROPHILS # BLD AUTO: 1.4 10E9/L (ref 1.6–8.3)
NEUTROPHILS NFR BLD AUTO: 45.3 %
NRBC # BLD AUTO: 0 10*3/UL
NRBC BLD AUTO-RTO: 0 /100
PLATELET # BLD AUTO: 173 10E9/L (ref 150–450)
POTASSIUM SERPL-SCNC: 4.4 MMOL/L (ref 3.4–5.3)
PROT SERPL-MCNC: 6.7 G/DL (ref 6.8–8.8)
RBC # BLD AUTO: 4.07 10E12/L (ref 3.8–5.2)
SODIUM SERPL-SCNC: 138 MMOL/L (ref 133–144)
WBC # BLD AUTO: 3 10E9/L (ref 4–11)

## 2020-12-28 PROCEDURE — 99152 MOD SED SAME PHYS/QHP 5/>YRS: CPT

## 2020-12-28 PROCEDURE — 36591 DRAW BLOOD OFF VENOUS DEVICE: CPT

## 2020-12-28 PROCEDURE — 80053 COMPREHEN METABOLIC PANEL: CPT | Performed by: INTERNAL MEDICINE

## 2020-12-28 PROCEDURE — 250N000009 HC RX 250: Performed by: RADIOLOGY

## 2020-12-28 PROCEDURE — 71260 CT THORAX DX C+: CPT

## 2020-12-28 PROCEDURE — 36590 REMOVAL TUNNELED CV CATH: CPT

## 2020-12-28 PROCEDURE — 250N000009 HC RX 250

## 2020-12-28 PROCEDURE — 272N000604 HC WOUND GLUE CR3

## 2020-12-28 PROCEDURE — 250N000011 HC RX IP 250 OP 636: Performed by: INTERNAL MEDICINE

## 2020-12-28 PROCEDURE — 85025 COMPLETE CBC W/AUTO DIFF WBC: CPT | Performed by: INTERNAL MEDICINE

## 2020-12-28 PROCEDURE — 250N000011 HC RX IP 250 OP 636: Performed by: RADIOLOGY

## 2020-12-28 PROCEDURE — 250N000009 HC RX 250: Performed by: INTERNAL MEDICINE

## 2020-12-28 PROCEDURE — 86301 IMMUNOASSAY TUMOR CA 19-9: CPT | Performed by: INTERNAL MEDICINE

## 2020-12-28 RX ORDER — DEXTROSE MONOHYDRATE 25 G/50ML
25-50 INJECTION, SOLUTION INTRAVENOUS
Status: DISCONTINUED | OUTPATIENT
Start: 2020-12-28 | End: 2020-12-28 | Stop reason: HOSPADM

## 2020-12-28 RX ORDER — IOPAMIDOL 755 MG/ML
500 INJECTION, SOLUTION INTRAVASCULAR ONCE
Status: COMPLETED | OUTPATIENT
Start: 2020-12-28 | End: 2020-12-28

## 2020-12-28 RX ORDER — NALOXONE HYDROCHLORIDE 0.4 MG/ML
0.2 INJECTION, SOLUTION INTRAMUSCULAR; INTRAVENOUS; SUBCUTANEOUS
Status: DISCONTINUED | OUTPATIENT
Start: 2020-12-28 | End: 2020-12-28 | Stop reason: HOSPADM

## 2020-12-28 RX ORDER — HEPARIN SODIUM (PORCINE) LOCK FLUSH IV SOLN 100 UNIT/ML 100 UNIT/ML
5 SOLUTION INTRAVENOUS
Status: CANCELLED | OUTPATIENT
Start: 2020-12-28

## 2020-12-28 RX ORDER — NICOTINE POLACRILEX 4 MG
15-30 LOZENGE BUCCAL
Status: CANCELLED | OUTPATIENT
Start: 2020-12-28

## 2020-12-28 RX ORDER — HEPARIN SODIUM 200 [USP'U]/100ML
1 INJECTION, SOLUTION INTRAVENOUS CONTINUOUS PRN
Status: DISCONTINUED | OUTPATIENT
Start: 2020-12-28 | End: 2020-12-28 | Stop reason: HOSPADM

## 2020-12-28 RX ORDER — HEPARIN SODIUM (PORCINE) LOCK FLUSH IV SOLN 100 UNIT/ML 100 UNIT/ML
5 SOLUTION INTRAVENOUS
Status: ACTIVE | OUTPATIENT
Start: 2020-12-28

## 2020-12-28 RX ORDER — LIDOCAINE HYDROCHLORIDE 10 MG/ML
INJECTION, SOLUTION EPIDURAL; INFILTRATION; INTRACAUDAL; PERINEURAL
Status: DISCONTINUED
Start: 2020-12-28 | End: 2020-12-28 | Stop reason: HOSPADM

## 2020-12-28 RX ORDER — FENTANYL CITRATE 50 UG/ML
25-50 INJECTION, SOLUTION INTRAMUSCULAR; INTRAVENOUS EVERY 5 MIN PRN
Status: DISCONTINUED | OUTPATIENT
Start: 2020-12-28 | End: 2020-12-28 | Stop reason: HOSPADM

## 2020-12-28 RX ORDER — FENTANYL CITRATE 50 UG/ML
INJECTION, SOLUTION INTRAMUSCULAR; INTRAVENOUS
Status: DISCONTINUED
Start: 2020-12-28 | End: 2020-12-28 | Stop reason: HOSPADM

## 2020-12-28 RX ORDER — LIDOCAINE 40 MG/G
CREAM TOPICAL
Status: DISCONTINUED | OUTPATIENT
Start: 2020-12-28 | End: 2020-12-28 | Stop reason: HOSPADM

## 2020-12-28 RX ORDER — LIDOCAINE HYDROCHLORIDE AND EPINEPHRINE 10; 10 MG/ML; UG/ML
INJECTION, SOLUTION INFILTRATION; PERINEURAL
Status: COMPLETED
Start: 2020-12-28 | End: 2020-12-28

## 2020-12-28 RX ORDER — FLUMAZENIL 0.1 MG/ML
0.2 INJECTION, SOLUTION INTRAVENOUS
Status: DISCONTINUED | OUTPATIENT
Start: 2020-12-28 | End: 2020-12-28 | Stop reason: HOSPADM

## 2020-12-28 RX ORDER — HEPARIN SODIUM,PORCINE 10 UNIT/ML
5 VIAL (ML) INTRAVENOUS
Status: CANCELLED | OUTPATIENT
Start: 2020-12-28

## 2020-12-28 RX ORDER — ACETAMINOPHEN 325 MG/1
325 TABLET ORAL
Status: CANCELLED | OUTPATIENT
Start: 2020-12-28

## 2020-12-28 RX ORDER — NALOXONE HYDROCHLORIDE 0.4 MG/ML
0.4 INJECTION, SOLUTION INTRAMUSCULAR; INTRAVENOUS; SUBCUTANEOUS
Status: DISCONTINUED | OUTPATIENT
Start: 2020-12-28 | End: 2020-12-28 | Stop reason: HOSPADM

## 2020-12-28 RX ORDER — NICOTINE POLACRILEX 4 MG
15-30 LOZENGE BUCCAL
Status: DISCONTINUED | OUTPATIENT
Start: 2020-12-28 | End: 2020-12-28 | Stop reason: HOSPADM

## 2020-12-28 RX ORDER — DEXTROSE MONOHYDRATE 25 G/50ML
25-50 INJECTION, SOLUTION INTRAVENOUS
Status: CANCELLED | OUTPATIENT
Start: 2020-12-28

## 2020-12-28 RX ADMIN — SODIUM CHLORIDE 54 ML: 9 INJECTION, SOLUTION INTRAVENOUS at 09:27

## 2020-12-28 RX ADMIN — LIDOCAINE HYDROCHLORIDE,EPINEPHRINE BITARTRATE 5 ML: 10; .01 INJECTION, SOLUTION INFILTRATION; PERINEURAL at 11:04

## 2020-12-28 RX ADMIN — LIDOCAINE HYDROCHLORIDE 3 ML: 10 INJECTION, SOLUTION EPIDURAL; INFILTRATION; INTRACAUDAL; PERINEURAL at 11:04

## 2020-12-28 RX ADMIN — IOPAMIDOL 57 ML: 755 INJECTION, SOLUTION INTRAVENOUS at 09:27

## 2020-12-28 RX ADMIN — MIDAZOLAM HYDROCHLORIDE 1 MG: 1 INJECTION, SOLUTION INTRAMUSCULAR; INTRAVENOUS at 11:02

## 2020-12-28 RX ADMIN — FENTANYL CITRATE 50 MCG: 50 INJECTION, SOLUTION INTRAMUSCULAR; INTRAVENOUS at 11:02

## 2020-12-28 RX ADMIN — Medication 5 ML: at 09:46

## 2020-12-28 NOTE — PROCEDURES
[facility]    Procedure: Port removal    Date/Time: 12/28/2020 11:12 AM  Performed by: Tyler Ghotra MD  Authorized by: Tyler Ghotra MD     UNIVERSAL PROTOCOL   Site Marked: NA  Prior Images Obtained and Reviewed:  Yes  Required items: Required blood products, implants, devices and special equipment available    Patient identity confirmed:  Verbally with patient  Patient was reevaluated immediately before administering moderate or deep sedation or anesthesia  Confirmation Checklist:  Patient's identity using two indicators, relevant allergies and procedure was appropriate and matched the consent or emergent situation  Time out: Immediately prior to the procedure a time out was called    Universal Protocol: the Joint Commission Universal Protocol was followed    Preparation: Patient was prepped and draped in usual sterile fashion    ESBL (mL):  5         ANESTHESIA    Anesthesia: Local infiltration  Local Anesthetic:  Lidocaine 1% without epinephrine and lidocaine 1% with epinephrine  Anesthetic Total (mL):  8      SEDATION    Patient Sedated: Yes    Sedation Type:  Moderate (conscious) sedation  Sedation:  Fentanyl, midazolam and see MAR for details  Vital signs: Vital signs monitored during sedation    See dictated procedure note for full details.  PROCEDURE Describe Procedure: Successful port removal  Length of time physician/provider present for 1:1 monitoring during sedation: 20

## 2020-12-28 NOTE — DISCHARGE INSTRUCTIONS
Going Home after the   Removal of Your Port or Tunneled Dialysis Catheter  ______________________________________________________________________    Patient Name:  Flora Hogan  Today's Date:  December 28, 2020    The doctor who removed your port or catheter was: Dr. Loza  at [facility]) in:    Interventional Radiology Department  When you get home:    No driving or drinking alcohol until tomorrow. You may still have side effects from   the medicine you received today (you may feel drowsy, unsteady or forgetful).    You should have an adult with you for the first 6 hours at home (radiology patients).    You may go back to your regular diet today.     If you take aspirin or Plavix, you may begin taking it again tomorrow. You may restart all other medicines today. Use pain medicine as directed.    Avoid heavy lifting or the overuse of your shoulder for three days.    Port site and bandage care    Keep the wound clean and dry for three days. Cover it with plastic before taking a shower.     Change the bandage if it gets wet or dirty. Use bacitracin (antibiotic cream) and clean gauze.     After three days, you may use Band-Aids until the wound has healed.    If you have oozing or bleeding from the port site or catheter (tube) site:   o Put direct pressure on the wound for 5 to 10 minutes with a gauze pad.  If you still have bleeding after 10 minutes, call your doctor.  o If you are bleeding a lot and can t control it with direct pressure, call 911.    Call your doctor if you have:    Swelling in your neck.    Signs of infection:   o fever over 100  F (37.8 C) under the tongue  o the wound is red, tender or draining.

## 2020-12-28 NOTE — IP AVS SNAPSHOT
MRN:[id number]                      After Visit Summary   12/28/2020    Flora Hogan    MRN: [id number]           Visit Information        Department      12/28/2020  9:55 AM [facility]          Review of your medicines      UNREVIEWED medicines. Ask your doctor about these medicines       Dose / Directions   acetaminophen 325 MG tablet  Commonly known as: TYLENOL  Used for: Postoperative pain      Dose: 325-650 mg  Take 1-2 tablets (325-650 mg) by mouth every 6 hours as needed for mild pain or fever  Quantity: 50 tablet  Refills: 0     albuterol 108 (90 Base) MCG/ACT inhaler  Commonly known as: PROAIR HFA/PROVENTIL HFA/VENTOLIN HFA  Used for: SOB (shortness of breath)      Dose: 2 puff  Inhale 2 puffs into the lungs every 6 hours as needed for shortness of breath / dyspnea or wheezing  Quantity: 1 Inhaler  Refills: 0     amylase-lipase-protease 90743-18186 units Cpep per EC capsule  Commonly known as: Creon  Used for: Malignant neoplasm of head of pancreas (H)      Dose: 1 capsule  Take 1 capsule by mouth 3 times daily (with meals)  Quantity: 90 capsule  Refills: 3     Caltrate 600+D 600-400 MG-UNIT chewable tablet  Generic drug: calcium carbonate-vitamin D      Dose: 1 chew tab  Take 1 chew tab by mouth 2 times daily  Quantity: 180 tablet  Refills: 3     loratadine 10 MG ODT  Commonly known as: CLARITIN REDITABS      Dose: 10 mg  Take 10 mg by mouth as needed  Refills: 0     magnesium 250 MG tablet      Dose: 1 tablet  Take 1 tablet by mouth daily  Refills: 0     oxyCODONE 5 MG tablet  Commonly known as: ROXICODONE  Used for: Postoperative pain      Dose: 5-10 mg  Take 1-2 tablets (5-10 mg) by mouth every 6 hours as needed for moderate to severe pain  Quantity: 20 tablet  Refills: 0     polyethylene glycol 17 g packet  Commonly known as: MIRALAX  Used for: Mass of pancreas      Dose: 17 g  Take 17 g by mouth daily as needed for constipation  Quantity: 12  packet  Refills: 0     prochlorperazine 10 MG tablet  Commonly known as: COMPAZINE  Used for: Malignant neoplasm of head of pancreas (H)      Dose: 10 mg  Take 1 tablet (10 mg) by mouth every 6 hours as needed (Nausea/Vomiting)  Quantity: 30 tablet  Refills: 1              Protect others around you: Learn how to safely use, store and throw away your medicines at www.disposemymeds.org.       Follow-ups after your visit       Your next 10 appointments already scheduled    Dec 28, 2020 10:30 AM  IR PORT REMOVAL RIGHT with RHIR11, MEGAN, RH IR RAD  [facility] Interventional Radiology ([facility]) [address] [facility] [address] [phone]   The day before the exam:    You may eat your regular diet.    You are encouraged to drink at least 8 eight ounce glasses of clear liquids.    Please wear loose clothing, such as a sweat suit or jogging clothes. Avoid snaps, zippers and other metal. We may ask you to undress and put on a hospital gown.    Please bring any scans or X-rays taken at other hospitals, if similar tests were done. Also bring a list of your medicines, including vitamins, minerals and over-the-counter drugs. It is safest to leave personal items at home.    Someone will need to drive you to and from the hospital.    Tell your doctor in advance:    If you have allergies to x-ray contrast or iodine.    If you are or may be pregnant.    If you are taking Coumadin (or any other blood thinners) 5 days prior to the exam for any special instructions.    If you are diabetic to determine if your insulin needs have to be adjusted for the exam.    Your doctor will:    Need to do a history and physical within 30 days before this procedure.    Obtain necessary laboratory tests prior to the exam (Basic Metabolic Panel, CBCP, PTT and INR)    (No labs needed if you are having a tunneled catheter exchange or removal)    If you were given sedation, you cannot drive for 24 hours  after the procedure, and an adult must be with you until then.    If you have any questions, please call the Imaging Department where you will have your exam. Directions, parking instructions, and other information are available on our website, [email or web address].     Dec 29, 2020  2:15 PM  Telephone Visit with Jeannie Coronado MD  [facility] ([facility]) [address]  [address]  [facility] [address]  [phone]   [facility]  Note: this is not an onsite visit; there is no need to come to the facility.  Please have a list of all current medications available for appointment.         Care Instructions       Further instructions from your care team         Going Home after the   Removal of Your Port or Tunneled Dialysis Catheter  ______________________________________________________________________    Patient Name:  Flora Hogan  Today's Date:  December 28, 2020    The doctor who removed your port or catheter was: Dr. Loza  at [facility]) in:    Interventional Radiology Department  When you get home:    No driving or drinking alcohol until tomorrow. You may still have side effects from   the medicine you received today (you may feel drowsy, unsteady or forgetful).    You should have an adult with you for the first 6 hours at home (radiology patients).    You may go back to your regular diet today.     If you take aspirin or Plavix, you may begin taking it again tomorrow. You may restart all other medicines today. Use pain medicine as directed.    Avoid heavy lifting or the overuse of your shoulder for three days.    Port site and bandage care    Keep the wound clean and dry for three days. Cover it with plastic before taking a shower.     Change the bandage if it gets wet or dirty. Use bacitracin (antibiotic cream) and clean gauze.     After three days, you may use Band-Aids  until the wound has healed.    If you have oozing or bleeding from the port site or catheter (tube) site:   o Put direct pressure on the wound for 5 to 10 minutes with a gauze pad.  If you still have bleeding after 10 minutes, call your doctor.  o If you are bleeding a lot and can t control it with direct pressure, call 911.    Call your doctor if you have:    Swelling in your neck.    Signs of infection:   o fever over 100  F (37.8 C) under the tongue  o the wound is red, tender or draining.    Additional Information About Your Visit       MyChart Information    [facility] gives you secure access to your electronic health record. If you see a primary care provider, you can also send messages to your care team and make appointments. If you have questions, please call your primary care clinic.  If you do not have a primary care provider, please call [phone] and they will assist you.       Care EveryWhere ID    This is your Care EveryWhere ID. This could be used by other organizations to access your [place] medical records  [id number]       Your Vitals Were  Most recent update: 12/28/2020 10:07 AM    Blood Pressure   136/107            Pulse   54          Temperature   97.4  F (36.3  C)          Respirations   16          Last Period    (LMP Unknown)             Pulse Oximetry   100%          Primary Care Provider Office Phone # Fax #    Natasha Braun [phone]      Equal Access to Services    ALEXANDER DELGADO : Hadii ceci ash hadasho Soomaali, waaxda luqadaha, qaybta kaalmada adeegyada, reji camacho. So [facility] [phone].    ATENCIÓN: Si habla español, tiene a bashir disposición servicios gratuitos de asistencia lingüística. Llame al [phone].    We comply with applicable federal and state civil rights laws, including the [place] Human Rights Act. We do not discriminate on the basis of race, color, creed, Cheondoism, national origin, marital status, age, disability, sex,  sexual orientation, or gender identity.    If you would like an itemization of your charges they will now be available in [facility] 30 days after discharge. To access the itemized statements in [employer] go to billing/billing summary. From there select view account. There will be multiple tabs showing an overview of your account, detail, payments, and communications. From the communications tab you can see your monthly statements, your itemized statements, and any billing letters generated for your account. If you do not have a [facility] account and need help getting access please contact [facility] support at [phone].  If you would prefer to have your itemized statements mailed please contact our automated itemized bill request line at [phone] option  2.       Thank you!    Thank you for choosing [facility] for your care. Our goal is always to provide you with excellent care. Hearing back from our patients is one way we can continue to improve our services. Please take a few minutes to complete the written survey that you may receive in the mail after you visit. If you would like to speak to someone directly about your visit please contact Patient Relations at [phone]. Thank you!              Medication List      ASK your doctor about these medications          Morning Afternoon Evening Bedtime As Needed    acetaminophen 325 MG tablet  Also known as: TYLENOL  INSTRUCTIONS: Take 1-2 tablets (325-650 mg) by mouth every 6 hours as needed for mild pain or fever                     albuterol 108 (90 Base) MCG/ACT inhaler  Also known as: PROAIR HFA/PROVENTIL HFA/VENTOLIN HFA  INSTRUCTIONS: Inhale 2 puffs into the lungs every 6 hours as needed for shortness of breath / dyspnea or wheezing  Doctor's comments: Pharmacy may dispense brand covered by insurance (Proair, or proventil or ventolin or generic albuterol inhaler)                     amylase-lipase-protease 86345-30486 units Cpep per EC  capsule  Also known as: Creon  INSTRUCTIONS: Take 1 capsule by mouth 3 times daily (with meals)                     Caltrate 600+D 600-400 MG-UNIT chewable tablet  INSTRUCTIONS: Take 1 chew tab by mouth 2 times daily  Generic drug: calcium carbonate-vitamin D                     loratadine 10 MG ODT  Also known as: CLARITIN REDITABS  INSTRUCTIONS: Take 10 mg by mouth as needed                     magnesium 250 MG tablet  INSTRUCTIONS: Take 1 tablet by mouth daily                     oxyCODONE 5 MG tablet  Also known as: ROXICODONE  INSTRUCTIONS: Take 1-2 tablets (5-10 mg) by mouth every 6 hours as needed for moderate to severe pain                     polyethylene glycol 17 g packet  Also known as: MIRALAX  INSTRUCTIONS: Take 17 g by mouth daily as needed for constipation                     prochlorperazine 10 MG tablet  Also known as: COMPAZINE  INSTRUCTIONS: Take 1 tablet (10 mg) by mouth every 6 hours as needed (Nausea/Vomiting)

## 2020-12-28 NOTE — PROGRESS NOTES
Nursing Note:  Flora Hogan presents today for port labs and access for CT scan.    Patient seen by provider today: No   present during visit today: Not Applicable.    Note: N/A.    Intravenous Access:  Labs drawn without difficulty.  Implanted Port.    Discharge Plan:   Patient was sent to radiology for CT scan appointment.    Vikki Riddle RN

## 2020-12-28 NOTE — LETTER
12/28/2020         RE: Flora Hogan  [address]  [place] [address]        Dear Colleague,    Thank you for referring your patient, Flora Hogan, to the [facility]. Please see a copy of my visit note below.    Nursing Note:  Flora Hogan presents today for port labs and access for CT scan.    Patient seen by provider today: No   present during visit today: Not Applicable.    Note: N/A.    Intravenous Access:  Labs drawn without difficulty.  Implanted Port.    Discharge Plan:   Patient was sent to radiology for CT scan appointment.    Vikki Riddle, ABELINO                Again, thank you for allowing me to participate in the care of your patient.        Sincerely,         Lab Draw 1

## 2020-12-28 NOTE — PRE-PROCEDURE
GENERAL PRE-PROCEDURE:   Procedure:  Port removal  Date/Time:  12/28/2020 10:48 AM    Written consent obtained?: Yes    Risks and benefits: Risks, benefits and alternatives were discussed    Consent given by:  Patient  Patient states understanding of procedure being performed: Yes    Patient's understanding of procedure matches consent: Yes    Procedure consent matches procedure scheduled: Yes    Expected level of sedation:  Moderate  Appropriately NPO:  Yes  ASA Class:  Class 2- mild systemic disease, no acute problems, no functional limitations  Mallampati  :  Grade 2- soft palate, base of uvula, tonsillar pillars, and portion of posterior pharyngeal wall visible  Lungs:  Lungs clear with good breath sounds bilaterally  Heart:  Normal heart sounds and rate  History & Physical reviewed:  History and physical reviewed and no updates needed  Statement of review:  I have reviewed the lab findings, diagnostic data, medications, and the plan for sedation

## 2020-12-28 NOTE — IP AVS SNAPSHOT
[facility]  [address]  [facility] [address]  Phone: [phone]                                    After Visit Summary   12/28/2020    Flora Hogan    MRN: [id number]           After Visit Summary Signature Page    I have received my discharge instructions, and my questions have been answered. I have discussed any challenges I see with this plan with the nurse or doctor.    ..........................................................................................................................................  Patient/Patient Representative Signature      ..........................................................................................................................................  Patient Representative Print Name and Relationship to Patient    ..................................................               ................................................  Date                                   Time    ..........................................................................................................................................  Reviewed by Signature/Title    ...................................................              ..............................................  Date                                               Time          22EPIC Rev 08/18

## 2020-12-29 ENCOUNTER — VIRTUAL VISIT (OUTPATIENT)
Dept: ONCOLOGY | Facility: CLINIC | Age: 75
End: 2020-12-29
Attending: INTERNAL MEDICINE
Payer: COMMERCIAL

## 2020-12-29 DIAGNOSIS — C25.0 MALIGNANT NEOPLASM OF HEAD OF PANCREAS (H): ICD-10-CM

## 2020-12-29 LAB — CANCER AG19-9 SERPL-ACNC: 6 U/ML (ref 0–37)

## 2020-12-29 PROCEDURE — 99214 OFFICE O/P EST MOD 30 MIN: CPT | Mod: 95 | Performed by: INTERNAL MEDICINE

## 2020-12-29 PROCEDURE — 999N001193 HC VIDEO/TELEPHONE VISIT; NO CHARGE

## 2020-12-29 NOTE — LETTER
"    12/29/2020         RE: Flora Hogan  [address] [facility] [address]        Dear Colleague,    Thank you for referring your patient, Flora Hogan, to the [facility]. Please see a copy of my visit note below.    Flora Hogan is a 75 year old female who is being evaluated via a billable telephone visit.      The patient has been notified of following:     \"This telephone visit will be conducted via a call between you and your physician/provider. We have found that certain health care needs can be provided without the need for a physical exam.  This service lets us provide the care you need with a short phone conversation.  If a prescription is necessary we can send it directly to your pharmacy.  If lab work is needed we can place an order for that and you can then stop by our lab to have the test done at a later time.    Telephone visits are billed at different rates depending on your insurance coverage. During this emergency period, for some insurers they may be billed the same as an in-person visit.  Please reach out to your insurance provider with any questions.    If during the course of the call the physician/provider feels a telephone visit is not appropriate, you will not be charged for this service.\"    Patient has given verbal consent for Telephone visit?  Yes    What phone number would you like to be contacted at? [phone]    How would you like to obtain your AVS? Juan      [facility]    Hematology/Oncology Established Patient Note      Today's Date: 12/29/20    Reason for Follow-up: pancreatic cancer      HISTORY OF PRESENT ILLNESS: Flora Hogan is a 75 year old female, who presents with adenocarcinoma of the pancreas.  She initially presented with jaundice.  She was evaluated for EUS and FNA, which revealed atypical cells, but no definitive evidence of malignancy.  She also underwent biliary stenting for " decompression of her biliary tree.  She had a second EUS done, but again revealed atypical cells.  She then saw Dr. Duarte Chaves at [facility].  She had CT scan and MRI.  MRI showed multiple benign lesions in her liver; there as one area that was indeterminate.  Her CA 19-9 was 93.  On 5/26/20, she underwent Whipple procedure.  Two nodules were visualized on the surface of the liver, which were biopsied and found to be benign on pathology.  There were also hepatic cysts seen.  Pathology showed pancreatic ductal adenocarcinoma, well-differentiated (grade 1), tumor size 2.6 x 2.4 x 2.0 cm, negative margins, +LVI, 0 of 4 lymph nodes involved, staged pT2N0 (stage IB).      Port was placed by IR on 7/6/20.  It was removed on 12/28/20.    She started adjuvant chemotherapy with single-agent gemcitabine on 7/7/20.  She completed 6 cycles on 12/15/20.      INTERIM HISTORY: Flora says that she is feeling well.  She and her  will be going to [place] for the winter in 2 days and staying until late March 2021.         REVIEW OF SYSTEMS:   14 point ROS was reviewed and is negative other than as noted above in HPI.       HOME MEDICATIONS:  Current Outpatient Medications   Medication Sig Dispense Refill     acetaminophen (TYLENOL) 325 MG tablet Take 1-2 tablets (325-650 mg) by mouth every 6 hours as needed for mild pain or fever 50 tablet 0     amylase-lipase-protease (CREON) 79524-54552 units CPEP per EC capsule Take 1 capsule by mouth 3 times daily (with meals) 90 capsule 3     calcium carbonate-vitamin D (CALTRATE 600+D) 600-400 MG-UNIT chewable tablet Take 1 chew tab by mouth 2 times daily  180 tablet 3     loratadine (CLARITIN REDITABS) 10 MG dispersible tablet Take 10 mg by mouth as needed        magnesium 250 MG tablet Take 1 tablet by mouth daily       oxyCODONE (ROXICODONE) 5 MG tablet Take 1-2 tablets (5-10 mg) by mouth every 6 hours as needed for moderate to severe pain 20 tablet 0     prochlorperazine  (COMPAZINE) 10 MG tablet Take 1 tablet (10 mg) by mouth every 6 hours as needed (Nausea/Vomiting) 30 tablet 1     albuterol (PROAIR HFA/PROVENTIL HFA/VENTOLIN HFA) 108 (90 Base) MCG/ACT inhaler Inhale 2 puffs into the lungs every 6 hours as needed for shortness of breath / dyspnea or wheezing 1 Inhaler 0     polyethylene glycol (MIRALAX) 17 g packet Take 17 g by mouth daily as needed for constipation 12 packet 0         ALLERGIES:  Allergies   Allergen Reactions     Penicillin V      Fredy-1 [Lidocaine] Unknown         PAST MEDICAL HISTORY:  Past Medical History:   Diagnosis Date     Chest tightness     had suspected allergies     Malignant neoplasm of head of pancreas (H) 6/9/2020     Seasonal allergies      SOB (shortness of breath)          PAST SURGICAL HISTORY:  Past Surgical History:   Procedure Laterality Date     CATARACT IOL, RT/LT  2015     COLONOSCOPY  10/14    no repeat needed due to age     COLONOSCOPY N/A 10/7/2014    Procedure: COLONOSCOPY;  Surgeon: Daryl Hanson MD;  Location:  GI     COLONOSCOPY  04/23/2019    no further screening     ENDOSCOPIC RETROGRADE CHOLANGIOPANCREATOGRAM N/A 4/20/2020    Procedure: ENDOSCOPIC RETROGRADE CHOLANGIOPANCREATOGRAPHY, PLACEMENT OF PANCREATIC STENT, PLACEMENT OF BILIARY STENT;  Surgeon: Yarely Vann MD;  Location:  OR     ESOPHAGOSCOPY, GASTROSCOPY, DUODENOSCOPY (EGD), COMBINED N/A 4/20/2020    Procedure: ESOPHAGOGASTRODUODENOSCOPY, WITH FINE NEEDLE ASPIRATION BIOPSY, WITH ENDOSCOPIC ULTRASOUND GUIDANCE, Endoscopic retrograde cholangiopancreatography;  Surgeon: Yarely Vann MD;  Location:  OR     ESOPHAGOSCOPY, GASTROSCOPY, DUODENOSCOPY (EGD), COMBINED N/A 5/5/2020    Procedure: ESOPHAGOGASTRODUODENOSCOPY, WITH FINE NEEDLE ASPIRATION BIOPSY, WITH ENDOSCOPIC ULTRASOUND GUIDANCE;  Surgeon: Romina Rosario MD;  Location:  GI     IR CHEST PORT PLACEMENT > 5 YRS OF AGE  7/6/2020     IR PORT REMOVAL RIGHT  12/28/2020      LAPAROSCOPIC BIOPSY LIVER N/A 5/22/2020    Procedure: Diagnostic Laparoscopy with intraoperative ultrasound and Liver Biopsy X2;  Surgeon: Duarte Chaves MD;  Location: [facility] OR     WHIPPLE PROCEDURE N/A 5/22/2020    Procedure: Non Pylorus Preserving Whipple procedure;  Surgeon: Duarte Chaves MD;  Location: [facility] OR         SOCIAL HISTORY:  Social History     Socioeconomic History     Marital status:      Spouse name: Not on file     Number of children: Not on file     Years of education: Not on file     Highest education level: Not on file   Occupational History     Employer: RETIRED     Comment: previous taught special ed   Social Needs     Financial resource strain: Not on file     Food insecurity     Worry: Not on file     Inability: Not on file     Transportation needs     Medical: Not on file     Non-medical: Not on file   Tobacco Use     Smoking status: Never Smoker     Smokeless tobacco: Never Used   Substance and Sexual Activity     Alcohol use: Yes     Comment: social      Drug use: No     Sexual activity: Yes   Lifestyle     Physical activity     Days per week: Not on file     Minutes per session: Not on file     Stress: Not on file   Relationships     Social connections     Talks on phone: Not on file     Gets together: Not on file     Attends Nondenominational service: Not on file     Active member of club or organization: Not on file     Attends meetings of clubs or organizations: Not on file     Relationship status: Not on file     Intimate partner violence     Fear of current or ex partner: Not on file     Emotionally abused: Not on file     Physically abused: Not on file     Forced sexual activity: Not on file   Other Topics Concern     Parent/sibling w/ CABG, MI or angioplasty before 65F 55M? Not Asked      Service Not Asked     Blood Transfusions Not Asked     Caffeine Concern Yes     Comment: 2 cups     Occupational Exposure Not Asked     Hobby Hazards Not Asked     Sleep Concern Not  Asked     Stress Concern Not Asked     Weight Concern Not Asked     Special Diet No     Back Care Not Asked     Exercise Yes     Comment: walking workout      Bike Helmet Not Asked     Seat Belt Not Asked     Self-Exams Not Asked   Social History Narrative     Not on file         FAMILY HISTORY:  Family History   Problem Relation Age of Onset     Musculoskeletal Disorder Mother         edematous legs     Obesity Mother      Musculoskeletal Disorder Maternal Grandmother         edematous legs; did have amputation     Obesity Maternal Grandmother      Myocardial Infarction Maternal Grandmother          PHYSICAL EXAM:  Phone visit.        LABS:  CBC RESULTS:   Recent Labs   Lab Test 12/28/20  0900   WBC 3.0*   RBC 4.07   HGB 11.9   HCT 37.0   MCV 91   MCH 29.2   MCHC 32.2   RDW 15.1*        Recent Labs   Lab Test 12/28/20  0900 11/30/20  1038    138   POTASSIUM 4.4 4.5   CHLORIDE 106 106   CO2 31 30   ANIONGAP 1* 2*   GLC 85 88   BUN 21 24   CR 0.88 0.92   CAMERON 8.8 8.7     Lab Results   Component Value Date    AST 33 12/28/2020     Lab Results   Component Value Date    ALT 72 12/28/2020     No results found for: BILICONJ   Lab Results   Component Value Date    BILITOTAL 0.6 12/28/2020     Lab Results   Component Value Date    ALBUMIN 3.6 12/28/2020     Lab Results   Component Value Date    PROTTOTAL 6.7 12/28/2020      Lab Results   Component Value Date    ALKPHOS 129 12/28/2020     Component      Latest Ref Rng & Units 4/19/2020 5/5/2020 7/7/2020   Cancer Antigen 19-9      0 - 37 U/mL 109 (H) 93 (H) 7         IMAGING:  CT c/a/p 12/28/20:  1.  Postop changes from Whipple procedure. No evidence of recurrent or  metastatic disease in the chest, abdomen or pelvis. No enlarged lymph  nodes.     2.  Left lower lobe lung nodule is stable. Mucoid impactions and  calcified granulomas as described above. No new lung lesions.     3.  Liver cysts and hemangiomas are stable. No new liver lesions  are  appreciated.     4.  Colonic constipation without obstruction.     5.  Multiple low-attenuation renal lesions, likely cysts. No new renal  lesions are appreciated. No hydronephrosis.      ASSESSMENT/PLAN:  Flora Hogan is a 75 year old female with:    1) Adenocarcinoma of the pancreas head: s/p Whipple procedure on 5/26/20; pathology showed pancreatic ductal adenocarcinoma, well-differentiated (grade 1), tumor size 2.6 x 2.4 x 2.0 cm, negative margins, +LVI, 0 of 4 lymph nodes involved, staged pT2N0 (stage IB).      She completed 6 months of adjuvant gemcitabine in December 2020.    CT c/a/p on 12/28/20 shows no evidence of recurrent or metastatic disease in the chest, abdomen, or pelvis.    Patient will be going to [place] and returning in late March 2021    -scans and tumor markers every 3 months for the first year  -CT c/a/p and labs/CA 19-9 in 3 months  -RTC in 3 months    2) Pulmonary nodule: stable 3 mm left lower lobe pulmonary nodule  -will monitor on future scans    3) Chronic constipation: Patient says that this has been an issue for her for many years.  She controls it with diet, and she does have stool softeners at home as needed.  She asked about pancreatic insufficiency after Whipple, which is possible.  We discussed trying Creon prior to meals, and she agrees.  -Creon prescribed - she has started it and finds it helpful.  She takes it 3 times a day.  She will continue it.    -she will also try fiber    4) Leukpenia: Secondary to chemotherapy.  Mild.  ANC is mildly low.  -monitor    Jeannie Coronado MD  Hematology/Oncology  [facility]    Phone call duration: 11 minutes        Again, thank you for allowing me to participate in the care of your patient.        Sincerely,        Jeannie Coronado MD

## 2020-12-29 NOTE — LETTER
"    12/29/2020         RE: Flora Hogan  [address] [facility] [address]        Dear Colleague,    Thank you for referring your patient, Flora Hogan, to the [facility]. Please see a copy of my visit note below.    Flora Hogan is a 75 year old female who is being evaluated via a billable telephone visit.      The patient has been notified of following:     \"This telephone visit will be conducted via a call between you and your physician/provider. We have found that certain health care needs can be provided without the need for a physical exam.  This service lets us provide the care you need with a short phone conversation.  If a prescription is necessary we can send it directly to your pharmacy.  If lab work is needed we can place an order for that and you can then stop by our lab to have the test done at a later time.    Telephone visits are billed at different rates depending on your insurance coverage. During this emergency period, for some insurers they may be billed the same as an in-person visit.  Please reach out to your insurance provider with any questions.    If during the course of the call the physician/provider feels a telephone visit is not appropriate, you will not be charged for this service.\"    Patient has given verbal consent for Telephone visit?  Yes    What phone number would you like to be contacted at? [phone]    How would you like to obtain your AVS? Juan      [facility]    Hematology/Oncology Established Patient Note      Today's Date: 12/29/20    Reason for Follow-up: pancreatic cancer      HISTORY OF PRESENT ILLNESS: Flora Hogan is a 75 year old female, who presents with adenocarcinoma of the pancreas.  She initially presented with jaundice.  She was evaluated for EUS and FNA, which revealed atypical cells, but no definitive evidence of malignancy.  She also underwent biliary stenting for " decompression of her biliary tree.  She had a second EUS done, but again revealed atypical cells.  She then saw Dr. Duarte Chaves at [facility].  She had CT scan and MRI.  MRI showed multiple benign lesions in her liver; there as one area that was indeterminate.  Her CA 19-9 was 93.  On 5/26/20, she underwent Whipple procedure.  Two nodules were visualized on the surface of the liver, which were biopsied and found to be benign on pathology.  There were also hepatic cysts seen.  Pathology showed pancreatic ductal adenocarcinoma, well-differentiated (grade 1), tumor size 2.6 x 2.4 x 2.0 cm, negative margins, +LVI, 0 of 4 lymph nodes involved, staged pT2N0 (stage IB).      Port was placed by IR on 7/6/20.  It was removed on 12/28/20.    She started adjuvant chemotherapy with single-agent gemcitabine on 7/7/20.  She completed 6 cycles on 12/15/20.      INTERIM HISTORY: Flora says that she is feeling well.  She and her  will be going to [place] for the winter in 2 days and staying until late March 2021.         REVIEW OF SYSTEMS:   14 point ROS was reviewed and is negative other than as noted above in HPI.       HOME MEDICATIONS:  Current Outpatient Medications   Medication Sig Dispense Refill     acetaminophen (TYLENOL) 325 MG tablet Take 1-2 tablets (325-650 mg) by mouth every 6 hours as needed for mild pain or fever 50 tablet 0     amylase-lipase-protease (CREON) 46590-54209 units CPEP per EC capsule Take 1 capsule by mouth 3 times daily (with meals) 90 capsule 3     calcium carbonate-vitamin D (CALTRATE 600+D) 600-400 MG-UNIT chewable tablet Take 1 chew tab by mouth 2 times daily  180 tablet 3     loratadine (CLARITIN REDITABS) 10 MG dispersible tablet Take 10 mg by mouth as needed        magnesium 250 MG tablet Take 1 tablet by mouth daily       oxyCODONE (ROXICODONE) 5 MG tablet Take 1-2 tablets (5-10 mg) by mouth every 6 hours as needed for moderate to severe pain 20 tablet 0     prochlorperazine  (COMPAZINE) 10 MG tablet Take 1 tablet (10 mg) by mouth every 6 hours as needed (Nausea/Vomiting) 30 tablet 1     albuterol (PROAIR HFA/PROVENTIL HFA/VENTOLIN HFA) 108 (90 Base) MCG/ACT inhaler Inhale 2 puffs into the lungs every 6 hours as needed for shortness of breath / dyspnea or wheezing 1 Inhaler 0     polyethylene glycol (MIRALAX) 17 g packet Take 17 g by mouth daily as needed for constipation 12 packet 0         ALLERGIES:  Allergies   Allergen Reactions     Penicillin V      Fredy-1 [Lidocaine] Unknown         PAST MEDICAL HISTORY:  Past Medical History:   Diagnosis Date     Chest tightness     had suspected allergies     Malignant neoplasm of head of pancreas (H) 6/9/2020     Seasonal allergies      SOB (shortness of breath)          PAST SURGICAL HISTORY:  Past Surgical History:   Procedure Laterality Date     CATARACT IOL, RT/LT  2015     COLONOSCOPY  10/14    no repeat needed due to age     COLONOSCOPY N/A 10/7/2014    Procedure: COLONOSCOPY;  Surgeon: Daryl Hanson MD;  Location:  GI     COLONOSCOPY  04/23/2019    no further screening     ENDOSCOPIC RETROGRADE CHOLANGIOPANCREATOGRAM N/A 4/20/2020    Procedure: ENDOSCOPIC RETROGRADE CHOLANGIOPANCREATOGRAPHY, PLACEMENT OF PANCREATIC STENT, PLACEMENT OF BILIARY STENT;  Surgeon: Yarely Vann MD;  Location:  OR     ESOPHAGOSCOPY, GASTROSCOPY, DUODENOSCOPY (EGD), COMBINED N/A 4/20/2020    Procedure: ESOPHAGOGASTRODUODENOSCOPY, WITH FINE NEEDLE ASPIRATION BIOPSY, WITH ENDOSCOPIC ULTRASOUND GUIDANCE, Endoscopic retrograde cholangiopancreatography;  Surgeon: Yarely Vann MD;  Location:  OR     ESOPHAGOSCOPY, GASTROSCOPY, DUODENOSCOPY (EGD), COMBINED N/A 5/5/2020    Procedure: ESOPHAGOGASTRODUODENOSCOPY, WITH FINE NEEDLE ASPIRATION BIOPSY, WITH ENDOSCOPIC ULTRASOUND GUIDANCE;  Surgeon: Romina Rosario MD;  Location:  GI     IR CHEST PORT PLACEMENT > 5 YRS OF AGE  7/6/2020     IR PORT REMOVAL RIGHT  12/28/2020      LAPAROSCOPIC BIOPSY LIVER N/A 5/22/2020    Procedure: Diagnostic Laparoscopy with intraoperative ultrasound and Liver Biopsy X2;  Surgeon: Duarte Chaves MD;  Location: [facility] OR     WHIPPLE PROCEDURE N/A 5/22/2020    Procedure: Non Pylorus Preserving Whipple procedure;  Surgeon: Duarte Chaves MD;  Location: [facility] OR         SOCIAL HISTORY:  Social History     Socioeconomic History     Marital status:      Spouse name: Not on file     Number of children: Not on file     Years of education: Not on file     Highest education level: Not on file   Occupational History     Employer: RETIRED     Comment: previous taught special ed   Social Needs     Financial resource strain: Not on file     Food insecurity     Worry: Not on file     Inability: Not on file     Transportation needs     Medical: Not on file     Non-medical: Not on file   Tobacco Use     Smoking status: Never Smoker     Smokeless tobacco: Never Used   Substance and Sexual Activity     Alcohol use: Yes     Comment: social      Drug use: No     Sexual activity: Yes   Lifestyle     Physical activity     Days per week: Not on file     Minutes per session: Not on file     Stress: Not on file   Relationships     Social connections     Talks on phone: Not on file     Gets together: Not on file     Attends Restorationism service: Not on file     Active member of club or organization: Not on file     Attends meetings of clubs or organizations: Not on file     Relationship status: Not on file     Intimate partner violence     Fear of current or ex partner: Not on file     Emotionally abused: Not on file     Physically abused: Not on file     Forced sexual activity: Not on file   Other Topics Concern     Parent/sibling w/ CABG, MI or angioplasty before 65F 55M? Not Asked      Service Not Asked     Blood Transfusions Not Asked     Caffeine Concern Yes     Comment: 2 cups     Occupational Exposure Not Asked     Hobby Hazards Not Asked     Sleep Concern Not  Asked     Stress Concern Not Asked     Weight Concern Not Asked     Special Diet No     Back Care Not Asked     Exercise Yes     Comment: walking workout      Bike Helmet Not Asked     Seat Belt Not Asked     Self-Exams Not Asked   Social History Narrative     Not on file         FAMILY HISTORY:  Family History   Problem Relation Age of Onset     Musculoskeletal Disorder Mother         edematous legs     Obesity Mother      Musculoskeletal Disorder Maternal Grandmother         edematous legs; did have amputation     Obesity Maternal Grandmother      Myocardial Infarction Maternal Grandmother          PHYSICAL EXAM:  Phone visit.        LABS:  CBC RESULTS:   Recent Labs   Lab Test 12/28/20  0900   WBC 3.0*   RBC 4.07   HGB 11.9   HCT 37.0   MCV 91   MCH 29.2   MCHC 32.2   RDW 15.1*        Recent Labs   Lab Test 12/28/20  0900 11/30/20  1038    138   POTASSIUM 4.4 4.5   CHLORIDE 106 106   CO2 31 30   ANIONGAP 1* 2*   GLC 85 88   BUN 21 24   CR 0.88 0.92   CAMERON 8.8 8.7     Lab Results   Component Value Date    AST 33 12/28/2020     Lab Results   Component Value Date    ALT 72 12/28/2020     No results found for: BILICONJ   Lab Results   Component Value Date    BILITOTAL 0.6 12/28/2020     Lab Results   Component Value Date    ALBUMIN 3.6 12/28/2020     Lab Results   Component Value Date    PROTTOTAL 6.7 12/28/2020      Lab Results   Component Value Date    ALKPHOS 129 12/28/2020     Component      Latest Ref Rng & Units 4/19/2020 5/5/2020 7/7/2020   Cancer Antigen 19-9      0 - 37 U/mL 109 (H) 93 (H) 7         IMAGING:  CT c/a/p 12/28/20:  1.  Postop changes from Whipple procedure. No evidence of recurrent or  metastatic disease in the chest, abdomen or pelvis. No enlarged lymph  nodes.     2.  Left lower lobe lung nodule is stable. Mucoid impactions and  calcified granulomas as described above. No new lung lesions.     3.  Liver cysts and hemangiomas are stable. No new liver lesions  are  appreciated.     4.  Colonic constipation without obstruction.     5.  Multiple low-attenuation renal lesions, likely cysts. No new renal  lesions are appreciated. No hydronephrosis.      ASSESSMENT/PLAN:  Flora Hogan is a 75 year old female with:    1) Adenocarcinoma of the pancreas head: s/p Whipple procedure on 5/26/20; pathology showed pancreatic ductal adenocarcinoma, well-differentiated (grade 1), tumor size 2.6 x 2.4 x 2.0 cm, negative margins, +LVI, 0 of 4 lymph nodes involved, staged pT2N0 (stage IB).      She completed 6 months of adjuvant gemcitabine in December 2020.    CT c/a/p on 12/28/20 shows no evidence of recurrent or metastatic disease in the chest, abdomen, or pelvis.    Patient will be going to [place] and returning in late March 2021    -scans and tumor markers every 3 months for the first year  -CT c/a/p and labs/CA 19-9 in 3 months  -RTC in 3 months    2) Pulmonary nodule: stable 3 mm left lower lobe pulmonary nodule  -will monitor on future scans    3) Chronic constipation: Patient says that this has been an issue for her for many years.  She controls it with diet, and she does have stool softeners at home as needed.  She asked about pancreatic insufficiency after Whipple, which is possible.  We discussed trying Creon prior to meals, and she agrees.  -Creon prescribed - she has started it and finds it helpful.  She takes it 3 times a day.  She will continue it.    -she will also try fiber    4) Leukpenia: Secondary to chemotherapy.  Mild.  ANC is mildly low.  -monitor    Jeannie Coronado MD  Hematology/Oncology  [facility]    Phone call duration: 11 minutes        Again, thank you for allowing me to participate in the care of your patient.        Sincerely,        Jeannie Coronado MD

## 2020-12-29 NOTE — PROGRESS NOTES
"Flora Hogan is a 75 year old female who is being evaluated via a billable telephone visit.      The patient has been notified of following:     \"This telephone visit will be conducted via a call between you and your physician/provider. We have found that certain health care needs can be provided without the need for a physical exam.  This service lets us provide the care you need with a short phone conversation.  If a prescription is necessary we can send it directly to your pharmacy.  If lab work is needed we can place an order for that and you can then stop by our lab to have the test done at a later time.    Telephone visits are billed at different rates depending on your insurance coverage. During this emergency period, for some insurers they may be billed the same as an in-person visit.  Please reach out to your insurance provider with any questions.    If during the course of the call the physician/provider feels a telephone visit is not appropriate, you will not be charged for this service.\"    Patient has given verbal consent for Telephone visit?  Yes    What phone number would you like to be contacted at? [phone]    How would you like to obtain your AVS? Juan      [facility]    Hematology/Oncology Established Patient Note      Today's Date: 12/29/20    Reason for Follow-up: pancreatic cancer      HISTORY OF PRESENT ILLNESS: Flora Hogan is a 75 year old female, who presents with adenocarcinoma of the pancreas.  She initially presented with jaundice.  She was evaluated for EUS and FNA, which revealed atypical cells, but no definitive evidence of malignancy.  She also underwent biliary stenting for decompression of her biliary tree.  She had a second EUS done, but again revealed atypical cells.  She then saw Dr. Duarte Chaves at [facility].  She had CT scan and MRI.  MRI showed multiple benign lesions in her liver; there as one area that was indeterminate. "  Her CA 19-9 was 93.  On 5/26/20, she underwent Whipple procedure.  Two nodules were visualized on the surface of the liver, which were biopsied and found to be benign on pathology.  There were also hepatic cysts seen.  Pathology showed pancreatic ductal adenocarcinoma, well-differentiated (grade 1), tumor size 2.6 x 2.4 x 2.0 cm, negative margins, +LVI, 0 of 4 lymph nodes involved, staged pT2N0 (stage IB).      Port was placed by IR on 7/6/20.  It was removed on 12/28/20.    She started adjuvant chemotherapy with single-agent gemcitabine on 7/7/20.  She completed 6 cycles on 12/15/20.      INTERIM HISTORY: Flora says that she is feeling well.  She and her  will be going to [place] for the winter in 2 days and staying until late March 2021.         REVIEW OF SYSTEMS:   14 point ROS was reviewed and is negative other than as noted above in HPI.       HOME MEDICATIONS:  Current Outpatient Medications   Medication Sig Dispense Refill     acetaminophen (TYLENOL) 325 MG tablet Take 1-2 tablets (325-650 mg) by mouth every 6 hours as needed for mild pain or fever 50 tablet 0     amylase-lipase-protease (CREON) 35695-57632 units CPEP per EC capsule Take 1 capsule by mouth 3 times daily (with meals) 90 capsule 3     calcium carbonate-vitamin D (CALTRATE 600+D) 600-400 MG-UNIT chewable tablet Take 1 chew tab by mouth 2 times daily  180 tablet 3     loratadine (CLARITIN REDITABS) 10 MG dispersible tablet Take 10 mg by mouth as needed        magnesium 250 MG tablet Take 1 tablet by mouth daily       oxyCODONE (ROXICODONE) 5 MG tablet Take 1-2 tablets (5-10 mg) by mouth every 6 hours as needed for moderate to severe pain 20 tablet 0     prochlorperazine (COMPAZINE) 10 MG tablet Take 1 tablet (10 mg) by mouth every 6 hours as needed (Nausea/Vomiting) 30 tablet 1     albuterol (PROAIR HFA/PROVENTIL HFA/VENTOLIN HFA) 108 (90 Base) MCG/ACT inhaler Inhale 2 puffs into the lungs every 6 hours as needed for shortness of breath /  dyspnea or wheezing 1 Inhaler 0     polyethylene glycol (MIRALAX) 17 g packet Take 17 g by mouth daily as needed for constipation 12 packet 0         ALLERGIES:  Allergies   Allergen Reactions     Penicillin V      Fredy-1 [Lidocaine] Unknown         PAST MEDICAL HISTORY:  Past Medical History:   Diagnosis Date     Chest tightness     had suspected allergies     Malignant neoplasm of head of pancreas (H) 6/9/2020     Seasonal allergies      SOB (shortness of breath)          PAST SURGICAL HISTORY:  Past Surgical History:   Procedure Laterality Date     CATARACT IOL, RT/LT  2015     COLONOSCOPY  10/14    no repeat needed due to age     COLONOSCOPY N/A 10/7/2014    Procedure: COLONOSCOPY;  Surgeon: Daryl Hanson MD;  Location:  GI     COLONOSCOPY  04/23/2019    no further screening     ENDOSCOPIC RETROGRADE CHOLANGIOPANCREATOGRAM N/A 4/20/2020    Procedure: ENDOSCOPIC RETROGRADE CHOLANGIOPANCREATOGRAPHY, PLACEMENT OF PANCREATIC STENT, PLACEMENT OF BILIARY STENT;  Surgeon: Yarely Vann MD;  Location:  OR     ESOPHAGOSCOPY, GASTROSCOPY, DUODENOSCOPY (EGD), COMBINED N/A 4/20/2020    Procedure: ESOPHAGOGASTRODUODENOSCOPY, WITH FINE NEEDLE ASPIRATION BIOPSY, WITH ENDOSCOPIC ULTRASOUND GUIDANCE, Endoscopic retrograde cholangiopancreatography;  Surgeon: Yraely Vann MD;  Location:  OR     ESOPHAGOSCOPY, GASTROSCOPY, DUODENOSCOPY (EGD), COMBINED N/A 5/5/2020    Procedure: ESOPHAGOGASTRODUODENOSCOPY, WITH FINE NEEDLE ASPIRATION BIOPSY, WITH ENDOSCOPIC ULTRASOUND GUIDANCE;  Surgeon: Romina Rosario MD;  Location:  GI     IR CHEST PORT PLACEMENT > 5 YRS OF AGE  7/6/2020     IR PORT REMOVAL RIGHT  12/28/2020     LAPAROSCOPIC BIOPSY LIVER N/A 5/22/2020    Procedure: Diagnostic Laparoscopy with intraoperative ultrasound and Liver Biopsy X2;  Surgeon: Duarte Chaves MD;  Location: [facility] OR     WHIPPLE PROCEDURE N/A 5/22/2020    Procedure: Non Pylorus Preserving Whipple procedure;  Surgeon:  Duarte Chaves MD;  Location:  OR         SOCIAL HISTORY:  Social History     Socioeconomic History     Marital status:      Spouse name: Not on file     Number of children: Not on file     Years of education: Not on file     Highest education level: Not on file   Occupational History     Employer: RETIRED     Comment: previous taught special ed   Social Needs     Financial resource strain: Not on file     Food insecurity     Worry: Not on file     Inability: Not on file     Transportation needs     Medical: Not on file     Non-medical: Not on file   Tobacco Use     Smoking status: Never Smoker     Smokeless tobacco: Never Used   Substance and Sexual Activity     Alcohol use: Yes     Comment: social      Drug use: No     Sexual activity: Yes   Lifestyle     Physical activity     Days per week: Not on file     Minutes per session: Not on file     Stress: Not on file   Relationships     Social connections     Talks on phone: Not on file     Gets together: Not on file     Attends Sikhism service: Not on file     Active member of club or organization: Not on file     Attends meetings of clubs or organizations: Not on file     Relationship status: Not on file     Intimate partner violence     Fear of current or ex partner: Not on file     Emotionally abused: Not on file     Physically abused: Not on file     Forced sexual activity: Not on file   Other Topics Concern     Parent/sibling w/ CABG, MI or angioplasty before 65F 55M? Not Asked      Service Not Asked     Blood Transfusions Not Asked     Caffeine Concern Yes     Comment: 2 cups     Occupational Exposure Not Asked     Hobby Hazards Not Asked     Sleep Concern Not Asked     Stress Concern Not Asked     Weight Concern Not Asked     Special Diet No     Back Care Not Asked     Exercise Yes     Comment: walking workout      Bike Helmet Not Asked     Seat Belt Not Asked     Self-Exams Not Asked   Social History Narrative     Not on file          FAMILY HISTORY:  Family History   Problem Relation Age of Onset     Musculoskeletal Disorder Mother         edematous legs     Obesity Mother      Musculoskeletal Disorder Maternal Grandmother         edematous legs; did have amputation     Obesity Maternal Grandmother      Myocardial Infarction Maternal Grandmother          PHYSICAL EXAM:  Phone visit.        LABS:  CBC RESULTS:   Recent Labs   Lab Test 12/28/20  0900   WBC 3.0*   RBC 4.07   HGB 11.9   HCT 37.0   MCV 91   MCH 29.2   MCHC 32.2   RDW 15.1*        Recent Labs   Lab Test 12/28/20  0900 11/30/20  1038    138   POTASSIUM 4.4 4.5   CHLORIDE 106 106   CO2 31 30   ANIONGAP 1* 2*   GLC 85 88   BUN 21 24   CR 0.88 0.92   CAMERON 8.8 8.7     Lab Results   Component Value Date    AST 33 12/28/2020     Lab Results   Component Value Date    ALT 72 12/28/2020     No results found for: BILICONJ   Lab Results   Component Value Date    BILITOTAL 0.6 12/28/2020     Lab Results   Component Value Date    ALBUMIN 3.6 12/28/2020     Lab Results   Component Value Date    PROTTOTAL 6.7 12/28/2020      Lab Results   Component Value Date    ALKPHOS 129 12/28/2020     Component      Latest Ref Rng & Units 4/19/2020 5/5/2020 7/7/2020   Cancer Antigen 19-9      0 - 37 U/mL 109 (H) 93 (H) 7         IMAGING:  CT c/a/p 12/28/20:  1.  Postop changes from Whipple procedure. No evidence of recurrent or  metastatic disease in the chest, abdomen or pelvis. No enlarged lymph  nodes.     2.  Left lower lobe lung nodule is stable. Mucoid impactions and  calcified granulomas as described above. No new lung lesions.     3.  Liver cysts and hemangiomas are stable. No new liver lesions are  appreciated.     4.  Colonic constipation without obstruction.     5.  Multiple low-attenuation renal lesions, likely cysts. No new renal  lesions are appreciated. No hydronephrosis.      ASSESSMENT/PLAN:  Flora Hogan is a 75 year old female with:    1) Adenocarcinoma of the pancreas  head: s/p Whipple procedure on 5/26/20; pathology showed pancreatic ductal adenocarcinoma, well-differentiated (grade 1), tumor size 2.6 x 2.4 x 2.0 cm, negative margins, +LVI, 0 of 4 lymph nodes involved, staged pT2N0 (stage IB).      She completed 6 months of adjuvant gemcitabine in December 2020.    CT c/a/p on 12/28/20 shows no evidence of recurrent or metastatic disease in the chest, abdomen, or pelvis.    Patient will be going to [place] and returning in late March 2021    -scans and tumor markers every 3 months for the first year  -CT c/a/p and labs/CA 19-9 in 3 months  -RTC in 3 months    2) Pulmonary nodule: stable 3 mm left lower lobe pulmonary nodule  -will monitor on future scans    3) Chronic constipation: Patient says that this has been an issue for her for many years.  She controls it with diet, and she does have stool softeners at home as needed.  She asked about pancreatic insufficiency after Whipple, which is possible.  We discussed trying Creon prior to meals, and she agrees.  -Creon prescribed - she has started it and finds it helpful.  She takes it 3 times a day.  She will continue it.    -she will also try fiber    4) Leukpenia: Secondary to chemotherapy.  Mild.  ANC is mildly low.  -monitor    Jeannie Coronado MD  Hematology/Oncology  [facility]    Phone call duration: 11 minutes

## 2021-01-06 PROBLEM — C25.0 MALIGNANT NEOPLASM OF HEAD OF PANCREAS (H): Status: ACTIVE | Noted: 2020-06-09

## 2021-01-07 NOTE — PROGRESS NOTES
Infusion Nursing Note:  Flora Hogan presents today for Day 1, Cycle 1 Gemzar, premed.    Patient seen by provider today: Yes: Dr. Coronado   present during visit today: Not Applicable.    Note: Provided education on medications being given today; patient verbalized understanding.    Intravenous Access:  Implanted Port-accessed in fast track today.    Treatment Conditions:  Lab Results   Component Value Date    HGB 12.8 07/07/2020     Lab Results   Component Value Date    WBC 4.0 07/07/2020      Lab Results   Component Value Date    ANEU 2.4 07/07/2020     Lab Results   Component Value Date     07/07/2020      Lab Results   Component Value Date     07/07/2020                   Lab Results   Component Value Date    POTASSIUM 4.1 07/07/2020           Lab Results   Component Value Date    MAG 2.3 07/07/2020            Lab Results   Component Value Date    CR 0.76 07/07/2020                   Lab Results   Component Value Date    CAMERON 8.2 07/07/2020                Lab Results   Component Value Date    BILITOTAL 0.7 07/07/2020           Lab Results   Component Value Date    ALBUMIN 3.4 07/07/2020                    Lab Results   Component Value Date    ALT 29 07/07/2020           Lab Results   Component Value Date    AST 20 07/07/2020       Results reviewed, labs MET treatment parameters, ok to proceed with treatment.      Post Infusion Assessment:  Patient tolerated infusion without incident.  Blood return noted pre and post infusion.  Site patent and intact, free from redness, edema or discomfort.  No evidence of extravasations.  Access discontinued per protocol.       Discharge Plan:   Discharge instructions reviewed with: Patient.  Patient and/or family verbalized understanding of discharge instructions and all questions answered.  Copy of AVS reviewed with patient and/or family.  Patient will return 7/14/20 for next appointment.  Patient discharged in stable condition accompanied by:  self.  Departure Mode: Ambulatory.    Hannah Chavez RN                         Airway patent. Mouth with normal mucosa.

## 2021-01-19 ENCOUNTER — PATIENT OUTREACH (OUTPATIENT)
Dept: ONCOLOGY | Facility: CLINIC | Age: 76
End: 2021-01-19

## 2021-01-19 NOTE — PROGRESS NOTES
Jeannie Coronado MD  You;  Cancer Clinic Rn Pool 1 minute ago (9:47 AM)     She should see a physician in [place], whether it is oncology or PCP.  She should be evaluated, and I cannot order imaging in [place].    Message text      Writer called and spoke with Flora.Writer encouraged her to get established with a provider in [place] so she has a local provider to see.  Flora verbalized understanding and is in agreement with the plan.     Writer explained she can give the new provider our office phone number so they can request any records they may need.     Leyla Layton, RN, BSN, SHARMIN  RN Care Coordinator  [facility]  [phone]

## 2021-01-19 NOTE — PROGRESS NOTES
S-(situation): Flora called in to clinic reporting she is having ongoing constipation and feels distended and anal leakage.     B-(background): Flora sees Dr. Coronado for Pancreatic Cancer and is currently in [place] for 3 months.    A-(assessment): Flora reports she has been having intermittent constipation where she goes 4-5 days between bowel movements. She does have daily small bits of stool but doesn't have a large BM regularly. Her last BM was Sunday, Jan 17th. She reports when she walks she also has anal leakage so she has to wear a pad. She also has hemorrhoids from the constipation and a feeling of fullness in the abdomen.    -denies any skin irritation due to this anal leakage    Flora reports her abdomen feels firm and distended. She can palpate a ropey area on the left side of her abdomen and she is wondering if there is an obstruction.     Flora reports she has been taking Miralax BID and Senna BID. She is wondering if imaging needs to be done to see if there is something blocking the stool or if there are other recommendations.    Flora does not have an oncologist down in [place]. She reports she has seen two general practitioners in [place] in the past but was not happy with the care she received there.       R-(recommendations): Writer will update Dr. Coronado and call Flora back.     Leyla Layton, RN, BSN, SHARMIN  RN Care Coordinator  [facility]  [phone]

## 2021-03-18 ENCOUNTER — IMMUNIZATION (OUTPATIENT)
Dept: NURSING | Facility: CLINIC | Age: 76
End: 2021-03-18
Payer: COMMERCIAL

## 2021-03-18 PROCEDURE — 91301 PR COVID VAC MODERNA 100 MCG/0.5 ML IM: CPT

## 2021-03-18 PROCEDURE — 0011A PR COVID VAC MODERNA 100 MCG/0.5 ML IM: CPT

## 2021-03-22 ENCOUNTER — HOSPITAL ENCOUNTER (OUTPATIENT)
Facility: CLINIC | Age: 76
Setting detail: SPECIMEN
Discharge: HOME | End: 2021-03-22
Attending: INTERNAL MEDICINE | Admitting: INTERNAL MEDICINE
Payer: COMMERCIAL

## 2021-03-22 ENCOUNTER — HOSPITAL ENCOUNTER (OUTPATIENT)
Dept: CT IMAGING | Facility: CLINIC | Age: 76
Discharge: HOME | End: 2021-03-22
Attending: INTERNAL MEDICINE | Admitting: INTERNAL MEDICINE
Payer: COMMERCIAL

## 2021-03-22 ENCOUNTER — INFUSION THERAPY VISIT (OUTPATIENT)
Dept: INFUSION THERAPY | Facility: CLINIC | Age: 76
End: 2021-03-22
Attending: INTERNAL MEDICINE
Payer: COMMERCIAL

## 2021-03-22 DIAGNOSIS — C25.0 MALIGNANT NEOPLASM OF HEAD OF PANCREAS (H): ICD-10-CM

## 2021-03-22 LAB
ALBUMIN SERPL-MCNC: 3.6 G/DL (ref 3.4–5)
ALP SERPL-CCNC: 120 U/L (ref 40–150)
ALT SERPL W P-5'-P-CCNC: 42 U/L (ref 0–50)
ANION GAP SERPL CALCULATED.3IONS-SCNC: 7 MMOL/L (ref 3–14)
AST SERPL W P-5'-P-CCNC: 35 U/L (ref 0–45)
BASOPHILS # BLD AUTO: 0 10E9/L (ref 0–0.2)
BASOPHILS NFR BLD AUTO: 1.5 %
BILIRUB SERPL-MCNC: 0.5 MG/DL (ref 0.2–1.3)
BUN SERPL-MCNC: 19 MG/DL (ref 7–30)
CALCIUM SERPL-MCNC: 9 MG/DL (ref 8.5–10.1)
CHLORIDE SERPL-SCNC: 105 MMOL/L (ref 94–109)
CO2 SERPL-SCNC: 28 MMOL/L (ref 20–32)
CREAT SERPL-MCNC: 0.91 MG/DL (ref 0.52–1.04)
DIFFERENTIAL METHOD BLD: ABNORMAL
EOSINOPHIL # BLD AUTO: 0 10E9/L (ref 0–0.7)
EOSINOPHIL NFR BLD AUTO: 1.5 %
ERYTHROCYTE [DISTWIDTH] IN BLOOD BY AUTOMATED COUNT: 13.9 % (ref 10–15)
GFR SERPL CREATININE-BSD FRML MDRD: 61 ML/MIN/{1.73_M2}
GLUCOSE SERPL-MCNC: 58 MG/DL (ref 70–99)
HCT VFR BLD AUTO: 43.3 % (ref 35–47)
HGB BLD-MCNC: 13.7 G/DL (ref 11.7–15.7)
IMM GRANULOCYTES # BLD: 0 10E9/L (ref 0–0.4)
IMM GRANULOCYTES NFR BLD: 0 %
LYMPHOCYTES # BLD AUTO: 0.9 10E9/L (ref 0.8–5.3)
LYMPHOCYTES NFR BLD AUTO: 33.7 %
MCH RBC QN AUTO: 28.4 PG (ref 26.5–33)
MCHC RBC AUTO-ENTMCNC: 31.6 G/DL (ref 31.5–36.5)
MCV RBC AUTO: 90 FL (ref 78–100)
MONOCYTES # BLD AUTO: 0.3 10E9/L (ref 0–1.3)
MONOCYTES NFR BLD AUTO: 12.3 %
NEUTROPHILS # BLD AUTO: 1.3 10E9/L (ref 1.6–8.3)
NEUTROPHILS NFR BLD AUTO: 51 %
NRBC # BLD AUTO: 0 10*3/UL
NRBC BLD AUTO-RTO: 0 /100
PLATELET # BLD AUTO: 185 10E9/L (ref 150–450)
POTASSIUM SERPL-SCNC: 4.4 MMOL/L (ref 3.4–5.3)
PROT SERPL-MCNC: 7.2 G/DL (ref 6.8–8.8)
RBC # BLD AUTO: 4.83 10E12/L (ref 3.8–5.2)
SODIUM SERPL-SCNC: 140 MMOL/L (ref 133–144)
WBC # BLD AUTO: 2.6 10E9/L (ref 4–11)

## 2021-03-22 PROCEDURE — 85025 COMPLETE CBC W/AUTO DIFF WBC: CPT | Performed by: INTERNAL MEDICINE

## 2021-03-22 PROCEDURE — 250N000009 HC RX 250: Performed by: INTERNAL MEDICINE

## 2021-03-22 PROCEDURE — 86301 IMMUNOASSAY TUMOR CA 19-9: CPT | Performed by: INTERNAL MEDICINE

## 2021-03-22 PROCEDURE — 36415 COLL VENOUS BLD VENIPUNCTURE: CPT

## 2021-03-22 PROCEDURE — 80053 COMPREHEN METABOLIC PANEL: CPT | Performed by: INTERNAL MEDICINE

## 2021-03-22 PROCEDURE — 250N000011 HC RX IP 250 OP 636: Performed by: INTERNAL MEDICINE

## 2021-03-22 PROCEDURE — 71260 CT THORAX DX C+: CPT

## 2021-03-22 RX ORDER — IOPAMIDOL 755 MG/ML
500 INJECTION, SOLUTION INTRAVASCULAR ONCE
Status: COMPLETED | OUTPATIENT
Start: 2021-03-22 | End: 2021-03-22

## 2021-03-22 RX ADMIN — SODIUM CHLORIDE 54 ML: 9 INJECTION, SOLUTION INTRAVENOUS at 10:31

## 2021-03-22 RX ADMIN — IOPAMIDOL 57 ML: 755 INJECTION, SOLUTION INTRAVENOUS at 10:31

## 2021-03-22 NOTE — PROGRESS NOTES
Infusion Nursing Note:  Flora Hogan presents today for piv labs.     present during visit today: Not Applicable.    Note: Going to CT after labs.    Intravenous Access:  20 gu. PIV inserted R AC    Treatment Conditions:  Labs drawn    Post Lab Assessment:  Patient tolerated infusion   Patient tolerated injection   Patient tolerated blood collection   Blood return noted pre and post infusion.  Site patent and intact, free from redness, edema or discomfort.  No evidence of extravasations.  Access remains for CT use today    Discharge Plan:   Patient and/or family verbalized understanding of  instructions and all questions answered.  Patient  to lobby in stable condition accompanied by: self.  Patient to see provider today: No  Departure Mode: Ambulatory.  Deb Farmer, RN, RN

## 2021-03-23 LAB — CANCER AG19-9 SERPL-ACNC: 9 U/ML (ref 0–37)

## 2021-03-25 ENCOUNTER — VIRTUAL VISIT (OUTPATIENT)
Dept: ONCOLOGY | Facility: CLINIC | Age: 76
End: 2021-03-25
Attending: INTERNAL MEDICINE
Payer: COMMERCIAL

## 2021-03-25 DIAGNOSIS — R19.7 DIARRHEA, UNSPECIFIED TYPE: Primary | ICD-10-CM

## 2021-03-25 DIAGNOSIS — C25.0 MALIGNANT NEOPLASM OF HEAD OF PANCREAS (H): ICD-10-CM

## 2021-03-25 PROCEDURE — 99214 OFFICE O/P EST MOD 30 MIN: CPT | Mod: 95 | Performed by: INTERNAL MEDICINE

## 2021-03-25 NOTE — LETTER
3/25/2021         RE: Flora Hogan  [address] [address]  [facility] [address]        Dear Colleague,    Thank you for referring your patient, Flora Hogan, to the [facility]. Please see a copy of my visit note below.    Flora is a 75 year old who is being evaluated via a billable video visit.      How would you like to obtain your AVS? MyChart  If the video visit is dropped, the invitation should be resent by: Text to cell phone: [phone]   Will anyone else be joining your video visit? No     - C/o involuntary bowel movements. Not complete BM. Discuss diet. Wondering if Cholestramine would work?  -  C/o insomnia. Occurs a few timers a week. Uses melatonin occasionally. Trouble staying asleep and then getting back to sleep.     Tessie Shelton, [facility]      Patient requested to change to phone visit, as she could not do video at this time.        [facility]    Hematology/Oncology Established Patient Note      Today's Date: 3/25/21    Reason for Follow-up: pancreatic cancer      HISTORY OF PRESENT ILLNESS: Flora Hogan is a 75 year old female, who presents with adenocarcinoma of the pancreas.  She initially presented with jaundice.  She was evaluated for EUS and FNA, which revealed atypical cells, but no definitive evidence of malignancy.  She also underwent biliary stenting for decompression of her biliary tree.  She had a second EUS done, but again revealed atypical cells.  She then saw Dr. Duarte Chaves at [facility].  She had CT scan and MRI.  MRI showed multiple benign lesions in her liver; there as one area that was indeterminate.  Her CA 19-9 was 93.  On 5/26/20, she underwent Whipple procedure.  Two nodules were visualized on the surface of the liver, which were biopsied and found to be benign on pathology.  There were also hepatic cysts seen.  Pathology showed pancreatic ductal adenocarcinoma, well-differentiated (grade 1),  tumor size 2.6 x 2.4 x 2.0 cm, negative margins, +LVI, 0 of 4 lymph nodes involved, staged pT2N0 (stage IB).      Port was placed by IR on 7/6/20.  It was removed on 12/28/20.    She started adjuvant chemotherapy with single-agent gemcitabine on 7/7/20.  She completed 6 cycles on 12/15/20.      INTERIM HISTORY: Flora says that she is doing well in generally.   She and her  have returned from [place].  She continues to have problems with her bowel movements and it is getting frustrating and causing her some depression.  She is also having trouble sleeping.            REVIEW OF SYSTEMS:   14 point ROS was reviewed and is negative other than as noted above in HPI.       HOME MEDICATIONS:  Current Outpatient Medications   Medication Sig Dispense Refill     acetaminophen (TYLENOL) 325 MG tablet Take 1-2 tablets (325-650 mg) by mouth every 6 hours as needed for mild pain or fever 50 tablet 0     albuterol (PROAIR HFA/PROVENTIL HFA/VENTOLIN HFA) 108 (90 Base) MCG/ACT inhaler Inhale 2 puffs into the lungs every 6 hours as needed for shortness of breath / dyspnea or wheezing 1 Inhaler 0     amylase-lipase-protease (CREON) 24731-97846 units CPEP per EC capsule Take 1 capsule by mouth 3 times daily (with meals) 90 capsule 3     calcium carbonate-vitamin D (CALTRATE 600+D) 600-400 MG-UNIT chewable tablet Take 1 chew tab by mouth 2 times daily  180 tablet 3     loratadine (CLARITIN REDITABS) 10 MG dispersible tablet Take 10 mg by mouth as needed        magnesium 250 MG tablet Take 1 tablet by mouth daily       oxyCODONE (ROXICODONE) 5 MG tablet Take 1-2 tablets (5-10 mg) by mouth every 6 hours as needed for moderate to severe pain 20 tablet 0     prochlorperazine (COMPAZINE) 10 MG tablet Take 1 tablet (10 mg) by mouth every 6 hours as needed (Nausea/Vomiting) 30 tablet 1         ALLERGIES:  Allergies   Allergen Reactions     Penicillin V      Fredy-1 [Lidocaine] Unknown         PAST MEDICAL HISTORY:  Past Medical History:    Diagnosis Date     Chest tightness     had suspected allergies     Malignant neoplasm of head of pancreas (H) 6/9/2020     Seasonal allergies      SOB (shortness of breath)          PAST SURGICAL HISTORY:  Past Surgical History:   Procedure Laterality Date     CATARACT IOL, RT/LT  2015     COLONOSCOPY  10/14    no repeat needed due to age     COLONOSCOPY N/A 10/7/2014    Procedure: COLONOSCOPY;  Surgeon: Daryl Hanson MD;  Location:  GI     COLONOSCOPY  04/23/2019    no further screening     ENDOSCOPIC RETROGRADE CHOLANGIOPANCREATOGRAM N/A 4/20/2020    Procedure: ENDOSCOPIC RETROGRADE CHOLANGIOPANCREATOGRAPHY, PLACEMENT OF PANCREATIC STENT, PLACEMENT OF BILIARY STENT;  Surgeon: Yarely Vann MD;  Location:  OR     ESOPHAGOSCOPY, GASTROSCOPY, DUODENOSCOPY (EGD), COMBINED N/A 4/20/2020    Procedure: ESOPHAGOGASTRODUODENOSCOPY, WITH FINE NEEDLE ASPIRATION BIOPSY, WITH ENDOSCOPIC ULTRASOUND GUIDANCE, Endoscopic retrograde cholangiopancreatography;  Surgeon: Yarely Vann MD;  Location:  OR     ESOPHAGOSCOPY, GASTROSCOPY, DUODENOSCOPY (EGD), COMBINED N/A 5/5/2020    Procedure: ESOPHAGOGASTRODUODENOSCOPY, WITH FINE NEEDLE ASPIRATION BIOPSY, WITH ENDOSCOPIC ULTRASOUND GUIDANCE;  Surgeon: Romina Rosario MD;  Location:  GI     IR CHEST PORT PLACEMENT > 5 YRS OF AGE  7/6/2020     IR PORT REMOVAL RIGHT  12/28/2020     LAPAROSCOPIC BIOPSY LIVER N/A 5/22/2020    Procedure: Diagnostic Laparoscopy with intraoperative ultrasound and Liver Biopsy X2;  Surgeon: Duarte Chaves MD;  Location: [facility] OR     WHIPPLE PROCEDURE N/A 5/22/2020    Procedure: Non Pylorus Preserving Whipple procedure;  Surgeon: Duarte Chaves MD;  Location: [facility] OR         SOCIAL HISTORY:  Social History     Socioeconomic History     Marital status:      Spouse name: Not on file     Number of children: Not on file     Years of education: Not on file     Highest education level: Not on file   Occupational History      Employer: RETIRED     Comment: previous taught special ed   Social Needs     Financial resource strain: Not on file     Food insecurity     Worry: Not on file     Inability: Not on file     Transportation needs     Medical: Not on file     Non-medical: Not on file   Tobacco Use     Smoking status: Never Smoker     Smokeless tobacco: Never Used   Substance and Sexual Activity     Alcohol use: Yes     Comment: social      Drug use: No     Sexual activity: Yes   Lifestyle     Physical activity     Days per week: Not on file     Minutes per session: Not on file     Stress: Not on file   Relationships     Social connections     Talks on phone: Not on file     Gets together: Not on file     Attends [employer] service: Not on file     Active member of club or organization: Not on file     Attends meetings of clubs or organizations: Not on file     Relationship status: Not on file     Intimate partner violence     Fear of current or ex partner: Not on file     Emotionally abused: Not on file     Physically abused: Not on file     Forced sexual activity: Not on file   Other Topics Concern     Parent/sibling w/ CABG, MI or angioplasty before 65F 55M? Not Asked      Service Not Asked     Blood Transfusions Not Asked     Caffeine Concern Yes     Comment: 2 cups     Occupational Exposure Not Asked     Hobby Hazards Not Asked     Sleep Concern Not Asked     Stress Concern Not Asked     Weight Concern Not Asked     Special Diet No     Back Care Not Asked     Exercise Yes     Comment: walking workout      Bike Helmet Not Asked     Seat Belt Not Asked     Self-Exams Not Asked   Social History Narrative     Not on file         FAMILY HISTORY:  Family History   Problem Relation Age of Onset     Musculoskeletal Disorder Mother         edematous legs     Obesity Mother      Musculoskeletal Disorder Maternal Grandmother         edematous legs; did have amputation     Obesity Maternal Grandmother      Myocardial Infarction  Maternal Grandmother          PHYSICAL EXAM:  Phone visit.        LABS:  CBC RESULTS:   Recent Labs   Lab Test 03/22/21  0945   WBC 2.6*   RBC 4.83   HGB 13.7   HCT 43.3   MCV 90   MCH 28.4   MCHC 31.6   RDW 13.9        Recent Labs   Lab Test 03/22/21  0945 12/28/20  0900    138   POTASSIUM 4.4 4.4   CHLORIDE 105 106   CO2 28 31   ANIONGAP 7 1*   GLC 58* 85   BUN 19 21   CR 0.91 0.88   CAMERON 9.0 8.8     Lab Results   Component Value Date    AST 35 03/22/2021     Lab Results   Component Value Date    ALT 42 03/22/2021     No results found for: BILICONJ   Lab Results   Component Value Date    BILITOTAL 0.5 03/22/2021     Lab Results   Component Value Date    ALBUMIN 3.6 03/22/2021     Lab Results   Component Value Date    PROTTOTAL 7.2 03/22/2021      Lab Results   Component Value Date    ALKPHOS 120 03/22/2021     Component      Latest Ref Rng & Units 4/19/2020 5/5/2020 7/7/2020 12/28/2020   Cancer Antigen 19-9      0 - 37 U/mL 109 (H) 93 (H) 7 6     Component      Latest Ref Rng & Units 3/22/2021   Cancer Antigen 19-9      0 - 37 U/mL 9           IMAGING:  CT c/a/p 3/22/21:  IMPRESSION: In this patient with history of pancreatic adenocarcinoma  status post Whipple's procedure, there is;  1. No convincing evidence of new metastatic disease in the chest,  abdomen or pelvis.  2. Few mildly prominent/enlarged mesenteric lymph nodes including 1.9  x 1.0 cm left mesenteric lymph node, not significantly changed as  compared to 6/30/2020 exam, although appears slightly more prominent  as compared to 4/19/2020 exam, indeterminate, recommend attention on  follow-up.  3. 0.7 cm hypoattenuating cystic focus in the pancreatic body/tail,  not significantly changed as compared to 4/19/2020 exam, is too small  to characterize, could represent side branch intraductal papillary  mucinous neoplasm versus other pancreatic cystic lesions. Recommend  continued attention on follow-up.      ASSESSMENT/PLAN:  Flora Hogan is  a 75 year old female with:    1) Adenocarcinoma of the pancreas head: s/p Whipple procedure on 5/26/20; pathology showed pancreatic ductal adenocarcinoma, well-differentiated (grade 1), tumor size 2.6 x 2.4 x 2.0 cm, negative margins, +LVI, 0 of 4 lymph nodes involved, staged pT2N0 (stage IB).    She completed 6 months of adjuvant gemcitabine in December 2020.    CT c/a/p on 3/22/21 showed no evidence of metastatic disease.  There are a few mildly prominent lymph nodes that will be followed.      -scans and tumor markers every 3 months for the first year  -CT c/a/p and labs/CA 19-9 in 3 months  -RTC in 3 months    2) Pulmonary nodule: stable  -will monitor on future scans    3) Chronic constipation/diarrhea: She notes bowel incontinence but sensation of incomplete bowel emptying.  She has had this for many years and has seen [facility] in the past.    -Creon prescribed - she has started it and finds it helpful.  She takes it 3 times a day.  She will continue it.   Prescription renewed today   -she will also try fiber  -follow-up with [facility] again - she requests to see Dr. Yarely Vann    4) Leukpenia: Secondary to chemotherapy.  Mild.  ANC is mildly low.  -monitor    5) Insomnia: She occasionally takes melatonin.    6) Coping: Patient notes some feelings of depression, a lot of it is due to her bowel incontinence issues.  She was looking for a support group, but the group at the [facility] has been stopped due to COVID.  She is open to having our  reach out to her to discuss various resources.  -will ask Caryn to call patient      Jeannie Coronado MD  Hematology/Oncology  [facility]    Phone call duration: 24 minutes    Total time spent on day of visit, including review of tests, obtaining/reviewing separately obtained history, ordering medications/tests/procedures, communicating with PCP/consultants, and documenting in electronic medical record: 40 minutes        Again, thank you  for allowing me to participate in the care of your patient.        Sincerely,        Jeannie Coronado MD

## 2021-03-25 NOTE — LETTER
3/25/2021         RE: Flora Hogan  [address]  [place] [address]        Dear Colleague,    Thank you for referring your patient, Flora Hogan, to the [facility]. Please see a copy of my visit note below.    Flora is a 75 year old who is being evaluated via a billable video visit.      How would you like to obtain your AVS? MyChart  If the video visit is dropped, the invitation should be resent by: Text to cell phone: [phone]   Will anyone else be joining your video visit? No     - C/o involuntary bowel movements. Not complete BM. Discuss diet. Wondering if Cholestramine would work?  -  C/o insomnia. Occurs a few timers a week. Uses melatonin occasionally. Trouble staying asleep and then getting back to sleep.     Tessie Shelton, [facility]      Patient requested to change to phone visit, as she could not do video at this time.        [facility]    Hematology/Oncology Established Patient Note      Today's Date: 3/25/21    Reason for Follow-up: pancreatic cancer      HISTORY OF PRESENT ILLNESS: Flora Hogan is a 75 year old female, who presents with adenocarcinoma of the pancreas.  She initially presented with jaundice.  She was evaluated for EUS and FNA, which revealed atypical cells, but no definitive evidence of malignancy.  She also underwent biliary stenting for decompression of her biliary tree.  She had a second EUS done, but again revealed atypical cells.  She then saw Dr. Duarte Chaves at [facility].  She had CT scan and MRI.  MRI showed multiple benign lesions in her liver; there as one area that was indeterminate.  Her CA 19-9 was 93.  On 5/26/20, she underwent Whipple procedure.  Two nodules were visualized on the surface of the liver, which were biopsied and found to be benign on pathology.  There were also hepatic cysts seen.  Pathology showed pancreatic ductal adenocarcinoma, well-differentiated (grade 1),  tumor size 2.6 x 2.4 x 2.0 cm, negative margins, +LVI, 0 of 4 lymph nodes involved, staged pT2N0 (stage IB).      Port was placed by IR on 7/6/20.  It was removed on 12/28/20.    She started adjuvant chemotherapy with single-agent gemcitabine on 7/7/20.  She completed 6 cycles on 12/15/20.      INTERIM HISTORY: Flora says that she is doing well in generally.   She and her  have returned from [place].  She continues to have problems with her bowel movements and it is getting frustrating and causing her some depression.  She is also having trouble sleeping.            REVIEW OF SYSTEMS:   14 point ROS was reviewed and is negative other than as noted above in HPI.       HOME MEDICATIONS:  Current Outpatient Medications   Medication Sig Dispense Refill     acetaminophen (TYLENOL) 325 MG tablet Take 1-2 tablets (325-650 mg) by mouth every 6 hours as needed for mild pain or fever 50 tablet 0     albuterol (PROAIR HFA/PROVENTIL HFA/VENTOLIN HFA) 108 (90 Base) MCG/ACT inhaler Inhale 2 puffs into the lungs every 6 hours as needed for shortness of breath / dyspnea or wheezing 1 Inhaler 0     amylase-lipase-protease (CREON) 29837-89749 units CPEP per EC capsule Take 1 capsule by mouth 3 times daily (with meals) 90 capsule 3     calcium carbonate-vitamin D (CALTRATE 600+D) 600-400 MG-UNIT chewable tablet Take 1 chew tab by mouth 2 times daily  180 tablet 3     loratadine (CLARITIN REDITABS) 10 MG dispersible tablet Take 10 mg by mouth as needed        magnesium 250 MG tablet Take 1 tablet by mouth daily       oxyCODONE (ROXICODONE) 5 MG tablet Take 1-2 tablets (5-10 mg) by mouth every 6 hours as needed for moderate to severe pain 20 tablet 0     prochlorperazine (COMPAZINE) 10 MG tablet Take 1 tablet (10 mg) by mouth every 6 hours as needed (Nausea/Vomiting) 30 tablet 1         ALLERGIES:  Allergies   Allergen Reactions     Penicillin V      Fredy-1 [Lidocaine] Unknown         PAST MEDICAL HISTORY:  Past Medical History:    Diagnosis Date     Chest tightness     had suspected allergies     Malignant neoplasm of head of pancreas (H) 6/9/2020     Seasonal allergies      SOB (shortness of breath)          PAST SURGICAL HISTORY:  Past Surgical History:   Procedure Laterality Date     CATARACT IOL, RT/LT  2015     COLONOSCOPY  10/14    no repeat needed due to age     COLONOSCOPY N/A 10/7/2014    Procedure: COLONOSCOPY;  Surgeon: Daryl Hanson MD;  Location:  GI     COLONOSCOPY  04/23/2019    no further screening     ENDOSCOPIC RETROGRADE CHOLANGIOPANCREATOGRAM N/A 4/20/2020    Procedure: ENDOSCOPIC RETROGRADE CHOLANGIOPANCREATOGRAPHY, PLACEMENT OF PANCREATIC STENT, PLACEMENT OF BILIARY STENT;  Surgeon: Yarely Vann MD;  Location:  OR     ESOPHAGOSCOPY, GASTROSCOPY, DUODENOSCOPY (EGD), COMBINED N/A 4/20/2020    Procedure: ESOPHAGOGASTRODUODENOSCOPY, WITH FINE NEEDLE ASPIRATION BIOPSY, WITH ENDOSCOPIC ULTRASOUND GUIDANCE, Endoscopic retrograde cholangiopancreatography;  Surgeon: Yarely Vann MD;  Location:  OR     ESOPHAGOSCOPY, GASTROSCOPY, DUODENOSCOPY (EGD), COMBINED N/A 5/5/2020    Procedure: ESOPHAGOGASTRODUODENOSCOPY, WITH FINE NEEDLE ASPIRATION BIOPSY, WITH ENDOSCOPIC ULTRASOUND GUIDANCE;  Surgeon: Romina Rosario MD;  Location:  GI     IR CHEST PORT PLACEMENT > 5 YRS OF AGE  7/6/2020     IR PORT REMOVAL RIGHT  12/28/2020     LAPAROSCOPIC BIOPSY LIVER N/A 5/22/2020    Procedure: Diagnostic Laparoscopy with intraoperative ultrasound and Liver Biopsy X2;  Surgeon: Duarte Chaves MD;  Location: [facility] OR     WHIPPLE PROCEDURE N/A 5/22/2020    Procedure: Non Pylorus Preserving Whipple procedure;  Surgeon: Duarte Chaves MD;  Location: [facility] OR         SOCIAL HISTORY:  Social History     Socioeconomic History     Marital status:      Spouse name: Not on file     Number of children: Not on file     Years of education: Not on file     Highest education level: Not on file   Occupational History      Employer: RETIRED     Comment: previous taught special ed   Social Needs     Financial resource strain: Not on file     Food insecurity     Worry: Not on file     Inability: Not on file     Transportation needs     Medical: Not on file     Non-medical: Not on file   Tobacco Use     Smoking status: Never Smoker     Smokeless tobacco: Never Used   Substance and Sexual Activity     Alcohol use: Yes     Comment: social      Drug use: No     Sexual activity: Yes   Lifestyle     Physical activity     Days per week: Not on file     Minutes per session: Not on file     Stress: Not on file   Relationships     Social connections     Talks on phone: Not on file     Gets together: Not on file     Attends [employer] service: Not on file     Active member of club or organization: Not on file     Attends meetings of clubs or organizations: Not on file     Relationship status: Not on file     Intimate partner violence     Fear of current or ex partner: Not on file     Emotionally abused: Not on file     Physically abused: Not on file     Forced sexual activity: Not on file   Other Topics Concern     Parent/sibling w/ CABG, MI or angioplasty before 65F 55M? Not Asked      Service Not Asked     Blood Transfusions Not Asked     Caffeine Concern Yes     Comment: 2 cups     Occupational Exposure Not Asked     Hobby Hazards Not Asked     Sleep Concern Not Asked     Stress Concern Not Asked     Weight Concern Not Asked     Special Diet No     Back Care Not Asked     Exercise Yes     Comment: walking workout      Bike Helmet Not Asked     Seat Belt Not Asked     Self-Exams Not Asked   Social History Narrative     Not on file         FAMILY HISTORY:  Family History   Problem Relation Age of Onset     Musculoskeletal Disorder Mother         edematous legs     Obesity Mother      Musculoskeletal Disorder Maternal Grandmother         edematous legs; did have amputation     Obesity Maternal Grandmother      Myocardial Infarction  Maternal Grandmother          PHYSICAL EXAM:  Phone visit.        LABS:  CBC RESULTS:   Recent Labs   Lab Test 03/22/21  0945   WBC 2.6*   RBC 4.83   HGB 13.7   HCT 43.3   MCV 90   MCH 28.4   MCHC 31.6   RDW 13.9        Recent Labs   Lab Test 03/22/21  0945 12/28/20  0900    138   POTASSIUM 4.4 4.4   CHLORIDE 105 106   CO2 28 31   ANIONGAP 7 1*   GLC 58* 85   BUN 19 21   CR 0.91 0.88   CAMERON 9.0 8.8     Lab Results   Component Value Date    AST 35 03/22/2021     Lab Results   Component Value Date    ALT 42 03/22/2021     No results found for: BILICONJ   Lab Results   Component Value Date    BILITOTAL 0.5 03/22/2021     Lab Results   Component Value Date    ALBUMIN 3.6 03/22/2021     Lab Results   Component Value Date    PROTTOTAL 7.2 03/22/2021      Lab Results   Component Value Date    ALKPHOS 120 03/22/2021     Component      Latest Ref Rng & Units 4/19/2020 5/5/2020 7/7/2020 12/28/2020   Cancer Antigen 19-9      0 - 37 U/mL 109 (H) 93 (H) 7 6     Component      Latest Ref Rng & Units 3/22/2021   Cancer Antigen 19-9      0 - 37 U/mL 9           IMAGING:  CT c/a/p 3/22/21:  IMPRESSION: In this patient with history of pancreatic adenocarcinoma  status post Whipple's procedure, there is;  1. No convincing evidence of new metastatic disease in the chest,  abdomen or pelvis.  2. Few mildly prominent/enlarged mesenteric lymph nodes including 1.9  x 1.0 cm left mesenteric lymph node, not significantly changed as  compared to 6/30/2020 exam, although appears slightly more prominent  as compared to 4/19/2020 exam, indeterminate, recommend attention on  follow-up.  3. 0.7 cm hypoattenuating cystic focus in the pancreatic body/tail,  not significantly changed as compared to 4/19/2020 exam, is too small  to characterize, could represent side branch intraductal papillary  mucinous neoplasm versus other pancreatic cystic lesions. Recommend  continued attention on follow-up.      ASSESSMENT/PLAN:  Flora Hogan is  a 75 year old female with:    1) Adenocarcinoma of the pancreas head: s/p Whipple procedure on 5/26/20; pathology showed pancreatic ductal adenocarcinoma, well-differentiated (grade 1), tumor size 2.6 x 2.4 x 2.0 cm, negative margins, +LVI, 0 of 4 lymph nodes involved, staged pT2N0 (stage IB).    She completed 6 months of adjuvant gemcitabine in December 2020.    CT c/a/p on 3/22/21 showed no evidence of metastatic disease.  There are a few mildly prominent lymph nodes that will be followed.      -scans and tumor markers every 3 months for the first year  -CT c/a/p and labs/CA 19-9 in 3 months  -RTC in 3 months    2) Pulmonary nodule: stable  -will monitor on future scans    3) Chronic constipation/diarrhea: She notes bowel incontinence but sensation of incomplete bowel emptying.  She has had this for many years and has seen [facility] in the past.    -Creon prescribed - she has started it and finds it helpful.  She takes it 3 times a day.  She will continue it.   Prescription renewed today   -she will also try fiber  -follow-up with [facility] again - she requests to see Dr. Yarely Vann    4) Leukpenia: Secondary to chemotherapy.  Mild.  ANC is mildly low.  -monitor    5) Insomnia: She occasionally takes melatonin.    6) Coping: Patient notes some feelings of depression, a lot of it is due to her bowel incontinence issues.  She was looking for a support group, but the group at the [facility] has been stopped due to COVID.  She is open to having our  reach out to her to discuss various resources.  -will ask Caryn to call patient      Jeannie Coronado MD  Hematology/Oncology  [facility]    Phone call duration: 24 minutes    Total time spent on day of visit, including review of tests, obtaining/reviewing separately obtained history, ordering medications/tests/procedures, communicating with PCP/consultants, and documenting in electronic medical record: 40 minutes        Again, thank you  for allowing me to participate in the care of your patient.        Sincerely,        Jeannie Coronado MD

## 2021-03-25 NOTE — PATIENT INSTRUCTIONS
Faxed referral to [facility] [phone] - 3/25/21 2:43 pm AA they will reach out to patient to schedule   3 mo mohan/ marlon is scheduled and labs prior

## 2021-03-25 NOTE — PROGRESS NOTES
Flora is a 75 year old who is being evaluated via a billable video visit.      How would you like to obtain your AVS? MyChart  If the video visit is dropped, the invitation should be resent by: Text to cell phone: [phone]   Will anyone else be joining your video visit? No     - C/o involuntary bowel movements. Not complete BM. Discuss diet. Wondering if Cholestramine would work?  -  C/o insomnia. Occurs a few timers a week. Uses melatonin occasionally. Trouble staying asleep and then getting back to sleep.     Tessie Shelton, [facility]      Patient requested to change to phone visit, as she could not do video at this time.        [facility]    Hematology/Oncology Established Patient Note      Today's Date: 3/25/21    Reason for Follow-up: pancreatic cancer      HISTORY OF PRESENT ILLNESS: Flora Hogan is a 75 year old female, who presents with adenocarcinoma of the pancreas.  She initially presented with jaundice.  She was evaluated for EUS and FNA, which revealed atypical cells, but no definitive evidence of malignancy.  She also underwent biliary stenting for decompression of her biliary tree.  She had a second EUS done, but again revealed atypical cells.  She then saw Dr. Duarte Chaves at [facility].  She had CT scan and MRI.  MRI showed multiple benign lesions in her liver; there as one area that was indeterminate.  Her CA 19-9 was 93.  On 5/26/20, she underwent Whipple procedure.  Two nodules were visualized on the surface of the liver, which were biopsied and found to be benign on pathology.  There were also hepatic cysts seen.  Pathology showed pancreatic ductal adenocarcinoma, well-differentiated (grade 1), tumor size 2.6 x 2.4 x 2.0 cm, negative margins, +LVI, 0 of 4 lymph nodes involved, staged pT2N0 (stage IB).      Port was placed by IR on 7/6/20.  It was removed on 12/28/20.    She started adjuvant chemotherapy with single-agent gemcitabine on 7/7/20.  She completed  6 cycles on 12/15/20.      INTERIM HISTORY: Flora says that she is doing well in generally.   She and her  have returned from [place].  She continues to have problems with her bowel movements and it is getting frustrating and causing her some depression.  She is also having trouble sleeping.            REVIEW OF SYSTEMS:   14 point ROS was reviewed and is negative other than as noted above in HPI.       HOME MEDICATIONS:  Current Outpatient Medications   Medication Sig Dispense Refill     acetaminophen (TYLENOL) 325 MG tablet Take 1-2 tablets (325-650 mg) by mouth every 6 hours as needed for mild pain or fever 50 tablet 0     albuterol (PROAIR HFA/PROVENTIL HFA/VENTOLIN HFA) 108 (90 Base) MCG/ACT inhaler Inhale 2 puffs into the lungs every 6 hours as needed for shortness of breath / dyspnea or wheezing 1 Inhaler 0     amylase-lipase-protease (CREON) 16864-42228 units CPEP per EC capsule Take 1 capsule by mouth 3 times daily (with meals) 90 capsule 3     calcium carbonate-vitamin D (CALTRATE 600+D) 600-400 MG-UNIT chewable tablet Take 1 chew tab by mouth 2 times daily  180 tablet 3     loratadine (CLARITIN REDITABS) 10 MG dispersible tablet Take 10 mg by mouth as needed        magnesium 250 MG tablet Take 1 tablet by mouth daily       oxyCODONE (ROXICODONE) 5 MG tablet Take 1-2 tablets (5-10 mg) by mouth every 6 hours as needed for moderate to severe pain 20 tablet 0     prochlorperazine (COMPAZINE) 10 MG tablet Take 1 tablet (10 mg) by mouth every 6 hours as needed (Nausea/Vomiting) 30 tablet 1         ALLERGIES:  Allergies   Allergen Reactions     Penicillin V      Fredy-1 [Lidocaine] Unknown         PAST MEDICAL HISTORY:  Past Medical History:   Diagnosis Date     Chest tightness     had suspected allergies     Malignant neoplasm of head of pancreas (H) 6/9/2020     Seasonal allergies      SOB (shortness of breath)          PAST SURGICAL HISTORY:  Past Surgical History:   Procedure Laterality Date      CATARACT IOL, RT/LT  2015     COLONOSCOPY  10/14    no repeat needed due to age     COLONOSCOPY N/A 10/7/2014    Procedure: COLONOSCOPY;  Surgeon: Daryl Hanson MD;  Location:  GI     COLONOSCOPY  04/23/2019    no further screening     ENDOSCOPIC RETROGRADE CHOLANGIOPANCREATOGRAM N/A 4/20/2020    Procedure: ENDOSCOPIC RETROGRADE CHOLANGIOPANCREATOGRAPHY, PLACEMENT OF PANCREATIC STENT, PLACEMENT OF BILIARY STENT;  Surgeon: Yarely Vann MD;  Location:  OR     ESOPHAGOSCOPY, GASTROSCOPY, DUODENOSCOPY (EGD), COMBINED N/A 4/20/2020    Procedure: ESOPHAGOGASTRODUODENOSCOPY, WITH FINE NEEDLE ASPIRATION BIOPSY, WITH ENDOSCOPIC ULTRASOUND GUIDANCE, Endoscopic retrograde cholangiopancreatography;  Surgeon: Yarely Vann MD;  Location:  OR     ESOPHAGOSCOPY, GASTROSCOPY, DUODENOSCOPY (EGD), COMBINED N/A 5/5/2020    Procedure: ESOPHAGOGASTRODUODENOSCOPY, WITH FINE NEEDLE ASPIRATION BIOPSY, WITH ENDOSCOPIC ULTRASOUND GUIDANCE;  Surgeon: Romina Rosario MD;  Location:  GI     IR CHEST PORT PLACEMENT > 5 YRS OF AGE  7/6/2020     IR PORT REMOVAL RIGHT  12/28/2020     LAPAROSCOPIC BIOPSY LIVER N/A 5/22/2020    Procedure: Diagnostic Laparoscopy with intraoperative ultrasound and Liver Biopsy X2;  Surgeon: Duarte Chaves MD;  Location: [facility] OR     WHIPPLE PROCEDURE N/A 5/22/2020    Procedure: Non Pylorus Preserving Whipple procedure;  Surgeon: Duarte Chaves MD;  Location: [facility] OR         SOCIAL HISTORY:  Social History     Socioeconomic History     Marital status:      Spouse name: Not on file     Number of children: Not on file     Years of education: Not on file     Highest education level: Not on file   Occupational History     Employer: RETIRED     Comment: previous taught special ed   Social Needs     Financial resource strain: Not on file     Food insecurity     Worry: Not on file     Inability: Not on file     Transportation needs     Medical: Not on file     Non-medical: Not on  file   Tobacco Use     Smoking status: Never Smoker     Smokeless tobacco: Never Used   Substance and Sexual Activity     Alcohol use: Yes     Comment: social      Drug use: No     Sexual activity: Yes   Lifestyle     Physical activity     Days per week: Not on file     Minutes per session: Not on file     Stress: Not on file   Relationships     Social connections     Talks on phone: Not on file     Gets together: Not on file     Attends Jainism service: Not on file     Active member of club or organization: Not on file     Attends meetings of clubs or organizations: Not on file     Relationship status: Not on file     Intimate partner violence     Fear of current or ex partner: Not on file     Emotionally abused: Not on file     Physically abused: Not on file     Forced sexual activity: Not on file   Other Topics Concern     Parent/sibling w/ CABG, MI or angioplasty before 65F 55M? Not Asked      Service Not Asked     Blood Transfusions Not Asked     Caffeine Concern Yes     Comment: 2 cups     Occupational Exposure Not Asked     Hobby Hazards Not Asked     Sleep Concern Not Asked     Stress Concern Not Asked     Weight Concern Not Asked     Special Diet No     Back Care Not Asked     Exercise Yes     Comment: walking workout      Bike Helmet Not Asked     Seat Belt Not Asked     Self-Exams Not Asked   Social History Narrative     Not on file         FAMILY HISTORY:  Family History   Problem Relation Age of Onset     Musculoskeletal Disorder Mother         edematous legs     Obesity Mother      Musculoskeletal Disorder Maternal Grandmother         edematous legs; did have amputation     Obesity Maternal Grandmother      Myocardial Infarction Maternal Grandmother          PHYSICAL EXAM:  Phone visit.        LABS:  CBC RESULTS:   Recent Labs   Lab Test 03/22/21  0945   WBC 2.6*   RBC 4.83   HGB 13.7   HCT 43.3   MCV 90   MCH 28.4   MCHC 31.6   RDW 13.9        Recent Labs   Lab Test 03/22/21  0945  12/28/20  0900    138   POTASSIUM 4.4 4.4   CHLORIDE 105 106   CO2 28 31   ANIONGAP 7 1*   GLC 58* 85   BUN 19 21   CR 0.91 0.88   CAMERON 9.0 8.8     Lab Results   Component Value Date    AST 35 03/22/2021     Lab Results   Component Value Date    ALT 42 03/22/2021     No results found for: BILICONJ   Lab Results   Component Value Date    BILITOTAL 0.5 03/22/2021     Lab Results   Component Value Date    ALBUMIN 3.6 03/22/2021     Lab Results   Component Value Date    PROTTOTAL 7.2 03/22/2021      Lab Results   Component Value Date    ALKPHOS 120 03/22/2021     Component      Latest Ref Rng & Units 4/19/2020 5/5/2020 7/7/2020 12/28/2020   Cancer Antigen 19-9      0 - 37 U/mL 109 (H) 93 (H) 7 6     Component      Latest Ref Rng & Units 3/22/2021   Cancer Antigen 19-9      0 - 37 U/mL 9           IMAGING:  CT c/a/p 3/22/21:  IMPRESSION: In this patient with history of pancreatic adenocarcinoma  status post Whipple's procedure, there is;  1. No convincing evidence of new metastatic disease in the chest,  abdomen or pelvis.  2. Few mildly prominent/enlarged mesenteric lymph nodes including 1.9  x 1.0 cm left mesenteric lymph node, not significantly changed as  compared to 6/30/2020 exam, although appears slightly more prominent  as compared to 4/19/2020 exam, indeterminate, recommend attention on  follow-up.  3. 0.7 cm hypoattenuating cystic focus in the pancreatic body/tail,  not significantly changed as compared to 4/19/2020 exam, is too small  to characterize, could represent side branch intraductal papillary  mucinous neoplasm versus other pancreatic cystic lesions. Recommend  continued attention on follow-up.      ASSESSMENT/PLAN:  Flora Hogan is a 75 year old female with:    1) Adenocarcinoma of the pancreas head: s/p Whipple procedure on 5/26/20; pathology showed pancreatic ductal adenocarcinoma, well-differentiated (grade 1), tumor size 2.6 x 2.4 x 2.0 cm, negative margins, +LVI, 0 of 4 lymph nodes  involved, staged pT2N0 (stage IB).    She completed 6 months of adjuvant gemcitabine in December 2020.    CT c/a/p on 3/22/21 showed no evidence of metastatic disease.  There are a few mildly prominent lymph nodes that will be followed.      -scans and tumor markers every 3 months for the first year  -CT c/a/p and labs/CA 19-9 in 3 months  -RTC in 3 months    2) Pulmonary nodule: stable  -will monitor on future scans    3) Chronic constipation/diarrhea: She notes bowel incontinence but sensation of incomplete bowel emptying.  She has had this for many years and has seen [facility] in the past.    -Creon prescribed - she has started it and finds it helpful.  She takes it 3 times a day.  She will continue it.   Prescription renewed today   -she will also try fiber  -follow-up with [facility] again - she requests to see Dr. Yarely Vann    4) Leukpenia: Secondary to chemotherapy.  Mild.  ANC is mildly low.  -monitor    5) Insomnia: She occasionally takes melatonin.    6) Coping: Patient notes some feelings of depression, a lot of it is due to her bowel incontinence issues.  She was looking for a support group, but the group at the [facility] has been stopped due to COVID.  She is open to having our  reach out to her to discuss various resources.  -will ask Caryn to call patient      Jeannie Coronado MD  Hematology/Oncology  [facility]    Phone call duration: 24 minutes    Total time spent on day of visit, including review of tests, obtaining/reviewing separately obtained history, ordering medications/tests/procedures, communicating with PCP/consultants, and documenting in electronic medical record: 40 minutes

## 2021-03-26 ENCOUNTER — TELEPHONE (OUTPATIENT)
Dept: CASE MANAGEMENT | Facility: CLINIC | Age: 76
End: 2021-03-26

## 2021-03-26 NOTE — TELEPHONE ENCOUNTER
Social Work Progress Note      Data/Intervention:  Patient Name:  Flora Hogan  /Age:  1945 (75 year old)    Reason for Follow-Up:  Flora is a 75-year-old woman with a history of pancreatic cancer who is followed by Dr. Coronado at [facility]. This clinician received referral from  for resource support.      Intervention:   Provided safe place for Flora to voice sadness and frustration in daily management of fecal incontinence and desire to be able to travel more freely. Emotional support, validation, and normalization offered. SW encouraged Floar to consider asking her providers about appropriateness of connecting with a pelvic floor physical therapist for additional strengthening and support surrounding this issue as it understandably has significantly impacted Flora's everyday quality of life. SW answered Flora's questions about support groups available, and oriented to support available through [facility]'s Survivorship Program (upcoming Survivorship Day, [employer] Series, listserve). Flora expressed appreciation of resources and emotional support offered today, and knows to reach out in the future as needed.     Resources Provided:  List of local pelvic floor physical therapists, [place] [facility] [employer] [facility]  [facility] Survivorship Website  Support Groups ([employer], CancerCare)    Plan:  1) [facility]OR will await feedback from Nay Pace and Hannah Pace regarding whether their pancreatic cancer support groups are active during pandemic, and will update Flora accordingly.   2) Ongoing collaboration with multidisciplinary care team.     Please call or page if needs or concerns arise.     ALAN Mathew, LICSW  Direct Phone: [phone]  Pager: [phone]

## 2021-04-15 ENCOUNTER — IMMUNIZATION (OUTPATIENT)
Dept: NURSING | Facility: CLINIC | Age: 76
End: 2021-04-15
Attending: INTERNAL MEDICINE
Payer: COMMERCIAL

## 2021-04-15 PROCEDURE — 0012A PR COVID VAC MODERNA 100 MCG/0.5 ML IM: CPT

## 2021-04-15 PROCEDURE — 91301 PR COVID VAC MODERNA 100 MCG/0.5 ML IM: CPT

## 2021-04-24 ENCOUNTER — HEALTH MAINTENANCE LETTER (OUTPATIENT)
Age: 76
End: 2021-04-24

## 2021-04-30 NOTE — PATIENT INSTRUCTIONS
1. Consider CT calcium score to evaluate your coronary artery calcification seen on your CT chest scan    2. Stretching for the cramps for now    3. Consider using flonase 1 puff each nostril daily for the next month    4. Use your inhaler as needed with activity for now        Patient Education   Personalized Prevention Plan  You are due for the preventive services outlined below.  Your care team is available to assist you in scheduling these services.  If you have already completed any of these items, please share that information with your care team to update in your medical record.  Health Maintenance Due   Topic Date Due     Osteoporosis Screening  Never done     ANNUAL REVIEW OF HM ORDERS  Never done     Hepatitis C Screening  Never done     Mammogram  10/20/2018     Cholesterol Lab  05/26/2020     Annual Wellness Visit  10/29/2020     FALL RISK ASSESSMENT  10/29/2020     PHQ-2  01/01/2021

## 2021-04-30 NOTE — PROGRESS NOTES
"SUBJECTIVE:   Flora Hogan is a 75 year old female who presents for Preventive Visit.    Is Patient Fasting: No       Patient has been advised of split billing requirements and indicates understanding: Yes   Are you in the first 12 months of your Medicare coverage?  No    Healthy Habits:     In general, how would you rate your overall health?  Good    Frequency of exercise:  4-5 days/week    Duration of exercise:  Greater than 60 minutes    Do you usually eat at least 4 servings of fruit and vegetables a day, include whole grains    & fiber and avoid regularly eating high fat or \"junk\" foods?  Yes    Taking medications regularly:  Yes    Medication side effects:  None    Ability to successfully perform activities of daily living:  No assistance needed    Home Safety:  Throw rugs in the hallway and lack of grab bars in the bathroom    Hearing Impairment:  Need to ask people to speak up or repeat themselves and difficulty understanding soft or whispered speech    In the past 6 months, have you been bothered by leaking of urine? Yes    In general, how would you rate your overall mental or emotional health?  Good      PHQ-2 Total Score: 2    Additional concerns today:  Yes (intermittent leg cramping x 3 years, having constant allergies, more frequent nose bleeds )    Flora Hogan is a 75 year old female who presents today for annual check up  She has undergone significant medical changes   She underwent a Whipple procedure on 5/26/2020. Pathology showed pancreatic ductal adenocarcinoma, well-differentiated, grade 1 and 0/4 lymph nodes involved. (Dr. Chaves)  She started adjuvant chemotherapy with single-agent gemcitabine on 7/7/20. She completed 6 cycles on 12/15/20.    Has follow up with [facility] on Dr. Vann    She did go to [place] this winter; Back in 03/19/2021    She has been vaccinated for COVID; completed in 4/15/2021      She DOES Have a few questions today  Getting some increased leg cramps over the " past 2 years  Nearly always at night; often in the arches but occasionally in the calves; thighs  Feels ok during regular work outs   Has not tried anything  Staying hydrated  Taking magnesium supplement    Mentions newer shortness of breath and thinking this is with allergies  She mentions chronic allergies, nose bleeds over time but feels like breathing is worsening  She can walk ok most of the time but can feel lightheaded  Never abnormal heart sensations  Unsure if there are symptoms when not having allergies  Chest CT in 03/2021  Has been given an inhaler previously; using a bit more regularly lately  Did see a pulmonologist; normal spiromtery      Do you feel safe in your environment? Yes    Have you ever done Advance Care Planning? (For example, a Health Directive, POLST, or a discussion with a medical provider or your loved ones about your wishes): Yes, advance care planning is on file.       Fall risk  Fallen 2 or more times in the past year?: No  Any fall with injury in the past year?: No    Cognitive Screening   1) Repeat 3 items (Leader, Season, Table)    2) Clock draw: NORMAL  3) 3 item recall: Recalls 3 objects  Results: 3 items recalled: COGNITIVE IMPAIRMENT LESS LIKELY    Mini-CogTM Copyright S Claus. Licensed by the author for use in [facility]; reprinted with permission ([email or web address]@.[email or web address]). All rights reserved.      Do you have sleep apnea, excessive snoring or daytime drowsiness?: unknown     Reviewed and updated as needed this visit by clinical staff  Tobacco  Allergies  Meds   Med Hx  Surg Hx  Fam Hx  Soc Hx        Reviewed and updated as needed this visit by Provider                Social History     Tobacco Use     Smoking status: Never Smoker     Smokeless tobacco: Never Used   Substance Use Topics     Alcohol use: Yes     Comment: social      If you drink alcohol do you typically have >3 drinks per day or >7 drinks per week? No    Alcohol Use 5/3/2021   Prescreen: >3  drinks/day or >7 drinks/week? No   Prescreen: >3 drinks/day or >7 drinks/week? -       Current providers sharing in care for this patient include:   Patient Care Team:  Natasha Braun MD as PCP - General (Family Practice)  Jeannie Coronado MD as MD (Hematology & Oncology)  Leyla Layton, RN as Specialty Care Coordinator (Oncology)  Duarte Chaves MD as Assigned Surgical Provider  Jeannie Coronado MD as Assigned Cancer Care Provider  Marcial Holden PA-C as Assigned PCP    The following health maintenance items are reviewed in Epic and correct as of today:  Health Maintenance Due   Topic Date Due     DEXA  Never done     MAMMO SCREENING  10/20/2018     FALL RISK ASSESSMENT  10/29/2020     Lab work is in process      Mammogram Screening - Mammography discussed and declined  Pertinent mammograms are reviewed under the imaging tab.    Review of Systems   Constitutional: Negative for chills and fever.   HENT: Positive for congestion and hearing loss. Negative for ear pain and sore throat.    Eyes: Negative for pain and visual disturbance.   Respiratory: Positive for cough and shortness of breath.    Cardiovascular: Positive for palpitations. Negative for chest pain and peripheral edema.   Gastrointestinal: Positive for abdominal pain and constipation. Negative for diarrhea, hematochezia and nausea.   Breasts:  Negative for tenderness, breast mass and discharge.   Genitourinary: Negative for dysuria, frequency, genital sores, hematuria, pelvic pain, urgency, vaginal bleeding and vaginal discharge.   Musculoskeletal: Negative for arthralgias, joint swelling and myalgias.   Skin: Negative for rash.   Neurological: Positive for dizziness, weakness and headaches. Negative for paresthesias.   Psychiatric/Behavioral: Positive for mood changes. The patient is nervous/anxious.      See above; discussed in clinic    OBJECTIVE:   /66 (BP Location: Right arm, Patient Position: Chair, Cuff  "Size: Adult Regular)   Pulse 63   Temp 97.5  F (36.4  C) (Oral)   Resp 14   Ht 1.632 m (5' 4.24\")   Wt 53.1 kg (117 lb)   LMP  (LMP Unknown)   SpO2 100%   BMI 19.93 kg/m   Estimated body mass index is 19.93 kg/m  as calculated from the following:    Height as of this encounter: 1.632 m (5' 4.24\").    Weight as of this encounter: 53.1 kg (117 lb).  Physical Exam  GENERAL: healthy, alert and no distress  EYES: Eyes grossly normal to inspection; there is apparent cataract on the left  HENT: ear canals and TM's normal, nose and mouth without ulcers or lesions  RESP: lungs clear to auscultation - no rales, rhonchi or wheezes  CV: regular rates and rhythm without ectopy or murmur  ABDOMEN: soft, nontender and well-healed incision  MS: No peripheral edema   SKIN: no suspicious lesions or rashes  NEURO: Normal strength and tone, mentation intact and speech normal  PSYCH: mentation appears normal, affect normal/bright    Diagnostic Test Results:  Labs reviewed in Epic  Updating some today    ASSESSMENT / PLAN:   1. Encounter for Medicare annual wellness exam  Reviewed personal and family history. Reviewed age appropriate screenings. Recommended any needed vaccinations. She defers DEXA and MAMMO today. She is getting adequate D an Ca. Multiple scans of the chest area recently  - Lipid panel reflex to direct LDL Fasting  - multivitamin (ONE-DAILY) tablet; Take 1 tablet by mouth daily  Dispense: 90 tablet; Refill: 0    2. Need for hepatitis C screening test  Per CDC  - Hepatitis C Screen Reflex to HCV RNA Quant and Genotype    3. Screening for hyperlipidemia  Due. Especially with noted CT chest that showed some coronary calcification    4. SOB (shortness of breath)  This is chronic. She has seen pulmonology with normal spirometry. We will consider cardiac work up but first will plan to maximize allergy treatments with flonase and her already prescribed albuterol.   - Lipid panel reflex to direct LDL Fasting    5. " "Malignant neoplasm of head of pancreas (H)  Continues with GI. Whipple went very well this past summer    6. Osteopenia, unspecified location  Did defer DEXA but very active and adequate D and Ca    7. Idiopathic peripheral neuropathy  Stable. This is a previous dx but with no progression    8. Abnormal findings on diagnostic imaging of other parts of digestive tract   Coronary calcification noted on recent scanning. Updating lipids and will consider CT calcium scoring. Discussed statin and asa81  - Lipid panel reflex to direct LDL Fasting    Patient has been advised of split billing requirements and indicates understanding: Yes  COUNSELING:  Reviewed preventive health counseling, as reflected in patient instructions    Estimated body mass index is 19.93 kg/m  as calculated from the following:    Height as of this encounter: 1.632 m (5' 4.24\").    Weight as of this encounter: 53.1 kg (117 lb).        She reports that she has never smoked. She has never used smokeless tobacco.      Appropriate preventive services were discussed with this patient, including applicable screening as appropriate for cardiovascular disease, diabetes, osteopenia/osteoporosis, and glaucoma.  As appropriate for age/gender, discussed screening for colorectal cancer, prostate cancer, breast cancer, and cervical cancer. Checklist reviewing preventive services available has been given to the patient.    Reviewed patients plan of care and provided an AVS. The Basic Care Plan (routine screening as documented in Health Maintenance) for Flora meets the Care Plan requirement. This Care Plan has been established and reviewed with the Patient.    Counseling Resources:  ATP IV Guidelines  Pooled Cohorts Equation Calculator  Breast Cancer Risk Calculator  Breast Cancer: Medication to Reduce Risk  FRAX Risk Assessment  ICSI Preventive Guidelines  Dietary Guidelines for Americans, 2010  USDA's MyPlate  ASA Prophylaxis  Lung CA Screening    Marcial Hernandez " FELICIANO Holden  [facility]    Identified Health Risks:

## 2021-05-03 ENCOUNTER — OFFICE VISIT (OUTPATIENT)
Dept: FAMILY MEDICINE | Facility: CLINIC | Age: 76
End: 2021-05-03
Payer: COMMERCIAL

## 2021-05-03 VITALS
HEIGHT: 64 IN | OXYGEN SATURATION: 100 % | WEIGHT: 117 LBS | TEMPERATURE: 97.5 F | BODY MASS INDEX: 19.97 KG/M2 | RESPIRATION RATE: 14 BRPM | HEART RATE: 63 BPM | SYSTOLIC BLOOD PRESSURE: 108 MMHG | DIASTOLIC BLOOD PRESSURE: 66 MMHG

## 2021-05-03 DIAGNOSIS — M85.80 OSTEOPENIA, UNSPECIFIED LOCATION: ICD-10-CM

## 2021-05-03 DIAGNOSIS — G60.9 IDIOPATHIC PERIPHERAL NEUROPATHY: ICD-10-CM

## 2021-05-03 DIAGNOSIS — R93.3 ABNORMAL FINDINGS ON DIAGNOSTIC IMAGING OF OTHER PARTS OF DIGESTIVE TRACT: ICD-10-CM

## 2021-05-03 DIAGNOSIS — Z11.59 NEED FOR HEPATITIS C SCREENING TEST: ICD-10-CM

## 2021-05-03 DIAGNOSIS — C25.0 MALIGNANT NEOPLASM OF HEAD OF PANCREAS (H): ICD-10-CM

## 2021-05-03 DIAGNOSIS — R06.02 SOB (SHORTNESS OF BREATH): ICD-10-CM

## 2021-05-03 DIAGNOSIS — Z13.220 SCREENING FOR HYPERLIPIDEMIA: ICD-10-CM

## 2021-05-03 DIAGNOSIS — Z00.00 ENCOUNTER FOR MEDICARE ANNUAL WELLNESS EXAM: Primary | ICD-10-CM

## 2021-05-03 LAB
CHOLEST SERPL-MCNC: 167 MG/DL
HDLC SERPL-MCNC: 83 MG/DL
LDLC SERPL CALC-MCNC: 73 MG/DL
NONHDLC SERPL-MCNC: 84 MG/DL
TRIGL SERPL-MCNC: 57 MG/DL

## 2021-05-03 PROCEDURE — 86803 HEPATITIS C AB TEST: CPT | Performed by: PHYSICIAN ASSISTANT

## 2021-05-03 PROCEDURE — 36415 COLL VENOUS BLD VENIPUNCTURE: CPT | Performed by: PHYSICIAN ASSISTANT

## 2021-05-03 PROCEDURE — 99213 OFFICE O/P EST LOW 20 MIN: CPT | Mod: 25 | Performed by: PHYSICIAN ASSISTANT

## 2021-05-03 PROCEDURE — 99397 PER PM REEVAL EST PAT 65+ YR: CPT | Performed by: PHYSICIAN ASSISTANT

## 2021-05-03 PROCEDURE — 80061 LIPID PANEL: CPT | Performed by: PHYSICIAN ASSISTANT

## 2021-05-03 RX ORDER — MULTIVITAMIN
1 TABLET ORAL DAILY
Qty: 90 TABLET | Refills: 0 | Status: SHIPPED | OUTPATIENT
Start: 2021-05-03 | End: 2023-05-18 | Stop reason: ALTCHOICE

## 2021-05-03 ASSESSMENT — PAIN SCALES - GENERAL: PAINLEVEL: NO PAIN (0)

## 2021-05-03 ASSESSMENT — ENCOUNTER SYMPTOMS
SORE THROAT: 0
DIZZINESS: 1
PALPITATIONS: 1
FREQUENCY: 0
PARESTHESIAS: 0
EYE PAIN: 0
FEVER: 0
JOINT SWELLING: 0
NERVOUS/ANXIOUS: 1
COUGH: 1
WEAKNESS: 1
HEADACHES: 1
DYSURIA: 0
CONSTIPATION: 1
DIARRHEA: 0
HEMATURIA: 0
HEMATOCHEZIA: 0
SHORTNESS OF BREATH: 1
ARTHRALGIAS: 0
ABDOMINAL PAIN: 1
CHILLS: 0
BREAST MASS: 0
MYALGIAS: 0
NAUSEA: 0

## 2021-05-03 ASSESSMENT — ACTIVITIES OF DAILY LIVING (ADL): CURRENT_FUNCTION: NO ASSISTANCE NEEDED

## 2021-05-03 ASSESSMENT — MIFFLIN-ST. JEOR: SCORE: 1009.52

## 2021-05-04 DIAGNOSIS — Z11.59 NEED FOR HEPATITIS C SCREENING TEST: Primary | ICD-10-CM

## 2021-05-04 LAB — HCV AB SERPL QL IA: ABNORMAL

## 2021-05-17 ENCOUNTER — TRANSFERRED RECORDS (OUTPATIENT)
Dept: HEALTH INFORMATION MANAGEMENT | Facility: CLINIC | Age: 76
End: 2021-05-17

## 2021-06-14 DIAGNOSIS — C25.0 MALIGNANT NEOPLASM OF HEAD OF PANCREAS (H): ICD-10-CM

## 2021-06-14 NOTE — TELEPHONE ENCOUNTER
Pending Prescriptions:                       Disp   Refills    amylase-lipase-protease (CREON) [id number]*90 cap*3            Sig: Take 1 capsule by mouth 3 times daily (with           meals)          Last Written Prescription Date:  03/25/2021  Last Fill Quantity: 90,   # refills: 3  Last Office Visit: 03/25/2021  Future Office visit:    Next 5 appointments (look out 90 days)    Jun 24, 2021 10:15 AM  Return Visit with Jeannie Coronado MD  [facility] ([facility] ) [address]  [address]  [facility] [address]  [phone]           Routing refill request to provider     Leyla Layton, RN, BSN, SHARMIN  RN Care Coordinator  [facility]  [phone]

## 2021-06-14 NOTE — TELEPHONE ENCOUNTER
Signed Prescriptions:                        Disp   Refills    amylase-lipase-protease (CREON) [id number]*90 cap*3        Sig: Take 1 capsule by mouth 3 times daily (with meals)  Authorizing Provider: EDOUARD RICHMOND, RN, BSN, SHARMIN  RN Care Coordinator  [facility]  [phone]

## 2021-06-21 ENCOUNTER — TRANSFERRED RECORDS (OUTPATIENT)
Dept: HEALTH INFORMATION MANAGEMENT | Facility: CLINIC | Age: 76
End: 2021-06-21

## 2021-06-22 ENCOUNTER — INFUSION THERAPY VISIT (OUTPATIENT)
Dept: INFUSION THERAPY | Facility: CLINIC | Age: 76
End: 2021-06-22
Attending: INTERNAL MEDICINE
Payer: COMMERCIAL

## 2021-06-22 ENCOUNTER — HOSPITAL ENCOUNTER (OUTPATIENT)
Facility: CLINIC | Age: 76
Setting detail: SPECIMEN
Discharge: HOME | End: 2021-06-22
Attending: INTERNAL MEDICINE | Admitting: INTERNAL MEDICINE
Payer: COMMERCIAL

## 2021-06-22 DIAGNOSIS — C25.0 MALIGNANT NEOPLASM OF HEAD OF PANCREAS (H): ICD-10-CM

## 2021-06-22 LAB
ALBUMIN SERPL-MCNC: 3.7 G/DL (ref 3.4–5)
ALP SERPL-CCNC: 109 U/L (ref 40–150)
ALT SERPL W P-5'-P-CCNC: 34 U/L (ref 0–50)
ANION GAP SERPL CALCULATED.3IONS-SCNC: 2 MMOL/L (ref 3–14)
AST SERPL W P-5'-P-CCNC: 28 U/L (ref 0–45)
BASOPHILS # BLD AUTO: 0 10E9/L (ref 0–0.2)
BASOPHILS NFR BLD AUTO: 1.3 %
BILIRUB SERPL-MCNC: 0.7 MG/DL (ref 0.2–1.3)
BUN SERPL-MCNC: 12 MG/DL (ref 7–30)
CALCIUM SERPL-MCNC: 9.1 MG/DL (ref 8.5–10.1)
CHLORIDE SERPL-SCNC: 100 MMOL/L (ref 94–109)
CO2 SERPL-SCNC: 30 MMOL/L (ref 20–32)
CREAT SERPL-MCNC: 0.92 MG/DL (ref 0.52–1.04)
DIFFERENTIAL METHOD BLD: ABNORMAL
EOSINOPHIL # BLD AUTO: 0.1 10E9/L (ref 0–0.7)
EOSINOPHIL NFR BLD AUTO: 2.3 %
ERYTHROCYTE [DISTWIDTH] IN BLOOD BY AUTOMATED COUNT: 14.1 % (ref 10–15)
GFR SERPL CREATININE-BSD FRML MDRD: 60 ML/MIN/{1.73_M2}
GLUCOSE SERPL-MCNC: 87 MG/DL (ref 70–99)
HCT VFR BLD AUTO: 39.7 % (ref 35–47)
HGB BLD-MCNC: 12.7 G/DL (ref 11.7–15.7)
IMM GRANULOCYTES # BLD: 0 10E9/L (ref 0–0.4)
IMM GRANULOCYTES NFR BLD: 0.3 %
LYMPHOCYTES # BLD AUTO: 1.1 10E9/L (ref 0.8–5.3)
LYMPHOCYTES NFR BLD AUTO: 36.5 %
MCH RBC QN AUTO: 28 PG (ref 26.5–33)
MCHC RBC AUTO-ENTMCNC: 32 G/DL (ref 31.5–36.5)
MCV RBC AUTO: 87 FL (ref 78–100)
MONOCYTES # BLD AUTO: 0.3 10E9/L (ref 0–1.3)
MONOCYTES NFR BLD AUTO: 8.5 %
NEUTROPHILS # BLD AUTO: 1.6 10E9/L (ref 1.6–8.3)
NEUTROPHILS NFR BLD AUTO: 51.1 %
NRBC # BLD AUTO: 0 10*3/UL
NRBC BLD AUTO-RTO: 0 /100
PLATELET # BLD AUTO: 162 10E9/L (ref 150–450)
POTASSIUM SERPL-SCNC: 4.1 MMOL/L (ref 3.4–5.3)
PROT SERPL-MCNC: 7 G/DL (ref 6.8–8.8)
RBC # BLD AUTO: 4.54 10E12/L (ref 3.8–5.2)
SODIUM SERPL-SCNC: 132 MMOL/L (ref 133–144)
WBC # BLD AUTO: 3.1 10E9/L (ref 4–11)

## 2021-06-22 PROCEDURE — 80053 COMPREHEN METABOLIC PANEL: CPT | Performed by: INTERNAL MEDICINE

## 2021-06-22 PROCEDURE — 86301 IMMUNOASSAY TUMOR CA 19-9: CPT | Performed by: INTERNAL MEDICINE

## 2021-06-22 PROCEDURE — 36415 COLL VENOUS BLD VENIPUNCTURE: CPT

## 2021-06-22 PROCEDURE — 85025 COMPLETE CBC W/AUTO DIFF WBC: CPT | Performed by: INTERNAL MEDICINE

## 2021-06-22 NOTE — PROGRESS NOTES
Nursing Note:  Flora Hogan presents today for 3 mo f/u labs.    Patient seen by provider today: No   present during visit today: Not Applicable.    Note: Pt's 3 mo f/u CT scan was never scheduled.  Scheduling will work on getting this scheduled ASAP.  ABELINO Mayer, will f/u with pt once this is scheduled, as pt's f/u MD appt with Dr Coronado may also need to be changed.  Pt verbalized understanding.    Intravenous Access:  Labs drawn without difficulty.    Discharge Plan:   Patient was discharged to home    Karma Capellan RN

## 2021-06-23 ENCOUNTER — HOSPITAL ENCOUNTER (OUTPATIENT)
Dept: CT IMAGING | Facility: CLINIC | Age: 76
Discharge: HOME | End: 2021-06-23
Attending: INTERNAL MEDICINE | Admitting: INTERNAL MEDICINE
Payer: COMMERCIAL

## 2021-06-23 DIAGNOSIS — C25.0 MALIGNANT NEOPLASM OF HEAD OF PANCREAS (H): ICD-10-CM

## 2021-06-23 LAB — CANCER AG19-9 SERPL-ACNC: 15 U/ML (ref 0–37)

## 2021-06-23 PROCEDURE — 250N000011 HC RX IP 250 OP 636: Performed by: INTERNAL MEDICINE

## 2021-06-23 PROCEDURE — 74177 CT ABD & PELVIS W/CONTRAST: CPT

## 2021-06-23 PROCEDURE — 250N000009 HC RX 250: Performed by: INTERNAL MEDICINE

## 2021-06-23 RX ORDER — IOPAMIDOL 755 MG/ML
57 INJECTION, SOLUTION INTRAVASCULAR ONCE
Status: COMPLETED | OUTPATIENT
Start: 2021-06-23 | End: 2021-06-23

## 2021-06-23 RX ADMIN — IOPAMIDOL 57 ML: 755 INJECTION, SOLUTION INTRAVENOUS at 16:59

## 2021-06-23 RX ADMIN — SODIUM CHLORIDE 56 ML: 9 INJECTION, SOLUTION INTRAVENOUS at 16:59

## 2021-06-24 ENCOUNTER — ONCOLOGY VISIT (OUTPATIENT)
Dept: ONCOLOGY | Facility: CLINIC | Age: 76
End: 2021-06-24
Attending: INTERNAL MEDICINE
Payer: COMMERCIAL

## 2021-06-24 VITALS
TEMPERATURE: 97.5 F | OXYGEN SATURATION: 97 % | SYSTOLIC BLOOD PRESSURE: 117 MMHG | DIASTOLIC BLOOD PRESSURE: 71 MMHG | BODY MASS INDEX: 19.79 KG/M2 | WEIGHT: 115.9 LBS | HEART RATE: 59 BPM | RESPIRATION RATE: 16 BRPM | HEIGHT: 64 IN

## 2021-06-24 DIAGNOSIS — C25.0 MALIGNANT NEOPLASM OF HEAD OF PANCREAS (H): Primary | ICD-10-CM

## 2021-06-24 PROCEDURE — 99215 OFFICE O/P EST HI 40 MIN: CPT | Performed by: INTERNAL MEDICINE

## 2021-06-24 PROCEDURE — G0463 HOSPITAL OUTPT CLINIC VISIT: HCPCS

## 2021-06-24 ASSESSMENT — PAIN SCALES - GENERAL: PAINLEVEL: NO PAIN (0)

## 2021-06-24 ASSESSMENT — MIFFLIN-ST. JEOR: SCORE: 1004.53

## 2021-06-24 NOTE — PROGRESS NOTES
[facility]    Hematology/Oncology Established Patient Note      Today's Date: 6/24/21    Reason for Follow-up: pancreatic cancer      HISTORY OF PRESENT ILLNESS: Flora Hogan is a 75 year old female, who presents with adenocarcinoma of the pancreas.  She initially presented with jaundice.  She was evaluated for EUS and FNA, which revealed atypical cells, but no definitive evidence of malignancy.  She also underwent biliary stenting for decompression of her biliary tree.  She had a second EUS done, but again revealed atypical cells.  She then saw Dr. Duarte Chaves at [facility].  She had CT scan and MRI.  MRI showed multiple benign lesions in her liver; there as one area that was indeterminate.  Her CA 19-9 was 93.  On 5/26/20, she underwent Whipple procedure.  Two nodules were visualized on the surface of the liver, which were biopsied and found to be benign on pathology.  There were also hepatic cysts seen.  Pathology showed pancreatic ductal adenocarcinoma, well-differentiated (grade 1), tumor size 2.6 x 2.4 x 2.0 cm, negative margins, +LVI, 0 of 4 lymph nodes involved, staged pT2N0 (stage IB).      Port was placed by IR on 7/6/20.  It was removed on 12/28/20.    She started adjuvant chemotherapy with single-agent gemcitabine on 7/7/20.  She completed 6 cycles on 12/15/20.      INTERIM HISTORY: Flora is here for follow-up.  She is doing about the same.  She continues to have bowel and bloating issues.  She takes Creon, which she thinks is helping.  She also notes memory issues since chemotherapy.  She had a lot of the same questions she usually has about her bowel changes.  She did talk with her GI doctor at [facility].          REVIEW OF SYSTEMS:   14 point ROS was reviewed and is negative other than as noted above in HPI.       HOME MEDICATIONS:  Current Outpatient Medications   Medication Sig Dispense Refill     acetaminophen (TYLENOL) 325 MG tablet Take 1-2 tablets (325-650 mg)  by mouth every 6 hours as needed for mild pain or fever 50 tablet 0     albuterol (PROAIR HFA/PROVENTIL HFA/VENTOLIN HFA) 108 (90 Base) MCG/ACT inhaler Inhale 2 puffs into the lungs every 6 hours as needed for shortness of breath / dyspnea or wheezing 1 Inhaler 0     amylase-lipase-protease (CREON) [id number] units CPEP per EC capsule Take 1 capsule by mouth 3 times daily (with meals) 90 capsule 3     calcium carbonate-vitamin D (CALTRATE 600+D) 600-400 MG-UNIT chewable tablet Take 1 chew tab by mouth 2 times daily  180 tablet 3     loratadine (CLARITIN REDITABS) 10 MG dispersible tablet Take 10 mg by mouth as needed        magnesium 250 MG tablet Take 1 tablet by mouth daily       multivitamin (ONE-DAILY) tablet Take 1 tablet by mouth daily 90 tablet 0     prochlorperazine (COMPAZINE) 10 MG tablet Take 1 tablet (10 mg) by mouth every 6 hours as needed (Nausea/Vomiting) 30 tablet 1         ALLERGIES:  Allergies   Allergen Reactions     Penicillin V      Seasonal Allergies      Fredy-1 [Lidocaine] Unknown         PAST MEDICAL HISTORY:  Past Medical History:   Diagnosis Date     Chest tightness     had suspected allergies     Malignant neoplasm of head of pancreas (H) 6/9/2020     Seasonal allergies      SOB (shortness of breath)          PAST SURGICAL HISTORY:  Past Surgical History:   Procedure Laterality Date     CATARACT IOL, RT/LT  2015     COLONOSCOPY  10/14    no repeat needed due to age     COLONOSCOPY N/A 10/7/2014    Procedure: COLONOSCOPY;  Surgeon: Daryl Hanson MD;  Location:  GI     COLONOSCOPY  04/23/2019    no further screening     ENDOSCOPIC RETROGRADE CHOLANGIOPANCREATOGRAM N/A 4/20/2020    Procedure: ENDOSCOPIC RETROGRADE CHOLANGIOPANCREATOGRAPHY, PLACEMENT OF PANCREATIC STENT, PLACEMENT OF BILIARY STENT;  Surgeon: Yarely Vann MD;  Location:  OR     ESOPHAGOSCOPY, GASTROSCOPY, DUODENOSCOPY (EGD), COMBINED N/A 4/20/2020    Procedure: ESOPHAGOGASTRODUODENOSCOPY, WITH FINE NEEDLE  ASPIRATION BIOPSY, WITH ENDOSCOPIC ULTRASOUND GUIDANCE, Endoscopic retrograde cholangiopancreatography;  Surgeon: Yarely Vann MD;  Location: [facility] OR     ESOPHAGOSCOPY, GASTROSCOPY, DUODENOSCOPY (EGD), COMBINED N/A 5/5/2020    Procedure: ESOPHAGOGASTRODUODENOSCOPY, WITH FINE NEEDLE ASPIRATION BIOPSY, WITH ENDOSCOPIC ULTRASOUND GUIDANCE;  Surgeon: Romina Rosario MD;  Location: [facility] GI     IR CHEST PORT PLACEMENT > 5 YRS OF AGE  7/6/2020     IR PORT REMOVAL RIGHT  12/28/2020     LAPAROSCOPIC BIOPSY LIVER N/A 5/22/2020    Procedure: Diagnostic Laparoscopy with intraoperative ultrasound and Liver Biopsy X2;  Surgeon: Duarte Chaves MD;  Location: [facility] OR     WHIPPLE PROCEDURE N/A 5/22/2020    Procedure: Non Pylorus Preserving Whipple procedure;  Surgeon: Duarte Chaves MD;  Location: [facility] OR         SOCIAL HISTORY:  Social History     Socioeconomic History     Marital status:      Spouse name: Not on file     Number of children: Not on file     Years of education: Not on file     Highest education level: Not on file   Occupational History     Employer: RETIRED     Comment: previous taught special ed   Social Needs     Financial resource strain: Not on file     Food insecurity     Worry: Not on file     Inability: Not on file     Transportation needs     Medical: Not on file     Non-medical: Not on file   Tobacco Use     Smoking status: Never Smoker     Smokeless tobacco: Never Used   Substance and Sexual Activity     Alcohol use: Yes     Comment: social      Drug use: No     Sexual activity: Yes   Lifestyle     Physical activity     Days per week: Not on file     Minutes per session: Not on file     Stress: Not on file   Relationships     Social connections     Talks on phone: Not on file     Gets together: Not on file     Attends [employer] service: Not on file     Active member of club or organization: Not on file     Attends meetings of clubs or organizations: Not on file     Relationship  "status: Not on file     Intimate partner violence     Fear of current or ex partner: Not on file     Emotionally abused: Not on file     Physically abused: Not on file     Forced sexual activity: Not on file   Other Topics Concern     Parent/sibling w/ CABG, MI or angioplasty before 65F 55M? Not Asked      Service Not Asked     Blood Transfusions Not Asked     Caffeine Concern Yes     Comment: 2 cups     Occupational Exposure Not Asked     Hobby Hazards Not Asked     Sleep Concern Not Asked     Stress Concern Not Asked     Weight Concern Not Asked     Special Diet No     Back Care Not Asked     Exercise Yes     Comment: walking workout      Bike Helmet Not Asked     Seat Belt Not Asked     Self-Exams Not Asked   Social History Narrative     Not on file         FAMILY HISTORY:  Family History   Problem Relation Age of Onset     Musculoskeletal Disorder Mother         edematous legs     Obesity Mother      Musculoskeletal Disorder Maternal Grandmother         edematous legs; did have amputation     Obesity Maternal Grandmother      Myocardial Infarction Maternal Grandmother          PHYSICAL EXAM:  /71   Pulse 59   Temp 97.5  F (36.4  C) (Oral)   Resp 16   Ht 1.632 m (5' 4.24\")   Wt 52.6 kg (115 lb 14.4 oz)   LMP  (LMP Unknown)   SpO2 97%   BMI 19.75 kg/m    ECO  GENERAL/CONSTITUTIONAL: No acute distress.  EYES: No scleral icterus.  NEUROLOGIC: Alert, oriented, answers questions appropriately.  INTEGUMENTARY: No jaundice.  GAIT: Steady, does not use assistive device      LABS:  CBC RESULTS:   Recent Labs   Lab Test 21  1225   WBC 3.1*   RBC 4.54   HGB 12.7   HCT 39.7   MCV 87   MCH 28.0   MCHC 32.0   RDW 14.1        Recent Labs   Lab Test 21  1225 21  0945   * 140   POTASSIUM 4.1 4.4   CHLORIDE 100 105   CO2 30 28   ANIONGAP 2* 7   GLC 87 58*   BUN 12 19   CR 0.92 0.91   CAMERON 9.1 9.0     Lab Results   Component Value Date    AST 28 2021     Lab Results "   Component Value Date    ALT 34 06/22/2021     No results found for: BILICONJ   Lab Results   Component Value Date    BILITOTAL 0.7 06/22/2021     Lab Results   Component Value Date    ALBUMIN 3.7 06/22/2021     Lab Results   Component Value Date    PROTTOTAL 7.0 06/22/2021      Lab Results   Component Value Date    ALKPHOS 109 06/22/2021       Component      Latest Ref Rng & Units 7/7/2020 12/28/2020 3/22/2021 6/22/2021   Cancer Antigen 19-9      0 - 37 U/mL 7 6 9 15       IMAGING:  CT c/a/p 6/23/21:  1.  No convincing recurrent or residual malignancy demonstrated.  2.  Stable low-dense lesion too small to characterize in the  pancreatic tail.      ASSESSMENT/PLAN:  Flora Hogan is a 75 year old female with:    1) Adenocarcinoma of the pancreas head: s/p Whipple procedure on 5/26/20; pathology showed pancreatic ductal adenocarcinoma, well-differentiated (grade 1), tumor size 2.6 x 2.4 x 2.0 cm, negative margins, +LVI, 0 of 4 lymph nodes involved, staged pT2N0 (stage IB).    She completed 6 months of adjuvant gemcitabine in December 2020.    CT c/a/p on 6/23/21 showed no evidence of recurrent or metastatic disease.  There is a stable low-dense lesion too small to characterize in the pancreatic tail.  I reviewed the images and report.    -scans and tumor markers every 4 months during 2nd year  -CT c/a/p and labs/CA 19-9 in 4 months  -RTC in 4 months    2) Pulmonary nodule: stable  -will monitor on future scans    3) Chronic constipation/diarrhea: She notes bowel incontinence but sensation of incomplete bowel emptying.  She has had this for many years and has seen [facility] in the past.    -Creon prescribed - she has started it and finds it helpful.  She takes it 3 times a day.  She will continue it.    -she will also try fiber  -follow-up with [facility] again - she requests to see Dr. Yarely Vann    4) Leukpenia: Secondary to prior chemotherapy.  Mild, stable.  ANC is normal this time.  -monitor    5) Insomnia:  She occasionally takes melatonin.    6) Coping: Patient notes some feelings of depression, a lot of it is due to her bowel incontinence issues.  She was looking for a support group, but the group at the [facility] has been stopped due to COVID.  She did speak with our , who offered resources and emotional support.    7) Fatigue: secondary to prior surgery and chemotherapy.  -she is open to PM&R referral  - referral placed      Jeannie Coronado MD  Hematology/Oncology  [facility]      Total time spent on day of visit, including review of tests, obtaining/reviewing separately obtained history, ordering medications/tests/procedures, communicating with PCP/consultants, and documenting in electronic medical record: 40 minutes

## 2021-06-24 NOTE — LETTER
"    6/24/2021         RE: Anali Hogan  [address] [facility] [address]        Dear Colleague,    Thank you for referring your patient, Anali Hogan, to the [facility]. Please see a copy of my visit note below.    Oncology Rooming Note    June 24, 2021 10:15 AM   Anali Hogan is a 75 year old female who presents for:    Chief Complaint   Patient presents with     Oncology Clinic Visit     Malignant neoplasm of head of pancreas (H)     Initial Vitals: LMP  (LMP Unknown)  Estimated body mass index is 19.93 kg/m  as calculated from the following:    Height as of 5/3/21: 1.632 m (5' 4.24\").    Weight as of 5/3/21: 53.1 kg (117 lb). There is no height or weight on file to calculate BSA.  Data Unavailable Comment: Data Unavailable   No LMP recorded (lmp unknown). Patient is postmenopausal.  Allergies reviewed: Yes  Medications reviewed: Yes    Medications: Medication refills not needed today.  Pharmacy name entered into [employer]:    [id number] - [place], [place] - [address] [id number] [place] [address] [facility] AT [place] [address] & [id number] - [address] AT [facility]    Clinical concerns: None       Emely Mo MA                [facility]    Hematology/Oncology Established Patient Note      Today's Date: 6/24/21    Reason for Follow-up: pancreatic cancer      HISTORY OF PRESENT ILLNESS: Anali Hogan is a 75 year old female, who presents with adenocarcinoma of the pancreas.  She initially presented with jaundice.  She was evaluated for EUS and FNA, which revealed atypical cells, but no definitive evidence of malignancy.  She also underwent biliary stenting for decompression of her biliary tree.  She had a second EUS done, but again revealed atypical cells.  She then saw Dr. Duarte Chaves at [facility].  She had " CT scan and MRI.  MRI showed multiple benign lesions in her liver; there as one area that was indeterminate.  Her CA 19-9 was 93.  On 5/26/20, she underwent Whipple procedure.  Two nodules were visualized on the surface of the liver, which were biopsied and found to be benign on pathology.  There were also hepatic cysts seen.  Pathology showed pancreatic ductal adenocarcinoma, well-differentiated (grade 1), tumor size 2.6 x 2.4 x 2.0 cm, negative margins, +LVI, 0 of 4 lymph nodes involved, staged pT2N0 (stage IB).      Port was placed by IR on 7/6/20.  It was removed on 12/28/20.    She started adjuvant chemotherapy with single-agent gemcitabine on 7/7/20.  She completed 6 cycles on 12/15/20.      INTERIM HISTORY: Flora is here for follow-up.  She is doing about the same.  She continues to have bowel and bloating issues.  She takes Creon, which she thinks is helping.  She also notes memory issues since chemotherapy.  She had a lot of the same questions she usually has about her bowel changes.  She did talk with her GI doctor at [facility].          REVIEW OF SYSTEMS:   14 point ROS was reviewed and is negative other than as noted above in HPI.       HOME MEDICATIONS:  Current Outpatient Medications   Medication Sig Dispense Refill     acetaminophen (TYLENOL) 325 MG tablet Take 1-2 tablets (325-650 mg) by mouth every 6 hours as needed for mild pain or fever 50 tablet 0     albuterol (PROAIR HFA/PROVENTIL HFA/VENTOLIN HFA) 108 (90 Base) MCG/ACT inhaler Inhale 2 puffs into the lungs every 6 hours as needed for shortness of breath / dyspnea or wheezing 1 Inhaler 0     amylase-lipase-protease (CREON) 00929-96286 units CPEP per EC capsule Take 1 capsule by mouth 3 times daily (with meals) 90 capsule 3     calcium carbonate-vitamin D (CALTRATE 600+D) 600-400 MG-UNIT chewable tablet Take 1 chew tab by mouth 2 times daily  180 tablet 3     loratadine (CLARITIN REDITABS) 10 MG dispersible tablet Take 10 mg by mouth as needed         magnesium 250 MG tablet Take 1 tablet by mouth daily       multivitamin (ONE-DAILY) tablet Take 1 tablet by mouth daily 90 tablet 0     prochlorperazine (COMPAZINE) 10 MG tablet Take 1 tablet (10 mg) by mouth every 6 hours as needed (Nausea/Vomiting) 30 tablet 1         ALLERGIES:  Allergies   Allergen Reactions     Penicillin V      Seasonal Allergies      Fredy-1 [Lidocaine] Unknown         PAST MEDICAL HISTORY:  Past Medical History:   Diagnosis Date     Chest tightness     had suspected allergies     Malignant neoplasm of head of pancreas (H) 6/9/2020     Seasonal allergies      SOB (shortness of breath)          PAST SURGICAL HISTORY:  Past Surgical History:   Procedure Laterality Date     CATARACT IOL, RT/LT  2015     COLONOSCOPY  10/14    no repeat needed due to age     COLONOSCOPY N/A 10/7/2014    Procedure: COLONOSCOPY;  Surgeon: Daryl Hanson MD;  Location:  GI     COLONOSCOPY  04/23/2019    no further screening     ENDOSCOPIC RETROGRADE CHOLANGIOPANCREATOGRAM N/A 4/20/2020    Procedure: ENDOSCOPIC RETROGRADE CHOLANGIOPANCREATOGRAPHY, PLACEMENT OF PANCREATIC STENT, PLACEMENT OF BILIARY STENT;  Surgeon: Yarely Vann MD;  Location:  OR     ESOPHAGOSCOPY, GASTROSCOPY, DUODENOSCOPY (EGD), COMBINED N/A 4/20/2020    Procedure: ESOPHAGOGASTRODUODENOSCOPY, WITH FINE NEEDLE ASPIRATION BIOPSY, WITH ENDOSCOPIC ULTRASOUND GUIDANCE, Endoscopic retrograde cholangiopancreatography;  Surgeon: Yarely Vann MD;  Location:  OR     ESOPHAGOSCOPY, GASTROSCOPY, DUODENOSCOPY (EGD), COMBINED N/A 5/5/2020    Procedure: ESOPHAGOGASTRODUODENOSCOPY, WITH FINE NEEDLE ASPIRATION BIOPSY, WITH ENDOSCOPIC ULTRASOUND GUIDANCE;  Surgeon: Romina Rosario MD;  Location:  GI     IR CHEST PORT PLACEMENT > 5 YRS OF AGE  7/6/2020     IR PORT REMOVAL RIGHT  12/28/2020     LAPAROSCOPIC BIOPSY LIVER N/A 5/22/2020    Procedure: Diagnostic Laparoscopy with intraoperative ultrasound and Liver Biopsy X2;  Surgeon:  Duarte Chaves MD;  Location:  OR     WHIPPLE PROCEDURE N/A 5/22/2020    Procedure: Non Pylorus Preserving Whipple procedure;  Surgeon: Duarte Chaves MD;  Location:  OR         SOCIAL HISTORY:  Social History     Socioeconomic History     Marital status:      Spouse name: Not on file     Number of children: Not on file     Years of education: Not on file     Highest education level: Not on file   Occupational History     Employer: RETIRED     Comment: previous taught special ed   Social Needs     Financial resource strain: Not on file     Food insecurity     Worry: Not on file     Inability: Not on file     Transportation needs     Medical: Not on file     Non-medical: Not on file   Tobacco Use     Smoking status: Never Smoker     Smokeless tobacco: Never Used   Substance and Sexual Activity     Alcohol use: Yes     Comment: social      Drug use: No     Sexual activity: Yes   Lifestyle     Physical activity     Days per week: Not on file     Minutes per session: Not on file     Stress: Not on file   Relationships     Social connections     Talks on phone: Not on file     Gets together: Not on file     Attends Christianity service: Not on file     Active member of club or organization: Not on file     Attends meetings of clubs or organizations: Not on file     Relationship status: Not on file     Intimate partner violence     Fear of current or ex partner: Not on file     Emotionally abused: Not on file     Physically abused: Not on file     Forced sexual activity: Not on file   Other Topics Concern     Parent/sibling w/ CABG, MI or angioplasty before 65F 55M? Not Asked      Service Not Asked     Blood Transfusions Not Asked     Caffeine Concern Yes     Comment: 2 cups     Occupational Exposure Not Asked     Hobby Hazards Not Asked     Sleep Concern Not Asked     Stress Concern Not Asked     Weight Concern Not Asked     Special Diet No     Back Care Not Asked     Exercise Yes     Comment:  "walking workout      Bike Helmet Not Asked     Seat Belt Not Asked     Self-Exams Not Asked   Social History Narrative     Not on file         FAMILY HISTORY:  Family History   Problem Relation Age of Onset     Musculoskeletal Disorder Mother         edematous legs     Obesity Mother      Musculoskeletal Disorder Maternal Grandmother         edematous legs; did have amputation     Obesity Maternal Grandmother      Myocardial Infarction Maternal Grandmother          PHYSICAL EXAM:  /71   Pulse 59   Temp 97.5  F (36.4  C) (Oral)   Resp 16   Ht 1.632 m (5' 4.24\")   Wt 52.6 kg (115 lb 14.4 oz)   LMP  (LMP Unknown)   SpO2 97%   BMI 19.75 kg/m    ECO  GENERAL/CONSTITUTIONAL: No acute distress.  EYES: No scleral icterus.  NEUROLOGIC: Alert, oriented, answers questions appropriately.  INTEGUMENTARY: No jaundice.  GAIT: Steady, does not use assistive device      LABS:  CBC RESULTS:   Recent Labs   Lab Test 21  1225   WBC 3.1*   RBC 4.54   HGB 12.7   HCT 39.7   MCV 87   MCH 28.0   MCHC 32.0   RDW 14.1        Recent Labs   Lab Test 21  1225 21  0945   * 140   POTASSIUM 4.1 4.4   CHLORIDE 100 105   CO2 30 28   ANIONGAP 2* 7   GLC 87 58*   BUN 12 19   CR 0.92 0.91   CAMERON 9.1 9.0     Lab Results   Component Value Date    AST 28 2021     Lab Results   Component Value Date    ALT 34 2021     No results found for: BILICONJ   Lab Results   Component Value Date    BILITOTAL 0.7 2021     Lab Results   Component Value Date    ALBUMIN 3.7 2021     Lab Results   Component Value Date    PROTTOTAL 7.0 2021      Lab Results   Component Value Date    ALKPHOS 109 2021       Component      Latest Ref Rng & Units 2020 2020 3/22/2021 2021   Cancer Antigen 19-9      0 - 37 U/mL 7 6 9 15       IMAGING:  CT c/a/p 21:  1.  No convincing recurrent or residual malignancy demonstrated.  2.  Stable low-dense lesion too small to characterize in " the  pancreatic tail.      ASSESSMENT/PLAN:  Flora Hogan is a 75 year old female with:    1) Adenocarcinoma of the pancreas head: s/p Whipple procedure on 5/26/20; pathology showed pancreatic ductal adenocarcinoma, well-differentiated (grade 1), tumor size 2.6 x 2.4 x 2.0 cm, negative margins, +LVI, 0 of 4 lymph nodes involved, staged pT2N0 (stage IB).    She completed 6 months of adjuvant gemcitabine in December 2020.    CT c/a/p on 6/23/21 showed no evidence of recurrent or metastatic disease.  There is a stable low-dense lesion too small to characterize in the pancreatic tail.  I reviewed the images and report.    -scans and tumor markers every 4 months during 2nd year  -CT c/a/p and labs/CA 19-9 in 4 months  -RTC in 4 months    2) Pulmonary nodule: stable  -will monitor on future scans    3) Chronic constipation/diarrhea: She notes bowel incontinence but sensation of incomplete bowel emptying.  She has had this for many years and has seen [facility] in the past.    -Creon prescribed - she has started it and finds it helpful.  She takes it 3 times a day.  She will continue it.    -she will also try fiber  -follow-up with [facility] again - she requests to see Dr. Yarely Vann    4) Leukpenia: Secondary to prior chemotherapy.  Mild, stable.  ANC is normal this time.  -monitor    5) Insomnia: She occasionally takes melatonin.    6) Coping: Patient notes some feelings of depression, a lot of it is due to her bowel incontinence issues.  She was looking for a support group, but the group at the [facility] has been stopped due to COVID.  She did speak with our , who offered resources and emotional support.    7) Fatigue: secondary to prior surgery and chemotherapy.  -she is open to PM&R referral  - referral placed      Jeannie Coronado MD  Hematology/Oncology  [facility]      Total time spent on day of visit, including review of tests, obtaining/reviewing separately obtained  history, ordering medications/tests/procedures, communicating with PCP/consultants, and documenting in electronic medical record: 40 minutes      Again, thank you for allowing me to participate in the care of your patient.        Sincerely,        Jeannie Coronado MD

## 2021-06-24 NOTE — TELEPHONE ENCOUNTER
Type of outreach:  [employer] message.  Health Maintenance Due   Topic Date Due     MAMMO Q1 YR  07/26/1964     DEXA SCAN SCREENING (SYSTEM ASSIGNED)  07/26/2009     ZOSTER IMMUNIZATION (2 of 3) 12/24/2014     MEDICARE ANNUAL WELLNESS VISIT  09/12/2015     FALL RISK ASSESSMENT  06/06/2018     PHQ-2 Q1 YR  06/06/2018     INFLUENZA VACCINE (1) 09/01/2018     Needs Medicare visit, mammogram.  Tita Jordan CMA (AAMA)       Not Applicable

## 2021-06-24 NOTE — PROGRESS NOTES
"Oncology Rooming Note    June 24, 2021 10:15 AM   Anali Hogan is a 75 year old female who presents for:    Chief Complaint   Patient presents with     Oncology Clinic Visit     Malignant neoplasm of head of pancreas (H)     Initial Vitals: LMP  (LMP Unknown)  Estimated body mass index is 19.93 kg/m  as calculated from the following:    Height as of 5/3/21: 1.632 m (5' 4.24\").    Weight as of 5/3/21: 53.1 kg (117 lb). There is no height or weight on file to calculate BSA.  Data Unavailable Comment: Data Unavailable   No LMP recorded (lmp unknown). Patient is postmenopausal.  Allergies reviewed: Yes  Medications reviewed: Yes    Medications: Medication refills not needed today.  Pharmacy name entered into AMT:    [id number] [place] - [address] [id number] - [place], [place] - [address] [facility] AT [place] [address] & [facility] #[id number] [address] AT [facility]    Clinical concerns: None       Emely Mo MA              "

## 2021-06-30 NOTE — TELEPHONE ENCOUNTER
RECORDS STATUS - ALL OTHER DIAGNOSIS      RECORDS RECEIVED FROM: Epic/ [facility]    DATE RECEIVED: 7/6/2021   NOTES STATUS DETAILS   OFFICE NOTE from referring provider Complete Jeannie Coronado MD   OFFICE NOTE from medical oncologist Complete 6/24/2021 Oncology Visit- Malignant Neoplasm of head of pancreas     3/25/2021 Virtual Visit- Diarrhea, unspecified type, Malignant Neoplasm of head of pancreas       12/29/2020 Virtual Visit- Malignant Neoplasm of head of pancreas     11/30/2020 Virtual Visit-    Malignant Neoplasm of head of pancreas     [facility] records are in EPIC     DISCHARGE SUMMARY from hospital Complete 5/22/2020 DVT- Mass of Pancreas    DISCHARGE REPORT from the ER     OPERATIVE REPORT Complete See Biopsies in EPIC   MEDICATION LIST Complete [facility]   CLINICAL TRIAL TREATMENTS TO DATE     LABS     PATHOLOGY REPORTS Complete- internal biopsies in [facility] 5/22/2020  D. Non-pylorus preserving Whipple (pancreaticoduodenectomy):   - Pancreatic ductal adenocarcinoma    5/5/2020   A1- 3. Pancreas, head, aspirate   - Rare atypical cells present (see description)    ANYTHING RELATED TO DIAGNOSIS Complete Labs last updated on 6/24/2021   GENONOMIC TESTING     TYPE:     IMAGING (NEED IMAGES & REPORT)     CT SCANS Complete CT Chest Abdomen Pelvis 10/25/2021, 6/23/2021, 3/22/2021, 12/28/2020   MRI     MAMMO     ULTRASOUND     PET       Action    Action Taken 6/30/2021 1:36pm KADIE    I called pt Flora- her phone went straight to .     I called [facility] [phone] #[phone] - they are going to fax over some additional records.

## 2021-07-06 ENCOUNTER — PRE VISIT (OUTPATIENT)
Dept: ONCOLOGY | Facility: CLINIC | Age: 76
End: 2021-07-06

## 2021-07-06 ENCOUNTER — ONCOLOGY VISIT (OUTPATIENT)
Dept: ONCOLOGY | Facility: CLINIC | Age: 76
End: 2021-07-06
Attending: STUDENT IN AN ORGANIZED HEALTH CARE EDUCATION/TRAINING PROGRAM
Payer: COMMERCIAL

## 2021-07-06 VITALS
HEART RATE: 55 BPM | SYSTOLIC BLOOD PRESSURE: 123 MMHG | OXYGEN SATURATION: 98 % | RESPIRATION RATE: 18 BRPM | TEMPERATURE: 98 F | DIASTOLIC BLOOD PRESSURE: 76 MMHG | BODY MASS INDEX: 19.46 KG/M2 | WEIGHT: 114.2 LBS

## 2021-07-06 DIAGNOSIS — R53.0 NEOPLASTIC (MALIGNANT) RELATED FATIGUE: ICD-10-CM

## 2021-07-06 DIAGNOSIS — C25.0 MALIGNANT NEOPLASM OF HEAD OF PANCREAS (H): Primary | ICD-10-CM

## 2021-07-06 DIAGNOSIS — R53.81 PHYSICAL DECONDITIONING: ICD-10-CM

## 2021-07-06 DIAGNOSIS — G47.00 INSOMNIA, UNSPECIFIED TYPE: ICD-10-CM

## 2021-07-06 PROCEDURE — 99204 OFFICE O/P NEW MOD 45 MIN: CPT | Performed by: STUDENT IN AN ORGANIZED HEALTH CARE EDUCATION/TRAINING PROGRAM

## 2021-07-06 ASSESSMENT — PAIN SCALES - GENERAL: PAINLEVEL: NO PAIN (0)

## 2021-07-06 NOTE — LETTER
2021         RE: Flora Hogan  [id number] [address]  [facility] [address]        Dear Colleague,    Thank you for referring your patient, Flora Hogan, to the [facility]. Please see a copy of my visit note below.           [facility] Note     Patient Name: Flora Hogan : 1945 Medical Record: [id number]     Requesting Physician/clinician: Jeannie Coronado MD           History of Present Illness:     Flora Hogan is a 75 year old female with history of pancreatic adenocarcinoma (s/p whipple on 20) who presents to [facility] for evaluation of fatigue and rehabilitation needs.    Oncology History:  - Initially presented with jaundice.  She was evaluated for EUS and FNA, which revealed atypical cells, but no definitive evidence of malignancy.  Also underwent biliary stenting for decompression of her biliary tree.    - Patient had a second EUS done, but again revealed atypical cells.  She then saw Dr. Duarte Chaves at [facility].  She had CT scan and MRI. CT revealed occluded distal common bile duct with dilatation of intra and extrahepatic biliary tree along with pancreatic duct. Surrounding 1.2 cm hypodensity in pancreatic he=ad concerning for malignancy. 0.8 cm enhancing nodule in inferior right lobe of liver is nonspecific. Noncalcified 3 mm pulmonary nodule. MRI showed multiple benign lesions in her liver; there as one area that was indeterminate.  Her CA 19-9 was 93.    -20, she underwent Whipple procedure.  Two nodules were visualized on the surface of the liver, which were biopsied and found to be benign on pathology.  There were also hepatic cysts seen.  Pathology showed pancreatic ductal adenocarcinoma, well-differentiated (grade 1), tumor size 2.6 x 2.4 x 2.0 cm, negative margins, +LVI, 0 of 4 lymph nodes involved, staged pT2N0 (stage IB).     -Port was placed by IR on 20.  It was removed on  12/28/20.   -Started adjuvant chemotherapy with single-agent gemcitabine on 7/7/20.  She completed 6 cycles on 12/15/20.  - Saw Dr. Coronado on 6/24/21, and complained of ongoing bowel and bloating issues. She takes Creon and thinks it is helping. Also notes memory issues since chemotherapy. Feelings of depression and fatigue.      Symptoms,  Patient was seen this afternoon for an initial evaluation.  She complains of significant fatigue after her cancer diagnosis, surgical intervention and ongoing treatment.  She states that she was previously very active prior to her diagnosis, and has progressively felt weaker over the last 6 months.  She states that in regards to her previous activity level, she was walking 3 to 7 miles a day and played golf.  She has tried to maintain some structured exercise up until 2 weeks ago, when it became very difficult for her due to low energy levels.      She also complains of difficulty sleeping.  She states that this has been chronic for several years, even prior to her diagnosis.  She generally gets 4 to 5 hours of sleep daily.  She has tried melatonin 10 to 20 mg tablets in the past with some relief and improvement in her sleep.  She only takes this as needed.      She has also been struggling with ongoing abdominal discomfort, and was recently prescribed Creon and feels it is helping with her bloating and pain.  She continues to worry about her nutrition with varying oral intake.  She states that she tries to maintain adequate nutrition and keeps track of this very closely.  She continues to eat multiple small meals per day.  She has also been taking calcium and magnesium supplementation in addition to protein supplementation in the form of whey protein.  She occasionally will also take a Premier protein shake when needed.    She also has concerns with memory difficulties.  She states that she feels her memory has progressively gotten worse over the last several months.  She is  specifically recalls not being able to remember names.  She is worried about dementia in addition to chemotherapy related cognitive difficulties.      Therapies/HEP,  She has not tried outpatient physical therapy.  She is continued with a structured exercise regimen as much as possible at home up until 2 weeks ago when she could not continue due to significant fatigue.      Functionally,   She remains independent for mobility, ADLs and IADLs.             Past Medical and Surgical History:     Past Medical History:   Diagnosis Date     Chest tightness     had suspected allergies     Malignant neoplasm of head of pancreas (H) 6/9/2020     Seasonal allergies      SOB (shortness of breath)      Past Surgical History:   Procedure Laterality Date     CATARACT IOL, RT/LT  2015     COLONOSCOPY  10/14    no repeat needed due to age     COLONOSCOPY N/A 10/7/2014    Procedure: COLONOSCOPY;  Surgeon: Daryl Hanson MD;  Location:  GI     COLONOSCOPY  04/23/2019    no further screening     ENDOSCOPIC RETROGRADE CHOLANGIOPANCREATOGRAM N/A 4/20/2020    Procedure: ENDOSCOPIC RETROGRADE CHOLANGIOPANCREATOGRAPHY, PLACEMENT OF PANCREATIC STENT, PLACEMENT OF BILIARY STENT;  Surgeon: Yarely Vann MD;  Location:  OR     ESOPHAGOSCOPY, GASTROSCOPY, DUODENOSCOPY (EGD), COMBINED N/A 4/20/2020    Procedure: ESOPHAGOGASTRODUODENOSCOPY, WITH FINE NEEDLE ASPIRATION BIOPSY, WITH ENDOSCOPIC ULTRASOUND GUIDANCE, Endoscopic retrograde cholangiopancreatography;  Surgeon: Yarely Vann MD;  Location:  OR     ESOPHAGOSCOPY, GASTROSCOPY, DUODENOSCOPY (EGD), COMBINED N/A 5/5/2020    Procedure: ESOPHAGOGASTRODUODENOSCOPY, WITH FINE NEEDLE ASPIRATION BIOPSY, WITH ENDOSCOPIC ULTRASOUND GUIDANCE;  Surgeon: Romina Rosario MD;  Location:  GI     IR CHEST PORT PLACEMENT > 5 YRS OF AGE  7/6/2020     IR PORT REMOVAL RIGHT  12/28/2020     LAPAROSCOPIC BIOPSY LIVER N/A 5/22/2020    Procedure: Diagnostic Laparoscopy with  intraoperative ultrasound and Liver Biopsy X2;  Surgeon: Duarte Chaves MD;  Location: [facility] OR     WHIPPLE PROCEDURE N/A 5/22/2020    Procedure: Non Pylorus Preserving Whipple procedure;  Surgeon: Duarte Chaves MD;  Location: [facility] OR            Social History:     Social History     Tobacco Use     Smoking status: Never Smoker     Smokeless tobacco: Never Used   Substance Use Topics     Alcohol use: Yes     Comment: social        Marital Status:  to her , Daryl.  Living situation: [place]  Family support: Besides her  she has a daughter and son who both live in the [place] area.  She also has a sister who lives 4 miles from her home.    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    Tobacco use: Denies  Alcohol use: Occasional           Functional history:     Flora Hogan is independent with all aspects of her life.    ADLs: Independent  Assistive devices: None  iADLs (medication management and finances): Independent  Hand dominance: right   Driving: Currently driving           Family History:     Family History   Problem Relation Age of Onset     Musculoskeletal Disorder Mother         edematous legs     Obesity Mother      Musculoskeletal Disorder Maternal Grandmother         edematous legs; did have amputation     Obesity Maternal Grandmother      Myocardial Infarction Maternal Grandmother             Medications:     Current Outpatient Medications   Medication Sig Dispense Refill     acetaminophen (TYLENOL) 325 MG tablet Take 1-2 tablets (325-650 mg) by mouth every 6 hours as needed for mild pain or fever 50 tablet 0     albuterol (PROAIR HFA/PROVENTIL HFA/VENTOLIN HFA) 108 (90 Base) MCG/ACT inhaler Inhale 2 puffs into the lungs every 6 hours as needed for shortness of breath / dyspnea or wheezing 1 Inhaler 0     amylase-lipase-protease (CREON) 34344-91447 units CPEP per EC capsule Take 1 capsule by mouth 3 times daily (with meals) 90 capsule 3  "    calcium carbonate-vitamin D (CALTRATE 600+D) 600-400 MG-UNIT chewable tablet Take 1 chew tab by mouth 2 times daily  180 tablet 3     loratadine (CLARITIN REDITABS) 10 MG dispersible tablet Take 10 mg by mouth as needed        magnesium 250 MG tablet Take 1 tablet by mouth daily       multivitamin (ONE-DAILY) tablet Take 1 tablet by mouth daily 90 tablet 0     prochlorperazine (COMPAZINE) 10 MG tablet Take 1 tablet (10 mg) by mouth every 6 hours as needed (Nausea/Vomiting) 30 tablet 1            Allergies:     Allergies   Allergen Reactions     Penicillin V      Seasonal Allergies      Fredy-1 [Lidocaine] Unknown              ROS:     A focused ROS is negative other than the symptoms noted above in the HPI.           Physical Examiniation:     VITAL SIGNS: LMP  (LMP Unknown)   BMI: Estimated body mass index is 19.75 kg/m  as calculated from the following:    Height as of 6/24/21: 1.632 m (5' 4.24\").    Weight as of 6/24/21: 52.6 kg (115 lb 14.4 oz).    Gen: NAD, pleasant and cooperative   HEENT: Atraumatic, normocephalic, extraocular movements appear intact.  Pulm: non-labored breathing in room air  Ext: No edema in BLE, no tenderness in calves  Neuro/MSK:   Orientation: Patient is oriented to person, place, situation and time.  Cognitive: 2/3 for 3 name recall at 5 minutes.  Serial sevens-missed 2 sequential numbers.  CN: PERRL, EOMI, visual fields intact.  Shoulders shrug strong and equal bilaterally.  Tongue protrusion midline.  Motor: LE-4/5 with bilateral hip flexion, 5/5 with bilateral knee extension, ankle dorsiflexion, EHL and plantar flexion.  You E-5/5 with bilateral shoulder abduction, elbow extension, wrist extension and  strength/finger abduction.  Sensory: Intact to light touch in bilateral upper and lower extremities.  Gait: Nonantalgic, slightly decreased foot clearance bilaterally though able to clear foot from ground.  Normal step and stride length.         Laboratory/Imaging:     CT " Chest/Abdomen/Pelvis W Contrast (6/23/2021):  IMPRESSION:  1.  No convincing recurrent or residual malignancy demonstrated.  2.  Stable low-dense lesion too small to characterize in the  pancreatic tail.           Assessment/Plan:   Flora Hogan is a 75 year old female with history of pancreatic adenocarcinoma (s/p whipple on 5/26/20) who [facility] for evaluation of fatigue and rehabilitation needs.  As discussed with rules, strong recommendation is to start outpatient cancer rehab PT for generalized deconditioning and cancer related fatigue to work on targeted strengthening and endurance.  In addition, an OT referral was placed to help guide the patient on sleep hygiene techniques as well as for cognitive rehabilitation in the setting of her memory difficulties.  We also discussed the possibility of neuropsychological testing and what this entails.  Patient is interested, and will think about this further and discuss this with her  and we can readdress this at a future visit.    1. Patient education: In depth discussion and education was provided about the assessment and implications of each of the below recommendations for management. Patient indicated readiness to learn, all questions were answered and understanding of material presented was confirmed.  2. Therapy/equipment/braces:  1. Cancer rehab PT referral placed for significant deconditioning and cancer related fatigue.  2. Cancer rehab OT referral placed for guidance on sleep hygiene techniques and cognitive rehabilitation in the setting of current memory difficulties.  3. Medications:  1. For insomnia, recommendation is to start scheduled melatonin 10 mg since this is helped in the past.  We also discussed some basic sleep hygiene techniques that should also be implemented.  4. Interventions:  1. Recommend continuing daily protein supplementation in the setting of variable appetite.  5. Referral / follow up with other  "providers:  1. Discussed possible future neuropsychology referral in the setting of ongoing, progressive memory and cognitive difficulties.  Patient will think about this and we can readdress at the next visit.  6. Follow up: 2 months    Rosalia Brito MD  Physical Medicine & Rehabilitation    I appreciate the opportunity to participate in the care of your patient.                   Oncology Rooming Note    July 6, 2021 3:24 PM   Flora Hogan is a 75 year old female who presents for:    Chief Complaint   Patient presents with     Oncology Clinic Visit     Initial Vitals: /76   Pulse 55   Temp 98  F (36.7  C) (Oral)   Resp 18   Wt 51.8 kg (114 lb 3.2 oz)   LMP  (LMP Unknown)   SpO2 98%   BMI 19.46 kg/m   Estimated body mass index is 19.46 kg/m  as calculated from the following:    Height as of 6/24/21: 1.632 m (5' 4.24\").    Weight as of this encounter: 51.8 kg (114 lb 3.2 oz). Body surface area is 1.53 meters squared.  No Pain (0) Comment: Data Unavailable   No LMP recorded (lmp unknown). Patient is postmenopausal.  Allergies reviewed: Yes  Medications reviewed: Yes    Medications: Medication refills not needed today.  Pharmacy name entered into [employer]:    [id number] - [place], [place] - [address] [id number], [place] - [address] [facility] AT [place] [address] & [id number] - [address] AT [facility] & [facility]    Clinical concerns: Pt wondering if there is a support group for pt's who have had the Whipple Procedure.  Dr. Brito was notified.      Shari J. Schoenberger, [facility]                Again, thank you for allowing me to participate in the care of your patient.        Sincerely,        Rosalia Brito MD    "

## 2021-07-06 NOTE — PROGRESS NOTES
[facility] Note     Patient Name: Flora Hogan : 1945 Medical Record: [id number]     Requesting Physician/clinician: Jeannie Coronado MD           History of Present Illness:     lFora Hogan is a 75 year old female with history of pancreatic adenocarcinoma (s/p whipple on 20) who [facility] for evaluation of fatigue and rehabilitation needs.    Oncology History:  - Initially presented with jaundice.  She was evaluated for EUS and FNA, which revealed atypical cells, but no definitive evidence of malignancy.  Also underwent biliary stenting for decompression of her biliary tree.    - Patient had a second EUS done, but again revealed atypical cells.  She then saw Dr. Duarte Chaves at [facility].  She had CT scan and MRI. CT revealed occluded distal common bile duct with dilatation of intra and extrahepatic biliary tree along with pancreatic duct. Surrounding 1.2 cm hypodensity in pancreatic he=ad concerning for malignancy. 0.8 cm enhancing nodule in inferior right lobe of liver is nonspecific. Noncalcified 3 mm pulmonary nodule. MRI showed multiple benign lesions in her liver; there as one area that was indeterminate.  Her CA 19-9 was 93.    -20, she underwent Whipple procedure.  Two nodules were visualized on the surface of the liver, which were biopsied and found to be benign on pathology.  There were also hepatic cysts seen.  Pathology showed pancreatic ductal adenocarcinoma, well-differentiated (grade 1), tumor size 2.6 x 2.4 x 2.0 cm, negative margins, +LVI, 0 of 4 lymph nodes involved, staged pT2N0 (stage IB).     -Port was placed by IR on 20.  It was removed on 20.   -Started adjuvant chemotherapy with single-agent gemcitabine on 20.  She completed 6 cycles on 12/15/20.  - Saw Dr. Coronado on 21, and complained of ongoing bowel and bloating issues. She takes Creon and thinks it is helping. Also notes memory issues since  chemotherapy. Feelings of depression and fatigue.      Symptoms,  Patient was seen this afternoon for an initial evaluation.  She complains of significant fatigue after her cancer diagnosis, surgical intervention and ongoing treatment.  She states that she was previously very active prior to her diagnosis, and has progressively felt weaker over the last 6 months.  She states that in regards to her previous activity level, she was walking 3 to 7 miles a day and played golf.  She has tried to maintain some structured exercise up until 2 weeks ago, when it became very difficult for her due to low energy levels.      She also complains of difficulty sleeping.  She states that this has been chronic for several years, even prior to her diagnosis.  She generally gets 4 to 5 hours of sleep daily.  She has tried melatonin 10 to 20 mg tablets in the past with some relief and improvement in her sleep.  She only takes this as needed.      She has also been struggling with ongoing abdominal discomfort, and was recently prescribed Creon and feels it is helping with her bloating and pain.  She continues to worry about her nutrition with varying oral intake.  She states that she tries to maintain adequate nutrition and keeps track of this very closely.  She continues to eat multiple small meals per day.  She has also been taking calcium and magnesium supplementation in addition to protein supplementation in the form of whey protein.  She occasionally will also take a Premier protein shake when needed.    She also has concerns with memory difficulties.  She states that she feels her memory has progressively gotten worse over the last several months.  She is specifically recalls not being able to remember names.  She is worried about dementia in addition to chemotherapy related cognitive difficulties.      Therapies/HEP,  She has not tried outpatient physical therapy.  She is continued with a structured exercise regimen as much as  possible at home up until 2 weeks ago when she could not continue due to significant fatigue.      Functionally,   She remains independent for mobility, ADLs and IADLs.             Past Medical and Surgical History:     Past Medical History:   Diagnosis Date     Chest tightness     had suspected allergies     Malignant neoplasm of head of pancreas (H) 6/9/2020     Seasonal allergies      SOB (shortness of breath)      Past Surgical History:   Procedure Laterality Date     CATARACT IOL, RT/LT  2015     COLONOSCOPY  10/14    no repeat needed due to age     COLONOSCOPY N/A 10/7/2014    Procedure: COLONOSCOPY;  Surgeon: Daryl Hanson MD;  Location:  GI     COLONOSCOPY  04/23/2019    no further screening     ENDOSCOPIC RETROGRADE CHOLANGIOPANCREATOGRAM N/A 4/20/2020    Procedure: ENDOSCOPIC RETROGRADE CHOLANGIOPANCREATOGRAPHY, PLACEMENT OF PANCREATIC STENT, PLACEMENT OF BILIARY STENT;  Surgeon: Yarely Vann MD;  Location:  OR     ESOPHAGOSCOPY, GASTROSCOPY, DUODENOSCOPY (EGD), COMBINED N/A 4/20/2020    Procedure: ESOPHAGOGASTRODUODENOSCOPY, WITH FINE NEEDLE ASPIRATION BIOPSY, WITH ENDOSCOPIC ULTRASOUND GUIDANCE, Endoscopic retrograde cholangiopancreatography;  Surgeon: Yarely Vann MD;  Location:  OR     ESOPHAGOSCOPY, GASTROSCOPY, DUODENOSCOPY (EGD), COMBINED N/A 5/5/2020    Procedure: ESOPHAGOGASTRODUODENOSCOPY, WITH FINE NEEDLE ASPIRATION BIOPSY, WITH ENDOSCOPIC ULTRASOUND GUIDANCE;  Surgeon: Romina Rosario MD;  Location:  GI     IR CHEST PORT PLACEMENT > 5 YRS OF AGE  7/6/2020     IR PORT REMOVAL RIGHT  12/28/2020     LAPAROSCOPIC BIOPSY LIVER N/A 5/22/2020    Procedure: Diagnostic Laparoscopy with intraoperative ultrasound and Liver Biopsy X2;  Surgeon: Duarte Chaves MD;  Location: [facility] OR     WHIPPLE PROCEDURE N/A 5/22/2020    Procedure: Non Pylorus Preserving Whipple procedure;  Surgeon: Duarte Chaves MD;  Location: [facility] OR            Social History:     Social History      Tobacco Use     Smoking status: Never Smoker     Smokeless tobacco: Never Used   Substance Use Topics     Alcohol use: Yes     Comment: social        Marital Status:  to her , Daryl.  Living situation: [place]  Family support: Besides her  she has a daughter and son who both live in the [facility] area.  She also has a sister who lives 4 miles from her home.    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    Tobacco use: Denies  Alcohol use: Occasional           Functional history:     Flora Hogan is independent with all aspects of her life.    ADLs: Independent  Assistive devices: None  iADLs (medication management and finances): Independent  Hand dominance: right   Driving: Currently driving           Family History:     Family History   Problem Relation Age of Onset     Musculoskeletal Disorder Mother         edematous legs     Obesity Mother      Musculoskeletal Disorder Maternal Grandmother         edematous legs; did have amputation     Obesity Maternal Grandmother      Myocardial Infarction Maternal Grandmother             Medications:     Current Outpatient Medications   Medication Sig Dispense Refill     acetaminophen (TYLENOL) 325 MG tablet Take 1-2 tablets (325-650 mg) by mouth every 6 hours as needed for mild pain or fever 50 tablet 0     albuterol (PROAIR HFA/PROVENTIL HFA/VENTOLIN HFA) 108 (90 Base) MCG/ACT inhaler Inhale 2 puffs into the lungs every 6 hours as needed for shortness of breath / dyspnea or wheezing 1 Inhaler 0     amylase-lipase-protease (CREON) 03218-36662 units CPEP per EC capsule Take 1 capsule by mouth 3 times daily (with meals) 90 capsule 3     calcium carbonate-vitamin D (CALTRATE 600+D) 600-400 MG-UNIT chewable tablet Take 1 chew tab by mouth 2 times daily  180 tablet 3     loratadine (CLARITIN REDITABS) 10 MG dispersible tablet Take 10 mg by mouth as needed        magnesium 250 MG tablet Take 1 tablet by mouth daily    "    multivitamin (ONE-DAILY) tablet Take 1 tablet by mouth daily 90 tablet 0     prochlorperazine (COMPAZINE) 10 MG tablet Take 1 tablet (10 mg) by mouth every 6 hours as needed (Nausea/Vomiting) 30 tablet 1            Allergies:     Allergies   Allergen Reactions     Penicillin V      Seasonal Allergies      Fredy-1 [Lidocaine] Unknown              ROS:     A focused ROS is negative other than the symptoms noted above in the HPI.           Physical Examiniation:     VITAL SIGNS: LMP  (LMP Unknown)   BMI: Estimated body mass index is 19.75 kg/m  as calculated from the following:    Height as of 6/24/21: 1.632 m (5' 4.24\").    Weight as of 6/24/21: 52.6 kg (115 lb 14.4 oz).    Gen: NAD, pleasant and cooperative   HEENT: Atraumatic, normocephalic, extraocular movements appear intact.  Pulm: non-labored breathing in room air  Ext: No edema in BLE, no tenderness in calves  Neuro/MSK:   Orientation: Patient is oriented to person, place, situation and time.  Cognitive: 2/3 for 3 name recall at 5 minutes.  Serial sevens-missed 2 sequential numbers.  CN: PERRL, EOMI, visual fields intact.  Shoulders shrug strong and equal bilaterally.  Tongue protrusion midline.  Motor: LE-4/5 with bilateral hip flexion, 5/5 with bilateral knee extension, ankle dorsiflexion, EHL and plantar flexion.  You E-5/5 with bilateral shoulder abduction, elbow extension, wrist extension and  strength/finger abduction.  Sensory: Intact to light touch in bilateral upper and lower extremities.  Gait: Nonantalgic, slightly decreased foot clearance bilaterally though able to clear foot from ground.  Normal step and stride length.         Laboratory/Imaging:     CT Chest/Abdomen/Pelvis W Contrast (6/23/2021):  IMPRESSION:  1.  No convincing recurrent or residual malignancy demonstrated.  2.  Stable low-dense lesion too small to characterize in the  pancreatic tail.           Assessment/Plan:   Flora Hogan is a 75 year old female with history of " pancreatic adenocarcinoma (s/p whipple on 5/26/20) who [facility] for evaluation of fatigue and rehabilitation needs.  As discussed with rules, strong recommendation is to start outpatient cancer rehab PT for generalized deconditioning and cancer related fatigue to work on targeted strengthening and endurance.  In addition, an OT referral was placed to help guide the patient on sleep hygiene techniques as well as for cognitive rehabilitation in the setting of her memory difficulties.  We also discussed the possibility of neuropsychological testing and what this entails.  Patient is interested, and will think about this further and discuss this with her  and we can readdress this at a future visit.    1. Patient education: In depth discussion and education was provided about the assessment and implications of each of the below recommendations for management. Patient indicated readiness to learn, all questions were answered and understanding of material presented was confirmed.  2. Therapy/equipment/braces:  1. Cancer rehab PT referral placed for significant deconditioning and cancer related fatigue.  2. Cancer rehab OT referral placed for guidance on sleep hygiene techniques and cognitive rehabilitation in the setting of current memory difficulties.  3. Medications:  1. For insomnia, recommendation is to start scheduled melatonin 10 mg since this is helped in the past.  We also discussed some basic sleep hygiene techniques that should also be implemented.  4. Interventions:  1. Recommend continuing daily protein supplementation in the setting of variable appetite.  5. Referral / follow up with other providers:  1. Discussed possible future neuropsychology referral in the setting of ongoing, progressive memory and cognitive difficulties.  Patient will think about this and we can readdress at the next visit.  6. Follow up: 2 months    Rosalia Brito MD  Physical Medicine &  Rehabilitation    I appreciate the opportunity to participate in the care of your patient.

## 2021-07-06 NOTE — PATIENT INSTRUCTIONS
1.  A referral has been placed to cancer rehab physical therapy to help with strengthening and endurance in the setting of your ongoing fatigue.  2.  A referral has been placed to cancer rehab Occupational Therapy to help with sleep hygiene techniques and cognitive rehabilitation in the setting of your insomnia and progressive memory difficulties.  3.  Return to clinic with Dr. Brito in 2 months.

## 2021-07-06 NOTE — PROGRESS NOTES
"Oncology Rooming Note    July 6, 2021 3:24 PM   Flora Hogan is a 75 year old female who presents for:    Chief Complaint   Patient presents with     Oncology Clinic Visit     Initial Vitals: /76   Pulse 55   Temp 98  F (36.7  C) (Oral)   Resp 18   Wt 51.8 kg (114 lb 3.2 oz)   LMP  (LMP Unknown)   SpO2 98%   BMI 19.46 kg/m   Estimated body mass index is 19.46 kg/m  as calculated from the following:    Height as of 6/24/21: 1.632 m (5' 4.24\").    Weight as of this encounter: 51.8 kg (114 lb 3.2 oz). Body surface area is 1.53 meters squared.  No Pain (0) Comment: Data Unavailable   No LMP recorded (lmp unknown). Patient is postmenopausal.  Allergies reviewed: Yes  Medications reviewed: Yes    Medications: Medication refills not needed today.  Pharmacy name entered into [employer]:    [facility][id number] - [place], [place] - [address] DRUG ST[facility] [address][id number] [place], [place] - [address] [facility] AT [place] [address] & [facility] DRUG STORE #[id number] AT [facility] & [facility]    Clinical concerns: Pt wondering if there is a support group for pt's who have had the Whipple Procedure.  Dr. Brito was notified.      Shari J. Schoenberger, [facility]            "

## 2021-08-16 ENCOUNTER — TELEPHONE (OUTPATIENT)
Dept: FAMILY MEDICINE | Facility: CLINIC | Age: 76
End: 2021-08-16

## 2021-08-16 NOTE — TELEPHONE ENCOUNTER
Flora is calling wanting to know if she should get a Covid booster shot. Pt had moderna.     Flora: [phone], ok to leave detailed message.    Charito Faye-

## 2021-08-17 NOTE — TELEPHONE ENCOUNTER
Right now I believe the recommendations are for only those that are being ACTIVELY treated for cancer. But I expect her to be in the groups that soon follow. She could also discuss this with her oncologist at her upcoming visit.

## 2021-09-14 ENCOUNTER — PATIENT OUTREACH (OUTPATIENT)
Dept: ONCOLOGY | Facility: CLINIC | Age: 76
End: 2021-09-14

## 2021-09-14 ENCOUNTER — ONCOLOGY VISIT (OUTPATIENT)
Dept: ONCOLOGY | Facility: CLINIC | Age: 76
End: 2021-09-14
Attending: STUDENT IN AN ORGANIZED HEALTH CARE EDUCATION/TRAINING PROGRAM
Payer: COMMERCIAL

## 2021-09-14 VITALS
OXYGEN SATURATION: 96 % | SYSTOLIC BLOOD PRESSURE: 129 MMHG | WEIGHT: 114.6 LBS | BODY MASS INDEX: 19.52 KG/M2 | DIASTOLIC BLOOD PRESSURE: 77 MMHG | RESPIRATION RATE: 16 BRPM | HEART RATE: 60 BPM

## 2021-09-14 DIAGNOSIS — R53.81 PHYSICAL DECONDITIONING: ICD-10-CM

## 2021-09-14 DIAGNOSIS — C25.0 MALIGNANT NEOPLASM OF HEAD OF PANCREAS (H): Primary | ICD-10-CM

## 2021-09-14 DIAGNOSIS — R25.2 MUSCLE CRAMPS: ICD-10-CM

## 2021-09-14 DIAGNOSIS — R53.0 NEOPLASTIC (MALIGNANT) RELATED FATIGUE: ICD-10-CM

## 2021-09-14 PROCEDURE — 99214 OFFICE O/P EST MOD 30 MIN: CPT | Performed by: STUDENT IN AN ORGANIZED HEALTH CARE EDUCATION/TRAINING PROGRAM

## 2021-09-14 ASSESSMENT — PAIN SCALES - GENERAL: PAINLEVEL: NO PAIN (0)

## 2021-09-14 NOTE — PROGRESS NOTES
"Oncology Rooming Note    September 14, 2021 8:11 AM   Flora Hogan is a 76 year old female who presents for:    Chief Complaint   Patient presents with     Oncology Clinic Visit     Initial Vitals: Wt 52 kg (114 lb 9.6 oz)   LMP  (LMP Unknown)   BMI 19.52 kg/m   Estimated body mass index is 19.52 kg/m  as calculated from the following:    Height as of 6/24/21: 1.632 m (5' 4.24\").    Weight as of this encounter: 52 kg (114 lb 9.6 oz). Body surface area is 1.54 meters squared.  Data Unavailable Comment: Data Unavailable   No LMP recorded (lmp unknown). Patient is postmenopausal.  Allergies reviewed: Yes  Medications reviewed: Yes    Medications: Medication refills not needed today.  Pharmacy name entered into [employer]:    [facility][id number] - [place], [place] - [address] DRUG STORE [address][id number] - [place], [place] - [address] [facility] AT [place] [address] & [facility] DRUG STORE #[id number] [address] AT [facility] & [facility]    Clinical concerns:  doctor was notified.      Brenda Pena CMA            "

## 2021-09-14 NOTE — PROGRESS NOTES
[facility]   PM&R clinic note        Interval history:     Flora Hogan presents to clinic today for follow up reg her rehab needs.   She has h/o pancreatic adenocarcinoma (s/p whipple on 5/26/20) with cancer related fatigue.  Was last seen in clinic on 7/6/21.  Recommendations included   1. Therapy/equipment/braces:  1. Cancer rehab PT referral placed for significant deconditioning and cancer related fatigue.  2. Cancer rehab OT referral placed for guidance on sleep hygiene techniques and cognitive rehabilitation in the setting of current memory difficulties.  2. Medications:  1. For insomnia, recommendation is to start scheduled melatonin 10 mg since this is helped in the past.  We also discussed some basic sleep hygiene techniques that should also be implemented.  3. Interventions:  1. Recommend continuing daily protein supplementation in the setting of variable appetite.  4. Referral / follow up with other providers:  1. Discussed possible future neuropsychology referral in the setting of ongoing, progressive memory and cognitive difficulties.  Patient will think about this and we can readdress at the next visit.    Oncology History:  - Initially presented with jaundice.  She was evaluated for EUS and FNA, which revealed atypical cells, but no definitive evidence of malignancy.  Also underwent biliary stenting for decompression of her biliary tree.    - Patient had a second EUS done, but again revealed atypical cells.  She then saw Dr. Duarte Chaves at [facility].  She had CT scan and MRI. CT revealed occluded distal common bile duct with dilatation of intra and extrahepatic biliary tree along with pancreatic duct. Surrounding 1.2 cm hypodensity in pancreatic he=ad concerning for malignancy. 0.8 cm enhancing nodule in inferior right lobe of liver is nonspecific. Noncalcified 3 mm pulmonary nodule. MRI showed multiple benign lesions in her liver; there as one  area that was indeterminate.  Her CA 19-9 was 93.    -5/26/20, she underwent Whipple procedure.  Two nodules were visualized on the surface of the liver, which were biopsied and found to be benign on pathology.  There were also hepatic cysts seen.  Pathology showed pancreatic ductal adenocarcinoma, well-differentiated (grade 1), tumor size 2.6 x 2.4 x 2.0 cm, negative margins, +LVI, 0 of 4 lymph nodes involved, staged pT2N0 (stage IB).     -Port was placed by IR on 7/6/20.  It was removed on 12/28/20.   -Started adjuvant chemotherapy with single-agent gemcitabine on 7/7/20.  She completed 6 cycles on 12/15/20.  - Saw Dr. Coronado on 6/24/21, and complained of ongoing bowel and bloating issues. She takes Creon and thinks it is helping. Also notes memory issues since chemotherapy. Feelings of depression and fatigue.        Symptoms,  Patient was seen for a return visit this morning.  Since her last visit, she states that things have not gone as she originally planned.  She had a couple of visits with our dietitian, but states that the appointment did not go well.  She feels that the information that was given to her did not help her with what she was looking for in terms of diet recommendations.  She states that a neighbor and her daughter looked into things for her, and she was directed to a FODMAP diet, which has significantly helped her.    She also recently attended a family reunion and had to reschedule or push some of her appointments to accommodate this.    In regards to support services that she had requested at her last visit, she states that she does not remember receiving a phone call from social work regarding this.  In addition she states that she was not contacted by physical or Occupational Therapy to schedule those visits.  However, she does state that sometimes she does not  her phone when she sees numbers that she does not recognize, and they may have called but she missed them.    In light of  this, patient has started a workout routine on her own.  She states that so far this has been working well, but would like more information on specific targeted strengthening exercises in light of her previous treatment and history.    The main symptom that she is complaining about today is severe leg cramps overnight, which have been very difficult for her.  She states that she has had these cramps for several years, even prior to her cancer diagnosis and surgery.  But she feels that the cramps have been getting worse over the last several months.  She is hydrating well during the day, and has been very purposeful but this.  She has lab scheduled next week along with a CT scan.  She has not tried any medication specifically for the cramps in the past, however does remember that her primary care provider at one point prescribed a medication that she tried and felt that it did not help.  She cannot remember the name of the medication.  She has been taking magnesium supplements for a while.    Patient also notes that she usually travels to [place] every year, and is planning a trip to leave on 12/26/2021 and will be gone for 3 months.    Therapies/HEP,  Patient has continued with a daily exercise regimen as tolerated.  However, she has not been able to schedule sessions with physical or occupational therapy since her last visit.  It is uncertain whether she was contacted by them or not.      Functionally,   Patient is independent for all mobility, ADLs and IADLs.      Social history is unchanged.        Medications:  Current Outpatient Medications   Medication Sig Dispense Refill     acetaminophen (TYLENOL) 325 MG tablet Take 1-2 tablets (325-650 mg) by mouth every 6 hours as needed for mild pain or fever 50 tablet 0     albuterol (PROAIR HFA/PROVENTIL HFA/VENTOLIN HFA) 108 (90 Base) MCG/ACT inhaler Inhale 2 puffs into the lungs every 6 hours as needed for shortness of breath / dyspnea or wheezing 1 Inhaler 0      amylase-lipase-protease (CREON) 83229-64629 units CPEP per EC capsule Take 1 capsule by mouth 3 times daily (with meals) 90 capsule 3     calcium carbonate-vitamin D (CALTRATE 600+D) 600-400 MG-UNIT chewable tablet Take 1 chew tab by mouth 2 times daily  180 tablet 3     loratadine (CLARITIN REDITABS) 10 MG dispersible tablet Take 10 mg by mouth as needed        magnesium 250 MG tablet Take 1 tablet by mouth daily       multivitamin (ONE-DAILY) tablet Take 1 tablet by mouth daily 90 tablet 0     prochlorperazine (COMPAZINE) 10 MG tablet Take 1 tablet (10 mg) by mouth every 6 hours as needed (Nausea/Vomiting) 30 tablet 1              Physical Exam:   LMP  (LMP Unknown)   Gen: NAD, pleasant and cooperative   HEENT: Atraumatic, normocephalic, extraocular movements appear intact.  Pulm: non-labored breathing in room air  Ext: No edema in BLE, no tenderness in calves  Neuro/MSK:   Orientation: Patient is oriented to person, place, situation and time.  Motor: LE-4+/5 with bilateral hip flexion, 5/5 with bilateral knee extension, ankle dorsiflexion, EHL and plantar flexion.  Upper extremities: -5/5 with bilateral shoulder abduction, elbow extension, wrist extension and  strength/finger abduction.  Sensory: Intact to light touch in bilateral upper and lower extremities.  Gait: Nonantalgic, slightly decreased foot clearance bilaterally though able to clear foot from ground.  Normal step and stride length.      Labs/Imaging:  Lab Results   Component Value Date    WBC 3.1 (L) 06/22/2021    HGB 12.7 06/22/2021    HCT 39.7 06/22/2021    MCV 87 06/22/2021     06/22/2021     Lab Results   Component Value Date     (L) 06/22/2021    POTASSIUM 4.1 06/22/2021    CHLORIDE 100 06/22/2021    CO2 30 06/22/2021    GLC 87 06/22/2021     Lab Results   Component Value Date    GFRESTIMATED 60 (L) 06/22/2021    GFRESTBLACK 70 06/22/2021     Lab Results   Component Value Date    AST 28 06/22/2021    ALT 34 06/22/2021    ALKPHOS  109 06/22/2021    BILITOTAL 0.7 06/22/2021     Lab Results   Component Value Date    INR 1.05 07/06/2020     Lab Results   Component Value Date    BUN 12 06/22/2021    CR 0.92 06/22/2021              Assessment/Plan   Flora Hogan presents to clinic today for follow up reg her rehab needs.   She has h/o pancreatic adenocarcinoma (s/p whipple on 5/26/20) with cancer related fatigue.  Was last seen in clinic on 7/6/21.  As discussed with rules at today's visit, I will reach out to both social work and physical/occupational therapy to ensure that she is able to get these visits scheduled in light of possible difficulties contacting her after her last visit.  We also discussed starting Flexeril to help with her muscle spasms, however she would like to think about this a little bit more and then will reach out if she would like to try this medication.  She has labs scheduled for next week, and would like to follow-up on electrolyte abnormalities that might be contributing to her cramping.  She was encouraged to continue to hydrate well.  We will plan a follow-up in 2 months prior to her December trip to [place] for 3 months.  Patient is in agreement with the above plan.    5. Therapy/equipment/braces:  1. We will follow up with cancer rehab PT and OT to see if a new order needs to be placed, and to help coordinate scheduling so that she may get at least 2-3 sessions in prior to her trip to [place] to help with targeted strengthening.    6. Medications:  1. We discussed starting Flexeril 5 mg 3 times daily as needed for muscle spasms if they continue to be difficult or get worse.  Patient would like to wait on this, and will review the medication and get back in touch if she would like to start this and have a prescription given.  7. Recommend continuing daily diet as tolerated.  Patient has felt better since her last visit with her new diet and supplementation regimen.    8. Referral / follow up with other  providers:  1. Social work referral to aid the patient with finding support group resources in the [facility] to help.  9. Follow up: 3 months      Rosalia Brito MD  Physical Medicine & Rehabilitation

## 2021-09-14 NOTE — PROGRESS NOTES
Social Work Progress Note      Data/Intervention:  Patient Name:  Flora Hogan  /Age:  1945 (75 year old)    Reason for Follow-Up:  Flora is a 76-year-old woman with a history of pancreatic cancer who is followed by Dr. Coronado at [facility]. This clinician received referral from  for resource support.      Intervention:   Flroa acknowledged that she is doing well from a resource and emotional standpoint. Flora reported that she found 3 books from library that gave her more concrete suggestions around diet changes, and she has more freedom and not as many issues with incontinence.     Flora appreciative of resources for support groups. [facility] put group options together in mailing.      Resources Provided:  [employer]  [facility]  [facility]- Pancreat[facility]    Plan:  1) [facility] sent mailing to Flora today with resources requested  2) This clinician will continue to be available as needed for ongoing psychosocial support.     Please call or page if needs or concerns arise.     ALAN Mathew, LICSW  Direct Phone: [phone]  Pager: [phone]

## 2021-09-14 NOTE — LETTER
9/14/2021         RE: Flora Hogan  [id number]  [place] [address]        Dear Colleague,    Thank you for referring your patient, Flora Hogan, to the [facility]. Please see a copy of my visit note below.    [facility]   PM&R clinic note        Interval history:     lFora Hogan presents to clinic today for follow up reg her rehab needs.   She has h/o pancreatic adenocarcinoma (s/p whipple on 5/26/20) with cancer related fatigue.  Was last seen in clinic on 7/6/21.  Recommendations included   1. Therapy/equipment/braces:  1. Cancer rehab PT referral placed for significant deconditioning and cancer related fatigue.  2. Cancer rehab OT referral placed for guidance on sleep hygiene techniques and cognitive rehabilitation in the setting of current memory difficulties.  2. Medications:  1. For insomnia, recommendation is to start scheduled melatonin 10 mg since this is helped in the past.  We also discussed some basic sleep hygiene techniques that should also be implemented.  3. Interventions:  1. Recommend continuing daily protein supplementation in the setting of variable appetite.  4. Referral / follow up with other providers:  1. Discussed possible future neuropsychology referral in the setting of ongoing, progressive memory and cognitive difficulties.  Patient will think about this and we can readdress at the next visit.    Oncology History:  - Initially presented with jaundice.  She was evaluated for EUS and FNA, which revealed atypical cells, but no definitive evidence of malignancy.  Also underwent biliary stenting for decompression of her biliary tree.    - Patient had a second EUS done, but again revealed atypical cells.  She then saw Dr. Duarte Chaves at [facility].  She had CT scan and MRI. CT revealed occluded distal common bile duct with dilatation of intra and extrahepatic biliary tree along with  pancreatic duct. Surrounding 1.2 cm hypodensity in pancreatic he=ad concerning for malignancy. 0.8 cm enhancing nodule in inferior right lobe of liver is nonspecific. Noncalcified 3 mm pulmonary nodule. MRI showed multiple benign lesions in her liver; there as one area that was indeterminate.  Her CA 19-9 was 93.    -5/26/20, she underwent Whipple procedure.  Two nodules were visualized on the surface of the liver, which were biopsied and found to be benign on pathology.  There were also hepatic cysts seen.  Pathology showed pancreatic ductal adenocarcinoma, well-differentiated (grade 1), tumor size 2.6 x 2.4 x 2.0 cm, negative margins, +LVI, 0 of 4 lymph nodes involved, staged pT2N0 (stage IB).     -Port was placed by IR on 7/6/20.  It was removed on 12/28/20.   -Started adjuvant chemotherapy with single-agent gemcitabine on 7/7/20.  She completed 6 cycles on 12/15/20.  - Saw Dr. Coronado on 6/24/21, and complained of ongoing bowel and bloating issues. She takes Creon and thinks it is helping. Also notes memory issues since chemotherapy. Feelings of depression and fatigue.        Symptoms,  Patient was seen for a return visit this morning.  Since her last visit, she states that things have not gone as she originally planned.  She had a couple of visits with our dietitian, but states that the appointment did not go well.  She feels that the information that was given to her did not help her with what she was looking for in terms of diet recommendations.  She states that a neighbor and her daughter looked into things for her, and she was directed to a FODMAP diet, which has significantly helped her.    She also recently attended a family reunion and had to reschedule or push some of her appointments to accommodate this.    In regards to support services that she had requested at her last visit, she states that she does not remember receiving a phone call from social work regarding this.  In addition she states that she  was not contacted by physical or Occupational Therapy to schedule those visits.  However, she does state that sometimes she does not  her phone when she sees numbers that she does not recognize, and they may have called but she missed them.    In light of this, patient has started a workout routine on her own.  She states that so far this has been working well, but would like more information on specific targeted strengthening exercises in light of her previous treatment and history.    The main symptom that she is complaining about today is severe leg cramps overnight, which have been very difficult for her.  She states that she has had these cramps for several years, even prior to her cancer diagnosis and surgery.  But she feels that the cramps have been getting worse over the last several months.  She is hydrating well during the day, and has been very purposeful but this.  She has lab scheduled next week along with a CT scan.  She has not tried any medication specifically for the cramps in the past, however does remember that her primary care provider at one point prescribed a medication that she tried and felt that it did not help.  She cannot remember the name of the medication.  She has been taking magnesium supplements for a while.    Patient also notes that she usually travels to [place] every year, and is planning a trip to leave on 12/26/2021 and will be gone for 3 months.    Therapies/HEP,  Patient has continued with a daily exercise regimen as tolerated.  However, she has not been able to schedule sessions with physical or occupational therapy since her last visit.  It is uncertain whether she was contacted by them or not.      Functionally,   Patient is independent for all mobility, ADLs and IADLs.      Social history is unchanged.        Medications:  Current Outpatient Medications   Medication Sig Dispense Refill     acetaminophen (TYLENOL) 325 MG tablet Take 1-2 tablets (325-650 mg) by mouth  every 6 hours as needed for mild pain or fever 50 tablet 0     albuterol (PROAIR HFA/PROVENTIL HFA/VENTOLIN HFA) 108 (90 Base) MCG/ACT inhaler Inhale 2 puffs into the lungs every 6 hours as needed for shortness of breath / dyspnea or wheezing 1 Inhaler 0     amylase-lipase-protease (CREON) 17393-57735 units CPEP per EC capsule Take 1 capsule by mouth 3 times daily (with meals) 90 capsule 3     calcium carbonate-vitamin D (CALTRATE 600+D) 600-400 MG-UNIT chewable tablet Take 1 chew tab by mouth 2 times daily  180 tablet 3     loratadine (CLARITIN REDITABS) 10 MG dispersible tablet Take 10 mg by mouth as needed        magnesium 250 MG tablet Take 1 tablet by mouth daily       multivitamin (ONE-DAILY) tablet Take 1 tablet by mouth daily 90 tablet 0     prochlorperazine (COMPAZINE) 10 MG tablet Take 1 tablet (10 mg) by mouth every 6 hours as needed (Nausea/Vomiting) 30 tablet 1              Physical Exam:   LMP  (LMP Unknown)   Gen: NAD, pleasant and cooperative   HEENT: Atraumatic, normocephalic, extraocular movements appear intact.  Pulm: non-labored breathing in room air  Ext: No edema in BLE, no tenderness in calves  Neuro/MSK:   Orientation: Patient is oriented to person, place, situation and time.  Motor: LE-4+/5 with bilateral hip flexion, 5/5 with bilateral knee extension, ankle dorsiflexion, EHL and plantar flexion.  Upper extremities: -5/5 with bilateral shoulder abduction, elbow extension, wrist extension and  strength/finger abduction.  Sensory: Intact to light touch in bilateral upper and lower extremities.  Gait: Nonantalgic, slightly decreased foot clearance bilaterally though able to clear foot from ground.  Normal step and stride length.      Labs/Imaging:  Lab Results   Component Value Date    WBC 3.1 (L) 06/22/2021    HGB 12.7 06/22/2021    HCT 39.7 06/22/2021    MCV 87 06/22/2021     06/22/2021     Lab Results   Component Value Date     (L) 06/22/2021    POTASSIUM 4.1 06/22/2021     CHLORIDE 100 06/22/2021    CO2 30 06/22/2021    GLC 87 06/22/2021     Lab Results   Component Value Date    GFRESTIMATED 60 (L) 06/22/2021    GFRESTBLACK 70 06/22/2021     Lab Results   Component Value Date    AST 28 06/22/2021    ALT 34 06/22/2021    ALKPHOS 109 06/22/2021    BILITOTAL 0.7 06/22/2021     Lab Results   Component Value Date    INR 1.05 07/06/2020     Lab Results   Component Value Date    BUN 12 06/22/2021    CR 0.92 06/22/2021              Assessment/Plan   Flora Hogan presents to clinic today for follow up reg her rehab needs.   She has h/o pancreatic adenocarcinoma (s/p whipple on 5/26/20) with cancer related fatigue.  Was last seen in clinic on 7/6/21.  As discussed with rules at today's visit, I will reach out to both social work and physical/occupational therapy to ensure that she is able to get these visits scheduled in light of possible difficulties contacting her after her last visit.  We also discussed starting Flexeril to help with her muscle spasms, however she would like to think about this a little bit more and then will reach out if she would like to try this medication.  She has labs scheduled for next week, and would like to follow-up on electrolyte abnormalities that might be contributing to her cramping.  She was encouraged to continue to hydrate well.  We will plan a follow-up in 2 months prior to her December trip to [place] for 3 months.  Patient is in agreement with the above plan.    5. Therapy/equipment/braces:  1. We will follow up with cancer rehab PT and OT to see if a new order needs to be placed, and to help coordinate scheduling so that she may get at least 2-3 sessions in prior to her trip to [place] to help with targeted strengthening.    6. Medications:  1. We discussed starting Flexeril 5 mg 3 times daily as needed for muscle spasms if they continue to be difficult or get worse.  Patient would like to wait on this, and will review the medication and get back in  "touch if she would like to start this and have a prescription given.  7. Recommend continuing daily diet as tolerated.  Patient has felt better since her last visit with her new diet and supplementation regimen.    8. Referral / follow up with other providers:  1. Social work referral to aid the patient with finding support group resources in the [facility] to help.  9. Follow up: 3 months      Rosalia Brito MD  Physical Medicine & Rehabilitation                  Oncology Rooming Note    September 14, 2021 8:11 AM   Flora Hogan is a 76 year old female who presents for:    Chief Complaint   Patient presents with     Oncology Clinic Visit     Initial Vitals: Wt 52 kg (114 lb 9.6 oz)   LMP  (LMP Unknown)   BMI 19.52 kg/m   Estimated body mass index is 19.52 kg/m  as calculated from the following:    Height as of 6/24/21: 1.632 m (5' 4.24\").    Weight as of this encounter: 52 kg (114 lb 9.6 oz). Body surface area is 1.54 meters squared.  Data Unavailable Comment: Data Unavailable   No LMP recorded (lmp unknown). Patient is postmenopausal.  Allergies reviewed: Yes  Medications reviewed: Yes    Medications: Medication refills not needed today.  Pharmacy name entered into [employer]:    [id number] - [place], [place] - [address] [id number], [place] - [address] AT [place] [address] & [id number] - [place] [address] [address] AT [facility] & [facility]    Clinical concerns:  doctor was notified.      [facility]                Again, thank you for allowing me to participate in the care of your patient.        Sincerely,        Rosalia Brito MD    "

## 2021-09-15 NOTE — PATIENT INSTRUCTIONS
1.  As we discussed, Dr. Brito will follow up with cancer rehab PT and OT regarding scheduling your sessions to help with ongoing fatigue and weakness.  2.  As we discussed, you can start a medication called Flexeril at the dose of 5 mg 3 times a day as needed for your muscle spasms if they continue to be difficult or get worse.  Please feel free to contact Dr. Brito through a chart if you would like to start this medication.  3.  A referral to social work has been placed to help you with finding appropriate support group resources in the [facility].  4.  Return to clinic with Dr. Brito in 3 months for follow-up.

## 2021-09-23 ENCOUNTER — PATIENT OUTREACH (OUTPATIENT)
Dept: ONCOLOGY | Facility: CLINIC | Age: 76
End: 2021-09-23

## 2021-09-23 NOTE — PROGRESS NOTES
Social Work Progress Note      Data/Intervention:  Patient Name:  Flora Hogan  /Age:  1945 (75 year old)    Reason for Follow-Up:  Flora is a 76-year-old woman with a history of pancreatic cancer who is followed by Dr. Coronado at [facility]. This clinician returning VM with request for resources to build strength.      Intervention:   This clinician called and left detailed voicemail with social work contact information and availability. SW also detailed programming that might be of interest to Flora, and placed additional resources in the mail.      Resources Provided:  Cancer Rehab- [facility]  Sur[facility] 2 Strength  [facility]    Plan:  1) SW sent mailing to Flora today with resources requested  2) This clinician will continue to be available as needed for ongoing psychosocial support.     Please call or page if needs or concerns arise.     ALAN Mathew, LICSW  Direct Phone: [phone]  Pager: [phone]

## 2021-10-03 ENCOUNTER — HEALTH MAINTENANCE LETTER (OUTPATIENT)
Age: 76
End: 2021-10-03

## 2021-10-25 ENCOUNTER — LAB (OUTPATIENT)
Dept: INFUSION THERAPY | Facility: CLINIC | Age: 76
End: 2021-10-25
Attending: INTERNAL MEDICINE
Payer: COMMERCIAL

## 2021-10-25 ENCOUNTER — HOSPITAL ENCOUNTER (OUTPATIENT)
Dept: CT IMAGING | Facility: CLINIC | Age: 76
Discharge: HOME | End: 2021-10-25
Attending: INTERNAL MEDICINE | Admitting: INTERNAL MEDICINE
Payer: COMMERCIAL

## 2021-10-25 DIAGNOSIS — C25.0 MALIGNANT NEOPLASM OF HEAD OF PANCREAS (H): ICD-10-CM

## 2021-10-25 LAB
ALBUMIN SERPL-MCNC: 3.8 G/DL (ref 3.4–5)
ALP SERPL-CCNC: 107 U/L (ref 40–150)
ALT SERPL W P-5'-P-CCNC: 41 U/L (ref 0–50)
ANION GAP SERPL CALCULATED.3IONS-SCNC: 5 MMOL/L (ref 3–14)
AST SERPL W P-5'-P-CCNC: 35 U/L (ref 0–45)
BASOPHILS # BLD AUTO: 0 10E3/UL (ref 0–0.2)
BASOPHILS NFR BLD AUTO: 1 %
BILIRUB SERPL-MCNC: 0.6 MG/DL (ref 0.2–1.3)
BUN SERPL-MCNC: 19 MG/DL (ref 7–30)
CALCIUM SERPL-MCNC: 8.7 MG/DL (ref 8.5–10.1)
CHLORIDE BLD-SCNC: 102 MMOL/L (ref 94–109)
CO2 SERPL-SCNC: 31 MMOL/L (ref 20–32)
CREAT BLD-MCNC: 0.8 MG/DL (ref 0.5–1)
CREAT SERPL-MCNC: 0.86 MG/DL (ref 0.52–1.04)
EOSINOPHIL # BLD AUTO: 0.1 10E3/UL (ref 0–0.7)
EOSINOPHIL NFR BLD AUTO: 4 %
ERYTHROCYTE [DISTWIDTH] IN BLOOD BY AUTOMATED COUNT: 13.2 % (ref 10–15)
GFR SERPL CREATININE-BSD FRML MDRD: 65 ML/MIN/1.73M2
GFR SERPL CREATININE-BSD FRML MDRD: >60 ML/MIN/1.73M2
GLUCOSE BLD-MCNC: 69 MG/DL (ref 70–99)
HCT VFR BLD AUTO: 43.6 % (ref 35–47)
HGB BLD-MCNC: 14.4 G/DL (ref 11.7–15.7)
IMM GRANULOCYTES # BLD: 0 10E3/UL
IMM GRANULOCYTES NFR BLD: 0 %
LYMPHOCYTES # BLD AUTO: 1.1 10E3/UL (ref 0.8–5.3)
LYMPHOCYTES NFR BLD AUTO: 34 %
MCH RBC QN AUTO: 29.5 PG (ref 26.5–33)
MCHC RBC AUTO-ENTMCNC: 33 G/DL (ref 31.5–36.5)
MCV RBC AUTO: 89 FL (ref 78–100)
MONOCYTES # BLD AUTO: 0.4 10E3/UL (ref 0–1.3)
MONOCYTES NFR BLD AUTO: 14 %
NEUTROPHILS # BLD AUTO: 1.5 10E3/UL (ref 1.6–8.3)
NEUTROPHILS NFR BLD AUTO: 47 %
NRBC # BLD AUTO: 0 10E3/UL
NRBC BLD AUTO-RTO: 0 /100
PLATELET # BLD AUTO: 188 10E3/UL (ref 150–450)
POTASSIUM BLD-SCNC: 3.9 MMOL/L (ref 3.4–5.3)
PROT SERPL-MCNC: 7.3 G/DL (ref 6.8–8.8)
RBC # BLD AUTO: 4.88 10E6/UL (ref 3.8–5.2)
SODIUM SERPL-SCNC: 138 MMOL/L (ref 133–144)
WBC # BLD AUTO: 3.2 10E3/UL (ref 4–11)

## 2021-10-25 PROCEDURE — 250N000009 HC RX 250: Performed by: INTERNAL MEDICINE

## 2021-10-25 PROCEDURE — 250N000011 HC RX IP 250 OP 636: Performed by: INTERNAL MEDICINE

## 2021-10-25 PROCEDURE — 36415 COLL VENOUS BLD VENIPUNCTURE: CPT

## 2021-10-25 PROCEDURE — 85025 COMPLETE CBC W/AUTO DIFF WBC: CPT | Performed by: INTERNAL MEDICINE

## 2021-10-25 PROCEDURE — 80053 COMPREHEN METABOLIC PANEL: CPT | Performed by: INTERNAL MEDICINE

## 2021-10-25 PROCEDURE — 86301 IMMUNOASSAY TUMOR CA 19-9: CPT | Performed by: INTERNAL MEDICINE

## 2021-10-25 PROCEDURE — 82565 ASSAY OF CREATININE: CPT

## 2021-10-25 PROCEDURE — 74177 CT ABD & PELVIS W/CONTRAST: CPT

## 2021-10-25 RX ORDER — IOPAMIDOL 755 MG/ML
500 INJECTION, SOLUTION INTRAVASCULAR ONCE
Status: COMPLETED | OUTPATIENT
Start: 2021-10-25 | End: 2021-10-25

## 2021-10-25 RX ADMIN — SODIUM CHLORIDE 49 ML: 9 INJECTION, SOLUTION INTRAVENOUS at 11:04

## 2021-10-25 RX ADMIN — IOPAMIDOL 58 ML: 755 INJECTION, SOLUTION INTRAVENOUS at 11:03

## 2021-10-25 NOTE — PROGRESS NOTES
Nursing Note:  Flora Hogan presents today for labs and IV start for CT scan.    Patient seen by provider today: No   present during visit today: Not Applicable.    Note: N/A.    Intravenous Access:  Labs drawn without difficulty.  Peripheral IV placed.    Discharge Plan:   Patient was sent to radiology for CT scan appointment.    Vikki Riddle RN

## 2021-10-27 LAB — CANCER AG19-9 SERPL IA-ACNC: 8 U/ML

## 2021-10-28 ENCOUNTER — ONCOLOGY VISIT (OUTPATIENT)
Dept: ONCOLOGY | Facility: CLINIC | Age: 76
End: 2021-10-28
Attending: INTERNAL MEDICINE
Payer: COMMERCIAL

## 2021-10-28 VITALS
WEIGHT: 113.6 LBS | HEIGHT: 64 IN | HEART RATE: 67 BPM | TEMPERATURE: 97.2 F | BODY MASS INDEX: 19.39 KG/M2 | OXYGEN SATURATION: 99 % | RESPIRATION RATE: 16 BRPM | DIASTOLIC BLOOD PRESSURE: 72 MMHG | SYSTOLIC BLOOD PRESSURE: 113 MMHG

## 2021-10-28 DIAGNOSIS — C25.0 MALIGNANT NEOPLASM OF HEAD OF PANCREAS (H): ICD-10-CM

## 2021-10-28 DIAGNOSIS — F32.A DEPRESSION, UNSPECIFIED DEPRESSION TYPE: Primary | ICD-10-CM

## 2021-10-28 PROCEDURE — 99215 OFFICE O/P EST HI 40 MIN: CPT | Performed by: INTERNAL MEDICINE

## 2021-10-28 RX ORDER — CITALOPRAM HYDROBROMIDE 20 MG/1
20 TABLET ORAL DAILY
Qty: 90 TABLET | Refills: 3 | Status: SHIPPED | OUTPATIENT
Start: 2021-10-28 | End: 2022-10-20

## 2021-10-28 ASSESSMENT — PAIN SCALES - GENERAL: PAINLEVEL: NO PAIN (0)

## 2021-10-28 ASSESSMENT — MIFFLIN-ST. JEOR: SCORE: 985.29

## 2021-10-28 NOTE — LETTER
10/28/2021         RE: Flora Hogan  [id number]  [place] [address]        Dear Colleague,    Thank you for referring your patient, Flora Hogan, to the [facility]. Please see a copy of my visit note below.    [facility]    Hematology/Oncology Established Patient Note      Today's Date: 10/28/21    Reason for Follow-up: pancreatic cancer      HISTORY OF PRESENT ILLNESS: Flora Hogan is a 76 year old female, who presents with adenocarcinoma of the pancreas.  She initially presented with jaundice.  She was evaluated for EUS and FNA, which revealed atypical cells, but no definitive evidence of malignancy.  She also underwent biliary stenting for decompression of her biliary tree.  She had a second EUS done, but again revealed atypical cells.  She then saw Dr. Duarte Chaves at [facility].  She had CT scan and MRI.  MRI showed multiple benign lesions in her liver; there was one area that was indeterminate.  Her CA 19-9 was 93.  On 5/26/20, she underwent Whipple procedure.  Two nodules were visualized on the surface of the liver, which were biopsied and found to be benign on pathology.  There were also hepatic cysts seen.  Pathology showed pancreatic ductal adenocarcinoma, well-differentiated (grade 1), tumor size 2.6 x 2.4 x 2.0 cm, negative margins, +LVI, 0 of 4 lymph nodes involved, staged pT2N0 (stage IB).      Port was placed by IR on 7/6/20.  It was removed on 12/28/20.    She started adjuvant chemotherapy with single-agent gemcitabine on 7/7/20.  She completed 6 cycles on 12/15/20.      INTERIM HISTORY: Flora is here for follow-up, accompanied by her partner.  She is doing okay, but speaks of forgetfulness, fatigue, trouble sleeping, crabbiness, depression/anxiety.      REVIEW OF SYSTEMS:   14 point ROS was reviewed and is negative other than as noted above in HPI.       HOME MEDICATIONS:  Current Outpatient Medications    Medication Sig Dispense Refill     acetaminophen (TYLENOL) 325 MG tablet Take 1-2 tablets (325-650 mg) by mouth every 6 hours as needed for mild pain or fever 50 tablet 0     albuterol (PROAIR HFA/PROVENTIL HFA/VENTOLIN HFA) 108 (90 Base) MCG/ACT inhaler Inhale 2 puffs into the lungs every 6 hours as needed for shortness of breath / dyspnea or wheezing 1 Inhaler 0     amylase-lipase-protease (CREON) 06960-85011 units CPEP per EC capsule Take 1 capsule by mouth 3 times daily (with meals) 90 capsule 3     calcium carbonate-vitamin D (CALTRATE 600+D) 600-400 MG-UNIT chewable tablet Take 1 chew tab by mouth 2 times daily  180 tablet 3     citalopram (CELEXA) 20 MG tablet Take 1 tablet (20 mg) by mouth daily 90 tablet 3     loratadine (CLARITIN REDITABS) 10 MG dispersible tablet Take 10 mg by mouth as needed        magnesium 250 MG tablet Take 1 tablet by mouth daily       multivitamin (ONE-DAILY) tablet Take 1 tablet by mouth daily 90 tablet 0     prochlorperazine (COMPAZINE) 10 MG tablet Take 1 tablet (10 mg) by mouth every 6 hours as needed (Nausea/Vomiting) 30 tablet 1         ALLERGIES:  Allergies   Allergen Reactions     Penicillin V      Seasonal Allergies          PAST MEDICAL HISTORY:  Past Medical History:   Diagnosis Date     Chest tightness     had suspected allergies     Malignant neoplasm of head of pancreas (H) 6/9/2020     Seasonal allergies      SOB (shortness of breath)          PAST SURGICAL HISTORY:  Past Surgical History:   Procedure Laterality Date     CATARACT IOL, RT/LT  2015     COLONOSCOPY  10/14    no repeat needed due to age     COLONOSCOPY N/A 10/7/2014    Procedure: COLONOSCOPY;  Surgeon: Daryl Hanson MD;  Location:  GI     COLONOSCOPY  04/23/2019    no further screening     ENDOSCOPIC RETROGRADE CHOLANGIOPANCREATOGRAM N/A 4/20/2020    Procedure: ENDOSCOPIC RETROGRADE CHOLANGIOPANCREATOGRAPHY, PLACEMENT OF PANCREATIC STENT, PLACEMENT OF BILIARY STENT;  Surgeon: Yarely Vann  MD;  Location: [facility] OR     ESOPHAGOSCOPY, GASTROSCOPY, DUODENOSCOPY (EGD), COMBINED N/A 4/20/2020    Procedure: ESOPHAGOGASTRODUODENOSCOPY, WITH FINE NEEDLE ASPIRATION BIOPSY, WITH ENDOSCOPIC ULTRASOUND GUIDANCE, Endoscopic retrograde cholangiopancreatography;  Surgeon: Yarely Vann MD;  Location: [facility] OR     ESOPHAGOSCOPY, GASTROSCOPY, DUODENOSCOPY (EGD), COMBINED N/A 5/5/2020    Procedure: ESOPHAGOGASTRODUODENOSCOPY, WITH FINE NEEDLE ASPIRATION BIOPSY, WITH ENDOSCOPIC ULTRASOUND GUIDANCE;  Surgeon: Romina Rosario MD;  Location: [facility] GI     IR CHEST PORT PLACEMENT > 5 YRS OF AGE  7/6/2020     IR PORT REMOVAL RIGHT  12/28/2020     LAPAROSCOPIC BIOPSY LIVER N/A 5/22/2020    Procedure: Diagnostic Laparoscopy with intraoperative ultrasound and Liver Biopsy X2;  Surgeon: Duarte Chaves MD;  Location: [facility] OR     WHIPPLE PROCEDURE N/A 5/22/2020    Procedure: Non Pylorus Preserving Whipple procedure;  Surgeon: Duarte Chaves MD;  Location: [facility] OR         SOCIAL HISTORY:  Social History     Socioeconomic History     Marital status:      Spouse name: Not on file     Number of children: Not on file     Years of education: Not on file     Highest education level: Not on file   Occupational History     Employer: RETIRED     Comment: previous taught special ed   Tobacco Use     Smoking status: Never Smoker     Smokeless tobacco: Never Used   Substance and Sexual Activity     Alcohol use: Yes     Comment: social      Drug use: No     Sexual activity: Yes   Other Topics Concern     Parent/sibling w/ CABG, MI or angioplasty before 65F 55M? Not Asked      Service Not Asked     Blood Transfusions Not Asked     Caffeine Concern Yes     Comment: 2 cups     Occupational Exposure Not Asked     Hobby Hazards Not Asked     Sleep Concern Not Asked     Stress Concern Not Asked     Weight Concern Not Asked     Special Diet No     Back Care Not Asked     Exercise Yes     Comment: walking workout      Bike  "Helmet Not Asked     Seat Belt Not Asked     Self-Exams Not Asked   Social History Narrative     Not on file     Social Determinants of Health     Financial Resource Strain:      Difficulty of Paying Living Expenses:    Food Insecurity:      Worried About Running Out of Food in the Last Year:      Ran Out of Food in the Last Year:    Transportation Needs:      Lack of Transportation (Medical):      Lack of Transportation (Non-Medical):    Physical Activity:      Days of Exercise per Week:      Minutes of Exercise per Session:    Stress:      Feeling of Stress :    Social Connections:      Frequency of Communication with Friends and Family:      Frequency of Social Gatherings with Friends and Family:      Attends Scientologist Services:      Active Member of Clubs or Organizations:      Attends Club or Organization Meetings:      Marital Status:    Intimate Partner Violence:      Fear of Current or Ex-Partner:      Emotionally Abused:      Physically Abused:      Sexually Abused:          FAMILY HISTORY:  Family History   Problem Relation Age of Onset     Musculoskeletal Disorder Mother         edematous legs     Obesity Mother      Musculoskeletal Disorder Maternal Grandmother         edematous legs; did have amputation     Obesity Maternal Grandmother      Myocardial Infarction Maternal Grandmother          PHYSICAL EXAM:  /72   Pulse 67   Temp 97.2  F (36.2  C) (Tympanic)   Resp 16   Ht 1.626 m (5' 4\")   Wt 51.5 kg (113 lb 9.6 oz)   LMP  (LMP Unknown)   SpO2 99%   BMI 19.50 kg/m    ECO  GENERAL/CONSTITUTIONAL: No acute distress. Accompanied by partner.  EYES: No scleral icterus.  NEUROLOGIC: Alert, oriented, answers questions appropriately.  INTEGUMENTARY: No jaundice.      LABS:  CBC RESULTS: Recent Labs   Lab Test 10/25/21  0949   WBC 3.2*   RBC 4.88   HGB 14.4   HCT 43.6   MCV 89   MCH 29.5   MCHC 33.0   RDW 13.2        Recent Labs   Lab Test 10/25/21  1101 10/25/21  0949 21  1225 "   NA  --  138 132*   POTASSIUM  --  3.9 4.1   CHLORIDE  --  102 100   CO2  --  31 30   ANIONGAP  --  5 2*   GLC  --  69* 87   BUN  --  19 12   CR 0.8 0.86 0.92   CAMERON  --  8.7 9.1     Lab Results   Component Value Date    AST 35 10/25/2021    AST 28 06/22/2021     Lab Results   Component Value Date    ALT 41 10/25/2021    ALT 34 06/22/2021     No results found for: BILICONJ   Lab Results   Component Value Date    BILITOTAL 0.6 10/25/2021    BILITOTAL 0.7 06/22/2021     Lab Results   Component Value Date    ALBUMIN 3.8 10/25/2021    ALBUMIN 3.7 06/22/2021     Lab Results   Component Value Date    PROTTOTAL 7.3 10/25/2021    PROTTOTAL 7.0 06/22/2021      Lab Results   Component Value Date    ALKPHOS 107 10/25/2021    ALKPHOS 109 06/22/2021     Component      Latest Ref Rng & Units 12/28/2020 3/22/2021 6/22/2021 10/25/2021   Cancer Antigen 19-9      0 - 37 U/mL 6 9 15 8         IMAGING:  CT c/a/p 10/25/21:  IMPRESSION: In this patient with history of pancreatic cancer status  post Whipple's procedure, there is;  1. An ill-defined hypoattenuating/hypoenhancing area along the  anterior-inferior aspect of the central liver, not significantly  changed as compared to 6/23/2021 exam, although appears slightly more  prominent as compared to 6/30/2020 exam, indeterminate, could  represent posttreatment changes, recommend attention on follow-up. Few  scattered hypoattenuating foci in the liver, appears stable.  2. Few scattered small pulmonary nodules including for example 3 mm  left lower lobe nodule, not significantly changed as compared to  6/30/2020 exam.  3. Significant amount of stool throughout the colon, nonspecific, can  be seen with constipation.      ASSESSMENT/PLAN:  Flora Hogan is a 76 year old female with:    1) Adenocarcinoma of the pancreas head: s/p Whipple procedure on 5/26/20; pathology showed pancreatic ductal adenocarcinoma, well-differentiated (grade 1), tumor size 2.6 x 2.4 x 2.0 cm, negative  margins, +LVI, 0 of 4 lymph nodes involved, staged pT2N0 (stage IB).    She completed 6 months of adjuvant gemcitabine in December 2020.    CT c/a/p on 10/25/21 was reviewed.  I reviewed the report and images.  She has known liver cysts and benign lesions that were biopsied during surgery.  There is an area in the central liver that is not significantly changed compared to the June 2021 exam, appears slightly more prominent completed to the June 2020 exam,  remains indeterminate, could be post-treatment changes.  We will continue to monitor this on follow-up imaging.      -scans and tumor markers every 4 months during 2nd year  -CT c/a/p and labs/CA 19-9 in 4 months - patient will return from [place] at the end of March 2022 - so requests to do the labs and scans then.  -RTC at the end of March 2022 with results of labs and scans    2) Pulmonary nodule: stable  -will monitor on future scans    3) Chronic constipation/diarrhea: She notes bowel incontinence but sensation of incomplete bowel emptying.  She has had this for many years and has seen [facility] in the past.    -Creon prescribed - she has started it and finds it helpful.  She takes it 3 times a day.  She will continue it.    -she will also try fiber  -follow-up with [facility] again - she requests to see Dr. Yarely Vann    4) Leukpenia: Secondary to prior chemotherapy.  Mild, stable.    -monitor    5) Insomnia: She occasionally takes melatonin. This has been a chronic problems for years, she says, even prior to her cancer diagnosis.    6) Coping: Patient notes some feelings of depression/anxiety and being crabby.  She was looking for a support group, but the group at the [facility] has been stopped due to COVID.  She did speak with our , who offered resources and emotional support.  She requests to try a low dose antidepressant.  -citalopram 20 mg daily prescribed; can go up to 40 mg if needed after 4-6 weeks    7) Fatigue: secondary to prior  surgery and chemotherapy.  -she met with Dr. Brito, PM&R.  They talked about cancer rehab PT/OT, but she wants to wait on this for now, as she says she would rather not do virtual appointments.  She says that she is willing to re-visit it next year after she returns from [place].      8) Forgetfulness, cognitive difficulties: Dr. Brito discussed neuropsychology referral, but again, she wants to wait until next year if in-person appointments are available.        Jeannie Coronado MD  Hematology/Oncology  [facility]      Total time spent on day of visit, including review of tests, obtaining/reviewing separately obtained history, ordering medications/tests/procedures, communicating with PCP/consultants, and documenting in electronic medical record: 40 minutes      Again, thank you for allowing me to participate in the care of your patient.        Sincerely,        Jeannie Coronado MD

## 2021-10-28 NOTE — PROGRESS NOTES
[facility]    Hematology/Oncology Established Patient Note      Today's Date: 10/28/21    Reason for Follow-up: pancreatic cancer      HISTORY OF PRESENT ILLNESS: Flora Hogan is a 76 year old female, who presents with adenocarcinoma of the pancreas.  She initially presented with jaundice.  She was evaluated for EUS and FNA, which revealed atypical cells, but no definitive evidence of malignancy.  She also underwent biliary stenting for decompression of her biliary tree.  She had a second EUS done, but again revealed atypical cells.  She then saw Dr. Duarte Chaves at [facility].  She had CT scan and MRI.  MRI showed multiple benign lesions in her liver; there was one area that was indeterminate.  Her CA 19-9 was 93.  On 5/26/20, she underwent Whipple procedure.  Two nodules were visualized on the surface of the liver, which were biopsied and found to be benign on pathology.  There were also hepatic cysts seen.  Pathology showed pancreatic ductal adenocarcinoma, well-differentiated (grade 1), tumor size 2.6 x 2.4 x 2.0 cm, negative margins, +LVI, 0 of 4 lymph nodes involved, staged pT2N0 (stage IB).      Port was placed by IR on 7/6/20.  It was removed on 12/28/20.    She started adjuvant chemotherapy with single-agent gemcitabine on 7/7/20.  She completed 6 cycles on 12/15/20.      INTERIM HISTORY: Flora is here for follow-up, accompanied by her partner.  She is doing okay, but speaks of forgetfulness, fatigue, trouble sleeping, crabbiness, depression/anxiety.      REVIEW OF SYSTEMS:   14 point ROS was reviewed and is negative other than as noted above in HPI.       HOME MEDICATIONS:  Current Outpatient Medications   Medication Sig Dispense Refill     acetaminophen (TYLENOL) 325 MG tablet Take 1-2 tablets (325-650 mg) by mouth every 6 hours as needed for mild pain or fever 50 tablet 0     albuterol (PROAIR HFA/PROVENTIL HFA/VENTOLIN HFA) 108 (90 Base) MCG/ACT inhaler Inhale 2  puffs into the lungs every 6 hours as needed for shortness of breath / dyspnea or wheezing 1 Inhaler 0     amylase-lipase-protease (CREON) 24161-04172 units CPEP per EC capsule Take 1 capsule by mouth 3 times daily (with meals) 90 capsule 3     calcium carbonate-vitamin D (CALTRATE 600+D) 600-400 MG-UNIT chewable tablet Take 1 chew tab by mouth 2 times daily  180 tablet 3     citalopram (CELEXA) 20 MG tablet Take 1 tablet (20 mg) by mouth daily 90 tablet 3     loratadine (CLARITIN REDITABS) 10 MG dispersible tablet Take 10 mg by mouth as needed        magnesium 250 MG tablet Take 1 tablet by mouth daily       multivitamin (ONE-DAILY) tablet Take 1 tablet by mouth daily 90 tablet 0     prochlorperazine (COMPAZINE) 10 MG tablet Take 1 tablet (10 mg) by mouth every 6 hours as needed (Nausea/Vomiting) 30 tablet 1         ALLERGIES:  Allergies   Allergen Reactions     Penicillin V      Seasonal Allergies          PAST MEDICAL HISTORY:  Past Medical History:   Diagnosis Date     Chest tightness     had suspected allergies     Malignant neoplasm of head of pancreas (H) 6/9/2020     Seasonal allergies      SOB (shortness of breath)          PAST SURGICAL HISTORY:  Past Surgical History:   Procedure Laterality Date     CATARACT IOL, RT/LT  2015     COLONOSCOPY  10/14    no repeat needed due to age     COLONOSCOPY N/A 10/7/2014    Procedure: COLONOSCOPY;  Surgeon: Daryl Hanson MD;  Location:  GI     COLONOSCOPY  04/23/2019    no further screening     ENDOSCOPIC RETROGRADE CHOLANGIOPANCREATOGRAM N/A 4/20/2020    Procedure: ENDOSCOPIC RETROGRADE CHOLANGIOPANCREATOGRAPHY, PLACEMENT OF PANCREATIC STENT, PLACEMENT OF BILIARY STENT;  Surgeon: Yarely Vann MD;  Location:  OR     ESOPHAGOSCOPY, GASTROSCOPY, DUODENOSCOPY (EGD), COMBINED N/A 4/20/2020    Procedure: ESOPHAGOGASTRODUODENOSCOPY, WITH FINE NEEDLE ASPIRATION BIOPSY, WITH ENDOSCOPIC ULTRASOUND GUIDANCE, Endoscopic retrograde cholangiopancreatography;   Surgeon: Yarely Vann MD;  Location: [facility] OR     ESOPHAGOSCOPY, GASTROSCOPY, DUODENOSCOPY (EGD), COMBINED N/A 5/5/2020    Procedure: ESOPHAGOGASTRODUODENOSCOPY, WITH FINE NEEDLE ASPIRATION BIOPSY, WITH ENDOSCOPIC ULTRASOUND GUIDANCE;  Surgeon: Romina Rosario MD;  Location: [facility] GI     IR CHEST PORT PLACEMENT > 5 YRS OF AGE  7/6/2020     IR PORT REMOVAL RIGHT  12/28/2020     LAPAROSCOPIC BIOPSY LIVER N/A 5/22/2020    Procedure: Diagnostic Laparoscopy with intraoperative ultrasound and Liver Biopsy X2;  Surgeon: Duarte Chaves MD;  Location: [facility] OR     WHIPPLE PROCEDURE N/A 5/22/2020    Procedure: Non Pylorus Preserving Whipple procedure;  Surgeon: Duarte Chaves MD;  Location: [facility] OR         SOCIAL HISTORY:  Social History     Socioeconomic History     Marital status:      Spouse name: Not on file     Number of children: Not on file     Years of education: Not on file     Highest education level: Not on file   Occupational History     Employer: RETIRED     Comment: previous taught special ed   Tobacco Use     Smoking status: Never Smoker     Smokeless tobacco: Never Used   Substance and Sexual Activity     Alcohol use: Yes     Comment: social      Drug use: No     Sexual activity: Yes   Other Topics Concern     Parent/sibling w/ CABG, MI or angioplasty before 65F 55M? Not Asked      Service Not Asked     Blood Transfusions Not Asked     Caffeine Concern Yes     Comment: 2 cups     Occupational Exposure Not Asked     Hobby Hazards Not Asked     Sleep Concern Not Asked     Stress Concern Not Asked     Weight Concern Not Asked     Special Diet No     Back Care Not Asked     Exercise Yes     Comment: walking workout      Bike Helmet Not Asked     Seat Belt Not Asked     Self-Exams Not Asked   Social History Narrative     Not on file     Social Determinants of Health     Financial Resource Strain:      Difficulty of Paying Living Expenses:    Food Insecurity:      Worried About Running  "Out of Food in the Last Year:      Ran Out of Food in the Last Year:    Transportation Needs:      Lack of Transportation (Medical):      Lack of Transportation (Non-Medical):    Physical Activity:      Days of Exercise per Week:      Minutes of Exercise per Session:    Stress:      Feeling of Stress :    Social Connections:      Frequency of Communication with Friends and Family:      Frequency of Social Gatherings with Friends and Family:      Attends Confucianist Services:      Active Member of Clubs or Organizations:      Attends Club or Organization Meetings:      Marital Status:    Intimate Partner Violence:      Fear of Current or Ex-Partner:      Emotionally Abused:      Physically Abused:      Sexually Abused:          FAMILY HISTORY:  Family History   Problem Relation Age of Onset     Musculoskeletal Disorder Mother         edematous legs     Obesity Mother      Musculoskeletal Disorder Maternal Grandmother         edematous legs; did have amputation     Obesity Maternal Grandmother      Myocardial Infarction Maternal Grandmother          PHYSICAL EXAM:  /72   Pulse 67   Temp 97.2  F (36.2  C) (Tympanic)   Resp 16   Ht 1.626 m (5' 4\")   Wt 51.5 kg (113 lb 9.6 oz)   LMP  (LMP Unknown)   SpO2 99%   BMI 19.50 kg/m    ECO  GENERAL/CONSTITUTIONAL: No acute distress. Accompanied by partner.  EYES: No scleral icterus.  NEUROLOGIC: Alert, oriented, answers questions appropriately.  INTEGUMENTARY: No jaundice.      LABS:  CBC RESULTS: Recent Labs   Lab Test 10/25/21  0949   WBC 3.2*   RBC 4.88   HGB 14.4   HCT 43.6   MCV 89   MCH 29.5   MCHC 33.0   RDW 13.2        Recent Labs   Lab Test 10/25/21  1101 10/25/21  0949 21  1225   NA  --  138 132*   POTASSIUM  --  3.9 4.1   CHLORIDE  --  102 100   CO2  --  31 30   ANIONGAP  --  5 2*   GLC  --  69* 87   BUN  --  19 12   CR 0.8 0.86 0.92   CAMERON  --  8.7 9.1     Lab Results   Component Value Date    AST 35 10/25/2021    AST 28 2021     Lab " Results   Component Value Date    ALT 41 10/25/2021    ALT 34 06/22/2021     No results found for: BILICONJ   Lab Results   Component Value Date    BILITOTAL 0.6 10/25/2021    BILITOTAL 0.7 06/22/2021     Lab Results   Component Value Date    ALBUMIN 3.8 10/25/2021    ALBUMIN 3.7 06/22/2021     Lab Results   Component Value Date    PROTTOTAL 7.3 10/25/2021    PROTTOTAL 7.0 06/22/2021      Lab Results   Component Value Date    ALKPHOS 107 10/25/2021    ALKPHOS 109 06/22/2021     Component      Latest Ref Rng & Units 12/28/2020 3/22/2021 6/22/2021 10/25/2021   Cancer Antigen 19-9      0 - 37 U/mL 6 9 15 8         IMAGING:  CT c/a/p 10/25/21:  IMPRESSION: In this patient with history of pancreatic cancer status  post Whipple's procedure, there is;  1. An ill-defined hypoattenuating/hypoenhancing area along the  anterior-inferior aspect of the central liver, not significantly  changed as compared to 6/23/2021 exam, although appears slightly more  prominent as compared to 6/30/2020 exam, indeterminate, could  represent posttreatment changes, recommend attention on follow-up. Few  scattered hypoattenuating foci in the liver, appears stable.  2. Few scattered small pulmonary nodules including for example 3 mm  left lower lobe nodule, not significantly changed as compared to  6/30/2020 exam.  3. Significant amount of stool throughout the colon, nonspecific, can  be seen with constipation.      ASSESSMENT/PLAN:  Flora Hogan is a 76 year old female with:    1) Adenocarcinoma of the pancreas head: s/p Whipple procedure on 5/26/20; pathology showed pancreatic ductal adenocarcinoma, well-differentiated (grade 1), tumor size 2.6 x 2.4 x 2.0 cm, negative margins, +LVI, 0 of 4 lymph nodes involved, staged pT2N0 (stage IB).    She completed 6 months of adjuvant gemcitabine in December 2020.    CT c/a/p on 10/25/21 was reviewed.  I reviewed the report and images.  She has known liver cysts and benign lesions that were biopsied  during surgery.  There is an area in the central liver that is not significantly changed compared to the June 2021 exam, appears slightly more prominent completed to the June 2020 exam,  remains indeterminate, could be post-treatment changes.  We will continue to monitor this on follow-up imaging.      -scans and tumor markers every 4 months during 2nd year  -CT c/a/p and labs/CA 19-9 in 4 months - patient will return from [place] at the end of March 2022 - so requests to do the labs and scans then.  -RTC at the end of March 2022 with results of labs and scans    2) Pulmonary nodule: stable  -will monitor on future scans    3) Chronic constipation/diarrhea: She notes bowel incontinence but sensation of incomplete bowel emptying.  She has had this for many years and has seen [facility] in the past.    -Creon prescribed - she has started it and finds it helpful.  She takes it 3 times a day.  She will continue it.    -she will also try fiber  -follow-up with [facility] again - she requests to see Dr. Yarely Vann    4) Leukpenia: Secondary to prior chemotherapy.  Mild, stable.    -monitor    5) Insomnia: She occasionally takes melatonin. This has been a chronic problems for years, she says, even prior to her cancer diagnosis.    6) Coping: Patient notes some feelings of depression/anxiety and being crabby.  She was looking for a support group, but the group at the [facility] has been stopped due to COVID.  She did speak with our , who offered resources and emotional support.  She requests to try a low dose antidepressant.  -citalopram 20 mg daily prescribed; can go up to 40 mg if needed after 4-6 weeks    7) Fatigue: secondary to prior surgery and chemotherapy.  -she met with Dr. Brito, PM&R.  They talked about cancer rehab PT/OT, but she wants to wait on this for now, as she says she would rather not do virtual appointments.  She says that she is willing to re-visit it next year after she returns from [place].       8) Forgetfulness, cognitive difficulties: Dr. Briot discussed neuropsychology referral, but again, she wants to wait until next year if in-person appointments are available.        Jeannie Coronado MD  Hematology/Oncology  [facility]      Total time spent on day of visit, including review of tests, obtaining/reviewing separately obtained history, ordering medications/tests/procedures, communicating with PCP/consultants, and documenting in electronic medical record: 40 minutes

## 2021-10-28 NOTE — NURSING NOTE
"Oncology Rooming Note    October 28, 2021 12:56 PM   Flora Hogan is a 76 year old female who presents for:    Chief Complaint   Patient presents with     Oncology Clinic Visit     Malignant neoplasm of head of pancreas      Initial Vitals: /72   Pulse 67   Temp 97.2  F (36.2  C) (Tympanic)   Resp 16   Ht 1.626 m (5' 4\")   Wt 51.5 kg (113 lb 9.6 oz)   LMP  (LMP Unknown)   SpO2 99%   BMI 19.50 kg/m   Estimated body mass index is 19.5 kg/m  as calculated from the following:    Height as of this encounter: 1.626 m (5' 4\").    Weight as of this encounter: 51.5 kg (113 lb 9.6 oz). Body surface area is 1.52 meters squared.  No Pain (0) Comment: Data Unavailable   No LMP recorded (lmp unknown). Patient is postmenopausal.  Allergies reviewed: Yes  Medications reviewed: Yes    Medications: Medication refills not needed today.  Pharmacy name entered into [facility]:    [id number] - [place], [place] - [address] [id number] - [place], [place] - [address] [facility] AT [place] [address] & [id number] [address] AT [place] [address]    Clinical concerns: follow up  - would like to discuss calcium intake and the creon.  Forgets to take at meal times- can take later when remembers.       Joi Johns, [facility]              "

## 2021-10-29 ENCOUNTER — PATIENT OUTREACH (OUTPATIENT)
Dept: ONCOLOGY | Facility: CLINIC | Age: 76
End: 2021-10-29

## 2021-10-29 NOTE — PROGRESS NOTES
Oncology Distress Screening Follow-up  Clinical Social Work  [facility]    Identified Concern and Score From Distress Screening:     3. How concerned are you about feeling depressed or very sad?   6Abnormal            4. How concerned are you about feeling anxious or very scared?   8Abnormal            5. Do you struggle with the loss of meaning and chelsea in your life?   Somewhat             Date of Distress Screening: 10/28/21    Intervention:   Flora is a 76-year-old woman with a diagnosis of pancreatic cancer who is followed by Dr. Coronado at [facility]. At time of appointment with Dr. Coronado, Flora reported feelings of elevated anxiety and feelings of depression. This clinician called Flora today with goal of following up on elevated distress screen, and re-orienting to psychosocial services and support.     This clinician called and left detailed voicemail for Flora with social work contact information and availability.     Follow-up Required:   1) This clinician mailed again resources for [facility] Pancreatic Cancer Support group, which is presently active online.   2) This clinician will await return call from Flora, and continue to be available as needed for ongoing psychosocial support.     ALAN Mathew, LICSW  Phone: [phone]  [facility]: M, Thu  *every other Tue, 8am-4:30pm  [facility]: W, F, *every other Tue, 8am-4:30pm

## 2021-10-29 NOTE — PATIENT INSTRUCTIONS
Labs and CT scheduled 3/28/22  Appt with Dr. Coronado scheduled 3/31/22  Leyla CORONEL RN on 10/29/2021 at 2:03 PM     Routing refill request to provider for review/approval because:  Drug not active on patient's medication list    Sulma Winters RN

## 2021-11-01 ENCOUNTER — PATIENT OUTREACH (OUTPATIENT)
Dept: ONCOLOGY | Facility: CLINIC | Age: 76
End: 2021-11-01

## 2021-11-01 NOTE — PROGRESS NOTES
This clinician received voicemail from Flora from 10/29/21 at 2:38pm. This clinician unable to return call on Friday, attempted to reach Flora again today by phone, leaving detailed voicemail with social work contact information and availability.     Will await return call and continue to be available as needed for ongoing psychosocial support.     ALAN Mathew, LICSW  Phone: [phone]  [facility]: M, Thu  *every other Tue, 8am-4:30pm  [facility]: W, F, *every other Tue, 8am-4:30pm

## 2021-11-04 ENCOUNTER — PATIENT OUTREACH (OUTPATIENT)
Dept: ONCOLOGY | Facility: CLINIC | Age: 76
End: 2021-11-04

## 2021-11-04 NOTE — PROGRESS NOTES
Social Work Progress Note      Data/Intervention:  Patient Name:  Flora Hogan  /Age:  1945 (75 year old)    Reason for Follow-Up:  Flora is a 76-year-old woman with a history of pancreatic cancer who is followed by Dr. Coronado at [facility]. This clinician received call from Flora today.    Intervention:   Flora reports that she received paperwork from this clinician about active pancreatic cancer support group. Flora reported that she would like to await enrollment in this group, and other groups at present time as she feels like she is getting busier with the holiday season. Flora reported that she had some awareness of increased shortness of breath and racing heart, and was appreciative of recommendation for EKG which has been scheduled. Flora denies other psychosocial concern or need today, and voices appreciation of SW connection as needed.     Plan:  This clinician will continue to be available as needed for ongoing psychosocial support.     Please call or page if needs or concerns arise.     ALAN Mathew, [facility] Hospital[facility]  Direct Phone: [phone]  Pager: [phone]

## 2021-11-22 DIAGNOSIS — C25.0 MALIGNANT NEOPLASM OF HEAD OF PANCREAS (H): ICD-10-CM

## 2021-11-22 NOTE — TELEPHONE ENCOUNTER
Flora called in to clinic requesting to get 3 months of Creon because she will be leaving for [place] and will be there for 3 months (January 2022 to March 2022).     Flora reports she has enough Creon to get her to December but will need a refill at the end of December.     Writer will route her request to Dr. Coronado.    Pending Prescriptions:                       Disp   Refills    amylase-lipase-protease (CREON) 01176-350*90 cap*3            Sig: Take 1 capsule by mouth 3 times daily (with           meals)          Last Written Prescription Date:  06/14/2021  Last Fill Quantity: 90,   # refills: 3  Last Office Visit: 10/28/2021  Future Office visit:   03/31/2022    Routing refill request to provider for review/approval     Leyla Layton, RN, BSN, SHARMIN  RN Care Coordinator  [facility]  [phone]

## 2021-11-23 NOTE — TELEPHONE ENCOUNTER
Signed Prescriptions:                        Disp   Refills    amylase-lipase-protease (CREON) [address]*270 ca*3        Sig: Take 1 capsule by mouth 3 times daily (with meals)  Authorizing Provider: EDOUARD RICHMOND RN on 11/23/2021 at 8:06 AM

## 2021-12-28 DIAGNOSIS — C25.0 MALIGNANT NEOPLASM OF HEAD OF PANCREAS (H): ICD-10-CM

## 2021-12-28 RX ORDER — PANCRELIPASE 24000; 76000; 120000 [USP'U]/1; [USP'U]/1; [USP'U]/1
CAPSULE, DELAYED RELEASE PELLETS ORAL
Qty: 90 CAPSULE | OUTPATIENT
Start: 2021-12-28

## 2021-12-28 NOTE — TELEPHONE ENCOUNTER
Incoming voicemail received at 2:40 pm from Flora requesting a refill on her Creon 90 day supply. Flora reports she is leaving for [place] for 3 months on Thursday. She is requesting a refill go to the [facility] in [place], [place] on [address].     In chart review, the prescription for Creon was just placed by Dr. Coronado on 11/23/21. Writer called [facility] pharmacy where the current prescription is at. They report they will call the [facility] pharmacy in [place] to transfer the prescription for pt to be able to fill on Saturday, Jan 1.     Writer will cancel the pended med as the prescription was just written on 11/23/21 for a year's supply.     Refused Prescriptions:                       Disp   Refills    CREON 22254-31828 units CPEP per EC capsul*90 cap*         Sig: TAKE 1 CAPSULE BY MOUTH THREE TIMES DAILY WITH MEALS    Leyla Layton, RN, BSN, SHARMIN  RN Care Coordinator  [facility]  [phone]

## 2021-12-29 DIAGNOSIS — C25.0 MALIGNANT NEOPLASM OF HEAD OF PANCREAS (H): ICD-10-CM

## 2021-12-29 RX ORDER — PANCRELIPASE 24000; 76000; 120000 [USP'U]/1; [USP'U]/1; [USP'U]/1
CAPSULE, DELAYED RELEASE PELLETS ORAL
Qty: 90 CAPSULE | OUTPATIENT
Start: 2021-12-29

## 2021-12-29 NOTE — TELEPHONE ENCOUNTER
Refused Prescriptions:                       Disp   Refills    CREON 90188-68857 units CPEP per EC capsul*90 cap*         Sig: TAKE 1 CAPSULE BY MOUTH THREE TIMES DAILY WITH MEALS    Pt requested refill too soon. Pt was given a 3 month supply on 11/23/21 with 3 refills ordered. Writer called [facility] pharmacy store # [id number] in [place], [place] to confirm the prescription was able to be transferred from [facility] to [facility].     [facility] in [place] reports the prescription was actually sent to the [facility] store # [id number] in [place], [place]. The [facility] report they were able to transfer the Creon prescription to [place] and will have it filled for the pt tomorrow, 12/30/21.     Leyla Layton, RN, BSN, SHARMIN  RN Care Coordinator  [facility]  [phone]

## 2022-03-28 ENCOUNTER — LAB (OUTPATIENT)
Dept: INFUSION THERAPY | Facility: CLINIC | Age: 77
End: 2022-03-28
Attending: INTERNAL MEDICINE
Payer: COMMERCIAL

## 2022-03-28 ENCOUNTER — HOSPITAL ENCOUNTER (OUTPATIENT)
Dept: CT IMAGING | Facility: CLINIC | Age: 77
Discharge: HOME | End: 2022-03-28
Attending: INTERNAL MEDICINE | Admitting: INTERNAL MEDICINE
Payer: COMMERCIAL

## 2022-03-28 DIAGNOSIS — C25.0 MALIGNANT NEOPLASM OF HEAD OF PANCREAS (H): ICD-10-CM

## 2022-03-28 LAB
ALBUMIN SERPL-MCNC: 3.7 G/DL (ref 3.4–5)
ALP SERPL-CCNC: 140 U/L (ref 40–150)
ALT SERPL W P-5'-P-CCNC: 39 U/L (ref 0–50)
ANION GAP SERPL CALCULATED.3IONS-SCNC: 2 MMOL/L (ref 3–14)
AST SERPL W P-5'-P-CCNC: 31 U/L (ref 0–45)
BASOPHILS # BLD AUTO: 0.1 10E3/UL (ref 0–0.2)
BASOPHILS NFR BLD AUTO: 2 %
BILIRUB SERPL-MCNC: 0.9 MG/DL (ref 0.2–1.3)
BUN SERPL-MCNC: 12 MG/DL (ref 7–30)
CALCIUM SERPL-MCNC: 9.2 MG/DL (ref 8.5–10.1)
CHLORIDE BLD-SCNC: 101 MMOL/L (ref 94–109)
CO2 SERPL-SCNC: 32 MMOL/L (ref 20–32)
CREAT SERPL-MCNC: 0.8 MG/DL (ref 0.52–1.04)
EOSINOPHIL # BLD AUTO: 0.1 10E3/UL (ref 0–0.7)
EOSINOPHIL NFR BLD AUTO: 3 %
ERYTHROCYTE [DISTWIDTH] IN BLOOD BY AUTOMATED COUNT: 13.5 % (ref 10–15)
GFR SERPL CREATININE-BSD FRML MDRD: 75 ML/MIN/1.73M2
GLUCOSE BLD-MCNC: 71 MG/DL (ref 70–99)
HCT VFR BLD AUTO: 44.7 % (ref 35–47)
HGB BLD-MCNC: 14.5 G/DL (ref 11.7–15.7)
IMM GRANULOCYTES # BLD: 0 10E3/UL
IMM GRANULOCYTES NFR BLD: 1 %
LYMPHOCYTES # BLD AUTO: 0.8 10E3/UL (ref 0.8–5.3)
LYMPHOCYTES NFR BLD AUTO: 26 %
MCH RBC QN AUTO: 28.4 PG (ref 26.5–33)
MCHC RBC AUTO-ENTMCNC: 32.4 G/DL (ref 31.5–36.5)
MCV RBC AUTO: 88 FL (ref 78–100)
MONOCYTES # BLD AUTO: 0.4 10E3/UL (ref 0–1.3)
MONOCYTES NFR BLD AUTO: 12 %
NEUTROPHILS # BLD AUTO: 1.9 10E3/UL (ref 1.6–8.3)
NEUTROPHILS NFR BLD AUTO: 56 %
NRBC # BLD AUTO: 0 10E3/UL
NRBC BLD AUTO-RTO: 0 /100
PLATELET # BLD AUTO: 176 10E3/UL (ref 150–450)
POTASSIUM BLD-SCNC: 3.9 MMOL/L (ref 3.4–5.3)
PROT SERPL-MCNC: 7.6 G/DL (ref 6.8–8.8)
RBC # BLD AUTO: 5.1 10E6/UL (ref 3.8–5.2)
SODIUM SERPL-SCNC: 135 MMOL/L (ref 133–144)
WBC # BLD AUTO: 3.3 10E3/UL (ref 4–11)

## 2022-03-28 PROCEDURE — 250N000011 HC RX IP 250 OP 636: Performed by: RADIOLOGY

## 2022-03-28 PROCEDURE — 250N000009 HC RX 250: Performed by: RADIOLOGY

## 2022-03-28 PROCEDURE — 36415 COLL VENOUS BLD VENIPUNCTURE: CPT

## 2022-03-28 PROCEDURE — 86301 IMMUNOASSAY TUMOR CA 19-9: CPT | Performed by: INTERNAL MEDICINE

## 2022-03-28 PROCEDURE — 82040 ASSAY OF SERUM ALBUMIN: CPT | Performed by: INTERNAL MEDICINE

## 2022-03-28 PROCEDURE — 85004 AUTOMATED DIFF WBC COUNT: CPT | Performed by: INTERNAL MEDICINE

## 2022-03-28 PROCEDURE — 80053 COMPREHEN METABOLIC PANEL: CPT | Performed by: INTERNAL MEDICINE

## 2022-03-28 PROCEDURE — 74177 CT ABD & PELVIS W/CONTRAST: CPT

## 2022-03-28 RX ORDER — IOPAMIDOL 755 MG/ML
500 INJECTION, SOLUTION INTRAVASCULAR ONCE
Status: COMPLETED | OUTPATIENT
Start: 2022-03-28 | End: 2022-03-28

## 2022-03-28 RX ADMIN — SODIUM CHLORIDE 49 ML: 9 INJECTION, SOLUTION INTRAVENOUS at 11:37

## 2022-03-28 RX ADMIN — IOPAMIDOL 57 ML: 755 INJECTION, SOLUTION INTRAVENOUS at 11:37

## 2022-03-28 NOTE — PROGRESS NOTES
Nursing Note:  Flora Hogan presents today for PIV labs.    Patient seen by provider today: No   present during visit today: Not Applicable.    Note: N/A.    Intravenous Access:  Lab draw site R AC, Needle type PIV, Gauge 20.  Labs drawn without difficulty.  Peripheral IV placed.    Discharge Plan:   Patient was sent to [place] for appointment.    Deb Farmer RN

## 2022-03-29 LAB — CANCER AG19-9 SERPL IA-ACNC: 10 U/ML

## 2022-03-31 ENCOUNTER — ONCOLOGY VISIT (OUTPATIENT)
Dept: ONCOLOGY | Facility: CLINIC | Age: 77
End: 2022-03-31
Attending: INTERNAL MEDICINE
Payer: COMMERCIAL

## 2022-03-31 VITALS
RESPIRATION RATE: 16 BRPM | TEMPERATURE: 97 F | HEART RATE: 64 BPM | HEIGHT: 64 IN | WEIGHT: 114.4 LBS | DIASTOLIC BLOOD PRESSURE: 78 MMHG | SYSTOLIC BLOOD PRESSURE: 130 MMHG | BODY MASS INDEX: 19.53 KG/M2 | OXYGEN SATURATION: 96 %

## 2022-03-31 DIAGNOSIS — C25.0 MALIGNANT NEOPLASM OF HEAD OF PANCREAS (H): Primary | ICD-10-CM

## 2022-03-31 DIAGNOSIS — Z20.822 EXPOSURE TO 2019 NOVEL CORONAVIRUS: ICD-10-CM

## 2022-03-31 PROCEDURE — 99214 OFFICE O/P EST MOD 30 MIN: CPT | Performed by: INTERNAL MEDICINE

## 2022-03-31 PROCEDURE — G0463 HOSPITAL OUTPT CLINIC VISIT: HCPCS

## 2022-03-31 ASSESSMENT — PAIN SCALES - GENERAL: PAINLEVEL: NO PAIN (0)

## 2022-03-31 NOTE — PROGRESS NOTES
[facility]    Hematology/Oncology Established Patient Note      Today's Date: 3/31/22    Reason for Follow-up: pancreatic cancer      HISTORY OF PRESENT ILLNESS: Flora Hogan is a 76 year old female, who presents with adenocarcinoma of the pancreas.  She initially presented with jaundice.  She was evaluated for EUS and FNA, which revealed atypical cells, but no definitive evidence of malignancy.  She also underwent biliary stenting for decompression of her biliary tree.  She had a second EUS done, but again revealed atypical cells.  She then saw Dr. Duarte Chaves at [facility].  She had CT scan and MRI.  MRI showed multiple benign lesions in her liver; there was one area that was indeterminate.  Her CA 19-9 was 93.  On 5/26/20, she underwent Whipple procedure.  Two nodules were visualized on the surface of the liver, which were biopsied and found to be benign on pathology.  There were also hepatic cysts seen.  Pathology showed pancreatic ductal adenocarcinoma, well-differentiated (grade 1), tumor size 2.6 x 2.4 x 2.0 cm, negative margins, +LVI, 0 of 4 lymph nodes involved, staged pT2N0 (stage IB).      Port was placed by IR on 7/6/20.  It was removed on 12/28/20.    She started adjuvant chemotherapy with single-agent gemcitabine on 7/7/20.  She completed 6 cycles on 12/15/20.      INTERIM HISTORY: Flora says that she feels pretty good.  She has returned from [place], where she spent the winter.      REVIEW OF SYSTEMS:   14 point ROS was reviewed and is negative other than as noted above in HPI.       HOME MEDICATIONS:  Current Outpatient Medications   Medication Sig Dispense Refill     acetaminophen (TYLENOL) 325 MG tablet Take 1-2 tablets (325-650 mg) by mouth every 6 hours as needed for mild pain or fever 50 tablet 0     albuterol (PROAIR HFA/PROVENTIL HFA/VENTOLIN HFA) 108 (90 Base) MCG/ACT inhaler Inhale 2 puffs into the lungs every 6 hours as needed for shortness of breath  / dyspnea or wheezing 1 Inhaler 0     amylase-lipase-protease (CREON) 73946-29230 units CPEP per EC capsule Take 1 capsule by mouth 3 times daily (with meals) 270 capsule 3     calcium carbonate-vitamin D (CALTRATE 600+D) 600-400 MG-UNIT chewable tablet Take 1 chew tab by mouth 2 times daily  180 tablet 3     citalopram (CELEXA) 20 MG tablet Take 1 tablet (20 mg) by mouth daily 90 tablet 3     loratadine (CLARITIN REDITABS) 10 MG dispersible tablet Take 10 mg by mouth as needed        magnesium 250 MG tablet Take 1 tablet by mouth daily       multivitamin (ONE-DAILY) tablet Take 1 tablet by mouth daily 90 tablet 0     prochlorperazine (COMPAZINE) 10 MG tablet Take 1 tablet (10 mg) by mouth every 6 hours as needed (Nausea/Vomiting) 30 tablet 1         ALLERGIES:  Allergies   Allergen Reactions     Penicillin V      Seasonal Allergies          PAST MEDICAL HISTORY:  Past Medical History:   Diagnosis Date     Chest tightness     had suspected allergies     Malignant neoplasm of head of pancreas (H) 6/9/2020     Seasonal allergies      SOB (shortness of breath)          PAST SURGICAL HISTORY:  Past Surgical History:   Procedure Laterality Date     CATARACT IOL, RT/LT  2015     COLONOSCOPY  10/14    no repeat needed due to age     COLONOSCOPY N/A 10/7/2014    Procedure: COLONOSCOPY;  Surgeon: Daryl Hanson MD;  Location:  GI     COLONOSCOPY  04/23/2019    no further screening     ENDOSCOPIC RETROGRADE CHOLANGIOPANCREATOGRAM N/A 4/20/2020    Procedure: ENDOSCOPIC RETROGRADE CHOLANGIOPANCREATOGRAPHY, PLACEMENT OF PANCREATIC STENT, PLACEMENT OF BILIARY STENT;  Surgeon: Yarely Vann MD;  Location:  OR     ESOPHAGOSCOPY, GASTROSCOPY, DUODENOSCOPY (EGD), COMBINED N/A 4/20/2020    Procedure: ESOPHAGOGASTRODUODENOSCOPY, WITH FINE NEEDLE ASPIRATION BIOPSY, WITH ENDOSCOPIC ULTRASOUND GUIDANCE, Endoscopic retrograde cholangiopancreatography;  Surgeon: Yarely Vann MD;  Location:  OR     ESOPHAGOSCOPY,  GASTROSCOPY, DUODENOSCOPY (EGD), COMBINED N/A 5/5/2020    Procedure: ESOPHAGOGASTRODUODENOSCOPY, WITH FINE NEEDLE ASPIRATION BIOPSY, WITH ENDOSCOPIC ULTRASOUND GUIDANCE;  Surgeon: Romina Rosario MD;  Location: [facility]     IR CHEST PORT PLACEMENT > 5 YRS OF AGE  7/6/2020     IR PORT REMOVAL RIGHT  12/28/2020     LAPAROSCOPIC BIOPSY LIVER N/A 5/22/2020    Procedure: Diagnostic Laparoscopy with intraoperative ultrasound and Liver Biopsy X2;  Surgeon: Duarte Chaves MD;  Location: [facility] OR     WHIPPLE PROCEDURE N/A 5/22/2020    Procedure: Non Pylorus Preserving Whipple procedure;  Surgeon: Duarte Chaves MD;  Location: [facility] OR         SOCIAL HISTORY:  Social History     Socioeconomic History     Marital status:      Spouse name: Not on file     Number of children: Not on file     Years of education: Not on file     Highest education level: Not on file   Occupational History     Employer: RETIRED     Comment: previous taught special ed   Tobacco Use     Smoking status: Never Smoker     Smokeless tobacco: Never Used   Substance and Sexual Activity     Alcohol use: Yes     Comment: social      Drug use: No     Sexual activity: Yes   Other Topics Concern     Parent/sibling w/ CABG, MI or angioplasty before 65F 55M? Not Asked      Service Not Asked     Blood Transfusions Not Asked     Caffeine Concern Yes     Comment: 2 cups     Occupational Exposure Not Asked     Hobby Hazards Not Asked     Sleep Concern Not Asked     Stress Concern Not Asked     Weight Concern Not Asked     Special Diet No     Back Care Not Asked     Exercise Yes     Comment: walking workout      Bike Helmet Not Asked     Seat Belt Not Asked     Self-Exams Not Asked   Social History Narrative     Not on file     Social Determinants of Health     Financial Resource Strain: Not on file   Food Insecurity: Not on file   Transportation Needs: Not on file   Physical Activity: Not on file   Stress: Not on file   Social Connections: Not  "on file   Intimate Partner Violence: Not At Risk     Fear of Current or Ex-Partner: No     Emotionally Abused: No     Physically Abused: No     Sexually Abused: No   Housing Stability: Not on file         FAMILY HISTORY:  Family History   Problem Relation Age of Onset     Musculoskeletal Disorder Mother         edematous legs     Obesity Mother      Musculoskeletal Disorder Maternal Grandmother         edematous legs; did have amputation     Obesity Maternal Grandmother      Myocardial Infarction Maternal Grandmother          PHYSICAL EXAM:  /78   Pulse 64   Temp 97  F (36.1  C) (Tympanic)   Resp 16   Ht 1.626 m (5' 4\")   Wt 51.9 kg (114 lb 6.4 oz)   LMP  (LMP Unknown)   SpO2 96%   BMI 19.64 kg/m    ECO  GENERAL/CONSTITUTIONAL: No acute distress.  EYES: No scleral icterus.  NEUROLOGIC: Alert, oriented, answers questions appropriately.  INTEGUMENTARY: No jaundice.      LABS:  CBC RESULTS: Recent Labs   Lab Test 22  1056   WBC 3.3*   RBC 5.10   HGB 14.5   HCT 44.7   MCV 88   MCH 28.4   MCHC 32.4   RDW 13.5        Recent Labs   Lab Test 22  1056 10/25/21  1101 10/25/21  0949     --  138   POTASSIUM 3.9  --  3.9   CHLORIDE 101  --  102   CO2 32  --  31   ANIONGAP 2*  --  5   GLC 71  --  69*   BUN 12  --  19   CR 0.80 0.8 0.86   CAMERON 9.2  --  8.7     Lab Results   Component Value Date    AST 31 2022    AST 28 2021     Lab Results   Component Value Date    ALT 39 2022    ALT 34 2021     No results found for: BILICONJ   Lab Results   Component Value Date    BILITOTAL 0.9 2022    BILITOTAL 0.7 2021     Lab Results   Component Value Date    ALBUMIN 3.7 2022    ALBUMIN 3.7 2021     Lab Results   Component Value Date    PROTTOTAL 7.6 2022    PROTTOTAL 7.0 2021      Lab Results   Component Value Date    ALKPHOS 140 2022    ALKPHOS 109 2021     Component      Latest Ref Rng & Units 2020 3/22/2021 2021 " 10/25/2021   Cancer Antigen 19-9      <=35 U/mL 6 9 15 8     Component      Latest Ref Rng & Units 3/28/2022   Cancer Antigen 19-9      <=35 U/mL 10         IMAGING:  CT c/a/p 3/28/22:  IMPRESSION: In this patient with history of pancreatic cancer status  post Whipple's procedure, there is:  1. No significant change in the ill-defined  hypoattenuating/hypoenhancing area in hepatic segment V as compared to  10/25/2021 exam, although appears slightly more prominent as compared  to 6/30/2020 exam, remains indeterminate, could represent  posttreatment changes, recommend continued attention on follow-up.  2. Few scattered small pulmonary nodules including for example 3 mm  left lower lobe nodule, not significantly changed as compared to  6/30/2020 exam, and hence likely benign. No new pulmonary nodules.  3. Significant amount of stool throughout the colon, nonspecific, can  be seen with constipation.      ASSESSMENT/PLAN:  Flora Hogan is a 76 year old female with:    1) Adenocarcinoma of the pancreas head: s/p Whipple procedure on 5/26/20; pathology showed pancreatic ductal adenocarcinoma, well-differentiated (grade 1), tumor size 2.6 x 2.4 x 2.0 cm, negative margins, +LVI, 0 of 4 lymph nodes involved, staged pT2N0 (stage IB).    She completed 6 months of adjuvant gemcitabine in December 2020.    CT c/a/p on 3/28/22 was reviewed.  I reviewed the report and images.  She has known liver cysts and benign lesions that were biopsied during surgery.  There is an area in the central liver that is not significantly changed compared to the October 2021 exam, appears slightly more prominent completed to the June 2020 exam,  remains indeterminate, could be post-treatment changes.  We will continue to monitor this on follow-up imaging.      -scans and tumor markers every 4 months during 2nd year  -CT c/a/p and labs/CA 19-9 in 4 months   -RTC in 4 months with results of labs and scans    2) Pulmonary nodule: stable  -will  monitor on future scans    3) Chronic constipation/diarrhea: She notes bowel incontinence but sensation of incomplete bowel emptying.  She has had this for many years and has seen [facility] in the past.    -Creon prescribed - she has started it and finds it helpful.  She takes it 3 times a day.  She will continue it.    -she will also try fiber  -follow-up with [facility] again - she requests to see Dr. Yarely Vann    4) Leukpenia: Secondary to prior chemotherapy.  Mild, stable.    -monitor    5) Insomnia: She occasionally takes melatonin. This has been a chronic problems for years, she says, even prior to her cancer diagnosis.    6) Coping: Patient notes some feelings of depression/anxiety and being crabby.  She was looking for a support group, but the group at the [facility] has been stopped due to COVID.  She did speak with our , who offered resources and emotional support.  She requested to try a low dose antidepressant.  -citalopram 20 mg daily prescribed at our last visit in October 2021.  She says that she has found this helpful and will stay at this same dose.    7) Fatigue: secondary to prior surgery and chemotherapy.  -she met with Dr. Brito, PM&R.  They talked about cancer rehab PT/OT, but she wants to wait on this for now, as she says she would rather not do virtual appointments.  She says that she is willing to re-visit it in the future.    8) Forgetfulness, cognitive difficulties: Dr. Brito discussed neuropsychology referral, but again, she wants to wait until in-person appointments are available.        Jeannie Coronado MD  Hematology/Oncology  [facility]      Total time spent on day of visit, including review of tests, obtaining/reviewing separately obtained history, ordering medications/tests/procedures, communicating with PCP/consultants, and documenting in electronic medical record: 30 minutes

## 2022-03-31 NOTE — NURSING NOTE
"Oncology Rooming Note    March 31, 2022 12:50 PM   Flora Hogan is a 76 year old female who presents for:    Chief Complaint   Patient presents with     Oncology Clinic Visit     Malignant neoplasm of head of pancreas      Initial Vitals: /78   Pulse 64   Temp 97  F (36.1  C) (Tympanic)   Resp 16   Ht 1.626 m (5' 4\")   Wt 51.9 kg (114 lb 6.4 oz)   LMP  (LMP Unknown)   SpO2 96%   BMI 19.64 kg/m   Estimated body mass index is 19.64 kg/m  as calculated from the following:    Height as of this encounter: 1.626 m (5' 4\").    Weight as of this encounter: 51.9 kg (114 lb 6.4 oz). Body surface area is 1.53 meters squared.  No Pain (0) Comment: Data Unavailable   No LMP recorded (lmp unknown). Patient is postmenopausal.  Allergies reviewed: Yes  Medications reviewed: Yes    Medications: Medication refills not needed today.  Pharmacy name entered into [employer]:    [id number] - [place], [place] - [address] [id number] [address] [facility] AT [place] [address] & [id number] [address] AT [place] [address]    Clinical concerns: follow up- concerned with balancing issues. Has been having SOB frequently with and without exertion.  Discuss getting Carotid check. Discuss lump on right side of belly button. Would like to know if can use Gas-X.  Discuss Covid booster number 2.       Joi Johns, PASTOR              "

## 2022-03-31 NOTE — LETTER
3/31/2022         RE: Flora Hogan  [address] [address]  [facility] [address]        Dear Colleague,    Thank you for referring your patient, Flora Hogan, to the [facility]. Please see a copy of my visit note below.    [facility]    Hematology/Oncology Established Patient Note      Today's Date: 3/31/22    Reason for Follow-up: pancreatic cancer      HISTORY OF PRESENT ILLNESS: Flora Hogan is a 76 year old female, who presents with adenocarcinoma of the pancreas.  She initially presented with jaundice.  She was evaluated for EUS and FNA, which revealed atypical cells, but no definitive evidence of malignancy.  She also underwent biliary stenting for decompression of her biliary tree.  She had a second EUS done, but again revealed atypical cells.  She then saw Dr. Duarte Chaves at [facility].  She had CT scan and MRI.  MRI showed multiple benign lesions in her liver; there was one area that was indeterminate.  Her CA 19-9 was 93.  On 5/26/20, she underwent Whipple procedure.  Two nodules were visualized on the surface of the liver, which were biopsied and found to be benign on pathology.  There were also hepatic cysts seen.  Pathology showed pancreatic ductal adenocarcinoma, well-differentiated (grade 1), tumor size 2.6 x 2.4 x 2.0 cm, negative margins, +LVI, 0 of 4 lymph nodes involved, staged pT2N0 (stage IB).      Port was placed by IR on 7/6/20.  It was removed on 12/28/20.    She started adjuvant chemotherapy with single-agent gemcitabine on 7/7/20.  She completed 6 cycles on 12/15/20.      INTERIM HISTORY: Flora says that she feels pretty good.  She has returned from [place], where she spent the winter.      REVIEW OF SYSTEMS:   14 point ROS was reviewed and is negative other than as noted above in HPI.       HOME MEDICATIONS:  Current Outpatient Medications   Medication Sig Dispense Refill     acetaminophen (TYLENOL) 325 MG  tablet Take 1-2 tablets (325-650 mg) by mouth every 6 hours as needed for mild pain or fever 50 tablet 0     albuterol (PROAIR HFA/PROVENTIL HFA/VENTOLIN HFA) 108 (90 Base) MCG/ACT inhaler Inhale 2 puffs into the lungs every 6 hours as needed for shortness of breath / dyspnea or wheezing 1 Inhaler 0     amylase-lipase-protease (CREON) 33130-52822 units CPEP per EC capsule Take 1 capsule by mouth 3 times daily (with meals) 270 capsule 3     calcium carbonate-vitamin D (CALTRATE 600+D) 600-400 MG-UNIT chewable tablet Take 1 chew tab by mouth 2 times daily  180 tablet 3     citalopram (CELEXA) 20 MG tablet Take 1 tablet (20 mg) by mouth daily 90 tablet 3     loratadine (CLARITIN REDITABS) 10 MG dispersible tablet Take 10 mg by mouth as needed        magnesium 250 MG tablet Take 1 tablet by mouth daily       multivitamin (ONE-DAILY) tablet Take 1 tablet by mouth daily 90 tablet 0     prochlorperazine (COMPAZINE) 10 MG tablet Take 1 tablet (10 mg) by mouth every 6 hours as needed (Nausea/Vomiting) 30 tablet 1         ALLERGIES:  Allergies   Allergen Reactions     Penicillin V      Seasonal Allergies          PAST MEDICAL HISTORY:  Past Medical History:   Diagnosis Date     Chest tightness     had suspected allergies     Malignant neoplasm of head of pancreas (H) 6/9/2020     Seasonal allergies      SOB (shortness of breath)          PAST SURGICAL HISTORY:  Past Surgical History:   Procedure Laterality Date     CATARACT IOL, RT/LT  2015     COLONOSCOPY  10/14    no repeat needed due to age     COLONOSCOPY N/A 10/7/2014    Procedure: COLONOSCOPY;  Surgeon: Daryl Hanson MD;  Location:  GI     COLONOSCOPY  04/23/2019    no further screening     ENDOSCOPIC RETROGRADE CHOLANGIOPANCREATOGRAM N/A 4/20/2020    Procedure: ENDOSCOPIC RETROGRADE CHOLANGIOPANCREATOGRAPHY, PLACEMENT OF PANCREATIC STENT, PLACEMENT OF BILIARY STENT;  Surgeon: Yarely Vann MD;  Location:  OR     ESOPHAGOSCOPY, GASTROSCOPY, DUODENOSCOPY  (EGD), COMBINED N/A 4/20/2020    Procedure: ESOPHAGOGASTRODUODENOSCOPY, WITH FINE NEEDLE ASPIRATION BIOPSY, WITH ENDOSCOPIC ULTRASOUND GUIDANCE, Endoscopic retrograde cholangiopancreatography;  Surgeon: Yarely Vann MD;  Location:  OR     ESOPHAGOSCOPY, GASTROSCOPY, DUODENOSCOPY (EGD), COMBINED N/A 5/5/2020    Procedure: ESOPHAGOGASTRODUODENOSCOPY, WITH FINE NEEDLE ASPIRATION BIOPSY, WITH ENDOSCOPIC ULTRASOUND GUIDANCE;  Surgeon: Romina Rosario MD;  Location:  GI     IR CHEST PORT PLACEMENT > 5 YRS OF AGE  7/6/2020     IR PORT REMOVAL RIGHT  12/28/2020     LAPAROSCOPIC BIOPSY LIVER N/A 5/22/2020    Procedure: Diagnostic Laparoscopy with intraoperative ultrasound and Liver Biopsy X2;  Surgeon: Duarte Chaves MD;  Location: [facility] OR     WHIPPLE PROCEDURE N/A 5/22/2020    Procedure: Non Pylorus Preserving Whipple procedure;  Surgeon: Duarte Chaves MD;  Location: [facility] OR         SOCIAL HISTORY:  Social History     Socioeconomic History     Marital status:      Spouse name: Not on file     Number of children: Not on file     Years of education: Not on file     Highest education level: Not on file   Occupational History     Employer: RETIRED     Comment: previous taught special ed   Tobacco Use     Smoking status: Never Smoker     Smokeless tobacco: Never Used   Substance and Sexual Activity     Alcohol use: Yes     Comment: social      Drug use: No     Sexual activity: Yes   Other Topics Concern     Parent/sibling w/ CABG, MI or angioplasty before 65F 55M? Not Asked      Service Not Asked     Blood Transfusions Not Asked     Caffeine Concern Yes     Comment: 2 cups     Occupational Exposure Not Asked     Hobby Hazards Not Asked     Sleep Concern Not Asked     Stress Concern Not Asked     Weight Concern Not Asked     Special Diet No     Back Care Not Asked     Exercise Yes     Comment: walking workout      Bike Helmet Not Asked     Seat Belt Not Asked     Self-Exams Not Asked  "  Social History Narrative     Not on file     Social Determinants of Health     Financial Resource Strain: Not on file   Food Insecurity: Not on file   Transportation Needs: Not on file   Physical Activity: Not on file   Stress: Not on file   Social Connections: Not on file   Intimate Partner Violence: Not At Risk     Fear of Current or Ex-Partner: No     Emotionally Abused: No     Physically Abused: No     Sexually Abused: No   Housing Stability: Not on file         FAMILY HISTORY:  Family History   Problem Relation Age of Onset     Musculoskeletal Disorder Mother         edematous legs     Obesity Mother      Musculoskeletal Disorder Maternal Grandmother         edematous legs; did have amputation     Obesity Maternal Grandmother      Myocardial Infarction Maternal Grandmother          PHYSICAL EXAM:  /78   Pulse 64   Temp 97  F (36.1  C) (Tympanic)   Resp 16   Ht 1.626 m (5' 4\")   Wt 51.9 kg (114 lb 6.4 oz)   LMP  (LMP Unknown)   SpO2 96%   BMI 19.64 kg/m    ECO  GENERAL/CONSTITUTIONAL: No acute distress.  EYES: No scleral icterus.  NEUROLOGIC: Alert, oriented, answers questions appropriately.  INTEGUMENTARY: No jaundice.      LABS:  CBC RESULTS: Recent Labs   Lab Test 22  1056   WBC 3.3*   RBC 5.10   HGB 14.5   HCT 44.7   MCV 88   MCH 28.4   MCHC 32.4   RDW 13.5        Recent Labs   Lab Test 22  1056 10/25/21  1101 10/25/21  0949     --  138   POTASSIUM 3.9  --  3.9   CHLORIDE 101  --  102   CO2 32  --  31   ANIONGAP 2*  --  5   GLC 71  --  69*   BUN 12  --  19   CR 0.80 0.8 0.86   CAMERON 9.2  --  8.7     Lab Results   Component Value Date    AST 31 2022    AST 28 2021     Lab Results   Component Value Date    ALT 39 2022    ALT 34 2021     No results found for: BILICONJ   Lab Results   Component Value Date    BILITOTAL 0.9 2022    BILITOTAL 0.7 2021     Lab Results   Component Value Date    ALBUMIN 3.7 2022    ALBUMIN 3.7 " 06/22/2021     Lab Results   Component Value Date    PROTTOTAL 7.6 03/28/2022    PROTTOTAL 7.0 06/22/2021      Lab Results   Component Value Date    ALKPHOS 140 03/28/2022    ALKPHOS 109 06/22/2021     Component      Latest Ref Rng & Units 12/28/2020 3/22/2021 6/22/2021 10/25/2021   Cancer Antigen 19-9      <=35 U/mL 6 9 15 8     Component      Latest Ref Rng & Units 3/28/2022   Cancer Antigen 19-9      <=35 U/mL 10         IMAGING:  CT c/a/p 3/28/22:  IMPRESSION: In this patient with history of pancreatic cancer status  post Whipple's procedure, there is:  1. No significant change in the ill-defined  hypoattenuating/hypoenhancing area in hepatic segment V as compared to  10/25/2021 exam, although appears slightly more prominent as compared  to 6/30/2020 exam, remains indeterminate, could represent  posttreatment changes, recommend continued attention on follow-up.  2. Few scattered small pulmonary nodules including for example 3 mm  left lower lobe nodule, not significantly changed as compared to  6/30/2020 exam, and hence likely benign. No new pulmonary nodules.  3. Significant amount of stool throughout the colon, nonspecific, can  be seen with constipation.      ASSESSMENT/PLAN:  Flora Hogan is a 76 year old female with:    1) Adenocarcinoma of the pancreas head: s/p Whipple procedure on 5/26/20; pathology showed pancreatic ductal adenocarcinoma, well-differentiated (grade 1), tumor size 2.6 x 2.4 x 2.0 cm, negative margins, +LVI, 0 of 4 lymph nodes involved, staged pT2N0 (stage IB).    She completed 6 months of adjuvant gemcitabine in December 2020.    CT c/a/p on 3/28/22 was reviewed.  I reviewed the report and images.  She has known liver cysts and benign lesions that were biopsied during surgery.  There is an area in the central liver that is not significantly changed compared to the October 2021 exam, appears slightly more prominent completed to the June 2020 exam,  remains indeterminate, could be  post-treatment changes.  We will continue to monitor this on follow-up imaging.      -scans and tumor markers every 4 months during 2nd year  -CT c/a/p and labs/CA 19-9 in 4 months   -RTC in 4 months with results of labs and scans    2) Pulmonary nodule: stable  -will monitor on future scans    3) Chronic constipation/diarrhea: She notes bowel incontinence but sensation of incomplete bowel emptying.  She has had this for many years and has seen [facility] in the past.    -Creon prescribed - she has started it and finds it helpful.  She takes it 3 times a day.  She will continue it.    -she will also try fiber  -follow-up with [facility] again - she requests to see Dr. Yarely Vann    4) Leukpenia: Secondary to prior chemotherapy.  Mild, stable.    -monitor    5) Insomnia: She occasionally takes melatonin. This has been a chronic problems for years, she says, even prior to her cancer diagnosis.    6) Coping: Patient notes some feelings of depression/anxiety and being crabby.  She was looking for a support group, but the group at the [facility] has been stopped due to COVID.  She did speak with our , who offered resources and emotional support.  She requested to try a low dose antidepressant.  -citalopram 20 mg daily prescribed at our last visit in October 2021.  She says that she has found this helpful and will stay at this same dose.    7) Fatigue: secondary to prior surgery and chemotherapy.  -she met with Dr. Brito, PM&R.  They talked about cancer rehab PT/OT, but she wants to wait on this for now, as she says she would rather not do virtual appointments.  She says that she is willing to re-visit it in the future.    8) Forgetfulness, cognitive difficulties: Dr. Brito discussed neuropsychology referral, but again, she wants to wait until in-person appointments are available.        Jeannie Coronado MD  Hematology/Oncology  [facility]      Total time spent on day of visit, including  review of tests, obtaining/reviewing separately obtained history, ordering medications/tests/procedures, communicating with PCP/consultants, and documenting in electronic medical record: 30 minutes      Again, thank you for allowing me to participate in the care of your patient.        Sincerely,        Jeannie Coronado MD

## 2022-05-15 ENCOUNTER — HEALTH MAINTENANCE LETTER (OUTPATIENT)
Age: 77
End: 2022-05-15

## 2022-05-20 ENCOUNTER — OFFICE VISIT (OUTPATIENT)
Dept: FAMILY MEDICINE | Facility: CLINIC | Age: 77
End: 2022-05-20
Payer: COMMERCIAL

## 2022-05-20 VITALS
RESPIRATION RATE: 14 BRPM | HEIGHT: 64 IN | HEART RATE: 58 BPM | BODY MASS INDEX: 20.03 KG/M2 | WEIGHT: 117.3 LBS | TEMPERATURE: 97.6 F | DIASTOLIC BLOOD PRESSURE: 52 MMHG | OXYGEN SATURATION: 100 % | SYSTOLIC BLOOD PRESSURE: 98 MMHG

## 2022-05-20 DIAGNOSIS — R06.02 SOB (SHORTNESS OF BREATH): ICD-10-CM

## 2022-05-20 DIAGNOSIS — R93.3 ABNORMAL FINDINGS ON DIAGNOSTIC IMAGING OF OTHER PARTS OF DIGESTIVE TRACT: ICD-10-CM

## 2022-05-20 DIAGNOSIS — C25.0 MALIGNANT NEOPLASM OF HEAD OF PANCREAS (H): ICD-10-CM

## 2022-05-20 DIAGNOSIS — Z23 HIGH PRIORITY FOR 2019-NCOV VACCINE: ICD-10-CM

## 2022-05-20 DIAGNOSIS — D70.1 AGRANULOCYTOSIS SECONDARY TO CANCER CHEMOTHERAPY (CODE) (H): ICD-10-CM

## 2022-05-20 DIAGNOSIS — Z11.59 ENCOUNTER FOR HEPATITIS C SCREENING TEST FOR LOW RISK PATIENT: ICD-10-CM

## 2022-05-20 DIAGNOSIS — Z00.00 ENCOUNTER FOR MEDICARE ANNUAL WELLNESS EXAM: Primary | ICD-10-CM

## 2022-05-20 DIAGNOSIS — Z12.31 VISIT FOR SCREENING MAMMOGRAM: ICD-10-CM

## 2022-05-20 PROCEDURE — 0064A COVID-19,PF,MODERNA (18+ YRS BOOSTER .25ML): CPT | Performed by: PHYSICIAN ASSISTANT

## 2022-05-20 PROCEDURE — 91306 COVID-19,PF,MODERNA (18+ YRS BOOSTER .25ML): CPT | Performed by: PHYSICIAN ASSISTANT

## 2022-05-20 PROCEDURE — 99213 OFFICE O/P EST LOW 20 MIN: CPT | Mod: 25 | Performed by: PHYSICIAN ASSISTANT

## 2022-05-20 PROCEDURE — 99397 PER PM REEVAL EST PAT 65+ YR: CPT | Mod: 25 | Performed by: PHYSICIAN ASSISTANT

## 2022-05-20 RX ORDER — ALBUTEROL SULFATE 90 UG/1
2 AEROSOL, METERED RESPIRATORY (INHALATION) EVERY 6 HOURS PRN
Qty: 18 G | Refills: 0 | Status: SHIPPED | OUTPATIENT
Start: 2022-05-20 | End: 2023-06-27

## 2022-05-20 ASSESSMENT — ENCOUNTER SYMPTOMS
SHORTNESS OF BREATH: 1
PALPITATIONS: 1
HEADACHES: 1
CONSTIPATION: 1
COUGH: 1

## 2022-05-20 ASSESSMENT — ACTIVITIES OF DAILY LIVING (ADL): CURRENT_FUNCTION: NO ASSISTANCE NEEDED

## 2022-05-20 NOTE — PROGRESS NOTES
"SUBJECTIVE:   Flora Hogan is a 76 year old female who presents for Preventive Visit.      Patient has been advised of split billing requirements and indicates understanding: Yes     Are you in the first 12 months of your Medicare coverage?  No    Healthy Habits:     In general, how would you rate your overall health?  Good    Frequency of exercise:  4-5 days/week    Duration of exercise:  Greater than 60 minutes    Do you usually eat at least 4 servings of fruit and vegetables a day, include whole grains    & fiber and avoid regularly eating high fat or \"junk\" foods?  Yes    Taking medications regularly:  Yes    Ability to successfully perform activities of daily living:  No assistance needed    Home Safety:  Lack of grab bars in the bathroom    Hearing Impairment:  Difficulty following a conversation in a noisy restaurant or crowded room    In the past 6 months, have you been bothered by leaking of urine?  No    In general, how would you rate your overall mental or emotional health?  Good      PHQ-2 Total Score: 0    Flora Hogan is a 76 year old female who presents today for annual wellness  Overall she is feeling well  She did winter again in [place]  Started on citalopram last fall after some mood changes, very helpful      Do you feel safe in your environment? Yes    Have you ever done Advance Care Planning? (For example, a Health Directive, POLST, or a discussion with a medical provider or your loved ones about your wishes): No, advance care planning information given to patient to review.  Advanced care planning was discussed at today's visit.       Fall risk  Fallen 2 or more times in the past year?: No  Any fall with injury in the past year?: No    Cognitive Screening     2) Clock draw: NORMAL  3) 3 item recall: }Recalls 3 objects  Results: NORMAL clock, 1-2 items recalled: COGNITIVE IMPAIRMENT LESS LIKELY    Mini-CogTM Copyright S Claus. Licensed by the author for use in [employer]; reprinted with permission (soob@[facility]). All rights reserved.      Do you have sleep apnea, excessive snoring or daytime drowsiness?: no    Reviewed and updated as needed this visit by clinical staff   Tobacco  Allergies  Meds   Med Hx  Surg Hx  Fam Hx  Soc Hx          Reviewed and updated as needed this visit by Provider                   Social History     Tobacco Use     Smoking status: Never Smoker     Smokeless tobacco: Never Used   Substance Use Topics     Alcohol use: Yes     Comment: social      If you drink alcohol do you typically have >3 drinks per day or >7 drinks per week? No    Alcohol Use 5/20/2022   Prescreen: >3 drinks/day or >7 drinks/week? No   Prescreen: >3 drinks/day or >7 drinks/week? -   No flowsheet data found.      ALLERGIES     Duration: SEASONAL ALLERGIES    Description:   Nasal congestion: YES  Sneezing: YES-   Red, itchy eyes: YES    Accompanying signs and symptoms: long standing above in spring    History (similar episodes/allergy testing): YES    Precipitating or alleviating factors: seasonal; sometimes walking    Therapies tried and outcome: Claratin as needed      Current providers sharing in care for this patient include:   Patient Care Team:  Marcial Holden PA-C as PCP - General (Family Medicine)  Jeannie Coronado MD as MD (Hematology & Oncology)  Leyla Layton, RN as Specialty Care Coordinator (Oncology)  Jeannie Coronado MD as Assigned Cancer Care Provider  Marcial Holden PA-C as Assigned PCP  Rosalia Brito MD as Assigned Neuroscience Provider  Rosalia Brito MD as Physician (Physical Medicine and Rehabilitation)    The following health maintenance items are reviewed in Epic and correct as of today:  Health Maintenance Due   Topic Date Due     DEXA  Never done     MAMMO SCREENING  10/20/2018     COVID-19 Vaccine (4 - Booster for Moderna series) 02/15/2022     ANNUAL REVIEW OF HM ORDERS  05/03/2022     Lab work  "is in process  Labs reviewed in Epic    Mammogram Screening - Patient over age 75, has elected to discontinue screenings.  Pertinent mammograms are reviewed under the imaging tab.    Review of Systems   HENT: Positive for congestion and hearing loss.    Respiratory: Positive for cough and shortness of breath.    Cardiovascular: Positive for chest pain and palpitations.   Gastrointestinal: Positive for constipation.   Neurological: Positive for headaches.     Constitutional, HEENT, cardiovascular, pulmonary, gi and gu systems are negative, except as otherwise noted.    OBJECTIVE:   BP 98/52 (BP Location: Right arm, Patient Position: Sitting, Cuff Size: Adult Regular)   Pulse 58   Temp 97.6  F (36.4  C) (Tympanic)   Resp 14   Ht 1.626 m (5' 4\")   Wt 53.2 kg (117 lb 4.8 oz)   LMP  (LMP Unknown)   SpO2 100%   BMI 20.13 kg/m   Estimated body mass index is 20.13 kg/m  as calculated from the following:    Height as of this encounter: 1.626 m (5' 4\").    Weight as of this encounter: 53.2 kg (117 lb 4.8 oz).  Physical Exam  GENERAL: healthy, alert and no distress  EYES: Eyes grossly normal to inspection, PERRL and conjunctivae and sclerae normal  HENT: ear canals and TM's normal, nose and mouth without ulcers or lesions  NECK: no adenopathy, no asymmetry, masses, or scars and thyroid normal to palpation  RESP: lungs clear to auscultation - no rales, rhonchi or wheezes  CV: regular rate and rhythm, normal S1 S2, no S3 or S4, no murmur, click or rub, no peripheral edema and peripheral pulses strong  ABDOMEN: soft, nontender, no hepatosplenomegaly, no masses and bowel sounds normal  MS: No peripheral edema; pedal pulses in tact  PSYCH: mentation appears normal, affect normal/bright    Diagnostic Test Results:  Labs reviewed in Epic    ASSESSMENT / PLAN:   1. Encounter for Medicare annual wellness exam  Reviewed personal and family history. Reviewed age appropriate screenings. Recommended any needed vaccinations. We " "reviewed some general screening thoughts and she will hold on some. Ok for mammography. Holding on DXA; gets good D and Ca    2. Visit for screening mammogram  Will perform  - MA SCREENING DIGITAL BILAT - Future  (s+30); Future    3. Malignant neoplasm of head of pancreas (H)  4. Agranulocytosis secondary to cancer chemotherapy (CODE) (H)  Continues with Dr. Coronado. q4 month scans. Stable overall    5. SOB (shortness of breath)  Getting with walking. Wondering if this is allergies. She has had since at least 2019. Still with the CT scans showing some atherosclerosis, do recommend considering statin and stress testing. She'll consider  - albuterol (PROAIR HFA/PROVENTIL HFA/VENTOLIN HFA) 108 (90 Base) MCG/ACT inhaler; Inhale 2 puffs into the lungs every 6 hours as needed for shortness of breath / dyspnea or wheezing  Dispense: 18 g; Refill: 0    6. Encounter for hepatitis C screening test for low risk patient  Repeat next labs after equivocal first screen  - Hepatitis C Screen Reflex to HCV RNA Quant and Genotype; Future    7. Abnormal findings on diagnostic imaging of other parts of digestive tract   See above shortness of breath     8. High priority for 2019-nCoV vaccine  updating  - COVID-19,PF,MODERNA (18+ Yrs BOOSTER .25mL)          COUNSELING:  Reviewed preventive health counseling, as reflected in patient instructions    Estimated body mass index is 20.13 kg/m  as calculated from the following:    Height as of this encounter: 1.626 m (5' 4\").    Weight as of this encounter: 53.2 kg (117 lb 4.8 oz).        She reports that she has never smoked. She has never used smokeless tobacco.      Appropriate preventive services were discussed with this patient, including applicable screening as appropriate for cardiovascular disease, diabetes, osteopenia/osteoporosis, and glaucoma.  As appropriate for age/gender, discussed screening for colorectal cancer, prostate cancer, breast cancer, and cervical cancer. Checklist " reviewing preventive services available has been given to the patient.    Reviewed patients plan of care and provided an AVS. The Basic Care Plan (routine screening as documented in Health Maintenance) for Flora meets the Care Plan requirement. This Care Plan has been established and reviewed with the Patient.    Counseling Resources:  ATP IV Guidelines  Pooled Cohorts Equation Calculator  Breast Cancer Risk Calculator  Breast Cancer: Medication to Reduce Risk  FRAX Risk Assessment  ICSI Preventive Guidelines  Dietary Guidelines for Americans, 2010  [employer]'s MyPlate  ASA Prophylaxis  Lung CA Screening    FELICIANO Santos [facility]    Identified Health Risks:

## 2022-05-20 NOTE — PATIENT INSTRUCTIONS
For the atherosclerosis - can consider stress testing, and still recommend statin/cholesterol medication as prevention. Alternatively there is a CT coronary calcium scan that we could consider to help make a safe decision          Patient Education   Personalized Prevention Plan  You are due for the preventive services outlined below.  Your care team is available to assist you in scheduling these services.  If you have already completed any of these items, please share that information with your care team to update in your medical record.  Health Maintenance Due   Topic Date Due    Osteoporosis Screening  Never done    Mammogram  10/20/2018    PHQ-2 (once per calendar year)  01/01/2022    COVID-19 Vaccine (4 - Booster for Moderna series) 02/15/2022    ANNUAL REVIEW OF HM ORDERS  05/03/2022    FALL RISK ASSESSMENT  05/03/2022    Annual Wellness Visit  05/03/2022

## 2022-06-22 ENCOUNTER — HOSPITAL ENCOUNTER (OUTPATIENT)
Dept: MAMMOGRAPHY | Facility: CLINIC | Age: 77
Discharge: HOME | End: 2022-06-22
Attending: PHYSICIAN ASSISTANT | Admitting: PHYSICIAN ASSISTANT
Payer: COMMERCIAL

## 2022-06-22 DIAGNOSIS — Z12.31 VISIT FOR SCREENING MAMMOGRAM: ICD-10-CM

## 2022-06-22 DIAGNOSIS — R06.02 SOB (SHORTNESS OF BREATH): ICD-10-CM

## 2022-06-22 PROCEDURE — 77067 SCR MAMMO BI INCL CAD: CPT

## 2022-06-24 RX ORDER — ALBUTEROL SULFATE 90 UG/1
AEROSOL, METERED RESPIRATORY (INHALATION)
Qty: 18 G | Refills: 0 | OUTPATIENT
Start: 2022-06-24

## 2022-06-24 NOTE — TELEPHONE ENCOUNTER
Contacted patient and reviewed message per Monroe Holden.    Pt states that she will continue to monitor for now. Pt will contact the clinic if symptoms worsen or increase in frequency.    Pt denies additional questions or concerns at this time.    Gabrielle MCCULLOUGH RN

## 2022-06-24 NOTE — TELEPHONE ENCOUNTER
We discussed stress testing at her last visit. That would be best way to rule out if cardiac. Ifshe wants to pursue that I can order otherwise I think ok to simply monitor

## 2022-06-24 NOTE — TELEPHONE ENCOUNTER
Received refill request for ventolin inhaler. Per chart review, inhaler was refilled one month ago. Also reviewed last visit note:    Per visit note on 5/20/22:  5. SOB (shortness of breath)  Getting with walking. Wondering if this is allergies. She has had since at least 2019. Still with the CT scans showing some atherosclerosis, do recommend considering statin and stress testing. She'll consider  - albuterol (PROAIR HFA/PROVENTIL HFA/VENTOLIN HFA) 108 (90 Base) MCG/ACT inhaler; Inhale 2 puffs into the lungs every 6 hours as needed for shortness of breath / dyspnea or wheezing  Dispense: 18 g; Refill: 0    Contacted patient to further assess.    Pt states that she uses inhaler occasionally, not every day. Pt states that she is not in need of a refill currently. Will cancel refill request and advised patient to contact pharmacy when she needs a refill.    Pt reports that she uses inhaler for occasional shortness of breath that she believes is due to allergies. Pt states that she thinks that her allergies have gotten worse over time. Pt reports that she is also using Flonase daily and occasional OTC claritin. Pt states that the inhaler helps and reports that the shortness of breath is occasional not related to activity. Pt reports that the SOB is mild. Pt also reports brief chest tightness that occurs occasionally (less than once per month). Pt states that chest tightness does not radiate, is brief (lasting less than 20 seconds), and is not related to activity. Pt also reports that when the chest tightness occurs it feels like her heart is racing. Pt reports that last night she felt brief lightheadedness and dizziness during her walk outdoors. Pt states that her spouse is concerned that her symptoms may be related to her heart.    Advised patient that will send update to Monroe Holden and return call with recommendations. Reviewed red flag symptoms with patient and advised patient to present to the ER if these occur. Pt  verbalized understanding and agrees with plan.    Will route to Monroe Holden to advise. Pt also states that she is very appreciative of Monroe Holden's bedside manner and expertise.    Gabrielle MCCULLOUGH RN

## 2022-06-28 ENCOUNTER — HOSPITAL ENCOUNTER (OUTPATIENT)
Dept: MAMMOGRAPHY | Facility: CLINIC | Age: 77
Discharge: HOME | End: 2022-06-28
Attending: PHYSICIAN ASSISTANT
Payer: COMMERCIAL

## 2022-06-28 DIAGNOSIS — R92.8 ABNORMAL MAMMOGRAM: ICD-10-CM

## 2022-06-28 PROCEDURE — 77062 BREAST TOMOSYNTHESIS BI: CPT

## 2022-07-11 DIAGNOSIS — C25.0 MALIGNANT NEOPLASM OF HEAD OF PANCREAS (H): ICD-10-CM

## 2022-07-11 RX ORDER — PANCRELIPASE 24000; 76000; 120000 [USP'U]/1; [USP'U]/1; [USP'U]/1
CAPSULE, DELAYED RELEASE PELLETS ORAL
Qty: 270 CAPSULE | Refills: 3 | Status: SHIPPED | OUTPATIENT
Start: 2022-07-11 | End: 2022-10-17

## 2022-07-11 NOTE — TELEPHONE ENCOUNTER
Last prescribing provider: Dr. Coronado    Last clinic visit date: 3/31/22 Dr. Coronado    Any missed appointments or no-shows since last clinic visit?: none    Recommendations for requested medication (if none, N/A): Take 1 capsule by mouth 3 times daily (with meals)    Next clinic visit date: 7/27 Dr. Barrett    Any other pertinent information (if none, N/A): none

## 2022-07-12 ENCOUNTER — HOSPITAL ENCOUNTER (OUTPATIENT)
Dept: MAMMOGRAPHY | Facility: CLINIC | Age: 77
Discharge: HOME | End: 2022-07-12
Attending: PHYSICIAN ASSISTANT
Payer: COMMERCIAL

## 2022-07-12 DIAGNOSIS — R92.8 ABNORMAL MAMMOGRAM: ICD-10-CM

## 2022-07-12 PROCEDURE — 999N000065 MA POST PROCEDURE RIGHT

## 2022-07-12 PROCEDURE — 272N000715 MA STEREOTACTIC BREAST BIOPSY VACUUM RT

## 2022-07-12 PROCEDURE — 88305 TISSUE EXAM BY PATHOLOGIST: CPT | Mod: TC | Performed by: PHYSICIAN ASSISTANT

## 2022-07-12 PROCEDURE — 250N000009 HC RX 250: Performed by: RADIOLOGY

## 2022-07-12 RX ORDER — LIDOCAINE HYDROCHLORIDE AND EPINEPHRINE 10; 10 MG/ML; UG/ML
1 INJECTION, SOLUTION INFILTRATION; PERINEURAL ONCE
Status: COMPLETED | OUTPATIENT
Start: 2022-07-12 | End: 2022-07-12

## 2022-07-12 RX ORDER — LIDOCAINE HYDROCHLORIDE 10 MG/ML
1 INJECTION, SOLUTION EPIDURAL; INFILTRATION; INTRACAUDAL; PERINEURAL ONCE
Status: COMPLETED | OUTPATIENT
Start: 2022-07-12 | End: 2022-07-12

## 2022-07-12 RX ADMIN — LIDOCAINE HYDROCHLORIDE AND EPINEPHRINE 12 ML: 10; 10 INJECTION, SOLUTION INFILTRATION; PERINEURAL at 13:25

## 2022-07-12 RX ADMIN — LIDOCAINE HYDROCHLORIDE 6 ML: 10 INJECTION, SOLUTION EPIDURAL; INFILTRATION; INTRACAUDAL; PERINEURAL at 13:25

## 2022-07-14 DIAGNOSIS — C25.0 MALIGNANT NEOPLASM OF HEAD OF PANCREAS (H): ICD-10-CM

## 2022-07-14 NOTE — CONFIDENTIAL NOTE
Patient calling in to report that copay on creon medication was too expensive and she would like to use [facility] to purchase medication moving forward. After reviewing with pharmacy medication only available to patient is Zenpep 25,000-79,000. After discussion with patient she okay with the drug manufactor change and will call [facility] to clarify if non-[place] providers are able to  prescribe to pharmacy.      Patient to follow up with writer once questions are answered by new pharmacy.     Iron Amaya, RN, BSN.  RN Care Coordinator     [facility]   [phone]

## 2022-07-15 ENCOUNTER — TELEPHONE (OUTPATIENT)
Dept: FAMILY MEDICINE | Facility: CLINIC | Age: 77
End: 2022-07-15

## 2022-07-15 NOTE — TELEPHONE ENCOUNTER
Spoke with patient. Called oncology office this am and patient stated was told new provider was not going to write prescription and to call pcp.     Prescription done 7/14/22 is in chart per Hematology/oncology.    Informed patient of prescription per PELON Barrett. Patient will contact Oncology for them to fax signed prescription.    Keely Harris RN

## 2022-07-15 NOTE — TELEPHONE ENCOUNTER
Flora is calling stating that her oncologist suggested that Monroe write the order for her lipase-protease-amylase (ZENPEP) 54113-39711 units CPEP     So that it can be filled in [place] at a reduced price Would like it faxed to [phone]    Flora- [phone], ok to leave detailed message.     Charito Maciel

## 2022-07-18 LAB
PATH REPORT.COMMENTS IMP SPEC: NORMAL
PATH REPORT.FINAL DX SPEC: NORMAL
PATH REPORT.GROSS SPEC: NORMAL
PATH REPORT.MICROSCOPIC SPEC OTHER STN: NORMAL
PATH REPORT.MICROSCOPIC SPEC OTHER STN: NORMAL
PATH REPORT.RELEVANT HX SPEC: NORMAL
PHOTO IMAGE: NORMAL

## 2022-07-18 PROCEDURE — 88305 TISSUE EXAM BY PATHOLOGIST: CPT | Mod: 26 | Performed by: PATHOLOGY

## 2022-07-18 PROCEDURE — 88342 IMHCHEM/IMCYTCHM 1ST ANTB: CPT | Mod: 26 | Performed by: PATHOLOGY

## 2022-07-18 PROCEDURE — 88341 IMHCHEM/IMCYTCHM EA ADD ANTB: CPT | Mod: 26 | Performed by: PATHOLOGY

## 2022-07-19 ENCOUNTER — TELEPHONE (OUTPATIENT)
Dept: MAMMOGRAPHY | Facility: CLINIC | Age: 77
End: 2022-07-19

## 2022-07-19 NOTE — TELEPHONE ENCOUNTER
Pathology report reviewed with our breast radiologist Dr Robbins, who confirmed the recent breast imaging is concordant with the final pathology results below.    I phoned Ms Hogan, confirmed her full name, date of birth, and informed patient of her stereotatic Guided right Breast Needle Biopsy (07/17/2022) results showing Negative for malignancy  - Focal atypical ductal hyperplasia  - Microcalcifications present in benign breast tissue     Recommended follow up is surgical consult or re biopsy.  Arrangements have been made for the patient to meet with Dr Wagner on 7-22-22 at 930 am.    Patient states no problems or concerns with her biopsy site.   Questions were answered and my phone number given if she has further questions or concerns.  I informed patient I will notify the ordering provider of the results and recommendations for follow up.  Patient verbalized understanding and agrees with the plan of care.     Raven Cox RN CBCN  Breast Care Nurse Coordinator  [facility]  [phone]

## 2022-07-22 ENCOUNTER — OFFICE VISIT (OUTPATIENT)
Dept: SURGERY | Facility: CLINIC | Age: 77
End: 2022-07-22
Payer: COMMERCIAL

## 2022-07-22 VITALS
WEIGHT: 117 LBS | OXYGEN SATURATION: 98 % | HEART RATE: 96 BPM | RESPIRATION RATE: 16 BRPM | HEIGHT: 64 IN | BODY MASS INDEX: 19.97 KG/M2

## 2022-07-22 DIAGNOSIS — N60.91 ATYPICAL DUCTAL HYPERPLASIA OF RIGHT BREAST: Primary | ICD-10-CM

## 2022-07-22 PROCEDURE — 99203 OFFICE O/P NEW LOW 30 MIN: CPT | Performed by: SURGERY

## 2022-07-22 NOTE — PROGRESS NOTES
This is a very pleasant 76-year-old patient with a history of pancreatic cancer removed with a Whipple procedure 2 years ago.  She presents today following a right breast biopsy for suspicious calcifications.  This revealed a small area of atypical ductal hyperplasia.  The patient has not noticed any masses.  The patient is due to see Dr. Gloria Barrett next Tuesday.    Past Medical History:   Diagnosis Date     Chest tightness     had suspected allergies     Malignant neoplasm of head of pancreas (H) 6/9/2020     Seasonal allergies      SOB (shortness of breath)      Past Surgical History:   Procedure Laterality Date     CATARACT IOL, RT/LT  2015     COLONOSCOPY  10/14    no repeat needed due to age     COLONOSCOPY N/A 10/7/2014    Procedure: COLONOSCOPY;  Surgeon: Daryl Hanson MD;  Location:  GI     COLONOSCOPY  04/23/2019    no further screening     ENDOSCOPIC RETROGRADE CHOLANGIOPANCREATOGRAM N/A 4/20/2020    Procedure: ENDOSCOPIC RETROGRADE CHOLANGIOPANCREATOGRAPHY, PLACEMENT OF PANCREATIC STENT, PLACEMENT OF BILIARY STENT;  Surgeon: Yarely Vann MD;  Location:  OR     ESOPHAGOSCOPY, GASTROSCOPY, DUODENOSCOPY (EGD), COMBINED N/A 4/20/2020    Procedure: ESOPHAGOGASTRODUODENOSCOPY, WITH FINE NEEDLE ASPIRATION BIOPSY, WITH ENDOSCOPIC ULTRASOUND GUIDANCE, Endoscopic retrograde cholangiopancreatography;  Surgeon: Yarely Vann MD;  Location:  OR     ESOPHAGOSCOPY, GASTROSCOPY, DUODENOSCOPY (EGD), COMBINED N/A 5/5/2020    Procedure: ESOPHAGOGASTRODUODENOSCOPY, WITH FINE NEEDLE ASPIRATION BIOPSY, WITH ENDOSCOPIC ULTRASOUND GUIDANCE;  Surgeon: Romina Rosario MD;  Location:  GI     IR CHEST PORT PLACEMENT > 5 YRS OF AGE  7/6/2020     IR PORT REMOVAL RIGHT  12/28/2020     LAPAROSCOPIC BIOPSY LIVER N/A 5/22/2020    Procedure: Diagnostic Laparoscopy with intraoperative ultrasound and Liver Biopsy X2;  Surgeon: Duarte Chaves MD;  Location: [facility] OR     WHIPPLE PROCEDURE N/A 5/22/2020     Procedure: Non Pylorus Preserving Whipple procedure;  Surgeon: Duarte Chaves MD;  Location: [facility] OR     Physical exam:  The patient is in no apparent distress.  Breathing is nonlabored.  The left breast reveals no palpable masses.  The right breast reveals a 2.5 cm firm area just medial to the nipple areolar complex.  The patient reports that this was not present prior to her biopsy, and that she is only noticed it over the last couple of days.  There is significant ecchymosis in the right breast.  There are no palpable axillary or supraclavicular lymph nodes.    Pathology on the patient's vacuum-assisted biopsy revealed a fairly significant volume of specimen.  There were calcifications seen, but those were not associated with atypia.  There was a small area of atypical ductal hyperplasia seen, but this was not associated with the calcifications.    Mammograms are reviewed with the patient and her .    Assessment and plan: This is a patient with a small area of atypical ductal hyperplasia in her right breast.  She has a broad area of calcifications, but the area of atypical ductal hyperplasia was not associated with calcifications on the biopsy.  It therefore seems likely that the calcifications are not necessarily a sign of malignancy or further atypia.  I explained that our typical recommendation in this situation would be an excisional biopsy to prove that there is no associated DCIS.  The patient and her  wonder whether that is truly necessary, and I explained that though there is some risk of missing a noninvasive cancer, that most invasive cancers will show up on mammogram.  The patient certainly has some risk relating to her pancreatic cancer diagnosis as well, though she is doing well a couple of years out from that.  I would certainly be happy to perform radiofrequency localized excisional biopsy on the region.  If the area of calcifications turned out to be more suspicious or  malignant, a full excision of that would likely require a mastectomy.  At this point, the patient will think about her options.  She will be meeting with Dr. Barrett next week and can discuss it further with her.  In the meantime, I will place a case request in case the patient decides she would like to go ahead with the excisional biopsy.  She may simply call us back to schedule.    A total of 35 minutes was spent today in chart review, patient examination and discussion, and documentation.    Geovanny Wagner MD  [facility]    Please route or send letter to:  Primary Care Provider (PCP) and Dr. Gloria Barrett at [facility] oncology.

## 2022-07-22 NOTE — PROGRESS NOTES
Breast Patients      BREAST PATIENTS (FEMALE)    At what age did your periods begin? 14    What was the date of your last menstrual period? ?    Have you begun menopause? Yes  Age Menopause began:  ?    Are you using hormone replacement therapy?  No    Number of full-term pregnancies: 2    Did you nurse your children? Yes    Are you pregnant now? No    Do you have breast implants? No         BREAST PATIENTS (ALL)    Have you had a previous breast biopsy? No    Have you had previous Breast Cancer? No

## 2022-07-25 ENCOUNTER — LAB (OUTPATIENT)
Dept: INFUSION THERAPY | Facility: CLINIC | Age: 77
End: 2022-07-25
Attending: INTERNAL MEDICINE
Payer: COMMERCIAL

## 2022-07-25 ENCOUNTER — HOSPITAL ENCOUNTER (OUTPATIENT)
Dept: CT IMAGING | Facility: CLINIC | Age: 77
Discharge: HOME | End: 2022-07-25
Attending: INTERNAL MEDICINE | Admitting: INTERNAL MEDICINE
Payer: COMMERCIAL

## 2022-07-25 DIAGNOSIS — C25.0 MALIGNANT NEOPLASM OF HEAD OF PANCREAS (H): ICD-10-CM

## 2022-07-25 LAB
ALBUMIN SERPL-MCNC: 3.6 G/DL (ref 3.4–5)
ALP SERPL-CCNC: 131 U/L (ref 40–150)
ALT SERPL W P-5'-P-CCNC: 39 U/L (ref 0–50)
ANION GAP SERPL CALCULATED.3IONS-SCNC: 3 MMOL/L (ref 3–14)
AST SERPL W P-5'-P-CCNC: 27 U/L (ref 0–45)
BASOPHILS # BLD AUTO: 0.1 10E3/UL (ref 0–0.2)
BASOPHILS NFR BLD AUTO: 2 %
BILIRUB SERPL-MCNC: 0.7 MG/DL (ref 0.2–1.3)
BUN SERPL-MCNC: 16 MG/DL (ref 7–30)
CALCIUM SERPL-MCNC: 9.1 MG/DL (ref 8.5–10.1)
CHLORIDE BLD-SCNC: 104 MMOL/L (ref 94–109)
CO2 SERPL-SCNC: 30 MMOL/L (ref 20–32)
CREAT SERPL-MCNC: 0.84 MG/DL (ref 0.52–1.04)
EOSINOPHIL # BLD AUTO: 0.2 10E3/UL (ref 0–0.7)
EOSINOPHIL NFR BLD AUTO: 5 %
ERYTHROCYTE [DISTWIDTH] IN BLOOD BY AUTOMATED COUNT: 13.2 % (ref 10–15)
GFR SERPL CREATININE-BSD FRML MDRD: 71 ML/MIN/1.73M2
GLUCOSE BLD-MCNC: 69 MG/DL (ref 70–99)
HCT VFR BLD AUTO: 44.3 % (ref 35–47)
HGB BLD-MCNC: 14.3 G/DL (ref 11.7–15.7)
IMM GRANULOCYTES # BLD: 0 10E3/UL
IMM GRANULOCYTES NFR BLD: 0 %
LYMPHOCYTES # BLD AUTO: 1 10E3/UL (ref 0.8–5.3)
LYMPHOCYTES NFR BLD AUTO: 31 %
MCH RBC QN AUTO: 29 PG (ref 26.5–33)
MCHC RBC AUTO-ENTMCNC: 32.3 G/DL (ref 31.5–36.5)
MCV RBC AUTO: 90 FL (ref 78–100)
MONOCYTES # BLD AUTO: 0.3 10E3/UL (ref 0–1.3)
MONOCYTES NFR BLD AUTO: 11 %
NEUTROPHILS # BLD AUTO: 1.5 10E3/UL (ref 1.6–8.3)
NEUTROPHILS NFR BLD AUTO: 51 %
NRBC # BLD AUTO: 0 10E3/UL
NRBC BLD AUTO-RTO: 0 /100
PLATELET # BLD AUTO: 185 10E3/UL (ref 150–450)
POTASSIUM BLD-SCNC: 3.9 MMOL/L (ref 3.4–5.3)
PROT SERPL-MCNC: 7.1 G/DL (ref 6.8–8.8)
RBC # BLD AUTO: 4.93 10E6/UL (ref 3.8–5.2)
SODIUM SERPL-SCNC: 137 MMOL/L (ref 133–144)
WBC # BLD AUTO: 3 10E3/UL (ref 4–11)

## 2022-07-25 PROCEDURE — 74177 CT ABD & PELVIS W/CONTRAST: CPT

## 2022-07-25 PROCEDURE — 82040 ASSAY OF SERUM ALBUMIN: CPT | Performed by: INTERNAL MEDICINE

## 2022-07-25 PROCEDURE — 250N000011 HC RX IP 250 OP 636: Performed by: INTERNAL MEDICINE

## 2022-07-25 PROCEDURE — 85025 COMPLETE CBC W/AUTO DIFF WBC: CPT | Performed by: INTERNAL MEDICINE

## 2022-07-25 PROCEDURE — 80053 COMPREHEN METABOLIC PANEL: CPT | Performed by: INTERNAL MEDICINE

## 2022-07-25 PROCEDURE — 36415 COLL VENOUS BLD VENIPUNCTURE: CPT

## 2022-07-25 PROCEDURE — 86301 IMMUNOASSAY TUMOR CA 19-9: CPT | Performed by: INTERNAL MEDICINE

## 2022-07-25 PROCEDURE — 250N000009 HC RX 250: Performed by: INTERNAL MEDICINE

## 2022-07-25 RX ORDER — IOPAMIDOL 755 MG/ML
500 INJECTION, SOLUTION INTRAVASCULAR ONCE
Status: COMPLETED | OUTPATIENT
Start: 2022-07-25 | End: 2022-07-25

## 2022-07-25 RX ADMIN — SODIUM CHLORIDE 50 ML: 9 INJECTION, SOLUTION INTRAVENOUS at 09:40

## 2022-07-25 RX ADMIN — IOPAMIDOL 50 ML: 755 INJECTION, SOLUTION INTRAVENOUS at 09:40

## 2022-07-25 NOTE — PROGRESS NOTES
Nursing Note:  Flora Hogan presents today for PIV start, labs.    Patient seen by provider today: No   present during visit today: Not Applicable.    Note: N/A.    Intravenous Access:  Labs drawn without difficulty.  Peripheral IV placed.  PIV left in place for CT scan.     Discharge Plan:   Patient was sent to imaging for CT appointment.    Karina Rodriguez RN

## 2022-07-26 LAB — CANCER AG19-9 SERPL IA-ACNC: 10 U/ML

## 2022-07-27 ENCOUNTER — PATIENT OUTREACH (OUTPATIENT)
Dept: ONCOLOGY | Facility: CLINIC | Age: 77
End: 2022-07-27

## 2022-07-27 ENCOUNTER — ONCOLOGY VISIT (OUTPATIENT)
Dept: ONCOLOGY | Facility: CLINIC | Age: 77
End: 2022-07-27
Attending: INTERNAL MEDICINE
Payer: COMMERCIAL

## 2022-07-27 ENCOUNTER — MEDICAL CORRESPONDENCE (OUTPATIENT)
Dept: HEALTH INFORMATION MANAGEMENT | Facility: CLINIC | Age: 77
End: 2022-07-27

## 2022-07-27 VITALS
TEMPERATURE: 97.6 F | SYSTOLIC BLOOD PRESSURE: 125 MMHG | DIASTOLIC BLOOD PRESSURE: 79 MMHG | RESPIRATION RATE: 16 BRPM | WEIGHT: 114 LBS | HEIGHT: 64 IN | HEART RATE: 62 BPM | OXYGEN SATURATION: 99 % | BODY MASS INDEX: 19.46 KG/M2

## 2022-07-27 DIAGNOSIS — N60.91 ATYPICAL DUCTAL HYPERPLASIA OF RIGHT BREAST: ICD-10-CM

## 2022-07-27 DIAGNOSIS — C25.0 MALIGNANT NEOPLASM OF HEAD OF PANCREAS (H): Primary | ICD-10-CM

## 2022-07-27 PROCEDURE — G0463 HOSPITAL OUTPT CLINIC VISIT: HCPCS

## 2022-07-27 PROCEDURE — 99214 OFFICE O/P EST MOD 30 MIN: CPT | Performed by: INTERNAL MEDICINE

## 2022-07-27 ASSESSMENT — PAIN SCALES - GENERAL: PAINLEVEL: NO PAIN (0)

## 2022-07-27 NOTE — LETTER
7/27/2022         RE: Flora Hogan  [id number]  [place] [address]        Dear Colleague,    Thank you for referring your patient, Flora Hogan, to the [facility]. Please see a copy of my visit note below.    [facility]    Hematology/Oncology Established Patient Follow-up Note    Treatment Summary:      Today's Date: 7/29/22    Reason for Follow-up: Pancreatic cancer      HISTORY OF PRESENT ILLNESS: Flora Hogan is a 76 year old female, who presents with adenocarcinoma of the pancreas.  She initially presented with jaundice.  She was evaluated for EUS and FNA, which revealed atypical cells, but no definitive evidence of malignancy.  She also underwent biliary stenting for decompression of her biliary tree.  She had a second EUS done, but again revealed atypical cells.  She then saw Dr. Duarte Chaves at [facility].  She had CT scan and MRI.  MRI showed multiple benign lesions in her liver; there was one area that was indeterminate.  Her CA 19-9 was 93.  On 5/26/20, she underwent Whipple procedure.  Two nodules were visualized on the surface of the liver, which were biopsied and found to be benign on pathology.  There were also hepatic cysts seen.  Pathology showed pancreatic ductal adenocarcinoma, well-differentiated (grade 1), tumor size 2.6 x 2.4 x 2.0 cm, negative margins, +LVI, 0 of 4 lymph nodes involved, staged pT2N0 (stage IB).       Port was placed by IR on 7/6/20.  It was removed on 12/28/20.     She started adjuvant chemotherapy with single-agent gemcitabine on 7/7/20.  She completed 6 cycles on 12/15/20.      INTERIM HISTORY:  Patient reports stable weight, but decreased appetite. No pain.     She recently had a breast biopsy upon return of abnormal screening mammogram, which has returned as atypical ductal hyperplasia.       REVIEW OF SYSTEMS:   A 14 point ROS was reviewed with pertinent positives and  negatives in the HPI.       HOME MEDICATIONS:  Current Outpatient Medications   Medication Sig Dispense Refill     acetaminophen (TYLENOL) 325 MG tablet Take 1-2 tablets (325-650 mg) by mouth every 6 hours as needed for mild pain or fever 50 tablet 0     albuterol (PROAIR HFA/PROVENTIL HFA/VENTOLIN HFA) 108 (90 Base) MCG/ACT inhaler Inhale 2 puffs into the lungs every 6 hours as needed for shortness of breath / dyspnea or wheezing 18 g 0     calcium carbonate-vitamin D (OS-CAMERON) 600-400 MG-UNIT chewable tablet Take 1 chew tab by mouth 2 times daily  180 tablet 3     citalopram (CELEXA) 20 MG tablet Take 1 tablet (20 mg) by mouth daily 90 tablet 3     CREON 18335-02528 units CPEP per EC capsule TAKE 1 CAPSULE BY MOUTH THREE TIMES DAILY WITH A MEAL 270 capsule 3     lipase-protease-amylase (ZENPEP) 82342-19105 units CPEP Take 1 capsule (25,000 Units) by mouth 3 times daily (with meals) for 30 days 90 capsule 11     loratadine (CLARITIN REDITABS) 10 MG dispersible tablet Take 10 mg by mouth as needed        magnesium 250 MG tablet Take 1 tablet by mouth daily       multivitamin (ONE-DAILY) tablet Take 1 tablet by mouth daily 90 tablet 0     prochlorperazine (COMPAZINE) 10 MG tablet Take 1 tablet (10 mg) by mouth every 6 hours as needed (Nausea/Vomiting) 30 tablet 1         ALLERGIES:  Allergies   Allergen Reactions     Penicillin V      Seasonal Allergies          PAST MEDICAL HISTORY:  Past Medical History:   Diagnosis Date     Chest tightness     had suspected allergies     Malignant neoplasm of head of pancreas (H) 6/9/2020     Seasonal allergies      SOB (shortness of breath)          PAST SURGICAL HISTORY:  Past Surgical History:   Procedure Laterality Date     CATARACT IOL, RT/LT  2015     COLONOSCOPY  10/14    no repeat needed due to age     COLONOSCOPY N/A 10/7/2014    Procedure: COLONOSCOPY;  Surgeon: Daryl Hanson MD;  Location:  GI     COLONOSCOPY  04/23/2019    no further screening     ENDOSCOPIC  RETROGRADE CHOLANGIOPANCREATOGRAM N/A 4/20/2020    Procedure: ENDOSCOPIC RETROGRADE CHOLANGIOPANCREATOGRAPHY, PLACEMENT OF PANCREATIC STENT, PLACEMENT OF BILIARY STENT;  Surgeon: Yarely Vann MD;  Location: [facility] OR     ESOPHAGOSCOPY, GASTROSCOPY, DUODENOSCOPY (EGD), COMBINED N/A 4/20/2020    Procedure: ESOPHAGOGASTRODUODENOSCOPY, WITH FINE NEEDLE ASPIRATION BIOPSY, WITH ENDOSCOPIC ULTRASOUND GUIDANCE, Endoscopic retrograde cholangiopancreatography;  Surgeon: Yarely Vann MD;  Location: [facility] OR     ESOPHAGOSCOPY, GASTROSCOPY, DUODENOSCOPY (EGD), COMBINED N/A 5/5/2020    Procedure: ESOPHAGOGASTRODUODENOSCOPY, WITH FINE NEEDLE ASPIRATION BIOPSY, WITH ENDOSCOPIC ULTRASOUND GUIDANCE;  Surgeon: Romina Rosario MD;  Location:  GI     IR CHEST PORT PLACEMENT > 5 YRS OF AGE  7/6/2020     IR PORT REMOVAL RIGHT  12/28/2020     LAPAROSCOPIC BIOPSY LIVER N/A 5/22/2020    Procedure: Diagnostic Laparoscopy with intraoperative ultrasound and Liver Biopsy X2;  Surgeon: Duarte Chaves MD;  Location: [facility] OR     WHIPPLE PROCEDURE N/A 5/22/2020    Procedure: Non Pylorus Preserving Whipple procedure;  Surgeon: Duarte Chaves MD;  Location: [facility] OR         SOCIAL HISTORY:  Social History     Socioeconomic History     Marital status:      Spouse name: Not on file     Number of children: Not on file     Years of education: Not on file     Highest education level: Not on file   Occupational History     Employer: RETIRED     Comment: previous taught special ed   Tobacco Use     Smoking status: Never Smoker     Smokeless tobacco: Never Used   Vaping Use     Vaping Use: Never used   Substance and Sexual Activity     Alcohol use: Yes     Comment: social      Drug use: No     Sexual activity: Yes   Other Topics Concern     Parent/sibling w/ CABG, MI or angioplasty before 65F 55M? Not Asked      Service Not Asked     Blood Transfusions Not Asked     Caffeine Concern Yes     Comment: 2 cups      "Occupational Exposure Not Asked     Hobby Hazards Not Asked     Sleep Concern Not Asked     Stress Concern Not Asked     Weight Concern Not Asked     Special Diet No     Back Care Not Asked     Exercise Yes     Comment: walking workout      Bike Helmet Not Asked     Seat Belt Not Asked     Self-Exams Not Asked   Social History Narrative     Not on file     Social Determinants of Health     Financial Resource Strain: Not on file   Food Insecurity: Not on file   Transportation Needs: Not on file   Physical Activity: Not on file   Stress: Not on file   Social Connections: Not on file   Intimate Partner Violence: Not At Risk     Fear of Current or Ex-Partner: No     Emotionally Abused: No     Physically Abused: No     Sexually Abused: No   Housing Stability: Not on file         FAMILY HISTORY:  Family History   Problem Relation Age of Onset     Musculoskeletal Disorder Mother         edematous legs     Obesity Mother      Musculoskeletal Disorder Maternal Grandmother         edematous legs; did have amputation     Obesity Maternal Grandmother      Myocardial Infarction Maternal Grandmother          PHYSICAL EXAM:  Vital signs:  /79 (Cuff Size: Adult Regular)   Pulse 62   Temp 97.6  F (36.4  C) (Tympanic)   Resp 16   Ht 1.626 m (5' 4\")   Wt 51.7 kg (114 lb)   LMP  (LMP Unknown)   SpO2 99%   BMI 19.57 kg/m     ECO  GENERAL/CONSTITUTIONAL: No acute distress.  EYES: Pupils are equal, round, and react to light and accommodation. Extraocular movements intact.  No scleral icterus.  ENT/MOUTH: Neck supple. Oropharynx clear, no mucositis.  LYMPH: No anterior cervical, posterior cervical, supraclavicular, axillary or inguinal adenopathy.   RESPIRATORY: Clear to auscultation bilaterally. No crackles or wheezing.   CARDIOVASCULAR: Regular rate and rhythm without murmurs, gallops, or rubs.  BREAST: Left breast and axilla without mass or adenopathy. Patient has ecchymosis of the right breast due to recent biopsy " with a 1.5 x 1.5 cm mass at the 3 o'clock position near the areola. No right axillary adenopathy.  GASTROINTESTINAL: No hepatosplenomegaly, masses, or tenderness. The patient has normal bowel sounds. No guarding.  No distention.  MUSCULOSKELETAL: Warm and well-perfused, no cyanosis, clubbing, or edema.  NEUROLOGIC: Cranial nerves II-XII are intact. Alert, oriented, answers questions appropriately.  INTEGUMENTARY: No rashes or jaundice.  GAIT: Steady, does not use assistive device      LABS:  CBC RESULTS:   Recent Labs   Lab Test 07/25/22  0844   WBC 3.0*   RBC 4.93   HGB 14.3   HCT 44.3   MCV 90   MCH 29.0   MCHC 32.3   RDW 13.2          Recent Labs   Lab Test 07/25/22 0844 03/28/22  1056    135   POTASSIUM 3.9 3.9   CHLORIDE 104 101   CO2 30 32   ANIONGAP 3 2*   GLC 69* 71   BUN 16 12   CR 0.84 0.80   CAMERON 9.1 9.2         PATHOLOGY:  Surgical Pathology Report                         Case: [id number]                                   Authorizing Provider:  Marcial Holden,     Collected:           07/12/2022 01:27 PM                                  PA-C                                                                          Ordering Location:     [facility]   Received:            07/12/2022 02:45 PM                                  [facility]                                                                 Pathologist:           Srinivas Villegas MD                                                      Specimen:    Breast, Right                                                                              Final Diagnosis   A. Right breast stereotactic needle core biopsies:  - Negative for malignancy  - Focal atypical ductal hyperplasia  - Microcalcifications present in benign breast tissue         IMAGING:  CT c/a/p 3/28/22:  IMPRESSION: In this patient with history of pancreatic cancer status  post Whipple's procedure, there is:  1. No significant change in the  ill-defined  hypoattenuating/hypoenhancing area in hepatic segment V as compared to  10/25/2021 exam, although appears slightly more prominent as compared  to 6/30/2020 exam, remains indeterminate, could represent  posttreatment changes, recommend continued attention on follow-up.  2. Few scattered small pulmonary nodules including for example 3 mm  left lower lobe nodule, not significantly changed as compared to  6/30/2020 exam, and hence likely benign. No new pulmonary nodules.  3. Significant amount of stool throughout the colon, nonspecific, can  be seen with constipation.      ASSESSMENT/PLAN:  Flora Hogan is a 76 year old female with:     1) Adenocarcinoma of the pancreas head: s/p Whipple procedure on 5/26/20; pathology showed pancreatic ductal adenocarcinoma, well-differentiated (grade 1), tumor size 2.6 x 2.4 x 2.0 cm, negative margins, +LVI, 0 of 4 lymph nodes involved, staged pT2N0 (stage IB).    She completed 6 months of adjuvant gemcitabine in December 2020.     CT c/a/p on 3/28/22 was reviewed.  I reviewed the report and images.  She has known liver cysts and benign lesions that were biopsied during surgery.  There is an area in the central liver that is not significantly changed compared to the October 2021 exam, appears slightly more prominent completed to the June 2020 exam,  remains indeterminate, could be post-treatment changes.  We will continue to monitor this on follow-up imaging.       -Scans and tumor markers every 4 months during 2nd year.  -CT c/a/p and labs/CA 19-9 in 4 months .     2) Pulmonary nodule: stable  -Sill monitor on future scans.     3) Chronic constipation/diarrhea: She notes bowel incontinence but sensation of incomplete bowel emptying.  She has had this for many years and has seen [facility] in the past.    -Creon prescribed - she has started it and finds it helpful.  She takes it 3 times a day.  She will continue it.    -She will also try fiber  -Follow-up with [facility] again -  she requests to see Dr. Yarely Vann     4) Leukpenia: Secondary to prior chemotherapy.  Mild, stable.    -Monitor     5) Insomnia: She occasionally takes melatonin. This has been a chronic problems for years, she says, even prior to her cancer diagnosis.     6) Coping: Patient notes some feelings of depression/anxiety and being crabby.  She was looking for a support group, but the group at the [facility] has been stopped due to COVID.  She did speak with our , who offered resources and emotional support.  She requested to try a low dose antidepressant.  -Citalopram 20 mg daily prescribed at our last visit in October 2021.  She says that she has found this helpful and will stay at this same dose.     7) Fatigue: secondary to prior surgery and chemotherapy.  -She met with Dr. Brito, PM&R.  They talked about cancer rehab PT/OT, but she wants to wait on this for now, as she says she would rather not do virtual appointments.  She says that she is willing to re-visit it in the future.     8) Forgetfulness, cognitive difficulties: Dr. Brito discussed neuropsychology referral, but again, she wants to wait until in-person appointments are available.      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 Wagner.  -As patient has history of pancreatic cancer and is now with atypical ductal hyperplasia, I recommend [facility] testing on her original specimen. She is open to referral to the genetics clinic.    10) Virutal follow up 8/18/22 at noon. Scans and labs due in 4 months.          Gloria Barrett DO  Hematology/Oncology  [facility]              Again, thank you for allowing me to participate in the care of your patient.        Sincerely,        Gloria Barrett DO

## 2022-07-27 NOTE — PROGRESS NOTES
Flora called in to clinic reporting she would like to get scheduled with Genetics asa but is not available August 4-8. Writer gave her the scheduling number for the Genetic referral: [phone].     Flora reports she will call to get scheduled.     Writer also spoke with Flora about the Foundation One testing that Dr. Barrett is looking to get completed on the May 2020 pathology specimen to help determine possible future treatment options.     Flora agreed to have writer send an LOKI form through My Chart and she will try to send it back through My Chart once signed. Waiting on Dr. Barrett to complete OV notes from 7/27/22 as well to send the Foundation One test req.     Leyla Layton, RN, BSN, SHARMIN  RN Care Coordinator  [facility]  Ph: [phone]  F: [phone]

## 2022-07-27 NOTE — PROGRESS NOTES
[facility]    Hematology/Oncology Established Patient Follow-up Note    Treatment Summary:      Today's Date: 7/29/22    Reason for Follow-up: Pancreatic cancer      HISTORY OF PRESENT ILLNESS: Flora Hogan is a 76 year old female, who presents with adenocarcinoma of the pancreas.  She initially presented with jaundice.  She was evaluated for EUS and FNA, which revealed atypical cells, but no definitive evidence of malignancy.  She also underwent biliary stenting for decompression of her biliary tree.  She had a second EUS done, but again revealed atypical cells.  She then saw Dr. Duarte Chaves at [facility].  She had CT scan and MRI.  MRI showed multiple benign lesions in her liver; there was one area that was indeterminate.  Her CA 19-9 was 93.  On 5/26/20, she underwent Whipple procedure.  Two nodules were visualized on the surface of the liver, which were biopsied and found to be benign on pathology.  There were also hepatic cysts seen.  Pathology showed pancreatic ductal adenocarcinoma, well-differentiated (grade 1), tumor size 2.6 x 2.4 x 2.0 cm, negative margins, +LVI, 0 of 4 lymph nodes involved, staged pT2N0 (stage IB).       Port was placed by IR on 7/6/20.  It was removed on 12/28/20.     She started adjuvant chemotherapy with single-agent gemcitabine on 7/7/20.  She completed 6 cycles on 12/15/20.      INTERIM HISTORY:  Patient reports stable weight, but decreased appetite. No pain.     She recently had a breast biopsy upon return of abnormal screening mammogram, which has returned as atypical ductal hyperplasia.       REVIEW OF SYSTEMS:   A 14 point ROS was reviewed with pertinent positives and negatives in the HPI.       HOME MEDICATIONS:  Current Outpatient Medications   Medication Sig Dispense Refill     acetaminophen (TYLENOL) 325 MG tablet Take 1-2 tablets (325-650 mg) by mouth every 6 hours as needed for mild pain or fever 50 tablet 0     albuterol (PROAIR  HFA/PROVENTIL HFA/VENTOLIN HFA) 108 (90 Base) MCG/ACT inhaler Inhale 2 puffs into the lungs every 6 hours as needed for shortness of breath / dyspnea or wheezing 18 g 0     calcium carbonate-vitamin D (OS-CAMERON) 600-400 MG-UNIT chewable tablet Take 1 chew tab by mouth 2 times daily  180 tablet 3     citalopram (CELEXA) 20 MG tablet Take 1 tablet (20 mg) by mouth daily 90 tablet 3     CREON 52996-13794 units CPEP per EC capsule TAKE 1 CAPSULE BY MOUTH THREE TIMES DAILY WITH A MEAL 270 capsule 3     lipase-protease-amylase (ZENPEP) 29029-85464 units CPEP Take 1 capsule (25,000 Units) by mouth 3 times daily (with meals) for 30 days 90 capsule 11     loratadine (CLARITIN REDITABS) 10 MG dispersible tablet Take 10 mg by mouth as needed        magnesium 250 MG tablet Take 1 tablet by mouth daily       multivitamin (ONE-DAILY) tablet Take 1 tablet by mouth daily 90 tablet 0     prochlorperazine (COMPAZINE) 10 MG tablet Take 1 tablet (10 mg) by mouth every 6 hours as needed (Nausea/Vomiting) 30 tablet 1         ALLERGIES:  Allergies   Allergen Reactions     Penicillin V      Seasonal Allergies          PAST MEDICAL HISTORY:  Past Medical History:   Diagnosis Date     Chest tightness     had suspected allergies     Malignant neoplasm of head of pancreas (H) 6/9/2020     Seasonal allergies      SOB (shortness of breath)          PAST SURGICAL HISTORY:  Past Surgical History:   Procedure Laterality Date     CATARACT IOL, RT/LT  2015     COLONOSCOPY  10/14    no repeat needed due to age     COLONOSCOPY N/A 10/7/2014    Procedure: COLONOSCOPY;  Surgeon: Daryl Hanson MD;  Location:  GI     COLONOSCOPY  04/23/2019    no further screening     ENDOSCOPIC RETROGRADE CHOLANGIOPANCREATOGRAM N/A 4/20/2020    Procedure: ENDOSCOPIC RETROGRADE CHOLANGIOPANCREATOGRAPHY, PLACEMENT OF PANCREATIC STENT, PLACEMENT OF BILIARY STENT;  Surgeon: Yarely Vann MD;  Location:  OR     ESOPHAGOSCOPY, GASTROSCOPY, DUODENOSCOPY (EGD),  COMBINED N/A 4/20/2020    Procedure: ESOPHAGOGASTRODUODENOSCOPY, WITH FINE NEEDLE ASPIRATION BIOPSY, WITH ENDOSCOPIC ULTRASOUND GUIDANCE, Endoscopic retrograde cholangiopancreatography;  Surgeon: Yarely Vann MD;  Location: [facility] OR     ESOPHAGOSCOPY, GASTROSCOPY, DUODENOSCOPY (EGD), COMBINED N/A 5/5/2020    Procedure: ESOPHAGOGASTRODUODENOSCOPY, WITH FINE NEEDLE ASPIRATION BIOPSY, WITH ENDOSCOPIC ULTRASOUND GUIDANCE;  Surgeon: Romina Rosario MD;  Location: [facility] GI     IR CHEST PORT PLACEMENT > 5 YRS OF AGE  7/6/2020     IR PORT REMOVAL RIGHT  12/28/2020     LAPAROSCOPIC BIOPSY LIVER N/A 5/22/2020    Procedure: Diagnostic Laparoscopy with intraoperative ultrasound and Liver Biopsy X2;  Surgeon: Duarte Chaves MD;  Location: [facility] OR     WHIPPLE PROCEDURE N/A 5/22/2020    Procedure: Non Pylorus Preserving Whipple procedure;  Surgeon: Duarte Chaves MD;  Location: [facility] OR         SOCIAL HISTORY:  Social History     Socioeconomic History     Marital status:      Spouse name: Not on file     Number of children: Not on file     Years of education: Not on file     Highest education level: Not on file   Occupational History     Employer: RETIRED     Comment: previous taught special ed   Tobacco Use     Smoking status: Never Smoker     Smokeless tobacco: Never Used   Vaping Use     Vaping Use: Never used   Substance and Sexual Activity     Alcohol use: Yes     Comment: social      Drug use: No     Sexual activity: Yes   Other Topics Concern     Parent/sibling w/ CABG, MI or angioplasty before 65F 55M? Not Asked      Service Not Asked     Blood Transfusions Not Asked     Caffeine Concern Yes     Comment: 2 cups     Occupational Exposure Not Asked     Hobby Hazards Not Asked     Sleep Concern Not Asked     Stress Concern Not Asked     Weight Concern Not Asked     Special Diet No     Back Care Not Asked     Exercise Yes     Comment: walking workout      Bike Helmet Not Asked     Seat Belt Not  "Asked     Self-Exams Not Asked   Social History Narrative     Not on file     Social Determinants of Health     Financial Resource Strain: Not on file   Food Insecurity: Not on file   Transportation Needs: Not on file   Physical Activity: Not on file   Stress: Not on file   Social Connections: Not on file   Intimate Partner Violence: Not At Risk     Fear of Current or Ex-Partner: No     Emotionally Abused: No     Physically Abused: No     Sexually Abused: No   Housing Stability: Not on file         FAMILY HISTORY:  Family History   Problem Relation Age of Onset     Musculoskeletal Disorder Mother         edematous legs     Obesity Mother      Musculoskeletal Disorder Maternal Grandmother         edematous legs; did have amputation     Obesity Maternal Grandmother      Myocardial Infarction Maternal Grandmother          PHYSICAL EXAM:  Vital signs:  /79 (Cuff Size: Adult Regular)   Pulse 62   Temp 97.6  F (36.4  C) (Tympanic)   Resp 16   Ht 1.626 m (5' 4\")   Wt 51.7 kg (114 lb)   LMP  (LMP Unknown)   SpO2 99%   BMI 19.57 kg/m     ECO  GENERAL/CONSTITUTIONAL: No acute distress.  EYES: Pupils are equal, round, and react to light and accommodation. Extraocular movements intact.  No scleral icterus.  ENT/MOUTH: Neck supple. Oropharynx clear, no mucositis.  LYMPH: No anterior cervical, posterior cervical, supraclavicular, axillary or inguinal adenopathy.   RESPIRATORY: Clear to auscultation bilaterally. No crackles or wheezing.   CARDIOVASCULAR: Regular rate and rhythm without murmurs, gallops, or rubs.  BREAST: Left breast and axilla without mass or adenopathy. Patient has ecchymosis of the right breast due to recent biopsy with a 1.5 x 1.5 cm mass at the 3 o'clock position near the areola. No right axillary adenopathy.  GASTROINTESTINAL: No hepatosplenomegaly, masses, or tenderness. The patient has normal bowel sounds. No guarding.  No distention.  MUSCULOSKELETAL: Warm and well-perfused, no cyanosis, " clubbing, or edema.  NEUROLOGIC: Cranial nerves II-XII are intact. Alert, oriented, answers questions appropriately.  INTEGUMENTARY: No rashes or jaundice.  GAIT: Steady, does not use assistive device      LABS:  CBC RESULTS:   Recent Labs   Lab Test 07/25/22  0844   WBC 3.0*   RBC 4.93   HGB 14.3   HCT 44.3   MCV 90   MCH 29.0   MCHC 32.3   RDW 13.2          Recent Labs   Lab Test 07/25/22  0844 03/28/22  1056    135   POTASSIUM 3.9 3.9   CHLORIDE 104 101   CO2 30 32   ANIONGAP 3 2*   GLC 69* 71   BUN 16 12   CR 0.84 0.80   CAMERON 9.1 9.2         PATHOLOGY:  Surgical Pathology Report                         Case: [id number]                                   Authorizing Provider:  Marcial Holden,     Collected:           07/12/2022 01:27 PM                                  PA-C                                                                          Ordering Location:     [facility]   Received:            07/12/2022 02:45 PM                                  [facility]                                                                 Pathologist:           Srinivas Villegas MD                                                      Specimen:    Breast, Right                                                                              Final Diagnosis   A. Right breast stereotactic needle core biopsies:  - Negative for malignancy  - Focal atypical ductal hyperplasia  - Microcalcifications present in benign breast tissue         IMAGING:  CT c/a/p 3/28/22:  IMPRESSION: In this patient with history of pancreatic cancer status  post Whipple's procedure, there is:  1. No significant change in the ill-defined  hypoattenuating/hypoenhancing area in hepatic segment V as compared to  10/25/2021 exam, although appears slightly more prominent as compared  to 6/30/2020 exam, remains indeterminate, could represent  posttreatment changes, recommend continued attention on follow-up.  2. Few scattered  small pulmonary nodules including for example 3 mm  left lower lobe nodule, not significantly changed as compared to  6/30/2020 exam, and hence likely benign. No new pulmonary nodules.  3. Significant amount of stool throughout the colon, nonspecific, can  be seen with constipation.      ASSESSMENT/PLAN:  Flora Hogan is a 76 year old female with:     1) Adenocarcinoma of the pancreas head: s/p Whipple procedure on 5/26/20; pathology showed pancreatic ductal adenocarcinoma, well-differentiated (grade 1), tumor size 2.6 x 2.4 x 2.0 cm, negative margins, +LVI, 0 of 4 lymph nodes involved, staged pT2N0 (stage IB).    She completed 6 months of adjuvant gemcitabine in December 2020.     CT c/a/p on 3/28/22 was reviewed.  I reviewed the report and images.  She has known liver cysts and benign lesions that were biopsied during surgery.  There is an area in the central liver that is not significantly changed compared to the October 2021 exam, appears slightly more prominent completed to the June 2020 exam,  remains indeterminate, could be post-treatment changes.  We will continue to monitor this on follow-up imaging.       -Scans and tumor markers every 4 months during 2nd year.  -CT c/a/p and labs/CA 19-9 in 4 months .     2) Pulmonary nodule: stable  -Sill monitor on future scans.     3) Chronic constipation/diarrhea: She notes bowel incontinence but sensation of incomplete bowel emptying.  She has had this for many years and has seen [facility] in the past.    -Creon prescribed - she has started it and finds it helpful.  She takes it 3 times a day.  She will continue it.    -She will also try fiber  -Follow-up with [facility] again - she requests to see Dr. Yarely Vann     4) Leukpenia: Secondary to prior chemotherapy.  Mild, stable.    -Monitor     5) Insomnia: She occasionally takes melatonin. This has been a chronic problems for years, she says, even prior to her cancer diagnosis.     6) Coping: Patient notes some  feelings of depression/anxiety and being crabby.  She was looking for a support group, but the group at the [facility] has been stopped due to COVID.  She did speak with our , who offered resources and emotional support.  She requested to try a low dose antidepressant.  -Citalopram 20 mg daily prescribed at our last visit in October 2021.  She says that she has found this helpful and will stay at this same dose.     7) Fatigue: secondary to prior surgery and chemotherapy.  -She met with Dr. Brito, PM&R.  They talked about cancer rehab PT/OT, but she wants to wait on this for now, as she says she would rather not do virtual appointments.  She says that she is willing to re-visit it in the future.     8) Forgetfulness, cognitive difficulties: Dr. Brito discussed neuropsychology referral, but again, she wants to wait until in-person appointments are available.      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 Wagner.  -As patient has history of pancreatic cancer and is now with atypical ductal hyperplasia, I recommend Foundation One testing on her original specimen. She is open to referral to the genetics clinic.    10) Virutal follow up 8/18/22 at noon. Scans and labs due in 4 months.          Gloria Barrett,   Hematology/Oncology  [facility]

## 2022-07-29 NOTE — PROGRESS NOTES
Writer faxed completed test req to [facility] with path report from 05/2020, office visit notes from 07/27/22, and Facesheet to [phone]. Fax transmission confirmed via Right Fax.     Test req sent to scanning.     Will watch for results.     Leyla Layton, RN, BSN, SHARMIN  RN Care Coordinator  [facility]  Ph: [phone]  F: [phone]

## 2022-08-01 NOTE — TELEPHONE ENCOUNTER
RECORDS STATUS - ALL OTHER DIAGNOSIS      RECORDS RECEIVED FROM: [facility]   DATE RECEIVED: 8/1   NOTES STATUS DETAILS   OFFICE NOTE from referring provider Gloria Cast DO in [facility]   OFFICE NOTE from medical oncologist [facility] Dr. Barrett: 7/27/22   DISCHARGE SUMMARY from hospital [facility] 12/28/20, 7/6/20, 5/22/20, 5/5/20, 4/19/20, 10/7/14   OPERATIVE REPORT [facility] 5/22/20: Diagnostic Laparoscopy with intraoperative ultrasound and Liver Biopsy X2    5/5/20, 4/20/20: EGD    10/7/14: Colonoscopy   MEDICATION LIST [facility] 7/25/22   LABS     PATHOLOGY REPORTS Epic 7/12/22, 5/22/20, 5/5/20: Surg Path    5/5/20, 4/20/20: FNA   ANYTHING RELATED TO DIAGNOSIS Epic 7/25/22   IMAGING (NEED IMAGES & REPORT)     CT SCANS PACS [facility]   MRI PACS [facility]   XR ERCP PACS Epic

## 2022-08-02 ENCOUNTER — LAB REQUISITION (OUTPATIENT)
Dept: LAB | Facility: CLINIC | Age: 77
End: 2022-08-02
Payer: COMMERCIAL

## 2022-08-02 NOTE — PATIENT INSTRUCTIONS
Flora is scheduled for a follow up appointment with Dr. Barrett on 08/22/22 at 12:00 pm.    Sheree Driscoll RN on 8/2/2022 at 2:38 PM

## 2022-08-11 LAB
BKR LAB AP ADD'L TEST STATUS: NORMAL
BKR LAB AP ADD'L TEST STATUS: NORMAL
BKR PATH ADDL TEST FINAL COMMENTS: NORMAL
BKR PATH ADDL TEST FINAL COMMENTS: NORMAL

## 2022-08-15 ENCOUNTER — PATIENT OUTREACH (OUTPATIENT)
Dept: ONCOLOGY | Facility: CLINIC | Age: 77
End: 2022-08-15

## 2022-08-16 ENCOUNTER — TELEPHONE (OUTPATIENT)
Dept: ONCOLOGY | Facility: CLINIC | Age: 77
End: 2022-08-16

## 2022-08-16 NOTE — TELEPHONE ENCOUNTER
Received a letter from [facility] to indicate Foundation One may not be covered by patient's insurance.     I called for a P2P which took place this afternoon at 2:15 PM. Foundation One testing is denied as patient has not had recurrence of disease.     I called [facility] client services and discussed with the Finance Department. I spoke with a representative who informed me for all patients, [facility] One testing is sent once the specimen is received so as to not delay testing/patient care. This is done without approval by patient's insurance. A prior authorization was sent to [facility] and thus far, only this has been denied. However, I informed the agent of the P2P conversation I had as above.     The representative informed me it is their policy to proceed as above and patients are contacted via phone and mailing to discuss the potential costs to the patient and to review benefit investigation. She confirmed they attempted to contact the patient on 8/5/22 and left a message. They also mailed a letter to the patient. They have not received a response.     I was also informed an appeal process by [facility] will also be started for all denials and this can take up to a year before finalization. The finance department will also work with patients closely to discuss payment plans and significant reductions based on the patient's current financial situation.     I will ask RNCC, Sheree Driscoll, to contact patient and ask her to call the [facility] Finance Department at [phone] to review this information as they have been trying to contact patient to discuss benefit investigation.     Gloria Barrett, DO  Hematology/Oncology  [facility]

## 2022-08-22 ENCOUNTER — ONCOLOGY VISIT (OUTPATIENT)
Dept: ONCOLOGY | Facility: CLINIC | Age: 77
End: 2022-08-22
Attending: INTERNAL MEDICINE
Payer: COMMERCIAL

## 2022-08-22 ENCOUNTER — TELEPHONE (OUTPATIENT)
Dept: SURGERY | Facility: CLINIC | Age: 77
End: 2022-08-22

## 2022-08-22 VITALS
BODY MASS INDEX: 19.29 KG/M2 | DIASTOLIC BLOOD PRESSURE: 77 MMHG | WEIGHT: 113 LBS | TEMPERATURE: 98 F | HEART RATE: 60 BPM | SYSTOLIC BLOOD PRESSURE: 129 MMHG | HEIGHT: 64 IN | RESPIRATION RATE: 16 BRPM | OXYGEN SATURATION: 93 %

## 2022-08-22 DIAGNOSIS — N60.91 ATYPICAL DUCTAL HYPERPLASIA OF RIGHT BREAST: Primary | ICD-10-CM

## 2022-08-22 DIAGNOSIS — N60.91 ATYPICAL DUCTAL HYPERPLASIA OF RIGHT BREAST: ICD-10-CM

## 2022-08-22 DIAGNOSIS — C25.0 MALIGNANT NEOPLASM OF HEAD OF PANCREAS (H): ICD-10-CM

## 2022-08-22 PROCEDURE — G0463 HOSPITAL OUTPT CLINIC VISIT: HCPCS

## 2022-08-22 PROCEDURE — 99214 OFFICE O/P EST MOD 30 MIN: CPT | Performed by: INTERNAL MEDICINE

## 2022-08-22 ASSESSMENT — PAIN SCALES - GENERAL: PAINLEVEL: NO PAIN (0)

## 2022-08-22 NOTE — LETTER
8/22/2022         RE: Flora Hogan  [id number] [address]  [place] [address]        Dear Colleague,    Thank you for referring your patient, Flora Hogan, to the [facility]. Please see a copy of my visit note below.    [facility]    Hematology/Oncology Established Patient Follow-up Note    Treatment Summary:      Today's Date: 8/22/22    Reason for Follow-up: Pancreatic cancer      HISTORY OF PRESENT ILLNESS: Flora Hogan is a 76 year old female, who presents with adenocarcinoma of the pancreas.  She initially presented with jaundice.  She was evaluated for EUS and FNA, which revealed atypical cells, but no definitive evidence of malignancy.  She also underwent biliary stenting for decompression of her biliary tree.  She had a second EUS done, but again revealed atypical cells.  She then saw Dr. Duarte Chaves at [facility].  She had CT scan and MRI.  MRI showed multiple benign lesions in her liver; there was one area that was indeterminate.  Her CA 19-9 was 93.  On 5/26/20, she underwent Whipple procedure.  Two nodules were visualized on the surface of the liver, which were biopsied and found to be benign on pathology.  There were also hepatic cysts seen.  Pathology showed pancreatic ductal adenocarcinoma, well-differentiated (grade 1), tumor size 2.6 x 2.4 x 2.0 cm, negative margins, +LVI, 0 of 4 lymph nodes involved, staged pT2N0 (stage IB).       Port was placed by IR on 7/6/20.  It was removed on 12/28/20.     She started adjuvant chemotherapy with single-agent gemcitabine on 7/7/20.  She completed 6 cycles on 12/15/20.      INTERIM HISTORY:  No updates since our last visit. She has not yet remet with Dr. Wagner. She ate a bratwurst yesterday that resulted in indigestion.       REVIEW OF SYSTEMS:   A 14 point ROS was reviewed with pertinent positives and negatives in the HPI.       HOME MEDICATIONS:  Current Outpatient  Medications   Medication Sig Dispense Refill     acetaminophen (TYLENOL) 325 MG tablet Take 1-2 tablets (325-650 mg) by mouth every 6 hours as needed for mild pain or fever 50 tablet 0     albuterol (PROAIR HFA/PROVENTIL HFA/VENTOLIN HFA) 108 (90 Base) MCG/ACT inhaler Inhale 2 puffs into the lungs every 6 hours as needed for shortness of breath / dyspnea or wheezing 18 g 0     calcium carbonate-vitamin D (OS-CAMERON) 600-400 MG-UNIT chewable tablet Take 1 chew tab by mouth 2 times daily  180 tablet 3     citalopram (CELEXA) 20 MG tablet Take 1 tablet (20 mg) by mouth daily 90 tablet 3     CREON 89409-16462 units CPEP per EC capsule TAKE 1 CAPSULE BY MOUTH THREE TIMES DAILY WITH A MEAL 270 capsule 3     loratadine (CLARITIN REDITABS) 10 MG dispersible tablet Take 10 mg by mouth as needed        magnesium 250 MG tablet Take 1 tablet by mouth daily       multivitamin (ONE-DAILY) tablet Take 1 tablet by mouth daily 90 tablet 0     prochlorperazine (COMPAZINE) 10 MG tablet Take 1 tablet (10 mg) by mouth every 6 hours as needed (Nausea/Vomiting) 30 tablet 1         ALLERGIES:  Allergies   Allergen Reactions     Penicillin V      Seasonal Allergies          PAST MEDICAL HISTORY:  Past Medical History:   Diagnosis Date     Chest tightness     had suspected allergies     Malignant neoplasm of head of pancreas (H) 6/9/2020     Seasonal allergies      SOB (shortness of breath)          PAST SURGICAL HISTORY:  Past Surgical History:   Procedure Laterality Date     CATARACT IOL, RT/LT  2015     COLONOSCOPY  10/14    no repeat needed due to age     COLONOSCOPY N/A 10/7/2014    Procedure: COLONOSCOPY;  Surgeon: Daryl Hanson MD;  Location:  GI     COLONOSCOPY  04/23/2019    no further screening     ENDOSCOPIC RETROGRADE CHOLANGIOPANCREATOGRAM N/A 4/20/2020    Procedure: ENDOSCOPIC RETROGRADE CHOLANGIOPANCREATOGRAPHY, PLACEMENT OF PANCREATIC STENT, PLACEMENT OF BILIARY STENT;  Surgeon: Yarely Vann MD;  Location:  OR      ESOPHAGOSCOPY, GASTROSCOPY, DUODENOSCOPY (EGD), COMBINED N/A 4/20/2020    Procedure: ESOPHAGOGASTRODUODENOSCOPY, WITH FINE NEEDLE ASPIRATION BIOPSY, WITH ENDOSCOPIC ULTRASOUND GUIDANCE, Endoscopic retrograde cholangiopancreatography;  Surgeon: Yarely Vann MD;  Location: [facility] OR     ESOPHAGOSCOPY, GASTROSCOPY, DUODENOSCOPY (EGD), COMBINED N/A 5/5/2020    Procedure: ESOPHAGOGASTRODUODENOSCOPY, WITH FINE NEEDLE ASPIRATION BIOPSY, WITH ENDOSCOPIC ULTRASOUND GUIDANCE;  Surgeon: Romina Rosario MD;  Location: [facility] GI     IR CHEST PORT PLACEMENT > 5 YRS OF AGE  7/6/2020     IR PORT REMOVAL RIGHT  12/28/2020     LAPAROSCOPIC BIOPSY LIVER N/A 5/22/2020    Procedure: Diagnostic Laparoscopy with intraoperative ultrasound and Liver Biopsy X2;  Surgeon: Duarte Chaves MD;  Location: [facility] OR     WHIPPLE PROCEDURE N/A 5/22/2020    Procedure: Non Pylorus Preserving Whipple procedure;  Surgeon: Duarte Chaves MD;  Location: [facility] OR         SOCIAL HISTORY:  Social History     Socioeconomic History     Marital status:      Spouse name: Not on file     Number of children: Not on file     Years of education: Not on file     Highest education level: Not on file   Occupational History     Employer: RETIRED     Comment: previous taught special ed   Tobacco Use     Smoking status: Never Smoker     Smokeless tobacco: Never Used   Vaping Use     Vaping Use: Never used   Substance and Sexual Activity     Alcohol use: Yes     Comment: social      Drug use: No     Sexual activity: Yes   Other Topics Concern     Parent/sibling w/ CABG, MI or angioplasty before 65F 55M? Not Asked      Service Not Asked     Blood Transfusions Not Asked     Caffeine Concern Yes     Comment: 2 cups     Occupational Exposure Not Asked     Hobby Hazards Not Asked     Sleep Concern Not Asked     Stress Concern Not Asked     Weight Concern Not Asked     Special Diet No     Back Care Not Asked     Exercise Yes     Comment: walking  "workout      Bike Helmet Not Asked     Seat Belt Not Asked     Self-Exams Not Asked   Social History Narrative     Not on file     Social Determinants of Health     Financial Resource Strain: Not on file   Food Insecurity: Not on file   Transportation Needs: Not on file   Physical Activity: Not on file   Stress: Not on file   Social Connections: Not on file   Intimate Partner Violence: Not At Risk     Fear of Current or Ex-Partner: No     Emotionally Abused: No     Physically Abused: No     Sexually Abused: No   Housing Stability: Not on file         FAMILY HISTORY:  Family History   Problem Relation Age of Onset     Musculoskeletal Disorder Mother         edematous legs     Obesity Mother      Musculoskeletal Disorder Maternal Grandmother         edematous legs; did have amputation     Obesity Maternal Grandmother      Myocardial Infarction Maternal Grandmother          PHYSICAL EXAM:  Vital signs:  /77 (Cuff Size: Adult Regular)   Pulse 60   Temp 98  F (36.7  C) (Tympanic)   Resp 16   Ht 1.626 m (5' 4\")   Wt 51.3 kg (113 lb)   LMP  (LMP Unknown)   SpO2 93%   BMI 19.40 kg/m     ECO  GENERAL/CONSTITUTIONAL: No acute distress.  RESPIRATORY: Clear to auscultation bilaterally. No crackles or wheezing.   CARDIOVASCULAR: Regular rate and rhythm without murmurs, gallops, or rubs.  BREAST: Left breast and axilla without mass or adenopathy. Patient has ecchymosis of the right breast due to recent biopsy with a 1.5 x 1.5 cm mass at the 3 o'clock position near the areola. No right axillary adenopathy.  GASTROINTESTINAL: No hepatosplenomegaly, masses, or tenderness. The patient has normal bowel sounds. No guarding.  No distention.  MUSCULOSKELETAL: Warm and well-perfused, no cyanosis, clubbing, or edema.  NEUROLOGIC: Cranial nerves II-XII are intact. Alert, oriented, answers questions appropriately.  INTEGUMENTARY: No rashes or jaundice.  GAIT: Steady, does not use assistive device.      LABS:  CBC RESULTS: "   Recent Labs   Lab Test 07/25/22  0844   WBC 3.0*   RBC 4.93   HGB 14.3   HCT 44.3   MCV 90   MCH 29.0   MCHC 32.3   RDW 13.2          Recent Labs   Lab Test 07/25/22  0844 03/28/22  1056    135   POTASSIUM 3.9 3.9   CHLORIDE 104 101   CO2 30 32   ANIONGAP 3 2*   GLC 69* 71   BUN 16 12   CR 0.84 0.80   CAMERON 9.1 9.2         PATHOLOGY:  Surgical Pathology Report                         Case: [id number]                                   Authorizing Provider:  Marcial Holden,     Collected:           07/12/2022 01:27 PM                                  PA-C                                                                          Ordering Location:     [facility]   Received:            07/12/2022 02:45 PM                                  [facility]                                                                 Pathologist:           Srinivas Villegas MD                                                      Specimen:    Breast, Right                                                                              Final Diagnosis   A. Right breast stereotactic needle core biopsies:  - Negative for malignancy  - Focal atypical ductal hyperplasia  - Microcalcifications present in benign breast tissue         IMAGING:  CT c/a/p 3/28/22:  IMPRESSION: In this patient with history of pancreatic cancer status  post Whipple's procedure, there is:  1. No significant change in the ill-defined  hypoattenuating/hypoenhancing area in hepatic segment V as compared to  10/25/2021 exam, although appears slightly more prominent as compared  to 6/30/2020 exam, remains indeterminate, could represent  posttreatment changes, recommend continued attention on follow-up.  2. Few scattered small pulmonary nodules including for example 3 mm  left lower lobe nodule, not significantly changed as compared to  6/30/2020 exam, and hence likely benign. No new pulmonary nodules.  3. Significant amount of stool  throughout the colon, nonspecific, can  be seen with constipation.    Mammogram 6/28/22:  IMPRESSION: BI-RADS CATEGORY: 4 - Suspicious.  Stereotactic core biopsy of right breast microcalcifications is  recommended for definitive histology. Findings and recommendation for  right breast biopsy were discussed with the patient during her  appointment.    CT CAP 7/25/22:  IMPRESSION:  1.  Stable exam without evidence for new disease.  2.  Stable Whipple surgery changes.  3.  Hypodensities in the liver are stable. One of these regions is  ill-defined along the inferior liver as detailed above. Another nodule  of the right hepatic dome is indeterminate but stable. Recommend  further attention to these at imaging follow-up.  4.  Stable small pulmonary nodules.  5.  There is a new solid oval-shaped nodule along the medial right  breast. Correlate with mammographic workup.       ASSESSMENT/PLAN:  Flora Hogan is a 76 year old female with:     1) Adenocarcinoma of the pancreas head: s/p Whipple procedure on 5/26/20; pathology showed pancreatic ductal adenocarcinoma, well-differentiated (grade 1), tumor size 2.6 x 2.4 x 2.0 cm, negative margins, +LVI, 0 of 4 lymph nodes involved, staged pT2N0 (stage IB).    She completed 6 months of adjuvant gemcitabine in December 2020.     CT c/a/p on 3/28/22 was reviewed.  I reviewed the report and images.  She has known liver cysts and benign lesions that were biopsied during surgery.  There is an area in the central liver that is not significantly changed compared to the October 2021 exam, appears slightly more prominent completed to the June 2020 exam,  remains indeterminate, could be post-treatment changes.  We will continue to monitor this on follow-up imaging.       -Scans and tumor markers every 4 months during 2nd year.  -CT c/a/p and labs/CA 19-9 in 4 months.     2) Pulmonary nodule: stable  -Sill monitor on future scans.     3) Chronic constipation/diarrhea: She notes bowel  incontinence but sensation of incomplete bowel emptying.  She has had this for many years and has seen [facility] in the past.    -Creon prescribed - she has started it and finds it helpful.  She takes it 3 times a day.  She will continue it.    -She will also try fiber.  -Follow-up with [facility] again - she requests to see Dr. Yarely Vann.     4) Leukpenia: Secondary to prior chemotherapy.  Mild, stable.    -Monitor.     5) Insomnia: She occasionally takes melatonin. This has been a chronic problems for years, she says, even prior to her cancer diagnosis.     6) Coping: Patient notes some feelings of depression/anxiety and being crabby.  She was looking for a support group, but the group at the [facility] has been stopped due to COVID.  She did speak with our , who offered resources and emotional support.  She requested to try a low dose antidepressant.  -Citalopram 20 mg daily prescribed at our last visit in October 2021.  She says that she has found this helpful and will stay at this same dose.     7) Fatigue: secondary to prior surgery and chemotherapy.  -She met with Dr. Brito, PM&R.  They talked about cancer rehab PT/OT, but she wants to wait on this for now, as she says she would rather not do virtual appointments.  She says that she is willing to re-visit it in the future.     8) Forgetfulness, cognitive difficulties: Dr. Brito discussed neuropsychology referral, but again, she wants to wait until in-person appointments are available.      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 Patient to follow up with Dr. Wagner.  -As patient has history of pancreatic cancer and is now with atypical ductal hyperplasia, I recommend [facility] testing on her original specimen. I received word this was not covered by her insurance (see telephone note on 8/16/22). Patient is continuing to discuss with finance department.    10) Repeat labs and scans due at the end of  November 2022. Follow up shortly after in clinic to review. Follow up with Dr. Wagner in regards to right breast ADH.          Gloria Barrett DO  Hematology/Oncology  [facility]              Again, thank you for allowing me to participate in the care of your patient.        Sincerely,        Gloria Barrett DO

## 2022-08-22 NOTE — NURSING NOTE
"Oncology Rooming Note    August 22, 2022 12:00 PM   Anali Hogan is a 77 year old female who presents for:    Chief Complaint   Patient presents with     Oncology Clinic Visit     Malignant neoplasm of head of pancreas      Initial Vitals: /77 (Cuff Size: Adult Regular)   Pulse 60   Temp 98  F (36.7  C) (Tympanic)   Resp 16   Ht 1.626 m (5' 4\")   Wt 51.3 kg (113 lb)   LMP  (LMP Unknown)   SpO2 93%   BMI 19.40 kg/m   Estimated body mass index is 19.4 kg/m  as calculated from the following:    Height as of this encounter: 1.626 m (5' 4\").    Weight as of this encounter: 51.3 kg (113 lb). Body surface area is 1.52 meters squared.  No Pain (0) Comment: Data Unavailable   No LMP recorded (lmp unknown). Patient is postmenopausal.  Allergies reviewed: Yes  Medications reviewed: Yes    Medications: Medication refills not needed today.  Pharmacy name entered into [facility]:    [id number] [place] [place] [place] [address] [id number], [place] - [address] [address] AT [place] [address] & [facility] #[id number] AT [facility] & [facility]    Clinical concerns: f/u       Katie Whitmore, PASTOR              "

## 2022-08-22 NOTE — PROGRESS NOTES
[facility]    Hematology/Oncology Established Patient Follow-up Note    Treatment Summary:      Today's Date: 8/22/22    Reason for Follow-up: Pancreatic cancer      HISTORY OF PRESENT ILLNESS: Flora Hogan is a 76 year old female, who presents with adenocarcinoma of the pancreas.  She initially presented with jaundice.  She was evaluated for EUS and FNA, which revealed atypical cells, but no definitive evidence of malignancy.  She also underwent biliary stenting for decompression of her biliary tree.  She had a second EUS done, but again revealed atypical cells.  She then saw Dr. Duarte Chaves at [facility].  She had CT scan and MRI.  MRI showed multiple benign lesions in her liver; there was one area that was indeterminate.  Her CA 19-9 was 93.  On 5/26/20, she underwent Whipple procedure.  Two nodules were visualized on the surface of the liver, which were biopsied and found to be benign on pathology.  There were also hepatic cysts seen.  Pathology showed pancreatic ductal adenocarcinoma, well-differentiated (grade 1), tumor size 2.6 x 2.4 x 2.0 cm, negative margins, +LVI, 0 of 4 lymph nodes involved, staged pT2N0 (stage IB).       Port was placed by IR on 7/6/20.  It was removed on 12/28/20.     She started adjuvant chemotherapy with single-agent gemcitabine on 7/7/20.  She completed 6 cycles on 12/15/20.      INTERIM HISTORY:  No updates since our last visit. She has not yet remet with Dr. Wagner. She ate a bratwurst yesterday that resulted in indigestion.       REVIEW OF SYSTEMS:   A 14 point ROS was reviewed with pertinent positives and negatives in the HPI.       HOME MEDICATIONS:  Current Outpatient Medications   Medication Sig Dispense Refill     acetaminophen (TYLENOL) 325 MG tablet Take 1-2 tablets (325-650 mg) by mouth every 6 hours as needed for mild pain or fever 50 tablet 0     albuterol (PROAIR HFA/PROVENTIL HFA/VENTOLIN HFA) 108 (90 Base) MCG/ACT inhaler  Inhale 2 puffs into the lungs every 6 hours as needed for shortness of breath / dyspnea or wheezing 18 g 0     calcium carbonate-vitamin D (OS-CAMERON) 600-400 MG-UNIT chewable tablet Take 1 chew tab by mouth 2 times daily  180 tablet 3     citalopram (CELEXA) 20 MG tablet Take 1 tablet (20 mg) by mouth daily 90 tablet 3     CREON 75006-70874 units CPEP per EC capsule TAKE 1 CAPSULE BY MOUTH THREE TIMES DAILY WITH A MEAL 270 capsule 3     loratadine (CLARITIN REDITABS) 10 MG dispersible tablet Take 10 mg by mouth as needed        magnesium 250 MG tablet Take 1 tablet by mouth daily       multivitamin (ONE-DAILY) tablet Take 1 tablet by mouth daily 90 tablet 0     prochlorperazine (COMPAZINE) 10 MG tablet Take 1 tablet (10 mg) by mouth every 6 hours as needed (Nausea/Vomiting) 30 tablet 1         ALLERGIES:  Allergies   Allergen Reactions     Penicillin V      Seasonal Allergies          PAST MEDICAL HISTORY:  Past Medical History:   Diagnosis Date     Chest tightness     had suspected allergies     Malignant neoplasm of head of pancreas (H) 6/9/2020     Seasonal allergies      SOB (shortness of breath)          PAST SURGICAL HISTORY:  Past Surgical History:   Procedure Laterality Date     CATARACT IOL, RT/LT  2015     COLONOSCOPY  10/14    no repeat needed due to age     COLONOSCOPY N/A 10/7/2014    Procedure: COLONOSCOPY;  Surgeon: Daryl Hanson MD;  Location:  GI     COLONOSCOPY  04/23/2019    no further screening     ENDOSCOPIC RETROGRADE CHOLANGIOPANCREATOGRAM N/A 4/20/2020    Procedure: ENDOSCOPIC RETROGRADE CHOLANGIOPANCREATOGRAPHY, PLACEMENT OF PANCREATIC STENT, PLACEMENT OF BILIARY STENT;  Surgeon: Yarely Vann MD;  Location:  OR     ESOPHAGOSCOPY, GASTROSCOPY, DUODENOSCOPY (EGD), COMBINED N/A 4/20/2020    Procedure: ESOPHAGOGASTRODUODENOSCOPY, WITH FINE NEEDLE ASPIRATION BIOPSY, WITH ENDOSCOPIC ULTRASOUND GUIDANCE, Endoscopic retrograde cholangiopancreatography;  Surgeon: Yarely Vann,  MD;  Location: [facility] OR     ESOPHAGOSCOPY, GASTROSCOPY, DUODENOSCOPY (EGD), COMBINED N/A 5/5/2020    Procedure: ESOPHAGOGASTRODUODENOSCOPY, WITH FINE NEEDLE ASPIRATION BIOPSY, WITH ENDOSCOPIC ULTRASOUND GUIDANCE;  Surgeon: Romina Rosario MD;  Location: [facility] GI     IR CHEST PORT PLACEMENT > 5 YRS OF AGE  7/6/2020     IR PORT REMOVAL RIGHT  12/28/2020     LAPAROSCOPIC BIOPSY LIVER N/A 5/22/2020    Procedure: Diagnostic Laparoscopy with intraoperative ultrasound and Liver Biopsy X2;  Surgeon: Duarte Chaves MD;  Location: [facility] OR     WHIPPLE PROCEDURE N/A 5/22/2020    Procedure: Non Pylorus Preserving Whipple procedure;  Surgeon: Duarte Chaves MD;  Location: [facility] OR         SOCIAL HISTORY:  Social History     Socioeconomic History     Marital status:      Spouse name: Not on file     Number of children: Not on file     Years of education: Not on file     Highest education level: Not on file   Occupational History     Employer: RETIRED     Comment: previous taught special ed   Tobacco Use     Smoking status: Never Smoker     Smokeless tobacco: Never Used   Vaping Use     Vaping Use: Never used   Substance and Sexual Activity     Alcohol use: Yes     Comment: social      Drug use: No     Sexual activity: Yes   Other Topics Concern     Parent/sibling w/ CABG, MI or angioplasty before 65F 55M? Not Asked      Service Not Asked     Blood Transfusions Not Asked     Caffeine Concern Yes     Comment: 2 cups     Occupational Exposure Not Asked     Hobby Hazards Not Asked     Sleep Concern Not Asked     Stress Concern Not Asked     Weight Concern Not Asked     Special Diet No     Back Care Not Asked     Exercise Yes     Comment: walking workout      Bike Helmet Not Asked     Seat Belt Not Asked     Self-Exams Not Asked   Social History Narrative     Not on file     Social Determinants of Health     Financial Resource Strain: Not on file   Food Insecurity: Not on file   Transportation Needs: Not on file  "  Physical Activity: Not on file   Stress: Not on file   Social Connections: Not on file   Intimate Partner Violence: Not At Risk     Fear of Current or Ex-Partner: No     Emotionally Abused: No     Physically Abused: No     Sexually Abused: No   Housing Stability: Not on file         FAMILY HISTORY:  Family History   Problem Relation Age of Onset     Musculoskeletal Disorder Mother         edematous legs     Obesity Mother      Musculoskeletal Disorder Maternal Grandmother         edematous legs; did have amputation     Obesity Maternal Grandmother      Myocardial Infarction Maternal Grandmother          PHYSICAL EXAM:  Vital signs:  /77 (Cuff Size: Adult Regular)   Pulse 60   Temp 98  F (36.7  C) (Tympanic)   Resp 16   Ht 1.626 m (5' 4\")   Wt 51.3 kg (113 lb)   LMP  (LMP Unknown)   SpO2 93%   BMI 19.40 kg/m     ECO  GENERAL/CONSTITUTIONAL: No acute distress.  RESPIRATORY: Clear to auscultation bilaterally. No crackles or wheezing.   CARDIOVASCULAR: Regular rate and rhythm without murmurs, gallops, or rubs.  BREAST: Left breast and axilla without mass or adenopathy. Patient has ecchymosis of the right breast due to recent biopsy with a 1.5 x 1.5 cm mass at the 3 o'clock position near the areola. No right axillary adenopathy.  GASTROINTESTINAL: No hepatosplenomegaly, masses, or tenderness. The patient has normal bowel sounds. No guarding.  No distention.  MUSCULOSKELETAL: Warm and well-perfused, no cyanosis, clubbing, or edema.  NEUROLOGIC: Cranial nerves II-XII are intact. Alert, oriented, answers questions appropriately.  INTEGUMENTARY: No rashes or jaundice.  GAIT: Steady, does not use assistive device.      LABS:  CBC RESULTS:   Recent Labs   Lab Test 22  0844   WBC 3.0*   RBC 4.93   HGB 14.3   HCT 44.3   MCV 90   MCH 29.0   MCHC 32.3   RDW 13.2          Recent Labs   Lab Test 22  0844 22  1056    135   POTASSIUM 3.9 3.9   CHLORIDE 104 101   CO2 30 32   ANIONGAP 3 " 2*   GLC 69* 71   BUN 16 12   CR 0.84 0.80   CAMERON 9.1 9.2         PATHOLOGY:  Surgical Pathology Report                         Case: [id number]                                   Authorizing Provider:  Marcial Holden,     Collected:           07/12/2022 01:27 PM                                  [facility]                                                                          Ordering Location:     [facility]   Received:            07/12/2022 02:45 PM                                  [facility]                                                                 Pathologist:           Srinivas Villegas MD                                                      Specimen:    Breast, Right                                                                              Final Diagnosis   A. Right breast stereotactic needle core biopsies:  - Negative for malignancy  - Focal atypical ductal hyperplasia  - Microcalcifications present in benign breast tissue         IMAGING:  CT c/a/p 3/28/22:  IMPRESSION: In this patient with history of pancreatic cancer status  post Whipple's procedure, there is:  1. No significant change in the ill-defined  hypoattenuating/hypoenhancing area in hepatic segment V as compared to  10/25/2021 exam, although appears slightly more prominent as compared  to 6/30/2020 exam, remains indeterminate, could represent  posttreatment changes, recommend continued attention on follow-up.  2. Few scattered small pulmonary nodules including for example 3 mm  left lower lobe nodule, not significantly changed as compared to  6/30/2020 exam, and hence likely benign. No new pulmonary nodules.  3. Significant amount of stool throughout the colon, nonspecific, can  be seen with constipation.    Mammogram 6/28/22:  IMPRESSION: BI-RADS CATEGORY: 4 - Suspicious.  Stereotactic core biopsy of right breast microcalcifications is  recommended for definitive histology. Findings and recommendation for  right  breast biopsy were discussed with the patient during her  appointment.    CT CAP 7/25/22:  IMPRESSION:  1.  Stable exam without evidence for new disease.  2.  Stable Whipple surgery changes.  3.  Hypodensities in the liver are stable. One of these regions is  ill-defined along the inferior liver as detailed above. Another nodule  of the right hepatic dome is indeterminate but stable. Recommend  further attention to these at imaging follow-up.  4.  Stable small pulmonary nodules.  5.  There is a new solid oval-shaped nodule along the medial right  breast. Correlate with mammographic workup.       ASSESSMENT/PLAN:  Flora Hogan is a 76 year old female with:     1) Adenocarcinoma of the pancreas head: s/p Whipple procedure on 5/26/20; pathology showed pancreatic ductal adenocarcinoma, well-differentiated (grade 1), tumor size 2.6 x 2.4 x 2.0 cm, negative margins, +LVI, 0 of 4 lymph nodes involved, staged pT2N0 (stage IB).    She completed 6 months of adjuvant gemcitabine in December 2020.     CT c/a/p on 3/28/22 was reviewed.  I reviewed the report and images.  She has known liver cysts and benign lesions that were biopsied during surgery.  There is an area in the central liver that is not significantly changed compared to the October 2021 exam, appears slightly more prominent completed to the June 2020 exam,  remains indeterminate, could be post-treatment changes.  We will continue to monitor this on follow-up imaging.       -Scans and tumor markers every 4 months during 2nd year.  -CT c/a/p and labs/CA 19-9 in 4 months.     2) Pulmonary nodule: stable  -Sill monitor on future scans.     3) Chronic constipation/diarrhea: She notes bowel incontinence but sensation of incomplete bowel emptying.  She has had this for many years and has seen [facility] in the past.    -Creon prescribed - she has started it and finds it helpful.  She takes it 3 times a day.  She will continue it.    -She will also try fiber.  -Follow-up  with [facility] again - she requests to see Dr. Yarely Vann.     4) Leukpenia: Secondary to prior chemotherapy.  Mild, stable.    -Monitor.     5) Insomnia: She occasionally takes melatonin. This has been a chronic problems for years, she says, even prior to her cancer diagnosis.     6) Coping: Patient notes some feelings of depression/anxiety and being crabby.  She was looking for a support group, but the group at the [facility] has been stopped due to COVID.  She did speak with our , who offered resources and emotional support.  She requested to try a low dose antidepressant.  -Citalopram 20 mg daily prescribed at our last visit in October 2021.  She says that she has found this helpful and will stay at this same dose.     7) Fatigue: secondary to prior surgery and chemotherapy.  -She met with Dr. Brito, PM&R.  They talked about cancer rehab PT/OT, but she wants to wait on this for now, as she says she would rather not do virtual appointments.  She says that she is willing to re-visit it in the future.     8) Forgetfulness, cognitive difficulties: Dr. Brito discussed neuropsychology referral, but again, she wants to wait until in-person appointments are available.      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 Patient to follow up with Dr. Wagner.  -As patient has history of pancreatic cancer and is now with atypical ductal hyperplasia, I recommend [facility] testing on her original specimen. I received word this was not covered by her insurance (see telephone note on 8/16/22). Patient is continuing to discuss with finance department.    10) Repeat labs and scans due at the end of November 2022. Follow up shortly after in clinic to review. Follow up with Dr. Wagner in regards to right breast ADH.          Gloria Barrett, DO  Hematology/Oncology  [facility]

## 2022-08-22 NOTE — PATIENT INSTRUCTIONS
Flora is scheduled for the following:     - Labs on 11/21/22 at 9:15 am  - CT scan on 11/21/22 at 10:20 am  - Follow up with Dr. Barrett on 11/30/22 at 11:00 am    Sheree Driscoll RN on 8/30/2022 at 8:19 PM

## 2022-08-22 NOTE — TELEPHONE ENCOUNTER
Type of surgery: RIGHT BREAST EXCISIONAL BIOPSY WITH LOCALIZATION   Location of surgery: [facility]  Date and time of surgery: 9-19-22, 7:30 AM   Surgeon: DR. KRAUS   Pre-Op Appt Date: PATIENT TO SCHEDULE   Post-Op Appt Date: NA   Packet sent out: Yes  Pre-cert/Authorization completed:  Not Applicable  Date: 8-22-22      RIGHT BREAST EXCISIONAL BIOPSY WITH LOCALIZATION   GENERAL  PT INST TO HAVE H&P WITH DR. BOLIVAR  60 MINS REQ  PA ASSIST D[facility]  ALW   R breast loc on 9/16 at 10:30 am amy

## 2022-08-30 ENCOUNTER — PATIENT OUTREACH (OUTPATIENT)
Dept: ONCOLOGY | Facility: CLINIC | Age: 77
End: 2022-08-30

## 2022-08-30 NOTE — PROGRESS NOTES
Received call from pt who states she received a call from [facility] and states pt insurance does not cover testing requested per Dr Barrett.  Pt states Dr Barrett informed pt to call clinic if she found out this was not covered.  Pt unsure what testing is required but possible genetic testing?  Pt informed that Eun[facility] with Dr Barrett is unavailable at time but will have Sheree reach out to pt.  Pt aware of plan.    April Ramesh RN, BSN, OCN

## 2022-08-31 NOTE — PROGRESS NOTES
Patient calling returning RN's call. Please call back at [phone].  May leave detailed message.

## 2022-08-31 NOTE — PROGRESS NOTES
Please see telephone encounter from  on 08/15/22 and telephone encounter from Dr. Barrett on 08/16/22. [facility] genetic testing was not covered by Flora's [facility] insurance plan.  advised that Flora call [facility] directly to discuss completing an appeal. She is agreeable and is going to contact writer with questions or concerns.    Sheree Driscoll RN on 8/31/2022 at 3:04 PM

## 2022-09-09 ENCOUNTER — OFFICE VISIT (OUTPATIENT)
Dept: FAMILY MEDICINE | Facility: CLINIC | Age: 77
End: 2022-09-09
Payer: COMMERCIAL

## 2022-09-09 VITALS
SYSTOLIC BLOOD PRESSURE: 118 MMHG | OXYGEN SATURATION: 97 % | RESPIRATION RATE: 16 BRPM | TEMPERATURE: 98.1 F | BODY MASS INDEX: 19.22 KG/M2 | DIASTOLIC BLOOD PRESSURE: 68 MMHG | HEART RATE: 56 BPM | WEIGHT: 112 LBS

## 2022-09-09 DIAGNOSIS — N60.99 ATYPICAL DUCTAL HYPERPLASIA OF BREAST: ICD-10-CM

## 2022-09-09 DIAGNOSIS — Z01.818 PREOP GENERAL PHYSICAL EXAM: Primary | ICD-10-CM

## 2022-09-09 PROCEDURE — 99214 OFFICE O/P EST MOD 30 MIN: CPT | Performed by: PHYSICIAN ASSISTANT

## 2022-09-09 ASSESSMENT — PAIN SCALES - GENERAL: PAINLEVEL: NO PAIN (0)

## 2022-09-09 NOTE — PROGRESS NOTES
[facility]  [address] [facility] [address]  Phone: [phone]  Primary Provider: Edmundo Bolivar  Pre-op Performing Provider: EDMUNDO BOLIVAR      PREOPERATIVE EVALUATION:  Today's date: 9/9/2022    Flora Hogan is a 77 year old female who presents for a preoperative evaluation.    Surgical Information:  Surgery/Procedure: Right radiofrequency localized excisional breast biopsy  Surgery Location: Replaced by [facility]  Surgeon: Geovanny Wagner MD  Surgery Date: 9/19/22  Time of Surgery: 0730  Where patient plans to recover: At home with family  Fax number for surgical facility: Note does not need to be faxed, will be available electronically in Epic.    Type of Anesthesia Anticipated: General    Assessment & Plan     The proposed surgical procedure is considered INTERMEDIATE risk.    Preop general physical exam  Atypical ductal hyperplasia of breast  Ok for planned procedure        RECOMMENDATION:  APPROVAL GIVEN to proceed with proposed procedure, without further diagnostic evaluation.          Subjective      HPI related to upcoming procedure: Atypical ductal hyperplasia of right breast    Preop Questions 9/9/2022   1. Have you ever had a heart attack or stroke? No   2. Have you ever had surgery on your heart or blood vessels, such as a stent placement, a coronary artery bypass, or surgery on an artery in your head, neck, heart, or legs? No   3. Do you have chest pain with activity? No   4. Do you have a history of  heart failure? No   5. Do you currently have a cold, bronchitis or symptoms of other infection? No   6. Do you have a cough, shortness of breath, or wheezing? YES  - will suffer from allergies but easily treated with inhaler and allergy medications   7. Do you or anyone in your family have previous history of blood clots? No   8. Do you or does anyone in your family have a serious bleeding problem such as prolonged bleeding following surgeries or cuts? No    9. Have you ever had problems with anemia or been told to take iron pills? YES - has needed supplements   10. Have you had any abnormal blood loss such as black, tarry or bloody stools, or abnormal vaginal bleeding? No   11. Have you ever had a blood transfusion? No   12. Are you willing to have a blood transfusion if it is medically needed before, during, or after your surgery? Yes   13. Have you or any of your relatives ever had problems with anesthesia? No   14. Do you have sleep apnea, excessive snoring or daytime drowsiness? YES - there is some fatigue   14a. Do you have a CPAP machine? No   15. Do you have any artifical heart valves or other implanted medical devices like a pacemaker, defibrillator, or continuous glucose monitor? No   16. Do you have artificial joints? No   17. Are you allergic to latex? No   18. Is there any chance that you may be pregnant? -       Health Care Directive:  Patient has a Health Care Directive on file      Preoperative Review of :   reviewed - no record of controlled substances prescribed.        Review of Systems  CONSTITUTIONAL: NEGATIVE for fever, chills, change in weight  INTEGUMENTARY/SKIN: NEGATIVE for worrisome rashes, moles or lesions  EYES: NEGATIVE for vision changes or irritation  ENT/MOUTH: NEGATIVE for ear, mouth and throat problems  RESP:POSITIVE for occasionaly sob; uses inhaler  CV: NEGATIVE for chest pain, palpitations or peripheral edema  GI: NEGATIVE for nausea, abdominal pain, heartburn, or change in bowel habits  : NEGATIVE for frequency, dysuria, or hematuria  MUSCULOSKELETAL: NEGATIVE for significant arthralgias or myalgia  NEURO: NEGATIVE for weakness, dizziness or paresthesias  ENDOCRINE: NEGATIVE for temperature intolerance, skin/hair changes  HEME: NEGATIVE for bleeding problems  PSYCHIATRIC: NEGATIVE for changes in mood or affect    Patient Active Problem List    Diagnosis Date Noted     Agranulocytosis secondary to cancer chemotherapy  (CODE) (H) 05/20/2022     Priority: Medium     Malignant neoplasm of head of pancreas (H) 06/09/2020     Priority: Medium     Complete prior to 6.[facility]       Painless jaundice 04/19/2020     Priority: Medium     Chest tightness      Priority: Medium     SOB (shortness of breath)      Priority: Medium     Peripheral neuropathy (HCC) 04/28/2016     Priority: Medium     Nonintractable headache, unspecified chronicity pattern, unspecified headache type 04/28/2016     Priority: Medium     Osteopenia 09/13/2014     Priority: Medium     CARDIOVASCULAR SCREENING; LDL GOAL LESS THAN 160 09/12/2014     Priority: Medium     Seasonal allergies      Priority: Medium      Past Medical History:   Diagnosis Date     Chest tightness     had suspected allergies     Malignant neoplasm of head of pancreas (H) 6/9/2020     Seasonal allergies      SOB (shortness of breath)      Past Surgical History:   Procedure Laterality Date     CATARACT IOL, RT/LT  2015     COLONOSCOPY  10/14    no repeat needed due to age     COLONOSCOPY N/A 10/7/2014    Procedure: COLONOSCOPY;  Surgeon: Daryl Hanson MD;  Location: [facility]     COLONOSCOPY  04/23/2019    no further screening     ENDOSCOPIC RETROGRADE CHOLANGIOPANCREATOGRAM N/A 4/20/2020    Procedure: ENDOSCOPIC RETROGRADE CHOLANGIOPANCREATOGRAPHY, PLACEMENT OF PANCREATIC STENT, PLACEMENT OF BILIARY STENT;  Surgeon: Yarely Vann MD;  Location:  OR     ESOPHAGOSCOPY, GASTROSCOPY, DUODENOSCOPY (EGD), COMBINED N/A 4/20/2020    Procedure: ESOPHAGOGASTRODUODENOSCOPY, WITH FINE NEEDLE ASPIRATION BIOPSY, WITH ENDOSCOPIC ULTRASOUND GUIDANCE, Endoscopic retrograde cholangiopancreatography;  Surgeon: Yarely Vann MD;  Location:  OR     ESOPHAGOSCOPY, GASTROSCOPY, DUODENOSCOPY (EGD), COMBINED N/A 5/5/2020    Procedure: ESOPHAGOGASTRODUODENOSCOPY, WITH FINE NEEDLE ASPIRATION BIOPSY, WITH ENDOSCOPIC ULTRASOUND GUIDANCE;  Surgeon: Romina Rosario MD;  Location: [facility]     IR  CHEST PORT PLACEMENT > 5 YRS OF AGE  7/6/2020     IR PORT REMOVAL RIGHT  12/28/2020     LAPAROSCOPIC BIOPSY LIVER N/A 5/22/2020    Procedure: Diagnostic Laparoscopy with intraoperative ultrasound and Liver Biopsy X2;  Surgeon: Duarte Chaves MD;  Location: [facility] OR     WHIPPLE PROCEDURE N/A 5/22/2020    Procedure: Non Pylorus Preserving Whipple procedure;  Surgeon: Duarte Chaves MD;  Location: [facility] OR     Current Outpatient Medications   Medication Sig Dispense Refill     acetaminophen (TYLENOL) 325 MG tablet Take 1-2 tablets (325-650 mg) by mouth every 6 hours as needed for mild pain or fever 50 tablet 0     albuterol (PROAIR HFA/PROVENTIL HFA/VENTOLIN HFA) 108 (90 Base) MCG/ACT inhaler Inhale 2 puffs into the lungs every 6 hours as needed for shortness of breath / dyspnea or wheezing 18 g 0     calcium carbonate-vitamin D (OS-CAMERON) 600-400 MG-UNIT chewable tablet Take 1 chew tab by mouth 2 times daily  180 tablet 3     citalopram (CELEXA) 20 MG tablet Take 1 tablet (20 mg) by mouth daily 90 tablet 3     CREON 49520-49860 units CPEP per EC capsule TAKE 1 CAPSULE BY MOUTH THREE TIMES DAILY WITH A MEAL 270 capsule 3     loratadine (CLARITIN REDITABS) 10 MG dispersible tablet Take 10 mg by mouth as needed        magnesium 250 MG tablet Take 1 tablet by mouth daily       multivitamin (ONE-DAILY) tablet Take 1 tablet by mouth daily 90 tablet 0       Allergies   Allergen Reactions     Penicillin V      Seasonal Allergies         Social History     Tobacco Use     Smoking status: Never Smoker     Smokeless tobacco: Never Used   Substance Use Topics     Alcohol use: Yes     Comment: social        History   Drug Use Unknown         Objective     /68 (BP Location: Right arm, Patient Position: Sitting, Cuff Size: Adult Small)   Pulse 56   Temp 98.1  F (36.7  C) (Oral)   Resp 16   Wt 50.8 kg (112 lb)   LMP  (LMP Unknown)   BMI 19.22 kg/m      Physical Exam    GENERAL APPEARANCE: healthy, alert and no distress      EYES: EOMI, PERRL     HENT: ear canals and TM's normal and nose and mouth without ulcers or lesions     NECK: no adenopathy, no asymmetry, masses, or scars and thyroid normal to palpation     RESP: lungs clear to auscultation - no rales, rhonchi or wheezes     BREAST: not examined     CV: mikael, regular rhythm      ABDOMEN:  soft, nontender, bowel sounds normal     MS: No peripheral edema      SKIN: no suspicious lesions or rashes     PSYCH: mentation appears normal. and affect normal/bright    Recent Labs   Lab Test 07/25/22  0844 03/28/22  1056   HGB 14.3 14.5    176    135   POTASSIUM 3.9 3.9   CR 0.84 0.80        Diagnostics:  See above; no need for repeat   No EKG required, no history of coronary heart disease, significant arrhythmia, peripheral arterial disease or other structural heart disease. She walks regularly without cardiac symptoms.     Revised Cardiac Risk Index (RCRI):  The patient has the following serious cardiovascular risks for perioperative complications:   - No serious cardiac risks = 0 points     RCRI Interpretation: 0 points: Class I (very low risk - 0.4% complication rate)           Signed Electronically by: Marcial Holden PA-C  Copy of this evaluation report is provided to requesting physician.

## 2022-09-09 NOTE — PATIENT INSTRUCTIONS
Ok to take all prescribed medications on day of surgery with small sip of water    Only Tylenol for pain starting one week from surgery    Stop all supplements/minerals/vitamins one week out from surgery  Preparing for Your Surgery  Getting started  A nurse will call you to review your health history and instructions. They will give you an arrival time based on your scheduled surgery time. Please be ready to share:  Your doctor's clinic name and phone number  Your medical, surgical and anesthesia history  A list of allergies and sensitivities  A list of medicines, including herbal treatments and over-the-counter drugs  Whether the patient has a legal guardian (ask how to send us the papers in advance)  Please tell us if you're pregnant--or if there's any chance you might be pregnant. Some surgeries may injure a fetus (unborn baby), so they require a pregnancy test. Surgeries that are safe for a fetus don't always need a test, and you can choose whether to have one.   If you have a child who's having surgery, please ask for a copy of Preparing for Your Child's Surgery.    Preparing for surgery  Within 30 days of surgery: Have a pre-op exam (sometimes called an H&P, or History and Physical). This can be done at a clinic or pre-operative center.  If you're having a , you may not need this exam. Talk to your care team.  At your pre-op exam, talk to your care team about all medicines you take. If you need to stop any medicines before surgery, ask when to start taking them again.  We do this for your safety. Many medicines can make you bleed too much during surgery. Some change how well surgery (anesthesia) drugs work.  Call your insurance company to let them know you're having surgery. (If you don't have insurance, call [phone].)  Call your clinic if there's any change in your health. This includes signs of a cold or flu (sore throat, runny nose, cough, rash, fever). It also includes a scrape or scratch  near the surgery site.  If you have questions on the day of surgery, call your hospital or surgery center.  COVID testing  You may need to be tested for COVID-19 before having surgery. If so, we will give you instructions.  Eating and drinking guidelines  For your safety: Unless your surgeon tells you otherwise, follow the guidelines below.  Eat and drink as usual until 8 hours before surgery. After that, no food or milk.  Drink clear liquids until 2 hours before surgery. These are liquids you can see through, like water, Gatorade and Propel Water. You may also have black coffee and tea (no cream or milk).  Nothing by mouth within 2 hours of surgery. This includes gum, candy and breath mints.  If you drink alcohol: Stop drinking it the night before surgery.  If your care team tells you to take medicine on the morning of surgery, it's okay to take it with a sip of water.  Preventing infection  Shower or bathe the night before and morning of your surgery. Follow the instructions your clinic gave you. (If no instructions, use regular soap.)  Don't shave or clip hair near your surgery site. We'll remove the hair if needed.  Don't smoke or vape the morning of surgery. You may chew nicotine gum up to 2 hours before surgery. A nicotine patch is okay.  Note: Some surgeries require you to completely quit smoking and nicotine. Check with your surgeon.  Your care team will make every effort to keep you safe from infection. We will:  Clean our hands often with soap and water (or an alcohol-based hand rub).  Clean the skin at your surgery site with a special soap that kills germs.  Give you a special gown to keep you warm. (Cold raises the risk of infection.)  Wear special hair covers, masks, gowns and gloves during surgery.  Give antibiotic medicine, if prescribed. Not all surgeries need antibiotics.  What to bring on the day of surgery  Photo ID and insurance card  Copy of your health care directive, if you have one  Glasses  and hearing aides (bring cases)  You can't wear contacts during surgery  Inhaler and eye drops, if you use them (tell us about these when you arrive)  CPAP machine or breathing device, if you use them  A few personal items, if spending the night  If you have . . .  A pacemaker, ICD (cardiac defibrillator) or other implant: Bring the ID card.  An implanted stimulator: Bring the remote control.  A legal guardian: Bring a copy of the certified (court-stamped) guardianship papers.  Please remove any jewelry, including body piercings. Leave jewelry and other valuables at home.  If you're going home the day of surgery  You must have a responsible adult drive you home. They should stay with you overnight as well.  If you don't have someone to stay with you, and you aren't safe to go home alone, we may keep you overnight. Insurance often won't pay for this.  After surgery  If it's hard to control your pain or you need more pain medicine, please call your surgeon's office.  Questions?   If you have any questions for your care team, list them here: _________________________________________________________________________________________________________________________________________________________________________ ____________________________________ ____________________________________ ____________________________________  For informational purposes only. Not to replace the advice of your health care provider. Copyright   2003, 2019 [facility]. All rights reserved. Clinically reviewed by Melissa More MD. [employer] [id number] - REV 07/21.

## 2022-09-10 ENCOUNTER — HEALTH MAINTENANCE LETTER (OUTPATIENT)
Age: 77
End: 2022-09-10

## 2022-09-16 ENCOUNTER — HOSPITAL ENCOUNTER (OUTPATIENT)
Dept: ULTRASOUND IMAGING | Facility: CLINIC | Age: 77
Discharge: HOME | End: 2022-09-16
Attending: SURGERY
Payer: COMMERCIAL

## 2022-09-16 ENCOUNTER — HOSPITAL ENCOUNTER (OUTPATIENT)
Dept: MAMMOGRAPHY | Facility: CLINIC | Age: 77
Discharge: HOME | End: 2022-09-16
Attending: SURGERY
Payer: COMMERCIAL

## 2022-09-16 DIAGNOSIS — N60.91 ATYPICAL DUCTAL HYPERPLASIA OF RIGHT BREAST: ICD-10-CM

## 2022-09-16 PROCEDURE — 999N000065 MA POST PROCEDURE RIGHT

## 2022-09-16 PROCEDURE — 250N000009 HC RX 250: Performed by: SURGERY

## 2022-09-16 PROCEDURE — A4648 IMPLANTABLE TISSUE MARKER: HCPCS

## 2022-09-16 RX ADMIN — LIDOCAINE HYDROCHLORIDE 5 ML: 10 INJECTION, SOLUTION EPIDURAL; INFILTRATION; INTRACAUDAL; PERINEURAL at 10:45

## 2022-09-19 ENCOUNTER — HOSPITAL ENCOUNTER (OUTPATIENT)
Facility: CLINIC | Age: 77
Discharge: HOME | End: 2022-09-19
Attending: SURGERY | Admitting: SURGERY
Payer: COMMERCIAL

## 2022-09-19 ENCOUNTER — ANESTHESIA EVENT (OUTPATIENT)
Dept: SURGERY | Facility: CLINIC | Age: 77
End: 2022-09-19
Payer: COMMERCIAL

## 2022-09-19 ENCOUNTER — ANESTHESIA (OUTPATIENT)
Dept: SURGERY | Facility: CLINIC | Age: 77
End: 2022-09-19
Payer: COMMERCIAL

## 2022-09-19 ENCOUNTER — HOSPITAL ENCOUNTER (OUTPATIENT)
Dept: MAMMOGRAPHY | Facility: CLINIC | Age: 77
Discharge: HOME | End: 2022-09-19
Attending: SURGERY | Admitting: SURGERY
Payer: COMMERCIAL

## 2022-09-19 VITALS
TEMPERATURE: 98 F | DIASTOLIC BLOOD PRESSURE: 72 MMHG | RESPIRATION RATE: 10 BRPM | OXYGEN SATURATION: 100 % | SYSTOLIC BLOOD PRESSURE: 117 MMHG | BODY MASS INDEX: 19.29 KG/M2 | HEART RATE: 51 BPM | HEIGHT: 64 IN | WEIGHT: 113 LBS

## 2022-09-19 DIAGNOSIS — N60.91 ATYPICAL DUCTAL HYPERPLASIA OF RIGHT BREAST: ICD-10-CM

## 2022-09-19 DIAGNOSIS — N60.91 ATYPICAL DUCTAL HYPERPLASIA OF RIGHT BREAST: Primary | ICD-10-CM

## 2022-09-19 PROCEDURE — 360N000083 HC SURGERY LEVEL 3 W/ FLUORO, PER MIN: Performed by: SURGERY

## 2022-09-19 PROCEDURE — 250N000009 HC RX 250: Performed by: NURSE ANESTHETIST, CERTIFIED REGISTERED

## 2022-09-19 PROCEDURE — 88305 TISSUE EXAM BY PATHOLOGIST: CPT | Mod: TC | Performed by: SURGERY

## 2022-09-19 PROCEDURE — 710N000012 HC RECOVERY PHASE 2, PER MINUTE: Performed by: SURGERY

## 2022-09-19 PROCEDURE — 258N000003 HC RX IP 258 OP 636: Performed by: NURSE ANESTHETIST, CERTIFIED REGISTERED

## 2022-09-19 PROCEDURE — 710N000009 HC RECOVERY PHASE 1, LEVEL 1, PER MIN: Performed by: SURGERY

## 2022-09-19 PROCEDURE — 999N000104 MA BREAST SPECIMEN RIGHT OR

## 2022-09-19 PROCEDURE — 250N000011 HC RX IP 250 OP 636: Performed by: NURSE ANESTHETIST, CERTIFIED REGISTERED

## 2022-09-19 PROCEDURE — 250N000009 HC RX 250: Performed by: SURGERY

## 2022-09-19 PROCEDURE — 999N000141 HC STATISTIC PRE-PROCEDURE NURSING ASSESSMENT: Performed by: SURGERY

## 2022-09-19 PROCEDURE — 19120 REMOVAL OF BREAST LESION: CPT | Mod: RT | Performed by: SURGERY

## 2022-09-19 PROCEDURE — 370N000017 HC ANESTHESIA TECHNICAL FEE, PER MIN: Performed by: SURGERY

## 2022-09-19 PROCEDURE — 272N000001 HC OR GENERAL SUPPLY STERILE: Performed by: SURGERY

## 2022-09-19 PROCEDURE — 250N000011 HC RX IP 250 OP 636: Performed by: SURGERY

## 2022-09-19 RX ORDER — FENTANYL CITRATE 50 UG/ML
25 INJECTION, SOLUTION INTRAMUSCULAR; INTRAVENOUS EVERY 5 MIN PRN
Status: DISCONTINUED | OUTPATIENT
Start: 2022-09-19 | End: 2022-09-19 | Stop reason: HOSPADM

## 2022-09-19 RX ORDER — ONDANSETRON 4 MG/1
4 TABLET, ORALLY DISINTEGRATING ORAL EVERY 8 HOURS PRN
Qty: 10 TABLET | Refills: 0 | Status: SHIPPED | OUTPATIENT
Start: 2022-09-19 | End: 2022-10-06

## 2022-09-19 RX ORDER — HYDROMORPHONE HCL IN WATER/PF 6 MG/30 ML
0.2 PATIENT CONTROLLED ANALGESIA SYRINGE INTRAVENOUS EVERY 5 MIN PRN
Status: DISCONTINUED | OUTPATIENT
Start: 2022-09-19 | End: 2022-09-19 | Stop reason: HOSPADM

## 2022-09-19 RX ORDER — OXYCODONE HYDROCHLORIDE 5 MG/1
5 TABLET ORAL EVERY 4 HOURS PRN
Status: DISCONTINUED | OUTPATIENT
Start: 2022-09-19 | End: 2022-09-19 | Stop reason: HOSPADM

## 2022-09-19 RX ORDER — MEPERIDINE HYDROCHLORIDE 25 MG/ML
12.5 INJECTION INTRAMUSCULAR; INTRAVENOUS; SUBCUTANEOUS
Status: DISCONTINUED | OUTPATIENT
Start: 2022-09-19 | End: 2022-09-19 | Stop reason: HOSPADM

## 2022-09-19 RX ORDER — ONDANSETRON 2 MG/ML
INJECTION INTRAMUSCULAR; INTRAVENOUS PRN
Status: DISCONTINUED | OUTPATIENT
Start: 2022-09-19 | End: 2022-09-19

## 2022-09-19 RX ORDER — GLYCOPYRROLATE 0.2 MG/ML
INJECTION, SOLUTION INTRAMUSCULAR; INTRAVENOUS PRN
Status: DISCONTINUED | OUTPATIENT
Start: 2022-09-19 | End: 2022-09-19

## 2022-09-19 RX ORDER — PHENYLEPHRINE HYDROCHLORIDE 10 MG/ML
INJECTION INTRAVENOUS PRN
Status: DISCONTINUED | OUTPATIENT
Start: 2022-09-19 | End: 2022-09-19

## 2022-09-19 RX ORDER — LIDOCAINE 40 MG/G
CREAM TOPICAL
Status: DISCONTINUED | OUTPATIENT
Start: 2022-09-19 | End: 2022-09-19 | Stop reason: HOSPADM

## 2022-09-19 RX ORDER — ONDANSETRON 4 MG/1
4 TABLET, ORALLY DISINTEGRATING ORAL EVERY 30 MIN PRN
Status: DISCONTINUED | OUTPATIENT
Start: 2022-09-19 | End: 2022-09-19 | Stop reason: HOSPADM

## 2022-09-19 RX ORDER — SODIUM CHLORIDE, SODIUM LACTATE, POTASSIUM CHLORIDE, CALCIUM CHLORIDE 600; 310; 30; 20 MG/100ML; MG/100ML; MG/100ML; MG/100ML
INJECTION, SOLUTION INTRAVENOUS CONTINUOUS
Status: DISCONTINUED | OUTPATIENT
Start: 2022-09-19 | End: 2022-09-19 | Stop reason: HOSPADM

## 2022-09-19 RX ORDER — OXYCODONE HYDROCHLORIDE 5 MG/1
5 TABLET ORAL
Status: DISCONTINUED | OUTPATIENT
Start: 2022-09-19 | End: 2022-09-19 | Stop reason: HOSPADM

## 2022-09-19 RX ORDER — SODIUM CHLORIDE, SODIUM LACTATE, POTASSIUM CHLORIDE, CALCIUM CHLORIDE 600; 310; 30; 20 MG/100ML; MG/100ML; MG/100ML; MG/100ML
INJECTION, SOLUTION INTRAVENOUS CONTINUOUS PRN
Status: DISCONTINUED | OUTPATIENT
Start: 2022-09-19 | End: 2022-09-19

## 2022-09-19 RX ORDER — ONDANSETRON 2 MG/ML
4 INJECTION INTRAMUSCULAR; INTRAVENOUS EVERY 30 MIN PRN
Status: DISCONTINUED | OUTPATIENT
Start: 2022-09-19 | End: 2022-09-19 | Stop reason: HOSPADM

## 2022-09-19 RX ORDER — LIDOCAINE HYDROCHLORIDE 10 MG/ML
INJECTION, SOLUTION INFILTRATION; PERINEURAL PRN
Status: DISCONTINUED | OUTPATIENT
Start: 2022-09-19 | End: 2022-09-19

## 2022-09-19 RX ORDER — CEFAZOLIN SODIUM/WATER 2 G/20 ML
2 SYRINGE (ML) INTRAVENOUS SEE ADMIN INSTRUCTIONS
Status: DISCONTINUED | OUTPATIENT
Start: 2022-09-19 | End: 2022-09-19 | Stop reason: HOSPADM

## 2022-09-19 RX ORDER — CEFAZOLIN SODIUM/WATER 2 G/20 ML
2 SYRINGE (ML) INTRAVENOUS
Status: COMPLETED | OUTPATIENT
Start: 2022-09-19 | End: 2022-09-19

## 2022-09-19 RX ORDER — BUPIVACAINE HYDROCHLORIDE 5 MG/ML
INJECTION, SOLUTION EPIDURAL; INTRACAUDAL PRN
Status: DISCONTINUED | OUTPATIENT
Start: 2022-09-19 | End: 2022-09-19 | Stop reason: HOSPADM

## 2022-09-19 RX ORDER — EPHEDRINE SULFATE 50 MG/ML
INJECTION, SOLUTION INTRAVENOUS PRN
Status: DISCONTINUED | OUTPATIENT
Start: 2022-09-19 | End: 2022-09-19

## 2022-09-19 RX ORDER — FENTANYL CITRATE 50 UG/ML
INJECTION, SOLUTION INTRAMUSCULAR; INTRAVENOUS PRN
Status: DISCONTINUED | OUTPATIENT
Start: 2022-09-19 | End: 2022-09-19

## 2022-09-19 RX ORDER — PROPOFOL 10 MG/ML
INJECTION, EMULSION INTRAVENOUS CONTINUOUS PRN
Status: DISCONTINUED | OUTPATIENT
Start: 2022-09-19 | End: 2022-09-19

## 2022-09-19 RX ORDER — FENTANYL CITRATE 50 UG/ML
25 INJECTION, SOLUTION INTRAMUSCULAR; INTRAVENOUS
Status: DISCONTINUED | OUTPATIENT
Start: 2022-09-19 | End: 2022-09-19 | Stop reason: HOSPADM

## 2022-09-19 RX ORDER — OXYCODONE HYDROCHLORIDE 5 MG/1
5-10 TABLET ORAL EVERY 4 HOURS PRN
Qty: 12 TABLET | Refills: 0 | Status: SHIPPED | OUTPATIENT
Start: 2022-09-19 | End: 2022-10-06

## 2022-09-19 RX ORDER — PROPOFOL 10 MG/ML
INJECTION, EMULSION INTRAVENOUS PRN
Status: DISCONTINUED | OUTPATIENT
Start: 2022-09-19 | End: 2022-09-19

## 2022-09-19 RX ORDER — DEXAMETHASONE SODIUM PHOSPHATE 4 MG/ML
INJECTION, SOLUTION INTRA-ARTICULAR; INTRALESIONAL; INTRAMUSCULAR; INTRAVENOUS; SOFT TISSUE PRN
Status: DISCONTINUED | OUTPATIENT
Start: 2022-09-19 | End: 2022-09-19

## 2022-09-19 RX ADMIN — Medication 0.5 G: at 07:50

## 2022-09-19 RX ADMIN — ONDANSETRON HYDROCHLORIDE 4 MG: 2 INJECTION, SOLUTION INTRAVENOUS at 08:23

## 2022-09-19 RX ADMIN — GLYCOPYRROLATE 0.1 MG: 0.2 INJECTION, SOLUTION INTRAMUSCULAR; INTRAVENOUS at 07:46

## 2022-09-19 RX ADMIN — PROPOFOL 150 MG: 10 INJECTION, EMULSION INTRAVENOUS at 07:46

## 2022-09-19 RX ADMIN — Medication 0.5 G: at 07:41

## 2022-09-19 RX ADMIN — DEXAMETHASONE SODIUM PHOSPHATE 4 MG: 4 INJECTION, SOLUTION INTRA-ARTICULAR; INTRALESIONAL; INTRAMUSCULAR; INTRAVENOUS; SOFT TISSUE at 07:46

## 2022-09-19 RX ADMIN — LIDOCAINE HYDROCHLORIDE 20 MG: 10 INJECTION, SOLUTION INFILTRATION; PERINEURAL at 07:46

## 2022-09-19 RX ADMIN — SODIUM CHLORIDE, POTASSIUM CHLORIDE, SODIUM LACTATE AND CALCIUM CHLORIDE: 600; 310; 30; 20 INJECTION, SOLUTION INTRAVENOUS at 07:28

## 2022-09-19 RX ADMIN — PROPOFOL 50 MCG/KG/MIN: 10 INJECTION, EMULSION INTRAVENOUS at 07:52

## 2022-09-19 RX ADMIN — FENTANYL CITRATE 100 MCG: 50 INJECTION, SOLUTION INTRAMUSCULAR; INTRAVENOUS at 07:46

## 2022-09-19 RX ADMIN — GLYCOPYRROLATE 0.1 MG: 0.2 INJECTION, SOLUTION INTRAMUSCULAR; INTRAVENOUS at 07:57

## 2022-09-19 RX ADMIN — EPHEDRINE SULFATE 10 MG: 50 INJECTION, SOLUTION INTRAVENOUS at 07:54

## 2022-09-19 RX ADMIN — PHENYLEPHRINE HYDROCHLORIDE 100 MCG: 10 INJECTION INTRAVENOUS at 07:56

## 2022-09-19 RX ADMIN — Medication 1 G: at 07:56

## 2022-09-19 ASSESSMENT — ACTIVITIES OF DAILY LIVING (ADL)
ADLS_ACUITY_SCORE: 35

## 2022-09-19 NOTE — ANESTHESIA POSTPROCEDURE EVALUATION
Patient: Flora Hogan    Procedure: Procedure(s):  Right radiofrequency localized excisional breast biopsy       Anesthesia Type:  General    Note:  Disposition: Outpatient   Postop Pain Control: Uneventful            Sign Out: Well controlled pain   PONV: No   Neuro/Psych: Uneventful            Sign Out: Acceptable/Baseline neuro status   Airway/Respiratory: Uneventful            Sign Out: Acceptable/Baseline resp. status   CV/Hemodynamics: Uneventful            Sign Out: Acceptable CV status; No obvious hypovolemia; No obvious fluid overload   Other NRE: NONE   DID A NON-ROUTINE EVENT OCCUR? No           Last vitals:  Vitals Value Taken Time   /77 09/19/22 0915   Temp 97.7  F (36.5  C) 09/19/22 0915   Pulse 54 09/19/22 0922   Resp 22 09/19/22 0922   SpO2 99 % 09/19/22 0922   Vitals shown include unvalidated device data.    Electronically Signed By: Herrera Chavez MD  September 19, 2022  4:44 PM

## 2022-09-19 NOTE — OP NOTE
General Surgery Operative Note    Pre-operative diagnosis:  Atypical ductal hyperplasia of right breast [N60.91]   Post-operative diagnosis: same   Procedure:  Right localized excisional breast biopsy   Surgeon: Geovanny Wagner MD   Assistant(s): Jessie Loya PA-C  - the PA's assistance was medically necessary in providing adequate exposure in the operating field, maintaining hemostasis, cuttting suture, clamping and ligating blood vessels, and visualization of the anatomic structures throughout the surgical procedure.   Anesthesia: General    Estimated blood loss: 2 cc's   Drains placed: None   Complications:  None   Findings:   Localizer and marking clip present within the biopsy specimen.     Indications for operation: This is a patient found to have calcifications in the right breast on mammogram.  Biopsy revealed atypical ductal hyperplasia.  Excisional biopsy with localization was recommended and the procedure, along with its risks and complications, was discussed with the patient.  She agreed to proceed.    Details of the operation: After informed consent, the patient underwent placement of the radiofrequency localizing device in the breast center the day prior to surgery.  She was then brought to the preoperative holding area on the morning of surgery.  The patient was marked and was then brought to the operating room, where she underwent satisfactory induction of general anesthesia.  The patient was sterilely prepped and draped in the radiofrequency localizer was used to identify the appropriate area.  A skin incision was made in the medial right breast and dissection was carried down around the area marked by the localizer and the expected area of the lesion.  This area was removed with electrocautery, obtaining hemostasis along the way.  The area was removed and marked for orientation.  Specimen mammogram revealed the marking clip and the localizer within the specimen.  Multiple calcifications were  present as well.  Hemostasis was again assured using electrocautery and the subdermal tissues were approximated using interrupted 3-0 Vicryl sutures.  Skin was closed using skin adhesive.    The patient tolerated the procedure well and was was transferred to the recovery room in satisfactory condition.  Sponge and needle counts were correct at the close of the case.    Specimens:   ID Type Source Tests Collected by Time Destination   1 : RIGHT LOCALIZED EXCISIONAL BREAST BIOPSY, short suture superior, long suture lateral Tissue Breast, Right SURGICAL PATHOLOGY EXAM Geovanny Wagner MD 9/19/2022  8:22 AM            Geovanny Wagner MD

## 2022-09-19 NOTE — ANESTHESIA CARE TRANSFER NOTE
Patient: Flora Hogan    Procedure: Procedure(s):  Right radiofrequency localized excisional breast biopsy       Diagnosis: Atypical ductal hyperplasia of right breast [N60.91]  Diagnosis Additional Information: No value filed.    Anesthesia Type:   General     Note:    Oropharynx: oropharynx clear of all foreign objects  Level of Consciousness: drowsy  Oxygen Supplementation: nasal cannula  Level of Supplemental Oxygen (L/min / FiO2): 4  Independent Airway: airway patency satisfactory and stable  Dentition: dentition unchanged  Vital Signs Stable: post-procedure vital signs reviewed and stable  Report to RN Given: handoff report given  Patient transferred to: PACU    Handoff Report: Identifed the Patient, Identified the Reponsible Provider, Reviewed the pertinent medical history, Discussed the surgical course, Reviewed Intra-OP anesthesia mangement and issues during anesthesia, Set expectations for post-procedure period and Allowed opportunity for questions and acknowledgement of understanding      Vitals:  Vitals Value Taken Time   /72 09/19/22 0835   Temp     Pulse 65 09/19/22 0836   Resp 10 09/19/22 0836   SpO2 99 % 09/19/22 0836   Vitals shown include unvalidated device data.    Electronically Signed By: MICAELA Rubin CRNA  September 19, 2022  8:38 AM

## 2022-09-19 NOTE — DISCHARGE INSTRUCTIONS
HOME CARE FOLLOWING BREAST BIOPSY  AL Mcfarland, MAHENDRA Gates C. Pratt, J. Shaheen    RESULTS:  You will receive a phone call from your surgeon or office staff with your pathology results.  Occasionally special testing must be done on the surgical specimen which may delay the posting of your final pathology report.  You may call for your final pathology report after 1p.m. three working days after surgery to check on its status.    INCISIONAL CARE:  If you have a dressing in place, keep clean and dry for 48 hours; you may replace the gauze if it becomes soiled.  After 48 hours you may remove the dressing and shower.  Do not submerse incision in water for 1 week.  If you have a Dermabond dressing (a type of skin glue), you may shower immediately.  Sutures will absorb and do not need to be removed.  If present, leave the steri-strips (white paper tapes) in place for 14 days after surgery.  If present, leave Dermabond glue in place until it wears/flakes off.  You may expect a small amount of drainage from your incision.  A lump/ridge under the incision is normal and will gradually resolve.    SUPPORT:  Wear a bra for support and comfort for 7 days, day and night.    ACTIVITY:  Cautiously resume exercise and strenuous activities such as jogging, tennis, aerobics, etc. Also, be careful of stretching activities with operative side for two weeks.    DIET:  Start with liquids and gradually resume your regular diet as tolerated.  Increased fluid intake is recommended. While taking pain medications, consider use of a stool softener, increase your fiber in your diet, or add a fiber supplement (like Metamucil, Citrucel) to help prevent constipation - a possible side effect of pain medications.    DISCOMFORT:  Local anesthetic placed at surgery should provide relief for 4-8 hours.  Begin taking pain pills before discomfort is severe.  Take the pain medication with some food, when possible, to minimize  side effects.  Intermittent use of ice packs may help during the first 1-3 weeks after surgery.  Expect gradual improvement.    Over-the-counter anti-inflammatory medications (i.e. Ibuprofen/Advil/Motrin or Naprosyn/Aleve) may be used per package instructions in addition to or while tapering off the narcotic pain medications to decrease swelling and sensitivity.  DO NOT TAKE these Anti-inflammatory medications if your primary physician has advised against doing so, or if you have acid reflux, ulcer, or bleeding disorder, or take blood-thinner medications.  Call your primary physician or the surgery office if you have medication questions.      FOLLOW-UP AFTER SURGERY:  -Our office will contact you approximately 2-3 weeks after surgery to check on your progress and answer any questions you may have.  If you are doing well, you will not need to return for an office appointment.  If any concerns are identified over the phone, we will help you make an appointment to see a provider.    -If you have not received a phone call, have any questions or concerns, or would like to be seen, please call us at [phone].  We are located at: [address], [address]; [place] [address]    -CONTACT US IF THE FOLLOWING DEVELOPS:   1. A fever that is above 101     2. Increased redness, warmth, drainage, bleeding, or swelling.   3. Pain that is not relieved by rest/ice and your prescription.   4.  Increasing pain after 48 hours.   5. Drainage that is thick, cloudy, yellow, green or white.   6. Any other questions or concerns.      FREQUENTLY ASKED QUESTIONS:    Q:  How should my incision look?    A:  Normally your incision will appear slightly swollen with light redness directly along the incision itself as it heals.  It may feel like a bump or ridge as the healing/scarring happens, and over time (3-4 months) this bump or ridge feeling should slowly go away.  In general, clear or pink watery drainage can be normal at first as  your incision heals, but should decrease over time.    Q:  How do I know if my incision is infected?  A:  Look at your incision for signs of infection, like redness around the incision spreading to surrounding skin, or drainage of cloudy or foul-smelling drainage.  If you feel warm, check your temperature to see if you are running a fever.    **If any of these things occur, please notify the nurse at our office.  We may need you to come into the office for an incision check.      Q:  How do I take care of my incision?  A:  If you have a dressing in place - Starting the day after surgery, replace the dressing 1-2 times a day until there is no further drainage from the incision.  At that time, a dressing is no longer needed.  Try to minimize tape on the skin if irritation is occurring at the tape sites.  If you have significant irritation from tape on the skin, please call the office to discuss other method of dressing your incision.    Small pieces of tape called  steri-strips  may be present directly overlying your incision; these may be removed 10 days after surgery unless otherwise specified by your surgeon.  If these tapes start to loosen at the ends, you may trim them back until they fall off or are removed.    A:  If you had  Dermabond  tissue glue used as a dressing (this causes your incision to look shiny with a clear covering over it) - This type of dressing wears off with time and does not require more dressings over the top unless it is draining around the glue as it wears off.  Do not apply ointments or lotions over the incisions until the glue has completely worn off.    Q:  There is a piece of tape or a sticky  lead  still on my skin.  Can I remove this?  A:  Sometimes the sticky  leads  used for monitoring during surgery or for evaluation in the emergency department are not all removed while you are in the hospital.  These sometimes have a tab or metal dot on them.  You can easily remove these on your  own, like taking off a band-aid.  If there is a gel substance under the  lead , simply wipe/clean it off with a washcloth or paper towel.      Q:  What can I do to minimize constipation (very hard stools, or lack of stools)?  A:  Stay well hydrated.  Increase your dietary fiber intake or take a fiber supplement -with plenty of water.  Walk around frequently.  You may consider an over-the-counter stool-softener.  Your Pharmacist can assist you with choosing one that is stocked at your pharmacy.  Constipation is also one of the most common side effects of pain medication.  If you are using pain medication, be pro-active and try to PREVENT problems with constipation by taking the steps above BEFORE constipation becomes a problem.    Q:  What do I do if I need more pain medications?  A:  Call the office to receive refills.  Be aware that certain pain meds cannot be called into a pharmacy and actually require a paper prescription.  A change may be made in your pain med as you progress thru your recovery period or if you have side effects to certain meds.    --Pain meds are NOT refilled after 5pm on weekdays, and NOT AT ALL on the weekends, so please look ahead to prevent problems.    Q:  Why am I having a hard time sleeping now that I am at home?  A:  Many medications you receive while you are in the hospital can impact your sleep for a number of days after your surgery/hospitalization.  Decreased level of activity and naps during the day may also make sleeping at night difficult.  Try to minimize day-time naps, and get up frequently during the day to walk around your home during your recovery time.  Sleep aides may be of some help, but are not recommended for long-term use.      Q:  I am having some back discomfort.  What should I do?  A:  This may be related to certain positioning that was required for your surgery, extended periods of time in bed, or other changes in your overall activity level.  You may try ice,  heat, acetaminophen, or ibuprofen to treat this temporarily.  Note that many pain medications have acetaminophen in them and would state this on the prescription bottle.  Be sure not to exceed the maximum of 4000mg per day of acetaminophen.     **If the pain you are having does not resolve, is severe, or is a flare of back pain you have had on other occasions prior to surgery, please contact your primary physician for further recommendations or for an appointment to be examined at their office.    Q:  Why am I having headaches?  A:  Headaches can be caused by many things:  caffeine withdrawal, use of pain meds, dehydration, high blood pressure, lack of sleep, over-activity/exhaustion, flare-up of usual migraine headaches.  If you feel this is related to muscle tension (a band-like feeling around the head, or a pressure at the low-back of the head) you may try ice or heat to this area.  You may need to drink more fluids (try electrolyte drink like Gatorade), rest, or take your usual migraine medications.   **If your headaches do not resolve, worsen, are accompanied by other symptoms, or if your blood pressure is high, please call your primary physician for recommendation and/or examination.    Q:  I am unable to urinate.  What do I do?  A:  A small percentage of people can have difficulty urinating initially after surgery.  This includes being able to urinate only a very small amount at a time and feeling discomfort or pressure in the very low abdomen.  This is called  urinary retention , and is actually an urgent situation.  Proceed to your nearest Emergency department for evaluation (not an Urgent Care Center).  Sometimes the bladder does not work correctly after certain medications you receive during surgery, or related to certain procedures.  You may need to have a catheter placed until your bladder recovers.  When planning to go to an Emergency department, it may help to call the ER to let them know you are  coming in for this problem after a surgery.  This may help you get in quicker to be evaluated.  **If you have symptoms of a urinary tract infection, please contact your primary physician for the proper evaluation and treatment.        If you have other questions, please call the office Monday thru Friday between 8am and 4:30pm to discuss with the nurse or physician assistant.  #[phone]    There is a surgeon ON CALL on weekday evenings and over the weekend in case of urgent need only, and may be contacted at the same number.    If you are having an emergency, call 911 or proceed to your nearest emergency department.    GENERAL ANESTHESIA OR SEDATION ADULT DISCHARGE INSTRUCTIONS   SPECIAL PRECAUTIONS FOR 24 HOURS AFTER SURGERY    IT IS NOT UNUSUAL TO FEEL LIGHT-HEADED OR FAINT, UP TO 24 HOURS AFTER SURGERY OR WHILE TAKING PAIN MEDICATION.  IF YOU HAVE THESE SYMPTOMS; SIT FOR A FEW MINUTES BEFORE STANDING AND HAVE SOMEONE ASSIST YOU WHEN YOU GET UP TO WALK OR USE THE BATHROOM.    YOU SHOULD REST AND RELAX FOR THE NEXT 24 HOURS AND YOU MUST MAKE ARRANGEMENTS TO HAVE SOMEONE STAY WITH YOU FOR AT LEAST 24 HOURS AFTER YOUR DISCHARGE.  AVOID HAZARDOUS AND STRENUOUS ACTIVITIES.  DO NOT MAKE IMPORTANT DECISIONS FOR 24 HOURS.    DO NOT DRIVE ANY VEHICLE OR OPERATE MECHANICAL EQUIPMENT FOR 24 HOURS FOLLOWING THE END OF YOUR SURGERY.  EVEN THOUGH YOU MAY FEEL NORMAL, YOUR REACTIONS MAY BE AFFECTED BY THE MEDICATION YOU HAVE RECEIVED.    DO NOT DRINK ALCOHOLIC BEVERAGES FOR 24 HOURS FOLLOWING YOUR SURGERY.    DRINK CLEAR LIQUIDS (APPLE JUICE, GINGER ALE, 7-UP, BROTH, ETC.).  PROGRESS TO YOUR REGULAR DIET AS YOU FEEL ABLE.    YOU MAY HAVE A DRY MOUTH, A SORE THROAT, MUSCLES ACHES OR TROUBLE SLEEPING.  THESE SHOULD GO AWAY AFTER 24 HOURS.    CALL YOUR DOCTOR FOR ANY OF THE FOLLOWING:  SIGNS OF INFECTION (FEVER, GROWING TENDERNESS AT THE SURGERY SITE, A LARGE AMOUNT OF DRAINAGE OR BLEEDING, SEVERE PAIN, FOUL-SMELLING  DRAINAGE, REDNESS OR SWELLING.    IT HAS BEEN OVER 8 TO 10 HOURS SINCE SURGERY AND YOU ARE STILL NOT ABLE TO URINATE (PASS WATER).

## 2022-09-19 NOTE — ANESTHESIA PROCEDURE NOTES
Airway       Patient location during procedure: OR  Staff -        Anesthesiologist:  Herrera Chavez MD       Performed By: anesthesiologist  Consent for Airway        Urgency: elective  Indications and Patient Condition       Indications for airway management: tino-procedural       Induction type:intravenous       Mask difficulty assessment: 1 - vent by mask    Final Airway Details       Final airway type: supraglottic airway    Supraglottic Airway Details        Type: LMA       Brand: I-Gel       LMA size: 4    Post intubation assessment        Placement verified by: capnometry, equal breath sounds and chest rise        Number of attempts at approach: 1       Number of other approaches attempted: 0       Secured with: commercial tube de león       Ease of procedure: easy       Dentition: Intact and Unchanged

## 2022-09-19 NOTE — ANESTHESIA PREPROCEDURE EVALUATION
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     Seasonal allergies      SOB (shortness of breath)       Past Surgical History:   Procedure Laterality Date     CATARACT IOL, RT/LT       COLONOSCOPY  10/14    no repeat needed due to age     COLONOSCOPY N/A 10/7/2014    Procedure: COLONOSCOPY;  Surgeon: Daryl Hanson MD;  Location:  GI     COLONOSCOPY  2019    no further screening     ENDOSCOPIC RETROGRADE CHOLANGIOPANCREATOGRAM N/A 2020    Procedure: ENDOSCOPIC RETROGRADE CHOLANGIOPANCREATOGRAPHY, PLACEMENT OF PANCREATIC STENT, PLACEMENT OF BILIARY STENT;  Surgeon: Yarely Vann MD;  Location:  OR     ESOPHAGOSCOPY, GASTROSCOPY, DUODENOSCOPY (EGD), COMBINED N/A 2020    Procedure: ESOPHAGOGASTRODUODENOSCOPY, WITH FINE NEEDLE ASPIRATION BIOPSY, WITH ENDOSCOPIC ULTRASOUND GUIDANCE, Endoscopic retrograde cholangiopancreatography;  Surgeon: Yarely Vann MD;  Location:  OR     ESOPHAGOSCOPY, GASTROSCOPY, DUODENOSCOPY (EGD), COMBINED N/A 2020    Procedure: ESOPHAGOGASTRODUODENOSCOPY, WITH FINE NEEDLE ASPIRATION BIOPSY, WITH ENDOSCOPIC ULTRASOUND GUIDANCE;  Surgeon: Romina Rosario MD;  Location:  GI     IR CHEST PORT PLACEMENT > 5 YRS OF AGE  2020     IR PORT REMOVAL RIGHT  2020     LAPAROSCOPIC BIOPSY LIVER N/A 2020    Procedure: Diagnostic Laparoscopy with intraoperative ultrasound and Liver Biopsy X2;  Surgeon: Duarte Chaves MD;  Location: [facility] OR     WHIPPLE PROCEDURE N/A 2020    Procedure: Non Pylorus Preserving Whipple procedure;  Surgeon: Duarte Chaves MD;  Location: [facility] OR      Allergies   Allergen Reactions     Penicillin V       Seasonal Allergies       Social History     Tobacco Use     Smoking status: Never Smoker     Smokeless tobacco: Never Used   Substance Use Topics     Alcohol use: Yes     Comment: social       Wt Readings from Last 1 Encounters:   09/19/22 51.3 kg (113 lb)        Anesthesia Evaluation   Pt has had prior anesthetic. Type: General.    No history of anesthetic complications       ROS/MED HX  ENT/Pulmonary:  - neg pulmonary ROS     Neurologic:     (+) peripheral neuropathy,     Cardiovascular:  - neg cardiovascular ROS     METS/Exercise Tolerance:     Hematologic:  - neg hematologic  ROS     Musculoskeletal:   (+) arthritis,     GI/Hepatic:  - neg GI/hepatic ROS     Renal/Genitourinary:  - neg Renal ROS     Endo:  - neg endo ROS     Psychiatric/Substance Use:  - neg psychiatric ROS     Infectious Disease:  - neg infectious disease ROS     Malignancy:   (+) Malignancy, History of Other.Other CA pancreas status post Surgery.    Other:            Physical Exam    Airway        Mallampati: II   TM distance: > 3 FB   Neck ROM: full   Mouth opening: > 3 cm    Respiratory Devices and Support         Dental       (+) missing      Cardiovascular   cardiovascular exam normal          Pulmonary   pulmonary exam normal                OUTSIDE LABS:  CBC:   Lab Results   Component Value Date    WBC 3.0 (L) 07/25/2022    WBC 3.3 (L) 03/28/2022    HGB 14.3 07/25/2022    HGB 14.5 03/28/2022    HCT 44.3 07/25/2022    HCT 44.7 03/28/2022     07/25/2022     03/28/2022     BMP:   Lab Results   Component Value Date     07/25/2022     03/28/2022    POTASSIUM 3.9 07/25/2022    POTASSIUM 3.9 03/28/2022    CHLORIDE 104 07/25/2022    CHLORIDE 101 03/28/2022    CO2 30 07/25/2022    CO2 32 03/28/2022    BUN 16 07/25/2022    BUN 12 03/28/2022    CR 0.84 07/25/2022    CR 0.80 03/28/2022    GLC 69 (L) 07/25/2022    GLC 71 03/28/2022     COAGS:   Lab Results   Component Value Date    PTT 30 07/06/2020    INR 1.05 07/06/2020      POC:   Lab Results   Component Value Date    BGM 86 05/26/2020     HEPATIC:   Lab Results   Component Value Date    ALBUMIN 3.6 07/25/2022    PROTTOTAL 7.1 07/25/2022    ALT 39 07/25/2022    AST 27 07/25/2022    ALKPHOS 131 07/25/2022    BILITOTAL 0.7 07/25/2022     OTHER:   Lab Results   Component Value Date    PH 7.41 05/22/2020    LACT 0.7 05/22/2020    A1C 5.5 05/23/2020    CAMERON 9.1 07/25/2022    PHOS 3.0 05/23/2020    MAG 2.3 07/07/2020    LIPASE 393 04/19/2020    AMYLASE 44 05/25/2020    TSH 0.88 04/28/2016       Anesthesia Plan    ASA Status:  3      Anesthesia Type: General.     - Airway: LMA   Induction: Intravenous.   Maintenance: Balanced.        Consents    Anesthesia Plan(s) and associated risks, benefits, and realistic alternatives discussed. Questions answered and patient/representative(s) expressed understanding.    - Discussed:     - Discussed with:  Patient      - Extended Intubation/Ventilatory Support Discussed: No.      - Patient is DNR/DNI Status: No    Use of blood products discussed: No .     Postoperative Care    Pain management: IV analgesics, Oral pain medications, Multi-modal analgesia.   PONV prophylaxis: Ondansetron (or other 5HT-3), Dexamethasone or Solumedrol     Comments:                Herrera Chavez MD

## 2022-09-26 ENCOUNTER — TELEPHONE (OUTPATIENT)
Dept: SURGERY | Facility: CLINIC | Age: 77
End: 2022-09-26

## 2022-09-26 LAB
PATH REPORT.COMMENTS IMP SPEC: ABNORMAL
PATH REPORT.COMMENTS IMP SPEC: ABNORMAL
PATH REPORT.COMMENTS IMP SPEC: YES
PATH REPORT.FINAL DX SPEC: ABNORMAL
PATH REPORT.GROSS SPEC: ABNORMAL
PATH REPORT.MICROSCOPIC SPEC OTHER STN: ABNORMAL
PATH REPORT.MICROSCOPIC SPEC OTHER STN: ABNORMAL
PATH REPORT.RELEVANT HX SPEC: ABNORMAL
PATHOLOGY SYNOPTIC REPORT: ABNORMAL
PHOTO IMAGE: ABNORMAL

## 2022-09-26 PROCEDURE — 88305 TISSUE EXAM BY PATHOLOGIST: CPT | Mod: 26 | Performed by: PATHOLOGY

## 2022-09-26 PROCEDURE — 88360 TUMOR IMMUNOHISTOCHEM/MANUAL: CPT | Mod: 26 | Performed by: PATHOLOGY

## 2022-09-26 PROCEDURE — 88342 IMHCHEM/IMCYTCHM 1ST ANTB: CPT | Mod: 26 | Performed by: PATHOLOGY

## 2022-09-26 NOTE — TELEPHONE ENCOUNTER
Patient is calling for pathology results.     Results are still pending.  I spoke with [facility] pathology department and was informed that pathology was sent to the [facility] for additional stains.      Informed patient of above.  I will watch for final results and alert Dr Wagner to call patient when results are available.

## 2022-09-28 ENCOUNTER — VIRTUAL VISIT (OUTPATIENT)
Dept: ONCOLOGY | Facility: CLINIC | Age: 77
End: 2022-09-28
Attending: INTERNAL MEDICINE
Payer: COMMERCIAL

## 2022-09-28 DIAGNOSIS — D05.11 DUCTAL CARCINOMA IN SITU (DCIS) OF RIGHT BREAST: Primary | ICD-10-CM

## 2022-09-28 DIAGNOSIS — C25.0 MALIGNANT NEOPLASM OF HEAD OF PANCREAS (H): ICD-10-CM

## 2022-09-28 PROCEDURE — 99215 OFFICE O/P EST HI 40 MIN: CPT | Mod: 95 | Performed by: INTERNAL MEDICINE

## 2022-09-28 NOTE — NURSING NOTE
Pt states she has been having diarrhea for several days. 'Do I need to be concerned about that?'    Pt states Ondanestron and Oxycodone can be removed. She never used them.    Pt states the Pencillin V could be removed as an allergy.     PO

## 2022-09-28 NOTE — PROGRESS NOTES
Flora is a 77 year old who is being evaluated via a billable telephone visit.      What phone number would you like to be contacted at? [phone]  How would you like to obtain your AVS? Juan Camacho VF    Phone call duration: 15 minutes        [facility]    Hematology/Oncology Established Patient Follow-up Note    Treatment Summary:      Today's Date: 9/28/22    Reason for Follow-up: Pancreatic cancer      HISTORY OF PRESENT ILLNESS: Flora Hogan is a 77 year old female, who presents with adenocarcinoma of the pancreas.  She was seen previously by Dr. Jeannie Coronado.     She initially presented with jaundice.  She was evaluated for EUS and FNA, which revealed atypical cells, but no definitive evidence of malignancy.  She also underwent biliary stenting for decompression of her biliary tree.  She had a second EUS done, but again revealed atypical cells.  She then saw Dr. Duarte Chaves at [facility].  She had CT scan and MRI.  MRI showed multiple benign lesions in her liver; there was one area that was indeterminate.  Her CA 19-9 was 93.  On 5/26/20, she underwent Whipple procedure.  Two nodules were visualized on the surface of the liver, which were biopsied and found to be benign on pathology.  There were also hepatic cysts seen.  Pathology showed pancreatic ductal adenocarcinoma, well-differentiated (grade 1), tumor size 2.6 x 2.4 x 2.0 cm, negative margins, +LVI, 0 of 4 lymph nodes involved, staged pT2N0 (stage IB).       Port was placed by IR on 7/6/20.  It was removed on 12/28/20.     She started adjuvant chemotherapy with single-agent gemcitabine on 7/7/20.  She completed 6 cycles on 12/15/20.      INTERIM HISTORY:  When I initially met patient, she recently underwent biopsy following an abnormal mammogram with path returning as ADH.     Patient was hesitant to move forward with surgical resection, but we discussed this and she ultimately underwent  lumpectomy on 9/19/22. Path has returned as DCIS. She is otherwise at baseline.       REVIEW OF SYSTEMS:   A 14 point ROS was reviewed with pertinent positives and negatives in the HPI.       HOME MEDICATIONS:  Current Outpatient Medications   Medication Sig Dispense Refill     acetaminophen (TYLENOL) 325 MG tablet Take 1-2 tablets (325-650 mg) by mouth every 6 hours as needed for mild pain or fever 50 tablet 0     albuterol (PROAIR HFA/PROVENTIL HFA/VENTOLIN HFA) 108 (90 Base) MCG/ACT inhaler Inhale 2 puffs into the lungs every 6 hours as needed for shortness of breath / dyspnea or wheezing 18 g 0     calcium carbonate-vitamin D (OS-CAMERON) 600-400 MG-UNIT chewable tablet Take 1 chew tab by mouth 2 times daily  180 tablet 3     citalopram (CELEXA) 20 MG tablet Take 1 tablet (20 mg) by mouth daily 90 tablet 3     CREON 68731-07534 units CPEP per EC capsule TAKE 1 CAPSULE BY MOUTH THREE TIMES DAILY WITH A MEAL 270 capsule 3     loratadine (CLARITIN REDITABS) 10 MG dispersible tablet Take 10 mg by mouth as needed        magnesium 250 MG tablet Take 1 tablet by mouth 2 times daily       multivitamin (ONE-DAILY) tablet Take 1 tablet by mouth daily 90 tablet 0     ondansetron (ZOFRAN ODT) 4 MG ODT tab Take 1 tablet (4 mg) by mouth every 8 hours as needed for nausea (Patient not taking: Reported on 9/28/2022) 10 tablet 0     oxyCODONE (ROXICODONE) 5 MG tablet Take 1-2 tablets (5-10 mg) by mouth every 4 hours as needed for moderate to severe pain (Patient not taking: Reported on 9/28/2022) 12 tablet 0         ALLERGIES:  Allergies   Allergen Reactions     Penicillin V      Seasonal Allergies          PAST MEDICAL HISTORY:  Past Medical History:   Diagnosis Date     Chest tightness     had suspected allergies     Malignant neoplasm of head of pancreas (H) 06/09/2020     Seasonal allergies      SOB (shortness of breath)          PAST SURGICAL HISTORY:  Past Surgical History:   Procedure Laterality Date     BIOPSY BREAST Right  9/19/2022    Procedure: Right radiofrequency localized excisional breast biopsy;  Surgeon: Geovanny Wagner MD;  Location: [facility] OR     CATARACT IOL, RT/LT  2015     COLONOSCOPY  10/14    no repeat needed due to age     COLONOSCOPY N/A 10/7/2014    Procedure: COLONOSCOPY;  Surgeon: Daryl Hanson MD;  Location: [facility]     COLONOSCOPY  04/23/2019    no further screening     ENDOSCOPIC RETROGRADE CHOLANGIOPANCREATOGRAM N/A 4/20/2020    Procedure: ENDOSCOPIC RETROGRADE CHOLANGIOPANCREATOGRAPHY, PLACEMENT OF PANCREATIC STENT, PLACEMENT OF BILIARY STENT;  Surgeon: Yarely Vann MD;  Location: [facility] OR     ESOPHAGOSCOPY, GASTROSCOPY, DUODENOSCOPY (EGD), COMBINED N/A 4/20/2020    Procedure: ESOPHAGOGASTRODUODENOSCOPY, WITH FINE NEEDLE ASPIRATION BIOPSY, WITH ENDOSCOPIC ULTRASOUND GUIDANCE, Endoscopic retrograde cholangiopancreatography;  Surgeon: Yarely Vann MD;  Location: [facility]     ESOPHAGOSCOPY, GASTROSCOPY, DUODENOSCOPY (EGD), COMBINED N/A 5/5/2020    Procedure: ESOPHAGOGASTRODUODENOSCOPY, WITH FINE NEEDLE ASPIRATION BIOPSY, WITH ENDOSCOPIC ULTRASOUND GUIDANCE;  Surgeon: Romina Rosario MD;  Location: [facility]     IR CHEST PORT PLACEMENT > 5 YRS OF AGE  7/6/2020     IR PORT REMOVAL RIGHT  12/28/2020     LAPAROSCOPIC BIOPSY LIVER N/A 5/22/2020    Procedure: Diagnostic Laparoscopy with intraoperative ultrasound and Liver Biopsy X2;  Surgeon: Duarte Chaves MD;  Location: [facility]     WHIPPLE PROCEDURE N/A 5/22/2020    Procedure: Non Pylorus Preserving Whipple procedure;  Surgeon: Duarte Chaves MD;  Location: [facility]         SOCIAL HISTORY:  Social History     Socioeconomic History     Marital status:      Spouse name: Not on file     Number of children: Not on file     Years of education: Not on file     Highest education level: Not on file   Occupational History     Employer: RETIRED     Comment: previous taught special ed   Tobacco Use     Smoking status: Never Smoker     Smokeless  tobacco: Never Used   Vaping Use     Vaping Use: Never used   Substance and Sexual Activity     Alcohol use: Yes     Comment: social      Drug use: Never     Sexual activity: Yes   Other Topics Concern     Parent/sibling w/ CABG, MI or angioplasty before 65F 55M? Not Asked      Service Not Asked     Blood Transfusions Not Asked     Caffeine Concern Yes     Comment: 2 cups     Occupational Exposure Not Asked     Hobby Hazards Not Asked     Sleep Concern Not Asked     Stress Concern Not Asked     Weight Concern Not Asked     Special Diet No     Back Care Not Asked     Exercise Yes     Comment: walking workout      Bike Helmet Not Asked     Seat Belt Not Asked     Self-Exams Not Asked   Social History Narrative     Not on file     Social Determinants of Health     Financial Resource Strain: Not on file   Food Insecurity: Not on file   Transportation Needs: Not on file   Physical Activity: Not on file   Stress: Not on file   Social Connections: Not on file   Intimate Partner Violence: Not At Risk     Fear of Current or Ex-Partner: No     Emotionally Abused: No     Physically Abused: No     Sexually Abused: No   Housing Stability: Not on file         FAMILY HISTORY:  Family History   Problem Relation Age of Onset     Musculoskeletal Disorder Mother         edematous legs     Obesity Mother      Musculoskeletal Disorder Maternal Grandmother         edematous legs; did have amputation     Obesity Maternal Grandmother      Myocardial Infarction Maternal Grandmother          PHYSICAL EXAM:  Telephone visit        LABS:  CBC RESULTS:   Recent Labs   Lab Test 07/25/22  0844   WBC 3.0*   RBC 4.93   HGB 14.3   HCT 44.3   MCV 90   MCH 29.0   MCHC 32.3   RDW 13.2          Recent Labs   Lab Test 07/25/22  0844 03/28/22  1056    135   POTASSIUM 3.9 3.9   CHLORIDE 104 101   CO2 30 32   ANIONGAP 3 2*   GLC 69* 71   BUN 16 12   CR 0.84 0.80   CAMERON 9.1 9.2         PATHOLOGY:  Surgical Pathology Report                          Case: [id number]                                   Authorizing Provider:  Marcial Holden,     Collected:           07/12/2022 01:27 PM                                  PA-C                                                                          Ordering Location:     [facility]   Received:            07/12/2022 02:45 PM                                  [facility]                                                                 Pathologist:           Srinivas Villegas MD                                                      Specimen:    Breast, Right                                                                              Final Diagnosis   A. Right breast stereotactic needle core biopsies:  - Negative for malignancy  - Focal atypical ductal hyperplasia  - Microcalcifications present in benign breast tissue     Case Report   Surgical Pathology Report                         Case: [id number]                                   Authorizing Provider:  Geovanny Wagner MD      Collected:           09/19/2022 08:22 AM           Ordering Location:     [facility]   Received:            09/19/2022 08:37 AM                                  [facility]                                                                       Pathologist:           Jose Lobo MD                                                           Specimen:    Breast, Right, RIGHT LOCALIZED EXCISIONAL BREAST BIOPSY, short suture superior, long                 suture lateral                                                                             Final Diagnosis   Right breast, RFID localized lumpectomy, with inked designated margins:  - Ductal carcinoma in situ, nuclear grade 3, comedonecrosis with microcalcifications and solid patterns, without extension to margins, and 0.4 mm from the nearest margins, the medial and inferior margins and 2.6 mm from the nearest lateral margin.  - Estimated size of  "ductal carcinoma in situ:  20 x 15 x 7 mm.  - Evidence of previous biopsy site/cavity with coil shaped biopsy marker and RFID tag , adjacent to tumor.  - No evidence of invasive carcinoma.  - No evidence of lymphovascular invasion.  - Estrogen and progesterone receptors:  ER %, MS 1-10%      Electronically signed by Jose Lobo MD on 9/26/2022 at  3:13 PM   Synoptic Checklist     DCIS OF THE BREAST: Resection  8th Edition - Protocol posted: 3/23/2022  DCIS OF THE BREAST: COMPLETE EXCISION - All Specimens  SPECIMEN   Procedure  Excision (less than total mastectomy)    Specimen Laterality  Right    TUMOR   Tumor Site  12:00 to 5:00, 0 to 7 cm from nipple    Histologic Type  Ductal carcinoma in situ    Size (Extent) of DCIS  Estimated size (extent) of DCIS is at least (Millimeters): 20 mm   Additional Dimension (Millimeters)  15 mm     7 mm   Number of Blocks with DCIS  4    Number of Blocks Examined  17    Architectural Patterns  Comedo      Solid    Nuclear Grade  Grade III (high)    Necrosis  Present, central (expansive \"comedo\" necrosis)    Microcalcifications  Present in DCIS    MARGINS   Margin Status  All margins negative for DCIS    Distance from DCIS to Closest Margin  0.4 mm   Closest Margin(s) to DCIS  Inferior      Medial    Distance from DCIS to Anterior Margin  28 mm   Distance from DCIS to Posterior Margin  5 mm   Distance from DCIS to Superior Margin  13 mm   Distance from DCIS to Lateral Margin  3 mm   REGIONAL LYMPH NODES   Regional Lymph Node Status  Not applicable (no regional lymph nodes submitted or found)    PATHOLOGIC STAGE CLASSIFICATION (pTNM, AJCC 8th Edition)   Reporting of pT, pN, and (when applicable) pM categories is based on information available to the pathologist at the time the report is issued. As per the AJCC (Chapter 1, 8th Ed.) it is the managing physician s responsibility to establish the final pathologic stage based upon all pertinent information, including but " potentially not limited to this pathology report.   pT Category  pTis (DCIS)    pN Category  pN not assigned (no nodes submitted or found)    ADDITIONAL FINDINGS   Additional Findings  PRevious biopsy site/cavity with biopsy marker and RFID    Breast Biomarker Testing Performed on Previous Biopsy     Testing Performed on Case Number  [id number]    .     Breast Biomarker Reporting Template  Protocol posted: 11/10/2021  BREAST: BIOMARKER REPORTING TEMPLATE - All Specimens  Test(s) Performed     Estrogen Receptor (ER) Status  Positive (greater than 10% of cells demonstrate nuclear positivity)    Percentage of Cells with Nuclear Positivity  %    Average Intensity of Staining  Strong    Test Type  Food and Drug Administration (FDA) cleared (test / vendor): [employer]    Primary Antibody  SP1    Test(s) Performed     Progesterone Receptor (PgR) Status  Positive    Percentage of Cells with Nuclear Positivity  10 %   Average Intensity of Staining  Moderate    Test Type  Food and Drug Administration (FDA) cleared (test / vendor): [employer]    Primary Antibody  1E2    Scoring System  No separate scoring system used    Cold Ischemia and Fixation Times  Meet requirements specified in latest version of the ASCO / CAP Guidelines    Cold Ischemia Time (minutes)  0 min   Fixation Time (hours)  12.21 hours   Testing Performed on Block Number(s)  [id number]             IMAGING:  CT c/a/p 3/28/22:  IMPRESSION: In this patient with history of pancreatic cancer status  post Whipple's procedure, there is:  1. No significant change in the ill-defined  hypoattenuating/hypoenhancing area in hepatic segment V as compared to  10/25/2021 exam, although appears slightly more prominent as compared  to 6/30/2020 exam, remains indeterminate, could represent  posttreatment changes, recommend continued attention on follow-up.  2. Few scattered small pulmonary nodules including for example 3 mm  left lower lobe nodule, not significantly changed  as compared to  6/30/2020 exam, and hence likely benign. No new pulmonary nodules.  3. Significant amount of stool throughout the colon, nonspecific, can  be seen with constipation.    Mammogram 6/28/22:  IMPRESSION: BI-RADS CATEGORY: 4 - Suspicious.  Stereotactic core biopsy of right breast microcalcifications is  recommended for definitive histology. Findings and recommendation for  right breast biopsy were discussed with the patient during her  appointment.    CT CAP 7/25/22:  IMPRESSION:  1.  Stable exam without evidence for new disease.  2.  Stable Whipple surgery changes.  3.  Hypodensities in the liver are stable. One of these regions is  ill-defined along the inferior liver as detailed above. Another nodule  of the right hepatic dome is indeterminate but stable. Recommend  further attention to these at imaging follow-up.  4.  Stable small pulmonary nodules.  5.  There is a new solid oval-shaped nodule along the medial right  breast. Correlate with mammographic workup.       ASSESSMENT/PLAN:  Flora Hogan is a 77 year old female previously followed by Dr. Jeannie Coronado with:     1) Adenocarcinoma of the pancreas head: s/p Whipple procedure on 5/26/20; pathology showed pancreatic ductal adenocarcinoma, well-differentiated (grade 1), tumor size 2.6 x 2.4 x 2.0 cm, negative margins, +LVI, 0 of 4 lymph nodes involved, staged pT2N0 (stage IB).    She completed 6 months of adjuvant gemcitabine in December 2020.     CT c/a/p on 3/28/22 was reviewed.  I reviewed the report and images.  She has known liver cysts and benign lesions that were biopsied during surgery.  There is an area in the central liver that is not significantly changed compared to the October 2021 exam, appears slightly more prominent completed to the June 2020 exam,  remains indeterminate, could be post-treatment changes.  We will continue to monitor this on follow-up imaging.       -Scans and tumor markers every 4 months during 2nd  year.  -CT CAP and labs/CA 19-9 in 4 months.     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 No evidence of invasive carcinoma, LVI.   -As patient and  had requested to speak today, I have not had the chance to speak with surgery or radiation oncology prior to today's visit.   -We discussed the possible need for re-excision given the close margin of 0.4 mm, but this will need to be reviewed with surgery. I am uncertain if this was down to the chest wall as patient has low breast volume.   -We discussed RT after lumpectomy is standard of care for most patients. They inquire if she can move forward without radiation. Though the lesion is <2.5 cm, I had discussed my concerns with forgoing RT given the close margin of 0.4 mm, grade 3 DCIS. Ideally, all margins if moving forward without radiation should be >/= 3 mm as well and purely grade 1-2.  -I discussed AI therapy would be warranted given the strong ER positivity to decrease the risk of invasive disease for a minimum of 5 years, if tolerated, following RT.  -A formal evaluation with radiation oncology to discuss the risk and benefits of forgoing RT is warranted.  -They will re-meet with Dr. Wagner on 10/6/22.   -Surgery was just on 9/19/22 and I discussed she will need time to heal following surgery prior to beginning RT and AI.  -They wish to be seen by a breast specialist and I will recommend for transfer to Dr. Wilberto Peoples's clinic.     3) Chronic constipation/diarrhea: She notes bowel incontinence but sensation of incomplete bowel emptying.  She has had this for many years and has seen [facility] in the past.    -Creon prescribed - she has started it and finds it helpful.  She takes it 3 times a day.  She will continue it.    -She will also try fiber.  -Follow-up with [facility] again - she requests to see Dr. Pritchett  [place].     4) Leukpenia: Secondary to prior chemotherapy.  Mild, stable.    -Monitor.     5)  Pulmonary nodule: stable  -Sill monitor on future scans.    6) Insomnia: She occasionally takes melatonin. This has been a chronic problems for years, she says, even prior to her cancer diagnosis.     7) Coping: Patient notes some feelings of depression/anxiety and being crabby.  She was looking for a support group, but the group at the [facility] has been stopped due to COVID.  She did speak with our , who offered resources and emotional support.  She requested to try a low dose antidepressant.  -Citalopram 20 mg daily prescribed at our last visit in October 2021.  She says that she has found this helpful and will stay at this same dose.     8) Fatigue: secondary to prior surgery and chemotherapy.  -She met with Dr. Brito, PM&R.  They talked about cancer rehab PT/OT, but she wants to wait on this for now, as she says she would rather not do virtual appointments.  She says that she is willing to re-visit it in the future.     9) Forgetfulness, cognitive difficulties: Dr. Brito discussed neuropsychology referral, but again, she wants to wait until in-person appointments are available.      10) Follow up in 2 weeks with Dr. Garcia. Will hold on radiation oncology referral at this time until re-meeting with Dr. Wagner and evaluation by Dr. Garcia.       Total time spent on day of visit, including review of tests, obtaining/reviewing separately obtained history, ordering medications/tests/procedures, communicating with PCP/consultants, and documenting in electronic medical record: 40 minutes.         Gloria Barrett DO  Hematology/Oncology  [facility]

## 2022-09-28 NOTE — LETTER
9/28/2022         RE: Flora Hogan  [id number] [facility] [address]  [facility] [address]        Dear Colleague,    Thank you for referring your patient, Flora Hogan, to the [facility]. Please see a copy of my visit note below.    Flora is a 77 year old who is being evaluated via a billable telephone visit.      What phone number would you like to be contacted at? [phone]  How would you like to obtain your AVS? Juan LAM    Phone call duration: 15 minutes        [facility]    Hematology/Oncology Established Patient Follow-up Note    Treatment Summary:      Today's Date: 9/28/22    Reason for Follow-up: Pancreatic cancer      HISTORY OF PRESENT ILLNESS: Flora Hogan is a 77 year old female, who presents with adenocarcinoma of the pancreas.  She was seen previously by Dr. Jeannie Coronado.     She initially presented with jaundice.  She was evaluated for EUS and FNA, which revealed atypical cells, but no definitive evidence of malignancy.  She also underwent biliary stenting for decompression of her biliary tree.  She had a second EUS done, but again revealed atypical cells.  She then saw Dr. Duarte Chaves at [facility].  She had CT scan and MRI.  MRI showed multiple benign lesions in her liver; there was one area that was indeterminate.  Her CA 19-9 was 93.  On 5/26/20, she underwent Whipple procedure.  Two nodules were visualized on the surface of the liver, which were biopsied and found to be benign on pathology.  There were also hepatic cysts seen.  Pathology showed pancreatic ductal adenocarcinoma, well-differentiated (grade 1), tumor size 2.6 x 2.4 x 2.0 cm, negative margins, +LVI, 0 of 4 lymph nodes involved, staged pT2N0 (stage IB).       Port was placed by IR on 7/6/20.  It was removed on 12/28/20.     She started adjuvant chemotherapy with single-agent gemcitabine on 7/7/20.  She completed 6 cycles on  12/15/20.      INTERIM HISTORY:  When I initially met patient, she recently underwent biopsy following an abnormal mammogram with path returning as ADH.     Patient was hesitant to move forward with surgical resection, but we discussed this and she ultimately underwent lumpectomy on 9/19/22. Path has returned as DCIS. She is otherwise at baseline.       REVIEW OF SYSTEMS:   A 14 point ROS was reviewed with pertinent positives and negatives in the HPI.       HOME MEDICATIONS:  Current Outpatient Medications   Medication Sig Dispense Refill     acetaminophen (TYLENOL) 325 MG tablet Take 1-2 tablets (325-650 mg) by mouth every 6 hours as needed for mild pain or fever 50 tablet 0     albuterol (PROAIR HFA/PROVENTIL HFA/VENTOLIN HFA) 108 (90 Base) MCG/ACT inhaler Inhale 2 puffs into the lungs every 6 hours as needed for shortness of breath / dyspnea or wheezing 18 g 0     calcium carbonate-vitamin D (OS-CAMERON) 600-400 MG-UNIT chewable tablet Take 1 chew tab by mouth 2 times daily  180 tablet 3     citalopram (CELEXA) 20 MG tablet Take 1 tablet (20 mg) by mouth daily 90 tablet 3     CREON 40815-84763 units CPEP per EC capsule TAKE 1 CAPSULE BY MOUTH THREE TIMES DAILY WITH A MEAL 270 capsule 3     loratadine (CLARITIN REDITABS) 10 MG dispersible tablet Take 10 mg by mouth as needed        magnesium 250 MG tablet Take 1 tablet by mouth 2 times daily       multivitamin (ONE-DAILY) tablet Take 1 tablet by mouth daily 90 tablet 0     ondansetron (ZOFRAN ODT) 4 MG ODT tab Take 1 tablet (4 mg) by mouth every 8 hours as needed for nausea (Patient not taking: Reported on 9/28/2022) 10 tablet 0     oxyCODONE (ROXICODONE) 5 MG tablet Take 1-2 tablets (5-10 mg) by mouth every 4 hours as needed for moderate to severe pain (Patient not taking: Reported on 9/28/2022) 12 tablet 0         ALLERGIES:  Allergies   Allergen Reactions     Penicillin V      Seasonal Allergies          PAST MEDICAL HISTORY:  Past Medical History:   Diagnosis Date      Chest tightness     had suspected allergies     Malignant neoplasm of head of pancreas (H) 06/09/2020     Seasonal allergies      SOB (shortness of breath)          PAST SURGICAL HISTORY:  Past Surgical History:   Procedure Laterality Date     BIOPSY BREAST Right 9/19/2022    Procedure: Right radiofrequency localized excisional breast biopsy;  Surgeon: Geovanny Wagner MD;  Location: [facility] OR     CATARACT IOL, RT/LT  2015     COLONOSCOPY  10/14    no repeat needed due to age     COLONOSCOPY N/A 10/7/2014    Procedure: COLONOSCOPY;  Surgeon: Daryl Hanson MD;  Location:  GI     COLONOSCOPY  04/23/2019    no further screening     ENDOSCOPIC RETROGRADE CHOLANGIOPANCREATOGRAM N/A 4/20/2020    Procedure: ENDOSCOPIC RETROGRADE CHOLANGIOPANCREATOGRAPHY, PLACEMENT OF PANCREATIC STENT, PLACEMENT OF BILIARY STENT;  Surgeon: Yarely Vann MD;  Location:  OR     ESOPHAGOSCOPY, GASTROSCOPY, DUODENOSCOPY (EGD), COMBINED N/A 4/20/2020    Procedure: ESOPHAGOGASTRODUODENOSCOPY, WITH FINE NEEDLE ASPIRATION BIOPSY, WITH ENDOSCOPIC ULTRASOUND GUIDANCE, Endoscopic retrograde cholangiopancreatography;  Surgeon: Yarely Vann MD;  Location:  OR     ESOPHAGOSCOPY, GASTROSCOPY, DUODENOSCOPY (EGD), COMBINED N/A 5/5/2020    Procedure: ESOPHAGOGASTRODUODENOSCOPY, WITH FINE NEEDLE ASPIRATION BIOPSY, WITH ENDOSCOPIC ULTRASOUND GUIDANCE;  Surgeon: Romina Rosario MD;  Location: [facility] GI     IR CHEST PORT PLACEMENT > 5 YRS OF AGE  7/6/2020     IR PORT REMOVAL RIGHT  12/28/2020     LAPAROSCOPIC BIOPSY LIVER N/A 5/22/2020    Procedure: Diagnostic Laparoscopy with intraoperative ultrasound and Liver Biopsy X2;  Surgeon: Duarte Chaves MD;  Location: [facility] OR     WHIPPLE PROCEDURE N/A 5/22/2020    Procedure: Non Pylorus Preserving Whipple procedure;  Surgeon: Duarte Chaves MD;  Location: [facility] OR         SOCIAL HISTORY:  Social History     Socioeconomic History     Marital status:      Spouse name: Not on  file     Number of children: Not on file     Years of education: Not on file     Highest education level: Not on file   Occupational History     Employer: RETIRED     Comment: previous taught special ed   Tobacco Use     Smoking status: Never Smoker     Smokeless tobacco: Never Used   Vaping Use     Vaping Use: Never used   Substance and Sexual Activity     Alcohol use: Yes     Comment: social      Drug use: Never     Sexual activity: Yes   Other Topics Concern     Parent/sibling w/ CABG, MI or angioplasty before 65F 55M? Not Asked      Service Not Asked     Blood Transfusions Not Asked     Caffeine Concern Yes     Comment: 2 cups     Occupational Exposure Not Asked     Hobby Hazards Not Asked     Sleep Concern Not Asked     Stress Concern Not Asked     Weight Concern Not Asked     Special Diet No     Back Care Not Asked     Exercise Yes     Comment: walking workout      Bike Helmet Not Asked     Seat Belt Not Asked     Self-Exams Not Asked   Social History Narrative     Not on file     Social Determinants of Health     Financial Resource Strain: Not on file   Food Insecurity: Not on file   Transportation Needs: Not on file   Physical Activity: Not on file   Stress: Not on file   Social Connections: Not on file   Intimate Partner Violence: Not At Risk     Fear of Current or Ex-Partner: No     Emotionally Abused: No     Physically Abused: No     Sexually Abused: No   Housing Stability: Not on file         FAMILY HISTORY:  Family History   Problem Relation Age of Onset     Musculoskeletal Disorder Mother         edematous legs     Obesity Mother      Musculoskeletal Disorder Maternal Grandmother         edematous legs; did have amputation     Obesity Maternal Grandmother      Myocardial Infarction Maternal Grandmother          PHYSICAL EXAM:  Telephone visit        LABS:  CBC RESULTS:   Recent Labs   Lab Test 07/25/22  0844   WBC 3.0*   RBC 4.93   HGB 14.3   HCT 44.3   MCV 90   MCH 29.0   MCHC 32.3   RDW 13.2           Recent Labs   Lab Test 07/25/22  0844 03/28/22  1056    135   POTASSIUM 3.9 3.9   CHLORIDE 104 101   CO2 30 32   ANIONGAP 3 2*   GLC 69* 71   BUN 16 12   CR 0.84 0.80   CAMERON 9.1 9.2         PATHOLOGY:  Surgical Pathology Report                         Case: [id number]                                   Authorizing Provider:  Marcial Holden,     Collected:           07/12/2022 01:27 PM                                  PA-C                                                                          Ordering Location:     [facility]   Received:            07/12/2022 02:45 PM                                  [facility]                                                                 Pathologist:           Srinivas Villegas MD                                                      Specimen:    Breast, Right                                                                              Final Diagnosis   A. Right breast stereotactic needle core biopsies:  - Negative for malignancy  - Focal atypical ductal hyperplasia  - Microcalcifications present in benign breast tissue     Case Report   Surgical Pathology Report                         Case: [id number]                                   Authorizing Provider:  Geovanny Wagner MD      Collected:           09/19/2022 08:22 AM           Ordering Location:     [facility]   Received:            09/19/2022 08:37 AM                                  [facility]                                                                       Pathologist:           Jose Lobo MD                                                           Specimen:    Breast, Right, RIGHT LOCALIZED EXCISIONAL BREAST BIOPSY, short suture superior, long                 suture lateral                                                                             Final Diagnosis   Right breast, RFID localized lumpectomy, with inked designated margins:  - Ductal  "carcinoma in situ, nuclear grade 3, comedonecrosis with microcalcifications and solid patterns, without extension to margins, and 0.4 mm from the nearest margins, the medial and inferior margins and 2.6 mm from the nearest lateral margin.  - Estimated size of ductal carcinoma in situ:  20 x 15 x 7 mm.  - Evidence of previous biopsy site/cavity with coil shaped biopsy marker and RFID tag , adjacent to tumor.  - No evidence of invasive carcinoma.  - No evidence of lymphovascular invasion.  - Estrogen and progesterone receptors:  ER %, SC 1-10%      Electronically signed by Jose Lobo MD on 9/26/2022 at  3:13 PM   Synoptic Checklist     DCIS OF THE BREAST: Resection  8th Edition - Protocol posted: 3/23/2022  DCIS OF THE BREAST: COMPLETE EXCISION - All Specimens  SPECIMEN   Procedure  Excision (less than total mastectomy)    Specimen Laterality  Right    TUMOR   Tumor Site  12:00 to 5:00, 0 to 7 cm from nipple    Histologic Type  Ductal carcinoma in situ    Size (Extent) of DCIS  Estimated size (extent) of DCIS is at least (Millimeters): 20 mm   Additional Dimension (Millimeters)  15 mm     7 mm   Number of Blocks with DCIS  4    Number of Blocks Examined  17    Architectural Patterns  Comedo      Solid    Nuclear Grade  Grade III (high)    Necrosis  Present, central (expansive \"comedo\" necrosis)    Microcalcifications  Present in DCIS    MARGINS   Margin Status  All margins negative for DCIS    Distance from DCIS to Closest Margin  0.4 mm   Closest Margin(s) to DCIS  Inferior      Medial    Distance from DCIS to Anterior Margin  28 mm   Distance from DCIS to Posterior Margin  5 mm   Distance from DCIS to Superior Margin  13 mm   Distance from DCIS to Lateral Margin  3 mm   REGIONAL LYMPH NODES   Regional Lymph Node Status  Not applicable (no regional lymph nodes submitted or found)    PATHOLOGIC STAGE CLASSIFICATION (pTNM, AJCC 8th Edition)   Reporting of pT, pN, and (when applicable) pM categories is based " on information available to the pathologist at the time the report is issued. As per the AJCC (Chapter 1, 8th Ed.) it is the managing physician s responsibility to establish the final pathologic stage based upon all pertinent information, including but potentially not limited to this pathology report.   pT Category  pTis (DCIS)    pN Category  pN not assigned (no nodes submitted or found)    ADDITIONAL FINDINGS   Additional Findings  PRevious biopsy site/cavity with biopsy marker and RFID    Breast Biomarker Testing Performed on Previous Biopsy     Testing Performed on Case Number  [id number]    .     Breast Biomarker Reporting Template  Protocol posted: 11/10/2021  BREAST: BIOMARKER REPORTING TEMPLATE - All Specimens  Test(s) Performed     Estrogen Receptor (ER) Status  Positive (greater than 10% of cells demonstrate nuclear positivity)    Percentage of Cells with Nuclear Positivity  %    Average Intensity of Staining  Strong    Test Type  Food and Drug Administration (FDA) cleared (test / vendor): Ada    Primary Antibody  SP1    Test(s) Performed     Progesterone Receptor (PgR) Status  Positive    Percentage of Cells with Nuclear Positivity  10 %   Average Intensity of Staining  Moderate    Test Type  Food and Drug Administration (FDA) cleared (test / vendor): Ada    Primary Antibody  1E2    Scoring System  No separate scoring system used    Cold Ischemia and Fixation Times  Meet requirements specified in latest version of the ASCO / CAP Guidelines    Cold Ischemia Time (minutes)  0 min   Fixation Time (hours)  12.21 hours   Testing Performed on Block Number(s)  [id number]             IMAGING:  CT c/a/p 3/28/22:  IMPRESSION: In this patient with history of pancreatic cancer status  post Whipple's procedure, there is:  1. No significant change in the ill-defined  hypoattenuating/hypoenhancing area in hepatic segment V as compared to  10/25/2021 exam, although appears slightly more prominent as  compared  to 6/30/2020 exam, remains indeterminate, could represent  posttreatment changes, recommend continued attention on follow-up.  2. Few scattered small pulmonary nodules including for example 3 mm  left lower lobe nodule, not significantly changed as compared to  6/30/2020 exam, and hence likely benign. No new pulmonary nodules.  3. Significant amount of stool throughout the colon, nonspecific, can  be seen with constipation.    Mammogram 6/28/22:  IMPRESSION: BI-RADS CATEGORY: 4 - Suspicious.  Stereotactic core biopsy of right breast microcalcifications is  recommended for definitive histology. Findings and recommendation for  right breast biopsy were discussed with the patient during her  appointment.    CT CAP 7/25/22:  IMPRESSION:  1.  Stable exam without evidence for new disease.  2.  Stable Whipple surgery changes.  3.  Hypodensities in the liver are stable. One of these regions is  ill-defined along the inferior liver as detailed above. Another nodule  of the right hepatic dome is indeterminate but stable. Recommend  further attention to these at imaging follow-up.  4.  Stable small pulmonary nodules.  5.  There is a new solid oval-shaped nodule along the medial right  breast. Correlate with mammographic workup.       ASSESSMENT/PLAN:  Flora Hogan is a 77 year old female previously followed by Dr. Jeannie Coronado with:     1) Adenocarcinoma of the pancreas head: s/p Whipple procedure on 5/26/20; pathology showed pancreatic ductal adenocarcinoma, well-differentiated (grade 1), tumor size 2.6 x 2.4 x 2.0 cm, negative margins, +LVI, 0 of 4 lymph nodes involved, staged pT2N0 (stage IB).    She completed 6 months of adjuvant gemcitabine in December 2020.     CT c/a/p on 3/28/22 was reviewed.  I reviewed the report and images.  She has known liver cysts and benign lesions that were biopsied during surgery.  There is an area in the central liver that is not significantly changed compared to the October  2021 exam, appears slightly more prominent completed to the June 2020 exam,  remains indeterminate, could be post-treatment changes.  We will continue to monitor this on follow-up imaging.       -Scans and tumor markers every 4 months during 2nd year.  -CT CAP and labs/CA 19-9 in 4 months.     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 No evidence of invasive carcinoma, LVI.   -As patient and  had requested to speak today, I have not had the chance to speak with surgery or radiation oncology prior to today's visit.   -We discussed the possible need for re-excision given the close margin of 0.4 mm, but this will need to be reviewed with surgery. I am uncertain if this was down to the chest wall as patient has low breast volume.   -We discussed RT after lumpectomy is standard of care for most patients. They inquire if she can move forward without radiation. Though the lesion is <2.5 cm, I had discussed my concerns with forgoing RT given the close margin of 0.4 mm, grade 3 DCIS. Ideally, all margins if moving forward without radiation should be >/= 3 mm as well and purely grade 1-2.  -I discussed AI therapy would be warranted given the strong ER positivity to decrease the risk of invasive disease for a minimum of 5 years, if tolerated, following RT.  -A formal evaluation with radiation oncology to discuss the risk and benefits of forgoing RT is warranted.  -They will re-meet with Dr. Wagner on 10/6/22.   -Surgery was just on 9/19/22 and I discussed she will need time to heal following surgery prior to beginning RT and AI.  -They wish to be seen by a breast specialist and I will recommend for transfer to Dr. Wilberto Peoples's clinic.     3) Chronic constipation/diarrhea: She notes bowel incontinence but sensation of incomplete bowel emptying.  She has had this for many years and has  seen VLADIMIR in the past.    -Creon prescribed - she has started it and finds it helpful.  She takes it 3 times a day.  She will continue it.    -She will also try fiber.  -Follow-up with [facility] again - she requests to see Dr. Yarely Vann.     4) Leukpenia: Secondary to prior chemotherapy.  Mild, stable.    -Monitor.     5)  Pulmonary nodule: stable  -Sill monitor on future scans.    6) Insomnia: She occasionally takes melatonin. This has been a chronic problems for years, she says, even prior to her cancer diagnosis.     7) Coping: Patient notes some feelings of depression/anxiety and being crabby.  She was looking for a support group, but the group at the [facility] has been stopped due to COVID.  She did speak with our , who offered resources and emotional support.  She requested to try a low dose antidepressant.  -Citalopram 20 mg daily prescribed at our last visit in October 2021.  She says that she has found this helpful and will stay at this same dose.     8) Fatigue: secondary to prior surgery and chemotherapy.  -She met with Dr. Brito, PM&R.  They talked about cancer rehab PT/OT, but she wants to wait on this for now, as she says she would rather not do virtual appointments.  She says that she is willing to re-visit it in the future.     9) Forgetfulness, cognitive difficulties: Dr. Brito discussed neuropsychology referral, but again, she wants to wait until in-person appointments are available.      10) Follow up in 2 weeks with Dr. Garcia. Will hold on radiation oncology referral at this time until re-meeting with Dr. Wagner and evaluation by Dr. Garcia.       Total time spent on day of visit, including review of tests, obtaining/reviewing separately obtained history, ordering medications/tests/procedures, communicating with PCP/consultants, and documenting in electronic medical record: 40 minutes.         Gloria Barrett,   Hematology/Oncology  [facility]              Again, thank you for allowing me to participate in the care of your patient.        Sincerely,        Gloria Barrett, DO

## 2022-09-28 NOTE — LETTER
9/28/2022         RE: Flora Hogan  [id number] [address]  [facility] [place] [address]        Dear Colleague,    Thank you for referring your patient, Flora Hogan, to the [facility]. Please see a copy of my visit note below.    Flora is a 77 year old who is being evaluated via a billable telephone visit.      What phone number would you like to be contacted at? [phone]  How would you like to obtain your AVS? Juan LAM    Phone call duration: 15 minutes        [facility]    Hematology/Oncology Established Patient Follow-up Note    Treatment Summary:      Today's Date: 9/28/22    Reason for Follow-up: Pancreatic cancer      HISTORY OF PRESENT ILLNESS: Flora Hogan is a 77 year old female, who presents with adenocarcinoma of the pancreas.  She was seen previously by Dr. Jeannie Coronado.     She initially presented with jaundice.  She was evaluated for EUS and FNA, which revealed atypical cells, but no definitive evidence of malignancy.  She also underwent biliary stenting for decompression of her biliary tree.  She had a second EUS done, but again revealed atypical cells.  She then saw Dr. Duarte Chaves at [facility].  She had CT scan and MRI.  MRI showed multiple benign lesions in her liver; there was one area that was indeterminate.  Her CA 19-9 was 93.  On 5/26/20, she underwent Whipple procedure.  Two nodules were visualized on the surface of the liver, which were biopsied and found to be benign on pathology.  There were also hepatic cysts seen.  Pathology showed pancreatic ductal adenocarcinoma, well-differentiated (grade 1), tumor size 2.6 x 2.4 x 2.0 cm, negative margins, +LVI, 0 of 4 lymph nodes involved, staged pT2N0 (stage IB).       Port was placed by IR on 7/6/20.  It was removed on 12/28/20.     She started adjuvant chemotherapy with single-agent gemcitabine on 7/7/20.  She completed 6 cycles on  12/15/20.      INTERIM HISTORY:  When I initially met patient, she recently underwent biopsy following an abnormal mammogram with path returning as ADH.     Patient was hesitant to move forward with surgical resection, but we discussed this and she ultimately underwent lumpectomy on 9/19/22. Path has returned as DCIS. She is otherwise at baseline.       REVIEW OF SYSTEMS:   A 14 point ROS was reviewed with pertinent positives and negatives in the HPI.       HOME MEDICATIONS:  Current Outpatient Medications   Medication Sig Dispense Refill     acetaminophen (TYLENOL) 325 MG tablet Take 1-2 tablets (325-650 mg) by mouth every 6 hours as needed for mild pain or fever 50 tablet 0     albuterol (PROAIR HFA/PROVENTIL HFA/VENTOLIN HFA) 108 (90 Base) MCG/ACT inhaler Inhale 2 puffs into the lungs every 6 hours as needed for shortness of breath / dyspnea or wheezing 18 g 0     calcium carbonate-vitamin D (OS-CAMERON) 600-400 MG-UNIT chewable tablet Take 1 chew tab by mouth 2 times daily  180 tablet 3     citalopram (CELEXA) 20 MG tablet Take 1 tablet (20 mg) by mouth daily 90 tablet 3     CREON 39141-68299 units CPEP per EC capsule TAKE 1 CAPSULE BY MOUTH THREE TIMES DAILY WITH A MEAL 270 capsule 3     loratadine (CLARITIN REDITABS) 10 MG dispersible tablet Take 10 mg by mouth as needed        magnesium 250 MG tablet Take 1 tablet by mouth 2 times daily       multivitamin (ONE-DAILY) tablet Take 1 tablet by mouth daily 90 tablet 0     ondansetron (ZOFRAN ODT) 4 MG ODT tab Take 1 tablet (4 mg) by mouth every 8 hours as needed for nausea (Patient not taking: Reported on 9/28/2022) 10 tablet 0     oxyCODONE (ROXICODONE) 5 MG tablet Take 1-2 tablets (5-10 mg) by mouth every 4 hours as needed for moderate to severe pain (Patient not taking: Reported on 9/28/2022) 12 tablet 0         ALLERGIES:  Allergies   Allergen Reactions     Penicillin V      Seasonal Allergies          PAST MEDICAL HISTORY:  Past Medical History:   Diagnosis Date      Chest tightness     had suspected allergies     Malignant neoplasm of head of pancreas (H) 06/09/2020     Seasonal allergies      SOB (shortness of breath)          PAST SURGICAL HISTORY:  Past Surgical History:   Procedure Laterality Date     BIOPSY BREAST Right 9/19/2022    Procedure: Right radiofrequency localized excisional breast biopsy;  Surgeon: Geovanny Wagner MD;  Location: [facility] OR     CATARACT IOL, RT/LT  2015     COLONOSCOPY  10/14    no repeat needed due to age     COLONOSCOPY N/A 10/7/2014    Procedure: COLONOSCOPY;  Surgeon: Daryl Hanson MD;  Location:  GI     COLONOSCOPY  04/23/2019    no further screening     ENDOSCOPIC RETROGRADE CHOLANGIOPANCREATOGRAM N/A 4/20/2020    Procedure: ENDOSCOPIC RETROGRADE CHOLANGIOPANCREATOGRAPHY, PLACEMENT OF PANCREATIC STENT, PLACEMENT OF BILIARY STENT;  Surgeon: Yarely Vann MD;  Location:  OR     ESOPHAGOSCOPY, GASTROSCOPY, DUODENOSCOPY (EGD), COMBINED N/A 4/20/2020    Procedure: ESOPHAGOGASTRODUODENOSCOPY, WITH FINE NEEDLE ASPIRATION BIOPSY, WITH ENDOSCOPIC ULTRASOUND GUIDANCE, Endoscopic retrograde cholangiopancreatography;  Surgeon: Yarely Vann MD;  Location:  OR     ESOPHAGOSCOPY, GASTROSCOPY, DUODENOSCOPY (EGD), COMBINED N/A 5/5/2020    Procedure: ESOPHAGOGASTRODUODENOSCOPY, WITH FINE NEEDLE ASPIRATION BIOPSY, WITH ENDOSCOPIC ULTRASOUND GUIDANCE;  Surgeon: Romina Rosario MD;  Location: [facility] GI     IR CHEST PORT PLACEMENT > 5 YRS OF AGE  7/6/2020     IR PORT REMOVAL RIGHT  12/28/2020     LAPAROSCOPIC BIOPSY LIVER N/A 5/22/2020    Procedure: Diagnostic Laparoscopy with intraoperative ultrasound and Liver Biopsy X2;  Surgeon: Duarte Chaves MD;  Location: [facility] OR     WHIPPLE PROCEDURE N/A 5/22/2020    Procedure: Non Pylorus Preserving Whipple procedure;  Surgeon: Duarte Chaves MD;  Location: [facility] OR         SOCIAL HISTORY:  Social History     Socioeconomic History     Marital status:      Spouse name: Not on  file     Number of children: Not on file     Years of education: Not on file     Highest education level: Not on file   Occupational History     Employer: RETIRED     Comment: previous taught special ed   Tobacco Use     Smoking status: Never Smoker     Smokeless tobacco: Never Used   Vaping Use     Vaping Use: Never used   Substance and Sexual Activity     Alcohol use: Yes     Comment: social      Drug use: Never     Sexual activity: Yes   Other Topics Concern     Parent/sibling w/ CABG, MI or angioplasty before 65F 55M? Not Asked      Service Not Asked     Blood Transfusions Not Asked     Caffeine Concern Yes     Comment: 2 cups     Occupational Exposure Not Asked     Hobby Hazards Not Asked     Sleep Concern Not Asked     Stress Concern Not Asked     Weight Concern Not Asked     Special Diet No     Back Care Not Asked     Exercise Yes     Comment: walking workout      Bike Helmet Not Asked     Seat Belt Not Asked     Self-Exams Not Asked   Social History Narrative     Not on file     Social Determinants of Health     Financial Resource Strain: Not on file   Food Insecurity: Not on file   Transportation Needs: Not on file   Physical Activity: Not on file   Stress: Not on file   Social Connections: Not on file   Intimate Partner Violence: Not At Risk     Fear of Current or Ex-Partner: No     Emotionally Abused: No     Physically Abused: No     Sexually Abused: No   Housing Stability: Not on file         FAMILY HISTORY:  Family History   Problem Relation Age of Onset     Musculoskeletal Disorder Mother         edematous legs     Obesity Mother      Musculoskeletal Disorder Maternal Grandmother         edematous legs; did have amputation     Obesity Maternal Grandmother      Myocardial Infarction Maternal Grandmother          PHYSICAL EXAM:  Telephone visit        LABS:  CBC RESULTS:   Recent Labs   Lab Test 07/25/22  0844   WBC 3.0*   RBC 4.93   HGB 14.3   HCT 44.3   MCV 90   MCH 29.0   MCHC 32.3   RDW 13.2           Recent Labs   Lab Test 07/25/22  0844 03/28/22  1056    135   POTASSIUM 3.9 3.9   CHLORIDE 104 101   CO2 30 32   ANIONGAP 3 2*   GLC 69* 71   BUN 16 12   CR 0.84 0.80   CAMERON 9.1 9.2         PATHOLOGY:  Surgical Pathology Report                         Case: [id number]                                   Authorizing Provider:  Marcial Holden,     Collected:           07/12/2022 01:27 PM                                  PA-C                                                                          Ordering Location:     [facility]   Received:            07/12/2022 02:45 PM                                  [facility]                                                                 Pathologist:           Srinivas Villegas MD                                                      Specimen:    Breast, Right                                                                              Final Diagnosis   A. Right breast stereotactic needle core biopsies:  - Negative for malignancy  - Focal atypical ductal hyperplasia  - Microcalcifications present in benign breast tissue     Case Report   Surgical Pathology Report                         Case: [id number]                                   Authorizing Provider:  Geovanny Wagner MD      Collected:           09/19/2022 08:22 AM           Ordering Location:     [facility]   Received:            09/19/2022 08:37 AM                                  Main OR                                                                       Pathologist:           Jose Lobo MD                                                           Specimen:    Breast, Right, RIGHT LOCALIZED EXCISIONAL BREAST BIOPSY, short suture superior, long                 suture lateral                                                                             Final Diagnosis   Right breast, RFID localized lumpectomy, with inked designated margins:  - Ductal  "carcinoma in situ, nuclear grade 3, comedonecrosis with microcalcifications and solid patterns, without extension to margins, and 0.4 mm from the nearest margins, the medial and inferior margins and 2.6 mm from the nearest lateral margin.  - Estimated size of ductal carcinoma in situ:  20 x 15 x 7 mm.  - Evidence of previous biopsy site/cavity with coil shaped biopsy marker and RFID tag , adjacent to tumor.  - No evidence of invasive carcinoma.  - No evidence of lymphovascular invasion.  - Estrogen and progesterone receptors:  ER %, IA 1-10%      Electronically signed by Jose Lobo MD on 9/26/2022 at  3:13 PM   Synoptic Checklist     DCIS OF THE BREAST: Resection  8th Edition - Protocol posted: 3/23/2022  DCIS OF THE BREAST: COMPLETE EXCISION - All Specimens  SPECIMEN   Procedure  Excision (less than total mastectomy)    Specimen Laterality  Right    TUMOR   Tumor Site  12:00 to 5:00, 0 to 7 cm from nipple    Histologic Type  Ductal carcinoma in situ    Size (Extent) of DCIS  Estimated size (extent) of DCIS is at least (Millimeters): 20 mm   Additional Dimension (Millimeters)  15 mm     7 mm   Number of Blocks with DCIS  4    Number of Blocks Examined  17    Architectural Patterns  Comedo      Solid    Nuclear Grade  Grade III (high)    Necrosis  Present, central (expansive \"comedo\" necrosis)    Microcalcifications  Present in DCIS    MARGINS   Margin Status  All margins negative for DCIS    Distance from DCIS to Closest Margin  0.4 mm   Closest Margin(s) to DCIS  Inferior      Medial    Distance from DCIS to Anterior Margin  28 mm   Distance from DCIS to Posterior Margin  5 mm   Distance from DCIS to Superior Margin  13 mm   Distance from DCIS to Lateral Margin  3 mm   REGIONAL LYMPH NODES   Regional Lymph Node Status  Not applicable (no regional lymph nodes submitted or found)    PATHOLOGIC STAGE CLASSIFICATION (pTNM, AJCC 8th Edition)   Reporting of pT, pN, and (when applicable) pM categories is based " on information available to the pathologist at the time the report is issued. As per the AJCC (Chapter 1, 8th Ed.) it is the managing physician s responsibility to establish the final pathologic stage based upon all pertinent information, including but potentially not limited to this pathology report.   pT Category  pTis (DCIS)    pN Category  pN not assigned (no nodes submitted or found)    ADDITIONAL FINDINGS   Additional Findings  PRevious biopsy site/cavity with biopsy marker and RFID    Breast Biomarker Testing Performed on Previous Biopsy     Testing Performed on Case Number  [id number]    .     Breast Biomarker Reporting Template  Protocol posted: 11/10/2021  BREAST: BIOMARKER REPORTING TEMPLATE - All Specimens  Test(s) Performed     Estrogen Receptor (ER) Status  Positive (greater than 10% of cells demonstrate nuclear positivity)    Percentage of Cells with Nuclear Positivity  %    Average Intensity of Staining  Strong    Test Type  Food and Drug Administration (FDA) cleared (test / vendor): [place]    Primary Antibody  SP1    Test(s) Performed     Progesterone Receptor (PgR) Status  Positive    Percentage of Cells with Nuclear Positivity  10 %   Average Intensity of Staining  Moderate    Test Type  Food and Drug Administration (FDA) cleared (test / vendor): [place]    Primary Antibody  1E2    Scoring System  No separate scoring system used    Cold Ischemia and Fixation Times  Meet requirements specified in latest version of the ASCO / CAP Guidelines    Cold Ischemia Time (minutes)  0 min   Fixation Time (hours)  12.21 hours   Testing Performed on Block Number(s)  [id number]             IMAGING:  CT c/a/p 3/28/22:  IMPRESSION: In this patient with history of pancreatic cancer status  post Whipple's procedure, there is:  1. No significant change in the ill-defined  hypoattenuating/hypoenhancing area in hepatic segment V as compared to  10/25/2021 exam, although appears slightly more prominent as  compared  to 6/30/2020 exam, remains indeterminate, could represent  posttreatment changes, recommend continued attention on follow-up.  2. Few scattered small pulmonary nodules including for example 3 mm  left lower lobe nodule, not significantly changed as compared to  6/30/2020 exam, and hence likely benign. No new pulmonary nodules.  3. Significant amount of stool throughout the colon, nonspecific, can  be seen with constipation.    Mammogram 6/28/22:  IMPRESSION: BI-RADS CATEGORY: 4 - Suspicious.  Stereotactic core biopsy of right breast microcalcifications is  recommended for definitive histology. Findings and recommendation for  right breast biopsy were discussed with the patient during her  appointment.    CT CAP 7/25/22:  IMPRESSION:  1.  Stable exam without evidence for new disease.  2.  Stable Whipple surgery changes.  3.  Hypodensities in the liver are stable. One of these regions is  ill-defined along the inferior liver as detailed above. Another nodule  of the right hepatic dome is indeterminate but stable. Recommend  further attention to these at imaging follow-up.  4.  Stable small pulmonary nodules.  5.  There is a new solid oval-shaped nodule along the medial right  breast. Correlate with mammographic workup.       ASSESSMENT/PLAN:  Flora Hogan is a 77 year old female previously followed by Dr. Jeannie Coronado with:     1) Adenocarcinoma of the pancreas head: s/p Whipple procedure on 5/26/20; pathology showed pancreatic ductal adenocarcinoma, well-differentiated (grade 1), tumor size 2.6 x 2.4 x 2.0 cm, negative margins, +LVI, 0 of 4 lymph nodes involved, staged pT2N0 (stage IB).    She completed 6 months of adjuvant gemcitabine in December 2020.     CT c/a/p on 3/28/22 was reviewed.  I reviewed the report and images.  She has known liver cysts and benign lesions that were biopsied during surgery.  There is an area in the central liver that is not significantly changed compared to the October  2021 exam, appears slightly more prominent completed to the June 2020 exam,  remains indeterminate, could be post-treatment changes.  We will continue to monitor this on follow-up imaging.       -Scans and tumor markers every 4 months during 2nd year.  -CT CAP and labs/CA 19-9 in 4 months.     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 No evidence of invasive carcinoma, LVI.   -As patient and  had requested to speak today, I have not had the chance to speak with surgery or radiation oncology prior to today's visit.   -We discussed the possible need for re-excision given the close margin of 0.4 mm, but this will need to be reviewed with surgery. I am uncertain if this was down to the chest wall as patient has low breast volume.   -We discussed RT after lumpectomy is standard of care for most patients. They inquire if she can move forward without radiation. Though the lesion is <2.5 cm, I had discussed my concerns with forgoing RT given the close margin of 0.4 mm, grade 3 DCIS. Ideally, all margins if moving forward without radiation should be >/= 3 mm as well and purely grade 1-2.  -I discussed AI therapy would be warranted given the strong ER positivity to decrease the risk of invasive disease for a minimum of 5 years, if tolerated, following RT.  -A formal evaluation with radiation oncology to discuss the risk and benefits of forgoing RT is warranted.  -They will re-meet with Dr. Wagner on 10/6/22.   -Surgery was just on 9/19/22 and I discussed she will need time to heal following surgery prior to beginning RT and AI.  -They wish to be seen by a breast specialist and I will recommend for transfer to Dr. Wilberto Peoples's clinic.     3) Chronic constipation/diarrhea: She notes bowel incontinence but sensation of incomplete bowel emptying.  She has had this for many years and has  seen VLADIMIR in the past.    -Creon prescribed - she has started it and finds it helpful.  She takes it 3 times a day.  She will continue it.    -She will also try fiber.  -Follow-up with VLADIMIR again - she requests to see Dr. Yarely Vann.     4) Leukpenia: Secondary to prior chemotherapy.  Mild, stable.    -Monitor.     5)  Pulmonary nodule: stable  -Sill monitor on future scans.    6) Insomnia: She occasionally takes melatonin. This has been a chronic problems for years, she says, even prior to her cancer diagnosis.     7) Coping: Patient notes some feelings of depression/anxiety and being crabby.  She was looking for a support group, but the group at the [facility] has been stopped due to COVID.  She did speak with our , who offered resources and emotional support.  She requested to try a low dose antidepressant.  -Citalopram 20 mg daily prescribed at our last visit in October 2021.  She says that she has found this helpful and will stay at this same dose.     8) Fatigue: secondary to prior surgery and chemotherapy.  -She met with Dr. Brito, PM&R.  They talked about cancer rehab PT/OT, but she wants to wait on this for now, as she says she would rather not do virtual appointments.  She says that she is willing to re-visit it in the future.     9) Forgetfulness, cognitive difficulties: Dr. Brito discussed neuropsychology referral, but again, she wants to wait until in-person appointments are available.      10) Follow up in 2 weeks with Dr. Garcia. Will hold on radiation oncology referral at this time until re-meeting with Dr. Wagner and evaluation by Dr. Garcia.       Total time spent on day of visit, including review of tests, obtaining/reviewing separately obtained history, ordering medications/tests/procedures, communicating with PCP/consultants, and documenting in electronic medical record: 40 minutes.         Gloria Barrett,   Hematology/Oncology  [facility]              Again, thank you for allowing me to participate in the care of your patient.        Sincerely,        Gloria Barrett, DO

## 2022-09-29 ENCOUNTER — TELEPHONE (OUTPATIENT)
Dept: ONCOLOGY | Facility: CLINIC | Age: 77
End: 2022-09-29

## 2022-09-29 NOTE — TELEPHONE ENCOUNTER
Left message that I heard back from Dr Barrett and she said Flora's care is being transferred to Dr Peoples so future appts with Dr Barrett can be canceled. I will call back to confirm receipt of message with Flora. PO

## 2022-10-06 ENCOUNTER — TELEPHONE (OUTPATIENT)
Dept: SURGERY | Facility: CLINIC | Age: 77
End: 2022-10-06

## 2022-10-06 ENCOUNTER — OFFICE VISIT (OUTPATIENT)
Dept: SURGERY | Facility: CLINIC | Age: 77
End: 2022-10-06
Payer: COMMERCIAL

## 2022-10-06 VITALS
OXYGEN SATURATION: 96 % | RESPIRATION RATE: 16 BRPM | SYSTOLIC BLOOD PRESSURE: 122 MMHG | DIASTOLIC BLOOD PRESSURE: 70 MMHG | HEART RATE: 53 BPM | HEIGHT: 64 IN | BODY MASS INDEX: 19.29 KG/M2 | WEIGHT: 113 LBS

## 2022-10-06 DIAGNOSIS — Z09 SURGICAL FOLLOWUP VISIT: Primary | ICD-10-CM

## 2022-10-06 DIAGNOSIS — D05.11 DUCTAL CARCINOMA IN SITU (DCIS) OF RIGHT BREAST: ICD-10-CM

## 2022-10-06 PROCEDURE — 99214 OFFICE O/P EST MOD 30 MIN: CPT | Mod: 24 | Performed by: SURGERY

## 2022-10-06 NOTE — TELEPHONE ENCOUNTER
Type of surgery: RIGHT BREAST RE-EXCISION LUMPECTOMY   Location of surgery: [facility]  Date and time of surgery: 10-27-22  Surgeon: DR. KRAUS   Pre-Op Appt Date: PATIENT TO SCHEDULE   Post-Op Appt Date: NA    Packet sent out: Yes  Pre-cert/Authorization completed:  Not Applicable  Date: 10-6-22    RIGHT BREAST RE-EXCISION LUMPECTOMY  GENERAL   PT INST TO HAVE H&P  60 MINS REQ  PA ASSIST DFB   ALW

## 2022-10-06 NOTE — PROGRESS NOTES
The patient returns today to follow-up after her right localized breast biopsy for atypical ductal hyperplasia.  Unfortunately, the final biopsy revealed high-grade DCIS.  This came close to the margins inferiorly and medially at 0.4 mm.  The remaining margins were greater than 2 mm.  Patiently recently saw Dr. Gloria Barrett but has been referred to Dr. Wilberto Peoples for ongoing breast cancer care.  She has not seen him yet, but has an appointment next week.  I had spoken to the patient on the phone regarding the possible need for reexcision.    Past medical and surgical histories are reviewed.    The patient's incision is healing well.  There is significant bruising in the region.    Pathology revealed grade 3 DCIS with comedonecrosis.  There is no evidence of invasion.  Margins were 0.4 mm at the inferior and medial margin.    This is a patient with a new diagnosis of DCIS following excisional biopsy for atypical ductal hyperplasia.  The margins are very close inferiorly and medially, and this typically would require reexcision.  I would anticipate that the patient will be getting radiation therapy and hormonal therapy, though the final decision on this will be up to Dr. Peoples.  1 possibility would be to count on those treatments rather than reexcision, though my leaning is to recommend reexcision of the close margins.  We discussed the procedure, along with its risks and complications, in detail.  We discussed breast cancer and the possibility of needing to do further excision in the future, should we be unable to obtain negative margins.  At this point, I do not see any indication for performing the sentinel node biopsy.  The patient would like to go ahead and schedule the reexcision.  It seems likely this will probably be after her appointment with Dr. Peoples, and certainly we could reschedule or cancel the surgery if that seemed appropriate.    A total of 30 minutes was spent today in chart review, patient  examination and discussion, and documentation.    Geovanny Wagner MD  [facility]    Please route or send letter to:  Primary Care Provider (PCP) and Dr. Gloria Barrett and Dr. Jc Peoples, both at the [facility] oncology clinic.

## 2022-10-06 NOTE — LETTER
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  Unfortunately, the final biopsy revealed high-grade DCIS.  This came close to the margins inferiorly and medially at 0.4 mm.  The remaining margins were greater than 2 mm.  Patiently recently saw Dr. Gloria Barrett but has been referred to Dr. Wilberto Peoples for ongoing breast cancer care. She has not seen him yet, but has an appointment next week.  I had spoken to the patient on the phone regarding the possible need for reexcision.     Past medical and surgical histories are reviewed.     The patient's incision is healing well.  There is significant bruising in the region.     Pathology revealed grade 3 DCIS with comedonecrosis.  There is no evidence of invasion.  Margins were 0.4 mm at the inferior and medial margin.     This is a patient with a new diagnosis of DCIS following excisional biopsy for atypical ductal hyperplasia.  The margins are very close inferiorly and medially, and this typically would require reexcision.  I would anticipate that the patient will be getting radiation therapy and hormonal therapy, though the final decision on this will be up to Dr. Peoples.  1 possibility would be to count on those treatments rather than reexcision, though my leaning is to recommend reexcision of the close margins. We discussed the procedure, along with its risks and complications, in detail.  We discussed breast cancer and the possibility of needing to do further excision in the future, should we be unable to obtain negative margins.  At this point, I do not see any indication for performing the sentinel node biopsy. The patient would like to go ahead and schedule the reexcision.  It seems likely this will probably be after her appointment with Dr. Peoples, and certainly we could reschedule or cancel the surgery if that seemed appropriate.     A total  of 30 minutes was spent today in chart review, patient examination and discussion, and documentation.     Geovanny Wagner MD  [facility]

## 2022-10-10 NOTE — PROGRESS NOTES
Flora is a 77 year old who is being evaluated via a billable telephone visit.      What phone number would you like to be contacted at? [phone]  How would you like to obtain your AVS? Juan  Phone call duration: 69 minutes    10/11/2022    Referring Provider: Gloria Barrett DO    Present for Today's Visit: Flora and [facility] genetic counseling student, Letty Hammond    Presenting Information:   I met with Flora Hogan today for genetic counseling (telephone visit due to covid19) to discuss her personal and family history of cancer.  She is here today to review this history, cancer screening recommendations, and available genetic testing options.    Personal History:  Flora is a 77 year old female. 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 She was most recently diagnosed with a ductal carcinoma in situ of the right breast, ER positive, AK 1-10% positive. 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 She has another re-excision lumpectomy scheduled for 10/27/2022 with Dr. Wagner. She will be following with Dr. Garcia for her breast cancer treatment moving forward.       She had her first menstrual period at age 12, her first child at age 22, and reports that she went through menopause in her mid 40's. Flora has her ovaries, fallopian tubes and uterus in place.  She reports approximately a 4-year history of oral contraceptive use and that she has never been on hormone replacement therapy.      Most recent colonoscopy in 2019 was normal and follow-up was recommended as needed. She does not regularly do any other cancer screening at this time.  Flora reported no tobacco use, and about 2 drinks per week for alcohol use.    Family History: Cancer family history and pertinent information reviewed below (Please see scanned pedigree for detailed family history  information)    Brother with a history of lymphoma diagnosed in his 40's and leukemia diagnosed in his 70's.     Maternal uncle passed at age 72 from stomach cancer diagnosed in his early 70's.    Her ethnicity is Vietnamese, Polish, and Surinamese.  There is no known Ashkenazi Advent ancestry on either side of her family. There is no reported consanguinity.    Discussion:    Flora's personal and family history of cancer is suggestive of a hereditary cancer syndrome.    We reviewed the features of sporadic, familial, and hereditary cancers. In looking at Flora's family history, it is possible that a cancer susceptibility gene is present due to her pancreatic cancer history and her breast cancer diagnosis as well as her maternal uncle's stomach cancer diagnosis.    We discussed the natural history and genetics of hereditary cancers. A detailed handout regarding the information we discussed will be sent to Flora via [employer]. Topics included: inheritance pattern, cancer risks, cancer screening recommendations, and also risks, benefits and limitations of testing.  We reviewed that the most common cause of hereditary breast cancer is Hereditary Breast and Ovarian Cancer (HBOC) syndrome, which is caused by mutations in the genes BRCA1 and BRCA2. Women with a mutation in either of these genes are at increased risk for breast and ovarian cancer. There is also an increased risk for a second primary breast cancer for women. Men with a mutation in either BRCA1 or BRCA2 are at increased risk for male breast and prostate cancer. Both women and men may also be at increased risk for pancreatic cancer and melanoma.   Cano syndrome can be caused by a mutation in one of five genes:  MLH1, MSH2, MSH6, PMS2, and EPCAM.  Cano syndrome may present with polyps, but typically a small number.  The highest cancer risks associated with Cano syndrome include colon cancer, endometrial/uterine cancer, gastric cancer, and ovarian cancer.    Based on her  personal history, Flora meets current National Comprehensive Cancer Network (NCCN) criteria for genetic testing of pancreatic cancer susceptibility genes.      We discussed that there are additional genes that could cause increased risk for pancreatic, breast, and related cancers. As many of these genes present with overlapping features in a family and accurate cancer risk cannot always be established based upon the pedigree analysis alone, it would be reasonable for Flora to consider panel genetic testing to analyze multiple genes at once.    We reviewed genetic testing options given Flora's personal and family history including targeted and expanded options.     Medical Management: For Flora, we reviewed that the information from genetic testing may determine:    surgery to treat Flora's active cancer diagnosis (i.e. lumpectomy versus bilateral mastectomy, partial versus total colectomy, etc.),    additional cancer screening for which Flora may qualify (i.e. mammogram and breast MRI, more frequent colonoscopies, more frequent dermatologic exams, etc.),    options for risk reducing surgeries Flora could consider (i.e. bilateral mastectomy, surgery to remove her ovaries and/or uterus, etc.),      and targeted chemotherapies for Flora's active cancer, or if she were to develop certain cancers in the future (i.e. immunotherapy for individuals with Cano syndrome, PARP inhibitors, etc.).     These recommendations will be discussed in detail once genetic testing is completed.     Flora declined testing today, but shared she will think about it. She will be sent information regarding out conversation today, and was encouraged to reach out if she would like to pursue genetic testing at any point in the future.     Screening recommendations for Flora and her family based on her current personal and family history information (these recommendations may change with changes to family history/if genetic testing is pursued):    Flora  should continue to follow her oncology team's recommendations for the treatment of her breast cancer and follow-up for her pancreatic cancer.     We discussed that Flora's close relatives likely have some increased risk for pancreatic cancer given her own history, but additional screening is typically not recommended at this time.  Flora's close relatives are encouraged to discuss this history with their care providers.      Flora s close female relatives remain at increased risk for breast cancer given their family history. Breast cancer screening is generally recommended to begin approximately 10 years younger than the earliest age of breast cancer diagnosis in the family, or at age 40, whichever comes first. Breast screening options should be discussed with an individual's primary care provider and a genetic counselor, to determine at what age to begin screening, what screening is appropriate, and if additional screening (such as breast MRI) is necessary based on personal/family history factors.    Other population cancer screening options, such as those recommended by the American Cancer Society and the National Comprehensive Cancer Network (NCCN), are also appropriate for Flora and her family. These screening recommendations may change if there are changes to Flora's personal and/or family history. Final screening recommendations should be made by each individual's managing physician.    Plan:  1) No genetic testing ordered today as Flora declined testing.   2) She was encouraged to reach out with any questions or if she decides to pursue genetic testing.   3) Screening recommendations reviewed for Flora and her family, and she is encouraged to reach out with any updates to the family history      Tatum Lauren MS, [facility]  Licensed, Certified Genetic Counselor  [facility]  [phone]  [email or web address]

## 2022-10-11 ENCOUNTER — VIRTUAL VISIT (OUTPATIENT)
Dept: ONCOLOGY | Facility: CLINIC | Age: 77
End: 2022-10-11
Attending: INTERNAL MEDICINE
Payer: COMMERCIAL

## 2022-10-11 ENCOUNTER — PRE VISIT (OUTPATIENT)
Dept: ONCOLOGY | Facility: CLINIC | Age: 77
End: 2022-10-11

## 2022-10-11 DIAGNOSIS — Z80.7 FAMILY HISTORY OF LYMPHOMA: ICD-10-CM

## 2022-10-11 DIAGNOSIS — N60.91 ATYPICAL DUCTAL HYPERPLASIA OF RIGHT BREAST: ICD-10-CM

## 2022-10-11 DIAGNOSIS — D05.11 DUCTAL CARCINOMA IN SITU (DCIS) OF RIGHT BREAST: Primary | ICD-10-CM

## 2022-10-11 DIAGNOSIS — Z80.6 FAMILY HISTORY OF LEUKEMIA: ICD-10-CM

## 2022-10-11 DIAGNOSIS — C25.0 MALIGNANT NEOPLASM OF HEAD OF PANCREAS (H): ICD-10-CM

## 2022-10-11 DIAGNOSIS — Z80.0 FAMILY HISTORY- STOMACH CANCER: ICD-10-CM

## 2022-10-11 PROCEDURE — 96040 HC GENETIC COUNSELING, EACH 30 MINUTES: CPT | Mod: TEL,95 | Performed by: GENETIC COUNSELOR, MS

## 2022-10-11 NOTE — LETTER
10/11/2022         RE: Flora Hogan  [address]  [place] [address]        Dear Colleague,    Thank you for referring your patient, Flora Hogan, to the [facility]. Please see a copy of my visit note below.    10/11/2022    Referring Provider: Gloria Barrett,     Present for Today's Visit: Flora and [facility] genetic counseling student, Letty Hammond    Presenting Information:   I met with Flora Hogan today for genetic counseling (telephone visit due to covid19) to discuss her personal and family history of cancer.  She is here today to review this history, cancer screening recommendations, and available genetic testing options.    Personal History:  Flora is a 77 year old female. 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 She was most recently diagnosed with a ductal carcinoma in situ of the right breast, ER positive, NE 1-10% positive. 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 She has another re-excision lumpectomy scheduled for 10/27/2022 with Dr. Wagner. She will be following with Dr. Garcia for her breast cancer treatment moving forward.       She had her first menstrual period at age 12, her first child at age 22, and reports that she went through menopause in her mid 40's. Flora has her ovaries, fallopian tubes and uterus in place.  She reports approximately a 4-year history of oral contraceptive use and that she has never been on hormone replacement therapy.      Most recent colonoscopy in 2019 was normal and follow-up was recommended as needed. She does not regularly do any other cancer screening at this time.  Flora reported no tobacco use, and about 2 drinks per week for alcohol use.    Family History: Cancer family history and pertinent information reviewed below (Please see scanned pedigree for detailed  family history information)    Brother with a history of lymphoma diagnosed in his 40's and leukemia diagnosed in his 70's.     Maternal uncle passed at age 72 from stomach cancer diagnosed in his early 70's.    Her ethnicity is Panamanian, Polish, and [place]yan.  There is no known Ashkenazi Shinto ancestry on either side of her family. There is no reported consanguinity.    Discussion:    Flora's personal and family history of cancer is suggestive of a hereditary cancer syndrome.    We reviewed the features of sporadic, familial, and hereditary cancers. In looking at Flora's family history, it is possible that a cancer susceptibility gene is present due to her pancreatic cancer history and her breast cancer diagnosis as well as her maternal uncle's stomach cancer diagnosis.    We discussed the natural history and genetics of hereditary cancers. A detailed handout regarding the information we discussed will be sent to Flora via [employer]. Topics included: inheritance pattern, cancer risks, cancer screening recommendations, and also risks, benefits and limitations of testing.  We reviewed that the most common cause of hereditary breast cancer is Hereditary Breast and Ovarian Cancer (HBOC) syndrome, which is caused by mutations in the genes BRCA1 and BRCA2. Women with a mutation in either of these genes are at increased risk for breast and ovarian cancer. There is also an increased risk for a second primary breast cancer for women. Men with a mutation in either BRCA1 or BRCA2 are at increased risk for male breast and prostate cancer. Both women and men may also be at increased risk for pancreatic cancer and melanoma.   Cano syndrome can be caused by a mutation in one of five genes:  MLH1, MSH2, MSH6, PMS2, and EPCAM.  Cano syndrome may present with polyps, but typically a small number.  The highest cancer risks associated with Cano syndrome include colon cancer, endometrial/uterine cancer, gastric cancer, and ovarian  cancer.    Based on her personal history, Flora meets current National Comprehensive Cancer Network (NCCN) criteria for genetic testing of pancreatic cancer susceptibility genes.      We discussed that there are additional genes that could cause increased risk for pancreatic, breast, and related cancers. As many of these genes present with overlapping features in a family and accurate cancer risk cannot always be established based upon the pedigree analysis alone, it would be reasonable for Flora to consider panel genetic testing to analyze multiple genes at once.    We reviewed genetic testing options given Flora's personal and family history including targeted and expanded options.     Medical Management: For Flora, we reviewed that the information from genetic testing may determine:    surgery to treat Flora's active cancer diagnosis (i.e. lumpectomy versus bilateral mastectomy, partial versus total colectomy, etc.),    additional cancer screening for which Flora may qualify (i.e. mammogram and breast MRI, more frequent colonoscopies, more frequent dermatologic exams, etc.),    options for risk reducing surgeries Flora could consider (i.e. bilateral mastectomy, surgery to remove her ovaries and/or uterus, etc.),      and targeted chemotherapies for Flora's active cancer, or if she were to develop certain cancers in the future (i.e. immunotherapy for individuals with Cano syndrome, PARP inhibitors, etc.).     These recommendations will be discussed in detail once genetic testing is completed.     Flora declined testing today, but shared she will think about it. She will be sent information regarding out conversation today, and was encouraged to reach out if she would like to pursue genetic testing at any point in the future.     Screening recommendations for Flora and her family based on her current personal and family history information (these recommendations may change with changes to family history/if genetic testing  is pursued):    Flora should continue to follow her oncology team's recommendations for the treatment of her breast cancer and follow-up for her pancreatic cancer.     We discussed that Flora's close relatives likely have some increased risk for pancreatic cancer given her own history, but additional screening is typically not recommended at this time.  Flora's close relatives are encouraged to discuss this history with their care providers.      Flora s close female relatives remain at increased risk for breast cancer given their family history. Breast cancer screening is generally recommended to begin approximately 10 years younger than the earliest age of breast cancer diagnosis in the family, or at age 40, whichever comes first. Breast screening options should be discussed with an individual's primary care provider and a genetic counselor, to determine at what age to begin screening, what screening is appropriate, and if additional screening (such as breast MRI) is necessary based on personal/family history factors.    Other population cancer screening options, such as those recommended by the American Cancer Society and the National Comprehensive Cancer Network (NCCN), are also appropriate for Flora and her family. These screening recommendations may change if there are changes to Flora's personal and/or family history. Final screening recommendations should be made by each individual's managing physician.    Plan:  1) No genetic testing ordered today as Flora declined testing.   2) She was encouraged to reach out with any questions or if she decides to pursue genetic testing.   3) Screening recommendations reviewed for Flora and her family, and she is encouraged to reach out with any updates to the family history      Tatum Lauren MS, [facility]  Licensed, Certified Genetic Counselor  [facility]  [phone]  [email or web address]

## 2022-10-11 NOTE — Clinical Note
Please send copy of clinic note and AVS to patient's home address.    Tatum Lauren MS, [facility]  Licensed, Certified Genetic Counselor  [facility]  [phone]  [email or web address]

## 2022-10-11 NOTE — LETTER
Cancer Risk Management  Program Locations    [facility]  [facility]  [facility]  [facility]  [facility]  Mailing Address  Cancer Risk Management Program  [address]  [place] [address]    New patient appointments  [phone]  October 14, 2022    Flora Hogan  [address] [facility] [address]      Dear Flora,    It was a pleasure speaking with you on the phone on 10/11/2022.  Here is a copy of the progress note from our discussion. If you have any additional questions, please feel free to call.    Referring Provider: Gloria Barrett DO    Present for Today's Visit: Flora and [facility] genetic counseling student, Letty Hammond    Presenting Information:   I met with Flora Samira today for genetic counseling (telephone visit due to covid19) to discuss her personal and family history of cancer.  She is here today to review this history, cancer screening recommendations, and available genetic testing options.    Personal History:  Flora is a 77 year old female. 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 She has another re-excision lumpectomy scheduled for 10/27/2022 with Dr. Wagner. She will be following with Dr. Garcia for her breast cancer treatment moving forward.       She had her first menstrual period at age 12, her first child at age 22, and reports that she went through menopause in her mid 40's. Flora has her ovaries, fallopian tubes and uterus in place.  She reports approximately a 4-year history of oral contraceptive use  and that she has never been on hormone replacement therapy.      Most recent colonoscopy in 2019 was normal and follow-up was recommended as needed. She does not regularly do any other cancer screening at this time.  Flora reported no tobacco use, and about 2 drinks per week for alcohol use.    Family History: Cancer family history and pertinent information reviewed below (Please see scanned pedigree for detailed family history information)    Brother with a history of lymphoma diagnosed in his 40's and leukemia diagnosed in his 70's.     Maternal uncle passed at age 72 from stomach cancer diagnosed in his early 70's.    Her ethnicity is Citizen of [place], Polish, and Scottish.  There is no known Ashkenazi Scientologist ancestry on either side of her family. There is no reported consanguinity.    Discussion:    Flora's personal and family history of cancer is suggestive of a hereditary cancer syndrome.    We reviewed the features of sporadic, familial, and hereditary cancers. In looking at Flora's family history, it is possible that a cancer susceptibility gene is present due to her pancreatic cancer history and her breast cancer diagnosis as well as her maternal uncle's stomach cancer diagnosis.    We discussed the natural history and genetics of hereditary cancers. A detailed handout regarding the information we discussed will be sent to Flora via [employer]. Topics included: inheritance pattern, cancer risks, cancer screening recommendations, and also risks, benefits and limitations of testing.  We reviewed that the most common cause of hereditary breast cancer is Hereditary Breast and Ovarian Cancer (HBOC) syndrome, which is caused by mutations in the genes BRCA1 and BRCA2. Women with a mutation in either of these genes are at increased risk for breast and ovarian cancer. There is also an increased risk for a second primary breast cancer for women. Men with a mutation in either BRCA1 or BRCA2 are at increased risk for male breast and  prostate cancer. Both women and men may also be at increased risk for pancreatic cancer and melanoma.   Cano syndrome can be caused by a mutation in one of five genes:  MLH1, MSH2, MSH6, PMS2, and EPCAM.  Cano syndrome may present with polyps, but typically a small number.  The highest cancer risks associated with Cano syndrome include colon cancer, endometrial/uterine cancer, gastric cancer, and ovarian cancer.    Based on her personal history, Flora meets current National Comprehensive Cancer Network (NCCN) criteria for genetic testing of pancreatic cancer susceptibility genes.      We discussed that there are additional genes that could cause increased risk for pancreatic, breast, and related cancers. As many of these genes present with overlapping features in a family and accurate cancer risk cannot always be established based upon the pedigree analysis alone, it would be reasonable for Flora to consider panel genetic testing to analyze multiple genes at once.    We reviewed genetic testing options given Flora's personal and family history including targeted and expanded options.     Medical Management: For Flora, we reviewed that the information from genetic testing may determine:    surgery to treat Flora's active cancer diagnosis (i.e. lumpectomy versus bilateral mastectomy, partial versus total colectomy, etc.),    additional cancer screening for which Flora may qualify (i.e. mammogram and breast MRI, more frequent colonoscopies, more frequent dermatologic exams, etc.),    options for risk reducing surgeries Flora could consider (i.e. bilateral mastectomy, surgery to remove her ovaries and/or uterus, etc.),      and targeted chemotherapies for Flora's active cancer, or if she were to develop certain cancers in the future (i.e. immunotherapy for individuals with Cano syndrome, PARP inhibitors, etc.).     These recommendations will be discussed in detail once genetic testing is completed.     Flora declined testing  today, but shared she will think about it. She will be sent information regarding out conversation today, and was encouraged to reach out if she would like to pursue genetic testing at any point in the future.     Screening recommendations for Flora and her family based on her current personal and family history information (these recommendations may change with changes to family history/if genetic testing is pursued):    Flora should continue to follow her oncology team's recommendations for the treatment of her breast cancer and follow-up for her pancreatic cancer.     We discussed that Flora's close relatives likely have some increased risk for pancreatic cancer given her own history, but additional screening is typically not recommended at this time.  Flora's close relatives are encouraged to discuss this history with their care providers.      Flora s close female relatives remain at increased risk for breast cancer given their family history. Breast cancer screening is generally recommended to begin approximately 10 years younger than the earliest age of breast cancer diagnosis in the family, or at age 40, whichever comes first. Breast screening options should be discussed with an individual's primary care provider and a genetic counselor, to determine at what age to begin screening, what screening is appropriate, and if additional screening (such as breast MRI) is necessary based on personal/family history factors.    Other population cancer screening options, such as those recommended by the American Cancer Society and the National Comprehensive Cancer Network (NCCN), are also appropriate for Flora and her family. These screening recommendations may change if there are changes to Flora's personal and/or family history. Final screening recommendations should be made by each individual's managing physician.    Plan:  1) No genetic testing ordered today as Flora declined testing.   2) She was encouraged to reach out  with any questions or if she decides to pursue genetic testing.   3) Screening recommendations reviewed for Flora and her family, and she is encouraged to reach out with any updates to the family history      Tatum Lauren MS, [facility]  Licensed, Certified Genetic Counselor  [facility]  [email or web address]

## 2022-10-14 NOTE — PATIENT INSTRUCTIONS
Assessing Cancer Risk  Only about 5-10% of cancers are thought to be due to an inherited cancer susceptibility gene.    These families often have:  Several people with the same or related types of cancer  Cancers diagnosed at a young age (before age 50)  Individuals with more than one primary cancer  Multiple generations of the family affected with cancer    Some people may be candidates for genetic testing of more than one gene.  For these families, genetic testing using a cancer panel may be offered.  These panels will test different genes known to increase the risk for breast, ovarian, uterine, and/or other cancers. All of the genes discussed below have published clinical management guidelines for individuals who are found to carry a mutation. The purpose of this handout is to serve as a brief summary of the genes analyzed by the panels used to inquire about hereditary breast and gynecologic cancer:  CHRISTEL, BRCA1, BRCA2, BRIP1, CDH1, CHEK2, MLH1, MSH2, MSH6, PMS2, EPCAM, PTEN, PALB2, RAD51C, RAD51D, and TP53.  ______________________________________________________________________________  Hereditary Breast and Ovarian Cancer Syndrome   (BRCA1 and BRCA2)  A single mutation in one of the copies of BRCA1 or BRCA2 increases the risk for breast and ovarian cancer, among others.  The risk for pancreatic cancer and melanoma may also be slightly increased in some families.  The chart below shows the chance that someone with a BRCA mutation would develop cancer in his or her lifetime1,2,3,4.        A person s ethnic background is also important to consider, as individuals of Ashkenazi Sikhism ancestry have a higher chance of having a BRCA gene mutation.  There are three BRCA mutations that occur more frequently in this population.    Cano Syndrome   (MLH1, MSH2, MSH6, PMS2, and EPCAM)  Currently five genes are known to cause Cano Syndrome: MLH1, MSH2, MSH6, PMS2, and EPCAM.  A single mutation in one of the Cano  Syndrome genes increases the risk for colon, endometrial, ovarian, and stomach cancers.  Other cancers that occur less commonly in Cano Syndrome include urinary tract, skin, and brain cancers.  The chart below shows the chance that a person with Cano syndrome would develop cancer in his or her lifetime5.      *Cancer risk varies depending on Cano syndrome gene found    Cowden Syndrome   (PTEN)  Cowden syndrome is a hereditary condition that increases the risk for breast, thyroid, endometrial, colon, and kidney cancer.  Cowden syndrome is caused by a mutation in the PTEN gene.  A single mutation in one of the copies of PTEN causes Cowden syndrome and increases cancer risk.  The chart below shows the chance that someone with a PTEN mutation would develop cancer in their lifetime6,7.  Other benign features seen in some individuals with Cowden syndrome include benign skin lesions (facial papules, keratoses, lipomas), learning disability, autism, thyroid nodules, colon polyps, and larger head size.      *One recent study found breast cancer risk to be increased to 85%    Li-Fraumeni Syndrome   (TP53)  Li-Fraumeni Syndrome (LFS) is a cancer predisposition syndrome caused by a mutation in the TP53 gene. A single mutation in one of the copies of TP53 increases the risk for multiple cancers. Individuals with LFS are at an increased risk for developing cancer at a young age. The lifetime risk for development of a LFS-associated cancer is 50% by age 30 and 90% by age 60.   Core Cancers: Sarcomas, Breast, Brain, Lung, Leukemias/Lymphomas, Adrenocortical carcinomas  Other Cancers: Gastrointestinal, Thyroid, Skin, Genitourinary    Hereditary Diffuse Gastric Cancer   (CDH1)  Currently, one gene is known to cause hereditary diffuse gastric cancer (HDGC): CDH1.  Individuals with HDGC are at increased risk for diffuse gastric cancer and lobular breast cancer. Of people diagnosed with HDGC, 30-50% have a mutation in the CDH1 gene.   This suggests there are likely other genes that may cause HDGC that have not been identified yet.      Lifetime Cancer Risks    General Population HDGC    Diffuse Gastric  <1% ~80%   Breast 12% 39-52%         Additional Genes  CHRISTEL  CHRISTEL is a moderate-risk breast cancer gene. Women who have a mutation in CHRISTEL can have between a 2-4 fold increased risk for breast cancer compared to the general population8. CHRISTEL mutations have also been associated with increased risk for pancreatic cancer, however an estimate of this cancer risk is not well understood9. Individuals who inherit two CHRISTEL mutations have a condition called ataxia-telangiectasia (AT).  This rare autosomal recessive condition affects the nervous system and immune system, and is associated with progressive cerebellar ataxia beginning in childhood.  Individuals with ataxia-telangiectasia often have a weakened immune system and have an increased risk for childhood cancers.    PALB2  Mutations in PALB2 have been shown to increase the risk of breast cancer up to 33-58% in some families; where individuals fall within this risk range is dependent upon family [id number]. PALB2 mutations have also been associated with increased risk for pancreatic cancer, although this risk has not been quantified yet.  Individuals who inherit two PALB2 mutations--one from their mother and one from their father--have a condition called Fanconi Anemia.  This rare autosomal recessive condition is associated with short stature, developmental delay, bone marrow failure, and increased risk for childhood cancers.    CHEK2   CHEK2 is a moderate-risk breast cancer gene.  Women who have a mutation in CHEK2 have around a 2-fold increased risk for breast cancer compared to the general population, and this risk may be higher depending upon family history.11,12,13 Mutations in CHEK2 have also been shown to increase the risk of a number of other cancers, including colon and prostate, however these  cancer risks are currently not well understood.    BRIP1, RAD51C and RAD51D  Mutations in BRIP1, RAD51C, and RAD51D have been shown to increase the risk of ovarian cancer and possibly female breast cancer as well14,15 .       Lifetime Cancer Risk    General Population BRIP1 RAD51C RAD51D   Ovarian 1-2% ~5-8% ~5-9% ~7-15%           Inheritance  All of the cancer syndromes reviewed above are inherited in an autosomal dominant pattern.  This means that if a parent has a mutation, each of his or her children will have a 50% chance of inheriting that same mutation.  Therefore, each child--male or female--would have a 50% chance of being at increased risk for developing cancer.      Image obtained from Genetics Home Reference, 2013     Mutations in some genes can occur de anny, which means that a person s mutation occurred for the first time in them and was not inherited from a parent.  Now that they have the mutation, however, it can be passed on to future generations.    Genetic Testing  Genetic testing involves a blood test and will look at the genetic information in the CHRISTEL, BRCA1, BRCA2, BRIP1, CDH1, CHEK2, MLH1, MSH2, MSH6, PMS2, EPCAM, PTEN, PALB2, RAD51C, RAD51D, and TP53 genes for any harmful mutations that are associated with increased cancer risk.  If possible, it is recommended that the person(s) who has had cancer be tested before other family members.  That person will give us the most useful information about whether or not a specific gene is associated with the cancer in the family.    Results  There are three possible results of genetic testing:  Positive--a harmful mutation was identified in one or more of the genes  Negative--no mutation was identified in any of the genes on this panel  Variant of unknown significance--a variation in one of the genes was identified, but it is unclear how this impacts cancer risk in the family    Advantages and Disadvantages   There are advantages and disadvantages to  genetic testing.    Advantages  May clarify your cancer risk  Can help you make medical decisions  May explain the cancers in your family  May give useful information to your family members (if you share your results)    Disadvantages  Possible negative emotional impact of learning about inherited cancer risk  Uncertainty in interpreting a negative test result in some situations  Possible genetic discrimination concerns (see below)    Genetic Information Nondiscrimination Act (AUBRIE)  AUBRIE is a federal law that protects individuals from health insurance or employment discrimination based on a genetic test result alone.  Although rare, there are currently no legal discrimination protections in terms of life insurance, long term care, or disability insurances.  Visit the [employer] [place] website to learn more.    Reducing Cancer Risk  All of the genes described above have nationally recognized cancer screening guidelines that would be recommended for individuals who test positive.  In addition to increased cancer screening, surgeries may be offered or recommended to reduce cancer risk.  Recommendations are based upon an individual s genetic test result as well as their personal and family history of cancer.    Questions to Think About Regarding Genetic Testing:  What effect will the test result have on me and my relationship with my family members if I have an inherited gene mutation?  If I don t have a gene mutation?  Should I share my test results, and how will my family react to this news, which may also affect them?  Are my children ready to learn new information that may one day affect their own health?    Hereditary Cancer Resources    FORCE: Facing Our Risk of Cancer Empowered facingourrisk.org   Bright Pink bebrightpink.org   Li-Fraumeni Syndrome Association lfsassociation.org   PTEN World PTENworld.Statusly   No stomach for cancer, Inc. nostomachforcancer.org   Stomach cancer relief network  Scrnet.org   Collaborative Group of the Americas on Inherited Colorectal Cancer (CGA) cgaicc.com    Cancer Care cancercare.org   American Cancer Society (ACS) cancer.org   National Cancer Ashley (NCI) cancer.gov     Please call us if you have any questions or concerns.   Cancer Risk Management Program [phone]ER ([phone])      References  Yony CHA, Arnie PDP, Blair S, Christ MARRERO, Frantz JE, Anthony JL, Omayra N, Naina H, Joey O, Elsa A, Jea B, Colton P, Mankrupa S, Rajesh DM, Esparza N, Christopher E, Ignacio H, Pascual E, Vijaya J, Mp J, Roel B, Silvia H, Issac S, Eerola H, Ollie H, David K, José Manuel OP. Average risks of breast and ovarian cancer associated with BRCA1 or BRCA2 mutations detected in case series unselected for family history: a combined analysis of 222 studies. Am J Hum Latisha. 2003;72:1117-30.  Carmen N, Christine M, Gurinder G.  BRCA1 and BRCA2 Hereditary Breast and Ovarian Cancer. Gene Reviews online. 2013.  Kervin YC, Jay S, Taqueria G, Joy S. Breast cancer risk among male BRCA1 and BRCA2 mutation carriers. J Natl Cancer Inst. 2007;99:1811-4.  Geoffrey DG, Geno I, Aldair J, Alexander E, Alba ER, Lajerod F. Risk of breast cancer in male BRCA2 carriers. J Med Latisha. 2010;47:710-1.  National Comprehensive Cancer Network. Clinical practice guidelines in oncology, colorectal cancer screening. Available online (registration required). 2015.  Klein MH, Nancy J, Juan Miguel J, Saeid LA, Orquincy MS, Eng C. Lifetime cancer risks in individuals with germline PTEN mutations. Clin Cancer Res. 2012;18:400-7.  Lee BANEGAS. Cowden Syndrome: A Critical Review of the Clinical Literature. J Latisha . 2009:18:13-27.  Cleve CHA, Timi D, Belinda S, Rhina P, Andrew T, Tobi M, Maximo B, Leigh CONNER, López R, Carmen K, Yony L, Geoffrey DG, Rajesh D, Bryan DF, Kathleen BHAKTA, The Breast Cancer Susceptibility Collaboration (UK) & Miesha NORRIS. CHRISTEL mutations that cause  ataxia-telangiectasia are breast cancer susceptibility alleles. Nature Genetics. 2006;38:873-875  Campos N , Butch Y, Ainsley J, Rosalee L, Mary GM , Tatianna ML, Sylvester S, Smith AG, Syngal S, Lev ML, Veda J , Matilde R, Romelia SZ, Vianey JR, Leny VE, Rut M, Vogelstein B, London N, Ventura RH, Virginia KW, and Lary AP. CHRISTEL mutations in patients with hereditary pancreatic cancer. Cancer Discover. 2012;2:41-46  Yony JACOBO, et al. Breast-Cancer Risk in Families with Mutations in PALB2. NEJM. 2014; 371(6):497-506.  CHEK2 Breast Cancer Case-Control Consortium. CHEK2*1100delC and susceptibility to breast cancer: A collaborative analysis involving 10,860 breast cancer cases and 9,065 controls from 10 studies. Am J Hum Latisha, 74 (2004), pp. 9907-0644  Enrique T, Kristy S, Tae K, et al. Spectrum of Mutations in BRCA1, BRCA2, CHEK2, and TP53 in Families at High Risk of Breast Cancer. DELPHINE. 2006;295(12):7426-0040.   Ru C, Jorge D, Johan A, et al. Risk of breast cancer in women with a CHEK2 mutation with and without a family history of breast cancer. J Clin Oncol. 2011;29:3196-8409.  Drake H, Matt E, Ram SJ, et al. Contribution of germline mutations in the RAD51B, RAD51C, and RAD51D genes to ovarian cancer in the population. J Clin Oncol. 2015;33(26):5481-6259. Doi:10.1200/JCO.2015.61.2408.  Gurmeet T, Adam DF, Regis P, et al. Mutations in BRIP1 confer high risk of ovarian cancer. Isabel Latisha. 2011;43(11):1405-9882. doi:10.1038/ng.955.

## 2022-10-16 NOTE — PROGRESS NOTES
[facility]    Hematology/Oncology Established Patient Follow-up Note    Oncologic History/Treatment Summary      4/19/20: Presented with painless jaundice.  CT scan demonstrated a 1.2 cm mass in the pancreas with biliary dilatation.  ERCP/EUS performed demonstrating atypia but no definite malignancy.    5/26/20: Whipple resection performed by Dr. Chaves at [facility].  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  Margins and lymph nodes negative, LVI was seen.  Final stage [id number].    7/7/20 to 12/15/20: Status post 6 cycles adjuvant gemcitabine.    6/22/22: Routine screening mammogram demonstrated a circumscribed mass in the left breast at 9 o'clock.  Diagnostic mammogram was benign but linear branching microcalcifications seen in the right breaset spanning 7 cm and stereotactic biopsy recommended.    7/12/22: Stereotactic biopsy right breast demonstrated atypical ductal hyperplasia.    9/16/22: Excisional biopsy performed by Dr. Wagner demonstrating DCIS grade 3, no invasive component seen.  Medial and inferior margins were close at 0.4 mm.  ER positive at %, MA positive at 1-10%.      Today's Date: 10/17/22    Reason for Follow-up: DCIS of the right breast.      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  Accompanied by her  and daughter.  She is transferring care today, had previously seen Dr. Coronado and Arnold.  She did recently meet with Dr. Wagner to review options to deal with the close surgical margin and is scheduled to undergo re-excision later this week.    Clinically she has been feeling well.  Healing well from the excisional biopsy.  She has mild hot flashes but no other menopausal symptoms currently.  No other physical concerns.        REVIEW OF SYSTEMS:   A 14 point ROS was  reviewed with pertinent positives and negatives in the HPI.       HOME MEDICATIONS:  Current Outpatient Medications   Medication Sig Dispense Refill     acetaminophen (TYLENOL) 325 MG tablet Take 1-2 tablets (325-650 mg) by mouth every 6 hours as needed for mild pain or fever 50 tablet 0     albuterol (PROAIR HFA/PROVENTIL HFA/VENTOLIN HFA) 108 (90 Base) MCG/ACT inhaler Inhale 2 puffs into the lungs every 6 hours as needed for shortness of breath / dyspnea or wheezing 18 g 0     anastrozole (ARIMIDEX) 1 MG tablet Take 1 tablet (1 mg) by mouth daily 90 tablet 3     calcium carbonate-vitamin D (OS-CAMERON) 600-400 MG-UNIT chewable tablet Take 1 chew tab by mouth 2 times daily  180 tablet 3     citalopram (CELEXA) 20 MG tablet Take 1 tablet (20 mg) by mouth daily 90 tablet 3     loratadine (CLARITIN REDITABS) 10 MG dispersible tablet Take 10 mg by mouth as needed        magnesium 250 MG tablet Take 1 tablet by mouth 2 times daily       multivitamin (ONE-DAILY) tablet Take 1 tablet by mouth daily 90 tablet 0     amylase-lipase-protease (CREON) 34298-15002 units CPEP per EC capsule TAKE 1 CAPSULE BY MOUTH THREE TIMES DAILY WITH A MEAL Strength: 24,000-76,000 Units 270 capsule 3         ALLERGIES:  Allergies   Allergen Reactions     Penicillin V      Seasonal Allergies          PAST MEDICAL HISTORY:  Past Medical History:   Diagnosis Date     Chest tightness     had suspected allergies     Malignant neoplasm of head of pancreas (H) 06/09/2020     Seasonal allergies      SOB (shortness of breath)          PAST SURGICAL HISTORY:  Past Surgical History:   Procedure Laterality Date     BIOPSY BREAST Right 9/19/2022    Procedure: Right radiofrequency localized excisional breast biopsy;  Surgeon: Geovanny Wagner MD;  Location:  OR     CATARACT IOL, RT/LT  2015     COLONOSCOPY  10/14    no repeat needed due to age     COLONOSCOPY N/A 10/7/2014    Procedure: COLONOSCOPY;  Surgeon: Daryl Hanson MD;  Location:  GI      COLONOSCOPY  04/23/2019    no further screening     ENDOSCOPIC RETROGRADE CHOLANGIOPANCREATOGRAM N/A 4/20/2020    Procedure: ENDOSCOPIC RETROGRADE CHOLANGIOPANCREATOGRAPHY, PLACEMENT OF PANCREATIC STENT, PLACEMENT OF BILIARY STENT;  Surgeon: Yarely Vann MD;  Location: [facility] OR     ESOPHAGOSCOPY, GASTROSCOPY, DUODENOSCOPY (EGD), COMBINED N/A 4/20/2020    Procedure: ESOPHAGOGASTRODUODENOSCOPY, WITH FINE NEEDLE ASPIRATION BIOPSY, WITH ENDOSCOPIC ULTRASOUND GUIDANCE, Endoscopic retrograde cholangiopancreatography;  Surgeon: Yarely Vann MD;  Location: [facility] OR     ESOPHAGOSCOPY, GASTROSCOPY, DUODENOSCOPY (EGD), COMBINED N/A 5/5/2020    Procedure: ESOPHAGOGASTRODUODENOSCOPY, WITH FINE NEEDLE ASPIRATION BIOPSY, WITH ENDOSCOPIC ULTRASOUND GUIDANCE;  Surgeon: Romina Rosario MD;  Location: [facility] GI     IR CHEST PORT PLACEMENT > 5 YRS OF AGE  7/6/2020     IR PORT REMOVAL RIGHT  12/28/2020     LAPAROSCOPIC BIOPSY LIVER N/A 5/22/2020    Procedure: Diagnostic Laparoscopy with intraoperative ultrasound and Liver Biopsy X2;  Surgeon: Duarte Chaves MD;  Location: [facility] OR     WHIPPLE PROCEDURE N/A 5/22/2020    Procedure: Non Pylorus Preserving Whipple procedure;  Surgeon: Duarte Chaves MD;  Location: [facility] OR         SOCIAL HISTORY:  Social History     Socioeconomic History     Marital status:      Spouse name: Not on file     Number of children: Not on file     Years of education: Not on file     Highest education level: Not on file   Occupational History     Employer: RETIRED     Comment: previous taught special ed   Tobacco Use     Smoking status: Never     Smokeless tobacco: Never   Vaping Use     Vaping Use: Never used   Substance and Sexual Activity     Alcohol use: Yes     Comment: social      Drug use: Never     Sexual activity: Yes   Other Topics Concern     Parent/sibling w/ CABG, MI or angioplasty before 65F 55M? Not Asked      Service Not Asked     Blood Transfusions Not Asked      "Caffeine Concern Yes     Comment: 2 cups     Occupational Exposure Not Asked     Hobby Hazards Not Asked     Sleep Concern Not Asked     Stress Concern Not Asked     Weight Concern Not Asked     Special Diet No     Back Care Not Asked     Exercise Yes     Comment: walking workout      Bike Helmet Not Asked     Seat Belt Not Asked     Self-Exams Not Asked   Social History Narrative     Not on file     Social Determinants of Health     Financial Resource Strain: Not on file   Food Insecurity: Not on file   Transportation Needs: Not on file   Physical Activity: Not on file   Stress: Not on file   Social Connections: Not on file   Intimate Partner Violence: Not At Risk     Fear of Current or Ex-Partner: No     Emotionally Abused: No     Physically Abused: No     Sexually Abused: No   Housing Stability: Not on file         FAMILY HISTORY:  Family History   Problem Relation Age of Onset     Musculoskeletal Disorder Mother         edematous legs     Obesity Mother      Lymphoma Brother      Musculoskeletal Disorder Maternal Grandmother         edematous legs; did have amputation     Obesity Maternal Grandmother      Myocardial Infarction Maternal Grandmother          PHYSICAL EXAM:  Vital signs:  /76 (Cuff Size: Adult Regular)   Pulse 54   Temp (!) 96.6  F (35.9  C) (Tympanic)   Resp 16   Ht 1.626 m (5' 4\")   Wt 50.8 kg (112 lb)   LMP  (LMP Unknown)   SpO2 100%   BMI 19.22 kg/m     ECO  GENERAL/CONSTITUTIONAL: Appears well, no acute distress.  Remainder of exam deferred.      LABS:  None today.      PATHOLOGY/RELEVANT BIOMARKERS:  Excisional breast biopsy:  Final Diagnosis   Right breast, RFID localized lumpectomy, with inked designated margins:  - Ductal carcinoma in situ, nuclear grade 3, comedonecrosis with microcalcifications and solid patterns, without extension to margins, and 0.4 mm from the nearest margins, the medial and inferior margins and 2.6 mm from the nearest lateral margin.  - Estimated " size of ductal carcinoma in situ:  20 x 15 x 7 mm.  - Evidence of previous biopsy site/cavity with coil shaped biopsy marker and RFID tag , adjacent to tumor.  - No evidence of invasive carcinoma.  - No evidence of lymphovascular invasion.  - Estrogen and progesterone receptors:  ER %, AL 1-10%       ASSESSMENT:  1. Grade 3 DCIS right breast with close margin.  I had a lengthy discussion with Flora and her family regarding the biology of breast cancer and the significance of DCIS.  I explained that I think of DCIS as essentially a breast cancer precursor as it is non-invasive so should not be capable of metastasizing.  It is a significant risk factor for developing invasive breast cancer, however.  I explained that DCIS can vary in significance of risk.  Important factors include extent of involved area, grade of the DCIS and margins.  Both the grade of the tumor and close margins place her in a higher risk category and I agree with the decision to pursue re-excision.  Other therapy modalities that are standardly recommended include breast radiation and endocrine therapy.  We reviewed the role of both tamoxifen and aromatase inhibitors including potential risks and side effects.  Aromatase inhibitors are more effective than tamoxifen in preventing breast cancer in both the involved breast as well as the contralateral breast.  They do increase risk of bone loss and she does have osteopenia so would need to monitor that closely.    We also discussed role of radiation.  Radiation is effective in reducing risk of recurrence in the involved breast.  Benefit will be less with use of endocrine therapy but I would still recommend a consult with a radiation oncologist.  Her  shared that his first wife  of complications from radiation for Hodgkin's disease and he is very reluctant to pursue radiation unless the benefit is substantial.  He also pointed out that Flora is 77 and although healthy wonders if  aggressive therapies are warranted.  I acknowledged that there is no survival benefit associated with radiation treatment and age does certainly factor in the decision.   They are not interested in a radiation consult and given the above I think this is reasonable.  She is interested in pursuing an aromatase inhibitor, however.  The spend the winter in [place], [place] and will be leaving in December.  We agreed to start on anastrozole prior to her departure so that we can assess tolerance.  I would like to wait until pathology is available from her re-excision to make sure no unexpected findings.    2.  History of resected stage IB pancreatic cancer.  We reviewed her history of pancreas cancer, presenting symptoms and pathology findings.  She has done remarkably well and has essentially no residual issues from the Whipple outside of pancreatic insufficiency which she manages well with Creon.  Agree with plans for annual imaging at this point.    3.  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  Continue on Creon.      PLAN:    I will follow-up on her pathology results and plan to discuss with her in one week.  Tentatively plan anastrozole 1 mg daily assuming no unexpected findings on re-excision.  Plan follow-up in 6 months after she returns from [place].    Total time is 50 minutes including face to face time, review of records, orders and documentation.          Jc Peoples MD  Hematology/Oncology  [facility]

## 2022-10-17 ENCOUNTER — ONCOLOGY VISIT (OUTPATIENT)
Dept: ONCOLOGY | Facility: CLINIC | Age: 77
End: 2022-10-17
Attending: INTERNAL MEDICINE
Payer: COMMERCIAL

## 2022-10-17 ENCOUNTER — PATIENT OUTREACH (OUTPATIENT)
Dept: ONCOLOGY | Facility: CLINIC | Age: 77
End: 2022-10-17

## 2022-10-17 VITALS
BODY MASS INDEX: 19.12 KG/M2 | HEART RATE: 54 BPM | OXYGEN SATURATION: 100 % | HEIGHT: 64 IN | SYSTOLIC BLOOD PRESSURE: 139 MMHG | WEIGHT: 112 LBS | RESPIRATION RATE: 16 BRPM | DIASTOLIC BLOOD PRESSURE: 76 MMHG | TEMPERATURE: 96.6 F

## 2022-10-17 DIAGNOSIS — D05.11 DUCTAL CARCINOMA IN SITU (DCIS) OF RIGHT BREAST: ICD-10-CM

## 2022-10-17 DIAGNOSIS — C25.0 MALIGNANT NEOPLASM OF HEAD OF PANCREAS (H): ICD-10-CM

## 2022-10-17 PROCEDURE — G0463 HOSPITAL OUTPT CLINIC VISIT: HCPCS

## 2022-10-17 PROCEDURE — 99215 OFFICE O/P EST HI 40 MIN: CPT | Performed by: INTERNAL MEDICINE

## 2022-10-17 RX ORDER — ANASTROZOLE 1 MG/1
1 TABLET ORAL DAILY
Qty: 90 TABLET | Refills: 3 | Status: SHIPPED | OUTPATIENT
Start: 2022-10-17 | End: 2023-06-27

## 2022-10-17 ASSESSMENT — PAIN SCALES - GENERAL: PAINLEVEL: NO PAIN (0)

## 2022-10-17 NOTE — TELEPHONE ENCOUNTER
Signed Prescriptions:                        Disp   Refills    amylase-lipase-protease (CREON) [address]*270 ca*3        Sig: TAKE 1 CAPSULE BY MOUTH THREE TIMES DAILY WITH A MEAL           Strength: 24,000-76,000 Units  Authorizing Provider: MALU MEADOWS RN on 10/17/2022 at 4:56 PM

## 2022-10-17 NOTE — TELEPHONE ENCOUNTER
Pending Prescriptions:                       Disp   Refills    amylase-lipase-protease (CREON) [address]*270 ca*3               Last Written Prescription Date: 07/11/22  Last Fill Quantity: 270,   # refills: 3  Last Office Visit: 10/17/22  Future Office visit:    Next 5 appointments (look out 90 days)    Oct 20, 2022  8:30 AM  (Arrive by 8:10 AM)  Provider Visit with Marcial Holden PA-C  [facility] ([facility] ) [address] [facility] [id number] [phone]   Nov 29, 2022 11:30 AM  Return Visit with Gloria Barrett DO  [facility] ([facility] ) [address]  [facility] [address]  [phone]           Routing refill request to provider for review/approval.    Sheree Driscoll RN on 10/17/2022 at 12:55 PM

## 2022-10-17 NOTE — NURSING NOTE
"Oncology Rooming Note    October 17, 2022 11:31 AM   Flora Hogan is a 77 year old female who presents for:    Chief Complaint   Patient presents with     Oncology Clinic Visit     Ductal carcinoma in situ (DCIS) of right breast      Initial Vitals: Pulse 54   Temp (!) 96.6  F (35.9  C) (Tympanic)   Resp 16   Ht 1.626 m (5' 4\")   Wt 50.8 kg (112 lb)   LMP  (LMP Unknown)   SpO2 100%   BMI 19.22 kg/m   Estimated body mass index is 19.22 kg/m  as calculated from the following:    Height as of this encounter: 1.626 m (5' 4\").    Weight as of this encounter: 50.8 kg (112 lb). Body surface area is 1.51 meters squared.  No Pain (0) Comment: Data Unavailable   No LMP recorded (lmp unknown). Patient is postmenopausal.  Allergies reviewed: Yes  Medications reviewed: Yes    Medications: Medication refills not needed today.  Pharmacy name entered into [facility]:    [id number] - [place], [place] - [address] [facility] [id number] [place] - [address] [facility] AT [place] [address] & [id number] [address] AT [facility] & [facility]    Clinical concerns: 2 week f/u       Katie Whitmore CMA              "

## 2022-10-17 NOTE — LETTER
10/17/2022         RE: Flora Hogan  [id number] [address]  [place] [address]        Dear Colleague,    Thank you for referring your patient, Flora Hogan, to the [facility]. Please see a copy of my visit note below.    [facility]    Hematology/Oncology Established Patient Follow-up Note    Oncologic History/Treatment Summary      4/19/20: Presented with painless jaundice.  CT scan demonstrated a 1.2 cm mass in the pancreas with biliary dilatation.  ERCP/EUS performed demonstrating atypia but no definite malignancy.    5/26/20: Whipple resection performed by Dr. Chaves at [facility].  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  Margins and lymph nodes negative, LVI was seen.  Final stage [id number].    7/7/20 to 12/15/20: Status post 6 cycles adjuvant gemcitabine.    6/22/22: Routine screening mammogram demonstrated a circumscribed mass in the left breast at 9 o'clock.  Diagnostic mammogram was benign but linear branching microcalcifications seen in the right breaset spanning 7 cm and stereotactic biopsy recommended.    7/12/22: Stereotactic biopsy right breast demonstrated atypical ductal hyperplasia.    9/16/22: Excisional biopsy performed by Dr. Wagner demonstrating DCIS grade 3, no invasive component seen.  Medial and inferior margins were close at 0.4 mm.  ER positive at %, ME positive at 1-10%.      Today's Date: 10/17/22    Reason for Follow-up: DCIS of the right breast.      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  Accompanied by her  and daughter.  She is transferring care today, had previously seen Dr. Coronado and Arnold.  She did recently meet with Dr. Wagner to review options to deal with the close surgical margin and is scheduled to  undergo re-excision later this week.    Clinically she has been feeling well.  Healing well from the excisional biopsy.  She has mild hot flashes but no other menopausal symptoms currently.  No other physical concerns.        REVIEW OF SYSTEMS:   A 14 point ROS was reviewed with pertinent positives and negatives in the HPI.       HOME MEDICATIONS:  Current Outpatient Medications   Medication Sig Dispense Refill     acetaminophen (TYLENOL) 325 MG tablet Take 1-2 tablets (325-650 mg) by mouth every 6 hours as needed for mild pain or fever 50 tablet 0     albuterol (PROAIR HFA/PROVENTIL HFA/VENTOLIN HFA) 108 (90 Base) MCG/ACT inhaler Inhale 2 puffs into the lungs every 6 hours as needed for shortness of breath / dyspnea or wheezing 18 g 0     anastrozole (ARIMIDEX) 1 MG tablet Take 1 tablet (1 mg) by mouth daily 90 tablet 3     calcium carbonate-vitamin D (OS-CAMERON) 600-400 MG-UNIT chewable tablet Take 1 chew tab by mouth 2 times daily  180 tablet 3     citalopram (CELEXA) 20 MG tablet Take 1 tablet (20 mg) by mouth daily 90 tablet 3     loratadine (CLARITIN REDITABS) 10 MG dispersible tablet Take 10 mg by mouth as needed        magnesium 250 MG tablet Take 1 tablet by mouth 2 times daily       multivitamin (ONE-DAILY) tablet Take 1 tablet by mouth daily 90 tablet 0     amylase-lipase-protease (CREON) 58618-23574 units CPEP per EC capsule TAKE 1 CAPSULE BY MOUTH THREE TIMES DAILY WITH A MEAL Strength: 24,000-76,000 Units 270 capsule 3         ALLERGIES:  Allergies   Allergen Reactions     Penicillin V      Seasonal Allergies          PAST MEDICAL HISTORY:  Past Medical History:   Diagnosis Date     Chest tightness     had suspected allergies     Malignant neoplasm of head of pancreas (H) 06/09/2020     Seasonal allergies      SOB (shortness of breath)          PAST SURGICAL HISTORY:  Past Surgical History:   Procedure Laterality Date     BIOPSY BREAST Right 9/19/2022    Procedure: Right radiofrequency localized  excisional breast biopsy;  Surgeon: Geovanny Wagner MD;  Location: [facility] OR     CATARACT IOL, RT/LT  2015     COLONOSCOPY  10/14    no repeat needed due to age     COLONOSCOPY N/A 10/7/2014    Procedure: COLONOSCOPY;  Surgeon: Daryl Hanson MD;  Location:  GI     COLONOSCOPY  04/23/2019    no further screening     ENDOSCOPIC RETROGRADE CHOLANGIOPANCREATOGRAM N/A 4/20/2020    Procedure: ENDOSCOPIC RETROGRADE CHOLANGIOPANCREATOGRAPHY, PLACEMENT OF PANCREATIC STENT, PLACEMENT OF BILIARY STENT;  Surgeon: Yarely Vann MD;  Location:  OR     ESOPHAGOSCOPY, GASTROSCOPY, DUODENOSCOPY (EGD), COMBINED N/A 4/20/2020    Procedure: ESOPHAGOGASTRODUODENOSCOPY, WITH FINE NEEDLE ASPIRATION BIOPSY, WITH ENDOSCOPIC ULTRASOUND GUIDANCE, Endoscopic retrograde cholangiopancreatography;  Surgeon: Yarely Vann MD;  Location: [facility] OR     ESOPHAGOSCOPY, GASTROSCOPY, DUODENOSCOPY (EGD), COMBINED N/A 5/5/2020    Procedure: ESOPHAGOGASTRODUODENOSCOPY, WITH FINE NEEDLE ASPIRATION BIOPSY, WITH ENDOSCOPIC ULTRASOUND GUIDANCE;  Surgeon: Romina Rosario MD;  Location:  GI     IR CHEST PORT PLACEMENT > 5 YRS OF AGE  7/6/2020     IR PORT REMOVAL RIGHT  12/28/2020     LAPAROSCOPIC BIOPSY LIVER N/A 5/22/2020    Procedure: Diagnostic Laparoscopy with intraoperative ultrasound and Liver Biopsy X2;  Surgeon: Duarte Chaves MD;  Location: [facility] OR     WHIPPLE PROCEDURE N/A 5/22/2020    Procedure: Non Pylorus Preserving Whipple procedure;  Surgeon: Duarte Chaves MD;  Location: [facility] OR         SOCIAL HISTORY:  Social History     Socioeconomic History     Marital status:      Spouse name: Not on file     Number of children: Not on file     Years of education: Not on file     Highest education level: Not on file   Occupational History     Employer: RETIRED     Comment: previous taught special ed   Tobacco Use     Smoking status: Never     Smokeless tobacco: Never   Vaping Use     Vaping Use: Never used   Substance  "and Sexual Activity     Alcohol use: Yes     Comment: social      Drug use: Never     Sexual activity: Yes   Other Topics Concern     Parent/sibling w/ CABG, MI or angioplasty before 65F 55M? Not Asked      Service Not Asked     Blood Transfusions Not Asked     Caffeine Concern Yes     Comment: 2 cups     Occupational Exposure Not Asked     Hobby Hazards Not Asked     Sleep Concern Not Asked     Stress Concern Not Asked     Weight Concern Not Asked     Special Diet No     Back Care Not Asked     Exercise Yes     Comment: walking workout      Bike Helmet Not Asked     Seat Belt Not Asked     Self-Exams Not Asked   Social History Narrative     Not on file     Social Determinants of Health     Financial Resource Strain: Not on file   Food Insecurity: Not on file   Transportation Needs: Not on file   Physical Activity: Not on file   Stress: Not on file   Social Connections: Not on file   Intimate Partner Violence: Not At Risk     Fear of Current or Ex-Partner: No     Emotionally Abused: No     Physically Abused: No     Sexually Abused: No   Housing Stability: Not on file         FAMILY HISTORY:  Family History   Problem Relation Age of Onset     Musculoskeletal Disorder Mother         edematous legs     Obesity Mother      Lymphoma Brother      Musculoskeletal Disorder Maternal Grandmother         edematous legs; did have amputation     Obesity Maternal Grandmother      Myocardial Infarction Maternal Grandmother          PHYSICAL EXAM:  Vital signs:  /76 (Cuff Size: Adult Regular)   Pulse 54   Temp (!) 96.6  F (35.9  C) (Tympanic)   Resp 16   Ht 1.626 m (5' 4\")   Wt 50.8 kg (112 lb)   LMP  (LMP Unknown)   SpO2 100%   BMI 19.22 kg/m     ECO  GENERAL/CONSTITUTIONAL: Appears well, no acute distress.  Remainder of exam deferred.      LABS:  None today.      PATHOLOGY/RELEVANT BIOMARKERS:  Excisional breast biopsy:  Final Diagnosis   Right breast, RFID localized lumpectomy, with inked designated " margins:  - Ductal carcinoma in situ, nuclear grade 3, comedonecrosis with microcalcifications and solid patterns, without extension to margins, and 0.4 mm from the nearest margins, the medial and inferior margins and 2.6 mm from the nearest lateral margin.  - Estimated size of ductal carcinoma in situ:  20 x 15 x 7 mm.  - Evidence of previous biopsy site/cavity with coil shaped biopsy marker and RFID tag , adjacent to tumor.  - No evidence of invasive carcinoma.  - No evidence of lymphovascular invasion.  - Estrogen and progesterone receptors:  ER %, MI 1-10%       ASSESSMENT:  1. Grade 3 DCIS right breast with close margin.  I had a lengthy discussion with Flora and her family regarding the biology of breast cancer and the significance of DCIS.  I explained that I think of DCIS as essentially a breast cancer precursor as it is non-invasive so should not be capable of metastasizing.  It is a significant risk factor for developing invasive breast cancer, however.  I explained that DCIS can vary in significance of risk.  Important factors include extent of involved area, grade of the DCIS and margins.  Both the grade of the tumor and close margins place her in a higher risk category and I agree with the decision to pursue re-excision.  Other therapy modalities that are standardly recommended include breast radiation and endocrine therapy.  We reviewed the role of both tamoxifen and aromatase inhibitors including potential risks and side effects.  Aromatase inhibitors are more effective than tamoxifen in preventing breast cancer in both the involved breast as well as the contralateral breast.  They do increase risk of bone loss and she does have osteopenia so would need to monitor that closely.    We also discussed role of radiation.  Radiation is effective in reducing risk of recurrence in the involved breast.  Benefit will be less with use of endocrine therapy but I would still recommend a consult with a  radiation oncologist.  Her  shared that his first wife  of complications from radiation for Hodgkin's disease and he is very reluctant to pursue radiation unless the benefit is substantial.  He also pointed out that Flora is 77 and although healthy wonders if aggressive therapies are warranted.  I acknowledged that there is no survival benefit associated with radiation treatment and age does certainly factor in the decision.   They are not interested in a radiation consult and given the above I think this is reasonable.  She is interested in pursuing an aromatase inhibitor, however.  The spend the winter in [place], [place] and will be leaving in December.  We agreed to start on anastrozole prior to her departure so that we can assess tolerance.  I would like to wait until pathology is available from her re-excision to make sure no unexpected findings.    2.  History of resected stage IB pancreatic cancer.  We reviewed her history of pancreas cancer, presenting symptoms and pathology findings.  She has done remarkably well and has essentially no residual issues from the Whipple outside of pancreatic insufficiency which she manages well with Creon.  Agree with plans for annual imaging at this point.    3.  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  Continue on Creon.      PLAN:    I will follow-up on her pathology results and plan to discuss with her in one week.  Tentatively plan anastrozole 1 mg daily assuming no unexpected findings on re-excision.  Plan follow-up in 6 months after she returns from [place].    Total time is 50 minutes including face to face time, review of records, orders and documentation.          Jc Peoples MD  Hematology/Oncology  [facility]        Again, thank you for allowing me to participate in the care of your patient.        Sincerely,        Jc Peoples MD

## 2022-10-17 NOTE — PROGRESS NOTES
[facility]: Cancer Care Plan of Care Education Note                                    Discussion with Patient:                                                    Writer met with Flora, her , and daughter to discuss Anastrozole education.  Assessment:                                                      Assessment completed with:: (P) Patient;Children;Spouse or significant other    Current living arrangement  (P) I live in a private home with spouse    Plan of Care Education   Yearly learning assessment completed?: (P) Yes (see Education tab)  Diagnosis:: (P) Breast Cancer  Does patient understand diagnosis?: (P) Yes  Tx plan/regimen:: (P) Anastrozole  Does patient understand treatment plan/regimen?: (P) Yes  Preparing for treatment:: (P) Reviewed treatment preparation information with patient (vascular access, day of chemo, visitor policy, what to bring, etc.)  Vascular access:: (P)  (Oral medication)  Side effect education:: (P) Endocrine therapy (myalgias, arthralgias, hot flashes, vaginal dryness, mood disorder, and thinning of the bones);Fatigue;Sexual health;Nausea/Vomiting;Skin changes  Transportation means:: (P) Family  Safety/self care at home reviewed with patient:: (P) Yes  Informal Support system:: (P) Children;Significant other  Coping - concerns/fears reviewed with patient:: (P) No  Plan of Care:: (P) MD follow-up appointment;Lab appointment  When to call provider:: (P) Bleeding;Increased shortness of breath;New/worsening pain;Shaking chills;Temperature >100.4F;Uncontrolled diarrhea/constipation;Uncontrolled nausea/vomiting  Reasons for deferring treatment reviewed with patient:: (P) No    Evaluation of Learning  Patient Education Provided: (P) Yes  Readiness:: (P) Acceptance  Method:: (P) Literature;Explanation  Response:: (P) Verbalizes understanding    Intervention/Education provided during outreach:                                                     All Anastrozole side effects  discussed, precautions to take, and when to contact the doctor discussed. Writer also discussed the roles of RNCC and triage team.     Flora reports that she givens in [place] and will be gone from December-March. Writer will route to RNCC to follow up with Dr. Peoples about possibly establishing care in [place] for those winter months.    Signature:  Sheree Driscoll RN

## 2022-10-17 NOTE — LETTER
10/17/2022         RE: Flora Hogan  [id number] [facility] [address]  [facility] access hospital [id number]      [facility] [place] [facility]    Hematology/Oncology Established Patient Follow-up Note    Oncologic History/Treatment Summary      4/19/20: Presented with painless jaundice.  CT scan demonstrated a 1.2 cm mass in the pancreas with biliary dilatation.  ERCP/EUS performed demonstrating atypia but no definite malignancy.    5/26/20: Whipple resection performed by Dr. Chaves at [facility].  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  Margins and lymph nodes negative, LVI was seen.  Final stage [id number].    7/7/20 to 12/15/20: Status post 6 cycles adjuvant gemcitabine.    6/22/22: Routine screening mammogram demonstrated a circumscribed mass in the left breast at 9 o'clock.  Diagnostic mammogram was benign but linear branching microcalcifications seen in the right breaset spanning 7 cm and stereotactic biopsy recommended.    7/12/22: Stereotactic biopsy right breast demonstrated atypical ductal hyperplasia.    9/16/22: Excisional biopsy performed by Dr. Wagner demonstrating DCIS grade 3, no invasive component seen.  Medial and inferior margins were close at 0.4 mm.  ER positive at %, MD positive at 1-10%.      Today's Date: 10/17/22    Reason for Follow-up: DCIS of the right breast.      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  Accompanied by her  and daughter.  She is transferring care today, had previously seen Dr. Coronado and Arnold.  She did recently meet with Dr. Wagner to review options to deal with the close surgical margin and is scheduled to undergo re-excision later this week.    Clinically she has been feeling well.  Healing well from the excisional biopsy.  She has mild hot flashes but no other menopausal  symptoms currently.  No other physical concerns.        REVIEW OF SYSTEMS:   A 14 point ROS was reviewed with pertinent positives and negatives in the HPI.       HOME MEDICATIONS:  Current Outpatient Medications   Medication Sig Dispense Refill     acetaminophen (TYLENOL) 325 MG tablet Take 1-2 tablets (325-650 mg) by mouth every 6 hours as needed for mild pain or fever 50 tablet 0     albuterol (PROAIR HFA/PROVENTIL HFA/VENTOLIN HFA) 108 (90 Base) MCG/ACT inhaler Inhale 2 puffs into the lungs every 6 hours as needed for shortness of breath / dyspnea or wheezing 18 g 0     anastrozole (ARIMIDEX) 1 MG tablet Take 1 tablet (1 mg) by mouth daily 90 tablet 3     calcium carbonate-vitamin D (OS-CAMERON) 600-400 MG-UNIT chewable tablet Take 1 chew tab by mouth 2 times daily  180 tablet 3     citalopram (CELEXA) 20 MG tablet Take 1 tablet (20 mg) by mouth daily 90 tablet 3     loratadine (CLARITIN REDITABS) 10 MG dispersible tablet Take 10 mg by mouth as needed        magnesium 250 MG tablet Take 1 tablet by mouth 2 times daily       multivitamin (ONE-DAILY) tablet Take 1 tablet by mouth daily 90 tablet 0     amylase-lipase-protease (CREON) 97451-62493 units CPEP per EC capsule TAKE 1 CAPSULE BY MOUTH THREE TIMES DAILY WITH A MEAL Strength: 24,000-76,000 Units 270 capsule 3         ALLERGIES:  Allergies   Allergen Reactions     Penicillin V      Seasonal Allergies          PAST MEDICAL HISTORY:  Past Medical History:   Diagnosis Date     Chest tightness     had suspected allergies     Malignant neoplasm of head of pancreas (H) 06/09/2020     Seasonal allergies      SOB (shortness of breath)          PAST SURGICAL HISTORY:  Past Surgical History:   Procedure Laterality Date     BIOPSY BREAST Right 9/19/2022    Procedure: Right radiofrequency localized excisional breast biopsy;  Surgeon: Geovanny Wagner MD;  Location: [facility] OR     CATARACT IOL, RT/LT  2015     COLONOSCOPY  10/14    no repeat needed due to age     COLONOSCOPY N/A  10/7/2014    Procedure: COLONOSCOPY;  Surgeon: Daryl Hanson MD;  Location: [facility] GI     COLONOSCOPY  04/23/2019    no further screening     ENDOSCOPIC RETROGRADE CHOLANGIOPANCREATOGRAM N/A 4/20/2020    Procedure: ENDOSCOPIC RETROGRADE CHOLANGIOPANCREATOGRAPHY, PLACEMENT OF PANCREATIC STENT, PLACEMENT OF BILIARY STENT;  Surgeon: Yarely Vann MD;  Location:  OR     ESOPHAGOSCOPY, GASTROSCOPY, DUODENOSCOPY (EGD), COMBINED N/A 4/20/2020    Procedure: ESOPHAGOGASTRODUODENOSCOPY, WITH FINE NEEDLE ASPIRATION BIOPSY, WITH ENDOSCOPIC ULTRASOUND GUIDANCE, Endoscopic retrograde cholangiopancreatography;  Surgeon: Yarely Vann MD;  Location: [facility] OR     ESOPHAGOSCOPY, GASTROSCOPY, DUODENOSCOPY (EGD), COMBINED N/A 5/5/2020    Procedure: ESOPHAGOGASTRODUODENOSCOPY, WITH FINE NEEDLE ASPIRATION BIOPSY, WITH ENDOSCOPIC ULTRASOUND GUIDANCE;  Surgeon: Romina Rosario MD;  Location:  GI     IR CHEST PORT PLACEMENT > 5 YRS OF AGE  7/6/2020     IR PORT REMOVAL RIGHT  12/28/2020     LAPAROSCOPIC BIOPSY LIVER N/A 5/22/2020    Procedure: Diagnostic Laparoscopy with intraoperative ultrasound and Liver Biopsy X2;  Surgeon: Duarte Chaves MD;  Location: [facility] OR     WHIPPLE PROCEDURE N/A 5/22/2020    Procedure: Non Pylorus Preserving Whipple procedure;  Surgeon: Duarte Chaves MD;  Location: [facility] OR         SOCIAL HISTORY:  Social History     Socioeconomic History     Marital status:      Spouse name: Not on file     Number of children: Not on file     Years of education: Not on file     Highest education level: Not on file   Occupational History     Employer: RETIRED     Comment: previous taught special ed   Tobacco Use     Smoking status: Never     Smokeless tobacco: Never   Vaping Use     Vaping Use: Never used   Substance and Sexual Activity     Alcohol use: Yes     Comment: social      Drug use: Never     Sexual activity: Yes   Other Topics Concern     Parent/sibling w/ CABG, MI or angioplasty  "before 65F 55M? Not Asked      Service Not Asked     Blood Transfusions Not Asked     Caffeine Concern Yes     Comment: 2 cups     Occupational Exposure Not Asked     Hobby Hazards Not Asked     Sleep Concern Not Asked     Stress Concern Not Asked     Weight Concern Not Asked     Special Diet No     Back Care Not Asked     Exercise Yes     Comment: walking workout      Bike Helmet Not Asked     Seat Belt Not Asked     Self-Exams Not Asked   Social History Narrative     Not on file     Social Determinants of Health     Financial Resource Strain: Not on file   Food Insecurity: Not on file   Transportation Needs: Not on file   Physical Activity: Not on file   Stress: Not on file   Social Connections: Not on file   Intimate Partner Violence: Not At Risk     Fear of Current or Ex-Partner: No     Emotionally Abused: No     Physically Abused: No     Sexually Abused: No   Housing Stability: Not on file         FAMILY HISTORY:  Family History   Problem Relation Age of Onset     Musculoskeletal Disorder Mother         edematous legs     Obesity Mother      Lymphoma Brother      Musculoskeletal Disorder Maternal Grandmother         edematous legs; did have amputation     Obesity Maternal Grandmother      Myocardial Infarction Maternal Grandmother          PHYSICAL EXAM:  Vital signs:  /76 (Cuff Size: Adult Regular)   Pulse 54   Temp (!) 96.6  F (35.9  C) (Tympanic)   Resp 16   Ht 1.626 m (5' 4\")   Wt 50.8 kg (112 lb)   LMP  (LMP Unknown)   SpO2 100%   BMI 19.22 kg/m     ECO  GENERAL/CONSTITUTIONAL: Appears well, no acute distress.  Remainder of exam deferred.      LABS:  None today.      PATHOLOGY/RELEVANT BIOMARKERS:  Excisional breast biopsy:  Final Diagnosis   Right breast, RFID localized lumpectomy, with inked designated margins:  - Ductal carcinoma in situ, nuclear grade 3, comedonecrosis with microcalcifications and solid patterns, without extension to margins, and 0.4 mm from the nearest " margins, the medial and inferior margins and 2.6 mm from the nearest lateral margin.  - Estimated size of ductal carcinoma in situ:  20 x 15 x 7 mm.  - Evidence of previous biopsy site/cavity with coil shaped biopsy marker and RFID tag , adjacent to tumor.  - No evidence of invasive carcinoma.  - No evidence of lymphovascular invasion.  - Estrogen and progesterone receptors:  ER %, MT 1-10%       ASSESSMENT:  1. Grade 3 DCIS right breast with close margin.  I had a lengthy discussion with Flora and her family regarding the biology of breast cancer and the significance of DCIS.  I explained that I think of DCIS as essentially a breast cancer precursor as it is non-invasive so should not be capable of metastasizing.  It is a significant risk factor for developing invasive breast cancer, however.  I explained that DCIS can vary in significance of risk.  Important factors include extent of involved area, grade of the DCIS and margins.  Both the grade of the tumor and close margins place her in a higher risk category and I agree with the decision to pursue re-excision.  Other therapy modalities that are standardly recommended include breast radiation and endocrine therapy.  We reviewed the role of both tamoxifen and aromatase inhibitors including potential risks and side effects.  Aromatase inhibitors are more effective than tamoxifen in preventing breast cancer in both the involved breast as well as the contralateral breast.  They do increase risk of bone loss and she does have osteopenia so would need to monitor that closely.    We also discussed role of radiation.  Radiation is effective in reducing risk of recurrence in the involved breast.  Benefit will be less with use of endocrine therapy but I would still recommend a consult with a radiation oncologist.  Her  shared that his first wife  of complications from radiation for Hodgkin's disease and he is very reluctant to pursue radiation unless the  benefit is substantial.  He also pointed out that Flora is 77 and although healthy wonders if aggressive therapies are warranted.  I acknowledged that there is no survival benefit associated with radiation treatment and age does certainly factor in the decision.   They are not interested in a radiation consult and given the above I think this is reasonable.  She is interested in pursuing an aromatase inhibitor, however.  The spend the winter in [place], [place] and will be leaving in December.  We agreed to start on anastrozole prior to her departure so that we can assess tolerance.  I would like to wait until pathology is available from her re-excision to make sure no unexpected findings.    2.  History of resected stage IB pancreatic cancer.  We reviewed her history of pancreas cancer, presenting symptoms and pathology findings.  She has done remarkably well and has essentially no residual issues from the Whipple outside of pancreatic insufficiency which she manages well with Creon.  Agree with plans for annual imaging at this point.    3.  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  Continue on Creon.      PLAN:    I will follow-up on her pathology results and plan to discuss with her in one week.  Tentatively plan anastrozole 1 mg daily assuming no unexpected findings on re-excision.  Plan follow-up in 6 months after she returns from [place].    Total time is 50 minutes including face to face time, review of records, orders and documentation.          Jc Peoples MD  Hematology/Oncology  [facility]          Jc Peoples MD

## 2022-10-20 ENCOUNTER — OFFICE VISIT (OUTPATIENT)
Dept: FAMILY MEDICINE | Facility: CLINIC | Age: 77
End: 2022-10-20
Payer: COMMERCIAL

## 2022-10-20 VITALS
TEMPERATURE: 97.4 F | DIASTOLIC BLOOD PRESSURE: 64 MMHG | BODY MASS INDEX: 19.22 KG/M2 | WEIGHT: 112 LBS | RESPIRATION RATE: 16 BRPM | OXYGEN SATURATION: 95 % | HEART RATE: 62 BPM | SYSTOLIC BLOOD PRESSURE: 116 MMHG

## 2022-10-20 DIAGNOSIS — F32.A DEPRESSION, UNSPECIFIED DEPRESSION TYPE: ICD-10-CM

## 2022-10-20 DIAGNOSIS — Z01.818 PREOP GENERAL PHYSICAL EXAM: Primary | ICD-10-CM

## 2022-10-20 DIAGNOSIS — D05.11 DUCTAL CARCINOMA IN SITU (DCIS) OF RIGHT BREAST: ICD-10-CM

## 2022-10-20 LAB — HGB BLD-MCNC: 13.8 G/DL (ref 11.7–15.7)

## 2022-10-20 PROCEDURE — 99214 OFFICE O/P EST MOD 30 MIN: CPT | Performed by: PHYSICIAN ASSISTANT

## 2022-10-20 PROCEDURE — 85018 HEMOGLOBIN: CPT | Performed by: PHYSICIAN ASSISTANT

## 2022-10-20 PROCEDURE — 36415 COLL VENOUS BLD VENIPUNCTURE: CPT | Performed by: PHYSICIAN ASSISTANT

## 2022-10-20 RX ORDER — CITALOPRAM HYDROBROMIDE 20 MG/1
20 TABLET ORAL DAILY
Qty: 90 TABLET | Refills: 3 | Status: SHIPPED | OUTPATIENT
Start: 2022-10-20 | End: 2023-05-18

## 2022-10-20 NOTE — PATIENT INSTRUCTIONS
Preparing for Your Surgery  Getting started  A nurse will call you to review your health history and instructions. They will give you an arrival time based on your scheduled surgery time. Please be ready to share:    Your doctor's clinic name and phone number    Your medical, surgical and anesthesia history    A list of allergies and sensitivities    A list of medicines, including herbal treatments and over-the-counter drugs    Whether the patient has a legal guardian (ask how to send us the papers in advance)  Please tell us if you're pregnant--or if there's any chance you might be pregnant. Some surgeries may injure a fetus (unborn baby), so they require a pregnancy test. Surgeries that are safe for a fetus don't always need a test, and you can choose whether to have one.   If you have a child who's having surgery, please ask for a copy of Preparing for Your Child's Surgery.    Preparing for surgery    Within 10 to 30 days of surgery: Have a pre-op exam (sometimes called an H&P, or History and Physical). This can be done at a clinic or pre-operative center.  ? If you're having a , you may not need this exam. Talk to your care team.    At your pre-op exam, talk to your care team about all medicines you take. If you need to stop any medicines before surgery, ask when to start taking them again.  ? We do this for your safety. Many medicines can make you bleed too much during surgery. Some change how well surgery (anesthesia) drugs work.    Call your insurance company to let them know you're having surgery. (If you don't have insurance, call [phone].)    Call your clinic if there's any change in your health. This includes signs of a cold or flu (sore throat, runny nose, cough, rash, fever). It also includes a scrape or scratch near the surgery site.    If you have questions on the day of surgery, call your hospital or surgery center.  COVID testing  You may need to be tested for COVID-19 before having  surgery. If so, we will give you instructions (or click here).  Eating and drinking guidelines  For your safety: Unless your surgeon tells you otherwise, follow the guidelines below.    Eat and drink as usual until 8 hours before surgery. After that, no food or milk.    Drink clear liquids until 2 hours before surgery. These are liquids you can see through, like water, Gatorade and Propel Water. You may also have black coffee and tea (no cream or milk).    Nothing by mouth within 2 hours of surgery. This includes gum, candy and breath mints.    If you drink alcohol: Stop drinking it the night before surgery.    If your care team tells you to take medicine on the morning of surgery, it's okay to take it with a sip of water.  Preventing infection    Shower or bathe the night before and morning of your surgery. Follow the instructions your clinic gave you. (If no instructions, use regular soap.)    Don't shave or clip hair near your surgery site. We'll remove the hair if needed.    Don't smoke or vape the morning of surgery. You may chew nicotine gum up to 2 hours before surgery. A nicotine patch is okay.  ? Note: Some surgeries require you to completely quit smoking and nicotine. Check with your surgeon.    Your care team will make every effort to keep you safe from infection. We will:  ? Clean our hands often with soap and water (or an alcohol-based hand rub).  ? Clean the skin at your surgery site with a special soap that kills germs.  ? Give you a special gown to keep you warm. (Cold raises the risk of infection.)  ? Wear special hair covers, masks, gowns and gloves during surgery.  ? Give antibiotic medicine, if prescribed. Not all surgeries need antibiotics.  What to bring on the day of surgery    Photo ID and insurance card    Copy of your health care directive, if you have one    Glasses and hearing aides (bring cases)  ? You can't wear contacts during surgery    Inhaler and eye drops, if you use them (tell us  about these when you arrive)    CPAP machine or breathing device, if you use them    A few personal items, if spending the night    If you have . . .  ? A pacemaker, ICD (cardiac defibrillator) or other implant: Bring the ID card.  ? An implanted stimulator: Bring the remote control.  ? A legal guardian: Bring a copy of the certified (court-stamped) guardianship papers.  Please remove any jewelry, including body piercings. Leave jewelry and other valuables at home.  If you're going home the day of surgery    You must have a responsible adult drive you home. They should stay with you overnight as well.    If you don't have someone to stay with you, and you aren't safe to go home alone, we may keep you overnight. Insurance often won't pay for this.  After surgery  If it's hard to control your pain or you need more pain medicine, please call your surgeon's office.  Questions?   If you have any questions for your care team, list them here: _________________________________________________________________________________________________________________________________________________________________________ ____________________________________ ____________________________________ ____________________________________  For informational purposes only. Not to replace the advice of your health care provider. Copyright   2003, 2019 [facility]. All rights reserved. Clinically reviewed by Melissa More MD. [employer] [id number] - REV 07/22.

## 2022-10-20 NOTE — PROGRESS NOTES
[facility]  [address] [facility] [address]  Phone: [phone]  Primary Provider: Edmundo Bolivar  Pre-op Performing Provider: EDMUNDO BOLIVAR      PREOPERATIVE EVALUATION:  Today's date: 10/20/2022    Flora Hogan is a 77 year old female who presents for a preoperative evaluation.    Surgical Information:  Surgery/Procedure: right breast reexcision lumpectomy  Surgery Location: [facility]  Surgeon: Geovanny Wagner MD  Surgery Date: 10/27/22  Time of Surgery: 1215  Where patient plans to recover: At home with family  Fax number for surgical facility: Note does not need to be faxed, will be available electronically in Epic.    Type of Anesthesia Anticipated: General    Assessment & Plan     The proposed surgical procedure is considered INTERMEDIATE risk.    Preop general physical exam  Ductal carcinoma in situ (DCIS) of right breast  Ok for current planned procedure.  - Hemoglobin; Future  - Hemoglobin    Depression, unspecified depression type  refilling  - citalopram (CELEXA) 20 MG tablet; Take 1 tablet (20 mg) by mouth daily      RECOMMENDATION:  APPROVAL GIVEN to proceed with proposed procedure, without further diagnostic evaluation.        Subjective     HPI related to upcoming procedure: Re-excision right breast lumpectomy      Preop Questions 9/9/2022   1. Have you ever had a heart attack or stroke? No   2. Have you ever had surgery on your heart or blood vessels, such as a stent placement, a coronary artery bypass, or surgery on an artery in your head, neck, heart, or legs? No   3. Do you have chest pain with activity? No   4. Do you have a history of  heart failure? No   5. Do you currently have a cold, bronchitis or symptoms of other infection? No   6. Do you have a cough, shortness of breath, or wheezing? YES - related to allergies; chronic   7. Do you or anyone in your family have previous history of blood clots? No   8. Do you or does anyone in your  family have a serious bleeding problem such as prolonged bleeding following surgeries or cuts? No   9. Have you ever had problems with anemia or been told to take iron pills? YES - has needed supplement previously   10. Have you had any abnormal blood loss such as black, tarry or bloody stools, or abnormal vaginal bleeding? No   11. Have you ever had a blood transfusion? No   12. Are you willing to have a blood transfusion if it is medically needed before, during, or after your surgery? Yes   13. Have you or any of your relatives ever had problems with anesthesia? No   14. Do you have sleep apnea, excessive snoring or daytime drowsiness? YES - mostly lower energy during day   14a. Do you have a CPAP machine? No   15. Do you have any artifical heart valves or other implanted medical devices like a pacemaker, defibrillator, or continuous glucose monitor? No   16. Do you have artificial joints? No   17. Are you allergic to latex? No   18. Is there any chance that you may be pregnant? -     Health Care Directive:  Patient has a Health Care Directive on file       Preoperative Review of :   reviewed - no record of controlled substances prescribed.  =    Review of Systems  Constitutional, neuro, ENT, endocrine, pulmonary, cardiac, gastrointestinal, genitourinary, musculoskeletal, integument and psychiatric systems are negative, except as otherwise noted.    Patient Active Problem List    Diagnosis Date Noted     Agranulocytosis secondary to cancer chemotherapy (CODE) (H) 05/20/2022     Priority: Medium     Malignant neoplasm of head of pancreas (H) 06/09/2020     Priority: Medium     Complete prior to 6.24 [facility]       Painless jaundice 04/19/2020     Priority: Medium     Chest tightness      Priority: Medium     SOB (shortness of breath)      Priority: Medium     Peripheral neuropathy (HCC) 04/28/2016     Priority: Medium     Nonintractable headache, unspecified chronicity pattern, unspecified headache type  04/28/2016     Priority: Medium     Osteopenia 09/13/2014     Priority: Medium     CARDIOVASCULAR SCREENING; LDL GOAL LESS THAN 160 09/12/2014     Priority: Medium     Seasonal allergies      Priority: Medium      Past Medical History:   Diagnosis Date     Chest tightness     had suspected allergies     Malignant neoplasm of head of pancreas (H) 06/09/2020     Seasonal allergies      SOB (shortness of breath)      Past Surgical History:   Procedure Laterality Date     BIOPSY BREAST Right 9/19/2022    Procedure: Right radiofrequency localized excisional breast biopsy;  Surgeon: Geovanny Wagner MD;  Location: [facility] OR     CATARACT IOL, RT/LT  2015     COLONOSCOPY  10/14    no repeat needed due to age     COLONOSCOPY N/A 10/7/2014    Procedure: COLONOSCOPY;  Surgeon: Daryl Hanson MD;  Location:  GI     COLONOSCOPY  04/23/2019    no further screening     ENDOSCOPIC RETROGRADE CHOLANGIOPANCREATOGRAM N/A 4/20/2020    Procedure: ENDOSCOPIC RETROGRADE CHOLANGIOPANCREATOGRAPHY, PLACEMENT OF PANCREATIC STENT, PLACEMENT OF BILIARY STENT;  Surgeon: Yarely Vann MD;  Location:  OR     ESOPHAGOSCOPY, GASTROSCOPY, DUODENOSCOPY (EGD), COMBINED N/A 4/20/2020    Procedure: ESOPHAGOGASTRODUODENOSCOPY, WITH FINE NEEDLE ASPIRATION BIOPSY, WITH ENDOSCOPIC ULTRASOUND GUIDANCE, Endoscopic retrograde cholangiopancreatography;  Surgeon: Yarely Vann MD;  Location:  OR     ESOPHAGOSCOPY, GASTROSCOPY, DUODENOSCOPY (EGD), COMBINED N/A 5/5/2020    Procedure: ESOPHAGOGASTRODUODENOSCOPY, WITH FINE NEEDLE ASPIRATION BIOPSY, WITH ENDOSCOPIC ULTRASOUND GUIDANCE;  Surgeon: Romina Rosario MD;  Location:  GI     IR CHEST PORT PLACEMENT > 5 YRS OF AGE  7/6/2020     IR PORT REMOVAL RIGHT  12/28/2020     LAPAROSCOPIC BIOPSY LIVER N/A 5/22/2020    Procedure: Diagnostic Laparoscopy with intraoperative ultrasound and Liver Biopsy X2;  Surgeon: Duarte Chaves MD;  Location: [facility] OR     WHIPPLE PROCEDURE N/A 5/22/2020     Procedure: Non Pylorus Preserving Whipple procedure;  Surgeon: Duarte Chaves MD;  Location: [facility] OR     Current Outpatient Medications   Medication Sig Dispense Refill     acetaminophen (TYLENOL) 325 MG tablet Take 1-2 tablets (325-650 mg) by mouth every 6 hours as needed for mild pain or fever 50 tablet 0     albuterol (PROAIR HFA/PROVENTIL HFA/VENTOLIN HFA) 108 (90 Base) MCG/ACT inhaler Inhale 2 puffs into the lungs every 6 hours as needed for shortness of breath / dyspnea or wheezing 18 g 0     amylase-lipase-protease (CREON) 78186-17299 units CPEP per EC capsule TAKE 1 CAPSULE BY MOUTH THREE TIMES DAILY WITH A MEAL Strength: 24,000-76,000 Units 270 capsule 3     anastrozole (ARIMIDEX) 1 MG tablet Take 1 tablet (1 mg) by mouth daily 90 tablet 3     calcium carbonate-vitamin D (OS-CAMERON) 600-400 MG-UNIT chewable tablet Take 1 chew tab by mouth 2 times daily  180 tablet 3     citalopram (CELEXA) 20 MG tablet Take 1 tablet (20 mg) by mouth daily 90 tablet 3     loratadine (CLARITIN REDITABS) 10 MG dispersible tablet Take 10 mg by mouth as needed        magnesium 250 MG tablet Take 1 tablet by mouth 2 times daily       multivitamin (ONE-DAILY) tablet Take 1 tablet by mouth daily 90 tablet 0       Allergies   Allergen Reactions     Penicillin V      Seasonal Allergies         Social History     Tobacco Use     Smoking status: Never     Smokeless tobacco: Never   Substance Use Topics     Alcohol use: Yes     Comment: social        History   Drug Use Unknown         Objective     LMP  (LMP Unknown)     Physical Exam    GENERAL APPEARANCE: healthy, alert and no distress     EYES: EOMI, PERRL     HENT: nose and mouth without ulcers or lesions     NECK: no adenopathy, no asymmetry, masses, or scars and thyroid normal to palpation     RESP: lungs clear to auscultation - no rales, rhonchi or wheezes     CV: regular rates and rhythm, normal S1 S2, no S3 or S4 and no murmur, click or rub     ABDOMEN:  soft, nontender, no  HSM or masses and bowel sounds normal     MS: No peripheral edema      PSYCH: mentation appears normal. and affect normal/bright    Recent Labs   Lab Test 07/25/22  0844 03/28/22  1056   HGB 14.3 14.5    176    135   POTASSIUM 3.9 3.9   CR 0.84 0.80        Diagnostics:  Recent Results (from the past 24 hour(s))   Hemoglobin    Collection Time: 10/20/22  8:48 AM   Result Value Ref Range    Hemoglobin 13.8 11.7 - 15.7 g/dL      No EKG required, no history of coronary heart disease, significant arrhythmia, peripheral arterial disease or other structural heart disease.    Revised Cardiac Risk Index (RCRI):  The patient has the following serious cardiovascular risks for perioperative complications:   - No serious cardiac risks = 0 points     RCRI Interpretation: 0 points: Class I (very low risk - 0.4% complication rate)           Signed Electronically by: Marcial Holden PA-C  Copy of this evaluation report is provided to requesting physician.      Answers for HPI/ROS submitted by the patient on 10/20/2022  What is the reason for your visit today? : preop for surgery  How many servings of fruits and vegetables do you eat daily?: 2-3  On average, how many sweetened beverages do you drink each day (Examples: soda, juice, sweet tea, etc.  Do NOT count diet or artificially sweetened beverages)?: 1  How many minutes a day do you exercise enough to make your heart beat faster?: 10 to 19  How many days per week do you miss taking your medication?: 1

## 2022-10-27 ENCOUNTER — APPOINTMENT (OUTPATIENT)
Dept: SURGERY | Facility: PHYSICIAN GROUP | Age: 77
End: 2022-10-27
Payer: COMMERCIAL

## 2022-10-27 ENCOUNTER — ANESTHESIA (OUTPATIENT)
Dept: SURGERY | Facility: CLINIC | Age: 77
End: 2022-10-27
Payer: COMMERCIAL

## 2022-10-27 ENCOUNTER — ANESTHESIA EVENT (OUTPATIENT)
Dept: SURGERY | Facility: CLINIC | Age: 77
End: 2022-10-27
Payer: COMMERCIAL

## 2022-10-27 ENCOUNTER — HOSPITAL ENCOUNTER (OUTPATIENT)
Facility: CLINIC | Age: 77
Discharge: HOME | End: 2022-10-27
Attending: SURGERY | Admitting: SURGERY
Payer: COMMERCIAL

## 2022-10-27 VITALS
OXYGEN SATURATION: 99 % | HEART RATE: 63 BPM | TEMPERATURE: 97.4 F | HEIGHT: 64 IN | SYSTOLIC BLOOD PRESSURE: 137 MMHG | DIASTOLIC BLOOD PRESSURE: 92 MMHG | WEIGHT: 112.8 LBS | RESPIRATION RATE: 16 BRPM | BODY MASS INDEX: 19.26 KG/M2

## 2022-10-27 DIAGNOSIS — D05.11 DUCTAL CARCINOMA IN SITU (DCIS) OF RIGHT BREAST: ICD-10-CM

## 2022-10-27 PROCEDURE — 360N000076 HC SURGERY LEVEL 3, PER MIN: Performed by: SURGERY

## 2022-10-27 PROCEDURE — 250N000011 HC RX IP 250 OP 636: Performed by: SURGERY

## 2022-10-27 PROCEDURE — 88305 TISSUE EXAM BY PATHOLOGIST: CPT | Mod: TC | Performed by: SURGERY

## 2022-10-27 PROCEDURE — 250N000011 HC RX IP 250 OP 636

## 2022-10-27 PROCEDURE — 19301 PARTIAL MASTECTOMY: CPT | Mod: RT | Performed by: SURGERY

## 2022-10-27 PROCEDURE — 250N000009 HC RX 250

## 2022-10-27 PROCEDURE — 258N000003 HC RX IP 258 OP 636: Performed by: ANESTHESIOLOGY

## 2022-10-27 PROCEDURE — 710N000012 HC RECOVERY PHASE 2, PER MINUTE: Performed by: SURGERY

## 2022-10-27 PROCEDURE — 999N000141 HC STATISTIC PRE-PROCEDURE NURSING ASSESSMENT: Performed by: SURGERY

## 2022-10-27 PROCEDURE — 272N000001 HC OR GENERAL SUPPLY STERILE: Performed by: SURGERY

## 2022-10-27 PROCEDURE — 250N000009 HC RX 250: Performed by: SURGERY

## 2022-10-27 PROCEDURE — 370N000017 HC ANESTHESIA TECHNICAL FEE, PER MIN: Performed by: SURGERY

## 2022-10-27 PROCEDURE — 258N000003 HC RX IP 258 OP 636

## 2022-10-27 PROCEDURE — 710N000009 HC RECOVERY PHASE 1, LEVEL 1, PER MIN: Performed by: SURGERY

## 2022-10-27 RX ORDER — SODIUM CHLORIDE, SODIUM LACTATE, POTASSIUM CHLORIDE, CALCIUM CHLORIDE 600; 310; 30; 20 MG/100ML; MG/100ML; MG/100ML; MG/100ML
INJECTION, SOLUTION INTRAVENOUS CONTINUOUS
Status: DISCONTINUED | OUTPATIENT
Start: 2022-10-27 | End: 2022-10-27 | Stop reason: HOSPADM

## 2022-10-27 RX ORDER — ONDANSETRON 4 MG/1
4 TABLET, ORALLY DISINTEGRATING ORAL EVERY 8 HOURS PRN
Qty: 10 TABLET | Refills: 0 | Status: SHIPPED | OUTPATIENT
Start: 2022-10-27 | End: 2022-11-04

## 2022-10-27 RX ORDER — ALBUTEROL SULFATE 0.83 MG/ML
2.5 SOLUTION RESPIRATORY (INHALATION) EVERY 4 HOURS PRN
Status: DISCONTINUED | OUTPATIENT
Start: 2022-10-27 | End: 2022-10-27 | Stop reason: HOSPADM

## 2022-10-27 RX ORDER — PROPOFOL 10 MG/ML
INJECTION, EMULSION INTRAVENOUS CONTINUOUS PRN
Status: DISCONTINUED | OUTPATIENT
Start: 2022-10-27 | End: 2022-10-27

## 2022-10-27 RX ORDER — MEPERIDINE HYDROCHLORIDE 25 MG/ML
12.5 INJECTION INTRAMUSCULAR; INTRAVENOUS; SUBCUTANEOUS
Status: DISCONTINUED | OUTPATIENT
Start: 2022-10-27 | End: 2022-10-27 | Stop reason: HOSPADM

## 2022-10-27 RX ORDER — ONDANSETRON 4 MG/1
4 TABLET, ORALLY DISINTEGRATING ORAL EVERY 30 MIN PRN
Status: DISCONTINUED | OUTPATIENT
Start: 2022-10-27 | End: 2022-10-27 | Stop reason: HOSPADM

## 2022-10-27 RX ORDER — HYDRALAZINE HYDROCHLORIDE 20 MG/ML
5-10 INJECTION INTRAMUSCULAR; INTRAVENOUS EVERY 10 MIN PRN
Status: DISCONTINUED | OUTPATIENT
Start: 2022-10-27 | End: 2022-10-27 | Stop reason: HOSPADM

## 2022-10-27 RX ORDER — CEFAZOLIN SODIUM/WATER 2 G/20 ML
2 SYRINGE (ML) INTRAVENOUS
Status: COMPLETED | OUTPATIENT
Start: 2022-10-27 | End: 2022-10-27

## 2022-10-27 RX ORDER — BUPIVACAINE HYDROCHLORIDE 5 MG/ML
INJECTION, SOLUTION EPIDURAL; INTRACAUDAL PRN
Status: DISCONTINUED | OUTPATIENT
Start: 2022-10-27 | End: 2022-10-27 | Stop reason: HOSPADM

## 2022-10-27 RX ORDER — ONDANSETRON 2 MG/ML
4 INJECTION INTRAMUSCULAR; INTRAVENOUS EVERY 30 MIN PRN
Status: DISCONTINUED | OUTPATIENT
Start: 2022-10-27 | End: 2022-10-27 | Stop reason: HOSPADM

## 2022-10-27 RX ORDER — LORAZEPAM 2 MG/ML
.5-1 INJECTION INTRAMUSCULAR
Status: DISCONTINUED | OUTPATIENT
Start: 2022-10-27 | End: 2022-10-27 | Stop reason: HOSPADM

## 2022-10-27 RX ORDER — GLYCOPYRROLATE 0.2 MG/ML
INJECTION, SOLUTION INTRAMUSCULAR; INTRAVENOUS PRN
Status: DISCONTINUED | OUTPATIENT
Start: 2022-10-27 | End: 2022-10-27

## 2022-10-27 RX ORDER — LIDOCAINE HYDROCHLORIDE 10 MG/ML
INJECTION, SOLUTION INFILTRATION; PERINEURAL PRN
Status: DISCONTINUED | OUTPATIENT
Start: 2022-10-27 | End: 2022-10-27

## 2022-10-27 RX ORDER — LIDOCAINE 40 MG/G
CREAM TOPICAL
Status: DISCONTINUED | OUTPATIENT
Start: 2022-10-27 | End: 2022-10-27 | Stop reason: HOSPADM

## 2022-10-27 RX ORDER — KETOROLAC TROMETHAMINE 30 MG/ML
INJECTION, SOLUTION INTRAMUSCULAR; INTRAVENOUS PRN
Status: DISCONTINUED | OUTPATIENT
Start: 2022-10-27 | End: 2022-10-27

## 2022-10-27 RX ORDER — CEFAZOLIN SODIUM/WATER 2 G/20 ML
2 SYRINGE (ML) INTRAVENOUS SEE ADMIN INSTRUCTIONS
Status: DISCONTINUED | OUTPATIENT
Start: 2022-10-27 | End: 2022-10-27 | Stop reason: HOSPADM

## 2022-10-27 RX ORDER — FENTANYL CITRATE 50 UG/ML
50 INJECTION, SOLUTION INTRAMUSCULAR; INTRAVENOUS EVERY 5 MIN PRN
Status: DISCONTINUED | OUTPATIENT
Start: 2022-10-27 | End: 2022-10-27 | Stop reason: HOSPADM

## 2022-10-27 RX ORDER — EPHEDRINE SULFATE 50 MG/ML
INJECTION, SOLUTION INTRAMUSCULAR; INTRAVENOUS; SUBCUTANEOUS PRN
Status: DISCONTINUED | OUTPATIENT
Start: 2022-10-27 | End: 2022-10-27

## 2022-10-27 RX ORDER — FENTANYL CITRATE 50 UG/ML
INJECTION, SOLUTION INTRAMUSCULAR; INTRAVENOUS PRN
Status: DISCONTINUED | OUTPATIENT
Start: 2022-10-27 | End: 2022-10-27

## 2022-10-27 RX ORDER — PROPOFOL 10 MG/ML
INJECTION, EMULSION INTRAVENOUS PRN
Status: DISCONTINUED | OUTPATIENT
Start: 2022-10-27 | End: 2022-10-27

## 2022-10-27 RX ORDER — OXYCODONE HYDROCHLORIDE 5 MG/1
5 TABLET ORAL
Status: DISCONTINUED | OUTPATIENT
Start: 2022-10-27 | End: 2022-10-27

## 2022-10-27 RX ORDER — ONDANSETRON 2 MG/ML
INJECTION INTRAMUSCULAR; INTRAVENOUS PRN
Status: DISCONTINUED | OUTPATIENT
Start: 2022-10-27 | End: 2022-10-27

## 2022-10-27 RX ORDER — OXYCODONE HYDROCHLORIDE 5 MG/1
5-10 TABLET ORAL EVERY 4 HOURS PRN
Qty: 12 TABLET | Refills: 0 | Status: SHIPPED | OUTPATIENT
Start: 2022-10-27 | End: 2022-11-04

## 2022-10-27 RX ORDER — DEXAMETHASONE SODIUM PHOSPHATE 4 MG/ML
INJECTION, SOLUTION INTRA-ARTICULAR; INTRALESIONAL; INTRAMUSCULAR; INTRAVENOUS; SOFT TISSUE PRN
Status: DISCONTINUED | OUTPATIENT
Start: 2022-10-27 | End: 2022-10-27

## 2022-10-27 RX ORDER — FENTANYL CITRATE 50 UG/ML
25 INJECTION, SOLUTION INTRAMUSCULAR; INTRAVENOUS
Status: DISCONTINUED | OUTPATIENT
Start: 2022-10-27 | End: 2022-10-27 | Stop reason: HOSPADM

## 2022-10-27 RX ORDER — KETAMINE HYDROCHLORIDE 10 MG/ML
INJECTION INTRAMUSCULAR; INTRAVENOUS PRN
Status: DISCONTINUED | OUTPATIENT
Start: 2022-10-27 | End: 2022-10-27

## 2022-10-27 RX ORDER — MAGNESIUM HYDROXIDE 1200 MG/15ML
LIQUID ORAL PRN
Status: DISCONTINUED | OUTPATIENT
Start: 2022-10-27 | End: 2022-10-27 | Stop reason: HOSPADM

## 2022-10-27 RX ORDER — OXYCODONE HYDROCHLORIDE 5 MG/1
5 TABLET ORAL EVERY 4 HOURS PRN
Status: DISCONTINUED | OUTPATIENT
Start: 2022-10-27 | End: 2022-10-27 | Stop reason: HOSPADM

## 2022-10-27 RX ORDER — HYDROMORPHONE HYDROCHLORIDE 1 MG/ML
0.5 INJECTION, SOLUTION INTRAMUSCULAR; INTRAVENOUS; SUBCUTANEOUS EVERY 5 MIN PRN
Status: DISCONTINUED | OUTPATIENT
Start: 2022-10-27 | End: 2022-10-27 | Stop reason: HOSPADM

## 2022-10-27 RX ADMIN — MIDAZOLAM 1 MG: 1 INJECTION INTRAMUSCULAR; INTRAVENOUS at 12:58

## 2022-10-27 RX ADMIN — Medication 20 MG: at 12:58

## 2022-10-27 RX ADMIN — FENTANYL CITRATE 100 MCG: 50 INJECTION, SOLUTION INTRAMUSCULAR; INTRAVENOUS at 12:52

## 2022-10-27 RX ADMIN — DEXAMETHASONE SODIUM PHOSPHATE 8 MG: 4 INJECTION, SOLUTION INTRA-ARTICULAR; INTRALESIONAL; INTRAMUSCULAR; INTRAVENOUS; SOFT TISSUE at 12:52

## 2022-10-27 RX ADMIN — Medication 5 MG: at 13:15

## 2022-10-27 RX ADMIN — Medication 10 MG: at 13:34

## 2022-10-27 RX ADMIN — PROPOFOL 150 MG: 10 INJECTION, EMULSION INTRAVENOUS at 12:52

## 2022-10-27 RX ADMIN — PHENYLEPHRINE HYDROCHLORIDE 50 MCG: 10 INJECTION INTRAVENOUS at 13:02

## 2022-10-27 RX ADMIN — ONDANSETRON HYDROCHLORIDE 4 MG: 2 INJECTION, SOLUTION INTRAVENOUS at 13:40

## 2022-10-27 RX ADMIN — GLYCOPYRROLATE 0.2 MG: 0.2 INJECTION, SOLUTION INTRAMUSCULAR; INTRAVENOUS at 12:58

## 2022-10-27 RX ADMIN — LIDOCAINE HYDROCHLORIDE 50 MG: 10 INJECTION, SOLUTION INFILTRATION; PERINEURAL at 12:52

## 2022-10-27 RX ADMIN — PHENYLEPHRINE HYDROCHLORIDE 50 MCG: 10 INJECTION INTRAVENOUS at 13:08

## 2022-10-27 RX ADMIN — PHENYLEPHRINE HYDROCHLORIDE 50 MCG: 10 INJECTION INTRAVENOUS at 13:15

## 2022-10-27 RX ADMIN — Medication 10 MG: at 13:30

## 2022-10-27 RX ADMIN — Medication 2 G: at 12:42

## 2022-10-27 RX ADMIN — KETOROLAC TROMETHAMINE 15 MG: 30 INJECTION, SOLUTION INTRAMUSCULAR at 12:52

## 2022-10-27 RX ADMIN — PROPOFOL 75 MCG/KG/MIN: 10 INJECTION, EMULSION INTRAVENOUS at 12:55

## 2022-10-27 RX ADMIN — SODIUM CHLORIDE, POTASSIUM CHLORIDE, SODIUM LACTATE AND CALCIUM CHLORIDE: 600; 310; 30; 20 INJECTION, SOLUTION INTRAVENOUS at 11:46

## 2022-10-27 ASSESSMENT — ACTIVITIES OF DAILY LIVING (ADL)
ADLS_ACUITY_SCORE: 35

## 2022-10-27 NOTE — ANESTHESIA PROCEDURE NOTES
Airway       Patient location during procedure: OR  Staff -        CRNA: Karime Crain APRN CRNA       Performed By: CRNAIndications and Patient Condition       Indications for airway management: tino-procedural         Mask difficulty assessment: 0 - not attempted    Final Airway Details       Final airway type: supraglottic airway    Supraglottic Airway Details        Type: LMA       Brand: I-Gel       LMA size: 4    Post intubation assessment        Placement verified by: capnometry, equal breath sounds and chest rise        Number of attempts at approach: 1       Secured with: plastic tape       Ease of procedure: easy       Dentition: Intact

## 2022-10-27 NOTE — ANESTHESIA POSTPROCEDURE EVALUATION
Patient: Flora Hogan    Procedure: Procedure(s):  right breast reexcision lumpectomy       Anesthesia Type:  General    Note:  Disposition: Outpatient   Postop Pain Control: Uneventful            Sign Out: Well controlled pain   PONV: No   Neuro/Psych: Uneventful            Sign Out: Acceptable/Baseline neuro status   Airway/Respiratory: Uneventful            Sign Out: Acceptable/Baseline resp. status   CV/Hemodynamics: Uneventful            Sign Out: Acceptable CV status   Other NRE: NONE   DID A NON-ROUTINE EVENT OCCUR? No           Last vitals:  Vitals Value Taken Time   /95 10/27/22 1440   Temp 97  F (36.1  C) 10/27/22 1440   Pulse 61 10/27/22 1441   Resp 10 10/27/22 1441   SpO2 90 % 10/27/22 1441   Vitals shown include unvalidated device data.    Electronically Signed By: Que Pace MD  October 27, 2022  2:42 PM

## 2022-10-27 NOTE — DISCHARGE INSTRUCTIONS
LUMPECTOMY DISCHARGE INSTRUCTIONS  Dr. MICHELLE Wagner    APPOINTMENT WITH YOUR SURGEON  ALL PATIENTS ARE TO SCHEDULE A POST-OP APPOINTMENT WITH THEIR GENERAL SURGEON.  ONCE YOU ARE HOME, CALL THE OFFICE TO SCHEDULE THE APPOINTMENT FOR 2-3 WEEKS FROM THE DATE OF YOUR SURGERY.  YOU MAY NEED TO BE SEEN SOONER IF YOU HAVE A DRAIN IN PLACE.    APPOINTMENTS TO SEE YOUR GENERAL SURGEON AT ANY OF OUR LOCATIONS CAN BE MADE BY CALLING:  #[phone].    YOU WILL RECEIVE A PHONE CALL FROM YOUR SURGEON WITH THE RESULTS.  YOU WILL BE ABLE TO ASK QUESTIONS AND RECEIVE MORE IN-DEPTH EXPLANATION AT YOUR POST-OP APPOINTMENT.  THIS APPOINTMENT WILL ALSO BE WHEN YOU DISCUSS FURTHER EVALUATION, TREATMENT AND REFERRAL TO ONCOLOGY AND/OR RADIATION ONCOLOGY IF THIS IS NECESSARY.  OCCASIONALLY SPECIAL TESTING MUST BE DONE ON THE SURGICAL SPECIMEN WHICH MAY DELAY THE POSTING OF YOUR FINAL PATHOLOGY REPORT.  YOU MAY CALL FOR YOUR FINAL PATHOLOGY REPORT AFTER 1 P.M. THREE WORKING DAYS AFTER SURGERY TO CHECK ON ITS STATUS.    SUPPORT  WEAR A BRA FOR FOR SUPPORT AND COMFORT FOR 3-7 DAYS, DAY AND NIGHT.     INCISION CARE  -IF YOU HAVE A DERMABOND DRESSING (A TYPE OF SKIN GLUE), YOU MAY SHOWER IMMEDIATELY.  -SUTURES WILL ABSORB AND DO NOT NEED TO BE REMOVED.  -YOU MAY EXPECT A SMALL AMOUNT OF DRAINAGE FROM YOUR INCISION.  -A LUMP/RIDGE UNDER THE INCISION IS NORMAL AND WILL GRADUALLY RESOLVE.    BATHING  YOU ARE ALLOWED TO BATH (SHALLOW WATER IN A TUB ONLY) OR SHOWER 24-48 HOURS AFTER YOUR SURGERY.  MILD SOAP IS OK TO USE NEAR THESE SITES.  WHEN BATHING, DO NOT ALLOW THE INCISION OR DRAIN SITE TO BECOME SUBMERSED IN WATER BECAUSE THIS MAY INCREASE THE RISK OF INFECTION.    ACTIVITIES  CAUTIOUSLY RESUME EXERCISE AND STRENUOUS ACTIVITIES LIKE JOGGING, TENNIS, AEROBICS, ETC.  ALSO, BE CAREFUL OF STRETCHING ACTIVITIES WITH THE OPERATIVE SIDE FOR TWO WEEKS.    DISCOMFORT  LOCAL ANESTHETIC PLACED AT SURGERY SHOULD PROVIDE RELIEF FOR 4-8 HOURS.  BEGIN TAKING  "PAIN PILLS BEFORE DISCOMFORT IS SEVERE.  TAKE THE PAIN MEDICATION WITH SOME FOOD, WHEN POSSIBLE, TO MINIMIZE SIDE EFFECTS.  INTERMITTENT USE OF ICE PACKS MAY HELP DURING THE FIRST 48 HOURS.  EXPECT GRADUAL IMPROVEMENT.    RETURN APPOINTMENT  SCHEDULE A FOLLOW-UP VISIT 2-3 WEEKS POST-OP.  OFFICE PHONE:  [phone]    CONTACT US IF THE FOLLOWING DEVELOPS  1.  A FEVER THAT IS ABOVE 101 DEGREES F.  2.  IF THERE IS A LARGE AMOUNT OF DRAINAGE, BLEEDING OR SWELLING.  3.  SEVERE PAIN THAT IS NOT RELIEVED BY YOUR PRESCRIPTION.  4.  DRAINAGE THAT IS THICK, CLOUDY, YELLOW, GREEN OR WHITE.  5.  ANY OTHER QUESTIONS NOT ANSWERED BY \"FREQUENTLY ASKED QUESTIONS\" SHEET.     GENERAL ANESTHESIA OR SEDATION ADULT DISCHARGE INSTRUCTIONS   SPECIAL PRECAUTIONS FOR 24 HOURS AFTER SURGERY    IT IS NOT UNUSUAL TO FEEL LIGHT-HEADED OR FAINT, UP TO 24 HOURS AFTER SURGERY OR WHILE TAKING PAIN MEDICATION.  IF YOU HAVE THESE SYMPTOMS; SIT FOR A FEW MINUTES BEFORE STANDING AND HAVE SOMEONE ASSIST YOU WHEN YOU GET UP TO WALK OR USE THE BATHROOM.    YOU SHOULD REST AND RELAX FOR THE NEXT 24 HOURS AND YOU MUST MAKE ARRANGEMENTS TO HAVE SOMEONE STAY WITH YOU FOR AT LEAST 24 HOURS AFTER YOUR DISCHARGE.  AVOID HAZARDOUS AND STRENUOUS ACTIVITIES.  DO NOT MAKE IMPORTANT DECISIONS FOR 24 HOURS.    DO NOT DRIVE ANY VEHICLE OR OPERATE MECHANICAL EQUIPMENT FOR 24 HOURS FOLLOWING THE END OF YOUR SURGERY.  EVEN THOUGH YOU MAY FEEL NORMAL, YOUR REACTIONS MAY BE AFFECTED BY THE MEDICATION YOU HAVE RECEIVED.    DO NOT DRINK ALCOHOLIC BEVERAGES FOR 24 HOURS FOLLOWING YOUR SURGERY.    DRINK CLEAR LIQUIDS (APPLE JUICE, GINGER ALE, 7-UP, BROTH, ETC.).  PROGRESS TO YOUR REGULAR DIET AS YOU FEEL ABLE.    YOU MAY HAVE A DRY MOUTH, A SORE THROAT, MUSCLES ACHES OR TROUBLE SLEEPING.  THESE SHOULD GO AWAY AFTER 24 HOURS.    CALL YOUR DOCTOR FOR ANY OF THE FOLLOWING:  SIGNS OF INFECTION (FEVER, GROWING TENDERNESS AT THE SURGERY SITE, A LARGE AMOUNT OF DRAINAGE OR BLEEDING, " SEVERE PAIN, FOUL-SMELLING DRAINAGE, REDNESS OR SWELLING.      IT HAS BEEN OVER 8 TO 10 HOURS SINCE SURGERY AND YOU ARE STILL NOT ABLE TO URINATE (PASS WATER).      You received Toradol, an IV form of Ibuprofen (Motrin) at 1pm.  Do not take any Ibuprofen products until 7pm   Maximum acetaminophen (Tylenol) dose from all sources should not exceed 4 grams (4000 mg) per day.           Dr. Wagner   [facility] [phone]

## 2022-10-27 NOTE — ANESTHESIA PREPROCEDURE EVALUATION
Anesthesia Pre-Procedure Evaluation    Patient: Flora Hogan   MRN: [id number] : 1945        Procedure : Procedure(s):  right breast reexcision lumpectomy          Past Medical History:   Diagnosis Date     Chest tightness     had suspected allergies     Malignant neoplasm of head of pancreas (H) 2020     Seasonal allergies      SOB (shortness of breath)       Past Surgical History:   Procedure Laterality Date     BIOPSY BREAST Right 2022    Procedure: Right radiofrequency localized excisional breast biopsy;  Surgeon: Geovanny Wagner MD;  Location: [facility] OR     CATARACT IOL, RT/LT       COLONOSCOPY  10/14    no repeat needed due to age     COLONOSCOPY N/A 10/7/2014    Procedure: COLONOSCOPY;  Surgeon: Daryl Hanson MD;  Location:  GI     COLONOSCOPY  2019    no further screening     ENDOSCOPIC RETROGRADE CHOLANGIOPANCREATOGRAM N/A 2020    Procedure: ENDOSCOPIC RETROGRADE CHOLANGIOPANCREATOGRAPHY, PLACEMENT OF PANCREATIC STENT, PLACEMENT OF BILIARY STENT;  Surgeon: Yarely Vann MD;  Location:  OR     ESOPHAGOSCOPY, GASTROSCOPY, DUODENOSCOPY (EGD), COMBINED N/A 2020    Procedure: ESOPHAGOGASTRODUODENOSCOPY, WITH FINE NEEDLE ASPIRATION BIOPSY, WITH ENDOSCOPIC ULTRASOUND GUIDANCE, Endoscopic retrograde cholangiopancreatography;  Surgeon: Yarely Vann MD;  Location:  OR     ESOPHAGOSCOPY, GASTROSCOPY, DUODENOSCOPY (EGD), COMBINED N/A 2020    Procedure: ESOPHAGOGASTRODUODENOSCOPY, WITH FINE NEEDLE ASPIRATION BIOPSY, WITH ENDOSCOPIC ULTRASOUND GUIDANCE;  Surgeon: Romina Rosario MD;  Location:  GI     IR CHEST PORT PLACEMENT > 5 YRS OF AGE  2020     IR PORT REMOVAL RIGHT  2020     LAPAROSCOPIC BIOPSY LIVER N/A 2020    Procedure: Diagnostic Laparoscopy with intraoperative ultrasound and Liver Biopsy X2;  Surgeon: Duarte Chaves MD;  Location: [facility] OR     WHIPPLE PROCEDURE N/A 2020    Procedure: Non Pylorus Preserving  Whipple procedure;  Surgeon: Duarte Chaves MD;  Location: [facility] OR      Allergies   Allergen Reactions     Penicillin V Hives     Seasonal Allergies       Social History     Tobacco Use     Smoking status: Never     Smokeless tobacco: Never   Substance Use Topics     Alcohol use: Yes     Comment: social       Wt Readings from Last 1 Encounters:   10/27/22 51.2 kg (112 lb 12.8 oz)        Anesthesia Evaluation            ROS/MED HX  ENT/Pulmonary:  - neg pulmonary ROS     Neurologic:  - neg neurologic ROS     Cardiovascular:  - neg cardiovascular ROS     METS/Exercise Tolerance:     Hematologic:  - neg hematologic  ROS     Musculoskeletal:  - neg musculoskeletal ROS     GI/Hepatic:  - neg GI/hepatic ROS     Renal/Genitourinary:  - neg Renal ROS     Endo:  - neg endo ROS     Psychiatric/Substance Use:  - neg psychiatric ROS     Infectious Disease:       Malignancy:   (+) Malignancy, History of GI and Breast.Breast CA Active status post.  GI CA Remission status post.        Other:            Physical Exam    Airway        Mallampati: II   TM distance: > 3 FB   Neck ROM: full   Mouth opening: > 3 cm    Respiratory Devices and Support         Dental  no notable dental history         Cardiovascular   cardiovascular exam normal          Pulmonary   pulmonary exam normal                OUTSIDE LABS:  CBC:   Lab Results   Component Value Date    WBC 3.0 (L) 07/25/2022    WBC 3.3 (L) 03/28/2022    HGB 13.8 10/20/2022    HGB 14.3 07/25/2022    HCT 44.3 07/25/2022    HCT 44.7 03/28/2022     07/25/2022     03/28/2022     BMP:   Lab Results   Component Value Date     07/25/2022     03/28/2022    POTASSIUM 3.9 07/25/2022    POTASSIUM 3.9 03/28/2022    CHLORIDE 104 07/25/2022    CHLORIDE 101 03/28/2022    CO2 30 07/25/2022    CO2 32 03/28/2022    BUN 16 07/25/2022    BUN 12 03/28/2022    CR 0.84 07/25/2022    CR 0.80 03/28/2022    GLC 69 (L) 07/25/2022    GLC 71 03/28/2022     COAGS:   Lab Results    Component Value Date    PTT 30 07/06/2020    INR 1.05 07/06/2020     POC:   Lab Results   Component Value Date    BGM 86 05/26/2020     HEPATIC:   Lab Results   Component Value Date    ALBUMIN 3.6 07/25/2022    PROTTOTAL 7.1 07/25/2022    ALT 39 07/25/2022    AST 27 07/25/2022    ALKPHOS 131 07/25/2022    BILITOTAL 0.7 07/25/2022     OTHER:   Lab Results   Component Value Date    PH 7.41 05/22/2020    LACT 0.7 05/22/2020    A1C 5.5 05/23/2020    CAMERON 9.1 07/25/2022    PHOS 3.0 05/23/2020    MAG 2.3 07/07/2020    LIPASE 393 04/19/2020    AMYLASE 44 05/25/2020    TSH 0.88 04/28/2016       Anesthesia Plan    ASA Status:  2   NPO Status:  NPO Appropriate    Anesthesia Type: General.     - Airway: LMA   Induction: Intravenous.   Maintenance: Balanced.        Consents    Anesthesia Plan(s) and associated risks, benefits, and realistic alternatives discussed. Questions answered and patient/representative(s) expressed understanding.    - Discussed:     - Discussed with:  Patient      - Extended Intubation/Ventilatory Support Discussed: No.      - Patient is DNR/DNI Status: No    Use of blood products discussed: No .     Postoperative Care    Pain management: IV analgesics.   PONV prophylaxis: Ondansetron (or other 5HT-3), Dexamethasone or Solumedrol, Background Propofol Infusion     Comments:                Que Pace MD

## 2022-10-27 NOTE — ANESTHESIA CARE TRANSFER NOTE
Patient: Flora Hogan    Procedure: Procedure(s):  right breast reexcision lumpectomy       Diagnosis: Ductal carcinoma in situ (DCIS) of right breast [D05.11]  Diagnosis Additional Information: No value filed.    Anesthesia Type:   General     Note:    Oropharynx: spontaneously breathing  Level of Consciousness: awake  Oxygen Supplementation: face mask  Level of Supplemental Oxygen (L/min / FiO2): 4  Independent Airway: airway patency satisfactory and stable  Dentition: dentition unchanged  Vital Signs Stable: post-procedure vital signs reviewed and stable  Report to RN Given: handoff report given  Patient transferred to: PACU    Handoff Report: Identifed the Patient, Identified the Reponsible Provider, Reviewed the pertinent medical history, Discussed the surgical course, Reviewed Intra-OP anesthesia mangement and issues during anesthesia, Set expectations for post-procedure period and Allowed opportunity for questions and acknowledgement of understanding      Vitals:  Vitals Value Taken Time   BP     Temp     Pulse     Resp 20 10/27/22 1339   SpO2 100 % 10/27/22 1339   Vitals shown include unvalidated device data.    Electronically Signed By: MICAELA Andrew CRNA  October 27, 2022  1:40 PM

## 2022-10-28 NOTE — OP NOTE
General Surgery Operative Note    Pre-operative diagnosis:  Ductal carcinoma in situ (DCIS) of right breast [D05.11] - close margins   Post-operative diagnosis: same   Procedure: Right Re-excision lumpectomy   Surgeon: Geovanny Wagner MD   Assistant(s): Cherelle Eller PA-C  - the PA's assistance was medically necessary in providing adequate exposure in the operating field, maintaining hemostasis, cuttting suture, clamping and ligating blood vessels, and visualization of the anatomic structures throughout the surgical procedure.   Anesthesia: General    Estimated blood loss: 5 cc's   Drains placed: None   Complications:  None   Findings:  Inferior and medial margin tissue removed     Indications for operation: This is a 77-year-old woman who recently underwent a biopsy for atypical ductal hyperplasia.  This revealed a fairly broad area of ductal carcinoma in situ.  Margins were less than 1 mm at the inferior and medial margins.  Reexcision lumpectomy was recommended.  We discussed the procedure, along with its risks and complications, in detail.  The patient agreed to proceed.    Details of the operation: After informed consent, the patient was taken to the operating room, where she underwent satisfactory induction of general anesthesia.  The patient was sterilely prepped and draped and the patient's old incision was reopened.  We dissected down into the biopsy cavity and then proceeded to use electrocautery to dissect the inferior and medial margin tissue out as a single piece.  This was brought out and cut at the junction of the medial and inferior portions.  Each piece was then separately marked for orientation.  This was then reviewed with pathology.  Hemostasis was assured using electrocautery.  The incision was irrigated out and local anesthetic was injected.  Marking clips were placed and the subdermal tissues were approximated using interrupted 3-0 Vicryl sutures.  Skin was closed using skin  adhesive.    The patient tolerated the procedure well and was transferred to the recovery room in satisfactory condition.  Sponge and needle counts were correct at the close of the case.    Specimens:   ID Type Source Tests Collected by Time Destination   1 : RE-EXCISION RIGHT BREAST MEDIAL MARGIN Lumpectomy Breast, Right SURGICAL PATHOLOGY EXAM Geovanny Wagner MD 10/27/2022  1:22 PM    2 : RE-EXCISION RIGHT BREAST INFERIOR MARGIN Lumpectomy Breast, Right SURGICAL PATHOLOGY EXAM Geovanny Wagner MD 10/27/2022  1:25 PM            Geovanny Wagner MD

## 2022-11-01 LAB
PATH REPORT.COMMENTS IMP SPEC: ABNORMAL
PATH REPORT.COMMENTS IMP SPEC: YES
PATH REPORT.FINAL DX SPEC: ABNORMAL
PATH REPORT.GROSS SPEC: ABNORMAL
PATH REPORT.MICROSCOPIC SPEC OTHER STN: ABNORMAL
PATH REPORT.RELEVANT HX SPEC: ABNORMAL
PATHOLOGY SYNOPTIC REPORT: ABNORMAL
PHOTO IMAGE: ABNORMAL

## 2022-11-01 PROCEDURE — 88305 TISSUE EXAM BY PATHOLOGIST: CPT | Mod: 26 | Performed by: STUDENT IN AN ORGANIZED HEALTH CARE EDUCATION/TRAINING PROGRAM

## 2022-11-01 PROCEDURE — 88342 IMHCHEM/IMCYTCHM 1ST ANTB: CPT | Mod: 26 | Performed by: STUDENT IN AN ORGANIZED HEALTH CARE EDUCATION/TRAINING PROGRAM

## 2022-11-01 PROCEDURE — 88341 IMHCHEM/IMCYTCHM EA ADD ANTB: CPT | Mod: 26 | Performed by: STUDENT IN AN ORGANIZED HEALTH CARE EDUCATION/TRAINING PROGRAM

## 2022-11-01 PROCEDURE — 88360 TUMOR IMMUNOHISTOCHEM/MANUAL: CPT | Mod: 26 | Performed by: STUDENT IN AN ORGANIZED HEALTH CARE EDUCATION/TRAINING PROGRAM

## 2022-11-04 ENCOUNTER — TELEPHONE (OUTPATIENT)
Dept: SURGERY | Facility: CLINIC | Age: 77
End: 2022-11-04

## 2022-11-04 ENCOUNTER — OFFICE VISIT (OUTPATIENT)
Dept: SURGERY | Facility: CLINIC | Age: 77
End: 2022-11-04
Payer: COMMERCIAL

## 2022-11-04 VITALS
OXYGEN SATURATION: 98 % | SYSTOLIC BLOOD PRESSURE: 114 MMHG | BODY MASS INDEX: 19.12 KG/M2 | HEIGHT: 64 IN | WEIGHT: 112 LBS | DIASTOLIC BLOOD PRESSURE: 70 MMHG | RESPIRATION RATE: 20 BRPM | HEART RATE: 56 BPM

## 2022-11-04 DIAGNOSIS — Z17.0 MALIGNANT NEOPLASM OF LOWER-INNER QUADRANT OF RIGHT BREAST OF FEMALE, ESTROGEN RECEPTOR POSITIVE (H): Primary | ICD-10-CM

## 2022-11-04 DIAGNOSIS — K43.2 INCISIONAL HERNIA, WITHOUT OBSTRUCTION OR GANGRENE: ICD-10-CM

## 2022-11-04 DIAGNOSIS — C50.311 MALIGNANT NEOPLASM OF LOWER-INNER QUADRANT OF RIGHT BREAST OF FEMALE, ESTROGEN RECEPTOR POSITIVE (H): Primary | ICD-10-CM

## 2022-11-04 PROCEDURE — 99215 OFFICE O/P EST HI 40 MIN: CPT | Mod: 24 | Performed by: SURGERY

## 2022-11-04 NOTE — TELEPHONE ENCOUNTER
Type of surgery: RE-EXCISION RIGHT LUMPECTOMY, RIGHT SENTINEL NODE BIOPSY   Location of surgery: [facility]  Date and time of surgery: 11-11-22, 12:45 PM   Surgeon: DR. KRAUS   Pre-Op Appt Date: 10/20/22   Post-Op Appt Date: NA    Packet sent out: GIVEN TO PATIENT   Pre-cert/Authorization completed:  Not Applicable  Date: 11-4-22      RE-EXCISION RIGHT LUMPECTOMY, RIGHT SENTINEL NODE BIOPSY   GENERAL   H&P DONE 10/20  90 MINS REQ  PA ASSIST DFB   ALW

## 2022-11-04 NOTE — LETTER
2022    RE: Flora Hogan, : 1945      The patient returns today to follow-up after her recent reexcision right lumpectomy and also to discuss her incisional hernia after her Whipple procedure.  The hernia bothers her minimally and has not been changing much.     The patient's incision is well-healed.  There is a small amount of ecchymosis.     The upper abdomen reveals a complex hernia with multiple defects in a line from the umbilicus up nearly to the xiphoid.  There appears to be permanent suture present.  All identifiable bulges are easily reducible.     Pathology revealed 2 areas of invasive cancer at the inferior margin.  1 of these had a positive margin.  These measured 3 mm and 4 mm.  Estrogen and progesterone receptors were positive.  HER2 was 3+ positive.     Assessment and plan: We had a long discussion about this new finding of invasive breast cancer with positive margin.  These are still very small lesions.  I explained that with an invasive cancer, we needed to evaluate the lymph nodes and also resected to negative margins.  This could be done with either reexcision lumpectomy, which I am willing to give 1 more try to, or with a mastectomy.  Mastectomy could be done with or without reconstruction.  We discussed the surgery, along with its risks and complications, in detail.  The patient's preference is to try a reexcision 1 more time.  We will therefore work on scheduling her for a reexcision lumpectomy and sentinel node biopsy.  She will be following up with Dr. Peoples later this month.     Regarding the patient's hernia, this is relatively complex given the broad affected area and her previous surgical history.  She is relatively minimally bothered by it at this point.  We could certainly consider repair, though I think it might well require an open technique, given the patient's previous Whipple procedure.  I have suggested that we would discuss this further once the patient is  done with her breast cancer treatment.       Geovnany Wagner MD  [facility]

## 2022-11-04 NOTE — PROGRESS NOTES
The patient returns today to follow-up after her recent reexcision right lumpectomy and also to discuss her incisional hernia after her Whipple procedure.  The hernia bothers her minimally and has not been changing much.    The patient's incision is well-healed.  There is a small amount of ecchymosis.    The upper abdomen reveals a complex hernia with multiple defects in a line from the umbilicus up nearly to the xiphoid.  There appears to be permanent suture present.  All identifiable bulges are easily reducible.    Pathology revealed 2 areas of invasive cancer at the inferior margin.  1 of these had a positive margin.  These measured 3 mm and 4 mm.  Estrogen and progesterone receptors were positive.  HER2 was 3+ positive.    Assessment and plan: We had a long discussion about this new finding of invasive breast cancer with positive margin.  These are still very small lesions.  I explained that with an invasive cancer, we needed to evaluate the lymph nodes and also resected to negative margins.  This could be done with either reexcision lumpectomy, which I am willing to give 1 more try to, or with a mastectomy.  Mastectomy could be done with or without reconstruction.  We discussed the surgery, along with its risks and complications, in detail.  The patient's preference is to try a reexcision 1 more time.  We will therefore work on scheduling her for a reexcision lumpectomy and sentinel node biopsy.  She will be following up with Dr. Peoples later this month.    Regarding the patient's hernia, this is relatively complex given the broad affected area and her previous surgical history.  She is relatively minimally bothered by it at this point.  We could certainly consider repair, though I think it might well require an open technique, given the patient's previous Whipple procedure.  I have suggested that we would discuss this further once the patient is done with her breast cancer treatment.    A total of 45 minutes  was spent today in chart review, patient examination and discussion, and documentation.  This is a new diagnosis and should be considered outside of the global period.    Geovanny Wagner MD  [facility]    Please route or send letter to:  Primary Care Provider (PCP) and Dr. Jc Peoples at the [facility] oncology

## 2022-11-04 NOTE — LETTER
[facility]    [address], [address]  [place], [place] [address]  Phone [phone]  Fax [phone]    [address], [address]  [place] [address]  Phone [phone]  Fax [phone]    [email or web address]   2022       Flora Hogan    RE: [id number]  : 1945    Flora Hogan has been scheduled for surgery on 22 at 12:45 pm at [facility] with Dr Geovanny Wagner.  The hospital is located at [address] in [place].      Please check in at the Surgery reception desk at 10:45 am. This is located in the back of the hospital on the East side, just past the Emergency Room entrance.       DO NOT EAT OR DRINK ANYTHING 8 HOURS BEFORE YOUR ARRIVAL TIME.   You may have sips of clear liquids up until 2 hours before your arrival time. If you have been advised to take your medication, please do this early in the morning with just sips of clear liquid.       Hospital regulations require an updated pre-operative examination to be completed within 30 days of the procedure. This can be done by your primary care provider. Please ask them to fax documentation to [phone]. We also recommend you bring a copy with you.       During the current pandemic, a COVID-19 at home rapid antigen test is required   1-2 days before your procedure per hospital regulations. You can buy these at many pharmacy stores. Or you can order free, at-home tests at covid.gov/tests  ? If your test is negative, please take a photo of your test. Show the photo to the nurse when you come in for your procedure.  ? If your test is positive, please call our office at [phone].      You should shower before your surgery with Hibiclens or Exidine soap.  This can be found at your local pharmacy or you can pick it up from our office for free.  Please call our office if you have any questions.       You will be  required to have an Adult (friend or family member) drive you home after your surgery and arrange for an adult to stay with you until the next morning.       You will receive several calls from our staff 3-7 days prior to your scheduled procedure with further details and to answer any questions you may have.      It is sometimes necessary to adjust the surgery schedule due to emergencies and additions to the schedule.  If your surgery is affected by this, we greatly appreciate your flexibility and understanding in this matter      It is best if you call regarding post-operative questions between the hours of 8:00 am & 3:00 pm Monday-Friday, so you have access to the daytime care team that know you best.  ? Prescription refills are accepted during regular office hours only.      Please do not bring any Disability or FMLA papers to the hospital.  They need to be either faxed ([phone]), mailed or hand delivered to our office by you or a family member for completion.  ? Please allow 14 business days to complete paperwork.        If you have questions or concerns, please contact our office at [phone].

## 2022-11-07 DIAGNOSIS — C25.0 MALIGNANT NEOPLASM OF HEAD OF PANCREAS (H): Primary | ICD-10-CM

## 2022-11-08 ENCOUNTER — ANCILLARY PROCEDURE (OUTPATIENT)
Dept: CT IMAGING | Facility: CLINIC | Age: 77
End: 2022-11-08
Attending: INTERNAL MEDICINE
Payer: COMMERCIAL

## 2022-11-08 DIAGNOSIS — C25.0 MALIGNANT NEOPLASM OF HEAD OF PANCREAS (H): ICD-10-CM

## 2022-11-08 LAB
CREAT BLD-MCNC: 0.9 MG/DL (ref 0.5–1)
GFR SERPL CREATININE-BSD FRML MDRD: >60 ML/MIN/1.73M2

## 2022-11-08 PROCEDURE — 74177 CT ABD & PELVIS W/CONTRAST: CPT

## 2022-11-08 PROCEDURE — 255N000002 HC RX 255 OP 636: Performed by: INTERNAL MEDICINE

## 2022-11-08 PROCEDURE — 82565 ASSAY OF CREATININE: CPT

## 2022-11-08 RX ADMIN — IOHEXOL 100 ML: 350 INJECTION, SOLUTION INTRAVENOUS at 07:05

## 2022-11-11 ENCOUNTER — ANESTHESIA (OUTPATIENT)
Dept: SURGERY | Facility: CLINIC | Age: 77
End: 2022-11-11
Payer: COMMERCIAL

## 2022-11-11 ENCOUNTER — HOSPITAL ENCOUNTER (OUTPATIENT)
Dept: NUCLEAR MEDICINE | Facility: CLINIC | Age: 77
Setting detail: NUCLEAR MEDICINE
Discharge: HOME | End: 2022-11-11
Attending: SURGERY | Admitting: SURGERY
Payer: COMMERCIAL

## 2022-11-11 ENCOUNTER — ANESTHESIA EVENT (OUTPATIENT)
Dept: SURGERY | Facility: CLINIC | Age: 77
End: 2022-11-11
Payer: COMMERCIAL

## 2022-11-11 ENCOUNTER — HOSPITAL ENCOUNTER (OUTPATIENT)
Facility: CLINIC | Age: 77
Discharge: HOME | End: 2022-11-11
Attending: SURGERY | Admitting: SURGERY
Payer: COMMERCIAL

## 2022-11-11 ENCOUNTER — APPOINTMENT (OUTPATIENT)
Dept: SURGERY | Facility: PHYSICIAN GROUP | Age: 77
End: 2022-11-11
Payer: COMMERCIAL

## 2022-11-11 VITALS
SYSTOLIC BLOOD PRESSURE: 113 MMHG | HEART RATE: 60 BPM | DIASTOLIC BLOOD PRESSURE: 66 MMHG | RESPIRATION RATE: 16 BRPM | BODY MASS INDEX: 19.43 KG/M2 | WEIGHT: 113.8 LBS | OXYGEN SATURATION: 99 % | TEMPERATURE: 98.3 F | HEIGHT: 64 IN

## 2022-11-11 DIAGNOSIS — C50.311 MALIGNANT NEOPLASM OF LOWER-INNER QUADRANT OF RIGHT BREAST OF FEMALE, ESTROGEN RECEPTOR POSITIVE (H): ICD-10-CM

## 2022-11-11 DIAGNOSIS — Z17.0 MALIGNANT NEOPLASM OF LOWER-INNER QUADRANT OF RIGHT BREAST OF FEMALE, ESTROGEN RECEPTOR POSITIVE (H): ICD-10-CM

## 2022-11-11 PROCEDURE — 272N000001 HC OR GENERAL SUPPLY STERILE: Performed by: SURGERY

## 2022-11-11 PROCEDURE — A9520 TC99 TILMANOCEPT DIAG 0.5MCI: HCPCS | Performed by: SURGERY

## 2022-11-11 PROCEDURE — 88307 TISSUE EXAM BY PATHOLOGIST: CPT | Mod: TC | Performed by: SURGERY

## 2022-11-11 PROCEDURE — 250N000009 HC RX 250: Performed by: SURGERY

## 2022-11-11 PROCEDURE — 250N000011 HC RX IP 250 OP 636: Performed by: NURSE ANESTHETIST, CERTIFIED REGISTERED

## 2022-11-11 PROCEDURE — 19301 PARTIAL MASTECTOMY: CPT | Mod: RT | Performed by: SURGERY

## 2022-11-11 PROCEDURE — 250N000013 HC RX MED GY IP 250 OP 250 PS 637: Performed by: ANESTHESIOLOGY

## 2022-11-11 PROCEDURE — 258N000003 HC RX IP 258 OP 636: Performed by: ANESTHESIOLOGY

## 2022-11-11 PROCEDURE — 250N000009 HC RX 250: Performed by: NURSE ANESTHETIST, CERTIFIED REGISTERED

## 2022-11-11 PROCEDURE — 88342 IMHCHEM/IMCYTCHM 1ST ANTB: CPT | Mod: TC | Performed by: SURGERY

## 2022-11-11 PROCEDURE — 250N000011 HC RX IP 250 OP 636: Performed by: ANESTHESIOLOGY

## 2022-11-11 PROCEDURE — 999N000141 HC STATISTIC PRE-PROCEDURE NURSING ASSESSMENT: Performed by: SURGERY

## 2022-11-11 PROCEDURE — 370N000017 HC ANESTHESIA TECHNICAL FEE, PER MIN: Performed by: SURGERY

## 2022-11-11 PROCEDURE — 38792 RA TRACER ID OF SENTINL NODE: CPT

## 2022-11-11 PROCEDURE — 343N000001 HC RX 343: Performed by: SURGERY

## 2022-11-11 PROCEDURE — 360N000076 HC SURGERY LEVEL 3, PER MIN: Performed by: SURGERY

## 2022-11-11 PROCEDURE — 250N000011 HC RX IP 250 OP 636: Performed by: SURGERY

## 2022-11-11 PROCEDURE — 710N000012 HC RECOVERY PHASE 2, PER MINUTE: Performed by: SURGERY

## 2022-11-11 PROCEDURE — 710N000009 HC RECOVERY PHASE 1, LEVEL 1, PER MIN: Performed by: SURGERY

## 2022-11-11 RX ORDER — DEXAMETHASONE SODIUM PHOSPHATE 4 MG/ML
INJECTION, SOLUTION INTRA-ARTICULAR; INTRALESIONAL; INTRAMUSCULAR; INTRAVENOUS; SOFT TISSUE PRN
Status: DISCONTINUED | OUTPATIENT
Start: 2022-11-11 | End: 2022-11-11

## 2022-11-11 RX ORDER — LABETALOL HYDROCHLORIDE 5 MG/ML
10 INJECTION, SOLUTION INTRAVENOUS
Status: DISCONTINUED | OUTPATIENT
Start: 2022-11-11 | End: 2022-11-11 | Stop reason: HOSPADM

## 2022-11-11 RX ORDER — NALOXONE HYDROCHLORIDE 0.4 MG/ML
0.4 INJECTION, SOLUTION INTRAMUSCULAR; INTRAVENOUS; SUBCUTANEOUS
Status: DISCONTINUED | OUTPATIENT
Start: 2022-11-11 | End: 2022-11-11 | Stop reason: HOSPADM

## 2022-11-11 RX ORDER — SODIUM CHLORIDE, SODIUM LACTATE, POTASSIUM CHLORIDE, CALCIUM CHLORIDE 600; 310; 30; 20 MG/100ML; MG/100ML; MG/100ML; MG/100ML
INJECTION, SOLUTION INTRAVENOUS CONTINUOUS
Status: DISCONTINUED | OUTPATIENT
Start: 2022-11-11 | End: 2022-11-11 | Stop reason: HOSPADM

## 2022-11-11 RX ORDER — ONDANSETRON 4 MG/1
4 TABLET, ORALLY DISINTEGRATING ORAL EVERY 8 HOURS PRN
Qty: 10 TABLET | Refills: 0 | Status: SHIPPED | OUTPATIENT
Start: 2022-11-11 | End: 2022-12-15

## 2022-11-11 RX ORDER — MAGNESIUM HYDROXIDE 1200 MG/15ML
LIQUID ORAL PRN
Status: DISCONTINUED | OUTPATIENT
Start: 2022-11-11 | End: 2022-11-11 | Stop reason: HOSPADM

## 2022-11-11 RX ORDER — EPHEDRINE SULFATE 50 MG/ML
INJECTION, SOLUTION INTRAMUSCULAR; INTRAVENOUS; SUBCUTANEOUS PRN
Status: DISCONTINUED | OUTPATIENT
Start: 2022-11-11 | End: 2022-11-11

## 2022-11-11 RX ORDER — NALOXONE HYDROCHLORIDE 0.4 MG/ML
0.2 INJECTION, SOLUTION INTRAMUSCULAR; INTRAVENOUS; SUBCUTANEOUS
Status: DISCONTINUED | OUTPATIENT
Start: 2022-11-11 | End: 2022-11-11 | Stop reason: HOSPADM

## 2022-11-11 RX ORDER — PROPOFOL 10 MG/ML
INJECTION, EMULSION INTRAVENOUS CONTINUOUS PRN
Status: DISCONTINUED | OUTPATIENT
Start: 2022-11-11 | End: 2022-11-11

## 2022-11-11 RX ORDER — MEPERIDINE HYDROCHLORIDE 25 MG/ML
12.5 INJECTION INTRAMUSCULAR; INTRAVENOUS; SUBCUTANEOUS
Status: DISCONTINUED | OUTPATIENT
Start: 2022-11-11 | End: 2022-11-11 | Stop reason: HOSPADM

## 2022-11-11 RX ORDER — KETOROLAC TROMETHAMINE 15 MG/ML
15 INJECTION, SOLUTION INTRAMUSCULAR; INTRAVENOUS EVERY 6 HOURS PRN
Status: DISCONTINUED | OUTPATIENT
Start: 2022-11-11 | End: 2022-11-11 | Stop reason: HOSPADM

## 2022-11-11 RX ORDER — LIDOCAINE 40 MG/G
CREAM TOPICAL
Status: DISCONTINUED | OUTPATIENT
Start: 2022-11-11 | End: 2022-11-11 | Stop reason: HOSPADM

## 2022-11-11 RX ORDER — HYDRALAZINE HYDROCHLORIDE 20 MG/ML
2.5-5 INJECTION INTRAMUSCULAR; INTRAVENOUS EVERY 10 MIN PRN
Status: DISCONTINUED | OUTPATIENT
Start: 2022-11-11 | End: 2022-11-11 | Stop reason: HOSPADM

## 2022-11-11 RX ORDER — BUPIVACAINE HYDROCHLORIDE 2.5 MG/ML
INJECTION, SOLUTION EPIDURAL; INFILTRATION; INTRACAUDAL PRN
Status: DISCONTINUED | OUTPATIENT
Start: 2022-11-11 | End: 2022-11-11 | Stop reason: HOSPADM

## 2022-11-11 RX ORDER — OXYCODONE HYDROCHLORIDE 5 MG/1
5 TABLET ORAL EVERY 4 HOURS PRN
Status: DISCONTINUED | OUTPATIENT
Start: 2022-11-11 | End: 2022-11-11 | Stop reason: HOSPADM

## 2022-11-11 RX ORDER — CLINDAMYCIN PHOSPHATE 900 MG/50ML
900 INJECTION, SOLUTION INTRAVENOUS SEE ADMIN INSTRUCTIONS
Status: DISCONTINUED | OUTPATIENT
Start: 2022-11-11 | End: 2022-11-11 | Stop reason: HOSPADM

## 2022-11-11 RX ORDER — HYDROMORPHONE HCL IN WATER/PF 6 MG/30 ML
0.2 PATIENT CONTROLLED ANALGESIA SYRINGE INTRAVENOUS EVERY 5 MIN PRN
Status: DISCONTINUED | OUTPATIENT
Start: 2022-11-11 | End: 2022-11-11 | Stop reason: HOSPADM

## 2022-11-11 RX ORDER — LIDOCAINE HYDROCHLORIDE 10 MG/ML
INJECTION, SOLUTION INFILTRATION; PERINEURAL PRN
Status: DISCONTINUED | OUTPATIENT
Start: 2022-11-11 | End: 2022-11-11

## 2022-11-11 RX ORDER — ONDANSETRON 2 MG/ML
4 INJECTION INTRAMUSCULAR; INTRAVENOUS EVERY 30 MIN PRN
Status: DISCONTINUED | OUTPATIENT
Start: 2022-11-11 | End: 2022-11-11 | Stop reason: HOSPADM

## 2022-11-11 RX ORDER — OXYCODONE HYDROCHLORIDE 5 MG/1
5 TABLET ORAL
Status: DISCONTINUED | OUTPATIENT
Start: 2022-11-11 | End: 2022-11-11 | Stop reason: HOSPADM

## 2022-11-11 RX ORDER — ONDANSETRON 2 MG/ML
INJECTION INTRAMUSCULAR; INTRAVENOUS PRN
Status: DISCONTINUED | OUTPATIENT
Start: 2022-11-11 | End: 2022-11-11

## 2022-11-11 RX ORDER — FENTANYL CITRATE 50 UG/ML
25 INJECTION, SOLUTION INTRAMUSCULAR; INTRAVENOUS
Status: DISCONTINUED | OUTPATIENT
Start: 2022-11-11 | End: 2022-11-11 | Stop reason: HOSPADM

## 2022-11-11 RX ORDER — ONDANSETRON 4 MG/1
4 TABLET, ORALLY DISINTEGRATING ORAL EVERY 30 MIN PRN
Status: DISCONTINUED | OUTPATIENT
Start: 2022-11-11 | End: 2022-11-11 | Stop reason: HOSPADM

## 2022-11-11 RX ORDER — FENTANYL CITRATE 50 UG/ML
INJECTION, SOLUTION INTRAMUSCULAR; INTRAVENOUS PRN
Status: DISCONTINUED | OUTPATIENT
Start: 2022-11-11 | End: 2022-11-11

## 2022-11-11 RX ORDER — PROPOFOL 10 MG/ML
INJECTION, EMULSION INTRAVENOUS PRN
Status: DISCONTINUED | OUTPATIENT
Start: 2022-11-11 | End: 2022-11-11

## 2022-11-11 RX ORDER — OXYCODONE HYDROCHLORIDE 5 MG/1
5-10 TABLET ORAL EVERY 4 HOURS PRN
Qty: 12 TABLET | Refills: 0 | Status: SHIPPED | OUTPATIENT
Start: 2022-11-11 | End: 2022-12-15

## 2022-11-11 RX ORDER — CLINDAMYCIN PHOSPHATE 900 MG/50ML
900 INJECTION, SOLUTION INTRAVENOUS
Status: COMPLETED | OUTPATIENT
Start: 2022-11-11 | End: 2022-11-11

## 2022-11-11 RX ORDER — FENTANYL CITRATE 50 UG/ML
25 INJECTION, SOLUTION INTRAMUSCULAR; INTRAVENOUS EVERY 5 MIN PRN
Status: DISCONTINUED | OUTPATIENT
Start: 2022-11-11 | End: 2022-11-11 | Stop reason: HOSPADM

## 2022-11-11 RX ADMIN — OXYCODONE HYDROCHLORIDE 5 MG: 5 TABLET ORAL at 15:26

## 2022-11-11 RX ADMIN — FENTANYL CITRATE 100 MCG: 50 INJECTION, SOLUTION INTRAMUSCULAR; INTRAVENOUS at 13:28

## 2022-11-11 RX ADMIN — CLINDAMYCIN PHOSPHATE 900 MG: 900 INJECTION, SOLUTION INTRAVENOUS at 11:57

## 2022-11-11 RX ADMIN — MIDAZOLAM 1 MG: 1 INJECTION INTRAMUSCULAR; INTRAVENOUS at 13:20

## 2022-11-11 RX ADMIN — LIDOCAINE HYDROCHLORIDE 50 MG: 10 INJECTION, SOLUTION INFILTRATION; PERINEURAL at 13:28

## 2022-11-11 RX ADMIN — ONDANSETRON HYDROCHLORIDE 4 MG: 2 INJECTION, SOLUTION INTRAVENOUS at 14:11

## 2022-11-11 RX ADMIN — SODIUM CHLORIDE, POTASSIUM CHLORIDE, SODIUM LACTATE AND CALCIUM CHLORIDE: 600; 310; 30; 20 INJECTION, SOLUTION INTRAVENOUS at 11:56

## 2022-11-11 RX ADMIN — FENTANYL CITRATE 50 MCG: 50 INJECTION, SOLUTION INTRAMUSCULAR; INTRAVENOUS at 13:49

## 2022-11-11 RX ADMIN — FENTANYL CITRATE 25 MCG: 0.05 INJECTION, SOLUTION INTRAMUSCULAR; INTRAVENOUS at 14:55

## 2022-11-11 RX ADMIN — PROPOFOL 50 MCG/KG/MIN: 10 INJECTION, EMULSION INTRAVENOUS at 13:28

## 2022-11-11 RX ADMIN — TILMANOCEPT 0.53 MCI.: KIT at 11:48

## 2022-11-11 RX ADMIN — PROPOFOL 200 MG: 10 INJECTION, EMULSION INTRAVENOUS at 13:28

## 2022-11-11 RX ADMIN — DEXAMETHASONE SODIUM PHOSPHATE 4 MG: 4 INJECTION, SOLUTION INTRA-ARTICULAR; INTRALESIONAL; INTRAMUSCULAR; INTRAVENOUS; SOFT TISSUE at 13:28

## 2022-11-11 RX ADMIN — Medication 5 MG: at 13:44

## 2022-11-11 RX ADMIN — Medication 10 MG: at 13:28

## 2022-11-11 RX ADMIN — Medication 10 MG: at 13:31

## 2022-11-11 ASSESSMENT — ACTIVITIES OF DAILY LIVING (ADL)
ADLS_ACUITY_SCORE: 35

## 2022-11-11 NOTE — ANESTHESIA PROCEDURE NOTES
Airway       Patient location during procedure: OR  Staff -        Anesthesiologist:  Chad Garcia MD       CRNA: Felicia Valiente APRN CRNA       Performed By: CRNA  Consent for Airway        Urgency: elective  Indications and Patient Condition       Indications for airway management: tino-procedural       Induction type:intravenous       Mask difficulty assessment: 1 - vent by mask    Final Airway Details       Final airway type: supraglottic airway    Supraglottic Airway Details        Type: LMA       Brand: I-Gel       LMA size: 4    Post intubation assessment        Placement verified by: capnometry, equal breath sounds and chest rise        Number of attempts at approach: 1       Secured with: commercial tube de león       Ease of procedure: easy       Dentition: Intact and Unchanged

## 2022-11-11 NOTE — OP NOTE
1142: Patient arrived from IR s/p left lower extremity angiogram with RN. Right femoral access site; clean, dry, and soft. Patient is alert and awake. Updated on POC.    1145: Family at bedside, updated on POC. Patient offered water and snacks. Patient tolerating PO intake.    1300: Criteria met to transfer patient to floor.    1335: Handoff report called to KEENA Pacheco. All questions answered.    1355: Patient taken by transport to [facility]-. No signs of distress. All belongings in possession of patient.   General Surgery Operative Note    Pre-operative diagnosis:  Malignant neoplasm of lower-inner quadrant of right breast of female, estrogen receptor positive (H) [C50.311, Z17.0], inferior margin positive   Post-operative diagnosis:  Same   Procedure:  Reexcision right lumpectomy, right axillary sentinel lymph node biopsy   Surgeon: Geovanny Wagner MD   Assistant(s): Jessie Loya PA-C  - the PA's assistance was medically necessary in providing adequate exposure in the operating field, maintaining hemostasis, cuttting suture, clamping and ligating blood vessels, and visualization of the anatomic structures throughout the surgical procedure.   Anesthesia: General    Estimated blood loss: 5 cc's   Drains placed: None   Complications:  None   Findings:   [place] nodes x2 removed.  Inferior margin was reexcised and marked for orientation     Indications for operation: This is a 77-year-old woman who initially had a diagnosis of atypical ductal hyperplasia.  The patient underwent an excisional biopsy and this revealed DCIS with a close margin at the inferior and medial margin.  Subsequent reexcision revealed 2 separate areas of invasive ductal carcinoma, 1 with a positive margin at the inferior margin.  Reexcision and sentinel node biopsy was discussed.  We also discussed the possibility of proceeding to mastectomy, but the patient preferred to try reexcision of the lumpectomy 1 more time.  We discussed the procedure, along with its risks and complications, in detail.  The patient agreed to proceed.    Details of the operation: After informed consent, the patient underwent injection of the radioactive tracing agent in the preoperative holding area.  She was brought to the operating room where she underwent satisfactory induction of general anesthesia.  The patient was sterilely prepped and draped and we injected 3 cc of dilute methylene blue in the subareolar area.  This was allowed to circulate.  An axillary  incision was made over a radioactive hotspot.  Electrocautery was used to enter the axillary space.  We then utilized blunt dissection and harmonic scalpel to first identify and then dissected free 2 lymph nodes, 1 of which was blue and yielded counts of 1167  ([place] lymph node #2), and 1 of which showed no blue coloration but yielded counts of 592 (sentinel lymph node #1).  These were sent for permanent section.  The remainder of the axilla was at background levels of radiation.  We now proceeded to reexcised the lumpectomy site.  The old incision was reopened and the majority of the inferior margin was found to be at maximal margins with a very thin flap.  We carried this down further to remove the remainder of any inferior margin tissue carrying this down under the previous lumpectomy cavity to the chest wall musculature.  The specimen was removed and marked for orientation with a short suture on the new inferior margin and a long suture laterally.  Hemostasis was assured using electrocautery.  Local anesthetic was injected in both incisions and both incisions were irrigated out.  Marking clips were placed in the reexcision site of the lumpectomy.  The subdermal tissues were now approximated using interrupted 3-0 Vicryl sutures and the skin was closed using skin adhesive.    The patient tolerated the procedure well and was transferred to the recovery room in satisfactory condition.  Sponge and needle counts were correct at the close of the case.    Specimens:   ID Type Source Tests Collected by Time Destination   1 : axillary sentinel node #1 Tissue Lymph Node(s), Axillary, Right SURGICAL PATHOLOGY EXAM Geovanny Wagner MD 11/11/2022  1:56 PM    2 : right axillary sentinel node #2 Tissue Lymph Node(s), Axillary, Right SURGICAL PATHOLOGY EXAM Geovanny Wagner MD 11/11/2022  1:59 PM    3 : right breast reexcision Tissue Breast, Right SURGICAL PATHOLOGY EXAM Geovanny Wagner MD 11/11/2022  2:09 PM             Geovanny Wagner MD

## 2022-11-11 NOTE — ANESTHESIA PREPROCEDURE EVALUATION
Anesthesia Pre-Procedure Evaluation    Patient: Flora Hogan   MRN: [id number] : 1945        Procedure : Procedure(s):  Reexcision right lumpectomy, right sentinel node biopsy          Past Medical History:   Diagnosis Date     Chest tightness     had suspected allergies     Malignant neoplasm of head of pancreas (H) 2020     Seasonal allergies      SOB (shortness of breath)       Past Surgical History:   Procedure Laterality Date     BIOPSY BREAST Right 2022    Procedure: Right radiofrequency localized excisional breast biopsy;  Surgeon: Geovanny Wagner MD;  Location: [facility] OR     CATARACT IOL, RT/LT       COLONOSCOPY  10/14    no repeat needed due to age     COLONOSCOPY N/A 10/7/2014    Procedure: COLONOSCOPY;  Surgeon: Daryl Hanson MD;  Location:  GI     COLONOSCOPY  2019    no further screening     ENDOSCOPIC RETROGRADE CHOLANGIOPANCREATOGRAM N/A 2020    Procedure: ENDOSCOPIC RETROGRADE CHOLANGIOPANCREATOGRAPHY, PLACEMENT OF PANCREATIC STENT, PLACEMENT OF BILIARY STENT;  Surgeon: Yarely Vann MD;  Location:  OR     ESOPHAGOSCOPY, GASTROSCOPY, DUODENOSCOPY (EGD), COMBINED N/A 2020    Procedure: ESOPHAGOGASTRODUODENOSCOPY, WITH FINE NEEDLE ASPIRATION BIOPSY, WITH ENDOSCOPIC ULTRASOUND GUIDANCE, Endoscopic retrograde cholangiopancreatography;  Surgeon: Yarely Vann MD;  Location:  OR     ESOPHAGOSCOPY, GASTROSCOPY, DUODENOSCOPY (EGD), COMBINED N/A 2020    Procedure: ESOPHAGOGASTRODUODENOSCOPY, WITH FINE NEEDLE ASPIRATION BIOPSY, WITH ENDOSCOPIC ULTRASOUND GUIDANCE;  Surgeon: Romina Rosario MD;  Location:  GI     IR CHEST PORT PLACEMENT > 5 YRS OF AGE  2020     IR PORT REMOVAL RIGHT  2020     LAPAROSCOPIC BIOPSY LIVER N/A 2020    Procedure: Diagnostic Laparoscopy with intraoperative ultrasound and Liver Biopsy X2;  Surgeon: Duarte Chaves MD;  Location: [facility] OR     LUMPECTOMY BREAST Right 10/27/2022     Procedure: right breast reexcision lumpectomy;  Surgeon: Geovanny Wagner MD;  Location: [facility] OR     WHIPPLE PROCEDURE N/A 5/22/2020    Procedure: Non Pylorus Preserving Whipple procedure;  Surgeon: Duarte Chaves MD;  Location: [facility] OR      Allergies   Allergen Reactions     Penicillin V Hives     Seasonal Allergies       Social History     Tobacco Use     Smoking status: Never     Smokeless tobacco: Never   Substance Use Topics     Alcohol use: Yes     Comment: social       Wt Readings from Last 1 Encounters:   11/11/22 51.6 kg (113 lb 12.8 oz)        Anesthesia Evaluation   Pt has had prior anesthetic. Type: General.        ROS/MED HX  ENT/Pulmonary:  - neg pulmonary ROS     Neurologic:  - neg neurologic ROS     Cardiovascular:  - neg cardiovascular ROS     METS/Exercise Tolerance:     Hematologic: Comments: Lab Test        10/20/22     07/25/22     03/28/22     10/25/21     07/07/20     07/06/20     05/23/20     05/22/20     04/21/20     04/20/20                       0848          0844          1056          0949          0854          0911          0754          1710          0628          1017          WBC           --          3.0*         3.3*         3.2*           < >        3.7*           < >         --            < >         --           HGB          13.8         14.3         14.5         14.4           < >        13.8           < >         --            < >         --           MCV           --          90           88           89             < >        90             < >         --            < >         --           PLT           --          185          176          188            < >        203            < >         --            < >         --           INR           --           --           --           --           --          1.05          --          1.34*         --          0.95           < > = values in this interval not displayed.                  Lab Test        11/08/22 07/25/22      03/28/22     10/25/21     10/25/21                       0706          0844          1056          1101          0949          NA            --          137          135           --          138           POTASSIUM     --          3.9          3.9           --          3.9           CHLORIDE      --          104          101           --          102           CO2           --          30           32            --          31            BUN           --          16           12            --          19            CR           0.9          0.84         0.80           < >        0.86          ANIONGAP      --          3            2*            --          5             CAMERON           --          9.1          9.2           --          8.7           GLC           --          69*          71            --          69*            < > = values in this interval not displayed.                       Musculoskeletal:  - neg musculoskeletal ROS     GI/Hepatic: Comment: Malignant neoplasm of head of pancreas      Renal/Genitourinary:  - neg Renal ROS     Endo:  - neg endo ROS     Psychiatric/Substance Use:  - neg psychiatric ROS     Infectious Disease:  - neg infectious disease ROS     Malignancy:  - neg malignancy ROS     Other:  - neg other ROS          Physical Exam    Airway  airway exam normal           Respiratory Devices and Support         Dental  no notable dental history         Cardiovascular   cardiovascular exam normal          Pulmonary   pulmonary exam normal                OUTSIDE LABS:  CBC:   Lab Results   Component Value Date    WBC 3.0 (L) 07/25/2022    WBC 3.3 (L) 03/28/2022    HGB 13.8 10/20/2022    HGB 14.3 07/25/2022    HCT 44.3 07/25/2022    HCT 44.7 03/28/2022     07/25/2022     03/28/2022     BMP:   Lab Results   Component Value Date     07/25/2022     03/28/2022    POTASSIUM 3.9 07/25/2022    POTASSIUM 3.9 03/28/2022    CHLORIDE 104 07/25/2022    CHLORIDE 101  03/28/2022    CO2 30 07/25/2022    CO2 32 03/28/2022    BUN 16 07/25/2022    BUN 12 03/28/2022    CR 0.9 11/08/2022    CR 0.84 07/25/2022    GLC 69 (L) 07/25/2022    GLC 71 03/28/2022     COAGS:   Lab Results   Component Value Date    PTT 30 07/06/2020    INR 1.05 07/06/2020     POC:   Lab Results   Component Value Date    BGM 86 05/26/2020     HEPATIC:   Lab Results   Component Value Date    ALBUMIN 3.6 07/25/2022    PROTTOTAL 7.1 07/25/2022    ALT 39 07/25/2022    AST 27 07/25/2022    ALKPHOS 131 07/25/2022    BILITOTAL 0.7 07/25/2022     OTHER:   Lab Results   Component Value Date    PH 7.41 05/22/2020    LACT 0.7 05/22/2020    A1C 5.5 05/23/2020    CAMERON 9.1 07/25/2022    PHOS 3.0 05/23/2020    MAG 2.3 07/07/2020    LIPASE 393 04/19/2020    AMYLASE 44 05/25/2020    TSH 0.88 04/28/2016       Anesthesia Plan    ASA Status:  2      Anesthesia Type: General.     - Airway: LMA   Induction: Propofol, Intravenous.   Maintenance: Balanced.        Consents    Anesthesia Plan(s) and associated risks, benefits, and realistic alternatives discussed. Questions answered and patient/representative(s) expressed understanding.    - Discussed:     - Discussed with:  Patient      - Extended Intubation/Ventilatory Support Discussed: No.      - Patient is DNR/DNI Status: No    Use of blood products discussed: Yes.     - Discussed with: Patient.     - Consented: consented to blood products            Reason for refusal: other.     Postoperative Care    Pain management: IV analgesics.   PONV prophylaxis: Ondansetron (or other 5HT-3), Dexamethasone or Solumedrol     Comments:                Chad Garcia MD   Ambulatory

## 2022-11-11 NOTE — PROGRESS NOTES
530uCi Tc99m Lymphoseek injected in the RIGHT breast intradermally at the 10 o'clock position. Injected at 11:45am- DKS

## 2022-11-11 NOTE — ANESTHESIA CARE TRANSFER NOTE
Patient: Flora Hogan    Procedure: Procedure(s):  Reexcision right lumpectomy, right sentinel node biopsy       Diagnosis: Malignant neoplasm of lower-inner quadrant of right breast of female, estrogen receptor positive (H) [C50.311, Z17.0]  Diagnosis Additional Information: No value filed.    Anesthesia Type:   General     Note:    Oropharynx: oropharynx clear of all foreign objects and spontaneously breathing  Level of Consciousness: drowsy  Oxygen Supplementation: face mask  Level of Supplemental Oxygen (L/min / FiO2): 8  Independent Airway: airway patency satisfactory and stable  Dentition: dentition unchanged  Vital Signs Stable: post-procedure vital signs reviewed and stable  Report to RN Given: handoff report given  Patient transferred to: PACU    Handoff Report: Identifed the Patient, Identified the Reponsible Provider, Reviewed the pertinent medical history, Discussed the surgical course, Reviewed Intra-OP anesthesia mangement and issues during anesthesia, Set expectations for post-procedure period and Allowed opportunity for questions and acknowledgement of understanding      Vitals:  Vitals Value Taken Time   /81 11/11/22 1440   Temp     Pulse 59 11/11/22 1441   Resp 11 11/11/22 1441   SpO2 100 % 11/11/22 1441   Vitals shown include unvalidated device data.    Electronically Signed By: MICAELA Taylor CRNA  November 11, 2022  2:42 PM

## 2022-11-11 NOTE — ANESTHESIA POSTPROCEDURE EVALUATION
Patient: Flora Hogan    Procedure: Procedure(s):  Reexcision right lumpectomy, right sentinel node biopsy       Anesthesia Type:  General    Note:     Postop Pain Control: Uneventful            Sign Out: Well controlled pain   PONV: No   Neuro/Psych: Uneventful            Sign Out: Acceptable/Baseline neuro status   Airway/Respiratory: Uneventful            Sign Out: Acceptable/Baseline resp. status   CV/Hemodynamics: Uneventful            Sign Out: Acceptable CV status   Other NRE: NONE   DID A NON-ROUTINE EVENT OCCUR? No           Last vitals:  Vitals Value Taken Time   /83 11/11/22 1500   Temp 96.5  F (35.8  C) 11/11/22 1437   Pulse 60 11/11/22 1502   Resp 20 11/11/22 1502   SpO2 100 % 11/11/22 1502   Vitals shown include unvalidated device data.    Electronically Signed By: Daryl Bee MD  November 11, 2022  3:03 PM

## 2022-11-11 NOTE — DISCHARGE INSTRUCTIONS
Dr. Wagner   [facility] [phone]     Maximum acetaminophen (Tylenol) dose from all sources should not exceed 4 grams (4000 mg) per day.     You were last given oxycodone at 3:30pm.          GENERAL ANESTHESIA OR SEDATION ADULT DISCHARGE INSTRUCTIONS   SPECIAL PRECAUTIONS FOR 24 HOURS AFTER SURGERY    IT IS NOT UNUSUAL TO FEEL LIGHT-HEADED OR FAINT, UP TO 24 HOURS AFTER SURGERY OR WHILE TAKING PAIN MEDICATION.  IF YOU HAVE THESE SYMPTOMS; SIT FOR A FEW MINUTES BEFORE STANDING AND HAVE SOMEONE ASSIST YOU WHEN YOU GET UP TO WALK OR USE THE BATHROOM.    YOU SHOULD REST AND RELAX FOR THE NEXT 24 HOURS AND YOU MUST MAKE ARRANGEMENTS TO HAVE SOMEONE STAY WITH YOU FOR AT LEAST 24 HOURS AFTER YOUR DISCHARGE.  AVOID HAZARDOUS AND STRENUOUS ACTIVITIES.  DO NOT MAKE IMPORTANT DECISIONS FOR 24 HOURS.    DO NOT DRIVE ANY VEHICLE OR OPERATE MECHANICAL EQUIPMENT FOR 24 HOURS FOLLOWING THE END OF YOUR SURGERY.  EVEN THOUGH YOU MAY FEEL NORMAL, YOUR REACTIONS MAY BE AFFECTED BY THE MEDICATION YOU HAVE RECEIVED.    DO NOT DRINK ALCOHOLIC BEVERAGES FOR 24 HOURS FOLLOWING YOUR SURGERY.    DRINK CLEAR LIQUIDS (APPLE JUICE, GINGER ALE, 7-UP, BROTH, ETC.).  PROGRESS TO YOUR REGULAR DIET AS YOU FEEL ABLE.    YOU MAY HAVE A DRY MOUTH, A SORE THROAT, MUSCLES ACHES OR TROUBLE SLEEPING.  THESE SHOULD GO AWAY AFTER 24 HOURS.    CALL YOUR DOCTOR FOR ANY OF THE FOLLOWING:  SIGNS OF INFECTION (FEVER, GROWING TENDERNESS AT THE SURGERY SITE, A LARGE AMOUNT OF DRAINAGE OR BLEEDING, SEVERE PAIN, FOUL-SMELLING DRAINAGE, REDNESS OR SWELLING.    IT HAS BEEN OVER 8 TO 10 HOURS SINCE SURGERY AND YOU ARE STILL NOT ABLE TO URINATE (PASS WATER).      HOME CARE FOLLOWING LUMPECTOMY  AL Mcfarland, MAHENDRA Gates C. Pratt, J. Shaheen    APPOINTMENT WITH YOUR SURGEON:  All patients are to schedule a post-op appointment with their general surgeon.  Once you are home, call the office to schedule the appointment for 2-3 weeks  from the date of your surgery.  You may need to be seen sooner if you have a drain in place.      Appointments to see your general surgeon at any of our locations can be made by calling:  #[phone]    You will receive a phone call from your surgeon with these results.  You will be able to ask questions and receive more in-depth explanation at your post-op appointment.  This appointment will also be when you discuss further evaluation, treatment, and referral to oncology and/or radiation oncology if this is necessary.  Occasionally special testing must be done on the surgical specimen which may delay the posting of your final pathology report.  You may call for your final pathology report after 1p.m. three working days after surgery to check on its status.    SUPPORT:  Wear a bra for support and comfort for 3-7 days, day and night.    DRAIN:  If you have a drain in place, you will need a separate appointment with a nurse in the office to have this removed.  You will have a form to keep track of the output of your drain.  When the output reaches a point where the total for a 24 hour period is less than 30cc s, you are ready to have the drain removed.  At that point you can call the office and talk to the nurse to arrange coming into the office to have this done.  If you have more than one drain in place, they may not all be removed at the same time, as the outputs can be very different from each.  The nurse will help you to manage this and help decide when the remaining drains will be taken out.    INCISIONAL CARE:  If you have a dressing in place, keep clean and dry for 48 hours; you may replace the gauze if it becomes soiled.  After 48 hours you may remove the dressing and shower.  Do not submerse incision in water for 1 week.  If you have a Dermabond dressing (a type of skin glue), you may shower immediately.  Sutures will absorb and do not need to be removed.  If present, leave the steri-strips (white paper  tapes) in place for 14 days after surgery.  If present, leave Dermabond glue in place until it wears/flakes off.  You may expect a small amount of drainage from your incision.  A lump/ridge under the incision is normal and will gradually resolve.    BATHING:  You are allowed to bath (shallow water in a tub only) or shower 24-48 hours after your surgery.  Mild soap is OK to use near these sites.  When bathing, do not allow the incision or drain site to become submersed in water as this may increase the risk of infection.    ACTIVITY:  Cautiously resume exercise and strenuous activities such as jogging, tennis, aerobics, etc. Also, be careful of stretching activities with operative side for two weeks.    If you had a  sentinel node biopsy  at the time of your surgery:  You have no restrictions in addition to those noted above.    If you had an  axillary node dissection  at the time of your surgery:  You are recommended to restrict the activity of the arm on the side the dissection was done on.  This means:  no reaching overhead until cleared to do so by your surgeon, no carrying weight on that side greater than a couple pounds, no injections/vaccinations/ blood samples from that side, and no blood pressure measurements on that side.  It is also recommended to elevate the arm on pillows several times a day to decrease/minimize swelling, and avoid sleeping on that arm.    DIET:  No restrictions.  Increased fluid intake is recommended. While taking pain medications, increase dietary fiber or add a fiber supplementation like Metamucil or Citrucel to help prevent constipation - a possible side effect of pain medications.    DISCOMFORT:  Local anesthetic placed at surgery should provide relief for 4-8 hours.  Begin taking pain pills before discomfort is severe.  Take the pain medication with some food, when possible, to minimize side effects.  Intermittent use of ice packs may help during the first 48 hours.  Expect gradual  improvement.    RETURN APPOINTMENT:  Schedule a follow-up visit 2-3 weeks post-op.  Office Phone:  [phone]     CONTACT US IF THE FOLLOWING DEVELOPS:   1. A fever that is above 101     2. If there is a large amount of drainage, bleeding, or swelling.   3. Severe pain that is not relieved by your prescription.   4. Drainage that is thick, cloudy, yellow, green or white.   5. Any other questions not answered by  Frequently Asked Questions  sheet.      FREQUENTLY ASKED QUESTIONS:    Q:  How should my incision look?    A:  Normally your incision will appear slightly swollen with light redness directly along the incision itself as it heals.  It may feel like a bump or ridge as the healing/scarring happens, and over time (3-4 months) this bump or ridge feeling should slowly go away.  In general, clear or pink watery drainage can be normal at first as your incision heals, but should decrease over time.    Q:  How do I know if my incision is infected?  A:  Look at your incision for signs of infection, like redness around the incision spreading to surrounding skin, or drainage of cloudy or foul-smelling drainage.  If you feel warm, check your temperature to see if you are running a fever.    **If any of these things occur, please notify the nurse at our office.  We may need you to come into the office for an incision check.      Q:  How do I take care of my incision?  A:  If you have a dressing in place - Starting the day after surgery, replace the dressing 1-2 times a day until there is no further drainage from the incision.  At that time, a dressing is no longer needed.  Try to minimize tape on the skin if irritation is occurring at the tape sites.  If you have significant irritation from tape on the skin, please call the office to discuss other method of dressing your incision.    Small pieces of tape called  steri-strips  may be present directly overlying your incision; these may be removed 10 days after surgery  unless otherwise specified by your surgeon.  If these tapes start to loosen at the ends, you may trim them back until they fall off or are removed.    A:  If you had  Dermabond  tissue glue used as a dressing (this causes your incision to look shiny with a clear covering over it) - This type of dressing wears off with time and does not require more dressings over the top unless it is draining around the glue as it wears off.  Do not apply ointments or lotions over the incisions until the glue has completely worn off.    Q:  There is a piece of tape or a sticky  lead  still on my skin.  Can I remove this?  A:  Sometimes the sticky  leads  used for monitoring during surgery or for evaluation in the emergency department are not all removed while you are in the hospital.  These sometimes have a tab or metal dot on them.  You can easily remove these on your own, like taking off a band-aid.  If there is a gel substance under the  lead , simply wipe/clean it off with a washcloth or paper towel.      Q:  What can I do to minimize constipation (very hard stools, or lack of stools)?  A:  Stay well hydrated.  Increase your dietary fiber intake or take a fiber supplement -with plenty of water.  Walk around frequently.  You may consider an over-the-counter stool-softener.  Your Pharmacist can assist you with choosing one that is stocked at your pharmacy.  Constipation is also one of the most common side effects of pain medication.  If you are using pain medication, be pro-active and try to PREVENT problems with constipation by taking the steps above BEFORE constipation becomes a problem.    Q:  What do I do if I need more pain medications?  A:  Call the office to receive refills.  Be aware that certain pain meds cannot be called into a pharmacy and actually require a paper prescription.  A change may be made in your pain med as you progress thru your recovery period or if you have side effects to certain meds.    --Pain meds are  NOT refilled after 5pm on weekdays, and NOT AT ALL on the weekends, so please look ahead to prevent problems.      Q:  Why am I having a hard time sleeping now that I am at home?  A:  Many medications you receive while you are in the hospital can impact your sleep for a number of days after your surgery/hospitalization.  Decreased level of activity and naps during the day may also make sleeping at night difficult.  Try to minimize day-time naps, and get up frequently during the day to walk around your home during your recovery time.  Sleep aides may be of some help, but are not recommended for long-term use.      Q:  I am having some back discomfort.  What should I do?  A:  This may be related to certain positioning that was required for your surgery, extended periods of time in bed, or other changes in your overall activity level.  You may try ice, heat, acetaminophen, or ibuprofen to treat this temporarily.  Note that many pain medications have acetaminophen in them and would state this on the prescription bottle.  Be sure not to exceed the maximum of 4000mg per day of acetaminophen.     **If the pain you are having does not resolve, is severe, or is a flare of back pain you have had on other occasions prior to surgery, please contact your primary physician for further recommendations or for an appointment to be examined at their office.    Q:  Why am I having headaches?  A:  Headaches can be caused by many things:  caffeine withdrawal, use of pain meds, dehydration, high blood pressure, lack of sleep, over-activity/exhaustion, flare-up of usual migraine headaches.  If you feel this is related to muscle tension (a band-like feeling around the head, or a pressure at the low-back of the head) you may try ice or heat to this area.  You may need to drink more fluids (try electrolyte drink like Gatorade), rest, or take your usual migraine medications.   **If your headaches do not resolve, worsen, are accompanied by  other symptoms, or if your blood pressure is high, please call your primary physician for recommendation and/or examination.    Q:  I am unable to urinate.  What do I do?  A:  A small percentage of people can have difficulty urinating initially after surgery.  This includes being able to urinate only a very small amount at a time and feeling discomfort or pressure in the very low abdomen.  This is called  urinary retention , and is actually an urgent situation.  Proceed to your nearest Emergency department for evaluation (not an Urgent Care Center).  Sometimes the bladder does not work correctly after certain medications you receive during surgery, or related to certain procedures.  You may need to have a catheter placed until your bladder recovers.  When planning to go to an Emergency department, it may help to call the ER to let them know you are coming in for this problem after a surgery.  This may help you get in quicker to be evaluated.  **If you have symptoms of a urinary tract infection, please contact your primary physician for the proper evaluation and treatment.          If you have other questions, please call the office Monday thru Friday between 8am and 4:30pm to discuss with the nurse or physician assistant.  #[phone]    There is a surgeon ON CALL on weekday evenings and over the weekend in case of urgent need only, and may be contacted at the same number.    If you are having an emergency, call 911 or proceed to your nearest emergency department.

## 2022-11-15 LAB
PATH REPORT.COMMENTS IMP SPEC: NORMAL
PATH REPORT.FINAL DX SPEC: NORMAL
PATH REPORT.GROSS SPEC: NORMAL
PATH REPORT.MICROSCOPIC SPEC OTHER STN: NORMAL
PATH REPORT.RELEVANT HX SPEC: NORMAL
PHOTO IMAGE: NORMAL

## 2022-11-15 PROCEDURE — 88342 IMHCHEM/IMCYTCHM 1ST ANTB: CPT | Mod: 26 | Performed by: PATHOLOGY

## 2022-11-15 PROCEDURE — 88307 TISSUE EXAM BY PATHOLOGIST: CPT | Mod: 26 | Performed by: PATHOLOGY

## 2022-11-17 ENCOUNTER — LAB (OUTPATIENT)
Dept: ONCOLOGY | Facility: CLINIC | Age: 77
End: 2022-11-17
Attending: INTERNAL MEDICINE
Payer: COMMERCIAL

## 2022-11-17 DIAGNOSIS — C25.0 MALIGNANT NEOPLASM OF HEAD OF PANCREAS (H): ICD-10-CM

## 2022-11-17 LAB
ALBUMIN SERPL BCG-MCNC: 4 G/DL (ref 3.5–5.2)
ALP SERPL-CCNC: 114 U/L (ref 35–104)
ALT SERPL W P-5'-P-CCNC: 23 U/L (ref 10–35)
ANION GAP SERPL CALCULATED.3IONS-SCNC: 10 MMOL/L (ref 7–15)
AST SERPL W P-5'-P-CCNC: 30 U/L (ref 10–35)
BASOPHILS # BLD AUTO: 0.1 10E3/UL (ref 0–0.2)
BASOPHILS NFR BLD AUTO: 1 %
BILIRUB SERPL-MCNC: 0.8 MG/DL
BUN SERPL-MCNC: 15.5 MG/DL (ref 8–23)
CALCIUM SERPL-MCNC: 8.9 MG/DL (ref 8.8–10.2)
CHLORIDE SERPL-SCNC: 96 MMOL/L (ref 98–107)
CREAT SERPL-MCNC: 0.87 MG/DL (ref 0.51–0.95)
DEPRECATED HCO3 PLAS-SCNC: 28 MMOL/L (ref 22–29)
EOSINOPHIL # BLD AUTO: 0.3 10E3/UL (ref 0–0.7)
EOSINOPHIL NFR BLD AUTO: 8 %
ERYTHROCYTE [DISTWIDTH] IN BLOOD BY AUTOMATED COUNT: 12.9 % (ref 10–15)
GFR SERPL CREATININE-BSD FRML MDRD: 68 ML/MIN/1.73M2
GLUCOSE SERPL-MCNC: 86 MG/DL (ref 70–99)
HCT VFR BLD AUTO: 38.8 % (ref 35–47)
HGB BLD-MCNC: 12.8 G/DL (ref 11.7–15.7)
IMM GRANULOCYTES # BLD: 0 10E3/UL
IMM GRANULOCYTES NFR BLD: 0 %
LYMPHOCYTES # BLD AUTO: 0.9 10E3/UL (ref 0.8–5.3)
LYMPHOCYTES NFR BLD AUTO: 24 %
MCH RBC QN AUTO: 28.8 PG (ref 26.5–33)
MCHC RBC AUTO-ENTMCNC: 33 G/DL (ref 31.5–36.5)
MCV RBC AUTO: 87 FL (ref 78–100)
MONOCYTES # BLD AUTO: 0.3 10E3/UL (ref 0–1.3)
MONOCYTES NFR BLD AUTO: 9 %
NEUTROPHILS # BLD AUTO: 2.1 10E3/UL (ref 1.6–8.3)
NEUTROPHILS NFR BLD AUTO: 58 %
NRBC # BLD AUTO: 0 10E3/UL
NRBC BLD AUTO-RTO: 0 /100
PLATELET # BLD AUTO: 201 10E3/UL (ref 150–450)
POTASSIUM SERPL-SCNC: 4.7 MMOL/L (ref 3.4–5.3)
PROT SERPL-MCNC: 6.9 G/DL (ref 6.4–8.3)
RBC # BLD AUTO: 4.44 10E6/UL (ref 3.8–5.2)
SODIUM SERPL-SCNC: 134 MMOL/L (ref 136–145)
WBC # BLD AUTO: 3.6 10E3/UL (ref 4–11)

## 2022-11-17 PROCEDURE — 85025 COMPLETE CBC W/AUTO DIFF WBC: CPT | Performed by: INTERNAL MEDICINE

## 2022-11-17 PROCEDURE — 80053 COMPREHEN METABOLIC PANEL: CPT | Performed by: INTERNAL MEDICINE

## 2022-11-17 PROCEDURE — 36415 COLL VENOUS BLD VENIPUNCTURE: CPT

## 2022-11-17 PROCEDURE — 86301 IMMUNOASSAY TUMOR CA 19-9: CPT | Performed by: INTERNAL MEDICINE

## 2022-11-17 NOTE — PROGRESS NOTES
Medical Assistant Note:  Flora Hogan presents today for labs.    Patient seen by provider today: No.   present during visit today: Not Applicable.    Concerns: No Concerns.    Procedure:  Labs drawn: .    Post Assessment:  Labs drawn without difficulty: Yes.    Discharge Plan:  Departure Mode: Ambulatory.    Face to Face Time: 10 minutes .    Joi Johns, CMA

## 2022-11-18 ENCOUNTER — TELEPHONE (OUTPATIENT)
Dept: SURGERY | Facility: CLINIC | Age: 77
End: 2022-11-18

## 2022-11-18 LAB — CANCER AG19-9 SERPL IA-ACNC: 7 U/ML

## 2022-11-18 NOTE — TELEPHONE ENCOUNTER
S/p Reexcision right lumpectomy, right axillary sentinel lymph node biopsy  Procedure date: 11/11/22  Surgeon: Dr. Wagner     I called patient with pathology results at the request of Dr. Wagner.      Informed patient that both sentinel nodes negative for metastatic cancer.  R lumpectomy inferior margin re-excision - no residual malignancy (cancer).    Patient is aware that pathology results will be released to [facility] as well.      Patient is appreciative of the information.

## 2022-11-20 NOTE — PROGRESS NOTES
[facility]    Hematology/Oncology Established Patient Follow-up Note    Oncologic History/Treatment Summary      4/19/20: Presented with painless jaundice.  CT scan demonstrated a 1.2 cm mass in the pancreas with biliary dilatation.  ERCP/EUS performed demonstrating atypia but no definite malignancy.    5/26/20: Whipple resection performed by Dr. Chaves at [facility].  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  Margins and lymph nodes negative, LVI was seen.  Final stage [id number].    7/7/20 to 12/15/20: Status post 6 cycles adjuvant gemcitabine.    6/22/22: Routine screening mammogram demonstrated a circumscribed mass in the left breast at 9 o'clock.  Diagnostic mammogram was benign but linear branching microcalcifications seen in the right breaset spanning 7 cm and stereotactic biopsy recommended.    7/12/22: Stereotactic biopsy right breast demonstrated atypical ductal hyperplasia.    9/16/22: Excisional biopsy performed by Dr. Wagner demonstrating DCIS grade 3, no invasive component seen.  Medial and inferior margins were close at 0.4 mm.  ER positive at %, MN positive at 1-10%.    10/27/22: Re-excision of medial and inferior margins demonstrated 2 foci of grade 2 invasive ductal carcinoma measuring 0.3 mm and 0.4 mm with involvement of inferior margin. The invasive cancer was ER+ (%), MN negative, HER2 positive (3+ IHC).    11/11/22: Re-excision of inferior margin with sentinel lymph node biopsy demonstrated no residual malignancy.  2 sentinel lymph nodes were negative.       Today's Date: 11/21/22    Reason for Follow-up: Invasive ductal carcinoma of the right breast.      INTERIM HISTORY:  Flora is seen for follow-up of recently diagnosed right breast cancer as well as history of resected stage I pancreas cancer.  Since our last visit she did undergo re-excision due to a close margin and pathology did demonstrate 2  subcentimeter foci of invasive ductal carcinoma.  The invasive cancer was ER+, HER2+.  She then underwent another re-excision with SLN biopsy and no evidence of malignancy was identified.    She is recovering fairly well from her surgery.  She did develop a large hematoma in the right breast following her most recent surgery.  No pain or redness associated with this and feels the bruising is beginning to improve.  Accompanied by her .    REVIEW OF SYSTEMS:   A 14 point ROS was reviewed with pertinent positives and negatives in the HPI.       HOME MEDICATIONS:  Current Outpatient Medications   Medication Sig Dispense Refill     acetaminophen (TYLENOL) 325 MG tablet Take 1-2 tablets (325-650 mg) by mouth every 6 hours as needed for mild pain or fever 50 tablet 0     albuterol (PROAIR HFA/PROVENTIL HFA/VENTOLIN HFA) 108 (90 Base) MCG/ACT inhaler Inhale 2 puffs into the lungs every 6 hours as needed for shortness of breath / dyspnea or wheezing 18 g 0     amylase-lipase-protease (CREON) 00197-78119 units CPEP per EC capsule TAKE 1 CAPSULE BY MOUTH THREE TIMES DAILY WITH A MEAL Strength: 24,000-76,000 Units 270 capsule 3     anastrozole (ARIMIDEX) 1 MG tablet Take 1 tablet (1 mg) by mouth daily 90 tablet 3     calcium carbonate-vitamin D (OS-CAMERON) 600-400 MG-UNIT chewable tablet Take 1 chew tab by mouth 2 times daily  180 tablet 3     citalopram (CELEXA) 20 MG tablet Take 1 tablet (20 mg) by mouth daily 90 tablet 3     loratadine (CLARITIN REDITABS) 10 MG dispersible tablet Take 10 mg by mouth as needed        magnesium 250 MG tablet Take 1 tablet by mouth 2 times daily       multivitamin (ONE-DAILY) tablet Take 1 tablet by mouth daily 90 tablet 0     ondansetron (ZOFRAN ODT) 4 MG ODT tab Take 1 tablet (4 mg) by mouth every 8 hours as needed for nausea 10 tablet 0     oxyCODONE (ROXICODONE) 5 MG tablet Take 1-2 tablets (5-10 mg) by mouth every 4 hours as needed for moderate to severe pain 12 tablet 0          ALLERGIES:  Allergies   Allergen Reactions     Penicillin V Hives     Seasonal Allergies          PAST MEDICAL HISTORY:  Past Medical History:   Diagnosis Date     Chest tightness     had suspected allergies     Malignant neoplasm of head of pancreas (H) 06/09/2020     Seasonal allergies      SOB (shortness of breath)          PAST SURGICAL HISTORY:  Past Surgical History:   Procedure Laterality Date     BIOPSY BREAST Right 9/19/2022    Procedure: Right radiofrequency localized excisional breast biopsy;  Surgeon: Geovanny Wagner MD;  Location: [facility] OR     CATARACT IOL, RT/LT  2015     COLONOSCOPY  10/14    no repeat needed due to age     COLONOSCOPY N/A 10/7/2014    Procedure: COLONOSCOPY;  Surgeon: Daryl Hanson MD;  Location:  GI     COLONOSCOPY  04/23/2019    no further screening     ENDOSCOPIC RETROGRADE CHOLANGIOPANCREATOGRAM N/A 4/20/2020    Procedure: ENDOSCOPIC RETROGRADE CHOLANGIOPANCREATOGRAPHY, PLACEMENT OF PANCREATIC STENT, PLACEMENT OF BILIARY STENT;  Surgeon: Yarely Vann MD;  Location:  OR     ESOPHAGOSCOPY, GASTROSCOPY, DUODENOSCOPY (EGD), COMBINED N/A 4/20/2020    Procedure: ESOPHAGOGASTRODUODENOSCOPY, WITH FINE NEEDLE ASPIRATION BIOPSY, WITH ENDOSCOPIC ULTRASOUND GUIDANCE, Endoscopic retrograde cholangiopancreatography;  Surgeon: Yarely Vann MD;  Location:  OR     ESOPHAGOSCOPY, GASTROSCOPY, DUODENOSCOPY (EGD), COMBINED N/A 5/5/2020    Procedure: ESOPHAGOGASTRODUODENOSCOPY, WITH FINE NEEDLE ASPIRATION BIOPSY, WITH ENDOSCOPIC ULTRASOUND GUIDANCE;  Surgeon: Romina Rosario MD;  Location:  GI     IR CHEST PORT PLACEMENT > 5 YRS OF AGE  7/6/2020     IR PORT REMOVAL RIGHT  12/28/2020     LAPAROSCOPIC BIOPSY LIVER N/A 5/22/2020    Procedure: Diagnostic Laparoscopy with intraoperative ultrasound and Liver Biopsy X2;  Surgeon: Duarte Chaves MD;  Location: [facility] OR     LUMPECTOMY BREAST Right 10/27/2022    Procedure: right breast reexcision lumpectomy;   Surgeon: Geovanny Wagner MD;  Location: [facility] OR     LUMPECTOMY BREAST WITH SENTINEL NODE, COMBINED Right 11/11/2022    Procedure: Reexcision right lumpectomy, right sentinel node biopsy;  Surgeon: Geovanny Wagner MD;  Location: [facility] OR     WHIPPLE PROCEDURE N/A 5/22/2020    Procedure: Non Pylorus Preserving Whipple procedure;  Surgeon: Duarte Chaves MD;  Location: [facility] OR         SOCIAL HISTORY:  Social History     Socioeconomic History     Marital status:      Spouse name: Not on file     Number of children: Not on file     Years of education: Not on file     Highest education level: Not on file   Occupational History     Employer: RETIRED     Comment: previous taught special ed   Tobacco Use     Smoking status: Never     Smokeless tobacco: Never   Vaping Use     Vaping Use: Never used   Substance and Sexual Activity     Alcohol use: Yes     Comment: social      Drug use: Never     Sexual activity: Yes   Other Topics Concern     Parent/sibling w/ CABG, MI or angioplasty before 65F 55M? Not Asked      Service Not Asked     Blood Transfusions Not Asked     Caffeine Concern Yes     Comment: 2 cups     Occupational Exposure Not Asked     Hobby Hazards Not Asked     Sleep Concern Not Asked     Stress Concern Not Asked     Weight Concern Not Asked     Special Diet No     Back Care Not Asked     Exercise Yes     Comment: walking workout      Bike Helmet Not Asked     Seat Belt Not Asked     Self-Exams Not Asked   Social History Narrative     Not on file     Social Determinants of Health     Financial Resource Strain: Not on file   Food Insecurity: Not on file   Transportation Needs: Not on file   Physical Activity: Not on file   Stress: Not on file   Social Connections: Not on file   Intimate Partner Violence: Not At Risk     Fear of Current or Ex-Partner: No     Emotionally Abused: No     Physically Abused: No     Sexually Abused: No   Housing Stability: Not on file         FAMILY HISTORY:  Family  "History   Problem Relation Age of Onset     Musculoskeletal Disorder Mother         edematous legs     Obesity Mother      Lymphoma Brother      Musculoskeletal Disorder Maternal Grandmother         edematous legs; did have amputation     Obesity Maternal Grandmother      Myocardial Infarction Maternal Grandmother          PHYSICAL EXAM:  Vital signs:  /70   Pulse 62   Temp 97.8  F (36.6  C) (Tympanic)   Resp 16   Ht 1.626 m (5' 4\")   Wt 52.3 kg (115 lb 4.8 oz)   LMP  (LMP Unknown)   SpO2 97%   BMI 19.79 kg/m     ECO  GENERAL/CONSTITUTIONAL: Appears well, no acute distress.  BREASTS: Incision in right breast is clean, dry and intact.  Large area of ecchymosis with palpable hematoma noted in the breast.  Ecchymosis extends down chest onto upper abdomen and appears to be maturing.  No erythema, tenderness or signs of infection.      LABS:  None today.      PATHOLOGY/RELEVANT BIOMARKERS:  Right breast re-excision 10/27/22:  Final Diagnosis   A.  Breast, right, medial margin, excision:  -Benign breast tissue with postsurgical changes.    B.  Breast, right, inferior margin, reexcision:  -INVASIVE DUCTAL CARCINOMA, [place] grade 2, multifocal (2 foci), tumor size 3 mm (slide B2) and 4 mm (slide B4).  -Invasive carcinoma is present at the inferior margin.  -In situ carcinoma is not identified.     Right breast re-excision with SLN biopsy 22:  Final Diagnosis   A.  Lymph node, right axillary sentinel #1, excisional biopsy:  -One lymph node, negative for metastatic carcinoma (0/1).     B.  Lymph node, right axillary sentinel #2, excisional biopsy:  -One lymph node, negative for metastatic carcinoma (0/1).  -See comment.     C.  Breast, right, suture-oriented reexcision:  -No residual malignancy identified.  -Previous surgical procedure site changes.  -Focal cystic dilatation of ducts and microcalcifications associated benign breast elements.     MOST RECENT IMAGING:  CT CAP 22:  IMPRESSION: In " this patient history of pancreatic cancer status post Whipple's procedure, there is:  1. No convincing evidence of local recurrence or metastatic disease in the chest, abdomen or pelvis.  2. Few small indeterminate liver lesions, not significantly changed as compared to 10/25/2021 and although indeterminate, likely benign lesion like hepatic hemangioma given their interval stability.  3. Small amount of gas-containing fluid in the medial aspect of the right breast, indeterminate, could be related to recent procedure versus small abscess.    ASSESSMENT:  1. 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  We had a lengthy discussion today regarding the significance of the new pathologic findings.  The invasive tumor are very small, <0.5 cm which places her in a very favorable prognostic group.  The HER2 amplification however does increase risk of both local and distant recurrence.  Given small volume of disease, HER2 directed therapy and chemotherapy would not be warranted here.  I would recommend revisiting radiation therapy however given this is a more aggressive clone.  Endocrine therapy would still be indicated and would plan to treat with adjuvant AI as we had previously discussed.  She in fact did start anastrozole about 2 weeks ago and so far has not experienced any appreciable side effects.  She is going to be leaving for [place] in one month so there will not be enough time to pursue radiation before she leaves.  She would prefer not having it done in [place] so will plan to arrange radiation oncology consult when she returns in March.    2.  History of resected stage IB pancreatic cancer.  No clinical or radiographic evidence of disease recurrence.    3.  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  Continue on Creon.      PLAN:      Anastrozole 1 mg daily    Radiation oncology consult when she returns in March, will plan to hold anastrozole during  radiation    Dexa scan when she returns in March    Follow-up with me in 6 months with CBC, CMP, CA 19-9    Plan next CT scan in one year    Total time is 35 minutes including face to face time, review of records, orders and documentation.          Jc Peoples MD  Hematology/Oncology  [facility]

## 2022-11-21 ENCOUNTER — PATIENT OUTREACH (OUTPATIENT)
Dept: ONCOLOGY | Facility: CLINIC | Age: 77
End: 2022-11-21

## 2022-11-21 ENCOUNTER — ONCOLOGY VISIT (OUTPATIENT)
Dept: ONCOLOGY | Facility: CLINIC | Age: 77
End: 2022-11-21
Attending: INTERNAL MEDICINE
Payer: COMMERCIAL

## 2022-11-21 VITALS
HEART RATE: 62 BPM | DIASTOLIC BLOOD PRESSURE: 70 MMHG | WEIGHT: 115.3 LBS | SYSTOLIC BLOOD PRESSURE: 118 MMHG | RESPIRATION RATE: 16 BRPM | TEMPERATURE: 97.8 F | BODY MASS INDEX: 19.68 KG/M2 | OXYGEN SATURATION: 97 % | HEIGHT: 64 IN

## 2022-11-21 DIAGNOSIS — C50.211 MALIGNANT NEOPLASM OF UPPER-INNER QUADRANT OF RIGHT BREAST IN FEMALE, ESTROGEN RECEPTOR POSITIVE (H): Primary | ICD-10-CM

## 2022-11-21 DIAGNOSIS — C25.0 MALIGNANT NEOPLASM OF HEAD OF PANCREAS (H): ICD-10-CM

## 2022-11-21 DIAGNOSIS — Z17.0 MALIGNANT NEOPLASM OF UPPER-INNER QUADRANT OF RIGHT BREAST IN FEMALE, ESTROGEN RECEPTOR POSITIVE (H): Primary | ICD-10-CM

## 2022-11-21 DIAGNOSIS — Z78.0 ASYMPTOMATIC MENOPAUSAL STATE: ICD-10-CM

## 2022-11-21 PROCEDURE — G0463 HOSPITAL OUTPT CLINIC VISIT: HCPCS

## 2022-11-21 PROCEDURE — 99214 OFFICE O/P EST MOD 30 MIN: CPT | Performed by: INTERNAL MEDICINE

## 2022-11-21 ASSESSMENT — PAIN SCALES - GENERAL: PAINLEVEL: NO PAIN (0)

## 2022-11-21 NOTE — ADDENDUM NOTE
After Visit Summary   3/19/2018    Nate Renae    MRN: [id number]           Patient Information     Date Of Birth          1967        Visit Information        Provider Department      3/19/2018 1:50 PM Michael Ann, PT [facility] [place] [facility]        Today's Diagnoses     Neck pain    -  1    Acute bilateral low back pain without sciatica           Follow-ups after your visit        Your next 10 appointments already scheduled     Mar 26, 2018  8:40 AM CDT   [facility] with Michael Ann PT   [facility] [place] [facility] ([facility] [place])    [address]  [place] [address]   [phone]            Apr 02, 2018  8:40 AM CDT   [facility] with Michael Ann PT   [facility] [place] [facility] ([facility] [place])    [address]  [place] [address]   [phone]            Apr 09, 2018  8:40 AM CDT   [facility] with Michael Ann PT   [facility] [place] [facility] ([facility] [place])    [address]  [place] [address]   [phone]              Who to contact     If you have questions or need follow up information about today's clinic visit or your schedule please contact [facility] [place] directly at [phone].  Normal or non-critical lab and imaging results will be communicated to you by MyChart, letter or phone within 4 business days after the clinic has received the results. If you do not hear from us within 7 days, please contact the clinic through [email or web address] or phone. If you have a critical or abnormal lab result, we will notify you by phone as soon as possible.  Submit refill requests through [employer] or call your pharmacy and they will forward the refill request to us. Please allow 3 business days for your refill to be completed.          Additional Information About Your Visit        MyChart Information      Addended by: MALU MEADOWS Stem on: 11/21/2022 12:28 PM     Modules accepted: Orders     "[employer] lets you send messages to your doctor, view your test results, renew your prescriptions, schedule appointments and more. To sign up, go to [email or web address] . Click on \"Log in\" on the left side of the screen, which will take you to the Welcome page. Then click on \"Sign up Now\" on the right side of the page.     You will be asked to enter the access code listed below, as well as some personal information. Please follow the directions to create your username and password.     Your access code is: [email or web address]  Expires: 2018  4:26 PM     Your access code will  in 90 days. If you need help or a new code, please call your [place] clinic or [phone].        Care EveryWhere ID     This is your Care EveryWhere ID. This could be used by other organizations to access your [place] medical records  [id number]         Blood Pressure from Last 3 Encounters:   07 108/70   08/15/06 114/80    Weight from Last 3 Encounters:   07 101.6 kg (224 lb)   08/15/06 95.3 kg (210 lb)              We Performed the Following     HC PT EVAL, LOW COMPLEXITY     [facility] PROGRESS NOTES REPORT     THERAPEUTIC EXERCISES        Primary Care Provider Office Phone # Fax #    Ruth Verner, [phone]       [facility] [address] [facility] [address]        Equal Access to Services     [facility] Center[facility] : Hadii aad ku hadasho Soomaali, waaxda luqadaha, qaybta kaalmada ucheyada, dennis larry . So [facility] [phone].    ATENCIÓN: Si habla español, tiene a elias disposición servicios gratuitos de asistencia lingüística. Armaan al [phone].    We comply with applicable federal civil rights laws and [place] laws. We do not discriminate on the basis of race, color, national origin, age, disability, sex, sexual orientation, or gender identity.            Thank you!     Thank you for choosing [facility]  for your care. Our goal is always to " provide you with excellent care. Hearing back from our patients is one way we can continue to improve our services. Please take a few minutes to complete the written survey that you may receive in the mail after your visit with us. Thank you!             Your Updated Medication List - Protect others around you: Learn how to safely use, store and throw away your medicines at www.disposemymeds.org.          This list is accurate as of 3/19/18  4:26 PM.  Always use your most recent med list.                   Brand Name Dispense Instructions for use Diagnosis    azithromycin 250 MG tablet    ZITHROMAX    6    2 TABLETS TODAY, THEN 1 TABLET DAILY THEREAFTER    Acute sinusitis, unspecified       FLUTICASONE PROPIONATE (NASAL) 50 MCG/ACT Susp     1 month    2 puffs in each nostril once daily    Chronic rhinitis

## 2022-11-21 NOTE — PROGRESS NOTES
Writer notified by Dr. Peoples that he wrote a letter of appeal to her insurance company for the recent CT that was denied. Writer notified Arielle RANDOLPH, with financial securing. She reported she will notify the [employer].     SHARMIN Zapien, RAQUELN, RN, LAc, OCN  RN Care Coordinator  [facility]  Ph: [phone]  F: [phone]

## 2022-11-21 NOTE — LETTER
11/21/2022         RE: Flora Hogan  [address]  [place] [id number]        Dear Colleague,    Thank you for referring your patient, Flora Hogan, to the [facility]. Please see a copy of my visit note below.    [facility]    Hematology/Oncology Established Patient Follow-up Note    Oncologic History/Treatment Summary      4/19/20: Presented with painless jaundice.  CT scan demonstrated a 1.2 cm mass in the pancreas with biliary dilatation.  ERCP/EUS performed demonstrating atypia but no definite malignancy.    5/26/20: Whipple resection performed by Dr. Chaves at [facility].  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  Margins and lymph nodes negative, LVI was seen.  Final stage [id number].    7/7/20 to 12/15/20: Status post 6 cycles adjuvant gemcitabine.    6/22/22: Routine screening mammogram demonstrated a circumscribed mass in the left breast at 9 o'clock.  Diagnostic mammogram was benign but linear branching microcalcifications seen in the right breaset spanning 7 cm and stereotactic biopsy recommended.    7/12/22: Stereotactic biopsy right breast demonstrated atypical ductal hyperplasia.    9/16/22: Excisional biopsy performed by Dr. Wagner demonstrating DCIS grade 3, no invasive component seen.  Medial and inferior margins were close at 0.4 mm.  ER positive at %, MO positive at 1-10%.    10/27/22: Re-excision of medial and inferior margins demonstrated 2 foci of grade 2 invasive ductal carcinoma measuring 0.3 mm and 0.4 mm with involvement of inferior margin. The invasive cancer was ER+ (%), MO negative, HER2 positive (3+ IHC).    11/11/22: Re-excision of inferior margin with sentinel lymph node biopsy demonstrated no residual malignancy.  2 sentinel lymph nodes were negative.       Today's Date: 11/21/22    Reason for Follow-up: Invasive ductal carcinoma of the right  breast.      INTERIM HISTORY:  Flora is seen for follow-up of recently diagnosed right breast cancer as well as history of resected stage I pancreas cancer.  Since our last visit she did undergo re-excision due to a close margin and pathology did demonstrate 2 subcentimeter foci of invasive ductal carcinoma.  The invasive cancer was ER+, HER2+.  She then underwent another re-excision with SLN biopsy and no evidence of malignancy was identified.    She is recovering fairly well from her surgery.  She did develop a large hematoma in the right breast following her most recent surgery.  No pain or redness associated with this and feels the bruising is beginning to improve.  Accompanied by her .    REVIEW OF SYSTEMS:   A 14 point ROS was reviewed with pertinent positives and negatives in the HPI.       HOME MEDICATIONS:  Current Outpatient Medications   Medication Sig Dispense Refill     acetaminophen (TYLENOL) 325 MG tablet Take 1-2 tablets (325-650 mg) by mouth every 6 hours as needed for mild pain or fever 50 tablet 0     albuterol (PROAIR HFA/PROVENTIL HFA/VENTOLIN HFA) 108 (90 Base) MCG/ACT inhaler Inhale 2 puffs into the lungs every 6 hours as needed for shortness of breath / dyspnea or wheezing 18 g 0     amylase-lipase-protease (CREON) 93910-70243 units CPEP per EC capsule TAKE 1 CAPSULE BY MOUTH THREE TIMES DAILY WITH A MEAL Strength: 24,000-76,000 Units 270 capsule 3     anastrozole (ARIMIDEX) 1 MG tablet Take 1 tablet (1 mg) by mouth daily 90 tablet 3     calcium carbonate-vitamin D (OS-CAMERON) 600-400 MG-UNIT chewable tablet Take 1 chew tab by mouth 2 times daily  180 tablet 3     citalopram (CELEXA) 20 MG tablet Take 1 tablet (20 mg) by mouth daily 90 tablet 3     loratadine (CLARITIN REDITABS) 10 MG dispersible tablet Take 10 mg by mouth as needed        magnesium 250 MG tablet Take 1 tablet by mouth 2 times daily       multivitamin (ONE-DAILY) tablet Take 1 tablet by mouth daily 90 tablet 0      ondansetron (ZOFRAN ODT) 4 MG ODT tab Take 1 tablet (4 mg) by mouth every 8 hours as needed for nausea 10 tablet 0     oxyCODONE (ROXICODONE) 5 MG tablet Take 1-2 tablets (5-10 mg) by mouth every 4 hours as needed for moderate to severe pain 12 tablet 0         ALLERGIES:  Allergies   Allergen Reactions     Penicillin V Hives     Seasonal Allergies          PAST MEDICAL HISTORY:  Past Medical History:   Diagnosis Date     Chest tightness     had suspected allergies     Malignant neoplasm of head of pancreas (H) 06/09/2020     Seasonal allergies      SOB (shortness of breath)          PAST SURGICAL HISTORY:  Past Surgical History:   Procedure Laterality Date     BIOPSY BREAST Right 9/19/2022    Procedure: Right radiofrequency localized excisional breast biopsy;  Surgeon: Geovanny Wagner MD;  Location: [facility] OR     CATARACT IOL, RT/LT  2015     COLONOSCOPY  10/14    no repeat needed due to age     COLONOSCOPY N/A 10/7/2014    Procedure: COLONOSCOPY;  Surgeon: Daryl Hanson MD;  Location:  GI     COLONOSCOPY  04/23/2019    no further screening     ENDOSCOPIC RETROGRADE CHOLANGIOPANCREATOGRAM N/A 4/20/2020    Procedure: ENDOSCOPIC RETROGRADE CHOLANGIOPANCREATOGRAPHY, PLACEMENT OF PANCREATIC STENT, PLACEMENT OF BILIARY STENT;  Surgeon: Yarely Vann MD;  Location:  OR     ESOPHAGOSCOPY, GASTROSCOPY, DUODENOSCOPY (EGD), COMBINED N/A 4/20/2020    Procedure: ESOPHAGOGASTRODUODENOSCOPY, WITH FINE NEEDLE ASPIRATION BIOPSY, WITH ENDOSCOPIC ULTRASOUND GUIDANCE, Endoscopic retrograde cholangiopancreatography;  Surgeon: Yarely Vann MD;  Location:  OR     ESOPHAGOSCOPY, GASTROSCOPY, DUODENOSCOPY (EGD), COMBINED N/A 5/5/2020    Procedure: ESOPHAGOGASTRODUODENOSCOPY, WITH FINE NEEDLE ASPIRATION BIOPSY, WITH ENDOSCOPIC ULTRASOUND GUIDANCE;  Surgeon: Romina Rosario MD;  Location:  GI     IR CHEST PORT PLACEMENT > 5 YRS OF AGE  7/6/2020     IR PORT REMOVAL RIGHT  12/28/2020     LAPAROSCOPIC  BIOPSY LIVER N/A 5/22/2020    Procedure: Diagnostic Laparoscopy with intraoperative ultrasound and Liver Biopsy X2;  Surgeon: Duarte Chaves MD;  Location: [facility] OR     LUMPECTOMY BREAST Right 10/27/2022    Procedure: right breast reexcision lumpectomy;  Surgeon: Geovanny Wagner MD;  Location: [facility] OR     LUMPECTOMY BREAST WITH SENTINEL NODE, COMBINED Right 11/11/2022    Procedure: Reexcision right lumpectomy, right sentinel node biopsy;  Surgeon: Geovanny Wagner MD;  Location: [facility] OR     WHIPPLE PROCEDURE N/A 5/22/2020    Procedure: Non Pylorus Preserving Whipple procedure;  Surgeon: Duarte Chaves MD;  Location: [facility] OR         SOCIAL HISTORY:  Social History     Socioeconomic History     Marital status:      Spouse name: Not on file     Number of children: Not on file     Years of education: Not on file     Highest education level: Not on file   Occupational History     Employer: RETIRED     Comment: previous taught special ed   Tobacco Use     Smoking status: Never     Smokeless tobacco: Never   Vaping Use     Vaping Use: Never used   Substance and Sexual Activity     Alcohol use: Yes     Comment: social      Drug use: Never     Sexual activity: Yes   Other Topics Concern     Parent/sibling w/ CABG, MI or angioplasty before 65F 55M? Not Asked      Service Not Asked     Blood Transfusions Not Asked     Caffeine Concern Yes     Comment: 2 cups     Occupational Exposure Not Asked     Hobby Hazards Not Asked     Sleep Concern Not Asked     Stress Concern Not Asked     Weight Concern Not Asked     Special Diet No     Back Care Not Asked     Exercise Yes     Comment: walking workout      Bike Helmet Not Asked     Seat Belt Not Asked     Self-Exams Not Asked   Social History Narrative     Not on file     Social Determinants of Health     Financial Resource Strain: Not on file   Food Insecurity: Not on file   Transportation Needs: Not on file   Physical Activity: Not on file   Stress: Not on file  "  Social Connections: Not on file   Intimate Partner Violence: Not At Risk     Fear of Current or Ex-Partner: No     Emotionally Abused: No     Physically Abused: No     Sexually Abused: No   Housing Stability: Not on file         FAMILY HISTORY:  Family History   Problem Relation Age of Onset     Musculoskeletal Disorder Mother         edematous legs     Obesity Mother      Lymphoma Brother      Musculoskeletal Disorder Maternal Grandmother         edematous legs; did have amputation     Obesity Maternal Grandmother      Myocardial Infarction Maternal Grandmother          PHYSICAL EXAM:  Vital signs:  /70   Pulse 62   Temp 97.8  F (36.6  C) (Tympanic)   Resp 16   Ht 1.626 m (5' 4\")   Wt 52.3 kg (115 lb 4.8 oz)   LMP  (LMP Unknown)   SpO2 97%   BMI 19.79 kg/m     ECO  GENERAL/CONSTITUTIONAL: Appears well, no acute distress.  BREASTS: Incision in right breast is clean, dry and intact.  Large area of ecchymosis with palpable hematoma noted in the breast.  Ecchymosis extends down chest onto upper abdomen and appears to be maturing.  No erythema, tenderness or signs of infection.      LABS:  None today.      PATHOLOGY/RELEVANT BIOMARKERS:  Right breast re-excision 10/27/22:  Final Diagnosis   A.  Breast, right, medial margin, excision:  -Benign breast tissue with postsurgical changes.    B.  Breast, right, inferior margin, reexcision:  -INVASIVE DUCTAL CARCINOMA, [place] grade 2, multifocal (2 foci), tumor size 3 mm (slide B2) and 4 mm (slide B4).  -Invasive carcinoma is present at the inferior margin.  -In situ carcinoma is not identified.     Right breast re-excision with SLN biopsy 22:  Final Diagnosis   A.  Lymph node, right axillary sentinel #1, excisional biopsy:  -One lymph node, negative for metastatic carcinoma (0/1).     B.  Lymph node, right axillary sentinel #2, excisional biopsy:  -One lymph node, negative for metastatic carcinoma (0/1).  -See comment.     C.  Breast, right, " suture-oriented reexcision:  -No residual malignancy identified.  -Previous surgical procedure site changes.  -Focal cystic dilatation of ducts and microcalcifications associated benign breast elements.     MOST RECENT IMAGING:  CT CAP 11/8/22:  IMPRESSION: In this patient history of pancreatic cancer status post Whipple's procedure, there is:  1. No convincing evidence of local recurrence or metastatic disease in the chest, abdomen or pelvis.  2. Few small indeterminate liver lesions, not significantly changed as compared to 10/25/2021 and although indeterminate, likely benign lesion like hepatic hemangioma given their interval stability.  3. Small amount of gas-containing fluid in the medial aspect of the right breast, indeterminate, could be related to recent procedure versus small abscess.    ASSESSMENT:  1. 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  We had a lengthy discussion today regarding the significance of the new pathologic findings.  The invasive tumor are very small, <0.5 cm which places her in a very favorable prognostic group.  The HER2 amplification however does increase risk of both local and distant recurrence.  Given small volume of disease, HER2 directed therapy and chemotherapy would not be warranted here.  I would recommend revisiting radiation therapy however given this is a more aggressive clone.  Endocrine therapy would still be indicated and would plan to treat with adjuvant AI as we had previously discussed.  She in fact did start anastrozole about 2 weeks ago and so far has not experienced any appreciable side effects.  She is going to be leaving for [place] in one month so there will not be enough time to pursue radiation before she leaves.  She would prefer not having it done in [place] so will plan to arrange radiation oncology consult when she returns in March.    2.  History of resected stage IB pancreatic cancer.  No clinical or  radiographic evidence of disease recurrence.    3.  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  Continue on Creon.      PLAN:      Anastrozole 1 mg daily    Radiation oncology consult when she returns in March, will plan to hold anastrozole during radiation    Dexa scan when she returns in March    Follow-up with me in 6 months with CBC, CMP, CA 19-9    Plan next CT scan in one year    Total time is 35 minutes including face to face time, review of records, orders and documentation.          Jc Peoples MD  Hematology/Oncology  [facility]        Again, thank you for allowing me to participate in the care of your patient.        Sincerely,        Jc Peoples MD

## 2022-11-21 NOTE — NURSING NOTE
"Oncology Rooming Note    November 21, 2022 11:11 AM   Flora Hogan is a 77 year old female who presents for:    Chief Complaint   Patient presents with     Oncology Clinic Visit     Malignant neoplasm of head of pancreas     Initial Vitals: /70   Pulse 62   Temp 97.8  F (36.6  C) (Tympanic)   Resp 16   Ht 1.626 m (5' 4\")   Wt 52.3 kg (115 lb 4.8 oz)   LMP  (LMP Unknown)   SpO2 97%   BMI 19.79 kg/m   Estimated body mass index is 19.79 kg/m  as calculated from the following:    Height as of this encounter: 1.626 m (5' 4\").    Weight as of this encounter: 52.3 kg (115 lb 4.8 oz). Body surface area is 1.54 meters squared.  No Pain (0) Comment: Data Unavailable   No LMP recorded (lmp unknown). Patient is postmenopausal.  Allergies reviewed: Yes  Medications reviewed: Yes    Medications: Medication refills not needed today.  Pharmacy name entered into [facility]:    [id number] - [place], [place] - [address] [id number] - [place] - [address] [facility] AT [place] [address] & [id number] AT [facility] & [address] PHARMACY [place], [place] - [address] [facility]        Karina Brito CMA              "

## 2022-12-07 ENCOUNTER — TRANSFERRED RECORDS (OUTPATIENT)
Dept: HEALTH INFORMATION MANAGEMENT | Facility: CLINIC | Age: 77
End: 2022-12-07

## 2022-12-14 ENCOUNTER — TELEPHONE (OUTPATIENT)
Dept: SURGERY | Facility: CLINIC | Age: 77
End: 2022-12-14

## 2022-12-14 NOTE — TELEPHONE ENCOUNTER
"S/p Reexcision right lumpectomy, right axillary sentinel lymph node biopsy  Procedure date: 11/11/22  Surgeon: Dr. Wagner      Patient reports that the medial aspect of R breast is quite firm and she has intermittent pain in the breast.  She tells me that the breast had been quite bruised after surgery, which is now mostly resolved.  Flora has noted 2 lumps at the inferior aspect of the right breast in the area of the old bruising.  Today she noticed that she has some red blotches between her breasts.      She denies fever, chills, and redness, drainage from incision site.       Flora also has concerns about some thickening of the tissue at the incision site and tells me that incision looks \"puffy.\"     She and her  would like her to be seen by Dr. Wagner as soon as possible because they have plans to leave for [place] the first of the year.      I scheduled patient for a post-op appointment with Dr. Wagner tomorrow, 12/15/22 at 10:30am.      Patient agrees with plan.        "

## 2022-12-15 ENCOUNTER — OFFICE VISIT (OUTPATIENT)
Dept: SURGERY | Facility: CLINIC | Age: 77
End: 2022-12-15
Payer: COMMERCIAL

## 2022-12-15 VITALS
HEIGHT: 64 IN | WEIGHT: 115 LBS | DIASTOLIC BLOOD PRESSURE: 68 MMHG | SYSTOLIC BLOOD PRESSURE: 122 MMHG | RESPIRATION RATE: 16 BRPM | OXYGEN SATURATION: 96 % | HEART RATE: 60 BPM | BODY MASS INDEX: 19.63 KG/M2

## 2022-12-15 DIAGNOSIS — Z09 SURGICAL FOLLOWUP VISIT: Primary | ICD-10-CM

## 2022-12-15 PROCEDURE — 99024 POSTOP FOLLOW-UP VISIT: CPT | Performed by: SURGERY

## 2022-12-15 NOTE — LETTER
December 15, 2022       Re: Flora Hogan - 1945    The patient presents today to follow-up regarding her reexcision right lumpectomy from approximately 1 month ago.  She has noticed significant bruising in the region as well as swelling, though this is actually been improving a little bit.  She had a couple of red patches adjacent to the breast as well, though these have improved somewhat.  She denies any significant pain.     Physical exam:  The patient is in no apparent distress.  Breathing is nonlabored.  The right breast reveals prominence and old ecchymosis consistent with hematoma within the lumpectomy cavity.  This is softening and, and feels to be of mixed solid and liquid consistency.  The patient's incision is well-healed.  There is no drainage.     Assessment and plan: The patient obviously has a fairly significant hematoma, though the overall swelling seems to be decreasing a bit.  She was concerned about the color change and warmth in the area, but it is clear to me that there is no evidence of infection.  The patient is due to travel to [place] for the winter in approximately 2 weeks.  We discussed the option of trying to drain some of this old hematoma off using a large bore needle versus continued observation.  The patient prefers observation at this point.  I have asked her to come back and see me 1 more time before she goes to [place], just so we can be sure everything is looking okay.  The patient agrees.  She will make an appointment to see me prior to her departure.     Geovanny Wagner MD  [facility]

## 2022-12-15 NOTE — PROGRESS NOTES
The patient presents today to follow-up regarding her reexcision right lumpectomy from approximately 1 month ago.  She has noticed significant bruising in the region as well as swelling, though this is actually been improving a little bit.  She had a couple of red patches adjacent to the breast as well, though these have improved somewhat.  She denies any significant pain.    Physical exam:  The patient is in no apparent distress.  Breathing is nonlabored.  The right breast reveals prominence and old ecchymosis consistent with hematoma within the lumpectomy cavity.  This is softening and, and feels to be of mixed solid and liquid consistency.  The patient's incision is well-healed.  There is no drainage.    Assessment and plan: The patient obviously has a fairly significant hematoma, though the overall swelling seems to be decreasing a bit.  She was concerned about the color change and warmth in the area, but it is clear to me that there is no evidence of infection.  The patient is due to travel to [place] for the winter in approximately 2 weeks.  We discussed the option of trying to drain some of this old hematoma off using a large bore needle versus continued observation.  The patient prefers observation at this point.  I have asked her to come back and see me 1 more time before she goes to [place], just so we can be sure everything is looking okay.  The patient agrees.  She will make an appointment to see me prior to her departure.    Geovanny Wagner MD  [facility]    Please route or send letter to:  Primary Care Provider (PCP)

## 2022-12-30 ENCOUNTER — OFFICE VISIT (OUTPATIENT)
Dept: SURGERY | Facility: CLINIC | Age: 77
End: 2022-12-30
Payer: COMMERCIAL

## 2022-12-30 VITALS
HEIGHT: 64 IN | RESPIRATION RATE: 16 BRPM | WEIGHT: 115 LBS | OXYGEN SATURATION: 97 % | DIASTOLIC BLOOD PRESSURE: 72 MMHG | SYSTOLIC BLOOD PRESSURE: 122 MMHG | HEART RATE: 57 BPM | BODY MASS INDEX: 19.63 KG/M2

## 2022-12-30 DIAGNOSIS — Z09 SURGICAL FOLLOWUP VISIT: Primary | ICD-10-CM

## 2022-12-30 PROCEDURE — 99024 POSTOP FOLLOW-UP VISIT: CPT | Performed by: SURGERY

## 2022-12-30 NOTE — PROGRESS NOTES
The patient returns today to follow-up regarding her right breast hematoma following multiple reexcisions of her lumpectomy.  She is feeling well and believes the area of swelling has gotten smaller.  She is due to leave for [place] for the winter tomorrow.    The patient's incision is intact.  Bruising has improved somewhat.  The hematoma remains, but it does seem a bit smaller and softer.    I think it should be fine for the patient to leave [facility] for the winter.  She does understand that if she has any problems with the area, she may need to be seen, even if it is in [place].  The patient seems comfortable with this plan.    Geovanny Wagner MD  [facility]

## 2022-12-30 NOTE — LETTER
2022    RE: Flora Hogan, : 1945      The patient returns today to follow-up regarding her right breast hematoma following multiple reexcisions of her lumpectomy.  She is feeling well and believes the area of swelling has gotten smaller.  She is due to leave for [place] for the winter tomorrow.     The patient's incision is intact.  Bruising has improved somewhat.  The hematoma remains, but it does seem a bit smaller and softer.     I think it should be fine for the patient to leave [facility] for the winter.  She does understand that if she has any problems with the area, she may need to be seen, even if it is in [place].  The patient seems comfortable with this plan.       Geovanny Wagner MD  [facility]

## 2023-01-06 NOTE — PATIENT INSTRUCTIONS
Return to clinic in 6 months with CMC, CMP, CA 19-9. Dexa and Radiation Consult in March.     Beatriz Benitez RN on 1/6/2023 at 3:54 PM

## 2023-01-29 NOTE — PROGRESS NOTES
"  SUBJECTIVE:   Flora Hogan is a 73 year old female who presents to clinic today for the following health issues:      1. Recheck SOB  Seen on 4/30/18 for SOB that has been ongoing for years but recently getting worse  At that time complained of associated stuffy nose/head, phlegm in throat, occ chest tightness and occ light-headedness  Initially saw cardiology and has tried inhalers without relief, CXR and EXG have come back normal  At the last visit, given pulmonology referral and albuterol inhaler    Patient states that Pulmonology \"didn't find anything\" per patient  She thinks it's due to seasonal allergies  Has not been using albuterol inhaler due to being used to SOB, did not want to use it unless she really needed it  Does take 10 mg Claritin some days when her SOB is bad, she thinks it helps    Problem list and histories reviewed & adjusted, as indicated.  Additional history: as documented    Patient Active Problem List   Diagnosis     Seasonal allergies     CARDIOVASCULAR SCREENING; LDL GOAL LESS THAN 160     Osteopenia     Advance Care Planning     Peripheral neuropathy (HCC)     Nonintractable headache, unspecified chronicity pattern, unspecified headache type     Chest tightness     SOB (shortness of breath)     Past Surgical History:   Procedure Laterality Date     CATARACT IOL, RT/LT  2015     COLONOSCOPY  10/14    no repeat needed due to age     COLONOSCOPY N/A 10/7/2014    Procedure: COLONOSCOPY;  Surgeon: Daryl Hanson MD;  Location:  GI       Social History   Substance Use Topics     Smoking status: Never Smoker     Smokeless tobacco: Never Used     Alcohol use Yes      Comment: social      Family History   Problem Relation Age of Onset     Musculoskeletal Disorder Mother      edematous legs     Obesity Mother      Musculoskeletal Disorder Maternal Grandmother      edematous legs; did have amputation     Obesity Maternal Grandmother      Myocardial Infarction Maternal Grandmother      " "    Current Outpatient Prescriptions   Medication Sig Dispense Refill     albuterol (PROAIR HFA/PROVENTIL HFA/VENTOLIN HFA) 108 (90 Base) MCG/ACT Inhaler Inhale 2 puffs into the lungs every 6 hours as needed for shortness of breath / dyspnea or wheezing 1 Inhaler 0     calcium carbonate-vitamin D (CALTRATE 600+D) 600-400 MG-UNIT CHEW Take 1 chew tab by mouth 2 times daily 180 tablet 3     loratadine (CLARITIN REDITABS) 10 MG dispersible tablet Take 10 mg by mouth as needed        magnesium 250 MG tablet Take 1 tablet by mouth daily 30 tablet      multivitamin, therapeutic with minerals (MULTI-VITAMIN) TABS Take 1 tablet by mouth daily 100 tablet 3     Omega-3 Fatty Acids (FISH OIL) 600 MG CAPS Take by mouth 2 times daily       psyllium 0.52 G capsule Take 1 capsule (0.52 g) by mouth daily 540 capsule      Allergies   Allergen Reactions     Penicillin V      Fredy-1 [Lidocaine] Unknown       Reviewed and updated as needed this visit by clinical staff  Tobacco  Allergies  Meds  Med Hx  Surg Hx  Fam Hx  Soc Hx      Reviewed and updated as needed this visit by Provider         ROS:  Constitutional, HEENT, cardiovascular, pulmonary, gi and gu systems are negative, except as otherwise noted.    OBJECTIVE:     /74 (BP Location: Right arm, Patient Position: Chair, Cuff Size: Adult Regular)  Pulse 58  Temp 97.4  F (36.3  C) (Oral)  Resp 16  Ht 5' 5\" (1.651 m)  Wt 121 lb 12.8 oz (55.2 kg)  LMP  (LMP Unknown)  SpO2 100%  Breastfeeding? No  BMI 20.27 kg/m2  Body mass index is 20.27 kg/(m^2).  GENERAL: healthy, alert and no distress  NECK: no adenopathy, no asymmetry, masses, or scars and thyroid normal to palpation  RESP: lungs clear to auscultation - no rales, rhonchi or wheezes  CV: regular rate and rhythm, normal S1 S2, no S3 or S4, no murmur, click or rub, no peripheral edema and peripheral pulses strong  MS: no gross musculoskeletal defects noted, no edema    Diagnostic Test Results:  none "     ASSESSMENT/PLAN:     1. Seasonal allergic rhinitis, unspecified chronicity, unspecified trigger  2. SOB (shortness of breath)  Chronic issue, ongoing for months. Saw Pulmonology who did not find issue, patient believes may be related to seasonal allergies. Encouraged her to take Claritin daily, use albuterol prn.  F/U if symptoms worsen or do not improve.      Risks, benefits and alternatives were discussed with patient. Agreeable to the plan of care.  Encouraged mammogram- declined.  Kelly Clay PA-C  [facility]   soft

## 2023-02-01 ENCOUNTER — PATIENT OUTREACH (OUTPATIENT)
Dept: ONCOLOGY | Facility: CLINIC | Age: 78
End: 2023-02-01
Payer: COMMERCIAL

## 2023-02-01 NOTE — PROGRESS NOTES
Patient called in asking if she needs to be seen by Dr. Wagner before her visit with Dr. Peoples on 4/17/23. She is also asking if her radiation consult has been scheduled. Writer will confer with Dr. Peoples regarding the need for appointment with Dr. Wagner, and will reach out to [facility] to determine if consult has been scheduled. Writer will then update patient accordingly.    Beatriz Benitez RN on 2/1/2023 at 4:00 PM

## 2023-02-02 NOTE — PROGRESS NOTES
Writer attempted to call patient to update her that her radiation consult is scheduled for 3/20/23 at 0830 with [facility].     Also, needing to update her of the following:  Jc Peoples MD Keppler, Gina L, RN  I don't think she needs to see Dr. Wagner unless she has a specific concern.      Writer unable to connect with patient, requested callback.    Beatriz Benitez RN on 2/2/2023 at 1:24 PM

## 2023-02-02 NOTE — PROGRESS NOTES
Patient calling in to speak with Beatriz regarding follow up call. Beatriz unavailable writer update patient of  Radiation consult scheduled for 3/20/23 at 8:30 at [facility]. Writer also updated her that Dr. Peoples thinks she may not need to see Dr. Wagner unless she is having specific concerns regarding her health.     Iron Amaya, RN, BSN.  RN Care Coordinator     [facility]   [phone]

## 2023-02-14 ENCOUNTER — PATIENT OUTREACH (OUTPATIENT)
Dept: ONCOLOGY | Facility: CLINIC | Age: 78
End: 2023-02-14
Payer: COMMERCIAL

## 2023-02-14 ENCOUNTER — TELEPHONE (OUTPATIENT)
Dept: SURGERY | Facility: CLINIC | Age: 78
End: 2023-02-14
Payer: COMMERCIAL

## 2023-02-14 NOTE — PROGRESS NOTES
"Patient called in to review upcoming appointments, and to express some concern regarding the \"hard lump that is still there in my right breast\". Writer reviewed upcoming appointments with patient as outlined below:      Writer also informed patient that she is scheduled with Radiation Oncology on 3/20/23 at 0830 for a consult.     Writer advised patient that she should reach out to Dr. Wagner's team to have them assess if she should be seen by Dr. Wagner prior to going to Radiation Oncology consult. Writer gave patient Dr. Wagner's office number for her to call.    Patient stated understanding of the above, and that she will call Dr. Wagner's office right away; all questions answered.     Beatriz Benitez RN on 2/14/2023 at 2:48 PM    "

## 2023-02-14 NOTE — TELEPHONE ENCOUNTER
"Procedure: reexcision right lumpectomy, right axillary sentinel lymph node biopsy    Date: 11/11/2022    Surgeon: Kristy    Patient seen at the end of December for right breast hematoma. It was determined at the time that she could travel to [place] as planned.    She is scheduled to return to [place] March 17th and has her first visit with radiation oncology on March 20th    She reports that the hematoma is still present, and relatively unchanged. About the size of a golf ball and \"really hard\"    She is wondering how to proceed when she returns to [place]. Does she need to have the hematoma dealt with before radiation can begin?    Will route to Dr. Wagner for review and advice.    Patient will receive call back with recommendations    She is in agreement with this plan    Yamile Mancia RN-BSN      "

## 2023-02-14 NOTE — TELEPHONE ENCOUNTER
Name of caller: Flora    Reason for Call:  Hard lump near incision    Surgeon:  Dr. Wagner    Recent Surgery:  Yes.    If yes, when & what type: 11/11,  Reexcision right lumpectomy, right axillary sentinel lymph node biopsy      Best phone number to reach pt at is: [phone]  Ok to leave a message with medical info? Yes.    Pharmacy preferred (if calling for a refill): NA

## 2023-02-16 NOTE — TELEPHONE ENCOUNTER
"Per :   \"I would like to see patient back in the clinic, though it does not necessarily need to be prior to seeing radiation oncologist.\"    This nurse called patient and scheduled her for a return appointment with Dr. Wagner on Thursday, 3/23/23 for a re-check of the right breast hematoma.        "

## 2023-03-20 ENCOUNTER — TRANSFERRED RECORDS (OUTPATIENT)
Dept: HEALTH INFORMATION MANAGEMENT | Facility: CLINIC | Age: 78
End: 2023-03-20
Payer: COMMERCIAL

## 2023-03-20 ENCOUNTER — TRANSFERRED RECORDS (OUTPATIENT)
Dept: SURGERY | Facility: CLINIC | Age: 78
End: 2023-03-20
Payer: COMMERCIAL

## 2023-03-23 ENCOUNTER — OFFICE VISIT (OUTPATIENT)
Dept: SURGERY | Facility: CLINIC | Age: 78
End: 2023-03-23
Payer: COMMERCIAL

## 2023-03-23 VITALS
OXYGEN SATURATION: 99 % | BODY MASS INDEX: 19.63 KG/M2 | DIASTOLIC BLOOD PRESSURE: 66 MMHG | SYSTOLIC BLOOD PRESSURE: 116 MMHG | HEART RATE: 57 BPM | WEIGHT: 115 LBS | HEIGHT: 64 IN | RESPIRATION RATE: 14 BRPM

## 2023-03-23 DIAGNOSIS — Z09 SURGICAL FOLLOWUP VISIT: Primary | ICD-10-CM

## 2023-03-23 DIAGNOSIS — K43.2 INCISIONAL HERNIA, WITHOUT OBSTRUCTION OR GANGRENE: ICD-10-CM

## 2023-03-23 PROCEDURE — 99214 OFFICE O/P EST MOD 30 MIN: CPT | Performed by: SURGERY

## 2023-03-23 NOTE — LETTER
2023       Re: Flora Hogan - 1945    The patient presents today to follow-up regarding her right breast hematoma after her multiple breast surgeries last November.  She has been out of town for the winter.  She is due to start radiation therapy imminently.  She has also been noticing more symptoms from her incisional hernia.  She has occasionally had fairly significant pains there, and it has affected her ability to do certain exercises. The lump never gets stuck out, however.  She is due to have another CT scan to follow-up on her pancreatic cancer next week.  The patient has also noticed some right lower quadrant discomfort and a lump.  She does have a history of having lipomas.     Past medical and surgical histories are reviewed.     The patient's right breast reveals some chronic bruising and fullness, though this is markedly improved from last time I saw her.  She now has an approximately 3 x 4 cm area of fullness, consistent with a chronic hematoma.  The abdomen reveals a hernia in her supraumbilical incision.  The bulge is approximately 4 cm in diameter.  The palpable defect is about 3 x 4 cm.  I can only feel a single defect.     The right lower quadrant reveals some subtle fullness which may represent a lipoma.  I do not feel an obvious hernia.     Review of the patient's most recent CT scan reveals an obvious hernia, but no additional obvious defects.  Looking back at the prior CT scan, however, does show what appears to be a small hernia above the largest one.  I do not see anything that looks like a hernia in the right lower quadrant.     Assessment and plan: Regarding the patient's right breast and the hematoma.  I do not think there is any point to waiting longer for radiation therapy.  This may eventually improve, but I do not think it is worth pursuing any sort of excision or other operative intervention at this point.  She should be fine to proceed with radiation  therapy.     Regarding the patient's incisional hernia.  She has been having more symptoms, but this certainly reduces easily.  I think this should be very low risk for her.  She is due for another CT scan in the near future, and I would be interested in seeing her back following that to further discuss hernia repair.  I have suggested she return to see me when she is nearing the end of her radiation therapy.  She agrees with this plan.     A total of 30 minutes was spent today in chart review, patient examination and discussion, and documentation.       Geovanny Wagner MD  [facility]

## 2023-03-23 NOTE — PROGRESS NOTES
The patient presents today to follow-up regarding her right breast hematoma after her multiple breast surgeries last November.  She has been out of town for the winter.  She is due to start radiation therapy imminently.  She has also been noticing more symptoms from her incisional hernia.  She has occasionally had fairly significant pains there, and it has affected her ability to do certain exercises.  The lump never gets stuck out, however.  She is due to have another CT scan to follow-up on her pancreatic cancer next week.  The patient has also noticed some right lower quadrant discomfort and a lump.  She does have a history of having lipomas.    Past medical and surgical histories are reviewed.    The patient's right breast reveals some chronic bruising and fullness, though this is markedly improved from last time I saw her.  She now has an approximately 3 x 4 cm area of fullness, consistent with a chronic hematoma.  The abdomen reveals a hernia in her supraumbilical incision.  The bulge is approximately 4 cm in diameter.  The palpable defect is about 3 x 4 cm.  I can only feel a single defect.    The right lower quadrant reveals some subtle fullness which may represent a lipoma.  I do not feel an obvious hernia.    Review of the patient's most recent CT scan reveals an obvious hernia, but no additional obvious defects.  Looking back at the prior CT scan, however, does show what appears to be a small hernia above the largest one.  I do not see anything that looks like a hernia in the right lower quadrant.    Assessment and plan: Regarding the patient's right breast and the hematoma.  I do not think there is any point to waiting longer for radiation therapy.  This may eventually improve, but I do not think it is worth pursuing any sort of excision or other operative intervention at this point.  She should be fine to proceed with radiation therapy.    Regarding the patient's incisional hernia.  She has been having  more symptoms, but this certainly reduces easily.  I think this should be very low risk for her.  She is due for another CT scan in the near future, and I would be interested in seeing her back following that to further discuss hernia repair.  I have suggested she return to see me when she is nearing the end of her radiation therapy.  She agrees with this plan.    A total of 30 minutes was spent today in chart review, patient examination and discussion, and documentation.    Geovanny Wagner MD  [facility]    Please route or send letter to:  Primary Care Provider (PCP) and and Dr. Jc Peoples of oncology.

## 2023-03-29 ENCOUNTER — ANCILLARY PROCEDURE (OUTPATIENT)
Dept: BONE DENSITY | Facility: CLINIC | Age: 78
End: 2023-03-29
Attending: INTERNAL MEDICINE
Payer: COMMERCIAL

## 2023-03-29 DIAGNOSIS — C50.211 MALIGNANT NEOPLASM OF UPPER-INNER QUADRANT OF RIGHT BREAST IN FEMALE, ESTROGEN RECEPTOR POSITIVE (H): ICD-10-CM

## 2023-03-29 DIAGNOSIS — Z17.0 MALIGNANT NEOPLASM OF UPPER-INNER QUADRANT OF RIGHT BREAST IN FEMALE, ESTROGEN RECEPTOR POSITIVE (H): ICD-10-CM

## 2023-03-29 DIAGNOSIS — Z78.0 ASYMPTOMATIC MENOPAUSAL STATE: ICD-10-CM

## 2023-03-29 PROCEDURE — 77085 DXA BONE DENSITY AXL VRT FX: CPT | Performed by: INTERNAL MEDICINE

## 2023-04-07 DIAGNOSIS — M80.00XA AGE-RELATED OSTEOPOROSIS WITH CURRENT PATHOLOGICAL FRACTURE, INITIAL ENCOUNTER: Primary | ICD-10-CM

## 2023-04-13 ENCOUNTER — LAB (OUTPATIENT)
Dept: LAB | Facility: CLINIC | Age: 78
End: 2023-04-13
Payer: COMMERCIAL

## 2023-04-13 DIAGNOSIS — Z11.59 ENCOUNTER FOR HEPATITIS C SCREENING TEST FOR LOW RISK PATIENT: ICD-10-CM

## 2023-04-13 DIAGNOSIS — M80.00XA AGE-RELATED OSTEOPOROSIS WITH CURRENT PATHOLOGICAL FRACTURE, INITIAL ENCOUNTER: ICD-10-CM

## 2023-04-13 LAB
HCV AB SERPL QL IA: NONREACTIVE
PTH-INTACT SERPL-MCNC: 29 PG/ML (ref 15–65)

## 2023-04-13 PROCEDURE — 83970 ASSAY OF PARATHORMONE: CPT

## 2023-04-13 PROCEDURE — 86803 HEPATITIS C AB TEST: CPT

## 2023-04-13 PROCEDURE — 36415 COLL VENOUS BLD VENIPUNCTURE: CPT

## 2023-04-13 PROCEDURE — 82306 VITAMIN D 25 HYDROXY: CPT

## 2023-04-16 NOTE — PROGRESS NOTES
[facility]    Hematology/Oncology Established Patient Follow-up Note    Oncologic History/Treatment Summary      4/19/20: Presented with painless jaundice.  CT scan demonstrated a 1.2 cm mass in the pancreas with biliary dilatation.  ERCP/EUS performed demonstrating atypia but no definite malignancy.    5/26/20: Whipple resection performed by Dr. Chaves at [facility].  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  Margins and lymph nodes negative, LVI was seen.  Final stage [id number].    7/7/20 to 12/15/20: Status post 6 cycles adjuvant gemcitabine.    6/22/22: Routine screening mammogram demonstrated a circumscribed mass in the left breast at 9 o'clock.  Diagnostic mammogram was benign but linear branching microcalcifications seen in the right breaset spanning 7 cm and stereotactic biopsy recommended.    7/12/22: Stereotactic biopsy right breast demonstrated atypical ductal hyperplasia.    9/16/22: Excisional biopsy performed by Dr. Wagner demonstrating DCIS grade 3, no invasive component seen.  Medial and inferior margins were close at 0.4 mm.  ER positive at %, WI positive at 1-10%.    10/27/22: Re-excision of medial and inferior margins demonstrated 2 foci of grade 2 invasive ductal carcinoma measuring 0.3 mm and 0.4 mm with involvement of inferior margin. The invasive cancer was ER+ (%), WI negative, HER2 positive (3+ IHC).    11/11/22: Re-excision of inferior margin with sentinel lymph node biopsy demonstrated no residual malignancy.  2 sentinel lymph nodes were negative.     11/21/22: Initiated adjuvant anastrozole.      Today's Date: 4/17/23    Reason for Follow-up: Invasive ductal carcinoma of the right breast.      INTERIM HISTORY:  Flora is seen for follow-up of stage IA right breast cancer receiving adjuvant anastrozole.  She also has history of resected stage I pancreas cancer.  She was recently seen by Dr. Wagner for  follow-up of breast hematoma as well as incisional abdominal hernia.  He recommended observation with follow-up after her next CT scan.  She has been undergoing right breast radiation and tolerating that quite well so far, some mild fatigue and skin irritation but not too bothersome.  Also tolerating anastrozole with essentially no side effects, states she has had a total of 2 hot flashes since starting it.  No issues with increased arthralgias.  Remaining very active.    REVIEW OF SYSTEMS:   A 14 point ROS was reviewed with pertinent positives and negatives in the HPI.       HOME MEDICATIONS:  Current Outpatient Medications   Medication Sig Dispense Refill     acetaminophen (TYLENOL) 325 MG tablet Take 1-2 tablets (325-650 mg) by mouth every 6 hours as needed for mild pain or fever 50 tablet 0     albuterol (PROAIR HFA/PROVENTIL HFA/VENTOLIN HFA) 108 (90 Base) MCG/ACT inhaler Inhale 2 puffs into the lungs every 6 hours as needed for shortness of breath / dyspnea or wheezing 18 g 0     amylase-lipase-protease (CREON) 31509-34769 units CPEP per EC capsule TAKE 1 CAPSULE BY MOUTH THREE TIMES DAILY WITH A MEAL Strength: 24,000-76,000 Units 270 capsule 3     anastrozole (ARIMIDEX) 1 MG tablet Take 1 tablet (1 mg) by mouth daily 90 tablet 3     calcium carbonate-vitamin D (OS-CAMERON) 600-400 MG-UNIT chewable tablet Take 1 chew tab by mouth 2 times daily  180 tablet 3     citalopram (CELEXA) 20 MG tablet Take 1 tablet (20 mg) by mouth daily 90 tablet 3     loratadine (CLARITIN REDITABS) 10 MG dispersible tablet Take 10 mg by mouth as needed        magnesium 250 MG tablet Take 1 tablet by mouth 2 times daily       multivitamin (ONE-DAILY) tablet Take 1 tablet by mouth daily 90 tablet 0         ALLERGIES:  Allergies   Allergen Reactions     Penicillin V Hives     Seasonal Allergies          PAST MEDICAL HISTORY:  Past Medical History:   Diagnosis Date     Chest tightness     had suspected allergies     Malignant neoplasm of  head of pancreas (H) 06/09/2020     Seasonal allergies      SOB (shortness of breath)          PAST SURGICAL HISTORY:  Past Surgical History:   Procedure Laterality Date     BIOPSY BREAST Right 9/19/2022    Procedure: Right radiofrequency localized excisional breast biopsy;  Surgeon: Geovanny Wagner MD;  Location: [facility] OR     CATARACT IOL, RT/LT  2015     COLONOSCOPY  10/14    no repeat needed due to age     COLONOSCOPY N/A 10/7/2014    Procedure: COLONOSCOPY;  Surgeon: Daryl Hanson MD;  Location:  GI     COLONOSCOPY  04/23/2019    no further screening     ENDOSCOPIC RETROGRADE CHOLANGIOPANCREATOGRAM N/A 4/20/2020    Procedure: ENDOSCOPIC RETROGRADE CHOLANGIOPANCREATOGRAPHY, PLACEMENT OF PANCREATIC STENT, PLACEMENT OF BILIARY STENT;  Surgeon: Yarely Vann MD;  Location:  OR     ESOPHAGOSCOPY, GASTROSCOPY, DUODENOSCOPY (EGD), COMBINED N/A 4/20/2020    Procedure: ESOPHAGOGASTRODUODENOSCOPY, WITH FINE NEEDLE ASPIRATION BIOPSY, WITH ENDOSCOPIC ULTRASOUND GUIDANCE, Endoscopic retrograde cholangiopancreatography;  Surgeon: Yarely Vann MD;  Location:  OR     ESOPHAGOSCOPY, GASTROSCOPY, DUODENOSCOPY (EGD), COMBINED N/A 5/5/2020    Procedure: ESOPHAGOGASTRODUODENOSCOPY, WITH FINE NEEDLE ASPIRATION BIOPSY, WITH ENDOSCOPIC ULTRASOUND GUIDANCE;  Surgeon: Romina Rosario MD;  Location:  GI     IR CHEST PORT PLACEMENT > 5 YRS OF AGE  7/6/2020     IR PORT REMOVAL RIGHT  12/28/2020     LAPAROSCOPIC BIOPSY LIVER N/A 5/22/2020    Procedure: Diagnostic Laparoscopy with intraoperative ultrasound and Liver Biopsy X2;  Surgeon: Duarte Chaves MD;  Location: [facility] OR     LUMPECTOMY BREAST Right 10/27/2022    Procedure: right breast reexcision lumpectomy;  Surgeon: Geovanny Wagner MD;  Location: [facility] OR     LUMPECTOMY BREAST WITH SENTINEL NODE, COMBINED Right 11/11/2022    Procedure: Reexcision right lumpectomy, right sentinel node biopsy;  Surgeon: Geovanny Wagner MD;  Location: [facility] OR      WHIPPLE PROCEDURE N/A 5/22/2020    Procedure: Non Pylorus Preserving Whipple procedure;  Surgeon: Duarte Chaves MD;  Location:  OR         SOCIAL HISTORY:  Social History     Socioeconomic History     Marital status:      Spouse name: Not on file     Number of children: Not on file     Years of education: Not on file     Highest education level: Not on file   Occupational History     Employer: RETIRED     Comment: previous taught special ed   Tobacco Use     Smoking status: Never     Smokeless tobacco: Never   Vaping Use     Vaping status: Never Used   Substance and Sexual Activity     Alcohol use: Yes     Comment: social      Drug use: Never     Sexual activity: Yes   Other Topics Concern     Parent/sibling w/ CABG, MI or angioplasty before 65F 55M? Not Asked      Service Not Asked     Blood Transfusions Not Asked     Caffeine Concern Yes     Comment: 2 cups     Occupational Exposure Not Asked     Hobby Hazards Not Asked     Sleep Concern Not Asked     Stress Concern Not Asked     Weight Concern Not Asked     Special Diet No     Back Care Not Asked     Exercise Yes     Comment: walking workout      Bike Helmet Not Asked     Seat Belt Not Asked     Self-Exams Not Asked   Social History Narrative     Not on file     Social Determinants of Health     Financial Resource Strain: Not on file   Food Insecurity: Not on file   Transportation Needs: Not on file   Physical Activity: Not on file   Stress: Not on file   Social Connections: Not on file   Intimate Partner Violence: Not At Risk (7/27/2022)    Humiliation, Afraid, Rape, and Kick questionnaire      Fear of Current or Ex-Partner: No      Emotionally Abused: No      Physically Abused: No      Sexually Abused: No   Housing Stability: Not on file         FAMILY HISTORY:  Family History   Problem Relation Age of Onset     Musculoskeletal Disorder Mother         edematous legs     Obesity Mother      Lymphoma Brother      Musculoskeletal Disorder  "Maternal Grandmother         edematous legs; did have amputation     Obesity Maternal Grandmother      Myocardial Infarction Maternal Grandmother          PHYSICAL EXAM:  /68 (Cuff Size: Adult Regular)   Pulse 56   Temp 97  F (36.1  C) (Tympanic)   Resp 16   Ht 1.626 m (5' 4\")   Wt 52.6 kg (116 lb)   LMP  (LMP Unknown)   SpO2 99%   BMI 19.91 kg/m    GENERAL/CONSTITUTIONAL: Appears well, no acute distress.  EYES: Gaze conjugate, pupils equal and round. No scleral icterus.  LYMPH: No cervical, supraclavicular, axillary or inguinal adenopathy.   RESPIRATORY: Lungs clear to auscultation bilaterally. No crackles or wheezing.   CARDIOVASCULAR: Regular rate and rhythm without murmurs, gallops, or rubs.    GASTROINTESTINAL: No organomegaly, masses, or tenderness.  No guarding.  No distention.  Easily reducible ventral hernia in mid abdomen.  BREASTS:  Some erythema in right breast but no desquamation.  Moderate sized firm hematoma again noted in upper inner quadrant.  Left breast unremarkable.  MUSCULOSKELETAL: No clubbing, cyanosis or edema.  NEUROLOGIC:  Alert and oriented, answers questions appropriately.  No evidence of ataxia or tremor.  Speech fluent.  INTEGUMENTARY: No rashes or jaundice.      LABS:   Latest Reference Range & Units 04/13/23 08:12   Parathyroid Hormone Intact 15 - 65 pg/mL 29         PATHOLOGY/RELEVANT BIOMARKERS:  Right breast re-excision 10/27/22:  Final Diagnosis   A.  Breast, right, medial margin, excision:  -Benign breast tissue with postsurgical changes.    B.  Breast, right, inferior margin, reexcision:  -INVASIVE DUCTAL CARCINOMA, Nicci grade 2, multifocal (2 foci), tumor size 3 mm (slide B2) and 4 mm (slide B4).  -Invasive carcinoma is present at the inferior margin.  -In situ carcinoma is not identified.     Right breast re-excision with SLN biopsy 11/11/22:  Final Diagnosis   A.  Lymph node, right axillary sentinel #1, excisional biopsy:  -One lymph node, negative for " metastatic carcinoma (0/1).     B.  Lymph node, right axillary sentinel #2, excisional biopsy:  -One lymph node, negative for metastatic carcinoma (0/1).  -See comment.     C.  Breast, right, suture-oriented reexcision:  -No residual malignancy identified.  -Previous surgical procedure site changes.  -Focal cystic dilatation of ducts and microcalcifications associated benign breast elements.     MOST RECENT IMAGING:    DEXA 3/29/23:  FINDINGS:               Lumbar Spine (L1-L3)      T-score:  -2.5, marked degenerative changes present at L4, so only L1-3 are evaluated.                Left Femoral Neck            T-score:  -3.2               Right Femoral Neck          T-score:  -3.0      CT CAP 11/8/22:  IMPRESSION: In this patient history of pancreatic cancer status post Whipple's procedure, there is:  1. No convincing evidence of local recurrence or metastatic disease in the chest, abdomen or pelvis.  2. Few small indeterminate liver lesions, not significantly changed as compared to 10/25/2021 and although indeterminate, likely benign lesion like hepatic hemangioma given their interval stability.  3. Small amount of gas-containing fluid in the medial aspect of the right breast, indeterminate, could be related to recent procedure versus small abscess.    ASSESSMENT:  1. 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  Currently receiving adjuvant anastrozole and tolerating it well but unfortunately has significant osteoporosis.  We had a lengthy discussion today again about her cancer stage, risk of recurrence and role of endocrine therapy.  She has low risk of distant and local recurrence but endocrine therapy will reduce risk of recurrence and perhaps more importantly reduce risk of new primary breast cancer.  Unfortunately, anastrozole could worsen her osteoporosis and I would recommend we discontinue it.  Tamoxifen would be an appropriate alternative, we reviewed  mechanism of action, side effects and risks including risk of thrombosis and endometrial cancer.  It does have some beneficial effects on bone density.  All questions addressed and she is agreeable to switching.    2.  History of resected stage IB pancreatic cancer.  No clinical evidence of disease recurrence.    3.  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  Continue on Creon.    4. Osteoporosis.  She does have significant osteoporosis as well as a small vertebral compression fracture which looking back at prior CT scans has been there since at least 2020.  Recommended she make and appointment with her PCP to discuss treatment options.      PLAN:    Discontinue anastrozole.  Tamoxifen 20 mg daily, start in about 3 weeks after radiation is completed.  Follow-up with PCP for osteoporosis.  RTC 3 months with CBC, CMP, CA 19-9 and CT CAP prior.    Total time is 40 minutes including face to face time, review of records, orders and documentation.          Jc Peoples MD  Hematology/Oncology  [facility]

## 2023-04-17 ENCOUNTER — ONCOLOGY VISIT (OUTPATIENT)
Dept: ONCOLOGY | Facility: CLINIC | Age: 78
End: 2023-04-17
Attending: INTERNAL MEDICINE
Payer: COMMERCIAL

## 2023-04-17 VITALS
OXYGEN SATURATION: 99 % | RESPIRATION RATE: 16 BRPM | SYSTOLIC BLOOD PRESSURE: 121 MMHG | DIASTOLIC BLOOD PRESSURE: 68 MMHG | WEIGHT: 116 LBS | HEIGHT: 64 IN | HEART RATE: 56 BPM | TEMPERATURE: 97 F | BODY MASS INDEX: 19.81 KG/M2

## 2023-04-17 DIAGNOSIS — C25.0 MALIGNANT NEOPLASM OF HEAD OF PANCREAS (H): Primary | ICD-10-CM

## 2023-04-17 LAB
DEPRECATED CALCIDIOL+CALCIFEROL SERPL-MC: <57 UG/L (ref 20–75)
VITAMIN D2 SERPL-MCNC: <5 UG/L
VITAMIN D3 SERPL-MCNC: 52 UG/L

## 2023-04-17 PROCEDURE — 99215 OFFICE O/P EST HI 40 MIN: CPT | Performed by: INTERNAL MEDICINE

## 2023-04-17 PROCEDURE — G0463 HOSPITAL OUTPT CLINIC VISIT: HCPCS | Performed by: INTERNAL MEDICINE

## 2023-04-17 ASSESSMENT — PAIN SCALES - GENERAL: PAINLEVEL: NO PAIN (0)

## 2023-04-17 NOTE — NURSING NOTE
"Oncology Rooming Note    April 17, 2023 8:33 AM   Anali Hogan is a 77 year old female who presents for:    Chief Complaint   Patient presents with     Oncology Clinic Visit     Malignant neoplasm of head of pancreas     Initial Vitals: /68 (Cuff Size: Adult Regular)   Pulse 56   Temp 97  F (36.1  C) (Tympanic)   Resp 16   Ht 1.626 m (5' 4\")   Wt 52.6 kg (116 lb)   LMP  (LMP Unknown)   SpO2 99%   BMI 19.91 kg/m   Estimated body mass index is 19.91 kg/m  as calculated from the following:    Height as of this encounter: 1.626 m (5' 4\").    Weight as of this encounter: 52.6 kg (116 lb). Body surface area is 1.54 meters squared.  No Pain (0) Comment: Data Unavailable   No LMP recorded (lmp unknown). Patient is postmenopausal.  Allergies reviewed: Yes  Medications reviewed: Yes    Medications: Medication refills not needed today.  Pharmacy name entered into [facility]:    [id number] [place] [facility] [address] [id number] [address] [facility] AT [place] [address] & [facility] [id number] AT [facility] & [address] [address] [facility]    Clinical concerns: f/u       Katie Whitmore, PASTOR              "

## 2023-04-17 NOTE — LETTER
4/17/2023         RE: Flora Hogan  [id number]  [place] [address]      [facility]    Hematology/Oncology Established Patient Follow-up Note    Oncologic History/Treatment Summary      4/19/20: Presented with painless jaundice.  CT scan demonstrated a 1.2 cm mass in the pancreas with biliary dilatation.  ERCP/EUS performed demonstrating atypia but no definite malignancy.    5/26/20: Whipple resection performed by Dr. Chaves at [facility].  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  Margins and lymph nodes negative, LVI was seen.  Final stage [id number].    7/7/20 to 12/15/20: Status post 6 cycles adjuvant gemcitabine.    6/22/22: Routine screening mammogram demonstrated a circumscribed mass in the left breast at 9 o'clock.  Diagnostic mammogram was benign but linear branching microcalcifications seen in the right breaset spanning 7 cm and stereotactic biopsy recommended.    7/12/22: Stereotactic biopsy right breast demonstrated atypical ductal hyperplasia.    9/16/22: Excisional biopsy performed by Dr. Wagner demonstrating DCIS grade 3, no invasive component seen.  Medial and inferior margins were close at 0.4 mm.  ER positive at %, NM positive at 1-10%.    10/27/22: Re-excision of medial and inferior margins demonstrated 2 foci of grade 2 invasive ductal carcinoma measuring 0.3 mm and 0.4 mm with involvement of inferior margin. The invasive cancer was ER+ (%), NM negative, HER2 positive (3+ IHC).    11/11/22: Re-excision of inferior margin with sentinel lymph node biopsy demonstrated no residual malignancy.  2 sentinel lymph nodes were negative.     11/21/22: Initiated adjuvant anastrozole.      Today's Date: 4/17/23    Reason for Follow-up: Invasive ductal carcinoma of the right breast.      INTERIM HISTORY:  Flora is seen for follow-up of stage IA right breast cancer receiving adjuvant anastrozole.  She also  has history of resected stage I pancreas cancer.  She was recently seen by Dr. Wagner for follow-up of breast hematoma as well as incisional abdominal hernia.  He recommended observation with follow-up after her next CT scan.  She has been undergoing right breast radiation and tolerating that quite well so far, some mild fatigue and skin irritation but not too bothersome.  Also tolerating anastrozole with essentially no side effects, states she has had a total of 2 hot flashes since starting it.  No issues with increased arthralgias.  Remaining very active.    REVIEW OF SYSTEMS:   A 14 point ROS was reviewed with pertinent positives and negatives in the HPI.       HOME MEDICATIONS:  Current Outpatient Medications   Medication Sig Dispense Refill     acetaminophen (TYLENOL) 325 MG tablet Take 1-2 tablets (325-650 mg) by mouth every 6 hours as needed for mild pain or fever 50 tablet 0     albuterol (PROAIR HFA/PROVENTIL HFA/VENTOLIN HFA) 108 (90 Base) MCG/ACT inhaler Inhale 2 puffs into the lungs every 6 hours as needed for shortness of breath / dyspnea or wheezing 18 g 0     amylase-lipase-protease (CREON) 41766-65378 units CPEP per EC capsule TAKE 1 CAPSULE BY MOUTH THREE TIMES DAILY WITH A MEAL Strength: 24,000-76,000 Units 270 capsule 3     anastrozole (ARIMIDEX) 1 MG tablet Take 1 tablet (1 mg) by mouth daily 90 tablet 3     calcium carbonate-vitamin D (OS-CAMERON) 600-400 MG-UNIT chewable tablet Take 1 chew tab by mouth 2 times daily  180 tablet 3     citalopram (CELEXA) 20 MG tablet Take 1 tablet (20 mg) by mouth daily 90 tablet 3     loratadine (CLARITIN REDITABS) 10 MG dispersible tablet Take 10 mg by mouth as needed        magnesium 250 MG tablet Take 1 tablet by mouth 2 times daily       multivitamin (ONE-DAILY) tablet Take 1 tablet by mouth daily 90 tablet 0         ALLERGIES:  Allergies   Allergen Reactions     Penicillin V Hives     Seasonal Allergies          PAST MEDICAL HISTORY:  Past Medical History:    Diagnosis Date     Chest tightness     had suspected allergies     Malignant neoplasm of head of pancreas (H) 06/09/2020     Seasonal allergies      SOB (shortness of breath)          PAST SURGICAL HISTORY:  Past Surgical History:   Procedure Laterality Date     BIOPSY BREAST Right 9/19/2022    Procedure: Right radiofrequency localized excisional breast biopsy;  Surgeon: Geovanny Wagner MD;  Location: [facility] OR     CATARACT IOL, RT/LT  2015     COLONOSCOPY  10/14    no repeat needed due to age     COLONOSCOPY N/A 10/7/2014    Procedure: COLONOSCOPY;  Surgeon: Daryl Hanson MD;  Location:  GI     COLONOSCOPY  04/23/2019    no further screening     ENDOSCOPIC RETROGRADE CHOLANGIOPANCREATOGRAM N/A 4/20/2020    Procedure: ENDOSCOPIC RETROGRADE CHOLANGIOPANCREATOGRAPHY, PLACEMENT OF PANCREATIC STENT, PLACEMENT OF BILIARY STENT;  Surgeon: Yarely Vann MD;  Location:  OR     ESOPHAGOSCOPY, GASTROSCOPY, DUODENOSCOPY (EGD), COMBINED N/A 4/20/2020    Procedure: ESOPHAGOGASTRODUODENOSCOPY, WITH FINE NEEDLE ASPIRATION BIOPSY, WITH ENDOSCOPIC ULTRASOUND GUIDANCE, Endoscopic retrograde cholangiopancreatography;  Surgeon: Yarely Vann MD;  Location:  OR     ESOPHAGOSCOPY, GASTROSCOPY, DUODENOSCOPY (EGD), COMBINED N/A 5/5/2020    Procedure: ESOPHAGOGASTRODUODENOSCOPY, WITH FINE NEEDLE ASPIRATION BIOPSY, WITH ENDOSCOPIC ULTRASOUND GUIDANCE;  Surgeon: Romina Rosario MD;  Location: [facility] GI     IR CHEST PORT PLACEMENT > 5 YRS OF AGE  7/6/2020     IR PORT REMOVAL RIGHT  12/28/2020     LAPAROSCOPIC BIOPSY LIVER N/A 5/22/2020    Procedure: Diagnostic Laparoscopy with intraoperative ultrasound and Liver Biopsy X2;  Surgeon: Duarte Chaves MD;  Location: [facility] OR     LUMPECTOMY BREAST Right 10/27/2022    Procedure: right breast reexcision lumpectomy;  Surgeon: Geovanny Wagner MD;  Location: [facility] OR     LUMPECTOMY BREAST WITH SENTINEL NODE, COMBINED Right 11/11/2022    Procedure: Reexcision right  lumpectomy, right sentinel node biopsy;  Surgeon: Geovanny Wagner MD;  Location: [facility] OR     WHIPPLE PROCEDURE N/A 5/22/2020    Procedure: Non Pylorus Preserving Whipple procedure;  Surgeon: Duarte Chaves MD;  Location: [facility] OR         SOCIAL HISTORY:  Social History     Socioeconomic History     Marital status:      Spouse name: Not on file     Number of children: Not on file     Years of education: Not on file     Highest education level: Not on file   Occupational History     Employer: RETIRED     Comment: previous taught special ed   Tobacco Use     Smoking status: Never     Smokeless tobacco: Never   Vaping Use     Vaping status: Never Used   Substance and Sexual Activity     Alcohol use: Yes     Comment: social      Drug use: Never     Sexual activity: Yes   Other Topics Concern     Parent/sibling w/ CABG, MI or angioplasty before 65F 55M? Not Asked      Service Not Asked     Blood Transfusions Not Asked     Caffeine Concern Yes     Comment: 2 cups     Occupational Exposure Not Asked     Hobby Hazards Not Asked     Sleep Concern Not Asked     Stress Concern Not Asked     Weight Concern Not Asked     Special Diet No     Back Care Not Asked     Exercise Yes     Comment: walking workout      Bike Helmet Not Asked     Seat Belt Not Asked     Self-Exams Not Asked   Social History Narrative     Not on file     Social Determinants of Health     Financial Resource Strain: Not on file   Food Insecurity: Not on file   Transportation Needs: Not on file   Physical Activity: Not on file   Stress: Not on file   Social Connections: Not on file   Intimate Partner Violence: Not At Risk (7/27/2022)    Humiliation, Afraid, Rape, and Kick questionnaire      Fear of Current or Ex-Partner: No      Emotionally Abused: No      Physically Abused: No      Sexually Abused: No   Housing Stability: Not on file         FAMILY HISTORY:  Family History   Problem Relation Age of Onset     Musculoskeletal Disorder Mother      "    edematous legs     Obesity Mother      Lymphoma Brother      Musculoskeletal Disorder Maternal Grandmother         edematous legs; did have amputation     Obesity Maternal Grandmother      Myocardial Infarction Maternal Grandmother          PHYSICAL EXAM:  /68 (Cuff Size: Adult Regular)   Pulse 56   Temp 97  F (36.1  C) (Tympanic)   Resp 16   Ht 1.626 m (5' 4\")   Wt 52.6 kg (116 lb)   LMP  (LMP Unknown)   SpO2 99%   BMI 19.91 kg/m    GENERAL/CONSTITUTIONAL: Appears well, no acute distress.  EYES: Gaze conjugate, pupils equal and round. No scleral icterus.  LYMPH: No cervical, supraclavicular, axillary or inguinal adenopathy.   RESPIRATORY: Lungs clear to auscultation bilaterally. No crackles or wheezing.   CARDIOVASCULAR: Regular rate and rhythm without murmurs, gallops, or rubs.    GASTROINTESTINAL: No organomegaly, masses, or tenderness.  No guarding.  No distention.  Easily reducible ventral hernia in mid abdomen.  BREASTS:  Some erythema in right breast but no desquamation.  Moderate sized firm hematoma again noted in upper inner quadrant.  Left breast unremarkable.  MUSCULOSKELETAL: No clubbing, cyanosis or edema.  NEUROLOGIC:  Alert and oriented, answers questions appropriately.  No evidence of ataxia or tremor.  Speech fluent.  INTEGUMENTARY: No rashes or jaundice.      LABS:   Latest Reference Range & Units 04/13/23 08:12   Parathyroid Hormone Intact 15 - 65 pg/mL 29         PATHOLOGY/RELEVANT BIOMARKERS:  Right breast re-excision 10/27/22:  Final Diagnosis   A.  Breast, right, medial margin, excision:  -Benign breast tissue with postsurgical changes.    B.  Breast, right, inferior margin, reexcision:  -INVASIVE DUCTAL CARCINOMA, Nicci grade 2, multifocal (2 foci), tumor size 3 mm (slide B2) and 4 mm (slide B4).  -Invasive carcinoma is present at the inferior margin.  -In situ carcinoma is not identified.     Right breast re-excision with SLN biopsy 11/11/22:  Final Diagnosis   A.  " Lymph node, right axillary sentinel #1, excisional biopsy:  -One lymph node, negative for metastatic carcinoma (0/1).     B.  Lymph node, right axillary sentinel #2, excisional biopsy:  -One lymph node, negative for metastatic carcinoma (0/1).  -See comment.     C.  Breast, right, suture-oriented reexcision:  -No residual malignancy identified.  -Previous surgical procedure site changes.  -Focal cystic dilatation of ducts and microcalcifications associated benign breast elements.     MOST RECENT IMAGING:    DEXA 3/29/23:  FINDINGS:               Lumbar Spine (L1-L3)      T-score:  -2.5, marked degenerative changes present at L4, so only L1-3 are evaluated.                Left Femoral Neck            T-score:  -3.2               Right Femoral Neck          T-score:  -3.0      CT CAP 11/8/22:  IMPRESSION: In this patient history of pancreatic cancer status post Whipple's procedure, there is:  1. No convincing evidence of local recurrence or metastatic disease in the chest, abdomen or pelvis.  2. Few small indeterminate liver lesions, not significantly changed as compared to 10/25/2021 and although indeterminate, likely benign lesion like hepatic hemangioma given their interval stability.  3. Small amount of gas-containing fluid in the medial aspect of the right breast, indeterminate, could be related to recent procedure versus small abscess.    ASSESSMENT:  1. Stage IA ([id number]) multifocal invasive ductal carcinoma of the right breast, ER+, HER2+.  Currently receiving adjuvant anastrozole and tolerating it well but unfortunately has significant osteoporosis.  We had a lengthy discussion today again about her cancer stage, risk of recurrence and role of endocrine therapy.  She has low risk of distant and local recurrence but endocrine therapy will reduce risk of recurrence and perhaps more importantly reduce risk of new primary breast cancer.  Unfortunately, anastrozole could worsen her osteoporosis and I would  recommend we discontinue it.  Tamoxifen would be an appropriate alternative, we reviewed mechanism of action, side effects and risks including risk of thrombosis and endometrial cancer.  It does have some beneficial effects on bone density.  All questions addressed and she is agreeable to switching.    2.  History of resected stage IB pancreatic cancer.  No clinical evidence of disease recurrence.    3.  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  Continue on Creon.    4. Osteoporosis.  She does have significant osteoporosis as well as a small vertebral compression fracture which looking back at prior CT scans has been there since at least 2020.  Recommended she make and appointment with her PCP to discuss treatment options.      PLAN:    Discontinue anastrozole.  Tamoxifen 20 mg daily, start in about 3 weeks after radiation is completed.  Follow-up with PCP for osteoporosis.  RTC 3 months with CBC, CMP, CA 19-9 and CT CAP prior.    Total time is 40 minutes including face to face time, review of records, orders and documentation.          Jc Peoples MD  Hematology/Oncology  [facility]          Jc Peoples MD

## 2023-04-17 NOTE — LETTER
4/17/2023         RE: Flora Hogan  [address]  [place] [address]        Dear Colleague,    Thank you for referring your patient, Flora Hogan, to the [facility]. Please see a copy of my visit note below.    [facility]    Hematology/Oncology Established Patient Follow-up Note    Oncologic History/Treatment Summary      4/19/20: Presented with painless jaundice.  CT scan demonstrated a 1.2 cm mass in the pancreas with biliary dilatation.  ERCP/EUS performed demonstrating atypia but no definite malignancy.    5/26/20: Whipple resection performed by Dr. Chaves at [facility].  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  Margins and lymph nodes negative, LVI was seen.  Final stage [id number].    7/7/20 to 12/15/20: Status post 6 cycles adjuvant gemcitabine.    6/22/22: Routine screening mammogram demonstrated a circumscribed mass in the left breast at 9 o'clock.  Diagnostic mammogram was benign but linear branching microcalcifications seen in the right breaset spanning 7 cm and stereotactic biopsy recommended.    7/12/22: Stereotactic biopsy right breast demonstrated atypical ductal hyperplasia.    9/16/22: Excisional biopsy performed by Dr. Wagner demonstrating DCIS grade 3, no invasive component seen.  Medial and inferior margins were close at 0.4 mm.  ER positive at %, FL positive at 1-10%.    10/27/22: Re-excision of medial and inferior margins demonstrated 2 foci of grade 2 invasive ductal carcinoma measuring 0.3 mm and 0.4 mm with involvement of inferior margin. The invasive cancer was ER+ (%), FL negative, HER2 positive (3+ IHC).    11/11/22: Re-excision of inferior margin with sentinel lymph node biopsy demonstrated no residual malignancy.  2 sentinel lymph nodes were negative.     11/21/22: Initiated adjuvant anastrozole.      Today's Date: 4/17/23    Reason for Follow-up:  Invasive ductal carcinoma of the right breast.      INTERIM HISTORY:  Flora is seen for follow-up of stage IA right breast cancer receiving adjuvant anastrozole.  She also has history of resected stage I pancreas cancer.  She was recently seen by Dr. Wagner for follow-up of breast hematoma as well as incisional abdominal hernia.  He recommended observation with follow-up after her next CT scan.  She has been undergoing right breast radiation and tolerating that quite well so far, some mild fatigue and skin irritation but not too bothersome.  Also tolerating anastrozole with essentially no side effects, states she has had a total of 2 hot flashes since starting it.  No issues with increased arthralgias.  Remaining very active.    REVIEW OF SYSTEMS:   A 14 point ROS was reviewed with pertinent positives and negatives in the HPI.       HOME MEDICATIONS:  Current Outpatient Medications   Medication Sig Dispense Refill     acetaminophen (TYLENOL) 325 MG tablet Take 1-2 tablets (325-650 mg) by mouth every 6 hours as needed for mild pain or fever 50 tablet 0     albuterol (PROAIR HFA/PROVENTIL HFA/VENTOLIN HFA) 108 (90 Base) MCG/ACT inhaler Inhale 2 puffs into the lungs every 6 hours as needed for shortness of breath / dyspnea or wheezing 18 g 0     amylase-lipase-protease (CREON) 69339-99055 units CPEP per EC capsule TAKE 1 CAPSULE BY MOUTH THREE TIMES DAILY WITH A MEAL Strength: 24,000-76,000 Units 270 capsule 3     anastrozole (ARIMIDEX) 1 MG tablet Take 1 tablet (1 mg) by mouth daily 90 tablet 3     calcium carbonate-vitamin D (OS-CAMERON) 600-400 MG-UNIT chewable tablet Take 1 chew tab by mouth 2 times daily  180 tablet 3     citalopram (CELEXA) 20 MG tablet Take 1 tablet (20 mg) by mouth daily 90 tablet 3     loratadine (CLARITIN REDITABS) 10 MG dispersible tablet Take 10 mg by mouth as needed        magnesium 250 MG tablet Take 1 tablet by mouth 2 times daily       multivitamin (ONE-DAILY) tablet Take 1 tablet by mouth  daily 90 tablet 0         ALLERGIES:  Allergies   Allergen Reactions     Penicillin V Hives     Seasonal Allergies          PAST MEDICAL HISTORY:  Past Medical History:   Diagnosis Date     Chest tightness     had suspected allergies     Malignant neoplasm of head of pancreas (H) 06/09/2020     Seasonal allergies      SOB (shortness of breath)          PAST SURGICAL HISTORY:  Past Surgical History:   Procedure Laterality Date     BIOPSY BREAST Right 9/19/2022    Procedure: Right radiofrequency localized excisional breast biopsy;  Surgeon: Geovanny Wagner MD;  Location: [facility] OR     CATARACT IOL, RT/LT  2015     COLONOSCOPY  10/14    no repeat needed due to age     COLONOSCOPY N/A 10/7/2014    Procedure: COLONOSCOPY;  Surgeon: Daryl Hanson MD;  Location:  GI     COLONOSCOPY  04/23/2019    no further screening     ENDOSCOPIC RETROGRADE CHOLANGIOPANCREATOGRAM N/A 4/20/2020    Procedure: ENDOSCOPIC RETROGRADE CHOLANGIOPANCREATOGRAPHY, PLACEMENT OF PANCREATIC STENT, PLACEMENT OF BILIARY STENT;  Surgeon: Yarely Vann MD;  Location:  OR     ESOPHAGOSCOPY, GASTROSCOPY, DUODENOSCOPY (EGD), COMBINED N/A 4/20/2020    Procedure: ESOPHAGOGASTRODUODENOSCOPY, WITH FINE NEEDLE ASPIRATION BIOPSY, WITH ENDOSCOPIC ULTRASOUND GUIDANCE, Endoscopic retrograde cholangiopancreatography;  Surgeon: Yarely Vann MD;  Location: [facility] OR     ESOPHAGOSCOPY, GASTROSCOPY, DUODENOSCOPY (EGD), COMBINED N/A 5/5/2020    Procedure: ESOPHAGOGASTRODUODENOSCOPY, WITH FINE NEEDLE ASPIRATION BIOPSY, WITH ENDOSCOPIC ULTRASOUND GUIDANCE;  Surgeon: Romina Rosario MD;  Location: [facility] GI     IR CHEST PORT PLACEMENT > 5 YRS OF AGE  7/6/2020     IR PORT REMOVAL RIGHT  12/28/2020     LAPAROSCOPIC BIOPSY LIVER N/A 5/22/2020    Procedure: Diagnostic Laparoscopy with intraoperative ultrasound and Liver Biopsy X2;  Surgeon: Duarte Chaves MD;  Location: [facility] OR     LUMPECTOMY BREAST Right 10/27/2022    Procedure: right breast  reexcision lumpectomy;  Surgeon: Geovanny Wagner MD;  Location: [facility] OR     LUMPECTOMY BREAST WITH SENTINEL NODE, COMBINED Right 11/11/2022    Procedure: Reexcision right lumpectomy, right sentinel node biopsy;  Surgeon: Geovanny Wagner MD;  Location: [facility] OR     WHIPPLE PROCEDURE N/A 5/22/2020    Procedure: Non Pylorus Preserving Whipple procedure;  Surgeon: Duarte Chaves MD;  Location: [facility] OR         SOCIAL HISTORY:  Social History     Socioeconomic History     Marital status:      Spouse name: Not on file     Number of children: Not on file     Years of education: Not on file     Highest education level: Not on file   Occupational History     Employer: RETIRED     Comment: previous taught special ed   Tobacco Use     Smoking status: Never     Smokeless tobacco: Never   Vaping Use     Vaping status: Never Used   Substance and Sexual Activity     Alcohol use: Yes     Comment: social      Drug use: Never     Sexual activity: Yes   Other Topics Concern     Parent/sibling w/ CABG, MI or angioplasty before 65F 55M? Not Asked      Service Not Asked     Blood Transfusions Not Asked     Caffeine Concern Yes     Comment: 2 cups     Occupational Exposure Not Asked     Hobby Hazards Not Asked     Sleep Concern Not Asked     Stress Concern Not Asked     Weight Concern Not Asked     Special Diet No     Back Care Not Asked     Exercise Yes     Comment: walking workout      Bike Helmet Not Asked     Seat Belt Not Asked     Self-Exams Not Asked   Social History Narrative     Not on file     Social Determinants of Health     Financial Resource Strain: Not on file   Food Insecurity: Not on file   Transportation Needs: Not on file   Physical Activity: Not on file   Stress: Not on file   Social Connections: Not on file   Intimate Partner Violence: Not At Risk (7/27/2022)    Humiliation, Afraid, Rape, and Kick questionnaire      Fear of Current or Ex-Partner: No      Emotionally Abused: No      Physically Abused:  "No      Sexually Abused: No   Housing Stability: Not on file         FAMILY HISTORY:  Family History   Problem Relation Age of Onset     Musculoskeletal Disorder Mother         edematous legs     Obesity Mother      Lymphoma Brother      Musculoskeletal Disorder Maternal Grandmother         edematous legs; did have amputation     Obesity Maternal Grandmother      Myocardial Infarction Maternal Grandmother          PHYSICAL EXAM:  /68 (Cuff Size: Adult Regular)   Pulse 56   Temp 97  F (36.1  C) (Tympanic)   Resp 16   Ht 1.626 m (5' 4\")   Wt 52.6 kg (116 lb)   LMP  (LMP Unknown)   SpO2 99%   BMI 19.91 kg/m    GENERAL/CONSTITUTIONAL: Appears well, no acute distress.  EYES: Gaze conjugate, pupils equal and round. No scleral icterus.  LYMPH: No cervical, supraclavicular, axillary or inguinal adenopathy.   RESPIRATORY: Lungs clear to auscultation bilaterally. No crackles or wheezing.   CARDIOVASCULAR: Regular rate and rhythm without murmurs, gallops, or rubs.    GASTROINTESTINAL: No organomegaly, masses, or tenderness.  No guarding.  No distention.  Easily reducible ventral hernia in mid abdomen.  BREASTS:  Some erythema in right breast but no desquamation.  Moderate sized firm hematoma again noted in upper inner quadrant.  Left breast unremarkable.  MUSCULOSKELETAL: No clubbing, cyanosis or edema.  NEUROLOGIC:  Alert and oriented, answers questions appropriately.  No evidence of ataxia or tremor.  Speech fluent.  INTEGUMENTARY: No rashes or jaundice.      LABS:   Latest Reference Range & Units 04/13/23 08:12   Parathyroid Hormone Intact 15 - 65 pg/mL 29         PATHOLOGY/RELEVANT BIOMARKERS:  Right breast re-excision 10/27/22:  Final Diagnosis   A.  Breast, right, medial margin, excision:  -Benign breast tissue with postsurgical changes.    B.  Breast, right, inferior margin, reexcision:  -INVASIVE DUCTAL CARCINOMA, Nicci grade 2, multifocal (2 foci), tumor size 3 mm (slide B2) and 4 mm (slide " B4).  -Invasive carcinoma is present at the inferior margin.  -In situ carcinoma is not identified.     Right breast re-excision with SLN biopsy 11/11/22:  Final Diagnosis   A.  Lymph node, right axillary sentinel #1, excisional biopsy:  -One lymph node, negative for metastatic carcinoma (0/1).     B.  Lymph node, right axillary sentinel #2, excisional biopsy:  -One lymph node, negative for metastatic carcinoma (0/1).  -See comment.     C.  Breast, right, suture-oriented reexcision:  -No residual malignancy identified.  -Previous surgical procedure site changes.  -Focal cystic dilatation of ducts and microcalcifications associated benign breast elements.     MOST RECENT IMAGING:    DEXA 3/29/23:  FINDINGS:               Lumbar Spine (L1-L3)      T-score:  -2.5, marked degenerative changes present at L4, so only L1-3 are evaluated.                Left Femoral Neck            T-score:  -3.2               Right Femoral Neck          T-score:  -3.0      CT CAP 11/8/22:  IMPRESSION: In this patient history of pancreatic cancer status post Whipple's procedure, there is:  1. No convincing evidence of local recurrence or metastatic disease in the chest, abdomen or pelvis.  2. Few small indeterminate liver lesions, not significantly changed as compared to 10/25/2021 and although indeterminate, likely benign lesion like hepatic hemangioma given their interval stability.  3. Small amount of gas-containing fluid in the medial aspect of the right breast, indeterminate, could be related to recent procedure versus small abscess.    ASSESSMENT:  1. Stage IA ([id number]) multifocal invasive ductal carcinoma of the right breast, ER+, HER2+.  Currently receiving adjuvant anastrozole and tolerating it well but unfortunately has significant osteoporosis.  We had a lengthy discussion today again about her cancer stage, risk of recurrence and role of endocrine therapy.  She has low risk of distant and local recurrence but endocrine therapy  will reduce risk of recurrence and perhaps more importantly reduce risk of new primary breast cancer.  Unfortunately, anastrozole could worsen her osteoporosis and I would recommend we discontinue it.  Tamoxifen would be an appropriate alternative, we reviewed mechanism of action, side effects and risks including risk of thrombosis and endometrial cancer.  It does have some beneficial effects on bone density.  All questions addressed and she is agreeable to switching.    2.  History of resected stage IB pancreatic cancer.  No clinical evidence of disease recurrence.    3.  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  Continue on Creon.    4. Osteoporosis.  She does have significant osteoporosis as well as a small vertebral compression fracture which looking back at prior CT scans has been there since at least 2020.  Recommended she make and appointment with her PCP to discuss treatment options.      PLAN:    Discontinue anastrozole.  Tamoxifen 20 mg daily, start in about 3 weeks after radiation is completed.  Follow-up with PCP for osteoporosis.  RTC 3 months with CBC, CMP, CA 19-9 and CT CAP prior.    Total time is 40 minutes including face to face time, review of records, orders and documentation.          Jc Peoples MD  Hematology/Oncology  [facility]        Again, thank you for allowing me to participate in the care of your patient.        Sincerely,        Jc Peoples MD

## 2023-04-17 NOTE — LETTER
4/17/2023         RE: Flora Hogan  [address]  [place] [address]      [facility]    Hematology/Oncology Established Patient Follow-up Note    Oncologic History/Treatment Summary      4/19/20: Presented with painless jaundice.  CT scan demonstrated a 1.2 cm mass in the pancreas with biliary dilatation.  ERCP/EUS performed demonstrating atypia but no definite malignancy.    5/26/20: Whipple resection performed by Dr. Chaves at [facility].  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  Margins and lymph nodes negative, LVI was seen.  Final stage [id number].    7/7/20 to 12/15/20: Status post 6 cycles adjuvant gemcitabine.    6/22/22: Routine screening mammogram demonstrated a circumscribed mass in the left breast at 9 o'clock.  Diagnostic mammogram was benign but linear branching microcalcifications seen in the right breaset spanning 7 cm and stereotactic biopsy recommended.    7/12/22: Stereotactic biopsy right breast demonstrated atypical ductal hyperplasia.    9/16/22: Excisional biopsy performed by Dr. Wagner demonstrating DCIS grade 3, no invasive component seen.  Medial and inferior margins were close at 0.4 mm.  ER positive at %, IN positive at 1-10%.    10/27/22: Re-excision of medial and inferior margins demonstrated 2 foci of grade 2 invasive ductal carcinoma measuring 0.3 mm and 0.4 mm with involvement of inferior margin. The invasive cancer was ER+ (%), IN negative, HER2 positive (3+ IHC).    11/11/22: Re-excision of inferior margin with sentinel lymph node biopsy demonstrated no residual malignancy.  2 sentinel lymph nodes were negative.     11/21/22: Initiated adjuvant anastrozole.      Today's Date: 4/17/23    Reason for Follow-up: Invasive ductal carcinoma of the right breast.      INTERIM HISTORY:  Flora is seen for follow-up of stage IA right breast cancer receiving adjuvant anastrozole.  She also  has history of resected stage I pancreas cancer.  She was recently seen by Dr. Wagner for follow-up of breast hematoma as well as incisional abdominal hernia.  He recommended observation with follow-up after her next CT scan.  She has been undergoing right breast radiation and tolerating that quite well so far, some mild fatigue and skin irritation but not too bothersome.  Also tolerating anastrozole with essentially no side effects, states she has had a total of 2 hot flashes since starting it.  No issues with increased arthralgias.  Remaining very active.    REVIEW OF SYSTEMS:   A 14 point ROS was reviewed with pertinent positives and negatives in the HPI.       HOME MEDICATIONS:  Current Outpatient Medications   Medication Sig Dispense Refill     acetaminophen (TYLENOL) 325 MG tablet Take 1-2 tablets (325-650 mg) by mouth every 6 hours as needed for mild pain or fever 50 tablet 0     albuterol (PROAIR HFA/PROVENTIL HFA/VENTOLIN HFA) 108 (90 Base) MCG/ACT inhaler Inhale 2 puffs into the lungs every 6 hours as needed for shortness of breath / dyspnea or wheezing 18 g 0     amylase-lipase-protease (CREON) 88622-05198 units CPEP per EC capsule TAKE 1 CAPSULE BY MOUTH THREE TIMES DAILY WITH A MEAL Strength: 24,000-76,000 Units 270 capsule 3     anastrozole (ARIMIDEX) 1 MG tablet Take 1 tablet (1 mg) by mouth daily 90 tablet 3     calcium carbonate-vitamin D (OS-CAMERON) 600-400 MG-UNIT chewable tablet Take 1 chew tab by mouth 2 times daily  180 tablet 3     citalopram (CELEXA) 20 MG tablet Take 1 tablet (20 mg) by mouth daily 90 tablet 3     loratadine (CLARITIN REDITABS) 10 MG dispersible tablet Take 10 mg by mouth as needed        magnesium 250 MG tablet Take 1 tablet by mouth 2 times daily       multivitamin (ONE-DAILY) tablet Take 1 tablet by mouth daily 90 tablet 0         ALLERGIES:  Allergies   Allergen Reactions     Penicillin V Hives     Seasonal Allergies          PAST MEDICAL HISTORY:  Past Medical History:    Diagnosis Date     Chest tightness     had suspected allergies     Malignant neoplasm of head of pancreas (H) 06/09/2020     Seasonal allergies      SOB (shortness of breath)          PAST SURGICAL HISTORY:  Past Surgical History:   Procedure Laterality Date     BIOPSY BREAST Right 9/19/2022    Procedure: Right radiofrequency localized excisional breast biopsy;  Surgeon: Geovanny Wagner MD;  Location: [facility] OR     CATARACT IOL, RT/LT  2015     COLONOSCOPY  10/14    no repeat needed due to age     COLONOSCOPY N/A 10/7/2014    Procedure: COLONOSCOPY;  Surgeon: Daryl Hanson MD;  Location:  GI     COLONOSCOPY  04/23/2019    no further screening     ENDOSCOPIC RETROGRADE CHOLANGIOPANCREATOGRAM N/A 4/20/2020    Procedure: ENDOSCOPIC RETROGRADE CHOLANGIOPANCREATOGRAPHY, PLACEMENT OF PANCREATIC STENT, PLACEMENT OF BILIARY STENT;  Surgeon: Yarely Vann MD;  Location:  OR     ESOPHAGOSCOPY, GASTROSCOPY, DUODENOSCOPY (EGD), COMBINED N/A 4/20/2020    Procedure: ESOPHAGOGASTRODUODENOSCOPY, WITH FINE NEEDLE ASPIRATION BIOPSY, WITH ENDOSCOPIC ULTRASOUND GUIDANCE, Endoscopic retrograde cholangiopancreatography;  Surgeon: Yarely Vann MD;  Location:  OR     ESOPHAGOSCOPY, GASTROSCOPY, DUODENOSCOPY (EGD), COMBINED N/A 5/5/2020    Procedure: ESOPHAGOGASTRODUODENOSCOPY, WITH FINE NEEDLE ASPIRATION BIOPSY, WITH ENDOSCOPIC ULTRASOUND GUIDANCE;  Surgeon: Romina Rosario MD;  Location: [facility] GI     IR CHEST PORT PLACEMENT > 5 YRS OF AGE  7/6/2020     IR PORT REMOVAL RIGHT  12/28/2020     LAPAROSCOPIC BIOPSY LIVER N/A 5/22/2020    Procedure: Diagnostic Laparoscopy with intraoperative ultrasound and Liver Biopsy X2;  Surgeon: Duarte Chaves MD;  Location: [facility] OR     LUMPECTOMY BREAST Right 10/27/2022    Procedure: right breast reexcision lumpectomy;  Surgeon: Geovanny Wagner MD;  Location: [facility] OR     LUMPECTOMY BREAST WITH SENTINEL NODE, COMBINED Right 11/11/2022    Procedure: Reexcision right  lumpectomy, right sentinel node biopsy;  Surgeon: Geovanny Wagner MD;  Location: [facility] OR     WHIPPLE PROCEDURE N/A 5/22/2020    Procedure: Non Pylorus Preserving Whipple procedure;  Surgeon: Duarte Chaves MD;  Location: [facility] OR         SOCIAL HISTORY:  Social History     Socioeconomic History     Marital status:      Spouse name: Not on file     Number of children: Not on file     Years of education: Not on file     Highest education level: Not on file   Occupational History     Employer: RETIRED     Comment: previous taught special ed   Tobacco Use     Smoking status: Never     Smokeless tobacco: Never   Vaping Use     Vaping status: Never Used   Substance and Sexual Activity     Alcohol use: Yes     Comment: social      Drug use: Never     Sexual activity: Yes   Other Topics Concern     Parent/sibling w/ CABG, MI or angioplasty before 65F 55M? Not Asked      Service Not Asked     Blood Transfusions Not Asked     Caffeine Concern Yes     Comment: 2 cups     Occupational Exposure Not Asked     Hobby Hazards Not Asked     Sleep Concern Not Asked     Stress Concern Not Asked     Weight Concern Not Asked     Special Diet No     Back Care Not Asked     Exercise Yes     Comment: walking workout      Bike Helmet Not Asked     Seat Belt Not Asked     Self-Exams Not Asked   Social History Narrative     Not on file     Social Determinants of Health     Financial Resource Strain: Not on file   Food Insecurity: Not on file   Transportation Needs: Not on file   Physical Activity: Not on file   Stress: Not on file   Social Connections: Not on file   Intimate Partner Violence: Not At Risk (7/27/2022)    Humiliation, Afraid, Rape, and Kick questionnaire      Fear of Current or Ex-Partner: No      Emotionally Abused: No      Physically Abused: No      Sexually Abused: No   Housing Stability: Not on file         FAMILY HISTORY:  Family History   Problem Relation Age of Onset     Musculoskeletal Disorder Mother      "    edematous legs     Obesity Mother      Lymphoma Brother      Musculoskeletal Disorder Maternal Grandmother         edematous legs; did have amputation     Obesity Maternal Grandmother      Myocardial Infarction Maternal Grandmother          PHYSICAL EXAM:  /68 (Cuff Size: Adult Regular)   Pulse 56   Temp 97  F (36.1  C) (Tympanic)   Resp 16   Ht 1.626 m (5' 4\")   Wt 52.6 kg (116 lb)   LMP  (LMP Unknown)   SpO2 99%   BMI 19.91 kg/m    GENERAL/CONSTITUTIONAL: Appears well, no acute distress.  EYES: Gaze conjugate, pupils equal and round. No scleral icterus.  LYMPH: No cervical, supraclavicular, axillary or inguinal adenopathy.   RESPIRATORY: Lungs clear to auscultation bilaterally. No crackles or wheezing.   CARDIOVASCULAR: Regular rate and rhythm without murmurs, gallops, or rubs.    GASTROINTESTINAL: No organomegaly, masses, or tenderness.  No guarding.  No distention.  Easily reducible ventral hernia in mid abdomen.  BREASTS:  Some erythema in right breast but no desquamation.  Moderate sized firm hematoma again noted in upper inner quadrant.  Left breast unremarkable.  MUSCULOSKELETAL: No clubbing, cyanosis or edema.  NEUROLOGIC:  Alert and oriented, answers questions appropriately.  No evidence of ataxia or tremor.  Speech fluent.  INTEGUMENTARY: No rashes or jaundice.      LABS:   Latest Reference Range & Units 04/13/23 08:12   Parathyroid Hormone Intact 15 - 65 pg/mL 29         PATHOLOGY/RELEVANT BIOMARKERS:  Right breast re-excision 10/27/22:  Final Diagnosis   A.  Breast, right, medial margin, excision:  -Benign breast tissue with postsurgical changes.    B.  Breast, right, inferior margin, reexcision:  -INVASIVE DUCTAL CARCINOMA, Nicci grade 2, multifocal (2 foci), tumor size 3 mm (slide B2) and 4 mm (slide B4).  -Invasive carcinoma is present at the inferior margin.  -In situ carcinoma is not identified.     Right breast re-excision with SLN biopsy 11/11/22:  Final Diagnosis   A.  " Lymph node, right axillary sentinel #1, excisional biopsy:  -One lymph node, negative for metastatic carcinoma (0/1).     B.  Lymph node, right axillary sentinel #2, excisional biopsy:  -One lymph node, negative for metastatic carcinoma (0/1).  -See comment.     C.  Breast, right, suture-oriented reexcision:  -No residual malignancy identified.  -Previous surgical procedure site changes.  -Focal cystic dilatation of ducts and microcalcifications associated benign breast elements.     MOST RECENT IMAGING:    DEXA 3/29/23:  FINDINGS:               Lumbar Spine (L1-L3)      T-score:  -2.5, marked degenerative changes present at L4, so only L1-3 are evaluated.                Left Femoral Neck            T-score:  -3.2               Right Femoral Neck          T-score:  -3.0      CT CAP 11/8/22:  IMPRESSION: In this patient history of pancreatic cancer status post Whipple's procedure, there is:  1. No convincing evidence of local recurrence or metastatic disease in the chest, abdomen or pelvis.  2. Few small indeterminate liver lesions, not significantly changed as compared to 10/25/2021 and although indeterminate, likely benign lesion like hepatic hemangioma given their interval stability.  3. Small amount of gas-containing fluid in the medial aspect of the right breast, indeterminate, could be related to recent procedure versus small abscess.    ASSESSMENT:  1. Stage IA ([id number]) multifocal invasive ductal carcinoma of the right breast, ER+, HER2+.  Currently receiving adjuvant anastrozole and tolerating it well but unfortunately has significant osteoporosis.  We had a lengthy discussion today again about her cancer stage, risk of recurrence and role of endocrine therapy.  She has low risk of distant and local recurrence but endocrine therapy will reduce risk of recurrence and perhaps more importantly reduce risk of new primary breast cancer.  Unfortunately, anastrozole could worsen her osteoporosis and I would  recommend we discontinue it.  Tamoxifen would be an appropriate alternative, we reviewed mechanism of action, side effects and risks including risk of thrombosis and endometrial cancer.  It does have some beneficial effects on bone density.  All questions addressed and she is agreeable to switching.    2.  History of resected stage IB pancreatic cancer.  No clinical evidence of disease recurrence.    3.  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  Continue on Creon.    4. Osteoporosis.  She does have significant osteoporosis as well as a small vertebral compression fracture which looking back at prior CT scans has been there since at least 2020.  Recommended she make and appointment with her PCP to discuss treatment options.      PLAN:    Discontinue anastrozole.  Tamoxifen 20 mg daily, start in about 3 weeks after radiation is completed.  Follow-up with PCP for osteoporosis.  RTC 3 months with CBC, CMP, CA 19-9 and CT CAP prior.    Total time is 40 minutes including face to face time, review of records, orders and documentation.          Jc Peoples MD  Hematology/Oncology  [facility]          Jc Peoples MD

## 2023-04-20 ENCOUNTER — PATIENT OUTREACH (OUTPATIENT)
Dept: CARE COORDINATION | Facility: CLINIC | Age: 78
End: 2023-04-20
Payer: COMMERCIAL

## 2023-04-21 ENCOUNTER — TRANSFERRED RECORDS (OUTPATIENT)
Dept: SURGERY | Facility: CLINIC | Age: 78
End: 2023-04-21
Payer: COMMERCIAL

## 2023-04-21 ENCOUNTER — TRANSFERRED RECORDS (OUTPATIENT)
Dept: HEALTH INFORMATION MANAGEMENT | Facility: CLINIC | Age: 78
End: 2023-04-21
Payer: COMMERCIAL

## 2023-05-04 ENCOUNTER — PATIENT OUTREACH (OUTPATIENT)
Dept: CARE COORDINATION | Facility: CLINIC | Age: 78
End: 2023-05-04
Payer: COMMERCIAL

## 2023-05-09 ENCOUNTER — HOSPITAL ENCOUNTER (OUTPATIENT)
Dept: CT IMAGING | Facility: CLINIC | Age: 78
Discharge: HOME | End: 2023-05-09
Attending: INTERNAL MEDICINE | Admitting: INTERNAL MEDICINE
Payer: COMMERCIAL

## 2023-05-09 ENCOUNTER — LAB (OUTPATIENT)
Dept: INFUSION THERAPY | Facility: CLINIC | Age: 78
End: 2023-05-09
Attending: INTERNAL MEDICINE
Payer: COMMERCIAL

## 2023-05-09 DIAGNOSIS — C25.0 MALIGNANT NEOPLASM OF HEAD OF PANCREAS (H): ICD-10-CM

## 2023-05-09 LAB
ALBUMIN SERPL BCG-MCNC: 4.2 G/DL (ref 3.5–5.2)
ALP SERPL-CCNC: 114 U/L (ref 35–104)
ALT SERPL W P-5'-P-CCNC: 29 U/L (ref 10–35)
ANION GAP SERPL CALCULATED.3IONS-SCNC: 11 MMOL/L (ref 7–15)
AST SERPL W P-5'-P-CCNC: 35 U/L (ref 10–35)
BASOPHILS # BLD AUTO: 0.1 10E3/UL (ref 0–0.2)
BASOPHILS NFR BLD AUTO: 2 %
BILIRUB SERPL-MCNC: 0.7 MG/DL
BUN SERPL-MCNC: 16 MG/DL (ref 8–23)
CALCIUM SERPL-MCNC: 9.3 MG/DL (ref 8.8–10.2)
CHLORIDE SERPL-SCNC: 101 MMOL/L (ref 98–107)
CREAT SERPL-MCNC: 0.82 MG/DL (ref 0.51–0.95)
DEPRECATED HCO3 PLAS-SCNC: 25 MMOL/L (ref 22–29)
EOSINOPHIL # BLD AUTO: 0.2 10E3/UL (ref 0–0.7)
EOSINOPHIL NFR BLD AUTO: 7 %
ERYTHROCYTE [DISTWIDTH] IN BLOOD BY AUTOMATED COUNT: 14 % (ref 10–15)
GFR SERPL CREATININE-BSD FRML MDRD: 73 ML/MIN/1.73M2
GLUCOSE SERPL-MCNC: 65 MG/DL (ref 70–99)
HCT VFR BLD AUTO: 42.3 % (ref 35–47)
HGB BLD-MCNC: 13.3 G/DL (ref 11.7–15.7)
IMM GRANULOCYTES # BLD: 0 10E3/UL
IMM GRANULOCYTES NFR BLD: 0 %
LYMPHOCYTES # BLD AUTO: 0.7 10E3/UL (ref 0.8–5.3)
LYMPHOCYTES NFR BLD AUTO: 26 %
MCH RBC QN AUTO: 28.4 PG (ref 26.5–33)
MCHC RBC AUTO-ENTMCNC: 31.4 G/DL (ref 31.5–36.5)
MCV RBC AUTO: 90 FL (ref 78–100)
MONOCYTES # BLD AUTO: 0.3 10E3/UL (ref 0–1.3)
MONOCYTES NFR BLD AUTO: 11 %
NEUTROPHILS # BLD AUTO: 1.4 10E3/UL (ref 1.6–8.3)
NEUTROPHILS NFR BLD AUTO: 54 %
NRBC # BLD AUTO: 0 10E3/UL
NRBC BLD AUTO-RTO: 0 /100
PLATELET # BLD AUTO: 198 10E3/UL (ref 150–450)
POTASSIUM SERPL-SCNC: 4.1 MMOL/L (ref 3.4–5.3)
PROT SERPL-MCNC: 7.2 G/DL (ref 6.4–8.3)
RBC # BLD AUTO: 4.69 10E6/UL (ref 3.8–5.2)
SODIUM SERPL-SCNC: 137 MMOL/L (ref 136–145)
WBC # BLD AUTO: 2.6 10E3/UL (ref 4–11)

## 2023-05-09 PROCEDURE — 250N000009 HC RX 250: Performed by: INTERNAL MEDICINE

## 2023-05-09 PROCEDURE — 86301 IMMUNOASSAY TUMOR CA 19-9: CPT | Performed by: INTERNAL MEDICINE

## 2023-05-09 PROCEDURE — 85025 COMPLETE CBC W/AUTO DIFF WBC: CPT | Performed by: INTERNAL MEDICINE

## 2023-05-09 PROCEDURE — 36415 COLL VENOUS BLD VENIPUNCTURE: CPT

## 2023-05-09 PROCEDURE — 80053 COMPREHEN METABOLIC PANEL: CPT | Performed by: INTERNAL MEDICINE

## 2023-05-09 PROCEDURE — 74177 CT ABD & PELVIS W/CONTRAST: CPT

## 2023-05-09 PROCEDURE — 250N000011 HC RX IP 250 OP 636: Performed by: INTERNAL MEDICINE

## 2023-05-09 RX ORDER — IOPAMIDOL 755 MG/ML
500 INJECTION, SOLUTION INTRAVASCULAR ONCE
Status: COMPLETED | OUTPATIENT
Start: 2023-05-09 | End: 2023-05-09

## 2023-05-09 RX ADMIN — SODIUM CHLORIDE 55 ML: 9 INJECTION, SOLUTION INTRAVENOUS at 09:28

## 2023-05-09 RX ADMIN — IOPAMIDOL 57 ML: 755 INJECTION, SOLUTION INTRAVENOUS at 09:28

## 2023-05-09 NOTE — PROGRESS NOTES
Nursing Note:  lFora Hogan presents today for PIV and labs.    Patient seen by provider today: No   present during visit today: Not Applicable.    Note: N/A.    Intravenous Access:  Lab draw site right AC, Needle type IV, Gauge 20.  Labs drawn without difficulty.  Peripheral IV placed. Sent to CT, IV will be discontinued in radiology after her scan.    Discharge Plan:   Patient was sent to radiology for CT appointment.    Marcia Lan RN

## 2023-05-10 LAB — CANCER AG19-9 SERPL IA-ACNC: 10 U/ML

## 2023-05-15 ENCOUNTER — ONCOLOGY VISIT (OUTPATIENT)
Dept: ONCOLOGY | Facility: CLINIC | Age: 78
End: 2023-05-15
Attending: INTERNAL MEDICINE
Payer: COMMERCIAL

## 2023-05-15 VITALS
BODY MASS INDEX: 19.97 KG/M2 | SYSTOLIC BLOOD PRESSURE: 115 MMHG | WEIGHT: 117 LBS | RESPIRATION RATE: 16 BRPM | DIASTOLIC BLOOD PRESSURE: 77 MMHG | HEIGHT: 64 IN | OXYGEN SATURATION: 99 % | HEART RATE: 58 BPM | TEMPERATURE: 97.1 F

## 2023-05-15 DIAGNOSIS — Z12.31 ENCOUNTER FOR SCREENING MAMMOGRAM FOR MALIGNANT NEOPLASM OF BREAST: ICD-10-CM

## 2023-05-15 DIAGNOSIS — D05.11 DUCTAL CARCINOMA IN SITU (DCIS) OF RIGHT BREAST: Primary | ICD-10-CM

## 2023-05-15 PROCEDURE — G0463 HOSPITAL OUTPT CLINIC VISIT: HCPCS | Performed by: INTERNAL MEDICINE

## 2023-05-15 PROCEDURE — 99214 OFFICE O/P EST MOD 30 MIN: CPT | Performed by: INTERNAL MEDICINE

## 2023-05-15 RX ORDER — TAMOXIFEN CITRATE 20 MG/1
20 TABLET ORAL DAILY
Qty: 90 TABLET | Refills: 3 | Status: SHIPPED | OUTPATIENT
Start: 2023-05-15 | End: 2023-05-18 | Stop reason: ALTCHOICE

## 2023-05-15 ASSESSMENT — PAIN SCALES - GENERAL: PAINLEVEL: NO PAIN (0)

## 2023-05-15 NOTE — NURSING NOTE
"Oncology Rooming Note    May 15, 2023 8:18 AM   Anali Hogan is a 77 year old female who presents for:    Chief Complaint   Patient presents with     Oncology Clinic Visit     Malignant neoplasm of head of pancreas     Initial Vitals: /77 (Cuff Size: Adult Regular)   Pulse 58   Temp 97.1  F (36.2  C) (Tympanic)   Resp 16   Ht 1.626 m (5' 4\")   Wt 53.1 kg (117 lb)   LMP  (LMP Unknown)   SpO2 99%   BMI 20.08 kg/m   Estimated body mass index is 20.08 kg/m  as calculated from the following:    Height as of this encounter: 1.626 m (5' 4\").    Weight as of this encounter: 53.1 kg (117 lb). Body surface area is 1.55 meters squared.  No Pain (0) Comment: Data Unavailable   No LMP recorded (lmp unknown). Patient is postmenopausal.  Allergies reviewed: Yes  Medications reviewed: Yes    Medications: Medication refills not needed today.  Pharmacy name entered into [facility]:    [id number] - [place] [facility], [place] - [address] [id number] - [place], [place] - [address] [facility] AT [place] [address] & [id number] [address] AT [address] [address] [facility]    Clinical concerns: f/u       Katie Whitmore, PASTOR              "

## 2023-05-15 NOTE — LETTER
5/15/2023         RE: Flora Hogan  [address]  Formerly [facility] [address]        Dear Colleague,    Thank you for referring your patient, Flora Hogan, to the [facility]. Please see a copy of my visit note below.    [facility]    Hematology/Oncology Established Patient Follow-up Note    Oncologic History/Treatment Summary      4/19/20: Presented with painless jaundice.  CT scan demonstrated a 1.2 cm mass in the pancreas with biliary dilatation.  ERCP/EUS performed demonstrating atypia but no definite malignancy.    5/26/20: Whipple resection performed by Dr. Chaves at [facility].  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  Margins and lymph nodes negative, LVI was seen.  Final stage [id number].    7/7/20 to 12/15/20: Status post 6 cycles adjuvant gemcitabine.    6/22/22: Routine screening mammogram demonstrated a circumscribed mass in the left breast at 9 o'clock.  Diagnostic mammogram was benign but linear branching microcalcifications seen in the right breaset spanning 7 cm and stereotactic biopsy recommended.    7/12/22: Stereotactic biopsy right breast demonstrated atypical ductal hyperplasia.    9/16/22: Excisional biopsy performed by Dr. Wagner demonstrating DCIS grade 3, no invasive component seen.  Medial and inferior margins were close at 0.4 mm.  ER positive at %, LA positive at 1-10%.    10/27/22: Re-excision of medial and inferior margins demonstrated 2 foci of grade 2 invasive ductal carcinoma measuring 0.3 mm and 0.4 mm with involvement of inferior margin. The invasive cancer was ER+ (%), LA negative, HER2 positive (3+ IHC).    11/11/22: Re-excision of inferior margin with sentinel lymph node biopsy demonstrated no residual malignancy.  2 sentinel lymph nodes were negative.     11/21/22: Initiated adjuvant anastrozole.    4/17/23: Switched to tamoxifen due to  osteoporosis.      Today's Date: 5/15/23    Reason for Follow-up: Invasive ductal carcinoma of the right breast and resected stage I pancreatic cancer.      INTERIM HISTORY:  Flora is seen for follow-up of stage IA right breast cancer receiving adjuvant tamoxifen as well as history of stage I resected pancreatic cancer.  Accompanied by her .  She unfortunately did not receive her tamoxifen yet but has been holding anastrozole.  Clinically doing well, some discomfort related to her ventral hernia and she has an appointment coming up with Dr. Wagner to discuss surgery.  Does have seasonal allergies and gets dyspneic when she walks, has an inhaler which helps.  Eating well, no other complaints.        REVIEW OF SYSTEMS:   A 14 point ROS was reviewed with pertinent positives and negatives in the HPI.       HOME MEDICATIONS:  Current Outpatient Medications   Medication Sig Dispense Refill     acetaminophen (TYLENOL) 325 MG tablet Take 1-2 tablets (325-650 mg) by mouth every 6 hours as needed for mild pain or fever 50 tablet 0     albuterol (PROAIR HFA/PROVENTIL HFA/VENTOLIN HFA) 108 (90 Base) MCG/ACT inhaler Inhale 2 puffs into the lungs every 6 hours as needed for shortness of breath / dyspnea or wheezing 18 g 0     amylase-lipase-protease (CREON) 53623-51965 units CPEP per EC capsule TAKE 1 CAPSULE BY MOUTH THREE TIMES DAILY WITH A MEAL Strength: 24,000-76,000 Units 270 capsule 3     anastrozole (ARIMIDEX) 1 MG tablet Take 1 tablet (1 mg) by mouth daily 90 tablet 3     calcium carbonate-vitamin D (OS-CAMERON) 600-400 MG-UNIT chewable tablet Take 1 chew tab by mouth 2 times daily  180 tablet 3     citalopram (CELEXA) 20 MG tablet Take 1 tablet (20 mg) by mouth daily 90 tablet 3     loratadine (CLARITIN REDITABS) 10 MG dispersible tablet Take 10 mg by mouth as needed        magnesium 250 MG tablet Take 1 tablet by mouth 2 times daily       multivitamin (ONE-DAILY) tablet Take 1 tablet by mouth daily 90 tablet 0      tamoxifen (NOLVADEX) 20 MG tablet Take 1 tablet (20 mg) by mouth daily 90 tablet 3         ALLERGIES:  Allergies   Allergen Reactions     Penicillin V Hives     Seasonal Allergies          PAST MEDICAL HISTORY:  Past Medical History:   Diagnosis Date     Chest tightness     had suspected allergies     Malignant neoplasm of head of pancreas (H) 06/09/2020     Seasonal allergies      SOB (shortness of breath)          PAST SURGICAL HISTORY:  Past Surgical History:   Procedure Laterality Date     BIOPSY BREAST Right 9/19/2022    Procedure: Right radiofrequency localized excisional breast biopsy;  Surgeon: Geovanny Wagner MD;  Location: [facility] OR     CATARACT IOL, RT/LT  2015     COLONOSCOPY  10/14    no repeat needed due to age     COLONOSCOPY N/A 10/7/2014    Procedure: COLONOSCOPY;  Surgeon: Daryl Hanson MD;  Location:  GI     COLONOSCOPY  04/23/2019    no further screening     ENDOSCOPIC RETROGRADE CHOLANGIOPANCREATOGRAM N/A 4/20/2020    Procedure: ENDOSCOPIC RETROGRADE CHOLANGIOPANCREATOGRAPHY, PLACEMENT OF PANCREATIC STENT, PLACEMENT OF BILIARY STENT;  Surgeon: Yarely Vann MD;  Location:  OR     ESOPHAGOSCOPY, GASTROSCOPY, DUODENOSCOPY (EGD), COMBINED N/A 4/20/2020    Procedure: ESOPHAGOGASTRODUODENOSCOPY, WITH FINE NEEDLE ASPIRATION BIOPSY, WITH ENDOSCOPIC ULTRASOUND GUIDANCE, Endoscopic retrograde cholangiopancreatography;  Surgeon: Yarely Vann MD;  Location:  OR     ESOPHAGOSCOPY, GASTROSCOPY, DUODENOSCOPY (EGD), COMBINED N/A 5/5/2020    Procedure: ESOPHAGOGASTRODUODENOSCOPY, WITH FINE NEEDLE ASPIRATION BIOPSY, WITH ENDOSCOPIC ULTRASOUND GUIDANCE;  Surgeon: Romina Rosario MD;  Location:  GI     IR CHEST PORT PLACEMENT > 5 YRS OF AGE  7/6/2020     IR PORT REMOVAL RIGHT  12/28/2020     LAPAROSCOPIC BIOPSY LIVER N/A 5/22/2020    Procedure: Diagnostic Laparoscopy with intraoperative ultrasound and Liver Biopsy X2;  Surgeon: Duarte Chaves MD;  Location: [facility]      LUMPECTOMY BREAST Right 10/27/2022    Procedure: right breast reexcision lumpectomy;  Surgeon: Geovanny Wagner MD;  Location: [facility] OR     LUMPECTOMY BREAST WITH SENTINEL NODE, COMBINED Right 11/11/2022    Procedure: Reexcision right lumpectomy, right sentinel node biopsy;  Surgeon: Geovanny Wagner MD;  Location: [facility] OR     WHIPPLE PROCEDURE N/A 5/22/2020    Procedure: Non Pylorus Preserving Whipple procedure;  Surgeon: Duarte Chaves MD;  Location: [facility] OR         SOCIAL HISTORY:  Social History     Socioeconomic History     Marital status:      Spouse name: Not on file     Number of children: Not on file     Years of education: Not on file     Highest education level: Not on file   Occupational History     Employer: RETIRED     Comment: previous taught special ed   Tobacco Use     Smoking status: Never     Smokeless tobacco: Never   Vaping Use     Vaping status: Never Used   Substance and Sexual Activity     Alcohol use: Yes     Comment: social      Drug use: Never     Sexual activity: Yes   Other Topics Concern     Parent/sibling w/ CABG, MI or angioplasty before 65F 55M? Not Asked      Service Not Asked     Blood Transfusions Not Asked     Caffeine Concern Yes     Comment: 2 cups     Occupational Exposure Not Asked     Hobby Hazards Not Asked     Sleep Concern Not Asked     Stress Concern Not Asked     Weight Concern Not Asked     Special Diet No     Back Care Not Asked     Exercise Yes     Comment: walking workout      Bike Helmet Not Asked     Seat Belt Not Asked     Self-Exams Not Asked   Social History Narrative     Not on file     Social Determinants of Health     Financial Resource Strain: Not on file   Food Insecurity: Not on file   Transportation Needs: Not on file   Physical Activity: Not on file   Stress: Not on file   Social Connections: Not on file   Intimate Partner Violence: Not At Risk (7/27/2022)    Humiliation, Afraid, Rape, and Kick questionnaire      Fear of Current or  "Ex-Partner: No      Emotionally Abused: No      Physically Abused: No      Sexually Abused: No   Housing Stability: Not on file         FAMILY HISTORY:  Family History   Problem Relation Age of Onset     Musculoskeletal Disorder Mother         edematous legs     Obesity Mother      Lymphoma Brother      Musculoskeletal Disorder Maternal Grandmother         edematous legs; did have amputation     Obesity Maternal Grandmother      Myocardial Infarction Maternal Grandmother          PHYSICAL EXAM:  /77 (Cuff Size: Adult Regular)   Pulse 58   Temp 97.1  F (36.2  C) (Tympanic)   Resp 16   Ht 1.626 m (5' 4\")   Wt 53.1 kg (117 lb)   LMP  (LMP Unknown)   SpO2 99%   BMI 20.08 kg/m    GENERAL/CONSTITUTIONAL: Appears well, no acute distress.  Exam deferred.      LABS:   Latest Reference Range & Units 05/09/23 08:15   Sodium 136 - 145 mmol/L 137   Potassium 3.4 - 5.3 mmol/L 4.1   Chloride 98 - 107 mmol/L 101   Carbon Dioxide (CO2) 22 - 29 mmol/L 25   Urea Nitrogen 8.0 - 23.0 mg/dL 16.0   Creatinine 0.51 - 0.95 mg/dL 0.82   GFR Estimate >60 mL/min/1.73m2 73   Calcium 8.8 - 10.2 mg/dL 9.3   Anion Gap 7 - 15 mmol/L 11   Albumin 3.5 - 5.2 g/dL 4.2   Protein Total 6.4 - 8.3 g/dL 7.2   Alkaline Phosphatase 35 - 104 U/L 114 (H)   ALT 10 - 35 U/L 29   AST 10 - 35 U/L 35   Bilirubin Total <=1.2 mg/dL 0.7   Cancer Antigen 19-9 <=35 U/mL 10   Glucose 70 - 99 mg/dL 65 (L)   WBC 4.0 - 11.0 10e3/uL 2.6 (L)   Hemoglobin 11.7 - 15.7 g/dL 13.3   Hematocrit 35.0 - 47.0 % 42.3   Platelet Count 150 - 450 10e3/uL 198   RBC Count 3.80 - 5.20 10e6/uL 4.69   MCV 78 - 100 fL 90   MCH 26.5 - 33.0 pg 28.4   MCHC 31.5 - 36.5 g/dL 31.4 (L)   RDW 10.0 - 15.0 % 14.0   % Neutrophils % 54   % Lymphocytes % 26   % Monocytes % 11   % Eosinophils % 7   % Basophils % 2   Absolute Basophils 0.0 - 0.2 10e3/uL 0.1   Absolute Eosinophils 0.0 - 0.7 10e3/uL 0.2   Absolute Immature Granulocytes <=0.4 10e3/uL 0.0   Absolute Lymphocytes 0.8 - 5.3 10e3/uL 0.7 " (L)   Absolute Monocytes 0.0 - 1.3 10e3/uL 0.3   % Immature Granulocytes % 0   Absolute Neutrophils 1.6 - 8.3 10e3/uL 1.4 (L)   (H): Data is abnormally high  (L): Data is abnormally low    PATHOLOGY/RELEVANT BIOMARKERS:  Right breast re-excision 10/27/22:  Final Diagnosis   A.  Breast, right, medial margin, excision:  -Benign breast tissue with postsurgical changes.    B.  Breast, right, inferior margin, reexcision:  -INVASIVE DUCTAL CARCINOMA, Nicci grade 2, multifocal (2 foci), tumor size 3 mm (slide B2) and 4 mm (slide B4).  -Invasive carcinoma is present at the inferior margin.  -In situ carcinoma is not identified.     Right breast re-excision with SLN biopsy 11/11/22:  Final Diagnosis   A.  Lymph node, right axillary sentinel #1, excisional biopsy:  -One lymph node, negative for metastatic carcinoma (0/1).     B.  Lymph node, right axillary sentinel #2, excisional biopsy:  -One lymph node, negative for metastatic carcinoma (0/1).  -See comment.     C.  Breast, right, suture-oriented reexcision:  -No residual malignancy identified.  -Previous surgical procedure site changes.  -Focal cystic dilatation of ducts and microcalcifications associated benign breast elements.     MOST RECENT IMAGING:      CT CAP 5/9/23 (personally reviewed and interpreted):  IMPRESSION: Stable exam without convincing recurrent or residual  malignancy.    ASSESSMENT:  Stage IA ([id number]) multifocal invasive ductal carcinoma of the right breast, ER+, HER2+.  Planning to switch to adjuvant tamoxifen, has not started yet.  Due for mammogram next month.    History of resected stage IB pancreatic cancer.  No clinical or radiographic evidence of disease recurrence.  Now 3 years out from surgery.  Questions about 5 year survival rate, I told her that I would reframe this as likelihood of cure. Majority of recurrences are within the first 3 years so very encouraging that she remains TYRONE.  Certainly still risk of recurrence and I would favor  continuing q 6 month CT and CA 19-9 and would discontinue surveillance at the 5 year brittnee.    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  Continues on Creon.    Osteoporosis.  She has an upcoming visit with her PCP.    Ventral hernia.  Reviewed on scan, not reported but clearly still present.  Has follow-up with Dr. Wagner later this week.      PLAN:    Tamoxifen 20 mg daily.  Mammogram in one month.  RTC 6 months with labs and CT scan (will schedule in November prior to leaving for [place] for the winter).    Total time is 25 minutes including face to face time, scan review, orders and documentation.        Jc Peoples MD  Hematology/Oncology  [facility]        Again, thank you for allowing me to participate in the care of your patient.        Sincerely,        Jc Peoples MD

## 2023-05-15 NOTE — PROGRESS NOTES
[facility]    Hematology/Oncology Established Patient Follow-up Note    Oncologic History/Treatment Summary      4/19/20: Presented with painless jaundice.  CT scan demonstrated a 1.2 cm mass in the pancreas with biliary dilatation.  ERCP/EUS performed demonstrating atypia but no definite malignancy.    5/26/20: Whipple resection performed by Dr. Chaves at [facility].  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  Margins and lymph nodes negative, LVI was seen.  Final stage [id number].    7/7/20 to 12/15/20: Status post 6 cycles adjuvant gemcitabine.    6/22/22: Routine screening mammogram demonstrated a circumscribed mass in the left breast at 9 o'clock.  Diagnostic mammogram was benign but linear branching microcalcifications seen in the right breaset spanning 7 cm and stereotactic biopsy recommended.    7/12/22: Stereotactic biopsy right breast demonstrated atypical ductal hyperplasia.    9/16/22: Excisional biopsy performed by Dr. Wagner demonstrating DCIS grade 3, no invasive component seen.  Medial and inferior margins were close at 0.4 mm.  ER positive at %, MT positive at 1-10%.    10/27/22: Re-excision of medial and inferior margins demonstrated 2 foci of grade 2 invasive ductal carcinoma measuring 0.3 mm and 0.4 mm with involvement of inferior margin. The invasive cancer was ER+ (%), MT negative, HER2 positive (3+ IHC).    11/11/22: Re-excision of inferior margin with sentinel lymph node biopsy demonstrated no residual malignancy.  2 sentinel lymph nodes were negative.     11/21/22: Initiated adjuvant anastrozole.    4/17/23: Switched to tamoxifen due to osteoporosis.      Today's Date: 5/15/23    Reason for Follow-up: Invasive ductal carcinoma of the right breast and resected stage I pancreatic cancer.      INTERIM HISTORY:  Flora is seen for follow-up of stage IA right breast cancer receiving adjuvant tamoxifen as well as  history of stage I resected pancreatic cancer.  Accompanied by her .  She unfortunately did not receive her tamoxifen yet but has been holding anastrozole.  Clinically doing well, some discomfort related to her ventral hernia and she has an appointment coming up with Dr. Wagner to discuss surgery.  Does have seasonal allergies and gets dyspneic when she walks, has an inhaler which helps.  Eating well, no other complaints.        REVIEW OF SYSTEMS:   A 14 point ROS was reviewed with pertinent positives and negatives in the HPI.       HOME MEDICATIONS:  Current Outpatient Medications   Medication Sig Dispense Refill     acetaminophen (TYLENOL) 325 MG tablet Take 1-2 tablets (325-650 mg) by mouth every 6 hours as needed for mild pain or fever 50 tablet 0     albuterol (PROAIR HFA/PROVENTIL HFA/VENTOLIN HFA) 108 (90 Base) MCG/ACT inhaler Inhale 2 puffs into the lungs every 6 hours as needed for shortness of breath / dyspnea or wheezing 18 g 0     amylase-lipase-protease (CREON) 69579-05952 units CPEP per EC capsule TAKE 1 CAPSULE BY MOUTH THREE TIMES DAILY WITH A MEAL Strength: 24,000-76,000 Units 270 capsule 3     anastrozole (ARIMIDEX) 1 MG tablet Take 1 tablet (1 mg) by mouth daily 90 tablet 3     calcium carbonate-vitamin D (OS-CAMERON) 600-400 MG-UNIT chewable tablet Take 1 chew tab by mouth 2 times daily  180 tablet 3     citalopram (CELEXA) 20 MG tablet Take 1 tablet (20 mg) by mouth daily 90 tablet 3     loratadine (CLARITIN REDITABS) 10 MG dispersible tablet Take 10 mg by mouth as needed        magnesium 250 MG tablet Take 1 tablet by mouth 2 times daily       multivitamin (ONE-DAILY) tablet Take 1 tablet by mouth daily 90 tablet 0     tamoxifen (NOLVADEX) 20 MG tablet Take 1 tablet (20 mg) by mouth daily 90 tablet 3         ALLERGIES:  Allergies   Allergen Reactions     Penicillin V Hives     Seasonal Allergies          PAST MEDICAL HISTORY:  Past Medical History:   Diagnosis Date     Chest tightness      had suspected allergies     Malignant neoplasm of head of pancreas (H) 06/09/2020     Seasonal allergies      SOB (shortness of breath)          PAST SURGICAL HISTORY:  Past Surgical History:   Procedure Laterality Date     BIOPSY BREAST Right 9/19/2022    Procedure: Right radiofrequency localized excisional breast biopsy;  Surgeon: Geovanny Wagner MD;  Location: [facility] OR     CATARACT IOL, RT/LT  2015     COLONOSCOPY  10/14    no repeat needed due to age     COLONOSCOPY N/A 10/7/2014    Procedure: COLONOSCOPY;  Surgeon: Daryl Hanson MD;  Location:  GI     COLONOSCOPY  04/23/2019    no further screening     ENDOSCOPIC RETROGRADE CHOLANGIOPANCREATOGRAM N/A 4/20/2020    Procedure: ENDOSCOPIC RETROGRADE CHOLANGIOPANCREATOGRAPHY, PLACEMENT OF PANCREATIC STENT, PLACEMENT OF BILIARY STENT;  Surgeon: Yarely Vann MD;  Location:  OR     ESOPHAGOSCOPY, GASTROSCOPY, DUODENOSCOPY (EGD), COMBINED N/A 4/20/2020    Procedure: ESOPHAGOGASTRODUODENOSCOPY, WITH FINE NEEDLE ASPIRATION BIOPSY, WITH ENDOSCOPIC ULTRASOUND GUIDANCE, Endoscopic retrograde cholangiopancreatography;  Surgeon: Yarely Vann MD;  Location:  OR     ESOPHAGOSCOPY, GASTROSCOPY, DUODENOSCOPY (EGD), COMBINED N/A 5/5/2020    Procedure: ESOPHAGOGASTRODUODENOSCOPY, WITH FINE NEEDLE ASPIRATION BIOPSY, WITH ENDOSCOPIC ULTRASOUND GUIDANCE;  Surgeon: Rmoina Rosario MD;  Location:  GI     IR CHEST PORT PLACEMENT > 5 YRS OF AGE  7/6/2020     IR PORT REMOVAL RIGHT  12/28/2020     LAPAROSCOPIC BIOPSY LIVER N/A 5/22/2020    Procedure: Diagnostic Laparoscopy with intraoperative ultrasound and Liver Biopsy X2;  Surgeon: Duarte Chaves MD;  Location: [facility]     LUMPECTOMY BREAST Right 10/27/2022    Procedure: right breast reexcision lumpectomy;  Surgeon: Geovanny Wagner MD;  Location: [facility] OR     LUMPECTOMY BREAST WITH SENTINEL NODE, COMBINED Right 11/11/2022    Procedure: Reexcision right lumpectomy, right sentinel node biopsy;   Surgeon: Geovanny Wagner MD;  Location: [facility] OR     WHIPPLE PROCEDURE N/A 5/22/2020    Procedure: Non Pylorus Preserving Whipple procedure;  Surgeon: Duarte Chaves MD;  Location: [facility] OR         SOCIAL HISTORY:  Social History     Socioeconomic History     Marital status:      Spouse name: Not on file     Number of children: Not on file     Years of education: Not on file     Highest education level: Not on file   Occupational History     Employer: RETIRED     Comment: previous taught special ed   Tobacco Use     Smoking status: Never     Smokeless tobacco: Never   Vaping Use     Vaping status: Never Used   Substance and Sexual Activity     Alcohol use: Yes     Comment: social      Drug use: Never     Sexual activity: Yes   Other Topics Concern     Parent/sibling w/ CABG, MI or angioplasty before 65F 55M? Not Asked      Service Not Asked     Blood Transfusions Not Asked     Caffeine Concern Yes     Comment: 2 cups     Occupational Exposure Not Asked     Hobby Hazards Not Asked     Sleep Concern Not Asked     Stress Concern Not Asked     Weight Concern Not Asked     Special Diet No     Back Care Not Asked     Exercise Yes     Comment: walking workout      Bike Helmet Not Asked     Seat Belt Not Asked     Self-Exams Not Asked   Social History Narrative     Not on file     Social Determinants of Health     Financial Resource Strain: Not on file   Food Insecurity: Not on file   Transportation Needs: Not on file   Physical Activity: Not on file   Stress: Not on file   Social Connections: Not on file   Intimate Partner Violence: Not At Risk (7/27/2022)    Humiliation, Afraid, Rape, and Kick questionnaire      Fear of Current or Ex-Partner: No      Emotionally Abused: No      Physically Abused: No      Sexually Abused: No   Housing Stability: Not on file         FAMILY HISTORY:  Family History   Problem Relation Age of Onset     Musculoskeletal Disorder Mother         edematous legs     Obesity Mother   "    Lymphoma Brother      Musculoskeletal Disorder Maternal Grandmother         edematous legs; did have amputation     Obesity Maternal Grandmother      Myocardial Infarction Maternal Grandmother          PHYSICAL EXAM:  /77 (Cuff Size: Adult Regular)   Pulse 58   Temp 97.1  F (36.2  C) (Tympanic)   Resp 16   Ht 1.626 m (5' 4\")   Wt 53.1 kg (117 lb)   LMP  (LMP Unknown)   SpO2 99%   BMI 20.08 kg/m    GENERAL/CONSTITUTIONAL: Appears well, no acute distress.  Exam deferred.      LABS:   Latest Reference Range & Units 05/09/23 08:15   Sodium 136 - 145 mmol/L 137   Potassium 3.4 - 5.3 mmol/L 4.1   Chloride 98 - 107 mmol/L 101   Carbon Dioxide (CO2) 22 - 29 mmol/L 25   Urea Nitrogen 8.0 - 23.0 mg/dL 16.0   Creatinine 0.51 - 0.95 mg/dL 0.82   GFR Estimate >60 mL/min/1.73m2 73   Calcium 8.8 - 10.2 mg/dL 9.3   Anion Gap 7 - 15 mmol/L 11   Albumin 3.5 - 5.2 g/dL 4.2   Protein Total 6.4 - 8.3 g/dL 7.2   Alkaline Phosphatase 35 - 104 U/L 114 (H)   ALT 10 - 35 U/L 29   AST 10 - 35 U/L 35   Bilirubin Total <=1.2 mg/dL 0.7   Cancer Antigen 19-9 <=35 U/mL 10   Glucose 70 - 99 mg/dL 65 (L)   WBC 4.0 - 11.0 10e3/uL 2.6 (L)   Hemoglobin 11.7 - 15.7 g/dL 13.3   Hematocrit 35.0 - 47.0 % 42.3   Platelet Count 150 - 450 10e3/uL 198   RBC Count 3.80 - 5.20 10e6/uL 4.69   MCV 78 - 100 fL 90   MCH 26.5 - 33.0 pg 28.4   MCHC 31.5 - 36.5 g/dL 31.4 (L)   RDW 10.0 - 15.0 % 14.0   % Neutrophils % 54   % Lymphocytes % 26   % Monocytes % 11   % Eosinophils % 7   % Basophils % 2   Absolute Basophils 0.0 - 0.2 10e3/uL 0.1   Absolute Eosinophils 0.0 - 0.7 10e3/uL 0.2   Absolute Immature Granulocytes <=0.4 10e3/uL 0.0   Absolute Lymphocytes 0.8 - 5.3 10e3/uL 0.7 (L)   Absolute Monocytes 0.0 - 1.3 10e3/uL 0.3   % Immature Granulocytes % 0   Absolute Neutrophils 1.6 - 8.3 10e3/uL 1.4 (L)   (H): Data is abnormally high  (L): Data is abnormally low    PATHOLOGY/RELEVANT BIOMARKERS:  Right breast re-excision 10/27/22:  Final Diagnosis   A.  " Breast, right, medial margin, excision:  -Benign breast tissue with postsurgical changes.    B.  Breast, right, inferior margin, reexcision:  -INVASIVE DUCTAL CARCINOMA, Nicci grade 2, multifocal (2 foci), tumor size 3 mm (slide B2) and 4 mm (slide B4).  -Invasive carcinoma is present at the inferior margin.  -In situ carcinoma is not identified.     Right breast re-excision with SLN biopsy 11/11/22:  Final Diagnosis   A.  Lymph node, right axillary sentinel #1, excisional biopsy:  -One lymph node, negative for metastatic carcinoma (0/1).     B.  Lymph node, right axillary sentinel #2, excisional biopsy:  -One lymph node, negative for metastatic carcinoma (0/1).  -See comment.     C.  Breast, right, suture-oriented reexcision:  -No residual malignancy identified.  -Previous surgical procedure site changes.  -Focal cystic dilatation of ducts and microcalcifications associated benign breast elements.     MOST RECENT IMAGING:      CT CAP 5/9/23 (personally reviewed and interpreted):  IMPRESSION: Stable exam without convincing recurrent or residual  malignancy.    ASSESSMENT:  Stage IA ([id number]) multifocal invasive ductal carcinoma of the right breast, ER+, HER2+.  Planning to switch to adjuvant tamoxifen, has not started yet.  Due for mammogram next month.    History of resected stage IB pancreatic cancer.  No clinical or radiographic evidence of disease recurrence.  Now 3 years out from surgery.  Questions about 5 year survival rate, I told her that I would reframe this as likelihood of cure. Majority of recurrences are within the first 3 years so very encouraging that she remains TYRONE.  Certainly still risk of recurrence and I would favor continuing q 6 month CT and CA 19-9 and would discontinue surveillance at the 5 year brittnee.    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  Continues on Creon.    Osteoporosis.  She has an upcoming visit with her PCP.    Ventral hernia.  Reviewed on scan, not  reported but clearly still present.  Has follow-up with Dr. Wagner later this week.      PLAN:    Tamoxifen 20 mg daily.  Mammogram in one month.  RTC 6 months with labs and CT scan (will schedule in November prior to leaving for [place] for the winter).    Total time is 25 minutes including face to face time, scan review, orders and documentation.        Jc Peoples MD  Hematology/Oncology  [facility]

## 2023-05-18 ENCOUNTER — OFFICE VISIT (OUTPATIENT)
Dept: SURGERY | Facility: CLINIC | Age: 78
End: 2023-05-18
Payer: COMMERCIAL

## 2023-05-18 VITALS
RESPIRATION RATE: 16 BRPM | HEART RATE: 65 BPM | BODY MASS INDEX: 19.97 KG/M2 | SYSTOLIC BLOOD PRESSURE: 116 MMHG | WEIGHT: 117 LBS | HEIGHT: 64 IN | OXYGEN SATURATION: 99 % | DIASTOLIC BLOOD PRESSURE: 68 MMHG

## 2023-05-18 DIAGNOSIS — K43.2 INCISIONAL HERNIA, WITHOUT OBSTRUCTION OR GANGRENE: Primary | ICD-10-CM

## 2023-05-18 PROCEDURE — 99215 OFFICE O/P EST HI 40 MIN: CPT | Performed by: SURGERY

## 2023-05-18 NOTE — PATIENT INSTRUCTIONS
[email or web address] message sent with number for Radiology Scheduling (for mammogram).  RTC in 6 months (before patient travels) with labs, CT prior~reminder sent for CT order.     Beatriz Benitez RN on 5/18/2023 at 2:27 PM

## 2023-05-18 NOTE — PROGRESS NOTES
HPI:  Flora is a 77 year old female who returns today to discuss her incisional hernia.  She underwent a recent follow-up CT scan, which was ordered due to her pancreatic cancer.  She has not noticed any particular changes, but still notices some discomfort at the hernia above her umbilicus.  She also notices occasional pains in her right lower quadrant, and she is concerned she might have a hernia there.    Past Medical History:   has a past medical history of Chest tightness, Malignant neoplasm of head of pancreas (H) (06/09/2020), Seasonal allergies, and SOB (shortness of breath).    Past Surgical History:  Past Surgical History:   Procedure Laterality Date     BIOPSY BREAST Right 9/19/2022    Procedure: Right radiofrequency localized excisional breast biopsy;  Surgeon: Geovanny Wagner MD;  Location: [facility] OR     CATARACT IOL, RT/LT  2015     COLONOSCOPY  10/14    no repeat needed due to age     COLONOSCOPY N/A 10/7/2014    Procedure: COLONOSCOPY;  Surgeon: Daryl Hanson MD;  Location:  GI     COLONOSCOPY  04/23/2019    no further screening     ENDOSCOPIC RETROGRADE CHOLANGIOPANCREATOGRAM N/A 4/20/2020    Procedure: ENDOSCOPIC RETROGRADE CHOLANGIOPANCREATOGRAPHY, PLACEMENT OF PANCREATIC STENT, PLACEMENT OF BILIARY STENT;  Surgeon: Yarely Vann MD;  Location:  OR     ESOPHAGOSCOPY, GASTROSCOPY, DUODENOSCOPY (EGD), COMBINED N/A 4/20/2020    Procedure: ESOPHAGOGASTRODUODENOSCOPY, WITH FINE NEEDLE ASPIRATION BIOPSY, WITH ENDOSCOPIC ULTRASOUND GUIDANCE, Endoscopic retrograde cholangiopancreatography;  Surgeon: Yarely Vann MD;  Location: [facility] OR     ESOPHAGOSCOPY, GASTROSCOPY, DUODENOSCOPY (EGD), COMBINED N/A 5/5/2020    Procedure: ESOPHAGOGASTRODUODENOSCOPY, WITH FINE NEEDLE ASPIRATION BIOPSY, WITH ENDOSCOPIC ULTRASOUND GUIDANCE;  Surgeon: Romina Rosario MD;  Location:  GI     IR CHEST PORT PLACEMENT > 5 YRS OF AGE  7/6/2020     IR PORT REMOVAL RIGHT  12/28/2020     LAPAROSCOPIC  BIOPSY LIVER N/A 5/22/2020    Procedure: Diagnostic Laparoscopy with intraoperative ultrasound and Liver Biopsy X2;  Surgeon: Duarte Chaves MD;  Location: [facility] OR     LUMPECTOMY BREAST Right 10/27/2022    Procedure: right breast reexcision lumpectomy;  Surgeon: Geovanny Wagner MD;  Location: [facility] OR     LUMPECTOMY BREAST WITH SENTINEL NODE, COMBINED Right 11/11/2022    Procedure: Reexcision right lumpectomy, right sentinel node biopsy;  Surgeon: Geovanny Wagner MD;  Location: [facility] OR     WHIPPLE PROCEDURE N/A 5/22/2020    Procedure: Non Pylorus Preserving Whipple procedure;  Surgeon: Duarte Chaves MD;  Location: [facility] OR        Social History:  Social History     Socioeconomic History     Marital status:      Spouse name: Not on file     Number of children: Not on file     Years of education: Not on file     Highest education level: Not on file   Occupational History     Employer: RETIRED     Comment: previous taught special ed   Tobacco Use     Smoking status: Never     Smokeless tobacco: Never   Vaping Use     Vaping status: Never Used     Passive vaping exposure: Yes   Substance and Sexual Activity     Alcohol use: Yes     Comment: social      Drug use: Never     Sexual activity: Yes   Other Topics Concern     Parent/sibling w/ CABG, MI or angioplasty before 65F 55M? Not Asked      Service Not Asked     Blood Transfusions Not Asked     Caffeine Concern Yes     Comment: 2 cups     Occupational Exposure Not Asked     Hobby Hazards Not Asked     Sleep Concern Not Asked     Stress Concern Not Asked     Weight Concern Not Asked     Special Diet No     Back Care Not Asked     Exercise Yes     Comment: walking workout      Bike Helmet Not Asked     Seat Belt Not Asked     Self-Exams Not Asked   Social History Narrative     Not on file     Social Determinants of Health     Financial Resource Strain: Not on file   Food Insecurity: Not on file   Transportation Needs: Not on file   Physical Activity:  Not on file   Stress: Not on file   Social Connections: Not on file   Intimate Partner Violence: Not At Risk (7/27/2022)    Humiliation, Afraid, Rape, and Kick questionnaire      Fear of Current or Ex-Partner: No      Emotionally Abused: No      Physically Abused: No      Sexually Abused: No   Housing Stability: Not on file        Family History:  Family History   Problem Relation Age of Onset     Musculoskeletal Disorder Mother         edematous legs     Obesity Mother      Lymphoma Brother      Musculoskeletal Disorder Maternal Grandmother         edematous legs; did have amputation     Obesity Maternal Grandmother      Myocardial Infarction Maternal Grandmother          ROS:  The 10 point review of systems is negative other than noted in the HPI and above.    PE:    General- Well-developed, well-nourished, patient able to get up on table without difficulty.  HEENT- Normocephalic and atraumatic. Pupils equal and round.  Mucous membranes moist.  Sclera are nonicteric.  Neck- No lymphadenopathy or masses   Respirations- are regular and non labored  Abdomen is abdomen is soft without significant tenderness, masses, organomegaly or guarding  Hernia-there is an obvious supraumbilical bulge.  Clinically, this defect is approximately 2.5 x 4 cm.  This hernia is easily reducible, even while the patient is upright.  There is also a question of additional defects above this.  There is no obvious hernia in the right lower quadrant.  There is a small lipoma in the left lower quadrant.    We reviewed the patient's recent CT scan together.  This shows a moderate sized incisional hernia with a 4 x 4 centimeter defect.  Above this, there appear to be additional small defects.  There is no obvious abnormality in the right lower quadrant.  There is minimal fat within the abdomen.               Assessment: Incisional hernia, moderately symptomatic.  This is easily reducible.    Plan:    We have discussed observation, reduction  techniques and importance, incarceration and strangulation signs, symptoms and importance as well as need to seek emergency treatment.    We have discussed surgery in detail, including risk, benefits, complications, mesh, infection, lifting and activity limits after surgery.  We had a thorough discussion of the relative risks of surgery versus observation.  Things are made a bit more complex in Flora's case because of her previous Whipple procedure.  This could potentially have resulted in a significant amount of scarring in the abdomen.  If we were to proceed with surgery, I would favor a robotic approach, though there is certainly the possibility that this would need to be converted to an open procedure.  In addition, if there were to be any issues with the bowel during the surgery, that might need to be dealt with instead of the hernia repair.  I explained to the patient that her risk of incarceration with this hernia is quite low.  Certainly if there were to be any significant change in the hernia, I might push a bit more for repair, but at this point, I think the risk of observation is probably a bit lower than the risk of surgery.  I have suggested that she could try an elastic binder for symptomatic relief.  At this point, the patient is leaning towards not having surgery.  She may certainly return to see me or call if she would like to further discuss this.    A total of 45 minutes was spent today in chart review, patient examination and discussion, and documentation.        Geovanny Wagner MD    Please route or send letter to:  Primary Care Provider (PCP)

## 2023-05-18 NOTE — LETTER
May 18, 2023    RE:   Flora Hogan 1945      Dear Colleague,    Thank you for referring your patient, Flora Hogan, to [facility] . Please see a copy of my visit note below.    HPI:  Flora is a 77 year old female who returns today to discuss her incisional hernia.  She underwent a recent follow-up CT scan, which was ordered due to her pancreatic cancer.  She has not noticed any particular changes, but still notices some discomfort at the hernia above her umbilicus.  She also notices occasional pains in her right lower quadrant, and she is concerned she might have a hernia there.    Past Medical History:  Has a past medical history of Chest tightness, Malignant neoplasm of head of pancreas (H) (06/09/2020), Seasonal allergies, and SOB (shortness of breath).    ROS:  The 10 point review of systems is negative other than noted in the HPI and above.    PE:    General- Well-developed, well-nourished, patient able to get up on table without difficulty.  HEENT- Normocephalic and atraumatic. Pupils equal and round.  Mucous membranes moist.  Sclera are nonicteric.  Neck- No lymphadenopathy or masses   Respirations- are regular and non labored  Abdomen is abdomen is soft without significant tenderness, masses, organomegaly or guarding  Hernia-there is an obvious supraumbilical bulge.  Clinically, this defect is approximately 2.5 x 4 cm.  This hernia is easily reducible, even while the patient is upright.  There is also a question of additional defects above this.  There is no obvious hernia in the right lower quadrant.  There is a small lipoma in the left lower quadrant.    We reviewed the patient's recent CT scan together.  This shows a moderate sized incisional hernia with a 4 x 4 centimeter defect.  Above this, there appear to be additional small defects.  There is no obvious abnormality in the right lower quadrant.  There is minimal fat within the abdomen.               Assessment: Incisional  hernia, moderately symptomatic.  This is easily reducible.    Plan:    We have discussed observation, reduction techniques and importance, incarceration and strangulation signs, symptoms and importance as well as need to seek emergency treatment.    We have discussed surgery in detail, including risk, benefits, complications, mesh, infection, lifting and activity limits after surgery.  We had a thorough discussion of the relative risks of surgery versus observation.  Things are made a bit more complex in Flora's case because of her previous Whipple procedure.  This could potentially have resulted in a significant amount of scarring in the abdomen.  If we were to proceed with surgery, I would favor a robotic approach, though there is certainly the possibility that this would need to be converted to an open procedure.  In addition, if there were to be any issues with the bowel during the surgery, that might need to be dealt with instead of the hernia repair.  I explained to the patient that her risk of incarceration with this hernia is quite low.  Certainly if there were to be any significant change in the hernia, I might push a bit more for repair, but at this point, I think the risk of observation is probably a bit lower than the risk of surgery.  I have suggested that she could try an elastic binder for symptomatic relief.  At this point, the patient is leaning towards not having surgery.  She may certainly return to see me or call if she would like to further discuss this.        Again, thank you for allowing me to participate in the care of your patient.      Sincerely,      Geovanny Wagner MD

## 2023-05-23 ENCOUNTER — PATIENT OUTREACH (OUTPATIENT)
Dept: ONCOLOGY | Facility: CLINIC | Age: 78
End: 2023-05-23
Payer: COMMERCIAL

## 2023-05-23 DIAGNOSIS — D05.11 DUCTAL CARCINOMA IN SITU (DCIS) OF RIGHT BREAST: Primary | ICD-10-CM

## 2023-05-23 DIAGNOSIS — C25.0 MALIGNANT NEOPLASM OF HEAD OF PANCREAS (H): Primary | ICD-10-CM

## 2023-05-23 RX ORDER — TAMOXIFEN CITRATE 20 MG/1
20 TABLET ORAL DAILY
Qty: 90 TABLET | Refills: 3 | Status: SHIPPED | OUTPATIENT
Start: 2023-05-23 | End: 2024-05-29

## 2023-05-23 NOTE — PROGRESS NOTES
Writer returned call to patient after receiving a VM from her inquiring about her Tamoxifen. Upon chart review, it appears as though this order was discontinued by Digna Gruber MA. Writer notified Dr. Peoples, who re-ordered the Tamoxifen. Patient updated by writer that this was re-ordered and sent to [facility] in [place]. Patient also re-educated that the Tamoxifen was prescribed for her breast cancer. Patient stated understanding of the above, all questions answered.     Beatriz Benitez RN on 5/23/2023 at 2:30 PM

## 2023-06-23 ENCOUNTER — HOSPITAL ENCOUNTER (OUTPATIENT)
Dept: MAMMOGRAPHY | Facility: CLINIC | Age: 78
Discharge: HOME | End: 2023-06-23
Attending: INTERNAL MEDICINE | Admitting: INTERNAL MEDICINE
Payer: COMMERCIAL

## 2023-06-23 DIAGNOSIS — D05.11 DUCTAL CARCINOMA IN SITU (DCIS) OF RIGHT BREAST: ICD-10-CM

## 2023-06-23 DIAGNOSIS — Z12.31 ENCOUNTER FOR SCREENING MAMMOGRAM FOR MALIGNANT NEOPLASM OF BREAST: ICD-10-CM

## 2023-06-23 PROCEDURE — 77067 SCR MAMMO BI INCL CAD: CPT

## 2023-06-27 ENCOUNTER — OFFICE VISIT (OUTPATIENT)
Dept: FAMILY MEDICINE | Facility: CLINIC | Age: 78
End: 2023-06-27
Payer: COMMERCIAL

## 2023-06-27 VITALS
RESPIRATION RATE: 23 BRPM | DIASTOLIC BLOOD PRESSURE: 66 MMHG | HEIGHT: 65 IN | SYSTOLIC BLOOD PRESSURE: 118 MMHG | TEMPERATURE: 98.2 F | OXYGEN SATURATION: 96 % | WEIGHT: 116.6 LBS | HEART RATE: 50 BPM | BODY MASS INDEX: 19.43 KG/M2

## 2023-06-27 DIAGNOSIS — R06.02 SOB (SHORTNESS OF BREATH): ICD-10-CM

## 2023-06-27 DIAGNOSIS — J30.2 SEASONAL ALLERGIES: ICD-10-CM

## 2023-06-27 DIAGNOSIS — M81.0 OSTEOPOROSIS WITHOUT CURRENT PATHOLOGICAL FRACTURE, UNSPECIFIED OSTEOPOROSIS TYPE: ICD-10-CM

## 2023-06-27 DIAGNOSIS — D05.11 DUCTAL CARCINOMA IN SITU (DCIS) OF RIGHT BREAST: ICD-10-CM

## 2023-06-27 DIAGNOSIS — C25.0 MALIGNANT NEOPLASM OF HEAD OF PANCREAS (H): ICD-10-CM

## 2023-06-27 DIAGNOSIS — Z00.00 ENCOUNTER FOR MEDICARE ANNUAL WELLNESS EXAM: Primary | ICD-10-CM

## 2023-06-27 DIAGNOSIS — D70.1 AGRANULOCYTOSIS SECONDARY TO CANCER CHEMOTHERAPY (CODE) (H): ICD-10-CM

## 2023-06-27 PROCEDURE — G0439 PPPS, SUBSEQ VISIT: HCPCS | Performed by: PHYSICIAN ASSISTANT

## 2023-06-27 PROCEDURE — 99213 OFFICE O/P EST LOW 20 MIN: CPT | Mod: 25 | Performed by: PHYSICIAN ASSISTANT

## 2023-06-27 RX ORDER — GINGER ROOT/GINGER ROOT EXT 262.5 MG
2 CAPSULE ORAL DAILY
Qty: 30 TABLET | Refills: 0 | COMMUNITY
Start: 2023-06-27

## 2023-06-27 RX ORDER — CITALOPRAM HYDROBROMIDE 20 MG/1
20 TABLET ORAL DAILY
Qty: 90 TABLET | Refills: 0 | COMMUNITY
Start: 2023-06-27 | End: 2023-07-31

## 2023-06-27 RX ORDER — ALBUTEROL SULFATE 90 UG/1
2 AEROSOL, METERED RESPIRATORY (INHALATION) EVERY 6 HOURS PRN
Qty: 18 G | Refills: 1 | Status: SHIPPED | OUTPATIENT
Start: 2023-06-27 | End: 2024-02-15

## 2023-06-27 RX ORDER — FLUTICASONE PROPIONATE 50 MCG
1 SPRAY, SUSPENSION (ML) NASAL DAILY
Qty: 16 G | Refills: 5 | Status: SHIPPED | OUTPATIENT
Start: 2023-06-27

## 2023-06-27 ASSESSMENT — ENCOUNTER SYMPTOMS
CHILLS: 1
COUGH: 0
ABDOMINAL PAIN: 0
CONSTIPATION: 0
HEMATOCHEZIA: 0
HEMATURIA: 0

## 2023-06-27 ASSESSMENT — PAIN SCALES - GENERAL: PAINLEVEL: NO PAIN (0)

## 2023-06-27 ASSESSMENT — ACTIVITIES OF DAILY LIVING (ADL): CURRENT_FUNCTION: NO ASSISTANCE NEEDED

## 2023-06-27 NOTE — PROGRESS NOTES
"SUBJECTIVE:   Flora is a 77 year old who presents for Preventive Visit.      6/27/2023     1:58 PM   Additional Questions   Roomed by Yecenia AIKEN   Accompanied by Self         6/27/2023     1:58 PM   Patient Reported Additional Medications   Patient reports taking the following new medications \"I don't think so\"     Are you in the first 12 months of your Medicare coverage?  No    Healthy Habits:     In general, how would you rate your overall health?  Good    Frequency of exercise:  2-3 days/week    Duration of exercise:  15-30 minutes    Do you usually eat at least 4 servings of fruit and vegetables a day, include whole grains    & fiber and avoid regularly eating high fat or \"junk\" foods?  Yes    Taking medications regularly:  Yes    Medication side effects:  None    Ability to successfully perform activities of daily living:  No assistance needed    Home Safety:  Lack of grab bars in the bathroom    Hearing Impairment:  Difficulty following a conversation in a noisy restaurant or crowded room and no hearing concerns    In the past 6 months, have you been bothered by leaking of urine?  No    In general, how would you rate your overall mental or emotional health?  Good      PHQ-2 Total Score: 2    Flora Hogan is a 77 year old female who presents today for annual wellness  Overall doing well  Dealing with allergies  Worried about her memory; started to notice worsened memory; names were the first thing she noted losing      Have you ever done Advance Care Planning? (For example, a Health Directive, POLST, or a discussion with a medical provider or your loved ones about your wishes): Yes, advance care planning is on file.       Fall risk  Fallen 2 or more times in the past year?: No  Any fall with injury in the past year?: No  click delete button to remove this line now  Cognitive Screening   1) Repeat 3 items (Leader, Season, Table)    2) Clock draw: NORMAL  3) 3 item recall: Recalls 2 objects   Results: NORMAL " clock, 1-2 items recalled: COGNITIVE IMPAIRMENT LESS LIKELY    Mini-CogTM Copyright ROBERT Devlin. Licensed by the author for use in [facility]; reprinted with permission (charmaine@[facility]). All rights reserved.      Do you have sleep apnea, excessive snoring or daytime drowsiness?: yes    Reviewed and updated as needed this visit by clinical staff   Tobacco  Allergies  Meds              Reviewed and updated as needed this visit by Provider                 Social History     Tobacco Use     Smoking status: Never     Passive exposure: Never     Smokeless tobacco: Never   Substance Use Topics     Alcohol use: Yes     Comment: social              6/27/2023     1:48 PM   Alcohol Use   Prescreen: >3 drinks/day or >7 drinks/week? No     Do you have a current opioid prescription? No  Do you use any other controlled substances or medications that are not prescribed by a provider? None        Current providers sharing in care for this patient include:   Patient Care Team:  Marcial Holden PA-C as PCP - General (Family Medicine)  Marcial Holden PA-C as Assigned PCP  Geovanny Wagner MD as Assigned Surgical Provider  Jc Peoples MD as Assigned Cancer Care Provider  Beatriz Benitez RN as Specialty Care Coordinator (Hematology & Oncology)    The following health maintenance items are reviewed in Epic and correct as of today:  Health Maintenance   Topic Date Due     COVID-19 Vaccine (6 - Moderna series) 03/03/2023     MEDICARE ANNUAL WELLNESS VISIT  05/20/2023     ANNUAL REVIEW OF HM ORDERS  05/20/2023     MAMMO SCREENING  06/23/2024     FALL RISK ASSESSMENT  06/27/2024     DTAP/TDAP/TD IMMUNIZATION (2 - Td or Tdap) 09/12/2024     LIPID  05/03/2026     ADVANCE CARE PLANNING  06/27/2028     DEXA  03/29/2038     HEPATITIS C SCREENING  Completed     PHQ-2 (once per calendar year)  Completed     INFLUENZA VACCINE  Completed     Pneumococcal Vaccine: 65+ Years  Completed     ZOSTER IMMUNIZATION   "Completed     IPV IMMUNIZATION  Aged Out     MENINGITIS IMMUNIZATION  Aged Out     COLORECTAL CANCER SCREENING  Discontinued     Lab work is in process  Labs reviewed in EPIC    Pertinent mammograms are reviewed under the imaging tab.    Review of Systems   Constitutional: Positive for chills.   HENT: Positive for congestion.    Respiratory: Negative for cough.    Cardiovascular: Negative for chest pain.   Gastrointestinal: Negative for abdominal pain, constipation and hematochezia.   Genitourinary: Negative for hematuria.       OBJECTIVE:   /66 (BP Location: Right arm, Patient Position: Sitting, Cuff Size: Adult Regular)   Pulse 50   Temp 98.2  F (36.8  C) (Oral)   Resp 23   Ht 1.638 m (5' 4.5\")   Wt 52.9 kg (116 lb 9.6 oz)   LMP  (LMP Unknown)   SpO2 96%   BMI 19.71 kg/m   Estimated body mass index is 19.71 kg/m  as calculated from the following:    Height as of this encounter: 1.638 m (5' 4.5\").    Weight as of this encounter: 52.9 kg (116 lb 9.6 oz).  Physical Exam  GENERAL: healthy, alert and no distress  EYES: Eyes grossly normal to inspection, PERRL and conjunctivae and sclerae normal  HENT: ear canals and TM's normal, nose and mouth without ulcers or lesions  RESP: lungs clear to auscultation - no rales, rhonchi or wheezes  CV: regular rate and rhythm, normal S1 S2, no S3 or S4, no murmur, click or rub, no peripheral edema and peripheral pulses strong  ABDOMEN: soft, nontender, no hepatosplenomegaly, no masses and bowel sounds normal  MS: no gross musculoskeletal defects noted, no edema  SKIN: no suspicious lesions or rashes  PSYCH: mentation appears normal, affect normal/bright    Diagnostic Test Results:  Labs reviewed in Epic    ASSESSMENT / PLAN:   1. Encounter for Medicare annual wellness exam  Reviewed personal and family history. Reviewed age appropriate screenings. Recommended any needed vaccinations. She still would like to think about the covid shot  - PRIMARY CARE FOLLOW-UP " SCHEDULING; Future    2. SOB (shortness of breath)  Ok to refill  - albuterol (PROAIR HFA/PROVENTIL HFA/VENTOLIN HFA) 108 (90 Base) MCG/ACT inhaler; Inhale 2 puffs into the lungs every 6 hours as needed for shortness of breath or wheezing  Dispense: 18 g; Refill: 1    3. Malignant neoplasm of head of pancreas (H)  4. Ductal carcinoma in situ (DCIS) of right breast  Continues to see oncology. On Tamoxifen. Moods stabl3  - citalopram (CELEXA) 20 MG tablet; Take 1 tablet (20 mg) by mouth daily  Dispense: 90 tablet; Refill: 0      5. Seasonal allergies  Recommend starting trial below  - fluticasone (FLONASE) 50 MCG/ACT nasal spray; Spray 1 spray into both nostrils daily  Dispense: 16 g; Refill: 5    6. 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 Oncology is following    7. Osteoporosis without current pathological fracture, unspecified osteoporosis type  Recent DEXA. She had not been using Ca and D but this is more striking than what adding those back can offer. Recommend consideration for bisphosphonate. She would like to discuss with oncology as well        COUNSELING:  Reviewed preventive health counseling, as reflected in patient instructions        She reports that she has never smoked. She has never been exposed to tobacco smoke. She has never used smokeless tobacco.      Appropriate preventive services were discussed with this patient, including applicable screening as appropriate for cardiovascular disease, diabetes, osteopenia/osteoporosis, and glaucoma.  As appropriate for age/gender, discussed screening for colorectal cancer, prostate cancer, breast cancer, and cervical cancer. Checklist reviewing preventive services available has been given to the patient.    Reviewed patients plan of care and provided an AVS. The Basic Care Plan (routine screening as documented in Health Maintenance) for Flora meets the Care Plan requirement. This Care Plan has been established and reviewed with the  Patient.          Marcial Holden PA-C  [facility]    Identified Health Risks:    I have reviewed Opioid Use Disorder and Substance Use Disorder risk factors and made any needed referrals.       The patient was provided with written information regarding signs of hearing loss.

## 2023-06-27 NOTE — PATIENT INSTRUCTIONS
Patient Education   Personalized Prevention Plan  You are due for the preventive services outlined below.  Your care team is available to assist you in scheduling these services.  If you have already completed any of these items, please share that information with your care team to update in your medical record.  Health Maintenance Due   Topic Date Due     COVID-19 Vaccine (6 - Moderna series) 03/03/2023     Annual Wellness Visit  05/20/2023       Signs of Hearing Loss  Hearing loss is a problem shared by many people. In fact, it's one of the most common health problems, particularly as people age. Most people aged 65 and older have some hearing loss. By age 80, almost everyone does. Hearing loss often occurs slowly over the years. So, you may not realize your hearing has gotten worse.   When sudden hearing loss occurs, it's important to contact your healthcare provider right away. Your provider will do a medical exam and a hearing exam as soon as possible. This is to help find the cause and type of your sudden hearing loss. Based on your diagnosis, your healthcare provider will discuss possible treatments.      Hearing much better with one ear can be a sign of hearing loss.     Have your hearing checked  Call your healthcare provider if you:     Have to strain to hear normal conversation    Have to watch other people s faces very carefully to follow what they re saying    Need to ask people to repeat what they ve said    Often misunderstand what people are saying    Turn the volume of the television or radio up so high that others complain    Feel that people are mumbling when they re talking to you    Find that the effort to hear leaves you feeling tired and irritated    Notice, when using the phone, that you hear better with one ear than the other  [employer] last reviewed this educational content on 6/1/2022 2000-2022 [employer]. All rights reserved. This information is not intended as a  substitute for professional medical care. Always follow your healthcare professional's instructions.

## 2023-06-30 ENCOUNTER — HOSPITAL ENCOUNTER (OUTPATIENT)
Dept: MAMMOGRAPHY | Facility: CLINIC | Age: 78
Discharge: HOME | End: 2023-06-30
Attending: INTERNAL MEDICINE | Admitting: INTERNAL MEDICINE
Payer: COMMERCIAL

## 2023-06-30 DIAGNOSIS — R92.8 ABNORMAL MAMMOGRAM: ICD-10-CM

## 2023-06-30 PROCEDURE — 77065 DX MAMMO INCL CAD UNI: CPT | Mod: RT

## 2023-07-03 ENCOUNTER — HOSPITAL ENCOUNTER (OUTPATIENT)
Dept: MAMMOGRAPHY | Facility: CLINIC | Age: 78
Discharge: HOME | End: 2023-07-03
Attending: INTERNAL MEDICINE
Payer: COMMERCIAL

## 2023-07-03 DIAGNOSIS — R92.8 ABNORMAL MAMMOGRAM: ICD-10-CM

## 2023-07-03 PROCEDURE — 999N000065 MA POST PROCEDURE RIGHT

## 2023-07-03 PROCEDURE — 88305 TISSUE EXAM BY PATHOLOGIST: CPT | Mod: TC | Performed by: INTERNAL MEDICINE

## 2023-07-03 PROCEDURE — 250N000009 HC RX 250: Performed by: RADIOLOGY

## 2023-07-03 PROCEDURE — 19081 BX BREAST 1ST LESION STRTCTC: CPT | Mod: RT

## 2023-07-03 RX ORDER — LIDOCAINE HYDROCHLORIDE 10 MG/ML
1 INJECTION, SOLUTION EPIDURAL; INFILTRATION; INTRACAUDAL; PERINEURAL ONCE
Status: COMPLETED | OUTPATIENT
Start: 2023-07-03 | End: 2023-07-03

## 2023-07-03 RX ORDER — LIDOCAINE HYDROCHLORIDE AND EPINEPHRINE 10; 10 MG/ML; UG/ML
1 INJECTION, SOLUTION INFILTRATION; PERINEURAL ONCE
Status: COMPLETED | OUTPATIENT
Start: 2023-07-03 | End: 2023-07-03

## 2023-07-03 RX ADMIN — LIDOCAINE HYDROCHLORIDE,EPINEPHRINE BITARTRATE 12 ML: 10; .01 INJECTION, SOLUTION INFILTRATION; PERINEURAL at 10:27

## 2023-07-03 RX ADMIN — LIDOCAINE HYDROCHLORIDE 6 ML: 10 INJECTION, SOLUTION EPIDURAL; INFILTRATION; INTRACAUDAL; PERINEURAL at 10:27

## 2023-07-06 ENCOUNTER — TELEPHONE (OUTPATIENT)
Dept: MAMMOGRAPHY | Facility: CLINIC | Age: 78
End: 2023-07-06
Payer: COMMERCIAL

## 2023-07-06 PROCEDURE — 88305 TISSUE EXAM BY PATHOLOGIST: CPT | Mod: 26 | Performed by: PATHOLOGY

## 2023-07-06 NOTE — TELEPHONE ENCOUNTER
BENIGN PATH:  Pathology report reviewed with our breast radiologist Dr. Colton Anderson, who confirmed the recent breast imaging is concordant with the final pathology results below.    I phoned Ms. Flora Hogan and left her a voicemail message asking if she could return my call when she is available.     Stereotactic Guided Right Breast Needle Biopsy (07/03/2023) results show benign Fat Necrosis.     Recommended follow up is Routine Annual Screening Mammogram.  Patient will be due for her next screening mammogram after 6/24/2024.    Awaiting a return call from patient to inform her of the biopsy results, and the recommendations for follow up.  Rebecca Zepeda, RN BSN  Breast Care Nurse Coordinator  [facility]  [phone]      Flora Hogan [id number]  F, 1945  Surgical Pathology Report (Final result) [id number]  Authorizing Provider: Jc Peoples MD Ordering Provider: Jc Peoples MD  Ordering Location: [facility]  Collected: 07/03/2023 10:29 AM  Pathologist: Emily Lyon Received: 07/03/2023 11:08 AM  .  Specimens  A Breast, Right  .  .  Final Diagnosis  A. Breast, right, 9:00, 2-3 cm from nipple (3.5 cm area of calcifications), stereotactic-guided needle core biopsy:  -Benign breast tissue with microcalcifications, stromal fibrosis and focal fat necrosis.  -See comment.  Electronically signed by Emily Lyon on 7/6/2023 at 10:54 AM

## 2023-07-07 NOTE — TELEPHONE ENCOUNTER
Patient was informed of breast biopsy results, and recommendations for follow up today by Nurse Lyudmila Lacy RN.  Rebecca Zepeda, RN, BSN

## 2023-07-07 NOTE — PROGRESS NOTES
Call placed to Flora Hogan.   verified.    Patient has been notified pathology results from her 7/3/2023, RIGHT BREAST BIOPSY that revealed:    Final Diagnosis  A. Breast, right, 9:00, 2-3 cm from nipple (3.5 cm area of calcifications), stereotactic-guided needle core biopsy:  -Benign breast tissue with microcalcifications, stromal fibrosis and focal fat necrosis.  -See comment.  Electronically signed by Emily Lyon on 2023 at 10:54 AM  Comment   Multiple levels were performed and examined.  This case was shown to a second pathologist with consensus on 2023.        Per [facility] radiologist Dr. Colton nAderson. Results are concordant with imaging findings.    Recommendation: Routine Breast Imaging    Flora denies any post biopsy site issues. I encouraged her to notify her doctor if any breast changes or concerns arise.    Lyudmila Castillo BSN, RN  Procedure Nurse  [facility] [place]  [phone]

## 2023-07-19 DIAGNOSIS — C25.0 MALIGNANT NEOPLASM OF HEAD OF PANCREAS (H): ICD-10-CM

## 2023-07-19 NOTE — TELEPHONE ENCOUNTER
Pending Prescriptions:                       Disp   Refills    lipase-protease-amylase (CREON) 81080-141*270 ca*3            Sig: TAKE 1 CAPSULE BY MOUTH THREE TIMES DAILY WITH A           MEAL Strength: 24,000-76,000 Units      Last Written Prescription Date: 10/18/2022  Last Fill Quantity: 270,   # refills: 3  Last Office Visit: 5/15/23  Future Office visit: 11/13/23       Routing refill request to provider for review/approval.     Beatriz Benitez RN on 7/19/2023 at 9:24 AM

## 2023-07-31 DIAGNOSIS — C25.0 MALIGNANT NEOPLASM OF HEAD OF PANCREAS (H): ICD-10-CM

## 2023-07-31 RX ORDER — CITALOPRAM HYDROBROMIDE 20 MG/1
20 TABLET ORAL DAILY
Qty: 90 TABLET | Refills: 0 | Status: SHIPPED | OUTPATIENT
Start: 2023-07-31 | End: 2023-11-02

## 2023-07-31 NOTE — TELEPHONE ENCOUNTER
Pending Prescriptions:                       Disp   Refills    citalopram (CELEXA) 20 MG tablet          90 tab*0            Sig: Take 1 tablet (20 mg) by mouth daily          Last Written Prescription Date: Unknown  Last Fill Quantity: 90,   # refills: 0  Last Office Visit: 5/15/23  Future Office visit: 11/13/23      Routing refill request to provider for review/approval because:  Medication is reported/historical    Beatriz Benitez RN on 7/31/2023 at 3:29 PM

## 2023-11-01 ENCOUNTER — NURSE TRIAGE (OUTPATIENT)
Dept: FAMILY MEDICINE | Facility: CLINIC | Age: 78
End: 2023-11-01
Payer: COMMERCIAL

## 2023-11-01 NOTE — TELEPHONE ENCOUNTER
As an FYI we are NOT supposed to be using same days prior to the day of whatsoever any longer -- they've sent us providers multiple notes about that so just moving forward we'll have to see what other choices there wouldve been in cases like this. Thanks Marcia!

## 2023-11-01 NOTE — TELEPHONE ENCOUNTER
Is it possible to call this patient back again. I am a little confused by the part:     R: patient does feel this is emergent but wants it addressed before she leaves for [place].     Im wondering if it meant to say does NOT feel emergent but wants addressed before leaving? Can we simply triage to see the urgency? How she is doing today? I dont have an in clinic opening including [facility] until 11/9 but we could use that if not thinking emergent. If this is acute/advancing she should be seen by someone in the next day or two, including urgent care to address, and then could keep appt to review symptoms on the 14th

## 2023-11-01 NOTE — TELEPHONE ENCOUNTER
Second Level Triage with Monroe Holden PA-C.  RN protocol states patient needs to be seen now. Patient does feel this is emergent but wants it addressed before she leaves for [place].    Patient open to sooner appointment. Team to assist.     S: shortness of breath  B: Hx pancreatic & breast cancer. Leaving for [place] Nov 30th. Return March 5th.  A: Sporadic. Allergic to dust and pollen. Worse when she givens in [place].  Last night she noticed increased shortness of breath when she climbed stairs last night. Infrequent wheezing/dizziness.     Denies: respiratory symptoms, chest pain, severe coughing, recent travel, history blood clots, fever,    R: patient does feel this is emergent but wants it addressed before she leaves for [place].    Questioning COPD...    Routing to Primary Care Provider to see if he can work patient in sooner.    Reason for Disposition   MILD difficulty breathing (e.g., minimal/no SOB at rest, SOB with walking, pulse < 100) of new-onset or worse than normal    Additional Information   Negative: SEVERE difficulty breathing (e.g., struggling for each breath, speaks in single words, pulse > 120)   Negative: Breathing stopped and hasn't returned   Negative: Choking on something   Negative: Bluish (or gray) lips or face   Negative: Difficult to awaken or acting confused (e.g., disoriented, slurred speech)   Negative: Passed out (i.e., fainted, collapsed and was not responding)   Negative: Wheezing started suddenly after medicine, an allergic food, or bee sting   Negative: Stridor (harsh sound while breathing in)   Negative: Slow, shallow and weak breathing   Negative: Sounds like a life-threatening emergency to the triager   Negative: Chest pain   Negative: Wheezing (high pitched whistling sound) and previous asthma attacks or use of asthma medicines   Negative: Difficulty breathing and within 14 days of COVID-19 Exposure   Negative: Difficulty breathing and only present when coughing   Negative: Difficulty breathing  "and only from stuffy nose   Negative: Difficulty breathing and only from stuffy nose or runny nose from common cold   Negative: MODERATE difficulty breathing (e.g., speaks in phrases, SOB even at rest, pulse 100-120) of new-onset or worse than normal   Negative: Oxygen level (e.g., pulse oximetry) 90% or lower   Negative: Wheezing can be heard across the room   Negative: Drooling or spitting out saliva (because can't swallow)   Negative: Any history of prior \"blood clot\" in leg or lungs   Negative: Illness requiring prolonged bedrest in past month (e.g., immobilization, long hospital stay)   Negative: Hip or leg fracture (broken bone) in past month (or had cast on leg or ankle in past month)   Negative: Major surgery in the past month   Negative: Long-distance travel in past month (e.g., car, bus, train, plane; with trip lasting 6 or more hours)   Negative: Cancer treatment in past six months (or has cancer now)   Negative: Extra heartbeats, irregular heart beating, or heart is beating very fast (i.e., 'palpitations')   Negative: Fever > 103 F (39.4 C)   Negative: Fever > 101 F (38.3 C) and over 60 years of age   Negative: Fever > 100.0 F (37.8 C) and bedridden (e.g., nursing home patient, stroke, chronic illness, recovering from surgery)   Negative: Fever > 100.0 F (37.8 C) and diabetes mellitus or weak immune system (e.g., HIV positive, cancer chemo, splenectomy, organ transplant, chronic steroids)   Negative: Periods where breathing stops and then resumes normally and bedridden (e.g., nursing home patient, CVA)   Negative: Pregnant or postpartum (from 0 to 6 weeks after delivery)   Negative: Patient sounds very sick or weak to the triager    Protocols used: Breathing Difficulty-A-[place]    "

## 2023-11-01 NOTE — TELEPHONE ENCOUNTER
Called the pt.  This has been going on for a while.  She said her  is very concerned as his first wife  from cancer and she is dealing with cancer.  They are leaving 23 for [place].      She said the symptoms are worse than they used to be.  Advised then we should try to schedule her sooner and then she can see if Monroe wants her to keep follow up appt on 23.    Had also talked to Ani Rodriguez - she said if urgent - be seen in Urgent Care.  Otherwise appt in the next few days.  If not urgent, can schedule with Monroe Holden on 23.  Pt was scheduled for tomorrow.

## 2023-11-02 ENCOUNTER — OFFICE VISIT (OUTPATIENT)
Dept: FAMILY MEDICINE | Facility: CLINIC | Age: 78
End: 2023-11-02
Payer: COMMERCIAL

## 2023-11-02 VITALS
HEART RATE: 60 BPM | SYSTOLIC BLOOD PRESSURE: 119 MMHG | TEMPERATURE: 97.7 F | WEIGHT: 120.9 LBS | BODY MASS INDEX: 20.64 KG/M2 | HEIGHT: 64 IN | RESPIRATION RATE: 24 BRPM | DIASTOLIC BLOOD PRESSURE: 72 MMHG | OXYGEN SATURATION: 95 %

## 2023-11-02 DIAGNOSIS — R06.02 SOB (SHORTNESS OF BREATH): Primary | ICD-10-CM

## 2023-11-02 DIAGNOSIS — C25.0 MALIGNANT NEOPLASM OF HEAD OF PANCREAS (H): ICD-10-CM

## 2023-11-02 DIAGNOSIS — R07.89 CHEST TIGHTNESS: ICD-10-CM

## 2023-11-02 PROCEDURE — 91320 SARSCV2 VAC 30MCG TRS-SUC IM: CPT | Performed by: PHYSICIAN ASSISTANT

## 2023-11-02 PROCEDURE — 99214 OFFICE O/P EST MOD 30 MIN: CPT | Mod: 25 | Performed by: PHYSICIAN ASSISTANT

## 2023-11-02 PROCEDURE — G0008 ADMIN INFLUENZA VIRUS VAC: HCPCS | Performed by: PHYSICIAN ASSISTANT

## 2023-11-02 PROCEDURE — 90480 ADMN SARSCOV2 VAC 1/ONLY CMP: CPT | Performed by: PHYSICIAN ASSISTANT

## 2023-11-02 PROCEDURE — 90662 IIV NO PRSV INCREASED AG IM: CPT | Performed by: PHYSICIAN ASSISTANT

## 2023-11-02 RX ORDER — RESPIRATORY SYNCYTIAL VIRUS VACCINE 120MCG/0.5
0.5 KIT INTRAMUSCULAR ONCE
Qty: 1 EACH | Refills: 0 | Status: CANCELLED | OUTPATIENT
Start: 2023-11-02 | End: 2023-11-02

## 2023-11-02 RX ORDER — CITALOPRAM HYDROBROMIDE 20 MG/1
20 TABLET ORAL DAILY
Qty: 90 TABLET | Refills: 1 | Status: SHIPPED | OUTPATIENT
Start: 2023-11-02 | End: 2024-04-22

## 2023-11-02 ASSESSMENT — ENCOUNTER SYMPTOMS: SHORTNESS OF BREATH: 1

## 2023-11-02 ASSESSMENT — PAIN SCALES - GENERAL: PAINLEVEL: NO PAIN (0)

## 2023-11-02 NOTE — PROGRESS NOTES
"  Assessment & Plan     SOB (shortness of breath)  Chest tightness  Normal exam today. This is chronic and she has long thought this to be allergy related? CT scan was ok in May, antoher upcoming in November. If this was reactive airway, she should increase albuterol and we can try ICS; we did also discuss pft. Additioanlly, with known CAD (Mild) on CT scan, we discussed consideration for stress testing. Previously labs have not depicted clinical progression towards anemia so we did not screen that, but could in the future  - fluticasone (FLOVENT DISKUS) 100 MCG/ACT inhaler; Inhale 1 puff into the lungs every 12 hours    Malignant neoplasm of head of pancreas (H)  Continue present management. Tolerating and effective for additional anxiety  - citalopram (CELEXA) 20 MG tablet; Take 1 tablet (20 mg) by mouth daily      Marcial Holden PA-C  [facility] COLLINS Hines is a 78 year old, presenting for the following health issues:  Shortness of Breath      History of Present Illness       Reason for visit:  Shirtness of breath  Symptom onset:  More than a month  Symptoms include:  Shortness of breath  Symptom intensity:  Mild  Symptom progression:  Staying the same  Had these symptoms before:  Yes  Has tried/received treatment for these symptoms:  No  Prior treatment description:  N/A  What makes it worse:  Exertion sometimes  What makes it better:  Not really    She eats 2-3 servings of fruits and vegetables daily.She consumes 1 sweetened beverage(s) daily.She exercises with enough effort to increase her heart rate 10 to 19 minutes per day.  She exercises with enough effort to increase her heart rate 4 days per week.   She is taking medications regularly.     Flora Hogan is a 78 year old female who presents today for continued \"breathing problems\"   We have discussed these before but they had been generally controlled; not advancing  She has historically attributed these to " "allergies;   Does think after cancer treatments the symptoms worsened slightly  Albuterol can seem to help    In the past week she mentions feeling great Monday and Tuesday, walked 4 miles  Wednesday \"didn't do that well\" but couldn't sleep  Yesterday after walking up the basement steps she was \"huffing and puffing\"; there was no pain, just \"hard to breathe\"  She does not hear/feel wheeze but can be occasionally tight in the chest      Review of Systems   Respiratory:  Positive for shortness of breath.           Objective    /72 (BP Location: Right arm, Patient Position: Sitting, Cuff Size: Adult Regular)   Pulse 60   Temp 97.7  F (36.5  C) (Oral)   Resp 24   Ht 1.638 m (5' 4.49\")   Wt 54.8 kg (120 lb 14.4 oz)   LMP  (LMP Unknown)   SpO2 95%   BMI 20.44 kg/m    Body mass index is 20.44 kg/m .  Physical Exam   GENERAL: healthy, alert and no distress  HENT: oropharynx clear and oral mucous membranes moist  RESP: lungs clear to auscultation - no rales, rhonchi or wheezes  CV: regular rates and rhythm  MS: No peripheral edema   PSYCH: mentation appears normal, affect normal/bright    Diagnostics: reviewed CT scan                  "

## 2023-11-02 NOTE — COMMUNITY RESOURCES LIST (ENGLISH)
11/02/2023   [facility] [employer]  N/A  For questions about this resource list or additional care needs, please contact your primary care clinic or care manager.  Phone: [phone]   Email: N/A   Address: [address] [place] [address]   Hours: N/A        Hotlines and Helplines       Hotline - Housing crisis  1  [facility] (Main Office) - Emergency Services Distance: 9.37 miles      Phone/Virtual   [address] [place] [address]  Language: English  Hours: Mon - Sun Open 24 Hours   Phone: (384) 392-5575 Email: info@[facility] [facility].org Website: http://[employer] Technologies[facility].org     2  [facility] Distance: 15.53 miles      Phone/Virtual   [address] [place] [address]  Language: English  Hours: Mon - Sun Open 24 Hours   Phone: (968) 672-5727 Email: communications@[facility].org Website: https://[facility]ospitals-[place].org/oursaviourshousing/          Housing       Coordinated Entry access point  3  [facility] Distance: 13.59 miles      In-Person, Phone/Virtual   [address] [place] [address]  Language: English, Macedonian  Hours: Mon - Fri 8:30 AM - 4:30 PM  Fees: Free   Phone: (816) 736-1876 Email: info@[facility].org Website: https://www.[facility].org/locations/downto-clinic/     4  [facility] ([facility] - Housing Services Distance: 15.5 miles      In-Person   [address] [place] [address]  Language: English  Hours: Mon - Fri 9:00 AM - 5:00 PM  Fees: Free   Phone: (407) 480-9921 Email: housing@[facility].org Website: http://www.[facility].org/housing     Drop-in center or day shelter  5  [facility] Distance: 14.67 miles      In-Person   [address] [place] [address]  Language: English  Hours: Mon - Fri 9:00 AM - 4:00 PM  Fees: Free   Phone: (320) 869-3941 Email: malikak@[employer].org Website: http://listeninghouse.org     6  [facility] Distance: 15.92 miles      In-Person    [address] [place] [address]  Language: English  Hours: Mon - Fri 12:00 PM - 3:00 PM  Fees: Free   Phone: (864) 288-9761 Email: [employer].FriendCode Website: http://[employer].ActionFlow/     Housing search assistance  7  [employer] [place] [employer] - Rental Homes for Future Homebuyers Program Distance: 9.37 miles      Phone/Virtual   [address] [place] [address]  Language: English  Hours: Mon - Fri 8:00 AM - 4:30 PM  Fees: Free   Phone: (372) 360-7394 Email: hra@[facility] Website: https://www.[facility]/hra/[place]-housing-and-kwtabvuecodcr-rpwbgwhhv-bex     8  [facility] Aging and Disability Services Distance: 9.52 miles      In-Person   [address] [place] [address]  Language: English  Hours: Mon - Fri 8:00 AM - 4:00 PM  Fees: Free, Insurance, Sliding Fee   Phone: (966) 898-4743 Email: panfilo@[facility]. Website: https://www.[facility]./HealthFamily/Disabilities     Shelter for families  9  [facility] Distance: 5.52 miles      In-Person   [address] [place] [address]  Language: English  Hours: Mon - Sun Open 24 Hours  Fees: Free, Sliding Fee   Phone: (974) 598-8635 Ext.1 Email: info@[facility].org Website: http://www.[facility].org     Shelter for individuals  10  [facility] ([facility], [facility] Distance: 1.62 miles      In-Person   [address] [place] [address]  Language: English, Maori  Hours: Mon - Fri 8:00 AM - 4:30 PM  Fees: Free   Phone: (147) 834-2303 Email: info@[facility].org Website: http://www.[facility].org     11  [facility] [place] - [facility] [place] [facility] - [facility] [place] [facility] Distance: 13.61 miles      In-Person   [address] [place] [address]  Language: English  Hours: Mon - Sun 5:00 PM - 10:00 AM  Fees: Free, Self Pay   Phone:  (411) 911-3801 Email: info@ddmap.com.org Website: https://www.ddmap.com.org/locations/[facility]/          Important Numbers & Websites       Emergency Services   911  [facility] Services   311  Poison Control   (367) 559-1256  Suicide Prevention Lifeline   (185) 260-9945 (TALK)  Child Abuse Hotline   (153) 863-5866 (4-A-Child)  Sexual Assault Hotline   (683) 328-7472 (HOPE)  National Runaway Safeline   (267) 523-3286 (RUNAWAY)  All-Options Talkline   (788) 370-5883  Substance Abuse Referral   (371) 704-4817 (HELP)

## 2023-11-02 NOTE — COMMUNITY RESOURCES LIST (ENGLISH)
11/02/2023   [facility] [employer]  N/A  For questions about this resource list or additional care needs, please contact your primary care clinic or care manager.  Phone: [phone]   Email: N/A   Address: [address] [place] [address]   Hours: N/A        Hotlines and Helplines       Hotline - Housing crisis  1  [facility] (Main Office) - Emergency Services Distance: 9.37 miles      Phone/Virtual   [address] [place] [address]  Language: English  Hours: Mon - Sun Open 24 Hours   Phone: (848) 964-1042 Email: info@[facility].org Website: http://[facility].org     2  [facility] Distance: 15.53 miles      Phone/Virtual   [address] [place] [address]  Language: English  Hours: Mon - Sun Open 24 Hours   Phone: (807) 643-6440 Email: communications@[facility].org Website: https://[facility].org/oursaviourshousing/          Housing       Coordinated Entry access point  3  [facility] Distance: 13.59 miles      In-Person, Phone/Virtual   [address] [place] [address]  Language: English, Anguillan  Hours: Mon - Fri 8:30 AM - 4:30 PM  Fees: Free   Phone: (820) 442-6419 Email: info@[facility].org Website: https://www.[facility].org/locations/downto-clinic/     4  [facility] ([facility] - Housing Services Distance: 15.5 miles      In-Person   [address] [place] [address]  Language: English  Hours: Mon - Fri 9:00 AM - 5:00 PM  Fees: Free   Phone: (719) 149-7072 Email: housing@[facility].org Website: http://www.[facility].org/housing     Drop-in center or day shelter  5  [facility] Distance: 14.67 miles      In-Person   [address] [place] [address]  Language: English  Hours: Mon - Fri 9:00 AM - 4:00 PM  Fees: Free   Phone: (316) 799-2902 Email: malikak@[employer].org Website: http://listeninghouse.org     6  [facility] Distance: 15.92 miles      In-Person    [address] [place] [address]  Language: English  Hours: Mon - Fri 12:00 PM - 3:00 PM  Fees: Free   Phone: (134) 818-2109 Email: [employer]ph[employer] BiosciencesmelissaProcyrion@[employer] Website: http://[employer]/     Housing search assistance  7  [place] [employer] - Rental Homes for Future Homebuyers Program Distance: 9.37 miles      Phone/Virtual   [address] [place] [address]  Language: English  Hours: Mon - Fri 8:00 AM - 4:30 PM  Fees: Free   Phone: (184) 482-2531 Email: hra@[facility] Website: https://www.[facility]/hra/[place]-housing-and-efjrncxasluxh-qrzdtqwoa-tss     8  [facility] Aging and Disability Services Distance: 9.52 miles      In-Person   [address] [place] [address]  Language: English  Hours: Mon - Fri 8:00 AM - 4:00 PM  Fees: Free, Insurance, Sliding Fee   Phone: (564) 810-1600 Email: panfilo@[facility]. Website: https://www.[facility]./HealthFamily/Disabilities     Shelter for families  9  [facility] Distance: 5.52 miles      In-Person   [address] [place] [address]  Language: English  Hours: Mon - Sun Open 24 Hours  Fees: Free, Sliding Fee   Phone: (606) 321-1063 Ext.1 Email: info@[facility].org Website: http://www.[facility].org     Shelter for individuals  10  [facility] ([facility], [facility] Distance: 1.62 miles      In-Person   [address] [place] [address]  Language: English, Maori  Hours: Mon - Fri 8:00 AM - 4:30 PM  Fees: Free   Phone: (181) 208-6754 Email: info@[facility].org Website: http://www.[facility].org     11  [facility] [place] [facility] - [facility] [place] [facility] Distance: 13.61 miles      In-Person   [address] [place] [address]  Language: English  Hours: Mon - Sun 5:00 PM - 10:00 AM  Fees: Free, Self Pay   Phone:  (297) 125-2872 Email: info@[employer].org Website: https://www.[employer].org/locations/[facility]/          Important Numbers & Websites       Emergency Services   911  [facility] Services   311  Poison Control   (710) 684-2162  Suicide Prevention Lifeline   (213) 834-4449 (TALK)  Child Abuse Hotline   (990) 530-3779 (4-A-Child)  Sexual Assault Hotline   (832) 373-8031 (HOPE)  National Runaway Safeline   (498) 984-8147 (RUNAWAY)  All-Options Talkline   (359) 677-4745  Substance Abuse Referral   (510) 277-8312 (HELP)

## 2023-11-09 ENCOUNTER — HOSPITAL ENCOUNTER (OUTPATIENT)
Dept: CT IMAGING | Facility: CLINIC | Age: 78
Discharge: HOME | End: 2023-11-09
Attending: INTERNAL MEDICINE | Admitting: INTERNAL MEDICINE
Payer: COMMERCIAL

## 2023-11-09 DIAGNOSIS — C25.0 MALIGNANT NEOPLASM OF HEAD OF PANCREAS (H): ICD-10-CM

## 2023-11-09 LAB
CREAT BLD-MCNC: 0.9 MG/DL (ref 0.5–1)
EGFRCR SERPLBLD CKD-EPI 2021: >60 ML/MIN/1.73M2

## 2023-11-09 PROCEDURE — 250N000009 HC RX 250: Performed by: INTERNAL MEDICINE

## 2023-11-09 PROCEDURE — 74177 CT ABD & PELVIS W/CONTRAST: CPT

## 2023-11-09 PROCEDURE — 82565 ASSAY OF CREATININE: CPT

## 2023-11-09 PROCEDURE — 250N000011 HC RX IP 250 OP 636: Performed by: INTERNAL MEDICINE

## 2023-11-09 RX ORDER — IOPAMIDOL 755 MG/ML
58 INJECTION, SOLUTION INTRAVASCULAR ONCE
Status: COMPLETED | OUTPATIENT
Start: 2023-11-09 | End: 2023-11-09

## 2023-11-09 RX ADMIN — SODIUM CHLORIDE 55 ML: 9 INJECTION, SOLUTION INTRAVENOUS at 10:03

## 2023-11-09 RX ADMIN — IOPAMIDOL 58 ML: 755 INJECTION, SOLUTION INTRAVENOUS at 10:02

## 2023-11-10 ENCOUNTER — LAB (OUTPATIENT)
Dept: LAB | Facility: CLINIC | Age: 78
End: 2023-11-10
Payer: COMMERCIAL

## 2023-11-10 DIAGNOSIS — C25.0 MALIGNANT NEOPLASM OF HEAD OF PANCREAS (H): ICD-10-CM

## 2023-11-10 LAB
ALBUMIN SERPL BCG-MCNC: 4.2 G/DL (ref 3.5–5.2)
ALP SERPL-CCNC: 70 U/L (ref 35–104)
ALT SERPL W P-5'-P-CCNC: 39 U/L (ref 0–50)
ANION GAP SERPL CALCULATED.3IONS-SCNC: 10 MMOL/L (ref 7–15)
AST SERPL W P-5'-P-CCNC: 63 U/L (ref 0–45)
BASOPHILS # BLD AUTO: 0 10E3/UL (ref 0–0.2)
BASOPHILS NFR BLD AUTO: 1 %
BILIRUB SERPL-MCNC: 0.6 MG/DL
BUN SERPL-MCNC: 13.5 MG/DL (ref 8–23)
CALCIUM SERPL-MCNC: 9.4 MG/DL (ref 8.8–10.2)
CHLORIDE SERPL-SCNC: 98 MMOL/L (ref 98–107)
CREAT SERPL-MCNC: 0.91 MG/DL (ref 0.51–0.95)
DEPRECATED HCO3 PLAS-SCNC: 27 MMOL/L (ref 22–29)
EGFRCR SERPLBLD CKD-EPI 2021: 64 ML/MIN/1.73M2
EOSINOPHIL # BLD AUTO: 0.2 10E3/UL (ref 0–0.7)
EOSINOPHIL NFR BLD AUTO: 4 %
ERYTHROCYTE [DISTWIDTH] IN BLOOD BY AUTOMATED COUNT: 13.6 % (ref 10–15)
GLUCOSE SERPL-MCNC: 111 MG/DL (ref 70–99)
HCT VFR BLD AUTO: 43.2 % (ref 35–47)
HGB BLD-MCNC: 14 G/DL (ref 11.7–15.7)
IMM GRANULOCYTES # BLD: 0 10E3/UL
IMM GRANULOCYTES NFR BLD: 0 %
LYMPHOCYTES # BLD AUTO: 1.1 10E3/UL (ref 0.8–5.3)
LYMPHOCYTES NFR BLD AUTO: 23 %
MCH RBC QN AUTO: 28.9 PG (ref 26.5–33)
MCHC RBC AUTO-ENTMCNC: 32.4 G/DL (ref 31.5–36.5)
MCV RBC AUTO: 89 FL (ref 78–100)
MONOCYTES # BLD AUTO: 0.3 10E3/UL (ref 0–1.3)
MONOCYTES NFR BLD AUTO: 7 %
NEUTROPHILS # BLD AUTO: 3.1 10E3/UL (ref 1.6–8.3)
NEUTROPHILS NFR BLD AUTO: 66 %
PLATELET # BLD AUTO: 171 10E3/UL (ref 150–450)
POTASSIUM SERPL-SCNC: 4 MMOL/L (ref 3.4–5.3)
PROT SERPL-MCNC: 7.1 G/DL (ref 6.4–8.3)
RBC # BLD AUTO: 4.84 10E6/UL (ref 3.8–5.2)
SODIUM SERPL-SCNC: 135 MMOL/L (ref 135–145)
WBC # BLD AUTO: 4.8 10E3/UL (ref 4–11)

## 2023-11-10 PROCEDURE — 36415 COLL VENOUS BLD VENIPUNCTURE: CPT

## 2023-11-10 PROCEDURE — 86301 IMMUNOASSAY TUMOR CA 19-9: CPT | Mod: 90

## 2023-11-10 PROCEDURE — 80053 COMPREHEN METABOLIC PANEL: CPT

## 2023-11-10 PROCEDURE — 99000 SPECIMEN HANDLING OFFICE-LAB: CPT

## 2023-11-10 PROCEDURE — 85025 COMPLETE CBC W/AUTO DIFF WBC: CPT

## 2023-11-10 NOTE — PROGRESS NOTES
[facility]    Hematology/Oncology Established Patient Follow-up Note    Oncologic History/Treatment Summary    4/19/20: Presented with painless jaundice.  CT scan demonstrated a 1.2 cm mass in the pancreas with biliary dilatation.  ERCP/EUS performed demonstrating atypia but no definite malignancy.  5/26/20: Whipple resection performed by Dr. Chaves at [facility].  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  Margins and lymph nodes negative, LVI was seen.  Final stage [id number].  7/7/20 to 12/15/20: Status post 6 cycles adjuvant gemcitabine.  6/22/22: Routine screening mammogram demonstrated a circumscribed mass in the left breast at 9 o'clock.  Diagnostic mammogram was benign but linear branching microcalcifications seen in the right breaset spanning 7 cm and stereotactic biopsy recommended.  7/12/22: Stereotactic biopsy right breast demonstrated atypical ductal hyperplasia.  9/16/22: Excisional biopsy performed by Dr. Wagner demonstrating DCIS grade 3, no invasive component seen.  Medial and inferior margins were close at 0.4 mm.  ER positive at %, SC positive at 1-10%.  10/27/22: Re-excision of medial and inferior margins demonstrated 2 foci of grade 2 invasive ductal carcinoma measuring 0.3 mm and 0.4 mm with involvement of inferior margin. The invasive cancer was ER+ (%), SC negative, HER2 positive (3+ IHC).  11/11/22: Re-excision of inferior margin with sentinel lymph node biopsy demonstrated no residual malignancy.  2 sentinel lymph nodes were negative.   11/21/22: Initiated adjuvant anastrozole.  4/17/23: Switched to tamoxifen due to osteoporosis.      Today's Date: 11/13/23    Reason for Follow-up: Invasive ductal carcinoma of the right breast and resected stage I pancreatic cancer.      INTERIM HISTORY:  Flora is seen for follow-up of stage IA right breast cancer receiving adjuvant tamoxifen as well as history of stage I  resected pancreatic cancer.  Accompanied by her .  She has been overall been doing OK.  She was seen by Dr. Wagner to evaluate her ventral hernia and agreed to observe for now.  She gets occasional associated pain but overall not very bothersome.  Other concerns are dyspnea on exertion, states this has been an issue for her for years.  No associated chest pain.  Also does have some short term memory loss, feels that this started after she completed chemotherapy and may be a little worse.      Leaving for [place] later this month, will return in March.      REVIEW OF SYSTEMS:   A 14 point ROS was reviewed with pertinent positives and negatives in the HPI.       HOME MEDICATIONS:  Current Outpatient Medications   Medication Sig Dispense Refill    acetaminophen (TYLENOL) 325 MG tablet Take 1-2 tablets (325-650 mg) by mouth every 6 hours as needed for mild pain or fever 50 tablet 0    albuterol (PROAIR HFA/PROVENTIL HFA/VENTOLIN HFA) 108 (90 Base) MCG/ACT inhaler Inhale 2 puffs into the lungs every 6 hours as needed for shortness of breath or wheezing 18 g 1    Calcium Carb-Cholecalciferol 600-20 MG-MCG TABS Take 2 tablets by mouth daily 30 tablet 0    citalopram (CELEXA) 20 MG tablet Take 1 tablet (20 mg) by mouth daily 90 tablet 1    fluticasone (FLONASE) 50 MCG/ACT nasal spray Spray 1 spray into both nostrils daily 16 g 5    fluticasone (FLOVENT DISKUS) 100 MCG/ACT inhaler Inhale 1 puff into the lungs every 12 hours 60 each 0    lipase-protease-amylase (CREON) 18518-70901-417961 units CPEP per EC capsule TAKE 1 CAPSULE BY MOUTH THREE TIMES DAILY WITH A MEAL Strength: 24,000-76,000 Units 270 capsule 3    loratadine (CLARITIN REDITABS) 10 MG dispersible tablet Take 10 mg by mouth as needed       magnesium 250 MG tablet Take 1 tablet by mouth 2 times daily      tamoxifen (NOLVADEX) 20 MG tablet Take 1 tablet (20 mg) by mouth daily 90 tablet 3         ALLERGIES:  Allergies   Allergen Reactions    Penicillin V  Hives    Seasonal Allergies          PAST MEDICAL HISTORY:  Past Medical History:   Diagnosis Date    Chest tightness     had suspected allergies    Malignant neoplasm of head of pancreas (H) 06/09/2020    Seasonal allergies     SOB (shortness of breath)          PAST SURGICAL HISTORY:  Past Surgical History:   Procedure Laterality Date    BIOPSY BREAST Right 9/19/2022    Procedure: Right radiofrequency localized excisional breast biopsy;  Surgeon: Geovanny Wagner MD;  Location: [facility] OR    CATARACT IOL, RT/LT  2015    COLONOSCOPY  10/14    no repeat needed due to age    COLONOSCOPY N/A 10/7/2014    Procedure: COLONOSCOPY;  Surgeon: Daryl Hanson MD;  Location:  GI    COLONOSCOPY  04/23/2019    no further screening    ENDOSCOPIC RETROGRADE CHOLANGIOPANCREATOGRAM N/A 4/20/2020    Procedure: ENDOSCOPIC RETROGRADE CHOLANGIOPANCREATOGRAPHY, PLACEMENT OF PANCREATIC STENT, PLACEMENT OF BILIARY STENT;  Surgeon: Yarely Vann MD;  Location:  OR    ESOPHAGOSCOPY, GASTROSCOPY, DUODENOSCOPY (EGD), COMBINED N/A 4/20/2020    Procedure: ESOPHAGOGASTRODUODENOSCOPY, WITH FINE NEEDLE ASPIRATION BIOPSY, WITH ENDOSCOPIC ULTRASOUND GUIDANCE, Endoscopic retrograde cholangiopancreatography;  Surgeon: Yarely Vann MD;  Location:  OR    ESOPHAGOSCOPY, GASTROSCOPY, DUODENOSCOPY (EGD), COMBINED N/A 5/5/2020    Procedure: ESOPHAGOGASTRODUODENOSCOPY, WITH FINE NEEDLE ASPIRATION BIOPSY, WITH ENDOSCOPIC ULTRASOUND GUIDANCE;  Surgeon: Romina Rosario MD;  Location:  GI    IR CHEST PORT PLACEMENT > 5 YRS OF AGE  7/6/2020    IR PORT REMOVAL RIGHT  12/28/2020    LAPAROSCOPIC BIOPSY LIVER N/A 5/22/2020    Procedure: Diagnostic Laparoscopy with intraoperative ultrasound and Liver Biopsy X2;  Surgeon: Duarte Chaves MD;  Location: [facility] OR    LUMPECTOMY BREAST Right 10/27/2022    Procedure: right breast reexcision lumpectomy;  Surgeon: Geovanny Wagner MD;  Location: [facility] OR    LUMPECTOMY BREAST WITH SENTINEL  NODE, COMBINED Right 11/11/2022    Procedure: Reexcision right lumpectomy, right sentinel node biopsy;  Surgeon: Geovanny Wagner MD;  Location: [facility] OR    WHIPPLE PROCEDURE N/A 5/22/2020    Procedure: Non Pylorus Preserving Whipple procedure;  Surgeon: Duarte Chaves MD;  Location: [facility] OR         SOCIAL HISTORY:  Social History     Socioeconomic History    Marital status:      Spouse name: Not on file    Number of children: Not on file    Years of education: Not on file    Highest education level: Not on file   Occupational History     Employer: RETIRED     Comment: previous taught special ed   Tobacco Use    Smoking status: Never     Passive exposure: Never    Smokeless tobacco: Never   Vaping Use    Vaping Use: Never used   Substance and Sexual Activity    Alcohol use: Yes     Comment: social     Drug use: Never    Sexual activity: Yes   Other Topics Concern    Parent/sibling w/ CABG, MI or angioplasty before 65F 55M? Not Asked     Service Not Asked    Blood Transfusions Not Asked    Caffeine Concern Yes     Comment: 2 cups    Occupational Exposure Not Asked    Hobby Hazards Not Asked    Sleep Concern Not Asked    Stress Concern Not Asked    Weight Concern Not Asked    Special Diet No    Back Care Not Asked    Exercise Yes     Comment: walking workout     Bike Helmet Not Asked    Seat Belt Not Asked    Self-Exams Not Asked   Social History Narrative    Not on file     Social Determinants of Health     Financial Resource Strain: Low Risk  (11/2/2023)    Financial Resource Strain     Within the past 12 months, have you or your family members you live with been unable to get utilities (heat, electricity) when it was really needed?: No   Food Insecurity: Low Risk  (11/2/2023)    Food Insecurity     Within the past 12 months, did you worry that your food would run out before you got money to buy more?: No     Within the past 12 months, did the food you bought just not last and you didn t have money  to get more?: No   Transportation Needs: Low Risk  (11/2/2023)    Transportation Needs     Within the past 12 months, has lack of transportation kept you from medical appointments, getting your medicines, non-medical meetings or appointments, work, or from getting things that you need?: No   Physical Activity: Not on file   Stress: Not on file   Social Connections: Not on file   Interpersonal Safety: Low Risk  (11/2/2023)    Interpersonal Safety     Do you feel physically and emotionally safe where you currently live?: Yes     Within the past 12 months, have you been hit, slapped, kicked or otherwise physically hurt by someone?: No     Within the past 12 months, have you been humiliated or emotionally abused in other ways by your partner or ex-partner?: No   Housing Stability: High Risk (11/2/2023)    Housing Stability     Do you have housing? : No     Are you worried about losing your housing?: No         FAMILY HISTORY:  Family History   Problem Relation Age of Onset    Musculoskeletal Disorder Mother         edematous legs    Obesity Mother     Lymphoma Brother     Musculoskeletal Disorder Maternal Grandmother         edematous legs; did have amputation    Obesity Maternal Grandmother     Myocardial Infarction Maternal Grandmother          PHYSICAL EXAM:  /68   Pulse 67   Temp 97.2  F (36.2  C) (Oral)   Resp 16   Wt 54.2 kg (119 lb 6.4 oz)   LMP  (LMP Unknown)   SpO2 97%   BMI 20.19 kg/m    GENERAL/CONSTITUTIONAL: Appears well, no acute distress.  EYES: Gaze conjugate, pupils equal and round. No scleral icterus.  ENT/MOUTH: Neck supple. Oropharynx clear.  LYMPH: No cervical, supraclavicular, axillary or inguinal adenopathy.   RESPIRATORY: Lungs clear to auscultation bilaterally. No crackles or wheezing.   CARDIOVASCULAR: Regular rate and rhythm without murmurs, gallops, or rubs.    GASTROINTESTINAL: No organomegaly, masses, or tenderness.  No guarding.  No distention.  Easily reducible ventral hernia  near the umbilicus.  MUSCULOSKELETAL: No clubbing, cyanosis or edema.  NEUROLOGIC:  Alert and oriented, answers questions appropriately.  No evidence of ataxia or tremor.  Speech fluent.  INTEGUMENTARY: No rashes or jaundice.       LABS:   Latest Reference Range & Units 11/10/23 13:05   Sodium 135 - 145 mmol/L 135   Potassium 3.4 - 5.3 mmol/L 4.0   Chloride 98 - 107 mmol/L 98   Carbon Dioxide (CO2) 22 - 29 mmol/L 27   Urea Nitrogen 8.0 - 23.0 mg/dL 13.5   Creatinine 0.51 - 0.95 mg/dL 0.91   GFR Estimate >60 mL/min/1.73m2 64   Calcium 8.8 - 10.2 mg/dL 9.4   Anion Gap 7 - 15 mmol/L 10   Albumin 3.5 - 5.2 g/dL 4.2   Protein Total 6.4 - 8.3 g/dL 7.1   Alkaline Phosphatase 35 - 104 U/L 70   ALT 0 - 50 U/L 39   AST 0 - 45 U/L 63 (H)   Bilirubin Total <=1.2 mg/dL 0.6   Cancer Antigen 19-9 <=35 U/mL 9   Glucose 70 - 99 mg/dL 111 (H)   WBC 4.0 - 11.0 10e3/uL 4.8   Hemoglobin 11.7 - 15.7 g/dL 14.0   Hematocrit 35.0 - 47.0 % 43.2   Platelet Count 150 - 450 10e3/uL 171   RBC Count 3.80 - 5.20 10e6/uL 4.84   MCV 78 - 100 fL 89   MCH 26.5 - 33.0 pg 28.9   MCHC 31.5 - 36.5 g/dL 32.4   RDW 10.0 - 15.0 % 13.6   % Neutrophils % 66   % Lymphocytes % 23   % Monocytes % 7   % Eosinophils % 4   % Basophils % 1   Absolute Basophils 0.0 - 0.2 10e3/uL 0.0   Absolute Eosinophils 0.0 - 0.7 10e3/uL 0.2   Absolute Immature Granulocytes <=0.4 10e3/uL 0.0   Absolute Lymphocytes 0.8 - 5.3 10e3/uL 1.1   Absolute Monocytes 0.0 - 1.3 10e3/uL 0.3   % Immature Granulocytes % 0   Absolute Neutrophils 1.6 - 8.3 10e3/uL 3.1   (H): Data is abnormally high    PATHOLOGY/RELEVANT BIOMARKERS:  7/3/23 Stereotactic biopsy right breast:  A. Breast, right, 9:00, 2-3 cm from nipple (3.5 cm area of calcifications), stereotactic-guided needle core biopsy:  -Benign breast tissue with microcalcifications, stromal fibrosis and focal fat necrosis.    Right breast re-excision 10/27/22:  Final Diagnosis   A.  Breast, right, medial margin, excision:  -Benign breast tissue  with postsurgical changes.    B.  Breast, right, inferior margin, reexcision:  -INVASIVE DUCTAL CARCINOMA, Nicci grade 2, multifocal (2 foci), tumor size 3 mm (slide B2) and 4 mm (slide B4).  -Invasive carcinoma is present at the inferior margin.  -In situ carcinoma is not identified.     Right breast re-excision with SLN biopsy 11/11/22:  Final Diagnosis   A.  Lymph node, right axillary sentinel #1, excisional biopsy:  -One lymph node, negative for metastatic carcinoma (0/1).     B.  Lymph node, right axillary sentinel #2, excisional biopsy:  -One lymph node, negative for metastatic carcinoma (0/1).  -See comment.     C.  Breast, right, suture-oriented reexcision:  -No residual malignancy identified.  -Previous surgical procedure site changes.  -Focal cystic dilatation of ducts and microcalcifications associated benign breast elements.     MOST RECENT IMAGING:      CT CAP 11/9/23 (personally reviewed and interpreted):  IMPRESSION:   1.  No convincing evidence for recurrent or metastatic malignancy in  the chest, abdomen, or pelvis.  2.  A ventral hernia in the mid abdomen right of midline contains a  loop of nondilated transverse colon.    6/30/23 Right breast diagnostic mammogram:  FINDINGS:  Additional mammographic views confirms the presence of  residual heterogeneous calcifications throughout the right lower inner  breast spanning up to 3.5 cm and anteriorly to the nipple, adjacent to  the surgical bed.     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  Receiving adjuvant tamoxifen and tolerating that well, no appreciable side effects.    History of resected stage IB pancreatic cancer.  No clinical or radiographic evidence of disease recurrence.  Continue q 6 month CT and CA 19-9 and would discontinue surveillance at the 5 year brittnee.    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  Continues on Creon.    Ventral hernia.  Met with   Kristy and plan is to observe, minimal associated symptoms.    Dyspnea on exertion.  This has been a longstanding issue for her.  We reviewed her CT chest together and no pathology there that would explain the symptom.  Encouraged her to discuss with her PCP.    Memory loss.  Certainly chemotherapy can impact short term memory, she is concerned that this could be an early sign of dementia.  Encouraged her to discuss with her PCP.      PLAN:    Continue tamoxifen 20 mg daily.  RTC 6 months with CT CAP, labs prior.    Total time is 30 minutes including face to face time, scan review, orders and documentation.        Jc Peoples MD  Hematology/Oncology  [facility]

## 2023-11-12 LAB — CANCER AG19-9 SERPL IA-ACNC: 9 U/ML

## 2023-11-13 ENCOUNTER — ONCOLOGY VISIT (OUTPATIENT)
Dept: ONCOLOGY | Facility: CLINIC | Age: 78
End: 2023-11-13
Attending: INTERNAL MEDICINE
Payer: COMMERCIAL

## 2023-11-13 VITALS
HEART RATE: 67 BPM | SYSTOLIC BLOOD PRESSURE: 123 MMHG | RESPIRATION RATE: 16 BRPM | WEIGHT: 119.4 LBS | BODY MASS INDEX: 20.19 KG/M2 | TEMPERATURE: 97.2 F | DIASTOLIC BLOOD PRESSURE: 68 MMHG | OXYGEN SATURATION: 97 %

## 2023-11-13 DIAGNOSIS — C25.0 MALIGNANT NEOPLASM OF HEAD OF PANCREAS (H): Primary | ICD-10-CM

## 2023-11-13 PROCEDURE — 99214 OFFICE O/P EST MOD 30 MIN: CPT | Performed by: INTERNAL MEDICINE

## 2023-11-13 PROCEDURE — G0463 HOSPITAL OUTPT CLINIC VISIT: HCPCS | Performed by: INTERNAL MEDICINE

## 2023-11-13 ASSESSMENT — PAIN SCALES - GENERAL: PAINLEVEL: NO PAIN (0)

## 2023-11-13 NOTE — LETTER
11/13/2023         RE: Flora Hogan  [id number] [address]  [facility] [address]        Dear Colleague,    Thank you for referring your patient, Flora Hogan, to the [facility]. Please see a copy of my visit note below.    [facility]    Hematology/Oncology Established Patient Follow-up Note    Oncologic History/Treatment Summary    4/19/20: Presented with painless jaundice.  CT scan demonstrated a 1.2 cm mass in the pancreas with biliary dilatation.  ERCP/EUS performed demonstrating atypia but no definite malignancy.  5/26/20: Whipple resection performed by Dr. Chaves at [facility].  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  Margins and lymph nodes negative, LVI was seen.  Final stage [id number].  7/7/20 to 12/15/20: Status post 6 cycles adjuvant gemcitabine.  6/22/22: Routine screening mammogram demonstrated a circumscribed mass in the left breast at 9 o'clock.  Diagnostic mammogram was benign but linear branching microcalcifications seen in the right breaset spanning 7 cm and stereotactic biopsy recommended.  7/12/22: Stereotactic biopsy right breast demonstrated atypical ductal hyperplasia.  9/16/22: Excisional biopsy performed by Dr. Wagner demonstrating DCIS grade 3, no invasive component seen.  Medial and inferior margins were close at 0.4 mm.  ER positive at %, AZ positive at 1-10%.  10/27/22: Re-excision of medial and inferior margins demonstrated 2 foci of grade 2 invasive ductal carcinoma measuring 0.3 mm and 0.4 mm with involvement of inferior margin. The invasive cancer was ER+ (%), AZ negative, HER2 positive (3+ IHC).  11/11/22: Re-excision of inferior margin with sentinel lymph node biopsy demonstrated no residual malignancy.  2 sentinel lymph nodes were negative.   11/21/22: Initiated adjuvant anastrozole.  4/17/23: Switched to tamoxifen due to osteoporosis.      Today's  Date: 11/13/23    Reason for Follow-up: Invasive ductal carcinoma of the right breast and resected stage I pancreatic cancer.      INTERIM HISTORY:  Flora is seen for follow-up of stage IA right breast cancer receiving adjuvant tamoxifen as well as history of stage I resected pancreatic cancer.  Accompanied by her .  She has been overall been doing OK.  She was seen by Dr. Wagner to evaluate her ventral hernia and agreed to observe for now.  She gets occasional associated pain but overall not very bothersome.  Other concerns are dyspnea on exertion, states this has been an issue for her for years.  No associated chest pain.  Also does have some short term memory loss, feels that this started after she completed chemotherapy and may be a little worse.      Leaving for [place] later this month, will return in March.      REVIEW OF SYSTEMS:   A 14 point ROS was reviewed with pertinent positives and negatives in the HPI.       HOME MEDICATIONS:  Current Outpatient Medications   Medication Sig Dispense Refill     acetaminophen (TYLENOL) 325 MG tablet Take 1-2 tablets (325-650 mg) by mouth every 6 hours as needed for mild pain or fever 50 tablet 0     albuterol (PROAIR HFA/PROVENTIL HFA/VENTOLIN HFA) 108 (90 Base) MCG/ACT inhaler Inhale 2 puffs into the lungs every 6 hours as needed for shortness of breath or wheezing 18 g 1     Calcium Carb-Cholecalciferol 600-20 MG-MCG TABS Take 2 tablets by mouth daily 30 tablet 0     citalopram (CELEXA) 20 MG tablet Take 1 tablet (20 mg) by mouth daily 90 tablet 1     fluticasone (FLONASE) 50 MCG/ACT nasal spray Spray 1 spray into both nostrils daily 16 g 5     fluticasone (FLOVENT DISKUS) 100 MCG/ACT inhaler Inhale 1 puff into the lungs every 12 hours 60 each 0     lipase-protease-amylase (CREON) 87317-51275-813485 units CPEP per EC capsule TAKE 1 CAPSULE BY MOUTH THREE TIMES DAILY WITH A MEAL Strength: 24,000-76,000 Units 270 capsule 3     loratadine (CLARITIN REDITABS) 10 MG  dispersible tablet Take 10 mg by mouth as needed        magnesium 250 MG tablet Take 1 tablet by mouth 2 times daily       tamoxifen (NOLVADEX) 20 MG tablet Take 1 tablet (20 mg) by mouth daily 90 tablet 3         ALLERGIES:  Allergies   Allergen Reactions     Penicillin V Hives     Seasonal Allergies          PAST MEDICAL HISTORY:  Past Medical History:   Diagnosis Date     Chest tightness     had suspected allergies     Malignant neoplasm of head of pancreas (H) 06/09/2020     Seasonal allergies      SOB (shortness of breath)          PAST SURGICAL HISTORY:  Past Surgical History:   Procedure Laterality Date     BIOPSY BREAST Right 9/19/2022    Procedure: Right radiofrequency localized excisional breast biopsy;  Surgeon: Geovanny Wagner MD;  Location:  OR     CATARACT IOL, RT/LT  2015     COLONOSCOPY  10/14    no repeat needed due to age     COLONOSCOPY N/A 10/7/2014    Procedure: COLONOSCOPY;  Surgeon: Daryl Hanson MD;  Location:  GI     COLONOSCOPY  04/23/2019    no further screening     ENDOSCOPIC RETROGRADE CHOLANGIOPANCREATOGRAM N/A 4/20/2020    Procedure: ENDOSCOPIC RETROGRADE CHOLANGIOPANCREATOGRAPHY, PLACEMENT OF PANCREATIC STENT, PLACEMENT OF BILIARY STENT;  Surgeon: Yarely Vann MD;  Location:  OR     ESOPHAGOSCOPY, GASTROSCOPY, DUODENOSCOPY (EGD), COMBINED N/A 4/20/2020    Procedure: ESOPHAGOGASTRODUODENOSCOPY, WITH FINE NEEDLE ASPIRATION BIOPSY, WITH ENDOSCOPIC ULTRASOUND GUIDANCE, Endoscopic retrograde cholangiopancreatography;  Surgeon: Yarely Vann MD;  Location:  OR     ESOPHAGOSCOPY, GASTROSCOPY, DUODENOSCOPY (EGD), COMBINED N/A 5/5/2020    Procedure: ESOPHAGOGASTRODUODENOSCOPY, WITH FINE NEEDLE ASPIRATION BIOPSY, WITH ENDOSCOPIC ULTRASOUND GUIDANCE;  Surgeon: Romina Rosario MD;  Location:  GI     IR CHEST PORT PLACEMENT > 5 YRS OF AGE  7/6/2020     IR PORT REMOVAL RIGHT  12/28/2020     LAPAROSCOPIC BIOPSY LIVER N/A 5/22/2020    Procedure: Diagnostic  Laparoscopy with intraoperative ultrasound and Liver Biopsy X2;  Surgeon: Duarte Chaves MD;  Location: [facility] OR     LUMPECTOMY BREAST Right 10/27/2022    Procedure: right breast reexcision lumpectomy;  Surgeon: Geovanny Wagner MD;  Location: [facility] OR     LUMPECTOMY BREAST WITH SENTINEL NODE, COMBINED Right 11/11/2022    Procedure: Reexcision right lumpectomy, right sentinel node biopsy;  Surgeon: Geovanny Wagner MD;  Location: [facility] OR     WHIPPLE PROCEDURE N/A 5/22/2020    Procedure: Non Pylorus Preserving Whipple procedure;  Surgeon: Duarte Chaves MD;  Location: [facility] OR         SOCIAL HISTORY:  Social History     Socioeconomic History     Marital status:      Spouse name: Not on file     Number of children: Not on file     Years of education: Not on file     Highest education level: Not on file   Occupational History     Employer: RETIRED     Comment: previous taught special ed   Tobacco Use     Smoking status: Never     Passive exposure: Never     Smokeless tobacco: Never   Vaping Use     Vaping Use: Never used   Substance and Sexual Activity     Alcohol use: Yes     Comment: social      Drug use: Never     Sexual activity: Yes   Other Topics Concern     Parent/sibling w/ CABG, MI or angioplasty before 65F 55M? Not Asked      Service Not Asked     Blood Transfusions Not Asked     Caffeine Concern Yes     Comment: 2 cups     Occupational Exposure Not Asked     Hobby Hazards Not Asked     Sleep Concern Not Asked     Stress Concern Not Asked     Weight Concern Not Asked     Special Diet No     Back Care Not Asked     Exercise Yes     Comment: walking workout      Bike Helmet Not Asked     Seat Belt Not Asked     Self-Exams Not Asked   Social History Narrative     Not on file     Social Determinants of Health     Financial Resource Strain: Low Risk  (11/2/2023)    Financial Resource Strain      Within the past 12 months, have you or your family members you live with been unable to get utilities  (heat, electricity) when it was really needed?: No   Food Insecurity: Low Risk  (11/2/2023)    Food Insecurity      Within the past 12 months, did you worry that your food would run out before you got money to buy more?: No      Within the past 12 months, did the food you bought just not last and you didn t have money to get more?: No   Transportation Needs: Low Risk  (11/2/2023)    Transportation Needs      Within the past 12 months, has lack of transportation kept you from medical appointments, getting your medicines, non-medical meetings or appointments, work, or from getting things that you need?: No   Physical Activity: Not on file   Stress: Not on file   Social Connections: Not on file   Interpersonal Safety: Low Risk  (11/2/2023)    Interpersonal Safety      Do you feel physically and emotionally safe where you currently live?: Yes      Within the past 12 months, have you been hit, slapped, kicked or otherwise physically hurt by someone?: No      Within the past 12 months, have you been humiliated or emotionally abused in other ways by your partner or ex-partner?: No   Housing Stability: High Risk (11/2/2023)    Housing Stability      Do you have housing? : No      Are you worried about losing your housing?: No         FAMILY HISTORY:  Family History   Problem Relation Age of Onset     Musculoskeletal Disorder Mother         edematous legs     Obesity Mother      Lymphoma Brother      Musculoskeletal Disorder Maternal Grandmother         edematous legs; did have amputation     Obesity Maternal Grandmother      Myocardial Infarction Maternal Grandmother          PHYSICAL EXAM:  /68   Pulse 67   Temp 97.2  F (36.2  C) (Oral)   Resp 16   Wt 54.2 kg (119 lb 6.4 oz)   LMP  (LMP Unknown)   SpO2 97%   BMI 20.19 kg/m    GENERAL/CONSTITUTIONAL: Appears well, no acute distress.  EYES: Gaze conjugate, pupils equal and round. No scleral icterus.  ENT/MOUTH: Neck supple. Oropharynx clear.  LYMPH: No cervical,  supraclavicular, axillary or inguinal adenopathy.   RESPIRATORY: Lungs clear to auscultation bilaterally. No crackles or wheezing.   CARDIOVASCULAR: Regular rate and rhythm without murmurs, gallops, or rubs.    GASTROINTESTINAL: No organomegaly, masses, or tenderness.  No guarding.  No distention.  Easily reducible ventral hernia near the umbilicus.  MUSCULOSKELETAL: No clubbing, cyanosis or edema.  NEUROLOGIC:  Alert and oriented, answers questions appropriately.  No evidence of ataxia or tremor.  Speech fluent.  INTEGUMENTARY: No rashes or jaundice.       LABS:   Latest Reference Range & Units 11/10/23 13:05   Sodium 135 - 145 mmol/L 135   Potassium 3.4 - 5.3 mmol/L 4.0   Chloride 98 - 107 mmol/L 98   Carbon Dioxide (CO2) 22 - 29 mmol/L 27   Urea Nitrogen 8.0 - 23.0 mg/dL 13.5   Creatinine 0.51 - 0.95 mg/dL 0.91   GFR Estimate >60 mL/min/1.73m2 64   Calcium 8.8 - 10.2 mg/dL 9.4   Anion Gap 7 - 15 mmol/L 10   Albumin 3.5 - 5.2 g/dL 4.2   Protein Total 6.4 - 8.3 g/dL 7.1   Alkaline Phosphatase 35 - 104 U/L 70   ALT 0 - 50 U/L 39   AST 0 - 45 U/L 63 (H)   Bilirubin Total <=1.2 mg/dL 0.6   Cancer Antigen 19-9 <=35 U/mL 9   Glucose 70 - 99 mg/dL 111 (H)   WBC 4.0 - 11.0 10e3/uL 4.8   Hemoglobin 11.7 - 15.7 g/dL 14.0   Hematocrit 35.0 - 47.0 % 43.2   Platelet Count 150 - 450 10e3/uL 171   RBC Count 3.80 - 5.20 10e6/uL 4.84   MCV 78 - 100 fL 89   MCH 26.5 - 33.0 pg 28.9   MCHC 31.5 - 36.5 g/dL 32.4   RDW 10.0 - 15.0 % 13.6   % Neutrophils % 66   % Lymphocytes % 23   % Monocytes % 7   % Eosinophils % 4   % Basophils % 1   Absolute Basophils 0.0 - 0.2 10e3/uL 0.0   Absolute Eosinophils 0.0 - 0.7 10e3/uL 0.2   Absolute Immature Granulocytes <=0.4 10e3/uL 0.0   Absolute Lymphocytes 0.8 - 5.3 10e3/uL 1.1   Absolute Monocytes 0.0 - 1.3 10e3/uL 0.3   % Immature Granulocytes % 0   Absolute Neutrophils 1.6 - 8.3 10e3/uL 3.1   (H): Data is abnormally high    PATHOLOGY/RELEVANT BIOMARKERS:  7/3/23 Stereotactic biopsy right  breast:  A. Breast, right, 9:00, 2-3 cm from nipple (3.5 cm area of calcifications), stereotactic-guided needle core biopsy:  -Benign breast tissue with microcalcifications, stromal fibrosis and focal fat necrosis.    Right breast re-excision 10/27/22:  Final Diagnosis   A.  Breast, right, medial margin, excision:  -Benign breast tissue with postsurgical changes.    B.  Breast, right, inferior margin, reexcision:  -INVASIVE DUCTAL CARCINOMA, Nicci grade 2, multifocal (2 foci), tumor size 3 mm (slide B2) and 4 mm (slide B4).  -Invasive carcinoma is present at the inferior margin.  -In situ carcinoma is not identified.     Right breast re-excision with SLN biopsy 11/11/22:  Final Diagnosis   A.  Lymph node, right axillary sentinel #1, excisional biopsy:  -One lymph node, negative for metastatic carcinoma (0/1).     B.  Lymph node, right axillary sentinel #2, excisional biopsy:  -One lymph node, negative for metastatic carcinoma (0/1).  -See comment.     C.  Breast, right, suture-oriented reexcision:  -No residual malignancy identified.  -Previous surgical procedure site changes.  -Focal cystic dilatation of ducts and microcalcifications associated benign breast elements.     MOST RECENT IMAGING:      CT CAP 11/9/23 (personally reviewed and interpreted):  IMPRESSION:   1.  No convincing evidence for recurrent or metastatic malignancy in  the chest, abdomen, or pelvis.  2.  A ventral hernia in the mid abdomen right of midline contains a  loop of nondilated transverse colon.    6/30/23 Right breast diagnostic mammogram:  FINDINGS:  Additional mammographic views confirms the presence of  residual heterogeneous calcifications throughout the right lower inner  breast spanning up to 3.5 cm and anteriorly to the nipple, adjacent to  the surgical bed.     ASSESSMENT:  Stage IA ([id number]) multifocal invasive ductal carcinoma of the right breast, ER+, HER2+.  Receiving adjuvant tamoxifen and tolerating that well, no  appreciable side effects.    History of resected stage IB pancreatic cancer.  No clinical or radiographic evidence of disease recurrence.  Continue q 6 month CT and CA 19-9 and would discontinue surveillance at the 5 year brittnee.    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  Continues on Creon.    Ventral hernia.  Met with Dr. Wagner and plan is to observe, minimal associated symptoms.    Dyspnea on exertion.  This has been a longstanding issue for her.  We reviewed her CT chest together and no pathology there that would explain the symptom.  Encouraged her to discuss with her PCP.    Memory loss.  Certainly chemotherapy can impact short term memory, she is concerned that this could be an early sign of dementia.  Encouraged her to discuss with her PCP.      PLAN:    Continue tamoxifen 20 mg daily.  RTC 6 months with CT CAP, labs prior.    Total time is 30 minutes including face to face time, scan review, orders and documentation.        Jc Peoples MD  Hematology/Oncology  [facility]      Again, thank you for allowing me to participate in the care of your patient.        Sincerely,        Jc Peoples MD

## 2023-11-13 NOTE — NURSING NOTE
"Oncology Rooming Note    November 13, 2023 9:08 AM   Flora Hogan is a 78 year old female who presents for:    Chief Complaint   Patient presents with    Oncology Clinic Visit     Initial Vitals: /68   Pulse 67   Temp 97.2  F (36.2  C) (Oral)   Resp 16   Wt 54.2 kg (119 lb 6.4 oz)   LMP  (LMP Unknown)   SpO2 97%   BMI 20.19 kg/m   Estimated body mass index is 20.19 kg/m  as calculated from the following:    Height as of 11/2/23: 1.638 m (5' 4.49\").    Weight as of this encounter: 54.2 kg (119 lb 6.4 oz). Body surface area is 1.57 meters squared.  No Pain (0) Comment: Data Unavailable   No LMP recorded (lmp unknown). Patient is postmenopausal.  Allergies reviewed: Yes  Medications reviewed: Yes    Medications: Medication refills not needed today.  Pharmacy name entered into [facility]:    [id number] - [place], [place] - [address] [address] AT [place] [address] & [id number] [address] AT [facility] & [address] [address] [facility]    Clinical concerns: follow up        Kelsey Jara            "

## 2023-11-14 ENCOUNTER — OFFICE VISIT (OUTPATIENT)
Dept: FAMILY MEDICINE | Facility: CLINIC | Age: 78
End: 2023-11-14
Payer: COMMERCIAL

## 2023-11-14 VITALS
OXYGEN SATURATION: 93 % | HEIGHT: 69 IN | DIASTOLIC BLOOD PRESSURE: 73 MMHG | TEMPERATURE: 97.9 F | HEART RATE: 59 BPM | WEIGHT: 119.6 LBS | SYSTOLIC BLOOD PRESSURE: 108 MMHG | BODY MASS INDEX: 17.71 KG/M2 | RESPIRATION RATE: 16 BRPM

## 2023-11-14 DIAGNOSIS — M80.00XA OSTEOPOROSIS WITH CURRENT PATHOLOGICAL FRACTURE, UNSPECIFIED OSTEOPOROSIS TYPE, INITIAL ENCOUNTER: Primary | ICD-10-CM

## 2023-11-14 PROCEDURE — 99214 OFFICE O/P EST MOD 30 MIN: CPT | Performed by: PHYSICIAN ASSISTANT

## 2023-11-14 RX ORDER — ALENDRONATE SODIUM 70 MG/1
70 TABLET ORAL
Qty: 12 TABLET | Refills: 3 | Status: SHIPPED | OUTPATIENT
Start: 2023-11-14 | End: 2024-07-01

## 2023-11-14 ASSESSMENT — PAIN SCALES - GENERAL: PAINLEVEL: NO PAIN (0)

## 2023-11-14 NOTE — PROGRESS NOTES
"  Assessment & Plan     Osteoporosis with current pathological fracture, unspecified osteoporosis type, initial encounter  Reviewed DEXA along with R/B/SE of fosamax. She did elect to start. Follow-up dexa in 1-2 years. Continue weight bearing exercise  - alendronate (FOSAMAX) 70 MG tablet; Take 1 tablet (70 mg) by mouth every 7 days      FELICIANO [facility] COLLINS Hines is a 78 year old, presenting for the following health issues:  RECHECK (Dexa Scan results )        11/14/2023    10:13 AM   Additional Questions   Roomed by Ramonita QUINTERO CMA   Accompanied by NA         11/14/2023    10:13 AM   Patient Reported Additional Medications   Patient reports taking the following new medications None       History of Present Illness       Reason for visit:  Shirtness of breath    She eats 2-3 servings of fruits and vegetables daily.She consumes 1 sweetened beverage(s) daily.She exercises with enough effort to increase her heart rate 10 to 19 minutes per day.  She exercises with enough effort to increase her heart rate 4 days per week.   She is taking medications regularly.     She has not yet started FLOVENT as a trial for her shortness of breath/dyspnea but will  today  She has not noted any worsening since last seen  Still feels overall comfortable with where things are at    Would like to discuss DEXA scan from March once again  Previously discussed and did not want to consider augmentive medications  Would reconsider now  Does get adequate D and Ca        Review of Systems   Constitutional, HEENT, cardiovascular, pulmonary, gi and gu systems are negative, except as otherwise noted.      Objective    /73 (BP Location: Right arm, Patient Position: Sitting, Cuff Size: Adult Regular)   Pulse 59   Temp 97.9  F (36.6  C) (Oral)   Resp 16   Ht 1.753 m (5' 9\")   Wt 54.3 kg (119 lb 9.6 oz)   LMP  (LMP Unknown)   SpO2 93%   BMI 17.66 kg/m    Body mass index is " 17.66 kg/m .  Physical Exam   GENERAL: healthy, alert and no distress  PSYCH: mentation appears normal, affect normal/bright

## 2023-11-14 NOTE — PATIENT INSTRUCTIONS
FOSAMAX     Oral: Administer first thing in the morning and ?30 minutes before the first food, beverage (except plain water), or other medication(s) of the day. Do not take with mineral water or with other beverages. Patients should be instructed to stay upright (not to lie down) for ?30 minutes and until after first food of the day (to reduce esophageal irritation).

## 2024-02-08 ENCOUNTER — TELEPHONE (OUTPATIENT)
Dept: FAMILY MEDICINE | Facility: CLINIC | Age: 79
End: 2024-02-08
Payer: COMMERCIAL

## 2024-02-08 NOTE — TELEPHONE ENCOUNTER
Patient in [place] until 3/19.    C/o increasing confusion and forgetfulness. Patient replied appropriately to questions, except did get dates mixed up for coming back to [place].     C/o some episodes of faster HR and chest tightness. Denies SOB. Seen by DM 11/2/23 for this and discussed patient allergies and  chest CT 11/9/23. Also possible stress testing.    Patient states the symptoms are inconsistent.    Advised to contact provider in [place] she has seen if having symptoms.    Made appointment for 3/21 to see DM when returns to [place].     Routed to pcp as SAUNDRA.    Keely Harris RN

## 2024-02-15 DIAGNOSIS — R06.02 SOB (SHORTNESS OF BREATH): ICD-10-CM

## 2024-02-15 RX ORDER — ALBUTEROL SULFATE 90 UG/1
2 AEROSOL, METERED RESPIRATORY (INHALATION) EVERY 6 HOURS PRN
Qty: 18 G | Refills: 0 | Status: SHIPPED | OUTPATIENT
Start: 2024-02-15 | End: 2024-08-28

## 2024-03-18 DIAGNOSIS — R06.02 SOB (SHORTNESS OF BREATH): ICD-10-CM

## 2024-03-19 RX ORDER — ALBUTEROL SULFATE 90 UG/1
2 AEROSOL, METERED RESPIRATORY (INHALATION) EVERY 6 HOURS PRN
Qty: 18 G | Refills: 0 | OUTPATIENT
Start: 2024-03-19

## 2024-03-21 ENCOUNTER — OFFICE VISIT (OUTPATIENT)
Dept: FAMILY MEDICINE | Facility: CLINIC | Age: 79
End: 2024-03-21
Payer: COMMERCIAL

## 2024-03-21 VITALS
WEIGHT: 120 LBS | BODY MASS INDEX: 17.77 KG/M2 | TEMPERATURE: 98 F | RESPIRATION RATE: 16 BRPM | HEART RATE: 53 BPM | HEIGHT: 69 IN | OXYGEN SATURATION: 100 % | SYSTOLIC BLOOD PRESSURE: 131 MMHG | DIASTOLIC BLOOD PRESSURE: 71 MMHG

## 2024-03-21 DIAGNOSIS — R41.3 MEMORY CHANGE: ICD-10-CM

## 2024-03-21 DIAGNOSIS — M54.2 CERVICALGIA: Primary | ICD-10-CM

## 2024-03-21 DIAGNOSIS — R06.02 SHORTNESS OF BREATH: Primary | ICD-10-CM

## 2024-03-21 DIAGNOSIS — C25.0 MALIGNANT NEOPLASM OF HEAD OF PANCREAS (H): ICD-10-CM

## 2024-03-21 DIAGNOSIS — K43.2 INCISIONAL HERNIA, WITHOUT OBSTRUCTION OR GANGRENE: ICD-10-CM

## 2024-03-21 DIAGNOSIS — R06.02 SOB (SHORTNESS OF BREATH): ICD-10-CM

## 2024-03-21 PROBLEM — D70.1 AGRANULOCYTOSIS SECONDARY TO CANCER CHEMOTHERAPY (CODE) (H): Status: RESOLVED | Noted: 2022-05-20 | Resolved: 2024-03-21

## 2024-03-21 PROCEDURE — 99214 OFFICE O/P EST MOD 30 MIN: CPT | Performed by: PHYSICIAN ASSISTANT

## 2024-03-21 ASSESSMENT — PAIN SCALES - GENERAL: PAINLEVEL: NO PAIN (0)

## 2024-03-21 NOTE — PROGRESS NOTES
Assessment & Plan     Cervicalgia/Thoracic back pain  Recommend trial physical therapy   - Physical Therapy  Referral; Future    Malignant neoplasm of head of pancreas (H)  Folra mentioned some concern that her back/neck pain was resulting from recurrent cancer. Reviewed most recent CT in Nov which was ok and has follow up CT in May. Dubious this is involvement    SOB (shortness of breath)  Chronic. Not associated with movement. No chest pain. Chest CT stable in November without evidence of overt causal etiology. Lifelong non-smoker. Uses albuterol with some effect though I am dubious this is reactive airway. No evidence of anemia (hgb normal). We've reviewed this previously and considered stress testing but holding off at this point. She requests pulm consults  - Adult Pulmonary Medicine  Referral; Future    Incisional hernia, without obstruction or gangrene  Ok for repeat consult. Not more bothersome, just still occasionally feeling/irritating  - Adult General Surg Referral; Future    Memory Change  Reviewed with her the concerns. I do not think any of these are extraordinary for her age; no red flags. Ok to monitor    >30 minutes spent on discussion of the above    Subjective   Flora is a 78 year old, presenting for the following health issues:  Recheck Medication      3/21/2024     6:56 AM   Additional Questions   Roomed by kristyn mendes   Accompanied by self         3/21/2024     6:56 AM   Patient Reported Additional Medications   Patient reports taking the following new medications na     HPI     Flora Hogan is a 78 year old female who presents today for discussion of a few topics    Mentions that her memory seems to be getting worse? Occasionally forgetful of names; other times forgetting where she puts things    -not getting lost; can bathe and clean herself; cook    -managing finances ok    2.   Noting increased back/neck pain; mid thoracic to the upper cervical space   -heating pad seems  to be helpful; has had some exercises in the past    3.   Inhaler change? Albuterol no longer covered; has temporary supply;    4.   Hernia tenderness? Has had some issues since her surgery; has discussed with surgery previously          Review of Systems  Constitutional, HEENT, cardiovascular, pulmonary, gi and gu systems are negative, except as otherwise noted.      Objective    LMP  (LMP Unknown)   There is no height or weight on file to calculate BMI.  Physical Exam   GENERAL: alert and no distress  EYES: Eyes grossly normal to inspection, PERRL and conjunctivae and sclerae normal  NECK: no adenopathy  RESP: lungs clear to auscultation - no rales, rhonchi or wheezes  CV: regular rate and rhythm, normal S1 S2, no S3 or S4, no murmur, click or rub, no peripheral edema  ABDOMEN: hernia incisional - ruq  BACK: no paralumbar tenderness; mild paracervical tenderness; extension of the neck increases pain; neg spurling test. Trunk rotation, flexion/ext and lateral flexion do not increase back pain; neg SLR  PSYCH: mentation appears normal, affect normal/bright          Signed Electronically by: Marcial Holden PA-C

## 2024-03-21 NOTE — Clinical Note
Future Appointments 5/22/2024  10:30 AM   [facility] LAB DRAW 1              [facility]              [facility] 5/22/2024  11:20 AM   [id number]                    [facility]SCCT              [facility] 5/29/2024  11:00 AM   Gracie Burgess,* RHCCRS              [place] [facility] 6/24/2024  11:30 AM   CS PULMONARY FUNCTION      CSPULM              CS 6/24/2024  1:00 PM    Amanda Link MD     CSPULM              CS 7/1/2024   8:30 AM    Marcial Holden PA* RMFP                [facility]

## 2024-03-27 ENCOUNTER — THERAPY VISIT (OUTPATIENT)
Dept: PHYSICAL THERAPY | Facility: CLINIC | Age: 79
End: 2024-03-27
Attending: PHYSICIAN ASSISTANT
Payer: COMMERCIAL

## 2024-03-27 DIAGNOSIS — M54.2 CERVICALGIA: ICD-10-CM

## 2024-03-27 DIAGNOSIS — M54.2 NECK PAIN: Primary | ICD-10-CM

## 2024-03-27 PROCEDURE — 97110 THERAPEUTIC EXERCISES: CPT | Mod: GP | Performed by: PHYSICAL THERAPIST

## 2024-03-27 PROCEDURE — 97140 MANUAL THERAPY 1/> REGIONS: CPT | Mod: GP | Performed by: PHYSICAL THERAPIST

## 2024-03-27 PROCEDURE — 97161 PT EVAL LOW COMPLEX 20 MIN: CPT | Mod: GP | Performed by: PHYSICAL THERAPIST

## 2024-03-27 NOTE — PROGRESS NOTES
PHYSICAL THERAPY EVALUATION  Type of Visit: Evaluation    See electronic medical record for Abuse and Falls Screening details.    Subjective       Presenting condition or subjective complaint: periodic pain in left iside of neck and back    The patient presents to therapy with a chief complaint of neck pain and mid back pain. The patient has been experiencing this pain for about 1 year on and off. They have the most pain with activities including at night when sleeping and some pain during the day and pain is relieved by heating pad. Their primary goal in therapy is to improve pain and mobility.   When she was a child she dove [place] of [place] she [place] and bumped her neck and reports that it never bothered her then. A couple of years ago went to Chiropractor and had some xrays done and they could see her hx of injury. Hx of pancreatic cancer. Does do a workout at home 3x/week-does total body exercise.     Date of onset: 03/21/24    Relevant medical history: Arthritis; Bladder or bowel problems; Cancer; Cold or hot arm or leg; Depression; Dizziness; Hearing problems; Migraines or headaches; Neck injury; Ongoing fever or chills; Osteoporosis   Dates & types of surgery: June 2020 whiple procedure for cancer, october 2023 breast cancer    Prior diagnostic imaging/testing results: MRI; X-ray     Prior therapy history for the same diagnosis, illness or injury: Yes chiro March 2023 very helpful.        Employment:      Hobbies/Interests: games, cards, quilting--would like to be able to bike, swim, walk, camp and play pickleball.    Patient goals for therapy: neck 2023, back [id number] cancer    Pain assessment:      Objective     CERVICAL:    Posture: forward head, thoracic kyphosis, rounded shoulders     Neurological:    Motor Deficit:  Myotomes L R   C4 (shoulder elevation) 5 5   C5 (shoulder abduction) 5 5   C6 (elbow flexion) 5 5   C7 (elbow extension) 5 5   C8 (thumb extension) 5 5   T1 (finger add/abd)       Strength (lb)       Sensory Deficit, Reflexes, Dural Signs: WNL     AROM: (Major, Moderate, Minimal or Nil loss)  Movement Loss Daniel Mod Min Nil Pain   Protrusion        Flexion   X     Retraction  X      Extension  X      Left Rotation  X      Right Rotation  X      Left Side Bending  X   P+   Right Side bending  X   P+     SPECIAL TESTS   R L   Spurlings/Quadrant     ULTT 1 (median)     ULTT 2 (ulnar)     ULTT 3 (radial)     Distraction     Alar Ligament Test     Transverse Ligament Test         Shoulder Screen: WFL painfree    Palpation: tender to rizwan UT L>R, tender to L subocipital, L interscapular region       Assessment & Plan   CLINICAL IMPRESSIONS  Medical Diagnosis: Cervicalgia    Treatment Diagnosis: Neck Pain   Impression/Assessment: Patient is a 78 year old female with neck  complaints.  The following significant findings have been identified: Pain, Decreased ROM/flexibility, Decreased joint mobility, Decreased strength, Inflammation, Impaired muscle performance, Decreased activity tolerance, and Impaired posture. These impairments interfere with their ability to perform self care tasks, work tasks, recreational activities, household chores, driving , household mobility, and community mobility as compared to previous level of function.     Clinical Decision Making (Complexity):  Clinical Presentation: Stable/Uncomplicated  Clinical Presentation Rationale: based on medical and personal factors listed in PT evaluation  Clinical Decision Making (Complexity): Low complexity    PLAN OF CARE  Treatment Interventions:  Modalities: Cryotherapy, E-stim, Hot Pack, Ultrasound  Interventions: Gait Training, Manual Therapy, Neuromuscular Re-education, Therapeutic Activity, Therapeutic Exercise, Self-Care/Home Management    Long Term Goals     PT Goal 1  Goal Identifier: Prolonged Positioning  Goal Description: The patient will be able to sustain a position, either sitting or standing x30 minutes with  appropriate posture and pain <3/10.  Rationale: to maximize safety and independence with performance of ADLs and functional tasks;to maximize safety and independence with self cares      Frequency of Treatment: 1x/week  Duration of Treatment: 6 weeks    Recommended Referrals to Other Professionals:   Education Assessment:   Learner/Method: Patient  Education Comments: Eager to participate in therapy    Risks and benefits of evaluation/treatment have been explained.   Patient/Family/caregiver agrees with Plan of Care.     Evaluation Time:             Signing Clinician: Jackie Cox, PT      W[facility]                                                                                   OUTPATIENT PHYSICAL THERAPY      PLAN OF TREATMENT FOR OUTPATIENT REHABILITATION   Patient's Last Name, First Name, WENCristianoAYSHA.  CoFlora grajeda  WEN YOB: 1945   Provider's Name   [facility]   Medical Record No.  [id number]     Onset Date: 03/21/24  Start of Care Date: 03/27/24     Medical Diagnosis:  Cervicalgia      PT Treatment Diagnosis:  Neck Pain Plan of Treatment  Frequency/Duration: 1x/week/ 6 weeks    Certification date from 03/27/24 to 05/08/24         See note for plan of treatment details and functional goals     Jackie Cox, PT                         I CERTIFY THE NEED FOR THESE SERVICES FURNISHED UNDER        THIS PLAN OF TREATMENT AND WHILE UNDER MY CARE     (Physician attestation of this document indicates review and certification of the therapy plan).              Referring Provider:  Marcial Holden    Initial Assessment  See Epic Evaluation- Start of Care Date: 03/27/24

## 2024-04-04 ENCOUNTER — THERAPY VISIT (OUTPATIENT)
Dept: PHYSICAL THERAPY | Facility: CLINIC | Age: 79
End: 2024-04-04
Attending: PHYSICIAN ASSISTANT
Payer: COMMERCIAL

## 2024-04-04 DIAGNOSIS — M54.2 NECK PAIN: Primary | ICD-10-CM

## 2024-04-04 PROCEDURE — 97110 THERAPEUTIC EXERCISES: CPT | Mod: GP | Performed by: PHYSICAL THERAPIST

## 2024-04-04 PROCEDURE — 97140 MANUAL THERAPY 1/> REGIONS: CPT | Mod: GP | Performed by: PHYSICAL THERAPIST

## 2024-04-11 ENCOUNTER — THERAPY VISIT (OUTPATIENT)
Dept: PHYSICAL THERAPY | Facility: CLINIC | Age: 79
End: 2024-04-11
Attending: PHYSICIAN ASSISTANT
Payer: COMMERCIAL

## 2024-04-11 DIAGNOSIS — M54.2 NECK PAIN: Primary | ICD-10-CM

## 2024-04-11 PROCEDURE — 97110 THERAPEUTIC EXERCISES: CPT | Mod: GP | Performed by: PHYSICAL THERAPIST

## 2024-04-11 NOTE — PROGRESS NOTES
04/11/24 0500   Appointment Info   Signing clinician's name / credentials Jg Masonews PT   Total/Authorized Visits [employer]   Visits Used 3   Medical Diagnosis Cervicalgia   PT Tx Diagnosis Neck Pain   Other pertinent information osteoporosis, hx of cancer   Quick Adds Certification   Progress Note/Certification   Start of Care Date 03/27/24   Onset of illness/injury or Date of Surgery 03/21/24   Therapy Frequency 1x/week   Predicted Duration 6 weeks   Certification date from 03/27/24   Certification date to 05/08/24   Progress Note Completed Date 03/27/24   GOALS   PT Goals 2   PT Goal 1   Goal Identifier Prolonged Positioning   Goal Description The patient will be able to sustain a position, either sitting or standing x30 minutes with appropriate posture and pain <3/10.   Rationale to maximize safety and independence with performance of ADLs and functional tasks;to maximize safety and independence with self cares   Goal Progress met   Target Date 05/08/24   Date Met 04/11/24   PT Goal 2   Goal Identifier Sleeping   Goal Description The patient will be able to sleep throughout the night without waking due to pain to improve restful sleep.   Rationale to maximize safety and independence with self cares   Goal Progress No issues sleeping in past week.   Target Date 05/08/24   Subjective Report   Subjective Report No sx's.  Functioning at 100% of where she wants to be.   Objective Measures   Objective Measures Objective Measure 1   Objective Measure 1   Details Cervical range of motion within normal limits.  No symptoms evident today   Treatment Interventions (PT)   Interventions Therapeutic Procedure/Exercise;Manual Therapy   Therapeutic Procedure/Exercise   Therapeutic Procedures: strength, endurance, ROM, flexibility minutes (48620) 12   Ther Proc 1 Education   Ther Proc 1 - Details educated regarding role of therapeutic exercise, POC, expected goal in therapy, nature of condition   PTRx Ther Proc 1 Cervical  "Retraction   PTRx Ther Proc 1 - Details x10   PTRx Ther Proc 2 Upper Trapezius Stretch   PTRx Ther Proc 2 - Details 3x10-30\" holds   PTRx Ther Proc 3 Scapular Retraction/Depression   PTRx Ther Proc 3 - Details x10   Skilled Intervention VC's   Patient Response/Progress tolerated well   Neuromuscular Re-education   PTRx Neuro Re-ed 1 Shoulder Theraband Rows   PTRx Neuro Re-ed 1 - Details home   Manual Therapy   Patient Response/Progress tolerated well improved pain   Education   Learner/Method Patient   Education Comments Eager to participate in therapy   Plan   Home program see PTRX printed   Plan for next session progress mobility and postural strength, continue MT if helpful.   Total Session Time   Timed Code Treatment Minutes 12   Total Treatment Time (sum of timed and untimed services) 12       DISCHARGE  Reason for Discharge: Patient has met all goals.    Equipment Issued:     Discharge Plan: Patient to continue home program.    Referring Provider:  Marcial Holden    "

## 2024-04-21 DIAGNOSIS — C25.0 MALIGNANT NEOPLASM OF HEAD OF PANCREAS (H): ICD-10-CM

## 2024-04-22 RX ORDER — CITALOPRAM HYDROBROMIDE 20 MG/1
20 TABLET ORAL DAILY
Qty: 90 TABLET | Refills: 1 | Status: SHIPPED | OUTPATIENT
Start: 2024-04-22

## 2024-05-20 ENCOUNTER — TELEPHONE (OUTPATIENT)
Dept: FAMILY MEDICINE | Facility: CLINIC | Age: 79
End: 2024-05-20
Payer: COMMERCIAL

## 2024-05-20 NOTE — TELEPHONE ENCOUNTER
Flora has a pre-op scheduled for 6/13/24 and an AWV scheduled for 7/1/24.  Patient wondering if she can do her AWV at her pre-op appointment?    Please advise, then call Flora at [phone] with information.      Maar SOLIS, - [facility]  [facility] Care- [facility]

## 2024-05-22 ENCOUNTER — LAB (OUTPATIENT)
Dept: INFUSION THERAPY | Facility: CLINIC | Age: 79
End: 2024-05-22
Attending: INTERNAL MEDICINE
Payer: COMMERCIAL

## 2024-05-22 ENCOUNTER — HOSPITAL ENCOUNTER (OUTPATIENT)
Dept: CT IMAGING | Facility: CLINIC | Age: 79
Discharge: HOME | End: 2024-05-22
Attending: INTERNAL MEDICINE | Admitting: INTERNAL MEDICINE
Payer: COMMERCIAL

## 2024-05-22 DIAGNOSIS — C25.0 MALIGNANT NEOPLASM OF HEAD OF PANCREAS (H): ICD-10-CM

## 2024-05-22 LAB
ALBUMIN SERPL BCG-MCNC: 4.2 G/DL (ref 3.5–5.2)
ALP SERPL-CCNC: 63 U/L (ref 40–150)
ALT SERPL W P-5'-P-CCNC: 23 U/L (ref 0–50)
ANION GAP SERPL CALCULATED.3IONS-SCNC: 11 MMOL/L (ref 7–15)
AST SERPL W P-5'-P-CCNC: 29 U/L (ref 0–45)
BILIRUB SERPL-MCNC: 0.4 MG/DL
BUN SERPL-MCNC: 15.9 MG/DL (ref 8–23)
CALCIUM SERPL-MCNC: 8.9 MG/DL (ref 8.8–10.2)
CHLORIDE SERPL-SCNC: 101 MMOL/L (ref 98–107)
CREAT SERPL-MCNC: 0.82 MG/DL (ref 0.51–0.95)
DEPRECATED HCO3 PLAS-SCNC: 23 MMOL/L (ref 22–29)
EGFRCR SERPLBLD CKD-EPI 2021: 72 ML/MIN/1.73M2
GLUCOSE SERPL-MCNC: 92 MG/DL (ref 70–99)
POTASSIUM SERPL-SCNC: 4.3 MMOL/L (ref 3.4–5.3)
PROT SERPL-MCNC: 6.8 G/DL (ref 6.4–8.3)
SODIUM SERPL-SCNC: 135 MMOL/L (ref 135–145)

## 2024-05-22 PROCEDURE — 86301 IMMUNOASSAY TUMOR CA 19-9: CPT | Performed by: INTERNAL MEDICINE

## 2024-05-22 PROCEDURE — 250N000009 HC RX 250: Performed by: INTERNAL MEDICINE

## 2024-05-22 PROCEDURE — 84295 ASSAY OF SERUM SODIUM: CPT | Performed by: INTERNAL MEDICINE

## 2024-05-22 PROCEDURE — 36415 COLL VENOUS BLD VENIPUNCTURE: CPT

## 2024-05-22 PROCEDURE — 71260 CT THORAX DX C+: CPT

## 2024-05-22 PROCEDURE — 250N000011 HC RX IP 250 OP 636: Performed by: INTERNAL MEDICINE

## 2024-05-22 RX ORDER — IOPAMIDOL 755 MG/ML
500 INJECTION, SOLUTION INTRAVASCULAR ONCE
Status: COMPLETED | OUTPATIENT
Start: 2024-05-22 | End: 2024-05-22

## 2024-05-22 RX ADMIN — IOPAMIDOL 58 ML: 755 INJECTION, SOLUTION INTRAVENOUS at 11:31

## 2024-05-22 RX ADMIN — SODIUM CHLORIDE 55 ML: 9 INJECTION, SOLUTION INTRAVENOUS at 11:31

## 2024-05-22 NOTE — PROGRESS NOTES
Nursing Note:  Flora Hogan presents today for labs and PIV for CT scan.    Patient seen by provider today: No   present during visit today: Not Applicable.    Note: N/A.    Intravenous Access:  Labs drawn without difficulty.  Peripheral IV placed.    Discharge Plan:   Patient was sent to CT scan appointment.    Liam Ferguson RN

## 2024-05-24 LAB — CANCER AG19-9 SERPL IA-ACNC: 7 U/ML

## 2024-05-29 ENCOUNTER — ONCOLOGY VISIT (OUTPATIENT)
Dept: ONCOLOGY | Facility: CLINIC | Age: 79
End: 2024-05-29
Attending: INTERNAL MEDICINE
Payer: COMMERCIAL

## 2024-05-29 VITALS
DIASTOLIC BLOOD PRESSURE: 68 MMHG | RESPIRATION RATE: 16 BRPM | SYSTOLIC BLOOD PRESSURE: 119 MMHG | OXYGEN SATURATION: 100 % | TEMPERATURE: 97.3 F | WEIGHT: 121 LBS | BODY MASS INDEX: 17.87 KG/M2 | HEART RATE: 52 BPM

## 2024-05-29 DIAGNOSIS — C50.211 MALIGNANT NEOPLASM OF UPPER-INNER QUADRANT OF RIGHT BREAST IN FEMALE, ESTROGEN RECEPTOR POSITIVE (H): ICD-10-CM

## 2024-05-29 DIAGNOSIS — D05.11 DUCTAL CARCINOMA IN SITU (DCIS) OF RIGHT BREAST: ICD-10-CM

## 2024-05-29 DIAGNOSIS — Z17.0 MALIGNANT NEOPLASM OF UPPER-INNER QUADRANT OF RIGHT BREAST IN FEMALE, ESTROGEN RECEPTOR POSITIVE (H): ICD-10-CM

## 2024-05-29 DIAGNOSIS — C25.0 MALIGNANT NEOPLASM OF HEAD OF PANCREAS (H): Primary | ICD-10-CM

## 2024-05-29 PROCEDURE — 99417 PROLNG OP E/M EACH 15 MIN: CPT | Performed by: INTERNAL MEDICINE

## 2024-05-29 PROCEDURE — 99215 OFFICE O/P EST HI 40 MIN: CPT | Performed by: INTERNAL MEDICINE

## 2024-05-29 PROCEDURE — G0463 HOSPITAL OUTPT CLINIC VISIT: HCPCS | Performed by: INTERNAL MEDICINE

## 2024-05-29 RX ORDER — TAMOXIFEN CITRATE 20 MG/1
20 TABLET ORAL DAILY
Qty: 90 TABLET | Refills: 3 | Status: SHIPPED | OUTPATIENT
Start: 2024-05-29

## 2024-05-29 ASSESSMENT — PAIN SCALES - GENERAL: PAINLEVEL: NO PAIN (0)

## 2024-05-29 NOTE — LETTER
5/29/2024         RE: Flora Hogan  [id number]  [place] [address]        Dear Colleague,    Thank you for referring your patient, Flora Hogan, to the [facility]. Please see a copy of my visit note below.    Flora is a 78-year-old postmenopausal patient who is transferring her care from Dr. Peoples's clinic over to this clinic for ongoing oncology care.      Today I have reviewed all of her hematology oncology records as well as her pathology.      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  ER positive TX negative HER2 receptor positive  Size of tumor 0.3 mm and 0.4 mm  11/22 started adjuvant anastrozole and then switched to tamoxifen due to osteoporosis in April 2023      5.  Previous history of a T2 N0 M0 pancreatic ductal adenocarcinoma diagnosed in May 2020, no evidence of recurrence of disease since  6.  Followed with 6 monthly scanning  7.  Had adjuvant gemcitabine from July 20 22 December 2020            History presenting complaint      Flora is a 78-year-old patient who is got a complex history of cancer as outlined above she has got both history of pancreas adenocarcinoma which measured 2.6 cm and had negative lymph nodes for which she had a Whipple resection in May 2020.  After that she had 6 cycles of adjuvant gemcitabine.    In 2022 she was diagnosed with right sided breast cancer initially her excisional biopsy was DCIS and then on reexcision for clean margins she was found to have 2 very small foci of invasive ductal carcinoma which were ER positive TX negative HER2 receptor positive.  The foci measured 0.3 mm and 0.4 mm    She is currently on adjuvant tamoxifen for those.    She comes in today for interim follow-up    Prior to coming in today she did have routine blood work done as well as CT scan of chest abdomen pelvis    There was no evidence of any disease on her chest abdomen pelvis.   She does have some very small pulmonary nodules which we will keep an eye on over the next year or so.    Her blood counts look good as well as her pancreas marker.    From a breast cancer standpoint she is not having anything that is concerning.    She denies today any cough shortness of breath bone pain or worrisome symptomatology from my standpoint    12 point comprehensive review of systems otherwise unremarkable    Medications and allergies are outlined in the nursing records      Past medical history:  History of left-sided breast cancer as outlined above.    History of pancreas adenocarcinoma  History of seasonal allergies      Past surgical history    History colonoscopy  History of endoscopy  History of port placement  History of right-sided lumpectomy  Wipples procedure   Hx ERCP   Hx Liver biopsy      Social history:  Patient is present with her  today she is a non-smoker.      Pain assessment: Patient is in no pain today      Physical exam:    Well-appearing lady no acute distress  Neck is no masses or lymph nodes  Respiratory exam normal  Cardiovascular exam normal  Breast exam: Right breast status post right-sided lumpectomy is scars look good right axilla negative no palpable masses in the right breast left breast normal no palpable masses left axilla negative  GI exam normal  Legs without tenderness or edema  Skin is no rashes or lesions  Patient is alert and orientated x 3        Data review:    I have reviewed his CT scan chest on pelvis which was done on 5/22/2024 and discussed it with the family.    I have also reviewed a CMP and a pancreatic cancer marker today.      Impression and plan:    This is a 78-year-old patient with 2 separate cancers , 1 is a right-sided multifocal breast cancer that is ER positive HER2 receptor positive and for that she is currently on tamoxifen and she is tolerating the tamoxifen well.  She was diagnosed with breast cancer in 2022 and so she is 2 years out  from that diagnosis.      In 2020 she was diagnosed with a pancreas cancer and so she is 4 years out from the pancreas cancer.  Her lab work looks good as does her CT scan of chest abdomen pelvis.      She will continue to get scanning every 6 months up until year 5 from the pancreas cancer    From a breast cancer standpoint she is doing well and we will continue on the tamoxifen for another 3 years.      All of the above has been discussed with her and her  are in agreement with the plan      Total time spent on her case today 1 hour      Gracie burgess MD     Again, thank you for allowing me to participate in the care of your patient.        Sincerely,        Garcie Burgess MD

## 2024-05-29 NOTE — NURSING NOTE
"Oncology Rooming Note    May 29, 2024 11:01 AM   Flora Hogan is a 78 year old female who presents for:    Chief Complaint   Patient presents with    Oncology Clinic Visit     Initial Vitals: /68 (Cuff Size: Adult Regular)   Pulse 52   Temp 97.3  F (36.3  C) (Temporal)   Resp 16   Wt 54.9 kg (121 lb)   LMP  (LMP Unknown)   SpO2 100%   BMI 17.87 kg/m   Estimated body mass index is 17.87 kg/m  as calculated from the following:    Height as of 3/21/24: 1.753 m (5' 9\").    Weight as of this encounter: 54.9 kg (121 lb). Body surface area is 1.63 meters squared.  No Pain (0) Comment: Data Unavailable   No LMP recorded (lmp unknown). Patient is postmenopausal.  Allergies reviewed: Yes  Medications reviewed: Yes    Medications: Medication refills not needed today.  Pharmacy name entered into [employer]:    [id number] [address] [address] AT [place] [address] & [facility][id number] [address] AT [facility] & [address] [address] [facility]    Frailty Screening:   Is the patient here for a new oncology consult visit in cancer care? 2. No      Clinical concerns: f/u       Katie Whitmore CMA              "

## 2024-05-29 NOTE — PROGRESS NOTES
Flora is a 78-year-old postmenopausal patient who is transferring her care from Dr. Peoples's clinic over to this clinic for ongoing oncology care.      Today I have reviewed all of her hematology oncology records as well as her pathology.      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  ER positive CO negative HER2 receptor positive  Size of tumor 0.3 mm and 0.4 mm  11/22 started adjuvant anastrozole and then switched to tamoxifen due to osteoporosis in April 2023      5.  Previous history of a T2 N0 M0 pancreatic ductal adenocarcinoma diagnosed in May 2020, no evidence of recurrence of disease since  6.  Followed with 6 monthly scanning  7.  Had adjuvant gemcitabine from July 20 22 December 2020            History presenting complaint      Flora is a 78-year-old patient who is got a complex history of cancer as outlined above she has got both history of pancreas adenocarcinoma which measured 2.6 cm and had negative lymph nodes for which she had a Whipple resection in May 2020.  After that she had 6 cycles of adjuvant gemcitabine.    In 2022 she was diagnosed with right sided breast cancer initially her excisional biopsy was DCIS and then on reexcision for clean margins she was found to have 2 very small foci of invasive ductal carcinoma which were ER positive CO negative HER2 receptor positive.  The foci measured 0.3 mm and 0.4 mm    She is currently on adjuvant tamoxifen for those.    She comes in today for interim follow-up    Prior to coming in today she did have routine blood work done as well as CT scan of chest abdomen pelvis    There was no evidence of any disease on her chest abdomen pelvis.  She does have some very small pulmonary nodules which we will keep an eye on over the next year or so.    Her blood counts look good as well as her pancreas marker.    From a breast cancer standpoint she is not having anything that is concerning.    She denies today  any cough shortness of breath bone pain or worrisome symptomatology from my standpoint    12 point comprehensive review of systems otherwise unremarkable    Medications and allergies are outlined in the nursing records      Past medical history:  History of left-sided breast cancer as outlined above.    History of pancreas adenocarcinoma  History of seasonal allergies      Past surgical history    History colonoscopy  History of endoscopy  History of port placement  History of right-sided lumpectomy  Wipples procedure   Hx ERCP   Hx Liver biopsy      Social history:  Patient is present with her  today she is a non-smoker.      Pain assessment: Patient is in no pain today      Physical exam:    Well-appearing lady no acute distress  Neck is no masses or lymph nodes  Respiratory exam normal  Cardiovascular exam normal  Breast exam: Right breast status post right-sided lumpectomy is scars look good right axilla negative no palpable masses in the right breast left breast normal no palpable masses left axilla negative  GI exam normal  Legs without tenderness or edema  Skin is no rashes or lesions  Patient is alert and orientated x 3        Data review:    I have reviewed his CT scan chest on pelvis which was done on 5/22/2024 and discussed it with the family.    I have also reviewed a CMP and a pancreatic cancer marker today.      Impression and plan:    This is a 78-year-old patient with 2 separate cancers , 1 is a right-sided multifocal breast cancer that is ER positive HER2 receptor positive and for that she is currently on tamoxifen and she is tolerating the tamoxifen well.  She was diagnosed with breast cancer in 2022 and so she is 2 years out from that diagnosis.      In 2020 she was diagnosed with a pancreas cancer and so she is 4 years out from the pancreas cancer.  Her lab work looks good as does her CT scan of chest abdomen pelvis.      She will continue to get scanning every 6 months up until year 5  from the pancreas cancer    From a breast cancer standpoint she is doing well and we will continue on the tamoxifen for another 3 years.      All of the above has been discussed with her and her  are in agreement with the plan      Total time spent on her case today 1 hour      Gracie reyna MD

## 2024-06-13 ENCOUNTER — OFFICE VISIT (OUTPATIENT)
Dept: FAMILY MEDICINE | Facility: CLINIC | Age: 79
End: 2024-06-13
Payer: COMMERCIAL

## 2024-06-13 VITALS
OXYGEN SATURATION: 100 % | RESPIRATION RATE: 24 BRPM | SYSTOLIC BLOOD PRESSURE: 111 MMHG | BODY MASS INDEX: 17.92 KG/M2 | WEIGHT: 121 LBS | HEIGHT: 69 IN | DIASTOLIC BLOOD PRESSURE: 67 MMHG | TEMPERATURE: 97.5 F | HEART RATE: 50 BPM

## 2024-06-13 DIAGNOSIS — K43.9 VENTRAL HERNIA WITHOUT OBSTRUCTION OR GANGRENE: ICD-10-CM

## 2024-06-13 DIAGNOSIS — Z01.818 PREOP GENERAL PHYSICAL EXAM: Primary | ICD-10-CM

## 2024-06-13 DIAGNOSIS — M80.00XA OSTEOPOROSIS WITH CURRENT PATHOLOGICAL FRACTURE, UNSPECIFIED OSTEOPOROSIS TYPE, INITIAL ENCOUNTER: ICD-10-CM

## 2024-06-13 PROCEDURE — 99214 OFFICE O/P EST MOD 30 MIN: CPT | Performed by: PHYSICIAN ASSISTANT

## 2024-06-13 RX ORDER — RESPIRATORY SYNCYTIAL VIRUS VACCINE 120MCG/0.5
0.5 KIT INTRAMUSCULAR ONCE
Qty: 1 EACH | Refills: 0 | Status: CANCELLED | OUTPATIENT
Start: 2024-06-13 | End: 2024-06-13

## 2024-06-13 ASSESSMENT — PAIN SCALES - GENERAL: PAINLEVEL: NO PAIN (0)

## 2024-06-13 NOTE — PATIENT INSTRUCTIONS

## 2024-06-13 NOTE — PROGRESS NOTES
Preoperative Evaluation  [facility]  [address] [facility] [address]  Phone: [phone]  Primary Provider: Marcial Holden PA-C  Pre-op Performing Provider: Marcial Holden PA-C  Jun 13, 2024 6/13/2024   Surgical Information   What procedure is being done? hernia repair   Facility or Hospital where procedure/surgery will be performed: [facility]   Who is doing the procedure / surgery? Dr Manzanares   Date of surgery / procedure: [id number]   Time of surgery / procedure: 100   Where do you plan to recover after surgery? at home with family     Fax number for surgical facility: Note does not need to be faxed, will be available electronically in Epic.    Assessment & Plan     The proposed surgical procedure is considered INTERMEDIATE risk.    Preop general physical exam  Ventral hernia without obstruction or gangrene  Ok for planned procedure    Osteoporosis with current pathological fracture, unspecified osteoporosis type, initial encounter  Discussed alendronate. She had not started. Discussed once again appopriate way to take and possible side effects       - No identified additional risk factors other than previously addressed        Recommendation  Approval given to proceed with proposed procedure, without further diagnostic evaluation.        Bam Hines is a 78 year old, presenting for the following:  Pre-Op Exam          6/13/2024    11:33 AM   Additional Questions   Roomed by ALLA MILES   Accompanied by Self         6/13/2024    11:33 AM   Patient Reported Additional Medications   Patient reports taking the following new medications None     HPI related to upcoming procedure: ongoing ventral hernia following whipple repair. She is increasingly symptomatic to this and avoiding things now due to pain          6/13/2024   Pre-Op Questionnaire   Have you ever had a heart attack or stroke? No   Have you ever had surgery on your heart or blood vessels, such  as a stent placement, a coronary artery bypass, or surgery on an artery in your head, neck, heart, or legs? No   Do you have chest pain with activity? No   Do you have a history of heart failure? No   Do you currently have a cold, bronchitis or symptoms of other infection? No   Do you have a cough, shortness of breath, or wheezing? (!) YES - consistent unexplained shortness of breath    Do you or anyone in your family have previous history of blood clots? No   Do you or does anyone in your family have a serious bleeding problem such as prolonged bleeding following surgeries or cuts? No   Have you ever had problems with anemia or been told to take iron pills? No   Have you had any abnormal blood loss such as black, tarry or bloody stools, or abnormal vaginal bleeding? No   Have you ever had a blood transfusion? No   Are you willing to have a blood transfusion if it is medically needed before, during, or after your surgery? Yes   Have you or any of your relatives ever had problems with anesthesia? No   Do you have sleep apnea, excessive snoring or daytime drowsiness? (!) YES   Do you have a CPAP machine? (!) NO   Do you have any artifical heart valves or other implanted medical devices like a pacemaker, defibrillator, or continuous glucose monitor? No   Do you have artificial joints? No   Are you allergic to latex? No     Health Care Directive  Patient has a Health Care Directive on file      Preoperative Review of    reviewed - no record of controlled substances prescribed.      Status of Chronic Conditions:  See problem list for active medical problems.  Problems all longstanding and stable, except as noted/documented.  See ROS for pertinent symptoms related to these conditions.    Patient Active Problem List    Diagnosis Date Noted    Neck pain 03/27/2024     Priority: Medium    Ductal carcinoma in situ (DCIS) of right breast 06/27/2023     Priority: Medium    Malignant neoplasm of head of pancreas (H)  06/09/2020     Priority: Medium     Complete prior to 6.24 [facility]      Painless jaundice 04/19/2020     Priority: Medium    Chest tightness      Priority: Medium    SOB (shortness of breath)      Priority: Medium    Peripheral neuropathy (HCC) 04/28/2016     Priority: Medium    Nonintractable headache, unspecified chronicity pattern, unspecified headache type 04/28/2016     Priority: Medium    Osteopenia 09/13/2014     Priority: Medium    CARDIOVASCULAR SCREENING; LDL GOAL LESS THAN 160 09/12/2014     Priority: Medium    Seasonal allergies      Priority: Medium      Past Medical History:   Diagnosis Date    Chest tightness     had suspected allergies    Malignant neoplasm of head of pancreas (H) 06/09/2020    Seasonal allergies     SOB (shortness of breath)      Past Surgical History:   Procedure Laterality Date    BIOPSY BREAST Right 9/19/2022    Procedure: Right radiofrequency localized excisional breast biopsy;  Surgeon: Geovanny Wagner MD;  Location:  OR    CATARACT IOL, RT/LT  2015    COLONOSCOPY  10/14    no repeat needed due to age    COLONOSCOPY N/A 10/7/2014    Procedure: COLONOSCOPY;  Surgeon: Daryl Hanson MD;  Location:  GI    COLONOSCOPY  04/23/2019    no further screening    ENDOSCOPIC RETROGRADE CHOLANGIOPANCREATOGRAM N/A 4/20/2020    Procedure: ENDOSCOPIC RETROGRADE CHOLANGIOPANCREATOGRAPHY, PLACEMENT OF PANCREATIC STENT, PLACEMENT OF BILIARY STENT;  Surgeon: Yarely Vann MD;  Location:  OR    ESOPHAGOSCOPY, GASTROSCOPY, DUODENOSCOPY (EGD), COMBINED N/A 4/20/2020    Procedure: ESOPHAGOGASTRODUODENOSCOPY, WITH FINE NEEDLE ASPIRATION BIOPSY, WITH ENDOSCOPIC ULTRASOUND GUIDANCE, Endoscopic retrograde cholangiopancreatography;  Surgeon: Yarely Vann MD;  Location:  OR    ESOPHAGOSCOPY, GASTROSCOPY, DUODENOSCOPY (EGD), COMBINED N/A 5/5/2020    Procedure: ESOPHAGOGASTRODUODENOSCOPY, WITH FINE NEEDLE ASPIRATION BIOPSY, WITH ENDOSCOPIC ULTRASOUND GUIDANCE;  Surgeon:  Romina Rosario MD;  Location:  GI    IR CHEST PORT PLACEMENT > 5 YRS OF AGE  7/6/2020    IR PORT REMOVAL RIGHT  12/28/2020    LAPAROSCOPIC BIOPSY LIVER N/A 5/22/2020    Procedure: Diagnostic Laparoscopy with intraoperative ultrasound and Liver Biopsy X2;  Surgeon: Duarte Chaves MD;  Location: [facility] OR    LUMPECTOMY BREAST Right 10/27/2022    Procedure: right breast reexcision lumpectomy;  Surgeon: Geovanny Wagner MD;  Location: [facility] OR    LUMPECTOMY BREAST WITH SENTINEL NODE, COMBINED Right 11/11/2022    Procedure: Reexcision right lumpectomy, right sentinel node biopsy;  Surgeon: Geovanny Wagner MD;  Location: [facility] OR    WHIPPLE PROCEDURE N/A 5/22/2020    Procedure: Non Pylorus Preserving Whipple procedure;  Surgeon: Duarte Chaves MD;  Location: [facility] OR     Current Outpatient Medications   Medication Sig Dispense Refill    acetaminophen (TYLENOL) 325 MG tablet Take 1-2 tablets (325-650 mg) by mouth every 6 hours as needed for mild pain or fever 50 tablet 0    albuterol (PROAIR HFA/PROVENTIL HFA/VENTOLIN HFA) 108 (90 Base) MCG/ACT inhaler Inhale 2 puffs into the lungs every 6 hours as needed for shortness of breath or wheezing 18 g 0    alendronate (FOSAMAX) 70 MG tablet Take 1 tablet (70 mg) by mouth every 7 days 12 tablet 3    Calcium Carb-Cholecalciferol 600-20 MG-MCG TABS Take 2 tablets by mouth daily 30 tablet 0    citalopram (CELEXA) 20 MG tablet TAKE 1 TABLET(20 MG) BY MOUTH DAILY 90 tablet 1    fluticasone (FLONASE) 50 MCG/ACT nasal spray Spray 1 spray into both nostrils daily 16 g 5    fluticasone (FLOVENT DISKUS) 100 MCG/ACT inhaler Inhale 1 puff into the lungs every 12 hours 60 each 0    lipase-protease-amylase (CREON) 10031-39288-487525 units CPEP per EC capsule TAKE 1 CAPSULE BY MOUTH THREE TIMES DAILY WITH A MEAL Strength: 24,000-76,000 Units 270 capsule 3    loratadine (CLARITIN REDITABS) 10 MG dispersible tablet Take 10 mg by mouth as needed       magnesium 250 MG tablet Take 1  "tablet by mouth 2 times daily      tamoxifen (NOLVADEX) 20 MG tablet Take 1 tablet (20 mg) by mouth daily 90 tablet 3       Allergies   Allergen Reactions    Penicillin V Hives    Seasonal Allergies         Social History     Tobacco Use    Smoking status: Never     Passive exposure: Never    Smokeless tobacco: Never   Substance Use Topics    Alcohol use: Yes     Comment: social      History   Drug Use Unknown             Review of Systems  Constitutional, HEENT, cardiovascular, pulmonary, gi and gu systems are negative, except as otherwise noted.    Objective    /67 (BP Location: Right arm, Patient Position: Sitting, Cuff Size: Adult Regular)   Pulse 50   Temp 97.5  F (36.4  C) (Oral)   Resp 24   Ht 1.753 m (5' 9.02\")   Wt 54.9 kg (121 lb)   LMP  (LMP Unknown)   SpO2 100%   BMI 17.86 kg/m     Estimated body mass index is 17.86 kg/m  as calculated from the following:    Height as of this encounter: 1.753 m (5' 9.02\").    Weight as of this encounter: 54.9 kg (121 lb).  Physical Exam  GENERAL: alert and no distress  EYES: Eyes grossly normal to inspection, PERRL and conjunctivae and sclerae normal  HENT: ear canals and TM's normal, nose and mouth without ulcers or lesions  RESP: lungs clear to auscultation - no rales, rhonchi or wheezes  CV: regular rate and rhythm, normal S1 S2, no S3 or S4, no murmur, click or rub, no peripheral edema  ABDOMEN: soft, nontender, bowel sounds normal; there is a small incisional hernia noted  MS: No peripheral edema   PSYCH: mentation appears normal, affect normal/bright    Recent Labs   Lab Test 05/22/24  1037 11/10/23  1305   HGB  --  14.0   PLT  --  171    135   POTASSIUM 4.3 4.0   CR 0.82 0.91        Diagnostics  See above regarding labs   No EKG required, no history of coronary heart disease, significant arrhythmia, peripheral arterial disease or other structural heart disease.    Revised Cardiac Risk Index (RCRI)  The patient has the following serious " cardiovascular risks for perioperative complications:   - No serious cardiac risks = 0 points     RCRI Interpretation: 0 points: Class I (very low risk - 0.4% complication rate)         Signed Electronically by: Marcial Holden PA-C  Copy of this evaluation report is provided to requesting physician.

## 2024-07-01 ENCOUNTER — OFFICE VISIT (OUTPATIENT)
Dept: FAMILY MEDICINE | Facility: CLINIC | Age: 79
End: 2024-07-01
Payer: COMMERCIAL

## 2024-07-01 VITALS
OXYGEN SATURATION: 98 % | RESPIRATION RATE: 22 BRPM | TEMPERATURE: 96.7 F | SYSTOLIC BLOOD PRESSURE: 100 MMHG | HEART RATE: 54 BPM | DIASTOLIC BLOOD PRESSURE: 62 MMHG | HEIGHT: 64 IN | BODY MASS INDEX: 20.98 KG/M2 | WEIGHT: 122.9 LBS

## 2024-07-01 DIAGNOSIS — R41.3 MEMORY CHANGE: ICD-10-CM

## 2024-07-01 DIAGNOSIS — K43.9 VENTRAL HERNIA WITHOUT OBSTRUCTION OR GANGRENE: ICD-10-CM

## 2024-07-01 DIAGNOSIS — Z91.89 AT RISK FOR ACUTE ISCHEMIC CARDIAC EVENT: ICD-10-CM

## 2024-07-01 DIAGNOSIS — C25.0 MALIGNANT NEOPLASM OF HEAD OF PANCREAS (H): ICD-10-CM

## 2024-07-01 DIAGNOSIS — M80.00XA OSTEOPOROSIS WITH CURRENT PATHOLOGICAL FRACTURE, UNSPECIFIED OSTEOPOROSIS TYPE, INITIAL ENCOUNTER: ICD-10-CM

## 2024-07-01 DIAGNOSIS — Z00.00 ENCOUNTER FOR MEDICARE ANNUAL WELLNESS EXAM: Primary | ICD-10-CM

## 2024-07-01 DIAGNOSIS — R06.02 SOB (SHORTNESS OF BREATH): ICD-10-CM

## 2024-07-01 PROCEDURE — G0439 PPPS, SUBSEQ VISIT: HCPCS | Performed by: PHYSICIAN ASSISTANT

## 2024-07-01 PROCEDURE — 99213 OFFICE O/P EST LOW 20 MIN: CPT | Mod: 25 | Performed by: PHYSICIAN ASSISTANT

## 2024-07-01 RX ORDER — RESPIRATORY SYNCYTIAL VIRUS VACCINE 120MCG/0.5
0.5 KIT INTRAMUSCULAR ONCE
Qty: 1 EACH | Refills: 0 | Status: CANCELLED | OUTPATIENT
Start: 2024-07-01 | End: 2024-07-01

## 2024-07-01 RX ORDER — ASPIRIN 81 MG/1
81 TABLET ORAL DAILY
Status: SHIPPED
Start: 2024-07-01

## 2024-07-01 RX ORDER — ALENDRONATE SODIUM 70 MG/1
70 TABLET ORAL
Qty: 12 TABLET | Refills: 3 | Status: SHIPPED | OUTPATIENT
Start: 2024-07-01

## 2024-07-01 SDOH — HEALTH STABILITY: PHYSICAL HEALTH: ON AVERAGE, HOW MANY DAYS PER WEEK DO YOU ENGAGE IN MODERATE TO STRENUOUS EXERCISE (LIKE A BRISK WALK)?: 0 DAYS

## 2024-07-01 SDOH — HEALTH STABILITY: PHYSICAL HEALTH: ON AVERAGE, HOW MANY MINUTES DO YOU ENGAGE IN EXERCISE AT THIS LEVEL?: 0 MIN

## 2024-07-01 ASSESSMENT — PAIN SCALES - GENERAL: PAINLEVEL: NO PAIN (0)

## 2024-07-01 ASSESSMENT — SOCIAL DETERMINANTS OF HEALTH (SDOH)
HOW OFTEN DO YOU GET TOGETHER WITH FRIENDS OR RELATIVES?: MORE THAN THREE TIMES A WEEK
HOW OFTEN DO YOU GET TOGETHER WITH FRIENDS OR RELATIVES?: MORE THAN THREE TIMES A WEEK

## 2024-07-01 NOTE — PATIENT INSTRUCTIONS
"Consider getting RSV shot this fall along with COVID and FLU    Lets consider cholesterol medication - a \"statin\" - I would recommend that      Patient Education   Preventive Care Advice   This is general advice we often give to help people stay healthy. Your care team may have specific advice just for you. Please talk to your care team about your own preventive care needs.  Lifestyle  Exercise at least 150 minutes each week (30 minutes a day, 5 days a week).  Do muscle strengthening activities 2 days a week. These help control your weight and prevent disease.  No smoking.  Wear sunscreen to prevent skin cancer.  Have your home tested for radon every 2 to 5 years. Radon is a colorless, odorless gas that can harm your lungs. To learn more, go to www.health.state.[place] and search for \"Radon in Homes.\"  Keep guns unloaded and locked up in a safe place like a safe or gun vault, or, use a gun lock and hide the keys. Always lock away bullets separately. To learn more, visit [employer].gov and search for \"safe gun storage.\"  Nutrition  Eat 5 or more servings of fruits and vegetables each day.  Try wheat bread, brown rice and whole grain pasta (instead of white bread, rice, and pasta).  Get enough calcium and vitamin D. Check the label on foods and aim for 100% of the RDA (recommended daily allowance).  Regular exams  Have a dental exam and cleaning every 6 months.  See your health care team every year to talk about:  Any changes in your health.  Any medicines your care team has prescribed.  Preventive care, family planning, and ways to prevent chronic diseases.  Shots (vaccines)   HPV shots (up to age 26), if you've never had them before.  Hepatitis B shots (up to age 59), if you've never had them before.  COVID-19 shot: Get this shot when it's due.  Flu shot: Get a flu shot every year.  Tetanus shot: Get a tetanus shot every 10 years.  Pneumococcal, hepatitis A, and RSV shots: Ask your care team if you need these based on " your risk.  Shingles shot (for age 50 and up).  General health tests  Diabetes screening:  Starting at age 35, Get screened for diabetes at least every 3 years.  If you are younger than age 35, ask your care team if you should be screened for diabetes.  Cholesterol test: At age 39, start having a cholesterol test every 5 years, or more often if advised.  Bone density scan (DEXA): At age 50, ask your care team if you should have this scan for osteoporosis (brittle bones).  Hepatitis C: Get tested at least once in your life.  Abdominal aortic aneurysm screening: Talk to your doctor about having this screening if you:  Have ever smoked; and  Are biologically male; and  Are between the ages of 65 and 75.  STIs (sexually transmitted infections)  Before age 24: Ask your care team if you should be screened for STIs.  After age 24: Get screened for STIs if you're at risk. You are at risk for STIs (including HIV) if:  You are sexually active with more than one person.  You don't use condoms every time.  You or a partner was diagnosed with a sexually transmitted infection.  If you are at risk for HIV, ask about PrEP medicine to prevent HIV.  Get tested for HIV at least once in your life, whether you are at risk for HIV or not.  Cancer screening tests  Cervical cancer screening: If you have a cervix, begin getting regular cervical cancer screening tests at age 21. Most people who have regular screenings with normal results can stop after age 65. Talk about this with your provider.  Breast cancer scan (mammogram): If you've ever had breasts, begin having regular mammograms starting at age 40. This is a scan to check for breast cancer.  Colon cancer screening: It is important to start screening for colon cancer at age 45.  Have a colonoscopy test every 10 years (or more often if you're at risk) Or, ask your provider about stool tests like a FIT test every year or Cologuard test every 3 years.  To learn more about your testing  options, visit: www.fvfiles.com/962145.pdf.  For help making a decision, visit: wayne/dd41571.  Prostate cancer screening test: If you have a prostate and are age 55 to 69, ask your provider if you would benefit from a yearly prostate cancer screening test.  Lung cancer screening: If you are a current or former smoker age 50 to 80, ask your care team if ongoing lung cancer screenings are right for you.  For informational purposes only. Not to replace the advice of your health care provider. Copyright   2023 [employer]. All rights reserved. Clinically reviewed by the  [employer] [facility]. WIB 646047 - REV 04/24.  Preventing Falls: Care Instructions  Injuries and health problems such as trouble walking or poor eyesight can increase your risk of falling. So can some medicines. But there are things you can do to help prevent falls. You can exercise to get stronger. You can also arrange your home to make it safer.    Talk to your doctor about the medicines you take. Ask if any of them increase the risk of falls and whether they can be changed or stopped.   Try to exercise regularly. It can help improve your strength and balance. This can help lower your risk of falling.     Practice fall safety and prevention.    Wear low-heeled shoes that fit well and give your feet good support. Talk to your doctor if you have foot problems that make this hard.  Carry a cellphone or wear a medical alert device that you can use to call for help.  Use stepladders instead of chairs to reach high objects. Don't climb if you're at risk for falls. Ask for help, if needed.  Wear the correct eyeglasses, if you need them.    Make your home safer.    Remove rugs, cords, clutter, and furniture from walkways.  Keep your house well lit. Use night-lights in hallways and bathrooms.  Install and use sturdy handrails on stairways.  Wear nonskid footwear, even inside. Don't walk barefoot or in socks without  "shoes.    Be safe outside.    Use handrails, curb cuts, and ramps whenever possible.  Keep your hands free by using a shoulder bag or backpack.  Try to walk in well-lit areas. Watch out for uneven ground, changes in pavement, and debris.  Be careful in the winter. Walk on the grass or gravel when sidewalks are slippery. Use de-icer on steps and walkways. Add non-slip devices to shoes.    Put grab bars and nonskid mats in your shower or tub and near the toilet. Try to use a shower chair or bath bench when bathing.   Get into a tub or shower by putting in your weaker leg first. Get out with your strong side first. Have a phone or medical alert device in the bathroom with you.   Where can you learn more?  Go to https://www.[employer].net/patiented  Enter G117 in the search box to learn more about \"Preventing Falls: Care Instructions.\"  Current as of: July 17, 2023               Content Version: 14.0    4911-4526 [employer].   Care instructions adapted under license by your healthcare professional. If you have questions about a medical condition or this instruction, always ask your healthcare professional. [employer] disclaims any warranty or liability for your use of this information.         "

## 2024-07-01 NOTE — PROGRESS NOTES
Preventive Care Visit  [facility] ANALIMOCALLY  Marcial Holden PA-C, Family Medicine  Jul 1, 2024      Assessment & Plan     Encounter for Medicare annual wellness exam  Reviewed personal and family history. Reviewed age appropriate screenings. Recommended any needed vaccinations.  - aspirin 81 MG EC tablet; Take 1 tablet (81 mg) by mouth daily    Osteoporosis with current pathological fracture, unspecified osteoporosis type, initial encounter  She had not started taking this. We reviewed once again the directions on how to take correctly.   - alendronate (FOSAMAX) 70 MG tablet; Take 1 tablet (70 mg) by mouth every 7 days    Memory change  6cit testing ok and she continues to do very well with iADLs but given concerns I will send her for more thorough eval/discussion.  - Adult Neurology  Referral; Future    Ventral hernia without obstruction or gangrene  Surgical repair planned tomorrow.     Malignant neoplasm of head of pancreas (H)  Continues with specialty    At risk for acute ischemic cardiac event  SOB (shortness of breath)  Chest CTs ok. There is no carin pain with activity but still question cardiac? She was not in favor of cardiac work up at this time. Hgb is normal. Monitor for now          Counseling  Appropriate preventive services were discussed with this patient, including applicable screening as appropriate for fall prevention, nutrition, physical activity, Tobacco-use cessation, weight loss and cognition.  Checklist reviewing preventive services available has been given to the patient.  Reviewed patient's diet, addressing concerns and/or questions.         Bam Hines is a 78 year old, presenting for the following:  Physical        7/1/2024     8:35 AM   Additional Questions   Roomed by Fany Morocho ()   Accompanied by self         7/1/2024     8:35 AM   Patient Reported Additional Medications   Patient reports taking the following new medications self          Health Care Directive  Patient has a Health Care Directive on file  Advance care planning document is on file and is current.    HPI             6/27/2023   General Health   How would you rate your overall physical health? Good            6/27/2023   Nutrition   At least 4 servings of fruits and vegetables/day Yes            6/27/2023   Exercise   Frequency of exercise: 2-3 days/week            11/2/2023   Social Factors   Worry food won't last until get money to buy more No   Food not last or not have enough money for food? No   Do you have housing? (Housing is defined as stable permanent housing and does not include staying ouside in a car, in a tent, in an abandoned building, in an overnight shelter, or couch-surfing.) No   Are you worried about losing your housing? No   Lack of transportation? No   Unable to get utilities (heat,electricity)? No            6/27/2023   Activities of Daily Living- Home Safety   Needs help with the following daily activites NO assistance is needed   Safety concerns in the home No grab bars in the bathroom             No data to display                  6/27/2023   Hearing Screening   Hearing concerns? Difficulty following a conversation in a noisy restaurant or crowded room    No concerns       Multiple values from one day are sorted in reverse-chronological order            No data to display                       Today's PHQ-2 Score:       7/1/2024     8:34 AM   PHQ-2 ( 1999 [employer])   Q1: Little interest or pleasure in doing things 0   Q2: Feeling down, depressed or hopeless 1   PHQ-2 Score 1   Q1: Little interest or pleasure in doing things Not at all   Q2: Feeling down, depressed or hopeless Several days   PHQ-2 Score 1         6/27/2023   Substance Use   Alcohol more than 3/day or more than 7/wk No        Social History     Tobacco Use    Smoking status: Never     Passive exposure: Never    Smokeless tobacco: Never   Vaping Use    Vaping status: Never Used   Substance  Use Topics    Alcohol use: Yes     Comment: social     Drug use: Never           6/30/2023   LAST FHS-7 RESULTS   1st degree relative breast or ovarian cancer No   Any relative bilateral breast cancer No   Any male have breast cancer No   Any ONE woman have BOTH breast AND ovarian cancer No   Any woman with breast cancer before 50yrs No   2 or more relatives with breast AND/OR ovarian cancer No   2 or more relatives with breast AND/OR bowel cancer No               ASCVD Risk   The 10-year ASCVD risk score (Josie ESCAMILLA, et al., 2019) is: 16.9%    Values used to calculate the score:      Age: 78 years      Sex: Female      Is Non- : No      Diabetic: No      Tobacco smoker: No      Systolic Blood Pressure: 100 mmHg      Is BP treated: No      HDL Cholesterol: 83 mg/dL      Total Cholesterol: 167 mg/dL          Reviewed and updated as needed this visit by Provider                    Lab work is in process  Labs reviewed in EPIC  Current providers sharing in care for this patient include:  Patient Care Team:  Marcial Holden PA-C as PCP - General (Family Medicine)  Marcial Holden PA-C as Assigned PCP  Geovanny Wagner MD as Assigned Surgical Provider  Jc Peoples MD as Assigned Cancer Care Provider  Cyn Rae PA-C as Physician Assistant (Pulmonary Disease)    The following health maintenance items are reviewed in Epic and correct as of today:  Health Maintenance   Topic Date Due    RSV VACCINE (Pregnancy & 60+) (1 - 1-dose 60+ series) Never done    COVID-19 Vaccine (7 - 2023-24 season) 12/28/2023    MEDICARE ANNUAL WELLNESS VISIT  06/27/2024    MAMMO SCREENING  06/30/2024    INFLUENZA VACCINE (1) 09/01/2024    DTAP/TDAP/TD IMMUNIZATION (2 - Td or Tdap) 09/12/2024    ANNUAL REVIEW OF HM ORDERS  06/13/2025    FALL RISK ASSESSMENT  07/08/2025    LIPID  05/03/2026    GLUCOSE  05/22/2027    ADVANCE CARE PLANNING  06/27/2028    DEXA  03/29/2038    HEPATITIS  "C SCREENING  Completed    PHQ-2 (once per calendar year)  Completed    Pneumococcal Vaccine: 65+ Years  Completed    ZOSTER IMMUNIZATION  Completed    IPV IMMUNIZATION  Aged Out    HPV IMMUNIZATION  Aged Out    MENINGITIS IMMUNIZATION  Aged Out    RSV MONOCLONAL ANTIBODY  Aged Out    COLORECTAL CANCER SCREENING  Discontinued             Review of Systems  Constitutional, HEENT, cardiovascular, pulmonary, gi and gu systems are negative, except as otherwise noted.     Objective    Exam  /62   Pulse 54   Temp (!) 96.7  F (35.9  C) (Tympanic)   Resp 22   Ht 1.626 m (5' 4\")   Wt 55.7 kg (122 lb 14.4 oz)   LMP  (LMP Unknown)   SpO2 98%   BMI 21.10 kg/m     Estimated body mass index is 21.1 kg/m  as calculated from the following:    Height as of this encounter: 1.626 m (5' 4\").    Weight as of this encounter: 55.7 kg (122 lb 14.4 oz).    Physical Exam  GENERAL: alert and no distress  EYES: Eyes grossly normal to inspection, PERRL and conjunctivae and sclerae normal  NECK: no adenopathy, no asymmetry, masses, or scars  RESP: lungs clear to auscultation - no rales, rhonchi or wheezes  CV: regular rate and rhythm, normal S1 S2, no S3 or S4, no murmur, click or rub, no peripheral edema  MS: No peripheral edema   SKIN: no suspicious lesions or rashes  PSYCH: mentation appears normal, affect normal/bright         7/1/2024   Mini Cog   Clock Draw Score 0 Abnormal   3 Item Recall 1 object recalled   Mini Cog Total Score 1      Patient mentions losing names a lot easier  She has not gotten lost; continues to perform all ADLs   -continues to cook, grocery shops  Communication seems to be the most difficult, forgetting information      Six Item Cognitive Impairment Test   (6CIT):    What year is it?                               Correct - 0 points  What month is it?                               Correct - 0 points    Give the patient an address to remember with five components:   [place] Figueredo ( first and last name - 2 " components)   [address] [facility]  (number and name of street - 2 components)   [place] ( city - 1 component)    About what time is it (within the hour)? Correct - 0 points  Count backwards from 20 to 1:   Correct - 0 points  Say the months of the year in reverse: One error - 2 points  Repeat the address phrase:   1 error - 2 points    Total 6CIT Score:      4/28    Interpretation: The 6CIT uses an inverse score and questions are weighted to produce a total out of 28. Scores of 0-7 are considered normal and 8 or more significant.    Advantages The test has high sensitivity without compromising specificity even in mild dementia. It is easy to translate linguistically and culturally.  Disadvantages The main disadvantage is in the scoring and weighting of the test, which is initially confusing, however computer models have simplified this greatly.    Probability Statistics: At the 7/8 cut off: Overall figures sensitivity 90% specificity 100%, in mild dementia sensitivity = 78% , specificity = 100%    Copyright 2000 [facility], [facility], . Courtesy of Dr. David Martinez         Signed Electronically by: Marcial Holden PA-C

## 2024-07-08 ENCOUNTER — OFFICE VISIT (OUTPATIENT)
Dept: FAMILY MEDICINE | Facility: CLINIC | Age: 79
End: 2024-07-08
Attending: PHYSICIAN ASSISTANT
Payer: COMMERCIAL

## 2024-07-08 VITALS
SYSTOLIC BLOOD PRESSURE: 118 MMHG | RESPIRATION RATE: 26 BRPM | HEART RATE: 60 BPM | TEMPERATURE: 98.1 F | DIASTOLIC BLOOD PRESSURE: 74 MMHG | WEIGHT: 124.2 LBS | OXYGEN SATURATION: 97 % | HEIGHT: 64 IN | BODY MASS INDEX: 21.21 KG/M2

## 2024-07-08 DIAGNOSIS — Z98.890 STATUS POST SURGERY: Primary | ICD-10-CM

## 2024-07-08 DIAGNOSIS — K43.2 INCISIONAL HERNIA, WITHOUT OBSTRUCTION OR GANGRENE: ICD-10-CM

## 2024-07-08 PROCEDURE — 99213 OFFICE O/P EST LOW 20 MIN: CPT | Performed by: PHYSICIAN ASSISTANT

## 2024-07-08 RX ORDER — RESPIRATORY SYNCYTIAL VIRUS VACCINE 120MCG/0.5
0.5 KIT INTRAMUSCULAR ONCE
Qty: 1 EACH | Refills: 0 | Status: CANCELLED | OUTPATIENT
Start: 2024-07-08 | End: 2024-07-08

## 2024-07-08 SDOH — HEALTH STABILITY: PHYSICAL HEALTH: ON AVERAGE, HOW MANY DAYS PER WEEK DO YOU ENGAGE IN MODERATE TO STRENUOUS EXERCISE (LIKE A BRISK WALK)?: 0 DAYS

## 2024-07-08 SDOH — HEALTH STABILITY: PHYSICAL HEALTH: ON AVERAGE, HOW MANY MINUTES DO YOU ENGAGE IN EXERCISE AT THIS LEVEL?: 0 MIN

## 2024-07-08 ASSESSMENT — PAIN SCALES - GENERAL: PAINLEVEL: MILD PAIN (3)

## 2024-07-08 ASSESSMENT — SOCIAL DETERMINANTS OF HEALTH (SDOH): HOW OFTEN DO YOU GET TOGETHER WITH FRIENDS OR RELATIVES?: MORE THAN THREE TIMES A WEEK

## 2024-07-08 NOTE — PROGRESS NOTES
Preventive Care Visit  [facility]  Marcial Holden PA-C, Family Medicine  Jul 8, 2024  {Provider  Link to SmartSet :294468}    {PROVIDER CHARTING PREFERENCE:164394}    Subjective   Flora is a 78 year old, presenting for the following:  Physical    {(!) Visit Details have not yet been documented.  Please enter Visit Details and then use this list to pull in documentation. (Optional):308382}{ROOMER positive Fall Risk- Gait Speed Test is required click here to document the Gait Speed Test and then refresh the note to pull in results  :604539}  {ROOMER if patient is in their first year of Medicare a vision screen is required click here to document the Vison screen and then refresh the note to pull in results  :793133}    Health Care Directive  Patient has a Health Care Directive on file  {(AWV REQUIRED) Advanced Care Planning Reviewed:786000}    Healthy Habits:     Taking medications regularly:  0  History of Present Illness       Reason for visit:  Need for a nurologistShe exercises with enough effort to increase her heart rate 10 to 19 minutes per day.  She exercises with enough effort to increase her heart rate 3 or less days per week.   She is taking medications regularly.    ***  {MA/LPN/RN Pre-Provider Visit Orders- hCG/UA/Strep (Optional):102530}  {SUPERLIST (Optional):784363}  {additonal problems for provider to add (Optional):372723}      7/1/2024   General Health   How would you rate your overall physical health? Good   Feel stress (tense, anxious, or unable to sleep) To some extent      (!) STRESS CONCERN      7/1/2024   Nutrition   Diet: Regular (no restrictions)            7/1/2024   Exercise   Days per week of moderate/strenous exercise 0 days   Average minutes spent exercising at this level 0 min      (!) EXERCISE CONCERN      7/1/2024   Social Factors   Frequency of gathering with friends or relatives More than three times a week   Worry food won't last until get money to  buy more No   Food not last or not have enough money for food? No   Do you have housing? (Housing is defined as stable permanent housing and does not include staying ouside in a car, in a tent, in an abandoned building, in an overnight shelter, or couch-surfing.) Yes   Are you worried about losing your housing? No   Lack of transportation? No   Unable to get utilities (heat,electricity)? No            7/1/2024   Fall Risk   Fallen 2 or more times in the past year? No    No   Trouble with walking or balance? Yes    Yes       Multiple values from one day are sorted in reverse-chronological order     {Positive Fall Risk- Gait Speed Test is required and was not documented before note was started.  If results do not appear, ask staff to complete.  Once completed, refresh note to pull in results Click here to link Gait Speed Test  :454260}      7/1/2024   Activities of Daily Living- Home Safety   Needs help with the following daily activites None of the above   Safety concerns in the home None of the above            7/1/2024   Dental   Dentist two times every year? Yes            7/1/2024   Hearing Screening   Hearing concerns? None of the above            7/1/2024   Driving Risk Screening   Patient/family members have concerns about driving No            7/1/2024   General Alertness/Fatigue Screening   Have you been more tired than usual lately? No            7/1/2024   Urinary Incontinence Screening   Bothered by leaking urine in past 6 months No            7/1/2024   TB Screening   Were you born outside of the [place]? No          {Rooming Staff Patient needs a PHQ as part of the AWV.  Use this link to complete and then refresh the note to pull results Link to PHQ2 Assessment :646264}  {USE TO PULL IN PHQ RESULTS FOR TODAY:182043}      7/1/2024   Substance Use   Alcohol more than 3/day or more than 7/wk No   Do you have a current opioid prescription? No   How severe/bad is pain from 1 to 10? 0/10 (No Pain)   Do you use  any other substances recreationally? No        Social History     Tobacco Use    Smoking status: Never     Passive exposure: Never    Smokeless tobacco: Never   Vaping Use    Vaping status: Never Used   Substance Use Topics    Alcohol use: Yes     Comment: social     Drug use: Never     {Provider  If there are gaps in the social history shown above, please follow the link to update and then refresh the note Link to Social and Substance History :054619}      6/30/2023   LAST FHS-7 RESULTS   1st degree relative breast or ovarian cancer No   Any relative bilateral breast cancer No   Any male have breast cancer No   Any ONE woman have BOTH breast AND ovarian cancer No   Any woman with breast cancer before 50yrs No   2 or more relatives with breast AND/OR ovarian cancer No   2 or more relatives with breast AND/OR bowel cancer No        {If any of the questions to the FHS7 are answered yes, consider referral for genetic counseling.    Additional indications for genetic referral include personal history of breast or ovarian cancer, genetic mutation in 1st degree relative which increases risk of breast cancer including BRCA1, BRCA2, CHRISTEL, PALB 2, TP53, CHEK2, PTEN, CDH1, STK11 (per ACS) and/or 1st degree relative with history of pancreatic or high-risk prostate cancer (per NCCN):126349}   {Mammogram Decision Support (Optional):391165}    ASCVD Risk   The 10-year ASCVD risk score (Josie DK, et al., 2019) is: 16.9%    Values used to calculate the score:      Age: 78 years      Sex: Female      Is Non- : No      Diabetic: No      Tobacco smoker: No      Systolic Blood Pressure: 100 mmHg      Is BP treated: No      HDL Cholesterol: 83 mg/dL      Total Cholesterol: 167 mg/dL    {Link to Fracture Risk Assessment Tool (Optional):372124}    {Provider  REQUIRED FOR AWV Use the storyboard to review patient history, after sections have been marked as reviewed, refresh note to capture  "documentation:871815}  {Provider   REQUIRED AWV use this link to review and update sexual activity history  after section has been marked as reviewed, refresh note to capture documentation:214457}  Reviewed and updated as needed this visit by Provider                    {HISTORY OPTIONS (Optional):138660}  Current providers sharing in care for this patient include:  Patient Care Team:  Marcial Holden PA-C as PCP - General (Family Medicine)  Marcial Holden PA-C as Assigned PCP  Geovanny Wagner MD as Assigned Surgical Provider  Jc Peoples MD as Assigned Cancer Care Provider  Cyn Rae PA-C as Physician Assistant (Pulmonary Disease)    The following health maintenance items are reviewed in Epic and correct as of today:  Health Maintenance   Topic Date Due    RSV VACCINE (Pregnancy & 60+) (1 - 1-dose 60+ series) Never done    COVID-19 Vaccine (7 - 2023-24 season) 12/28/2023    MAMMO SCREENING  06/30/2024    INFLUENZA VACCINE (1) 09/01/2024    DTAP/TDAP/TD IMMUNIZATION (2 - Td or Tdap) 09/12/2024    ANNUAL REVIEW OF HM ORDERS  06/13/2025    MEDICARE ANNUAL WELLNESS VISIT  07/01/2025    FALL RISK ASSESSMENT  07/08/2025    LIPID  05/03/2026    GLUCOSE  05/22/2027    ADVANCE CARE PLANNING  06/27/2028    DEXA  03/29/2038    HEPATITIS C SCREENING  Completed    PHQ-2 (once per calendar year)  Completed    Pneumococcal Vaccine: 65+ Years  Completed    ZOSTER IMMUNIZATION  Completed    IPV IMMUNIZATION  Aged Out    HPV IMMUNIZATION  Aged Out    MENINGITIS IMMUNIZATION  Aged Out    RSV MONOCLONAL ANTIBODY  Aged Out    COLORECTAL CANCER SCREENING  Discontinued       {ROS Picklists (Optional):898675}     Objective    Exam  LMP  (LMP Unknown)    Estimated body mass index is 21.1 kg/m  as calculated from the following:    Height as of 7/1/24: 1.626 m (5' 4\").    Weight as of 7/1/24: 55.7 kg (122 lb 14.4 oz).    Physical Exam  {Exam Choices (Optional):449227}  {Provider  The Mini-Cog is " incomplete, use link to complete and refresh note Link to Mini-Cog :361817}      7/1/2024   Mini Cog   Clock Draw Score 0 Abnormal   3 Item Recall 1 object recalled   Mini Cog Total Score 1        {A Mini-Cog total score of 0-2 suggests the possibility of dementia, score of 3-5 suggests no dementia:319602}         Signed Electronically by: Marcial Holden PA-C  {Email feedback regarding this note to [email or web address]   :913457}

## 2024-07-08 NOTE — PROGRESS NOTES
"  Assessment & Plan     Status post surgery  Incisional hernia, without obstruction or gangrene  Overall this went well though she did seem to have a possible breakout rash related to the abd girdle. She is not wearing it today -- recommended she contact her surgical team and consider wearing it over a shirt. Surgical sites look clean. Continue per surgery team        Subjective   Flora is a 78 year old, presenting for the following health issues:  Follow Up        7/8/2024    11:00 AM   Additional Questions   Roomed by ALLA MILES   Accompanied by Self         7/8/2024    11:00 AM   Patient Reported Additional Medications   Patient reports taking the following new medications None     HPI   Flora Hogan is a 78 year old female who presents today for follow up s/p ventral hernia repair  Overall the repair went well but she was unhappy with the post-operative care  Did have a reaction to something -- likely the abdominal girdle provided afterwards   -torso felt itchy and like on fire   -took this girdle off and went to bed; now improved a bit this morning  Wounds ok; no bleeding or tenderness    Did take oxycodone Rx, methocarbamol         Review of Systems  Constitutional, HEENT, cardiovascular, pulmonary, gi and gu systems are negative, except as otherwise noted.      Objective    /74 (BP Location: Right arm, Patient Position: Sitting, Cuff Size: Adult Regular)   Pulse 60   Temp 98.1  F (36.7  C) (Oral)   Resp 26   Ht 1.626 m (5' 4.02\")   Wt 56.3 kg (124 lb 3.2 oz)   LMP  (LMP Unknown)   SpO2 97%   BMI 21.31 kg/m    Body mass index is 21.31 kg/m .  Physical Exam   GENERAL: alert and no distress  RESP: lungs clear to auscultation - no rales, rhonchi or wheezes  CV: regular rates and rhythm  ABDOMEN: mildly tender around surgical sites but overall soft, non tender  MS: No peripheral edema   SKIN: there is subtle diffuse erythema over the torso from the ribline to the waistline           Signed " Electronically by: Marcial Holden PA-C

## 2024-07-09 ENCOUNTER — TELEPHONE (OUTPATIENT)
Dept: FAMILY MEDICINE | Facility: CLINIC | Age: 79
End: 2024-07-09
Payer: COMMERCIAL

## 2024-07-09 NOTE — TELEPHONE ENCOUNTER
General Call    Contacts       Contact Date/Time Type Contact Phone/Fax    07/09/2024 02:25 PM CDT Phone (Incoming) Flora Hogan (Self) [phone] (M)          Reason for Call:  pt looking for neurologist would like Dr Childress to recommend a neurologist    What are your questions or concerns:  NA    Date of last appointment with provider: 07/08/2024    Could we send this information to you in [employer] or would you prefer to receive a phone call?:   Patient would like to be contacted via [employer]

## 2024-07-09 NOTE — TELEPHONE ENCOUNTER
There is such a delay in seeing neurology I think anyone she can make an appt with is fully capable of reviewing her symptoms and deciding if any invention is worthwhile. Would simply recommend to make soonest appt

## 2024-07-10 NOTE — TELEPHONE ENCOUNTER
LVM message requesting a call back for an appt. Two more attempts will be made.     Debbie Maciel     [facility]

## 2024-07-11 NOTE — TELEPHONE ENCOUNTER
Pt called back. Advised of provider note, Pt was given the [facility] Referral line # from the referral that was placed on 7/1/24.     Dolly Maciel

## 2024-07-12 ENCOUNTER — PATIENT OUTREACH (OUTPATIENT)
Dept: CARE COORDINATION | Facility: CLINIC | Age: 79
End: 2024-07-12
Payer: COMMERCIAL

## 2024-07-17 ENCOUNTER — HOSPITAL ENCOUNTER (OUTPATIENT)
Dept: MAMMOGRAPHY | Facility: CLINIC | Age: 79
Discharge: HOME | End: 2024-07-17
Attending: PHYSICIAN ASSISTANT | Admitting: PHYSICIAN ASSISTANT
Payer: COMMERCIAL

## 2024-07-17 DIAGNOSIS — Z12.31 VISIT FOR SCREENING MAMMOGRAM: ICD-10-CM

## 2024-07-17 PROCEDURE — 77063 BREAST TOMOSYNTHESIS BI: CPT

## 2024-08-27 NOTE — PROGRESS NOTES
Pulmonary Clinic Note    Date of Service: 8/28/2024     Chief Complaint   Patient presents with    RECHECK     SOB.       A/P:  79F invasive ductal Ca, pancreatic ductal adenoCa being seen for SOB. She has supranormal pulmonary function. Chest imaging w/ stable nodules. She has symptoms consistent with allergic-type asthma. Will start maintenance therapy.     - start ICS-LABA (Symbicort) twice daily, rinse mouth out after use  - start montelukast  - OK to substitute medications based on formulary     History:  79F invasive ductal Ca, pancreatic ductal adenoCa being seen for SOB. She is prescribed prn albuterol. Has been feeling SOB for years. Breathing has become worse since Ca diagnosis. GONZALEZ w/ moderate-strenuous activity. No SOB at rest. At times notices palpitations, will be SOB at this time. Notices chest tightness. Does not hear wheezing. Has had more cough recently. Has noticed more sinus congestion. No nocturnal cough. No orthopnea or PND. No LE edema. Has significant seasonal allergies, worsening. She is using loratadine for allergies, helps at times. Allergies worse in spring and fall. Also notices with poor air quality w/ [place] fires. Walks up to 2mi, but can be difficult, used to walk 4mi a day. Harder to get air in. Has not noticed throat tightness. Has noticed hoarseness. Perhaps triggered by strong odors. Does not use albuterol frequently, but it is helpful.     Oncologic Hx: received anastrozole and tamoxifen for breast Ca, received gemcitabine.     Smoking: never     Bird exposure: no             Animal exposure: no        Inhalation exposure: no    Occupation: retired, special                           10 point review of systems negative, aside from that mentioned in HPI.    /70   Pulse 52   Wt 54.4 kg (120 lb)   LMP  (LMP Unknown)   SpO2 97%   BMI 20.59 kg/m    Gen: well-appearing  HEENT: Mallampati I  Card: RRR  Pulm: clear bilaterally   Abd: soft  MSK: no edema, no acute  joint abnormality   Skin: no obvious rash  Psych: normal affect  Neuro: alert and oriented     Labs:  Personally reviewed  Abs eos (11/2023) - 200     Imaging/Studies: Personally reviewed  PFTs (8/2024) - supranormal FVC and FEV1, likely normal variant, otherwise normal pulmonary function   CT C/A/P (5/2024) - few stable nodules, few punctate foci of peripheral bronchial mucous plugging    Past Medical History:   Diagnosis Date    Chest tightness     had suspected allergies    Malignant neoplasm of head of pancreas (H) 06/09/2020    Osteopenia 09/13/2014    Seasonal allergies     SOB (shortness of breath)      Past Surgical History:   Procedure Laterality Date    BIOPSY BREAST Right 9/19/2022    Procedure: Right radiofrequency localized excisional breast biopsy;  Surgeon: Geovanny Wagner MD;  Location:  OR    CATARACT IOL, RT/LT  2015    COLONOSCOPY  10/14    no repeat needed due to age    COLONOSCOPY N/A 10/7/2014    Procedure: COLONOSCOPY;  Surgeon: Daryl Hanson MD;  Location:  GI    COLONOSCOPY  04/23/2019    no further screening    ENDOSCOPIC RETROGRADE CHOLANGIOPANCREATOGRAM N/A 4/20/2020    Procedure: ENDOSCOPIC RETROGRADE CHOLANGIOPANCREATOGRAPHY, PLACEMENT OF PANCREATIC STENT, PLACEMENT OF BILIARY STENT;  Surgeon: Yarely Vann MD;  Location:  OR    ESOPHAGOSCOPY, GASTROSCOPY, DUODENOSCOPY (EGD), COMBINED N/A 4/20/2020    Procedure: ESOPHAGOGASTRODUODENOSCOPY, WITH FINE NEEDLE ASPIRATION BIOPSY, WITH ENDOSCOPIC ULTRASOUND GUIDANCE, Endoscopic retrograde cholangiopancreatography;  Surgeon: Yarely Vann MD;  Location:  OR    ESOPHAGOSCOPY, GASTROSCOPY, DUODENOSCOPY (EGD), COMBINED N/A 5/5/2020    Procedure: ESOPHAGOGASTRODUODENOSCOPY, WITH FINE NEEDLE ASPIRATION BIOPSY, WITH ENDOSCOPIC ULTRASOUND GUIDANCE;  Surgeon: Romina Rosario MD;  Location:  GI    IR CHEST PORT PLACEMENT > 5 YRS OF AGE  7/6/2020    IR PORT REMOVAL RIGHT  12/28/2020    LAPAROSCOPIC BIOPSY LIVER  N/A 5/22/2020    Procedure: Diagnostic Laparoscopy with intraoperative ultrasound and Liver Biopsy X2;  Surgeon: Duarte Chaves MD;  Location: [facility]    LUMPECTOMY BREAST Right 10/27/2022    Procedure: right breast reexcision lumpectomy;  Surgeon: Geovanny Wagner MD;  Location: [facility]    LUMPECTOMY BREAST WITH SENTINEL NODE, COMBINED Right 11/11/2022    Procedure: Reexcision right lumpectomy, right sentinel node biopsy;  Surgeon: Geovanny Wagner MD;  Location: [facility]    WHIPPLE PROCEDURE N/A 5/22/2020    Procedure: Non Pylorus Preserving Whipple procedure;  Surgeon: Duarte Chaves MD;  Location: [facility] OR     Family History   Problem Relation Age of Onset    Musculoskeletal Disorder Mother         edematous legs    Obesity Mother     Lymphoma Brother     Musculoskeletal Disorder Maternal Grandmother         edematous legs; did have amputation    Obesity Maternal Grandmother     Myocardial Infarction Maternal Grandmother      Social History     Socioeconomic History    Marital status: Single     Spouse name: Not on file    Number of children: Not on file    Years of education: Not on file    Highest education level: Not on file   Occupational History     Employer: RETIRED     Comment: previous taught special ed   Tobacco Use    Smoking status: Never     Passive exposure: Never    Smokeless tobacco: Never   Vaping Use    Vaping status: Never Used   Substance and Sexual Activity    Alcohol use: Yes     Comment: social     Drug use: Never    Sexual activity: Yes   Other Topics Concern    Parent/sibling w/ CABG, MI or angioplasty before 65F 55M? Not Asked     Service Not Asked    Blood Transfusions Not Asked    Caffeine Concern Yes     Comment: 2 cups    Occupational Exposure Not Asked    Hobby Hazards Not Asked    Sleep Concern Not Asked    Stress Concern Not Asked    Weight Concern Not Asked    Special Diet No    Back Care Not Asked    Exercise Yes     Comment: walking workout     Bike Helmet Not Asked     Seat Belt Not Asked    Self-Exams Not Asked   Social History Narrative    Not on file     Social Determinants of Health     Financial Resource Strain: Low Risk  (7/8/2024)    Financial Resource Strain     Within the past 12 months, have you or your family members you live with been unable to get utilities (heat, electricity) when it was really needed?: No   Food Insecurity: Low Risk  (7/8/2024)    Food Insecurity     Within the past 12 months, did you worry that your food would run out before you got money to buy more?: No     Within the past 12 months, did the food you bought just not last and you didn t have money to get more?: No   Transportation Needs: Low Risk  (7/8/2024)    Transportation Needs     Within the past 12 months, has lack of transportation kept you from medical appointments, getting your medicines, non-medical meetings or appointments, work, or from getting things that you need?: No   Physical Activity: Inactive (7/8/2024)    Exercise Vital Sign     Days of Exercise per Week: 0 days     Minutes of Exercise per Session: 0 min   Stress: Stress Concern Present (7/8/2024)    [place] [employer] - Occupational Stress Questionnaire     Feeling of Stress : To some extent   Social Connections: Unknown (7/8/2024)    Social Connection and Isolation Panel [NHANES]     Frequency of Communication with Friends and Family: Not on file     Frequency of Social Gatherings with Friends and Family: More than three times a week     Attends Amish Services: Not on file     Active Member of Clubs or Organizations: Not on file     Attends Club or Organization Meetings: Not on file     Marital Status: Not on file   Interpersonal Safety: Low Risk  (7/1/2024)    Interpersonal Safety     Do you feel physically and emotionally safe where you currently live?: Yes     Within the past 12 months, have you been hit, slapped, kicked or otherwise physically hurt by someone?: No     Within the past 12  months, have you been humiliated or emotionally abused in other ways by your partner or ex-partner?: No   Housing Stability: Low Risk  (7/8/2024)    Housing Stability     Do you have housing? : Yes     Are you worried about losing your housing?: No       50 minutes spent reviewing chart, reviewing test results, talking with and examining patient, formulating plan, and documentation on the day of the encounter.    Rigo Menchaca MD  Pulmonary and Critical Care Medicine  [facility]

## 2024-08-28 ENCOUNTER — OFFICE VISIT (OUTPATIENT)
Dept: PULMONOLOGY | Facility: CLINIC | Age: 79
End: 2024-08-28
Attending: PHYSICIAN ASSISTANT
Payer: COMMERCIAL

## 2024-08-28 ENCOUNTER — OFFICE VISIT (OUTPATIENT)
Dept: PULMONOLOGY | Facility: CLINIC | Age: 79
End: 2024-08-28
Payer: COMMERCIAL

## 2024-08-28 VITALS
SYSTOLIC BLOOD PRESSURE: 118 MMHG | BODY MASS INDEX: 20.59 KG/M2 | DIASTOLIC BLOOD PRESSURE: 70 MMHG | WEIGHT: 120 LBS | HEART RATE: 52 BPM | OXYGEN SATURATION: 97 %

## 2024-08-28 DIAGNOSIS — R06.02 SHORTNESS OF BREATH: ICD-10-CM

## 2024-08-28 DIAGNOSIS — J45.30 MILD PERSISTENT ASTHMA WITHOUT COMPLICATION: Primary | ICD-10-CM

## 2024-08-28 LAB
DLCOUNC-%PRED-PRE: 90 %
DLCOUNC-PRE: 16.6 ML/MIN/MMHG
DLCOUNC-PRED: 18.31 ML/MIN/MMHG
ERV-%PRED-PRE: 137 %
ERV-PRE: 1.18 L
ERV-PRED: 0.86 L
EXPTIME-PRE: 5.82 SEC
FEF2575-%PRED-PRE: 151 %
FEF2575-PRE: 2.23 L/SEC
FEF2575-PRED: 1.47 L/SEC
FEFMAX-%PRED-PRE: 134 %
FEFMAX-PRE: 6.43 L/SEC
FEFMAX-PRED: 4.78 L/SEC
FEV1-%PRED-PRE: 135 %
FEV1-PRE: 2.45 L
FEV1FEV6-PRE: 76 %
FEV1FEV6-PRED: 78 %
FEV1FVC-PRE: 76 %
FEV1FVC-PRED: 78 %
FEV1SVC-PRE: 72 %
FEV1SVC-PRED: 61 %
FIFMAX-PRE: 4.84 L/SEC
FRCPLETH-%PRED-PRE: 127 %
FRCPLETH-PRE: 3.47 L
FRCPLETH-PRED: 2.72 L
FVC-%PRED-PRE: 136 %
FVC-PRE: 3.2 L
FVC-PRED: 2.34 L
IC-%PRED-PRE: 127 %
IC-PRE: 2.2 L
IC-PRED: 1.72 L
RVPLETH-%PRED-PRE: 102 %
RVPLETH-PRE: 2.29 L
RVPLETH-PRED: 2.22 L
TLCPLETH-%PRED-PRE: 114 %
TLCPLETH-PRE: 5.67 L
TLCPLETH-PRED: 4.94 L
VA-%PRED-PRE: 111 %
VA-PRE: 4.97 L
VC-%PRED-PRE: 114 %
VC-PRE: 3.38 L
VC-PRED: 2.96 L

## 2024-08-28 PROCEDURE — 94729 DIFFUSING CAPACITY: CPT | Performed by: INTERNAL MEDICINE

## 2024-08-28 PROCEDURE — 99204 OFFICE O/P NEW MOD 45 MIN: CPT | Mod: 25 | Performed by: STUDENT IN AN ORGANIZED HEALTH CARE EDUCATION/TRAINING PROGRAM

## 2024-08-28 PROCEDURE — 94726 PLETHYSMOGRAPHY LUNG VOLUMES: CPT | Performed by: INTERNAL MEDICINE

## 2024-08-28 PROCEDURE — 94375 RESPIRATORY FLOW VOLUME LOOP: CPT | Performed by: INTERNAL MEDICINE

## 2024-08-28 RX ORDER — MONTELUKAST SODIUM 10 MG/1
10 TABLET ORAL AT BEDTIME
Qty: 30 TABLET | Refills: 3 | Status: SHIPPED | OUTPATIENT
Start: 2024-08-28

## 2024-08-28 RX ORDER — BUDESONIDE AND FORMOTEROL FUMARATE DIHYDRATE 160; 4.5 UG/1; UG/1
2 AEROSOL RESPIRATORY (INHALATION) 2 TIMES DAILY
Qty: 10.2 G | Refills: 3 | Status: SHIPPED | OUTPATIENT
Start: 2024-08-28

## 2024-08-28 RX ORDER — ALBUTEROL SULFATE 90 UG/1
2 AEROSOL, METERED RESPIRATORY (INHALATION) EVERY 6 HOURS PRN
Qty: 18 G | Refills: 3 | Status: SHIPPED | OUTPATIENT
Start: 2024-08-28

## 2024-08-28 NOTE — PROGRESS NOTES
Flora Hogan comes into clinic today at the request of Dr. Menchaca, Ordering Provider for PFT    This service provided today was under the supervising provider of the day Dr. Menchaca, who was available if needed.    Sterling Degroot, RT

## 2024-08-28 NOTE — PATIENT INSTRUCTIONS
Thank you for coming to pulmonary clinic. Your pulmonary function tests is normal. Your chest imaging shows stable nodules, your oncologist will continue to follow up with routine CT scans. Your symptoms are consistent with asthma. I would like you to start Symbicort twice daily, rinse your mouth out after use. Start Singulair at night. You may continue as needed albuterol for worsening shortness of breath or 5-10 minutes prior to activity. I will see you back in 3 months.

## 2024-08-28 NOTE — LETTER
8/28/2024      Flora Hogan  [address]  [place] [address]      Dear Colleague,    Thank you for referring your patient, Flora Hogan, to the [facility] [place]. Please see a copy of my visit note below.    Pulmonary Clinic Note    Date of Service: 8/28/2024     Chief Complaint   Patient presents with     RECHECK     SOB.       A/P:  79F invasive ductal Ca, pancreatic ductal adenoCa being seen for SOB. She has supranormal pulmonary function. Chest imaging w/ stable nodules. She has symptoms consistent with allergic-type asthma. Will start maintenance therapy.     - start ICS-LABA (Symbicort) twice daily, rinse mouth out after use  - start montelukast  - OK to substitute medications based on formulary     History:  79F invasive ductal Ca, pancreatic ductal adenoCa being seen for SOB. She is prescribed prn albuterol. Has been feeling SOB for years. Breathing has become worse since Ca diagnosis. G[place]Z w/ moderate-strenuous activity. No SOB at rest. At times notices palpitations, will be SOB at this time. Notices chest tightness. Does not hear wheezing. Has had more cough recently. Has noticed more sinus congestion. No nocturnal cough. No orthopnea or PND. No LE edema. Has significant seasonal allergies, worsening. She is using loratadine for allergies, helps at times. Allergies worse in spring and fall. Also notices with poor air quality w/ [place] fires. Walks up to 2mi, but can be difficult, used to walk 4mi a day. Harder to get air in. Has not noticed throat tightness. Has noticed hoarseness. Perhaps triggered by strong odors. Does not use albuterol frequently, but it is helpful.     Oncologic Hx: received anastrozole and tamoxifen for breast Ca, received gemcitabine.     Smoking: never     Bird exposure: no             Animal exposure: no        Inhalation exposure: no    Occupation: retired, special                           10 point review of systems negative,  aside from that mentioned in HPI.    /70   Pulse 52   Wt 54.4 kg (120 lb)   LMP  (LMP Unknown)   SpO2 97%   BMI 20.59 kg/m    Gen: well-appearing  HEENT: Mallampati I  Card: RRR  Pulm: clear bilaterally   Abd: soft  MSK: no edema, no acute joint abnormality   Skin: no obvious rash  Psych: normal affect  Neuro: alert and oriented     Labs:  Personally reviewed  Abs eos (11/2023) - 200     Imaging/Studies: Personally reviewed  PFTs (8/2024) - supranormal FVC and FEV1, likely normal variant, otherwise normal pulmonary function   CT C/A/P (5/2024) - few stable nodules, few punctate foci of peripheral bronchial mucous plugging    Past Medical History:   Diagnosis Date     Chest tightness     had suspected allergies     Malignant neoplasm of head of pancreas (H) 06/09/2020     Osteopenia 09/13/2014     Seasonal allergies      SOB (shortness of breath)      Past Surgical History:   Procedure Laterality Date     BIOPSY BREAST Right 9/19/2022    Procedure: Right radiofrequency localized excisional breast biopsy;  Surgeon: Geovanny Wagner MD;  Location:  OR     CATARACT IOL, RT/LT  2015     COLONOSCOPY  10/14    no repeat needed due to age     COLONOSCOPY N/A 10/7/2014    Procedure: COLONOSCOPY;  Surgeon: Daryl Hanson MD;  Location:  GI     COLONOSCOPY  04/23/2019    no further screening     ENDOSCOPIC RETROGRADE CHOLANGIOPANCREATOGRAM N/A 4/20/2020    Procedure: ENDOSCOPIC RETROGRADE CHOLANGIOPANCREATOGRAPHY, PLACEMENT OF PANCREATIC STENT, PLACEMENT OF BILIARY STENT;  Surgeon: Yarely Vann MD;  Location:  OR     ESOPHAGOSCOPY, GASTROSCOPY, DUODENOSCOPY (EGD), COMBINED N/A 4/20/2020    Procedure: ESOPHAGOGASTRODUODENOSCOPY, WITH FINE NEEDLE ASPIRATION BIOPSY, WITH ENDOSCOPIC ULTRASOUND GUIDANCE, Endoscopic retrograde cholangiopancreatography;  Surgeon: Yarely Vann MD;  Location:  OR     ESOPHAGOSCOPY, GASTROSCOPY, DUODENOSCOPY (EGD), COMBINED N/A 5/5/2020    Procedure:  ESOPHAGOGASTRODUODENOSCOPY, WITH FINE NEEDLE ASPIRATION BIOPSY, WITH ENDOSCOPIC ULTRASOUND GUIDANCE;  Surgeon: Romina Rosario MD;  Location: [facility] GI     IR CHEST PORT PLACEMENT > 5 YRS OF AGE  7/6/2020     IR PORT REMOVAL RIGHT  12/28/2020     LAPAROSCOPIC BIOPSY LIVER N/A 5/22/2020    Procedure: Diagnostic Laparoscopy with intraoperative ultrasound and Liver Biopsy X2;  Surgeon: Duarte Chaves MD;  Location: [facility]     LUMPECTOMY BREAST Right 10/27/2022    Procedure: right breast reexcision lumpectomy;  Surgeon: Geovanny Wagner MD;  Location: [facility]     LUMPECTOMY BREAST WITH SENTINEL NODE, COMBINED Right 11/11/2022    Procedure: Reexcision right lumpectomy, right sentinel node biopsy;  Surgeon: Geovanny Wagner MD;  Location: [facility]     WHIPPLE PROCEDURE N/A 5/22/2020    Procedure: Non Pylorus Preserving Whipple procedure;  Surgeon: Duarte Chaves MD;  Location: [facility]     Family History   Problem Relation Age of Onset     Musculoskeletal Disorder Mother         edematous legs     Obesity Mother      Lymphoma Brother      Musculoskeletal Disorder Maternal Grandmother         edematous legs; did have amputation     Obesity Maternal Grandmother      Myocardial Infarction Maternal Grandmother      Social History     Socioeconomic History     Marital status: Single     Spouse name: Not on file     Number of children: Not on file     Years of education: Not on file     Highest education level: Not on file   Occupational History     Employer: RETIRED     Comment: previous taught special ed   Tobacco Use     Smoking status: Never     Passive exposure: Never     Smokeless tobacco: Never   Vaping Use     Vaping status: Never Used   Substance and Sexual Activity     Alcohol use: Yes     Comment: social      Drug use: Never     Sexual activity: Yes   Other Topics Concern     Parent/sibling w/ CABG, MI or angioplasty before 65F 55M? Not Asked      Service Not Asked     Blood Transfusions Not Asked      Caffeine Concern Yes     Comment: 2 cups     Occupational Exposure Not Asked     Hobby Hazards Not Asked     Sleep Concern Not Asked     Stress Concern Not Asked     Weight Concern Not Asked     Special Diet No     Back Care Not Asked     Exercise Yes     Comment: walking workout      Bike Helmet Not Asked     Seat Belt Not Asked     Self-Exams Not Asked   Social History Narrative     Not on file     Social Determinants of Health     Financial Resource Strain: Low Risk  (7/8/2024)    Financial Resource Strain      Within the past 12 months, have you or your family members you live with been unable to get utilities (heat, electricity) when it was really needed?: No   Food Insecurity: Low Risk  (7/8/2024)    Food Insecurity      Within the past 12 months, did you worry that your food would run out before you got money to buy more?: No      Within the past 12 months, did the food you bought just not last and you didn t have money to get more?: No   Transportation Needs: Low Risk  (7/8/2024)    Transportation Needs      Within the past 12 months, has lack of transportation kept you from medical appointments, getting your medicines, non-medical meetings or appointments, work, or from getting things that you need?: No   Physical Activity: Inactive (7/8/2024)    Exercise Vital Sign      Days of Exercise per Week: 0 days      Minutes of Exercise per Session: 0 min   Stress: Stress Concern Present (7/8/2024)    [place] [employer] - Occupational Stress Questionnaire      Feeling of Stress : To some extent   Social Connections: Unknown (7/8/2024)    Social Connection and Isolation Panel [NHANES]      Frequency of Communication with Friends and Family: Not on file      Frequency of Social Gatherings with Friends and Family: More than three times a week      Attends Sikh Services: Not on file      Active Member of Clubs or Organizations: Not on file      Attends Club or Organization Meetings: Not  on file      Marital Status: Not on file   Interpersonal Safety: Low Risk  (7/1/2024)    Interpersonal Safety      Do you feel physically and emotionally safe where you currently live?: Yes      Within the past 12 months, have you been hit, slapped, kicked or otherwise physically hurt by someone?: No      Within the past 12 months, have you been humiliated or emotionally abused in other ways by your partner or ex-partner?: No   Housing Stability: Low Risk  (7/8/2024)    Housing Stability      Do you have housing? : Yes      Are you worried about losing your housing?: No       50 minutes spent reviewing chart, reviewing test results, talking with and examining patient, formulating plan, and documentation on the day of the encounter.    Rigo Menchaca MD  Pulmonary and Critical Care Medicine  [facility]       Again, thank you for allowing me to participate in the care of your patient.        Sincerely,        Rigo Menchaca MD

## 2024-09-27 DIAGNOSIS — C25.0 MALIGNANT NEOPLASM OF HEAD OF PANCREAS (H): ICD-10-CM

## 2024-09-27 NOTE — TELEPHONE ENCOUNTER
Lipase-protease-amylase (CREON) [id number] units     Last Written Prescription Date:  7/19/23  Last Fill Quantity: 270,   # refills: 3  Last Office Visit: 5/29/24  Future Office visit:       Routing refill request to provider for review/approval.    Katie Whitmore, [facility]

## 2024-10-21 DIAGNOSIS — C25.0 MALIGNANT NEOPLASM OF HEAD OF PANCREAS (H): ICD-10-CM

## 2024-10-21 RX ORDER — CITALOPRAM HYDROBROMIDE 20 MG/1
20 TABLET ORAL DAILY
Qty: 90 TABLET | Refills: 1 | Status: SHIPPED | OUTPATIENT
Start: 2024-10-21

## 2024-10-31 ENCOUNTER — TELEPHONE (OUTPATIENT)
Dept: FAMILY MEDICINE | Facility: CLINIC | Age: 79
End: 2024-10-31
Payer: COMMERCIAL

## 2024-10-31 NOTE — TELEPHONE ENCOUNTER
Patient calls stating she is currently taking 2000IU if vitamin d3 daily, wondering if she can continue this dose.    Based on most recent dexa scan, results did advise on calcium and vitamin D supplement. Patient does take fosamax as well. I advised her that it's fine to continue that dose. Routing to PCP. Please route back to triage if you have other recommendations.    Neena Knight RN on 10/31/2024 at 10:05 AM

## 2024-11-08 ENCOUNTER — LAB (OUTPATIENT)
Dept: INFUSION THERAPY | Facility: CLINIC | Age: 79
End: 2024-11-08
Attending: PHYSICIAN ASSISTANT
Payer: COMMERCIAL

## 2024-11-08 ENCOUNTER — HOSPITAL ENCOUNTER (OUTPATIENT)
Dept: CT IMAGING | Facility: CLINIC | Age: 79
Discharge: HOME | End: 2024-11-08
Attending: INTERNAL MEDICINE | Admitting: INTERNAL MEDICINE
Payer: COMMERCIAL

## 2024-11-08 DIAGNOSIS — C25.0 MALIGNANT NEOPLASM OF HEAD OF PANCREAS (H): ICD-10-CM

## 2024-11-08 DIAGNOSIS — Z17.0 MALIGNANT NEOPLASM OF UPPER-INNER QUADRANT OF RIGHT BREAST IN FEMALE, ESTROGEN RECEPTOR POSITIVE (H): ICD-10-CM

## 2024-11-08 DIAGNOSIS — C50.211 MALIGNANT NEOPLASM OF UPPER-INNER QUADRANT OF RIGHT BREAST IN FEMALE, ESTROGEN RECEPTOR POSITIVE (H): ICD-10-CM

## 2024-11-08 LAB
ALBUMIN SERPL BCG-MCNC: 4.2 G/DL (ref 3.5–5.2)
ALP SERPL-CCNC: 74 U/L (ref 40–150)
ALT SERPL W P-5'-P-CCNC: 23 U/L (ref 0–50)
ANION GAP SERPL CALCULATED.3IONS-SCNC: 12 MMOL/L (ref 7–15)
AST SERPL W P-5'-P-CCNC: 27 U/L (ref 0–45)
BILIRUB SERPL-MCNC: 0.3 MG/DL
BUN SERPL-MCNC: 19.4 MG/DL (ref 8–23)
CALCIUM SERPL-MCNC: 9.1 MG/DL (ref 8.8–10.4)
CHLORIDE SERPL-SCNC: 98 MMOL/L (ref 98–107)
CREAT SERPL-MCNC: 1 MG/DL (ref 0.51–0.95)
EGFRCR SERPLBLD CKD-EPI 2021: 57 ML/MIN/1.73M2
ERYTHROCYTE [DISTWIDTH] IN BLOOD BY AUTOMATED COUNT: 13.6 % (ref 10–15)
GLUCOSE SERPL-MCNC: 90 MG/DL (ref 70–99)
HCO3 SERPL-SCNC: 24 MMOL/L (ref 22–29)
HCT VFR BLD AUTO: 40.2 % (ref 35–47)
HGB BLD-MCNC: 13.3 G/DL (ref 11.7–15.7)
MCH RBC QN AUTO: 29 PG (ref 26.5–33)
MCHC RBC AUTO-ENTMCNC: 33.1 G/DL (ref 31.5–36.5)
MCV RBC AUTO: 88 FL (ref 78–100)
PLATELET # BLD AUTO: 166 10E3/UL (ref 150–450)
POTASSIUM SERPL-SCNC: 4.7 MMOL/L (ref 3.4–5.3)
PROT SERPL-MCNC: 7 G/DL (ref 6.4–8.3)
RBC # BLD AUTO: 4.58 10E6/UL (ref 3.8–5.2)
SODIUM SERPL-SCNC: 134 MMOL/L (ref 135–145)
WBC # BLD AUTO: 4.4 10E3/UL (ref 4–11)

## 2024-11-08 PROCEDURE — 84155 ASSAY OF PROTEIN SERUM: CPT | Performed by: INTERNAL MEDICINE

## 2024-11-08 PROCEDURE — 36415 COLL VENOUS BLD VENIPUNCTURE: CPT

## 2024-11-08 PROCEDURE — 85014 HEMATOCRIT: CPT | Performed by: INTERNAL MEDICINE

## 2024-11-08 PROCEDURE — 250N000011 HC RX IP 250 OP 636: Performed by: RADIOLOGY

## 2024-11-08 PROCEDURE — 74177 CT ABD & PELVIS W/CONTRAST: CPT

## 2024-11-08 PROCEDURE — 250N000009 HC RX 250: Performed by: RADIOLOGY

## 2024-11-08 PROCEDURE — 86301 IMMUNOASSAY TUMOR CA 19-9: CPT | Performed by: INTERNAL MEDICINE

## 2024-11-08 PROCEDURE — 82947 ASSAY GLUCOSE BLOOD QUANT: CPT | Performed by: INTERNAL MEDICINE

## 2024-11-08 RX ORDER — IOPAMIDOL 755 MG/ML
500 INJECTION, SOLUTION INTRAVASCULAR ONCE
Status: COMPLETED | OUTPATIENT
Start: 2024-11-08 | End: 2024-11-08

## 2024-11-08 RX ADMIN — IOPAMIDOL 58 ML: 755 INJECTION, SOLUTION INTRAVENOUS at 13:38

## 2024-11-08 RX ADMIN — SODIUM CHLORIDE 55 ML: 9 INJECTION, SOLUTION INTRAVENOUS at 13:38

## 2024-11-08 NOTE — PROGRESS NOTES
Nursing Note:  Flora Hogan presents today for labs+ piv placement for ct scan.    Patient seen by provider today: No   present during visit today: Not Applicable.    Note: N/A.    Intravenous Access:  Lab draw site right arm, Needle type angiocath, Gauge 20.  Labs drawn without difficulty.  Peripheral IV placed.    Discharge Plan:   Patient was sent to radiology for scan appointment.    Barbara Barrios RN

## 2024-11-10 LAB — CANCER AG19-9 SERPL IA-ACNC: 8 U/ML
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
## 2024-11-19 ENCOUNTER — ONCOLOGY VISIT (OUTPATIENT)
Dept: ONCOLOGY | Facility: CLINIC | Age: 79
End: 2024-11-19
Attending: PHYSICIAN ASSISTANT
Payer: COMMERCIAL

## 2024-11-19 VITALS
RESPIRATION RATE: 18 BRPM | BODY MASS INDEX: 20.76 KG/M2 | OXYGEN SATURATION: 91 % | DIASTOLIC BLOOD PRESSURE: 66 MMHG | TEMPERATURE: 97.4 F | HEIGHT: 64 IN | SYSTOLIC BLOOD PRESSURE: 110 MMHG | HEART RATE: 57 BPM | WEIGHT: 121.6 LBS

## 2024-11-19 DIAGNOSIS — Z17.0 MALIGNANT NEOPLASM OF UPPER-INNER QUADRANT OF RIGHT BREAST IN FEMALE, ESTROGEN RECEPTOR POSITIVE (H): ICD-10-CM

## 2024-11-19 DIAGNOSIS — M80.00XA AGE-RELATED OSTEOPOROSIS WITH CURRENT PATHOLOGICAL FRACTURE, INITIAL ENCOUNTER: ICD-10-CM

## 2024-11-19 DIAGNOSIS — C25.0 MALIGNANT NEOPLASM OF HEAD OF PANCREAS (H): Primary | ICD-10-CM

## 2024-11-19 DIAGNOSIS — C50.211 MALIGNANT NEOPLASM OF UPPER-INNER QUADRANT OF RIGHT BREAST IN FEMALE, ESTROGEN RECEPTOR POSITIVE (H): ICD-10-CM

## 2024-11-19 DIAGNOSIS — Z12.31 ENCOUNTER FOR SCREENING MAMMOGRAM FOR BREAST CANCER: ICD-10-CM

## 2024-11-19 PROCEDURE — 99215 OFFICE O/P EST HI 40 MIN: CPT | Performed by: PHYSICIAN ASSISTANT

## 2024-11-19 PROCEDURE — G0463 HOSPITAL OUTPT CLINIC VISIT: HCPCS | Performed by: PHYSICIAN ASSISTANT

## 2024-11-19 PROCEDURE — G2211 COMPLEX E/M VISIT ADD ON: HCPCS | Performed by: PHYSICIAN ASSISTANT

## 2024-11-19 ASSESSMENT — PAIN SCALES - GENERAL: PAINLEVEL_OUTOF10: NO PAIN (0)
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
## 2024-11-19 NOTE — NURSING NOTE
"Oncology Rooming Note    November 19, 2024 9:05 AM   Flora Hogan is a 79 year old female who presents for:    Chief Complaint   Patient presents with    Oncology Clinic Visit     Ductal carcinoma in situ (DCIS) of right breast  Malignant neoplasm of head of pancreas           Initial Vitals: /66   Pulse 57   Temp 97.4  F (36.3  C) (Temporal)   Resp 18   Ht 1.626 m (5' 4.02\")   Wt 55.2 kg (121 lb 9.6 oz)   LMP  (LMP Unknown)   SpO2 91%   BMI 20.86 kg/m   Estimated body mass index is 20.86 kg/m  as calculated from the following:    Height as of this encounter: 1.626 m (5' 4.02\").    Weight as of this encounter: 55.2 kg (121 lb 9.6 oz). Body surface area is 1.58 meters squared.  No Pain (0) Comment: Data Unavailable   No LMP recorded (lmp unknown). Patient is postmenopausal.  Allergies reviewed: Yes  Medications reviewed: Yes    Medications: Medication refills not needed today.  Pharmacy name entered into [facility]:    [id number] - [facility] [address] AT [place] [address] & [facility] #[id number] - [address] AT [facility] & [address] [address] [facility]    Frailty Screening:   Is the patient here for a new oncology consult visit in cancer care? 2. No      Clinical concerns: Follow up     Fingernails are brittle and have ridges      Erika Peguero MA              "

## 2024-12-18 ENCOUNTER — OFFICE VISIT (OUTPATIENT)
Dept: PULMONOLOGY | Facility: CLINIC | Age: 79
End: 2024-12-18
Attending: STUDENT IN AN ORGANIZED HEALTH CARE EDUCATION/TRAINING PROGRAM
Payer: COMMERCIAL

## 2024-12-18 VITALS
HEART RATE: 58 BPM | BODY MASS INDEX: 21.1 KG/M2 | WEIGHT: 123 LBS | SYSTOLIC BLOOD PRESSURE: 136 MMHG | OXYGEN SATURATION: 100 % | DIASTOLIC BLOOD PRESSURE: 73 MMHG

## 2024-12-18 DIAGNOSIS — J45.30 MILD PERSISTENT ASTHMA WITHOUT COMPLICATION: Primary | ICD-10-CM

## 2024-12-18 PROCEDURE — 99214 OFFICE O/P EST MOD 30 MIN: CPT | Performed by: STUDENT IN AN ORGANIZED HEALTH CARE EDUCATION/TRAINING PROGRAM

## 2024-12-18 RX ORDER — BUDESONIDE AND FORMOTEROL FUMARATE DIHYDRATE 160; 4.5 UG/1; UG/1
2 AEROSOL RESPIRATORY (INHALATION) PRN
Qty: 10.2 G | Refills: 5 | Status: SHIPPED | OUTPATIENT
Start: 2024-12-18

## 2024-12-18 RX ORDER — MONTELUKAST SODIUM 10 MG/1
10 TABLET ORAL AT BEDTIME
Qty: 30 TABLET | Refills: 11 | Status: SHIPPED | OUTPATIENT
Start: 2024-12-18

## 2024-12-18 ASSESSMENT — PAIN SCALES - GENERAL: PAINLEVEL_OUTOF10: NO PAIN (0)

## 2024-12-18 ASSESSMENT — ASTHMA QUESTIONNAIRES
QUESTION_2 LAST FOUR WEEKS HOW OFTEN HAVE YOU HAD SHORTNESS OF BREATH: MORE THAN ONCE A DAY
ACT_TOTALSCORE: 18
QUESTION_4 LAST FOUR WEEKS HOW OFTEN HAVE YOU USED YOUR RESCUE INHALER OR NEBULIZER MEDICATION (SUCH AS ALBUTEROL): ONCE A WEEK OR LESS
QUESTION_3 LAST FOUR WEEKS HOW OFTEN DID YOUR ASTHMA SYMPTOMS (WHEEZING, COUGHING, SHORTNESS OF BREATH, CHEST TIGHTNESS OR PAIN) WAKE YOU UP AT NIGHT OR EARLIER THAN USUAL IN THE MORNING: NOT AT ALL
QUESTION_5 LAST FOUR WEEKS HOW WOULD YOU RATE YOUR ASTHMA CONTROL: WELL CONTROLLED
ACT_TOTALSCORE: 18
QUESTION_1 LAST FOUR WEEKS HOW MUCH OF THE TIME DID YOUR ASTHMA KEEP YOU FROM GETTING AS MUCH DONE AT WORK, SCHOOL OR AT HOME: A LITTLE OF THE TIME

## 2024-12-18 NOTE — LETTER
12/18/2024      Flora Hogan  [address]  [place] [address]      Dear Colleague,    Thank you for referring your patient, Flora Hogan, to the [facility] [place]. Please see a copy of my visit note below.    Pulmonary Clinic Note    Date of Service: 12/18/2024     Chief Complaint   Patient presents with     RECHECK     Mild persistent asthma without complication         A/P:  79F invasive ductal Ca, pancreatic ductal adenoCa being seen for follow up asthma. She is doing very well. No exacerbations.     - continue montelukast  - continue prn ICS-LABA (Symbicort)  - follow up prn  - OK to substitute medications based on formulary     History:  79F invasive ductal Ca, pancreatic ductal adenoCa being seen for follow up asthma. Last seen 8/2024. She is prescribed ICS-LABA (Symbicort), montelukast, and prn albuterol. Has been doing better than expected. She thinks she got better since allergens have decreased. GONZALEZ w/ moderate activity, especially stairs. No SOB at rest. No chest pain or tightness. No wheezing. No cough. No nocturnal cough. No orthopnea or PND. No LE edema. Using Symbicort as needed.     Oncologic Hx: received anastrozole and tamoxifen for breast Ca, received gemcitabine.      Smoking: never     Bird exposure: no             Animal exposure: no        Inhalation exposure: no    Occupation: retired, special                          10 point review of systems negative, aside from that mentioned in HPI.    /73 (BP Location: Left arm, Patient Position: Sitting, Cuff Size: Adult Regular)   Pulse 58   Wt 55.8 kg (123 lb)   LMP  (LMP Unknown)   SpO2 100%   BMI 21.10 kg/m    Gen: well-appearing  HEENT: Mallampati I  Card: RRR  Pulm: clear bilaterally   Abd: soft  MSK: no edema, no acute joint abnormality   Skin: no obvious rash  Psych: normal affect  Neuro: alert and oriented     Labs  Abs eos (11/2023) - 200      Imaging/Studies: Personally  reviewed  CT C/A/P (11/2024) - stable 3mm LLL nodule, no new nodules   PFTs (8/2024) - supranormal FVC and FEV1, likely normal variant, otherwise normal pulmonary function   CT C/A/P (5/2024) - few stable nodules, few punctate foci of peripheral bronchial mucous plugging    Past Medical History:   Diagnosis Date     Chest tightness     had suspected allergies     Malignant neoplasm of head of pancreas (H) 06/09/2020     Osteopenia 09/13/2014     Seasonal allergies      SOB (shortness of breath)      Past Surgical History:   Procedure Laterality Date     BIOPSY BREAST Right 9/19/2022    Procedure: Right radiofrequency localized excisional breast biopsy;  Surgeon: Geovanny Wagner MD;  Location: [facility] OR     CATARACT IOL, RT/LT  2015     COLONOSCOPY  10/14    no repeat needed due to age     COLONOSCOPY N/A 10/7/2014    Procedure: COLONOSCOPY;  Surgeon: Daryl Hanson MD;  Location:  GI     COLONOSCOPY  04/23/2019    no further screening     ENDOSCOPIC RETROGRADE CHOLANGIOPANCREATOGRAM N/A 4/20/2020    Procedure: ENDOSCOPIC RETROGRADE CHOLANGIOPANCREATOGRAPHY, PLACEMENT OF PANCREATIC STENT, PLACEMENT OF BILIARY STENT;  Surgeon: Yarely Vann MD;  Location:  OR     ESOPHAGOSCOPY, GASTROSCOPY, DUODENOSCOPY (EGD), COMBINED N/A 4/20/2020    Procedure: ESOPHAGOGASTRODUODENOSCOPY, WITH FINE NEEDLE ASPIRATION BIOPSY, WITH ENDOSCOPIC ULTRASOUND GUIDANCE, Endoscopic retrograde cholangiopancreatography;  Surgeon: Yarely Vann MD;  Location:  OR     ESOPHAGOSCOPY, GASTROSCOPY, DUODENOSCOPY (EGD), COMBINED N/A 5/5/2020    Procedure: ESOPHAGOGASTRODUODENOSCOPY, WITH FINE NEEDLE ASPIRATION BIOPSY, WITH ENDOSCOPIC ULTRASOUND GUIDANCE;  Surgeon: Romina Rosario MD;  Location:  GI     IR CHEST PORT PLACEMENT > 5 YRS OF AGE  7/6/2020     IR PORT REMOVAL RIGHT  12/28/2020     LAPAROSCOPIC BIOPSY LIVER N/A 5/22/2020    Procedure: Diagnostic Laparoscopy with intraoperative ultrasound and Liver Biopsy X2;   Surgeon: Duarte Chaves MD;  Location: [facility]     LUMPECTOMY BREAST Right 10/27/2022    Procedure: right breast reexcision lumpectomy;  Surgeon: Geovanny Wagner MD;  Location: [facility] OR     LUMPECTOMY BREAST WITH SENTINEL NODE, COMBINED Right 11/11/2022    Procedure: Reexcision right lumpectomy, right sentinel node biopsy;  Surgeon: Geovanny Wagner MD;  Location: [facility] OR     WHIPPLE PROCEDURE N/A 5/22/2020    Procedure: Non Pylorus Preserving Whipple procedure;  Surgeon: Duarte Chaves MD;  Location: [facility] OR     Family History   Problem Relation Age of Onset     Musculoskeletal Disorder Mother         edematous legs     Obesity Mother      Lymphoma Brother      Musculoskeletal Disorder Maternal Grandmother         edematous legs; did have amputation     Obesity Maternal Grandmother      Myocardial Infarction Maternal Grandmother      Social History     Socioeconomic History     Marital status: Single     Spouse name: Not on file     Number of children: Not on file     Years of education: Not on file     Highest education level: Not on file   Occupational History     Employer: RETIRED     Comment: previous taught special ed   Tobacco Use     Smoking status: Never     Passive exposure: Never     Smokeless tobacco: Never   Vaping Use     Vaping status: Never Used   Substance and Sexual Activity     Alcohol use: Yes     Comment: social      Drug use: Never     Sexual activity: Yes   Other Topics Concern     Parent/sibling w/ CABG, MI or angioplasty before 65F 55M? Not Asked      Service Not Asked     Blood Transfusions Not Asked     Caffeine Concern Yes     Comment: 2 cups     Occupational Exposure Not Asked     Hobby Hazards Not Asked     Sleep Concern Not Asked     Stress Concern Not Asked     Weight Concern Not Asked     Special Diet No     Back Care Not Asked     Exercise Yes     Comment: walking workout      Bike Helmet Not Asked     Seat Belt Not Asked     Self-Exams Not Asked   Social History  Narrative     Not on file     Social Drivers of Health     Financial Resource Strain: Low Risk  (7/8/2024)    Financial Resource Strain      Within the past 12 months, have you or your family members you live with been unable to get utilities (heat, electricity) when it was really needed?: No   Food Insecurity: Low Risk  (7/8/2024)    Food Insecurity      Within the past 12 months, did you worry that your food would run out before you got money to buy more?: No      Within the past 12 months, did the food you bought just not last and you didn t have money to get more?: No   Transportation Needs: Low Risk  (7/8/2024)    Transportation Needs      Within the past 12 months, has lack of transportation kept you from medical appointments, getting your medicines, non-medical meetings or appointments, work, or from getting things that you need?: No   Physical Activity: Inactive (7/8/2024)    Exercise Vital Sign      Days of Exercise per Week: 0 days      Minutes of Exercise per Session: 0 min   Stress: Stress Concern Present (7/8/2024)    [employer] [place] [employer] - Occupational Stress Questionnaire      Feeling of Stress : To some extent   Social Connections: Unknown (7/8/2024)    Social Connection and Isolation Panel [NHANES]      Frequency of Communication with Friends and Family: Not on file      Frequency of Social Gatherings with Friends and Family: More than three times a week      Attends Yazidism Services: Not on file      Active Member of Clubs or Organizations: Not on file      Attends Club or Organization Meetings: Not on file      Marital Status: Not on file   Interpersonal Safety: Low Risk  (7/1/2024)    Interpersonal Safety      Do you feel physically and emotionally safe where you currently live?: Yes      Within the past 12 months, have you been hit, slapped, kicked or otherwise physically hurt by someone?: No      Within the past 12 months, have you been humiliated or emotionally abused in  other ways by your partner or ex-partner?: No   Housing Stability: Low Risk  (7/8/2024)    Housing Stability      Do you have housing? : Yes      Are you worried about losing your housing?: No       35 minutes spent reviewing chart, reviewing test results, talking with and examining patient, formulating plan, and documentation on the day of the encounter.    Rigo Menchaca MD  Pulmonary and Critical Care Medicine  [facility]       Again, thank you for allowing me to participate in the care of your patient.        Sincerely,        Rigo Menchaca MD

## 2024-12-18 NOTE — NURSING NOTE
Chief Complaint   Patient presents with    RECHECK     Mild persistent asthma without complication         Vitals:    12/18/24 0728   BP: 136/73   BP Location: Left arm   Patient Position: Sitting   Cuff Size: Adult Regular   Pulse: 58   SpO2: 100%   Weight: 55.8 kg (123 lb)       Body mass index is 21.1 kg/m .      Avila Strong MA

## 2024-12-18 NOTE — PROGRESS NOTES
Pulmonary Clinic Note    Date of Service: 12/18/2024     Chief Complaint   Patient presents with    RECHECK     Mild persistent asthma without complication         A/P:  79F invasive ductal Ca, pancreatic ductal adenoCa being seen for follow up asthma. She is doing very well. No exacerbations.     - continue montelukast  - continue prn ICS-LABA (Symbicort)  - follow up prn  - OK to substitute medications based on formulary     History:  79F invasive ductal Ca, pancreatic ductal adenoCa being seen for follow up asthma. Last seen 8/2024. She is prescribed ICS-LABA (Symbicort), montelukast, and prn albuterol. Has been doing better than expected. She thinks she got better since allergens have decreased. GONZALEZ w/ moderate activity, especially stairs. No SOB at rest. No chest pain or tightness. No wheezing. No cough. No nocturnal cough. No orthopnea or PND. No LE edema. Using Symbicort as needed.     Oncologic Hx: received anastrozole and tamoxifen for breast Ca, received gemcitabine.      Smoking: never     Bird exposure: no             Animal exposure: no        Inhalation exposure: no    Occupation: retired, special                          10 point review of systems negative, aside from that mentioned in HPI.    /73 (BP Location: Left arm, Patient Position: Sitting, Cuff Size: Adult Regular)   Pulse 58   Wt 55.8 kg (123 lb)   LMP  (LMP Unknown)   SpO2 100%   BMI 21.10 kg/m    Gen: well-appearing  HEENT: Mallampati I  Card: RRR  Pulm: clear bilaterally   Abd: soft  MSK: no edema, no acute joint abnormality   Skin: no obvious rash  Psych: normal affect  Neuro: alert and oriented     Labs  Abs eos (11/2023) - 200      Imaging/Studies: Personally reviewed  CT C/A/P (11/2024) - stable 3mm LLL nodule, no new nodules   PFTs (8/2024) - supranormal FVC and FEV1, likely normal variant, otherwise normal pulmonary function   CT C/A/P (5/2024) - few stable nodules, few punctate foci of peripheral bronchial  mucous plugging    Past Medical History:   Diagnosis Date    Chest tightness     had suspected allergies    Malignant neoplasm of head of pancreas (H) 06/09/2020    Osteopenia 09/13/2014    Seasonal allergies     SOB (shortness of breath)      Past Surgical History:   Procedure Laterality Date    BIOPSY BREAST Right 9/19/2022    Procedure: Right radiofrequency localized excisional breast biopsy;  Surgeon: Geovanny Wagner MD;  Location: [facility] OR    CATARACT IOL, RT/LT  2015    COLONOSCOPY  10/14    no repeat needed due to age    COLONOSCOPY N/A 10/7/2014    Procedure: COLONOSCOPY;  Surgeon: Daryl Hanson MD;  Location:  GI    COLONOSCOPY  04/23/2019    no further screening    ENDOSCOPIC RETROGRADE CHOLANGIOPANCREATOGRAM N/A 4/20/2020    Procedure: ENDOSCOPIC RETROGRADE CHOLANGIOPANCREATOGRAPHY, PLACEMENT OF PANCREATIC STENT, PLACEMENT OF BILIARY STENT;  Surgeon: Yarely Vann MD;  Location:  OR    ESOPHAGOSCOPY, GASTROSCOPY, DUODENOSCOPY (EGD), COMBINED N/A 4/20/2020    Procedure: ESOPHAGOGASTRODUODENOSCOPY, WITH FINE NEEDLE ASPIRATION BIOPSY, WITH ENDOSCOPIC ULTRASOUND GUIDANCE, Endoscopic retrograde cholangiopancreatography;  Surgeon: Yarely Vann MD;  Location:  OR    ESOPHAGOSCOPY, GASTROSCOPY, DUODENOSCOPY (EGD), COMBINED N/A 5/5/2020    Procedure: ESOPHAGOGASTRODUODENOSCOPY, WITH FINE NEEDLE ASPIRATION BIOPSY, WITH ENDOSCOPIC ULTRASOUND GUIDANCE;  Surgeon: Romina Rosario MD;  Location:  GI    IR CHEST PORT PLACEMENT > 5 YRS OF AGE  7/6/2020    IR PORT REMOVAL RIGHT  12/28/2020    LAPAROSCOPIC BIOPSY LIVER N/A 5/22/2020    Procedure: Diagnostic Laparoscopy with intraoperative ultrasound and Liver Biopsy X2;  Surgeon: Duarte Chaves MD;  Location: [facility] OR    LUMPECTOMY BREAST Right 10/27/2022    Procedure: right breast reexcision lumpectomy;  Surgeon: Geovanny Wagner MD;  Location: [facility] OR    LUMPECTOMY BREAST WITH SENTINEL NODE, COMBINED Right 11/11/2022    Procedure:  Reexcision right lumpectomy, right sentinel node biopsy;  Surgeon: Geovanny Wagner MD;  Location: [facility] OR    WHIPPLE PROCEDURE N/A 5/22/2020    Procedure: Non Pylorus Preserving Whipple procedure;  Surgeon: Duarte Chaves MD;  Location: [facility] OR     Family History   Problem Relation Age of Onset    Musculoskeletal Disorder Mother         edematous legs    Obesity Mother     Lymphoma Brother     Musculoskeletal Disorder Maternal Grandmother         edematous legs; did have amputation    Obesity Maternal Grandmother     Myocardial Infarction Maternal Grandmother      Social History     Socioeconomic History    Marital status: Single     Spouse name: Not on file    Number of children: Not on file    Years of education: Not on file    Highest education level: Not on file   Occupational History     Employer: RETIRED     Comment: previous taught special ed   Tobacco Use    Smoking status: Never     Passive exposure: Never    Smokeless tobacco: Never   Vaping Use    Vaping status: Never Used   Substance and Sexual Activity    Alcohol use: Yes     Comment: social     Drug use: Never    Sexual activity: Yes   Other Topics Concern    Parent/sibling w/ CABG, MI or angioplasty before 65F 55M? Not Asked     Service Not Asked    Blood Transfusions Not Asked    Caffeine Concern Yes     Comment: 2 cups    Occupational Exposure Not Asked    Hobby Hazards Not Asked    Sleep Concern Not Asked    Stress Concern Not Asked    Weight Concern Not Asked    Special Diet No    Back Care Not Asked    Exercise Yes     Comment: walking workout     Bike Helmet Not Asked    Seat Belt Not Asked    Self-Exams Not Asked   Social History Narrative    Not on file     Social Drivers of Health     Financial Resource Strain: Low Risk  (7/8/2024)    Financial Resource Strain     Within the past 12 months, have you or your family members you live with been unable to get utilities (heat, electricity) when it was really needed?: No   Food  Insecurity: Low Risk  (7/8/2024)    Food Insecurity     Within the past 12 months, did you worry that your food would run out before you got money to buy more?: No     Within the past 12 months, did the food you bought just not last and you didn t have money to get more?: No   Transportation Needs: Low Risk  (7/8/2024)    Transportation Needs     Within the past 12 months, has lack of transportation kept you from medical appointments, getting your medicines, non-medical meetings or appointments, work, or from getting things that you need?: No   Physical Activity: Inactive (7/8/2024)    Exercise Vital Sign     Days of Exercise per Week: 0 days     Minutes of Exercise per Session: 0 min   Stress: Stress Concern Present (7/8/2024)    [employer] [place] [employer] - Occupational Stress Questionnaire     Feeling of Stress : To some extent   Social Connections: Unknown (7/8/2024)    Social Connection and Isolation Panel [NHANES]     Frequency of Communication with Friends and Family: Not on file     Frequency of Social Gatherings with Friends and Family: More than three times a week     Attends Jew Services: Not on file     Active Member of Clubs or Organizations: Not on file     Attends Club or Organization Meetings: Not on file     Marital Status: Not on file   Interpersonal Safety: Low Risk  (7/1/2024)    Interpersonal Safety     Do you feel physically and emotionally safe where you currently live?: Yes     Within the past 12 months, have you been hit, slapped, kicked or otherwise physically hurt by someone?: No     Within the past 12 months, have you been humiliated or emotionally abused in other ways by your partner or ex-partner?: No   Housing Stability: Low Risk  (7/8/2024)    Housing Stability     Do you have housing? : Yes     Are you worried about losing your housing?: No       35 minutes spent reviewing chart, reviewing test results, talking with and examining patient, formulating plan, and  documentation on the day of the encounter.    Rigo Menchaca MD  Pulmonary and Critical Care Medicine  [facility]

## 2025-01-08 ENCOUNTER — TELEPHONE (OUTPATIENT)
Dept: FAMILY MEDICINE | Facility: CLINIC | Age: 80
End: 2025-01-08
Payer: COMMERCIAL

## 2025-01-08 NOTE — TELEPHONE ENCOUNTER
Patient called to review medications on her medication list to ensure she has everything she needs while on vacation. RN reviewed the medications on her medication list.    The only medication she needs to call in to the pharmacy in [place] is alendronate 70 mg every 7 days. She is going to call.     She says she was told not to take montelukast anymore so she is not taking it.     She denied further questions or concerns.    Reyna Isaac RN, BSN, PHN  [facility], [place] [facility]

## 2025-02-27 DIAGNOSIS — R41.3 MEMORY CHANGE: Primary | ICD-10-CM

## 2025-04-07 ENCOUNTER — OFFICE VISIT (OUTPATIENT)
Dept: FAMILY MEDICINE | Facility: CLINIC | Age: 80
End: 2025-04-07
Payer: COMMERCIAL

## 2025-04-07 VITALS
HEIGHT: 62 IN | BODY MASS INDEX: 21.86 KG/M2 | RESPIRATION RATE: 16 BRPM | SYSTOLIC BLOOD PRESSURE: 105 MMHG | DIASTOLIC BLOOD PRESSURE: 64 MMHG | HEART RATE: 59 BPM | WEIGHT: 118.8 LBS | OXYGEN SATURATION: 99 % | TEMPERATURE: 97.6 F

## 2025-04-07 DIAGNOSIS — N18.2 CHRONIC KIDNEY DISEASE, STAGE 2 (MILD): ICD-10-CM

## 2025-04-07 DIAGNOSIS — M80.00XS OSTEOPOROSIS WITH CURRENT PATHOLOGICAL FRACTURE, UNSPECIFIED OSTEOPOROSIS TYPE, SEQUELA: ICD-10-CM

## 2025-04-07 DIAGNOSIS — Z90.49 HISTORY OF WHIPPLE PROCEDURE: ICD-10-CM

## 2025-04-07 DIAGNOSIS — Z90.410 HISTORY OF WHIPPLE PROCEDURE: ICD-10-CM

## 2025-04-07 DIAGNOSIS — C25.0 MALIGNANT NEOPLASM OF HEAD OF PANCREAS (H): Primary | ICD-10-CM

## 2025-04-07 PROCEDURE — 99213 OFFICE O/P EST LOW 20 MIN: CPT | Performed by: PHYSICIAN ASSISTANT

## 2025-04-07 PROCEDURE — 3074F SYST BP LT 130 MM HG: CPT | Performed by: PHYSICIAN ASSISTANT

## 2025-04-07 PROCEDURE — 3078F DIAST BP <80 MM HG: CPT | Performed by: PHYSICIAN ASSISTANT

## 2025-04-07 PROCEDURE — 1126F AMNT PAIN NOTED NONE PRSNT: CPT | Performed by: PHYSICIAN ASSISTANT

## 2025-04-07 RX ORDER — CITALOPRAM HYDROBROMIDE 20 MG/1
20 TABLET ORAL DAILY
Qty: 90 TABLET | Refills: 1 | Status: SHIPPED | OUTPATIENT
Start: 2025-04-07

## 2025-04-07 ASSESSMENT — ANXIETY QUESTIONNAIRES
2. NOT BEING ABLE TO STOP OR CONTROL WORRYING: SEVERAL DAYS
GAD7 TOTAL SCORE: 6
6. BECOMING EASILY ANNOYED OR IRRITABLE: SEVERAL DAYS
5. BEING SO RESTLESS THAT IT IS HARD TO SIT STILL: SEVERAL DAYS
IF YOU CHECKED OFF ANY PROBLEMS ON THIS QUESTIONNAIRE, HOW DIFFICULT HAVE THESE PROBLEMS MADE IT FOR YOU TO DO YOUR WORK, TAKE CARE OF THINGS AT HOME, OR GET ALONG WITH OTHER PEOPLE: SOMEWHAT DIFFICULT
8. IF YOU CHECKED OFF ANY PROBLEMS, HOW DIFFICULT HAVE THESE MADE IT FOR YOU TO DO YOUR WORK, TAKE CARE OF THINGS AT HOME, OR GET ALONG WITH OTHER PEOPLE?: SOMEWHAT DIFFICULT
3. WORRYING TOO MUCH ABOUT DIFFERENT THINGS: SEVERAL DAYS
4. TROUBLE RELAXING: SEVERAL DAYS
GAD7 TOTAL SCORE: 6
1. FEELING NERVOUS, ANXIOUS, OR ON EDGE: SEVERAL DAYS
7. FEELING AFRAID AS IF SOMETHING AWFUL MIGHT HAPPEN: NOT AT ALL

## 2025-04-07 ASSESSMENT — ASTHMA QUESTIONNAIRES
QUESTION_3 LAST FOUR WEEKS HOW OFTEN DID YOUR ASTHMA SYMPTOMS (WHEEZING, COUGHING, SHORTNESS OF BREATH, CHEST TIGHTNESS OR PAIN) WAKE YOU UP AT NIGHT OR EARLIER THAN USUAL IN THE MORNING: ONCE A WEEK
ACT_TOTALSCORE: 12
QUESTION_4 LAST FOUR WEEKS HOW OFTEN HAVE YOU USED YOUR RESCUE INHALER OR NEBULIZER MEDICATION (SUCH AS ALBUTEROL): TWO OR THREE TIMES PER WEEK
QUESTION_2 LAST FOUR WEEKS HOW OFTEN HAVE YOU HAD SHORTNESS OF BREATH: THREE TO SIX TIMES A WEEK
QUESTION_1 LAST FOUR WEEKS HOW MUCH OF THE TIME DID YOUR ASTHMA KEEP YOU FROM GETTING AS MUCH DONE AT WORK, SCHOOL OR AT HOME: ALL OF THE TIME
QUESTION_5 LAST FOUR WEEKS HOW WOULD YOU RATE YOUR ASTHMA CONTROL: POORLY CONTROLLED

## 2025-04-07 ASSESSMENT — PAIN SCALES - GENERAL: PAINLEVEL_OUTOF10: NO PAIN (0)

## 2025-04-07 NOTE — PROGRESS NOTES
Assessment & Plan     Malignant neoplasm of head of pancreas (H)  History of Whipple procedure  Chronic kidney disease, stage 2 (mild)  Flora presented today to discuss some ongoing intermittent stool changes. A majority of the time these are related to her dietary habits but she does not think every time. She has been off celexa for awhile and so unlikely this is contributing. With increased stress she is planning to restart. Sadly, medicare is very limited in coverage for nutrition though she would absolutely benefit. If she cannot get this covered we'll have her discuss nutritional/dietary aspects post whipple with a GI team.   - Adult Nutrition  Referral; Future  - Adult GI  Referral - Consult Only; Future  - citalopram (CELEXA) 20 MG tablet; Take 1 tablet (20 mg) by mouth daily.  - Adult Nutrition  Referral; Future  - Adult GI  Referral - Consult Only; Future      Osteoporosis with current pathological fracture, unspecified osteoporosis type, sequela  On medication review it was unclear if she was taking her fosamax. She does not recall taking a medication weekly which would be surprising since this has been prescribed for over one year. Thankfully she does focus on D and calcium. She does have neurology referral for evaluation later this month as well.       The longitudinal plan of care for the diagnosis(es)/condition(s) as documented were addressed during this visit. Due to the added complexity in care, I will continue to support Flora in the subsequent management and with ongoing continuity of care.        Subjective   Flora is a 79 year old, presenting for the following health issues:   Follow Up        4/7/2025     1:53 PM   Additional Questions   Roomed by Ramonita QUINTERO CMA   Accompanied by NEGIN         4/7/2025     1:53 PM   Patient Reported Additional Medications   Patient reports taking the following new medications None     History of Present Illness       Mental Health  "Follow-up:  Patient presents to follow-up on Depression & Anxiety.Patient's depression since last visit has been:  Medium  The patient is having other symptoms associated with depression.  Patient's anxiety since last visit has been:  Medium  The patient is having other symptoms associated with anxiety.  Any significant life events: health concerns  Patient is feeling anxious or having panic attacks.  Patient has no concerns about alcohol or drug use.    She eats 2-3 servings of fruits and vegetables daily.She consumes 0 sweetened beverage(s) daily.She exercises with enough effort to increase her heart rate 9 or less minutes per day.  She exercises with enough effort to increase her heart rate 3 or less days per week. She is missing 1 dose(s) of medications per week.        Patient wants to discuss a possible referral to GI.  Hx of whipple 2/2 pancreatic neoplasm  She was wintering in [place] and ate some things \"I shouldn't have\" and this caused some GI concerns for quite a while; lots of loose stooling; no vomiting   -not necessarily daily but regularly  Did continue creon  Weight was generally stable thankfully    She would also like to review her medications      Review of Systems  Constitutional, HEENT, cardiovascular, pulmonary, gi and gu systems are negative, except as otherwise noted.      Objective    /64 (BP Location: Right arm, Patient Position: Sitting, Cuff Size: Adult Regular)   Pulse 59   Temp 97.6  F (36.4  C) (Oral)   Resp 16   Ht 1.575 m (5' 2\")   Wt 53.9 kg (118 lb 12.8 oz)   LMP  (LMP Unknown)   SpO2 99%   BMI 21.73 kg/m    Body mass index is 21.73 kg/m .  Physical Exam   GENERAL: alert and no distress  PSYCH: mentation appears normal, affect normal/bright          Signed Electronically by: Marcial Holden PA-C    "

## 2025-04-07 NOTE — PATIENT INSTRUCTIONS
FOSAMAX   Oral: Administer first thing in the morning and >=30 minutes before the first food, beverage (except plain water), or other medication(s) of the day. Do not take with mineral water or with other beverages. Patients should be instructed to stay upright (not to lie down) for >=30 minutes and until after first food of the day (to reduce esophageal irritation).     Restart the citalopram (start with a 1/2 tab daily for the first week, then move to a whole tablet)

## 2025-04-08 ENCOUNTER — DOCUMENTATION ONLY (OUTPATIENT)
Dept: GASTROENTEROLOGY | Facility: CLINIC | Age: 80
End: 2025-04-08
Payer: COMMERCIAL

## 2025-04-15 NOTE — PROGRESS NOTES
"CC: Memory changes      HPI   Ms. Flora Hogan is a 79 year old  with a past medical history of pancreatic cancer 2020 (s/p Whipple procedure with 6 cycles adjuvant gemcitabine), breast cancer 2023 (s/p lumpectomy, radiation, anastrazole, and tamoxifen), headache, peripheral neuropathy, and neck pain. Full surgical history is included below.     Per chart review, Ms. Hogan began experiencing short-term memory changes after chemotherapy and feels symptoms have progressed. On 7/1/24 she scored 1/5 on Mini-Cog. She has not gotten lost in familiar places. She continues to perform all ADLs independently.    Today, she presents with her partner Daryl who provide collateral history. Flora reports losing track of dates and time, \"forgetting things\". She feels that she has \"slid down\" since the time of her Whipple procedures but is has continued to go downhill. She has stopped golfing and swimming, although she ascribes this change to her Whipple procedure. She has significant word-finding difficulty. No loss of word knowledge. She feels that her ability to organize/arrange appointments has done downhill, too, and cannot remember what things on her itinerary are happening on which day.    Daryl reports that Flora's longterm memory is going well. He concurs that Flora's short term memory is problematic. She forgets where the keys are, or forgets that Daryl already gave her something.    No repeating questions. No loss of navigation.    Sleep: She describes her sleep as inconsistent, waking up sometimes at 5AM, sometimes later at 7AM. She goes to bed at 1030PM. No coughing, choking, gasping or morning dry mouth. No dream enactment behavior.     Mood/Psych: No hallucinations. She denies depression/anxiety, and if she is anxious she ascribes it to \"confusion\".    Gen health: No seizure history. She denies the headache issue that is mentioned in her problem list, although reports life long tendencies " to headaches that she associated with allergies. No weight loss. No unexplained fevers/chills/rashes, however she feels generally predisposd to cold and wears wool in summer.    Psychotropic meds:  - Escitalopram 20 mg daily  - Tamoxifen 20 mg daily  - Loratadine 10 mg only when needed for allergies    PROBLEM LIST:/PMHx  As per HPI    FHx:  No known family history of neurological disease. Mother passed away at 93. Father passed away in mid 70s.    SHx:  Patient's Occupation: Retired    Head trauma history: None.  Alcohol and drug screenin beer per day maximum. CBD/THC or recreational drugs. Rare OTC sleeping pills.  History of neurodevelopmental issues? None.    OBJECTIVE     Exam:  MMSE: -1 clinic name; -1 WORLD backwards; -1 delayed verbal spontaneous recall (takes time to remember these, no additional points for multiple choice);     Neuro Exam: Alert, awake, fluent speech, good insight into her condition, no parkinsonism, mild word-finding difficulty, no tremor. Normal gait but difficulty tandem walking. Rhomberg negative. Normal peripheral visual fields without visual simultagnosia. Normal saccadic initiation, velocity and amplitude. Mild upper and lower limb appendicular paratonia. Normal appendicular deep tendon reflexes. Normal vibratory sense in the distal limbs. Normal finger-to-nose     Objective Testing:  CMP (24): normal except reduced sodium (134), reduced eGFR (57), and elevated creatinine (1.00)  CBC (24): normal  TSH (16): 0.88  B12 (16): 905    *No brain imaging available for review      ASSESSMENT/PLAN   #Slowly progressive cognitive decline  #History of cancer with chemotherapy    Further work-up:  Orders Placed This Encounter   Procedures    MR Brain w/o Contrast    Vitamin B12    TSH with free T4 reflex   - Follow-up with me in several weeks to review results

## 2025-04-16 ENCOUNTER — OFFICE VISIT (OUTPATIENT)
Dept: NEUROLOGY | Facility: CLINIC | Age: 80
End: 2025-04-16
Payer: COMMERCIAL

## 2025-04-16 VITALS
DIASTOLIC BLOOD PRESSURE: 70 MMHG | HEIGHT: 62 IN | HEART RATE: 61 BPM | TEMPERATURE: 97.3 F | WEIGHT: 118 LBS | SYSTOLIC BLOOD PRESSURE: 118 MMHG | BODY MASS INDEX: 21.71 KG/M2

## 2025-04-16 DIAGNOSIS — R41.3 MEMORY CHANGE: ICD-10-CM

## 2025-04-16 LAB
TSH SERPL DL<=0.005 MIU/L-ACNC: 1.05 UIU/ML (ref 0.3–4.2)
VIT B12 SERPL-MCNC: 408 PG/ML (ref 232–1245)

## 2025-04-16 PROCEDURE — 82607 VITAMIN B-12: CPT | Mod: ORL | Performed by: PSYCHIATRY & NEUROLOGY

## 2025-04-16 PROCEDURE — 84443 ASSAY THYROID STIM HORMONE: CPT | Mod: ORL | Performed by: PSYCHIATRY & NEUROLOGY

## 2025-04-16 NOTE — LETTER
"4/16/2025       RE: Flora Hogan  [id number]  [place] [address]     Dear Colleague,    Thank you for referring your patient, Flora Hogan, to the  [facility]. Please see a copy of my visit note below.    CC: Memory changes      HPI   Ms. Flora Hogan is a 79 year old  with a past medical history of pancreatic cancer 2020 (s/p Whipple procedure with 6 cycles adjuvant gemcitabine), breast cancer 2023 (s/p lumpectomy, radiation, anastrazole, and tamoxifen), headache, peripheral neuropathy, and neck pain. Full surgical history is included below.     Per chart review, Ms. Hogan began experiencing short-term memory changes after chemotherapy and feels symptoms have progressed. On 7/1/24 she scored 1/5 on Mini-Cog. She has not gotten lost in familiar places. She continues to perform all ADLs independently.    Today, she presents with her partner Daryl who provide collateral history. Flora reports losing track of dates and time, \"forgetting things\". She feels that she has \"slid down\" since the time of her Whipple procedures but is has continued to go downhill. She has stopped golfing and swimming, although she ascribes this change to her Whipple procedure. She has significant word-finding difficulty. No loss of word knowledge. She feels that her ability to organize/arrange appointments has done downhill, too, and cannot remember what things on her itinerary are happening on which day.    Daryl reports that Floar's longterm memory is going well. He concurs that Flora's short term memory is problematic. She forgets where the keys are, or forgets that Daryl already gave her something.    No repeating questions. No loss of navigation.    Sleep: She describes her sleep as inconsistent, waking up sometimes at 5AM, sometimes later at 7AM. She goes to bed at 1030PM. No coughing, choking, gasping or " "morning dry mouth. No dream enactment behavior.     Mood/Psych: No hallucinations. She denies depression/anxiety, and if she is anxious she ascribes it to \"confusion\".    Gen health: No seizure history. She denies the headache issue that is mentioned in her problem list, although reports life long tendencies to headaches that she associated with allergies. No weight loss. No unexplained fevers/chills/rashes, however she feels generally predisposd to cold and wears wool in summer.    Psychotropic meds:  - Escitalopram 20 mg daily  - Tamoxifen 20 mg daily  - Loratadine 10 mg only when needed for allergies    PROBLEM LIST:/PMHx  As per HPI    FHx:  No known family history of neurological disease. Mother passed away at 93. Father passed away in mid 70s.    SHx:  Patient's Occupation: Retired    Head trauma history: None.  Alcohol and drug screenin beer per day maximum. CBD/THC or recreational drugs. Rare OTC sleeping pills.  History of neurodevelopmental issues? None.    OBJECTIVE     Exam:  MMSE: -1 clinic name; -1 WORLD backwards; -1 delayed verbal spontaneous recall (takes time to remember these, no additional points for multiple choice);     Neuro Exam: Alert, awake, fluent speech, good insight into her condition, no parkinsonism, mild word-finding difficulty, no tremor. Normal gait but difficulty tandem walking. Rhomberg negative. Normal peripheral visual fields without visual simultagnosia. Normal saccadic initiation, velocity and amplitude. Mild upper and lower limb appendicular paratonia. Normal appendicular deep tendon reflexes. Normal vibratory sense in the distal limbs. Normal finger-to-nose     Objective Testing:  CMP (24): normal except reduced sodium (134), reduced eGFR (57), and elevated creatinine (1.00)  CBC (24): normal  TSH (16): 0.88  B12 (16): 905    *No brain imaging available for review      ASSESSMENT/PLAN   #Slowly progressive cognitive " decline  #History of cancer with chemotherapy    Further work-up:  Orders Placed This Encounter   Procedures     MR Brain w/o Contrast     Vitamin B12     TSH with free T4 reflex   - Follow-up with me in several weeks to review results      Again, thank you for allowing me to participate in the care of your patient.      Sincerely,    Vladimir Casas MD

## 2025-04-26 ENCOUNTER — HEALTH MAINTENANCE LETTER (OUTPATIENT)
Age: 80
End: 2025-04-26

## 2025-05-01 ENCOUNTER — TELEPHONE (OUTPATIENT)
Dept: FAMILY MEDICINE | Facility: CLINIC | Age: 80
End: 2025-05-01
Payer: COMMERCIAL

## 2025-05-01 NOTE — TELEPHONE ENCOUNTER
Patient calls to ask about taking a probiotic. States she never got a prescription. Advised that probiotic can be purchased OTC. Looks like per chart review this was recommended by oncologist in [place].    Neena Knight RN on 5/1/2025 at 8:28 AM

## 2025-05-15 ENCOUNTER — LAB (OUTPATIENT)
Dept: INFUSION THERAPY | Facility: CLINIC | Age: 80
End: 2025-05-15
Attending: PHYSICIAN ASSISTANT
Payer: COMMERCIAL

## 2025-05-15 DIAGNOSIS — C25.0 MALIGNANT NEOPLASM OF HEAD OF PANCREAS (H): ICD-10-CM

## 2025-05-15 LAB
ALBUMIN SERPL BCG-MCNC: 3.9 G/DL (ref 3.5–5.2)
ALP SERPL-CCNC: NORMAL U/L
ALT SERPL W P-5'-P-CCNC: NORMAL U/L
ANION GAP SERPL CALCULATED.3IONS-SCNC: 10 MMOL/L (ref 7–15)
AST SERPL W P-5'-P-CCNC: 36 U/L (ref 0–45)
BASOPHILS # BLD AUTO: 0.1 10E3/UL (ref 0–0.2)
BASOPHILS NFR BLD AUTO: 2 %
BILIRUB SERPL-MCNC: 0.3 MG/DL
BUN SERPL-MCNC: 18.6 MG/DL (ref 8–23)
CALCIUM SERPL-MCNC: 8.8 MG/DL (ref 8.8–10.4)
CHLORIDE SERPL-SCNC: 100 MMOL/L (ref 98–107)
CREAT SERPL-MCNC: 0.89 MG/DL (ref 0.51–0.95)
EGFRCR SERPLBLD CKD-EPI 2021: 65 ML/MIN/1.73M2
EOSINOPHIL # BLD AUTO: 0.2 10E3/UL (ref 0–0.7)
EOSINOPHIL NFR BLD AUTO: 6 %
ERYTHROCYTE [DISTWIDTH] IN BLOOD BY AUTOMATED COUNT: 13.2 % (ref 10–15)
GLUCOSE SERPL-MCNC: 75 MG/DL (ref 70–99)
HCO3 SERPL-SCNC: 25 MMOL/L (ref 22–29)
HCT VFR BLD AUTO: 37 % (ref 35–47)
HGB BLD-MCNC: 12.3 G/DL (ref 11.7–15.7)
IMM GRANULOCYTES # BLD: 0 10E3/UL
IMM GRANULOCYTES NFR BLD: 0 %
LYMPHOCYTES # BLD AUTO: 1 10E3/UL (ref 0.8–5.3)
LYMPHOCYTES NFR BLD AUTO: 35 %
MCH RBC QN AUTO: 28.7 PG (ref 26.5–33)
MCHC RBC AUTO-ENTMCNC: 33.2 G/DL (ref 31.5–36.5)
MCV RBC AUTO: 86 FL (ref 78–100)
MONOCYTES # BLD AUTO: 0.3 10E3/UL (ref 0–1.3)
MONOCYTES NFR BLD AUTO: 9 %
NEUTROPHILS # BLD AUTO: 1.4 10E3/UL (ref 1.6–8.3)
NEUTROPHILS NFR BLD AUTO: 49 %
NRBC # BLD AUTO: 0 10E3/UL
NRBC BLD AUTO-RTO: 0 /100
PLATELET # BLD AUTO: 213 10E3/UL (ref 150–450)
POTASSIUM SERPL-SCNC: 5 MMOL/L (ref 3.4–5.3)
PROT SERPL-MCNC: 6.7 G/DL (ref 6.4–8.3)
RBC # BLD AUTO: 4.29 10E6/UL (ref 3.8–5.2)
SODIUM SERPL-SCNC: 135 MMOL/L (ref 135–145)
WBC # BLD AUTO: 3 10E3/UL (ref 4–11)

## 2025-05-15 PROCEDURE — 85004 AUTOMATED DIFF WBC COUNT: CPT | Performed by: PHYSICIAN ASSISTANT

## 2025-05-15 PROCEDURE — 86301 IMMUNOASSAY TUMOR CA 19-9: CPT | Performed by: PHYSICIAN ASSISTANT

## 2025-05-15 PROCEDURE — 84155 ASSAY OF PROTEIN SERUM: CPT | Performed by: PHYSICIAN ASSISTANT

## 2025-05-15 NOTE — PROGRESS NOTES
Nursing Note:  Flora Hogan presents today for labs+PIV for scan.    Patient seen by provider today: No   present during visit today: Not Applicable.    Note: N/A.    Intravenous Access:  Lab draw site left upper forearm, Needle type angiocath, Gauge 20.  Labs drawn without difficulty.  Peripheral IV placed.    Discharge Plan:   Patient was sent to radiology for CT scan appointment.    Barbara Barrios RN

## 2025-05-20 ENCOUNTER — ONCOLOGY VISIT (OUTPATIENT)
Dept: ONCOLOGY | Facility: CLINIC | Age: 80
End: 2025-05-20
Attending: PHYSICIAN ASSISTANT
Payer: COMMERCIAL

## 2025-05-20 VITALS
HEIGHT: 62 IN | HEART RATE: 59 BPM | SYSTOLIC BLOOD PRESSURE: 133 MMHG | BODY MASS INDEX: 22.45 KG/M2 | RESPIRATION RATE: 16 BRPM | OXYGEN SATURATION: 93 % | WEIGHT: 122 LBS | TEMPERATURE: 97 F | DIASTOLIC BLOOD PRESSURE: 71 MMHG

## 2025-05-20 DIAGNOSIS — C25.0 MALIGNANT NEOPLASM OF HEAD OF PANCREAS (H): ICD-10-CM

## 2025-05-20 DIAGNOSIS — D05.11 DUCTAL CARCINOMA IN SITU (DCIS) OF RIGHT BREAST: Primary | ICD-10-CM

## 2025-05-20 DIAGNOSIS — M80.00XA AGE-RELATED OSTEOPOROSIS WITH CURRENT PATHOLOGICAL FRACTURE, INITIAL ENCOUNTER: ICD-10-CM

## 2025-05-20 PROCEDURE — 99214 OFFICE O/P EST MOD 30 MIN: CPT | Performed by: PHYSICIAN ASSISTANT

## 2025-05-20 PROCEDURE — G2211 COMPLEX E/M VISIT ADD ON: HCPCS | Performed by: PHYSICIAN ASSISTANT

## 2025-05-20 PROCEDURE — G0463 HOSPITAL OUTPT CLINIC VISIT: HCPCS | Performed by: PHYSICIAN ASSISTANT

## 2025-05-20 RX ORDER — TAMOXIFEN CITRATE 20 MG/1
20 TABLET ORAL DAILY
Qty: 90 TABLET | Refills: 3 | Status: SHIPPED | OUTPATIENT
Start: 2025-05-20

## 2025-05-20 ASSESSMENT — PAIN SCALES - GENERAL: PAINLEVEL_OUTOF10: NO PAIN (0)

## 2025-05-20 NOTE — NURSING NOTE
"Oncology Rooming Note    May 20, 2025 1:34 PM   Flora Hogan is a 79 year old female who presents for:    Chief Complaint   Patient presents with    Oncology Clinic Visit     Ductal carcinoma in situ (DCIS) of right breast     Initial Vitals: /71   Pulse 59   Temp 97  F (36.1  C) (Temporal)   Resp 16   Ht 1.575 m (5' 2\")   Wt 55.3 kg (122 lb)   LMP  (LMP Unknown)   SpO2 93%   BMI 22.31 kg/m   Estimated body mass index is 22.31 kg/m  as calculated from the following:    Height as of this encounter: 1.575 m (5' 2\").    Weight as of this encounter: 55.3 kg (122 lb). Body surface area is 1.56 meters squared.  No Pain (0) Comment: Data Unavailable   No LMP recorded (lmp unknown). Patient is postmenopausal.  Allergies reviewed: Yes  Medications reviewed: Yes    Medications: Medication refills not needed today.  Pharmacy name entered into [employer]:    [facility] #[id number] - [facility] [address] [address] AT [place] [address] & [facility] #[id number] - [address] AT [address] [address] [facility]    Frailty Screening:   Is the patient here for a new oncology consult visit in cancer care? 2. No    PHQ9:  Did this patient require a PHQ9?: No      Clinical concerns: f/u       Katie Whitmore CMA              "

## 2025-05-20 NOTE — PROGRESS NOTES
Oncology/Hematology Visit Note    May 20, 2025    Reason for visit: Follow up pancreatic and breast cancer    Oncology HPI: Flora Hogan is a 79 year old female with pancreatic and breast cancer.  Below is her oncology history and treatment summary:    4/19/20: Presented with painless jaundice.  CT scan demonstrated a 1.2 cm mass in the pancreas with biliary dilatation.  ERCP/EUS performed demonstrating atypia but no definite malignancy.  5/26/20: Whipple resection performed by Dr. Chaves at [facility].  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  Margins and lymph nodes negative, LVI was seen.  Final stage [id number].  7/7/20 to 12/15/20: Status post 6 cycles adjuvant gemcitabine.  6/22/22: Routine screening mammogram demonstrated a circumscribed mass in the left breast at 9 o'clock.  Diagnostic mammogram was benign but linear branching microcalcifications seen in the right breaset spanning 7 cm and stereotactic biopsy recommended.  7/12/22: Stereotactic biopsy right breast demonstrated atypical ductal hyperplasia.  9/16/22: Excisional biopsy performed by Dr. Wagner demonstrating DCIS grade 3, no invasive component seen.  Medial and inferior margins were close at 0.4 mm.  ER positive at %, MN positive at 1-10%.  10/27/22: Re-excision of medial and inferior margins demonstrated 2 foci of grade 2 invasive ductal carcinoma measuring 0.3 mm and 0.4 mm with involvement of inferior margin. The invasive cancer was ER+ (%), MN negative, HER2 positive (3+ IHC).  11/11/22: Re-excision of inferior margin with sentinel lymph node biopsy demonstrated no residual malignancy.  2 sentinel lymph nodes were negative.   11/21/22: Initiated adjuvant anastrozole.  4/17/23: Switched to tamoxifen due to osteoporosis.    She is here today for close follow-up and repeat CT CAP.    Interval History: Flora is here with her , Mitul.  She continues to do well.  She continues  "1 Creon every meal, but she is unsure if she really needs it.  She does not have any diarrhea or abdominal pain.  She denies any new breast or chest concerns.  She is tolerating tamoxifen pretty well.  No other new complaints.    Review of Systems: See interval hx. Denies fevers, chills, HA, dizziness, cough, CP, SOB, abdominal pain, N/V, diarrhea, changes in urination, bleeding, bruising.     PMHx and Social Hx reviewed per EPIC.      Medications:  Current Outpatient Medications   Medication Sig Dispense Refill    alendronate (FOSAMAX) 70 MG tablet Take 1 tablet (70 mg) by mouth every 7 days 12 tablet 3    aspirin 81 MG EC tablet Take 1 tablet (81 mg) by mouth daily      budesonide-formoterol (SYMBICORT) 160-4.5 MCG/ACT Inhaler Inhale 2 puffs into the lungs as needed (shortness of breath or wheezing). 10.2 g 5    Calcium Carb-Cholecalciferol 600-20 MG-MCG TABS Take 2 tablets by mouth daily 30 tablet 0    citalopram (CELEXA) 20 MG tablet Take 1 tablet (20 mg) by mouth daily. 90 tablet 1    fluticasone (FLONASE) 50 MCG/ACT nasal spray Spray 1 spray into both nostrils daily 16 g 5    lipase-protease-amylase (CREON) 11627-72566-093641 units CPEP per EC capsule TAKE 1 CAPSULE BY MOUTH THREE TIMES DAILY WITH A MEAL Strength: 24,000-76,000 Units 270 capsule 3    loratadine (CLARITIN REDITABS) 10 MG dispersible tablet Take 10 mg by mouth as needed       magnesium 250 MG tablet Take 1 tablet by mouth 2 times daily      montelukast (SINGULAIR) 10 MG tablet Take 1 tablet (10 mg) by mouth at bedtime. 30 tablet 11    tamoxifen (NOLVADEX) 20 MG tablet Take 1 tablet (20 mg) by mouth daily 90 tablet 3       Allergies   Allergen Reactions    Penicillin V Hives    Seasonal Allergies        EXAM:    /71   Pulse 59   Temp 97  F (36.1  C) (Temporal)   Resp 16   Ht 1.575 m (5' 2\")   Wt 55.3 kg (122 lb)   LMP  (LMP Unknown)   SpO2 93%   BMI 22.31 kg/m      GENERAL:  Female, in no acute distress.  Alert and oriented x3. "   HEENT:  Normocephalic, atraumatic.    LYMPH NODES:  No palpable axillary lymphadenopathy appreciated.  CV:  RRR, No murmurs, gallops, or rubs.   LUNGS:  Clear to auscultation bilaterally.   BREAST: Bilateral breasts were examined.  Lumpectomy site noted on the right side.  No lumps, bumps, skin changes, puckering or nipple discharge.  ABDOMEN:  Soft, nontender and nondistended.    EXTREMITIES:  No clubbing, cyanosis, or edema.   SKIN: No rash  PSYCH: Calm and cooperative       Labs:    05/15/25 09:17   Sodium 135   Potassium 5.0   Chloride 100   Carbon Dioxide (CO2) 25   Urea Nitrogen 18.6   Creatinine 0.89   GFR Estimate 65   Calcium 8.8   Anion Gap 10   Albumin 3.9   Protein Total 6.7   Alkaline Phosphatase See Comment   ALT See Comment   AST 36   Bilirubin Total 0.3   Cancer Antigen 19-9 6   Glucose 75   WBC 3.0 (L)   Hemoglobin 12.3   Hematocrit 37.0   Platelet Count 213   RBC Count 4.29   MCV 86   MCH 28.7   MCHC 33.2   RDW 13.2   % Neutrophils 49   % Lymphocytes 35   % Monocytes 9   % Eosinophils 6   % Basophils 2   % Immature Granulocytes 0   NRBC/W 0   Absolute Neutrophil 1.4 (L)   Absolute Lymphocytes 1.0   Absolute Monocytes 0.3   Absolute Eosinophils 0.2   Absolute Basophils 0.1   Absolute Immature Granulocytes 0.0   Absolute NRBCs 0.0     Imaging:   EXAM: CT CHEST/ABDOMEN/PELVIS W CONTRAST  LOCATION: [facility]  DATE: 5/15/2025     INDICATION: H o pancreatic cancer, now on surveillance  COMPARISON: 11/08/2024  TECHNIQUE: CT scan of the chest, abdomen, and pelvis was performed following injection of IV contrast. Multiplanar reformats were obtained. Dose reduction techniques were used.   CONTRAST: Yes     FINDINGS:   LUNGS AND PLEURA: Stable 3 mm left lower lobe pulmonary nodule image 237. Calcified granulomas right lower lobe. Small focus of mucus plugging right upper lobe. No new nodules. Mild cylindrical bronchiectasis.     MEDIASTINUM/AXILLAE: No lymphadenopathy.     CORONARY  ARTERY CALCIFICATION: Mild.     HEPATOBILIARY: Benign hepatic cysts and hemangioma are stable.     PANCREAS: Postop Whipple. Stable mild ductal dilation in the remaining body and tail of the pancreas.     SPLEEN: Normal.     ADRENAL GLANDS: Normal.     KIDNEYS/BLADDER: Stable benign cysts.     BOWEL: Moderate stool throughout the colon.     LYMPH NODES: Normal.     VASCULATURE: Normal.     PELVIC ORGANS: Normal.     MUSCULOSKELETAL: Normal.                                                                      IMPRESSION:  1.  No evidence of recurrent or metastatic disease in the chest, abdomen, or pelvis.               Impression/Plan: Flora Hogan is a 79 year old female with pancreatic cancer and breast cancer, currently on surveillance.     Pancreatic cancer: Presented with painless jaundice, s/p Whipple resection by Dr. Chaves in May 2020, pathology grade 1 pancreatic ductal adenocarcinoma.  She underwent 6 cycles of adjuvant gemcitabine and has been on surveillance, most recent CT CAP on 5/15/2025 with TYRONE.  Labs reviewed and stable/normal.  She is now 5 years out from her Whipple surgery and no NCCN surveillance guidelines for pancreatic cancer 3-5 years from diagnosis given the high recurrence rate.  We discussed this today and I spoke to Dr Henry with Dr. Peoples's departure.    -No need for annual imaging  -We will check CMP and CA 19-9 annually going forward and I have put in standing orders.  -Lacey in 1 year with labs prior    Breast cancer: Left breast, diagnosed in July 2022 with screening mammogram.  Invasive ductal carcinoma, ER positive, NH negative, HER2 positive, 2 sentinel lymph nodes negative.  Started adjuvant anastrozole in November 2022, but then due to osteoporosis, this was switched to tamoxifen in April 2023.  She is tolerating this pretty well.  Mammogram in July 2024 was benign.  No clinical concerns for recurrence on exam today.  -Continue tamoxifen  - Establish care with   Natalio in 6-month    Bone health: Developed osteoporosis while on anastrozole, switched to tamoxifen.  Currently on Fosamax.    Chart documentation with Dragon Voice recognition Software. Although reviewed after completion, some words and grammatical errors may remain.      30 minutes spent on the date of the encounter doing chart review, review of test results, interpretation of tests, patient visit, documentation, discussion with other provider(s), and discussion with family       The longitudinal plan of care for the diagnosis(es)/condition(s) as documented were addressed during this visit. Due to the added complexity in care, I will continue to support Flora in the subsequent management and with ongoing continuity of care.    Lacey Castillo PA-C  Hematology/Oncology  [facility]

## 2025-05-20 NOTE — LETTER
5/20/2025      Flora Hogan  [id number]  [place] [address]      Dear Colleague,    Thank you for referring your patient, Flora Hogan, to the [facility]. Please see a copy of my visit note below.    Oncology/Hematology Visit Note    May 20, 2025    Reason for visit: Follow up pancreatic and breast cancer    Oncology HPI: Flora Hogan is a 79 year old female with pancreatic and breast cancer.  Below is her oncology history and treatment summary:    4/19/20: Presented with painless jaundice.  CT scan demonstrated a 1.2 cm mass in the pancreas with biliary dilatation.  ERCP/EUS performed demonstrating atypia but no definite malignancy.  5/26/20: Whipple resection performed by Dr. Chaves at [facility].  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  Margins and lymph nodes negative, LVI was seen.  Final stage [id number].  7/7/20 to 12/15/20: Status post 6 cycles adjuvant gemcitabine.  6/22/22: Routine screening mammogram demonstrated a circumscribed mass in the left breast at 9 o'clock.  Diagnostic mammogram was benign but linear branching microcalcifications seen in the right breaset spanning 7 cm and stereotactic biopsy recommended.  7/12/22: Stereotactic biopsy right breast demonstrated atypical ductal hyperplasia.  9/16/22: Excisional biopsy performed by Dr. Wagner demonstrating DCIS grade 3, no invasive component seen.  Medial and inferior margins were close at 0.4 mm.  ER positive at %, OH positive at 1-10%.  10/27/22: Re-excision of medial and inferior margins demonstrated 2 foci of grade 2 invasive ductal carcinoma measuring 0.3 mm and 0.4 mm with involvement of inferior margin. The invasive cancer was ER+ (%), OH negative, HER2 positive (3+ IHC).  11/11/22: Re-excision of inferior margin with sentinel lymph node biopsy demonstrated no residual malignancy.  2 sentinel lymph nodes were negative.    11/21/22: Initiated adjuvant anastrozole.  4/17/23: Switched to tamoxifen due to osteoporosis.    She is here today for close follow-up and repeat CT CAP.    Interval History: Flora is here with her , Jack.  She continues to do well.  She continues 1 Creon every meal, but she is unsure if she really needs it.  She does not have any diarrhea or abdominal pain.  She denies any new breast or chest concerns.  She is tolerating tamoxifen pretty well.  No other new complaints.    Review of Systems: See interval hx. Denies fevers, chills, HA, dizziness, cough, CP, SOB, abdominal pain, N/V, diarrhea, changes in urination, bleeding, bruising.     PMHx and Social Hx reviewed per EPIC.      Medications:  Current Outpatient Medications   Medication Sig Dispense Refill     alendronate (FOSAMAX) 70 MG tablet Take 1 tablet (70 mg) by mouth every 7 days 12 tablet 3     aspirin 81 MG EC tablet Take 1 tablet (81 mg) by mouth daily       budesonide-formoterol (SYMBICORT) 160-4.5 MCG/ACT Inhaler Inhale 2 puffs into the lungs as needed (shortness of breath or wheezing). 10.2 g 5     Calcium Carb-Cholecalciferol 600-20 MG-MCG TABS Take 2 tablets by mouth daily 30 tablet 0     citalopram (CELEXA) 20 MG tablet Take 1 tablet (20 mg) by mouth daily. 90 tablet 1     fluticasone (FLONASE) 50 MCG/ACT nasal spray Spray 1 spray into both nostrils daily 16 g 5     lipase-protease-amylase (CREON) 79583-05930-740243 units CPEP per EC capsule TAKE 1 CAPSULE BY MOUTH THREE TIMES DAILY WITH A MEAL Strength: 24,000-76,000 Units 270 capsule 3     loratadine (CLARITIN REDITABS) 10 MG dispersible tablet Take 10 mg by mouth as needed        magnesium 250 MG tablet Take 1 tablet by mouth 2 times daily       montelukast (SINGULAIR) 10 MG tablet Take 1 tablet (10 mg) by mouth at bedtime. 30 tablet 11     tamoxifen (NOLVADEX) 20 MG tablet Take 1 tablet (20 mg) by mouth daily 90 tablet 3       Allergies   Allergen Reactions     Penicillin V Hives      "Seasonal Allergies        EXAM:    /71   Pulse 59   Temp 97  F (36.1  C) (Temporal)   Resp 16   Ht 1.575 m (5' 2\")   Wt 55.3 kg (122 lb)   LMP  (LMP Unknown)   SpO2 93%   BMI 22.31 kg/m      GENERAL:  Female, in no acute distress.  Alert and oriented x3.   HEENT:  Normocephalic, atraumatic.    LYMPH NODES:  No palpable axillary lymphadenopathy appreciated.  CV:  RRR, No murmurs, gallops, or rubs.   LUNGS:  Clear to auscultation bilaterally.   BREAST: Bilateral breasts were examined.  Lumpectomy site noted on the right side.  No lumps, bumps, skin changes, puckering or nipple discharge.  ABDOMEN:  Soft, nontender and nondistended.    EXTREMITIES:  No clubbing, cyanosis, or edema.   SKIN: No rash  PSYCH: Calm and cooperative       Labs:    05/15/25 09:17   Sodium 135   Potassium 5.0   Chloride 100   Carbon Dioxide (CO2) 25   Urea Nitrogen 18.6   Creatinine 0.89   GFR Estimate 65   Calcium 8.8   Anion Gap 10   Albumin 3.9   Protein Total 6.7   Alkaline Phosphatase See Comment   ALT See Comment   AST 36   Bilirubin Total 0.3   Cancer Antigen 19-9 6   Glucose 75   WBC 3.0 (L)   Hemoglobin 12.3   Hematocrit 37.0   Platelet Count 213   RBC Count 4.29   MCV 86   MCH 28.7   MCHC 33.2   RDW 13.2   % Neutrophils 49   % Lymphocytes 35   % Monocytes 9   % Eosinophils 6   % Basophils 2   % Immature Granulocytes 0   NRBC/W 0   Absolute Neutrophil 1.4 (L)   Absolute Lymphocytes 1.0   Absolute Monocytes 0.3   Absolute Eosinophils 0.2   Absolute Basophils 0.1   Absolute Immature Granulocytes 0.0   Absolute NRBCs 0.0     Imaging:   EXAM: CT CHEST/ABDOMEN/PELVIS W CONTRAST  LOCATION: [facility]  DATE: 5/15/2025     INDICATION: H o pancreatic cancer, now on surveillance  COMPARISON: 11/08/2024  TECHNIQUE: CT scan of the chest, abdomen, and pelvis was performed following injection of IV contrast. Multiplanar reformats were obtained. Dose reduction techniques were used.   CONTRAST: Yes     FINDINGS: "   LUNGS AND PLEURA: Stable 3 mm left lower lobe pulmonary nodule image 237. Calcified granulomas right lower lobe. Small focus of mucus plugging right upper lobe. No new nodules. Mild cylindrical bronchiectasis.     MEDIASTINUM/AXILLAE: No lymphadenopathy.     CORONARY ARTERY CALCIFICATION: Mild.     HEPATOBILIARY: Benign hepatic cysts and hemangioma are stable.     PANCREAS: Postop Whipple. Stable mild ductal dilation in the remaining body and tail of the pancreas.     SPLEEN: Normal.     ADRENAL GLANDS: Normal.     KIDNEYS/BLADDER: Stable benign cysts.     BOWEL: Moderate stool throughout the colon.     LYMPH NODES: Normal.     VASCULATURE: Normal.     PELVIC ORGANS: Normal.     MUSCULOSKELETAL: Normal.                                                                      IMPRESSION:  1.  No evidence of recurrent or metastatic disease in the chest, abdomen, or pelvis.               Impression/Plan: Flora Hogan is a 79 year old female with pancreatic cancer and breast cancer, currently on surveillance.     Pancreatic cancer: Presented with painless jaundice, s/p Whipple resection by Dr. Chaves in May 2020, pathology grade 1 pancreatic ductal adenocarcinoma.  She underwent 6 cycles of adjuvant gemcitabine and has been on surveillance, most recent CT CAP on 5/15/2025 with TYRONE.  Labs reviewed and stable/normal.  She is now 5 years out from her Whipple surgery and no NCCN surveillance guidelines for pancreatic cancer 3-5 years from diagnosis given the high recurrence rate.  We discussed this today and I spoke to Dr Henry with Dr. Peoples's departure.    -No need for annual imaging  -We will check CMP and CA 19-9 annually going forward and I have put in standing orders.  -Lacey in 1 year with labs prior    Breast cancer: Left breast, diagnosed in July 2022 with screening mammogram.  Invasive ductal carcinoma, ER positive, FL negative, HER2 positive, 2 sentinel lymph nodes negative.  Started adjuvant anastrozole in  November 2022, but then due to osteoporosis, this was switched to tamoxifen in April 2023.  She is tolerating this pretty well.  Mammogram in July 2024 was benign.  No clinical concerns for recurrence on exam today.  -Continue tamoxifen  - Establish care with Dr. Lance in 6-month    Bone health: Developed osteoporosis while on anastrozole, switched to tamoxifen.  Currently on Fosamax.    Chart documentation with Dragon Voice recognition Software. Although reviewed after completion, some words and grammatical errors may remain.      30 minutes spent on the date of the encounter doing chart review, review of test results, interpretation of tests, patient visit, documentation, discussion with other provider(s), and discussion with family       The longitudinal plan of care for the diagnosis(es)/condition(s) as documented were addressed during this visit. Due to the added complexity in care, I will continue to support Flora in the subsequent management and with ongoing continuity of care.    Lacey Castillo PA-C  Hematology/Oncology  [facility]                  Again, thank you for allowing me to participate in the care of your patient.        Sincerely,        Lacey Castillo PA-C    Electronically signed

## 2025-06-02 ENCOUNTER — PATIENT OUTREACH (OUTPATIENT)
Dept: CARE COORDINATION | Facility: CLINIC | Age: 80
End: 2025-06-02
Payer: COMMERCIAL

## 2025-06-10 ENCOUNTER — TELEPHONE (OUTPATIENT)
Dept: FAMILY MEDICINE | Facility: CLINIC | Age: 80
End: 2025-06-10
Payer: COMMERCIAL

## 2025-06-10 NOTE — TELEPHONE ENCOUNTER
"  Reason for Call:  Appointment Request    Patient requesting this type of appt:  bowel issue    Requested provider: Marcial Holden preferred    Reason patient unable to be scheduled: Not within requested timeframe    When does patient want to be seen/preferred time: before June 23rd    Comments: Flora has been having ongoing bowel issue and took appt on June 30 when she returns from her trip but she would like to be seen before she leaves if possible. \"Not an ideal way to travel\"    Could we send this information to you in [facility] or would you prefer to receive a phone call?:   Patient would prefer a phone call   Okay to leave a detailed message?: Yes at Home number on file [phone] (home)    Call taken on 6/10/2025 at 3:02 PM by Amanda Bob    "

## 2025-06-10 NOTE — TELEPHONE ENCOUNTER
Pt was scheduled at the [place] location.     Debbie Maciel   [facility]     [FreeTextEntry1] : Betzaida Walsh  [FreeTextEntry2] : Adrenal nodule [FreeTextEntry3] :  Pt is a 73 yo  female  with h/o PreDM  high cholesterol  found to have left  adrenal nodule.  PMH  OA Pre DM  High cholesterol  IBS Osteopenia   PSH cervical conization   FH  Father colon cancer COPD Mother DM CVA  Sister DM  Soc HX  former smoker mild alcohol use  meds Florastor Pravastatin  Occasional Prednisone taper Valtrex Zolpidem   All PCN Amoxicillin    Labs 	HGB A1C	5.8 %	H	4.0-5.6 	Sodium	141 mmol/L	 	135-145	  	Potassium	4.6 mmol/L	 	3.5-5.3	  	Chloride	103 mmol/L	 		  	CO2	26 mmol/L	 	22-31	  	Anion Gap, Serum	11 mmol/L	 	5-17	 	Glucose	65 mg/dL	L	70-99	  	BUN	12 mg/dL	 	7-23	  	Creatinine	0.74 mg/dL	 	0.50-1.30	  	Total Protein	6.9 g/dL	 	6.0-8.3	  	Albumin	4.2 g/dL	 	3.3-5.0	  	Calcium, Serum	9.8 mg/dL	 	8.4-10.5	  	Total Bilirubin	0.3 mg/dL	 	0.2-1.2	  	AST (SGOT)	32 U/L	 	10-40	  	ALT(SGPT)	27 U/L	 	10-45	  	ALK PHOS	116 U/L	 		  	eGFR	84 mL/min/1.73m2	 	>=60   EXAM: [id number] - CT CHEST  - ORDERED BY: LUDY GRAMAJO  EXAM: [id number] - CT ABDOMEN AND PELVIS  - ORDERED BY: LUDY GRAMAJO   PROCEDURE DATE:  10/11/2024    INTERPRETATION:  CLINICAL INFORMATION: Generalized hyperhidrosis. Flushing. Evaluate for malignancy.  COMPARISON: None.  CONTRAST/COMPLICATIONS: IV Contrast: NONE Oral Contrast: Omnipaque 300 Complications: None reported at time of study completion  PROCEDURE: CT of the Chest, Abdomen and Pelvis was performed. Sagittal and coronal reformats were performed.  FINDINGS: CHEST: LUNGS AND LARGE AIRWAYS: Patent central airways. No pulmonary nodules. PLEURA: No pleural effusion. VESSELS: Within normal limits. HEART: Heart size is normal. No pericardial effusion. MEDIASTINUM AND REED: No lymphadenopathy. CHEST WALL AND LOWER NECK: Within normal limits.  ABDOMEN AND PELVIS: LIVER: Within normal limits. BILE DUCTS: Normal caliber. GALLBLADDER: Within normal limits. SPLEEN: Within normal limits. PANCREAS: Within normal limits. ADRENALS: 12 mm right adrenal adenoma (-10HU). Left adrenal gland within normal limits. KIDNEYS/URETERS: Within normal limits.  BLADDER: Within normal limits. REPRODUCTIVE ORGANS: Uterus and bilateral adnexa within normal limits.  BOWEL: No bowel obstruction. Appendix is normal. PERITONEUM/RETROPERITONEUM: Within normal limits. VESSELS: Within normal limits. LYMPH NODES: No lymphadenopathy. ABDOMINAL WALL: Small fat-containing umbilical hernia. BONES: Right hip arthroplasty.  IMPRESSION: No evidence of suspicious mass or adenopathy in the chest, abdomen, or pelvis. Incidentally noted 4 mm right adrenal adenoma.    --- End of Report --- [FreeTextEntry4] : Pt with left adrenal adenoma- some size discrepancy on CT scan report  4mm vs 1.2 cm-- likely 1.2 cm) would ask radiology to correct report  would  check following labs before 8 AM  ( and as long as pt has been off steroids for 1 month)   ACTH   Cortisol Plasma renin Activity level  Aldosterone Plama metanephrines  ( pt has to hold Tylenol ,caffeine chocolate decongestants for 3 days prior )     [FreeTextEntry5] : If there are any further questions, please reach out to me

## 2025-06-11 ENCOUNTER — OFFICE VISIT (OUTPATIENT)
Dept: PEDIATRICS | Facility: CLINIC | Age: 80
End: 2025-06-11
Payer: COMMERCIAL

## 2025-06-11 VITALS
RESPIRATION RATE: 18 BRPM | TEMPERATURE: 96.8 F | SYSTOLIC BLOOD PRESSURE: 116 MMHG | HEART RATE: 50 BPM | HEIGHT: 62 IN | DIASTOLIC BLOOD PRESSURE: 60 MMHG | OXYGEN SATURATION: 98 % | WEIGHT: 119.1 LBS | BODY MASS INDEX: 21.92 KG/M2

## 2025-06-11 DIAGNOSIS — J45.50 SEVERE PERSISTENT ASTHMA WITHOUT COMPLICATION (H): ICD-10-CM

## 2025-06-11 DIAGNOSIS — R19.5 LOOSE STOOLS: Primary | ICD-10-CM

## 2025-06-11 DIAGNOSIS — C25.0 MALIGNANT NEOPLASM OF HEAD OF PANCREAS (H): ICD-10-CM

## 2025-06-11 LAB
ERYTHROCYTE [DISTWIDTH] IN BLOOD BY AUTOMATED COUNT: 13.3 % (ref 10–15)
HCT VFR BLD AUTO: 37.7 % (ref 35–47)
HGB BLD-MCNC: 12.3 G/DL (ref 11.7–15.7)
MCH RBC QN AUTO: 28.7 PG (ref 26.5–33)
MCHC RBC AUTO-ENTMCNC: 32.6 G/DL (ref 31.5–36.5)
MCV RBC AUTO: 88 FL (ref 78–100)
PLATELET # BLD AUTO: 170 10E3/UL (ref 150–450)
RBC # BLD AUTO: 4.29 10E6/UL (ref 3.8–5.2)
WBC # BLD AUTO: 4.3 10E3/UL (ref 4–11)

## 2025-06-11 PROCEDURE — 36415 COLL VENOUS BLD VENIPUNCTURE: CPT | Performed by: PHYSICIAN ASSISTANT

## 2025-06-11 PROCEDURE — 85027 COMPLETE CBC AUTOMATED: CPT | Performed by: PHYSICIAN ASSISTANT

## 2025-06-11 ASSESSMENT — ASTHMA QUESTIONNAIRES
QUESTION_3 LAST FOUR WEEKS HOW OFTEN DID YOUR ASTHMA SYMPTOMS (WHEEZING, COUGHING, SHORTNESS OF BREATH, CHEST TIGHTNESS OR PAIN) WAKE YOU UP AT NIGHT OR EARLIER THAN USUAL IN THE MORNING: TWO OR THREE NIGHTS A WEEK
QUESTION_2 LAST FOUR WEEKS HOW OFTEN HAVE YOU HAD SHORTNESS OF BREATH: ONCE A DAY
QUESTION_5 LAST FOUR WEEKS HOW WOULD YOU RATE YOUR ASTHMA CONTROL: SOMEWHAT CONTROLLED
QUESTION_1 LAST FOUR WEEKS HOW MUCH OF THE TIME DID YOUR ASTHMA KEEP YOU FROM GETTING AS MUCH DONE AT WORK, SCHOOL OR AT HOME: A LITTLE OF THE TIME
QUESTION_4 LAST FOUR WEEKS HOW OFTEN HAVE YOU USED YOUR RESCUE INHALER OR NEBULIZER MEDICATION (SUCH AS ALBUTEROL): TWO OR THREE TIMES PER WEEK
ACT_TOTALSCORE: 14

## 2025-06-11 ASSESSMENT — PAIN SCALES - GENERAL: PAINLEVEL_OUTOF10: NO PAIN (0)

## 2025-06-11 ASSESSMENT — ENCOUNTER SYMPTOMS
DIARRHEA: 1
ABDOMINAL PAIN: 1

## 2025-06-11 NOTE — PROGRESS NOTES
Assessment & Plan     Loose stools- intermitent  Intermittent loose stools over 5 days with normal stools daily.  No changes in diet, meds  No associated symptoms   No evidence for infection.  Labs are pending  No change in meds at this time.    She is thinking about trying a different probiotic  Stay hydrated. Avoid foods that may make symptoms worse  Brat diet if needed.  - Comprehensive metabolic panel (BMP + Alb, Alk Phos, ALT, AST, Total. Bili, TP)  - CBC with platelets  - Comprehensive metabolic panel (BMP + Alb, Alk Phos, ALT, AST, Total. Bili, TP)  - CBC with platelets    Malignant neoplasm of head of pancreas (H)  Follows with oncology.    Severe persistent asthma without complication (H)  Stable                   Subjective   Flora is a 79 year old, presenting for the following health issues:  Diarrhea      6/11/2025     4:20 PM   Additional Questions   Roomed by Soraida BEVERLY   Accompanied by Partner         6/11/2025     4:20 PM   Patient Reported Additional Medications   Patient reports taking the following new medications No     Diarrhea  This is a recurrent problem. The current episode started in the past 7 days. The problem occurs 2 to 4 times per day. The problem has been gradually worsening. Associated symptoms include abdominal pain.   History of Present Illness       Reason for visit:  Stomic issues  Symptom onset:  3-7 days ago   She is taking medications regularly.          Here for intermittent loose stools over the last 5 days.  May happen 2-3 times a day. Sometimes stool leaks without her knowing, other times has loose stools when defecating. Does have normal stools as well throughout the day. No pain, nausea, vomiting.  Denies melena. No fever chills. No others with similar symptoms.  Has not had any medication changes  No antibiotics  No tx for this.    She reports she is in the 5th year aniv of whipple procedure  Knows that she may get these symptoms at times. BRAT diet was recommened when  "she has issues with stomach. She reports this time it is lasting longer    She also takes metamucil bid daily.  She uses probiotic- has  a new brand and felt that brand was causing dryness so she discontinued it 5 weeks ago.    No cp, sob    She does note some anxiety stressors. Overall health concerns but does not feel anything is new                          Objective    /60 (BP Location: Right arm, Patient Position: Sitting, Cuff Size: Adult Regular)   Pulse 50   Temp 96.8  F (36  C) (Temporal)   Resp 18   Ht 1.575 m (5' 2\")   Wt 54 kg (119 lb 1.6 oz)   LMP  (LMP Unknown)   SpO2 98%   BMI 21.78 kg/m    Body mass index is 21.78 kg/m .  Physical Exam   GENERAL: alert and no distress  RESP: lungs clear to auscultation - no rales, rhonchi or wheezes  CV: regular rate and rhythm, normal S1 S2, no S3 or S4, no murmur, click or rub, no peripheral edema  ABDOMEN: soft, nontender, no hepatosplenomegaly, no masses and bowel sounds normal  PSYCH: mentation appears normal, affect normal/bright            Signed Electronically by: Saniya Alvarez PA-C    "

## 2025-06-12 DIAGNOSIS — J45.30 MILD PERSISTENT ASTHMA WITHOUT COMPLICATION: ICD-10-CM

## 2025-06-12 LAB
ALBUMIN SERPL BCG-MCNC: 4.1 G/DL (ref 3.5–5.2)
ALP SERPL-CCNC: 67 U/L (ref 40–150)
ALT SERPL W P-5'-P-CCNC: 19 U/L (ref 0–50)
ANION GAP SERPL CALCULATED.3IONS-SCNC: 9 MMOL/L (ref 7–15)
AST SERPL W P-5'-P-CCNC: 29 U/L (ref 0–45)
BILIRUB SERPL-MCNC: 0.3 MG/DL
BUN SERPL-MCNC: 19.5 MG/DL (ref 8–23)
CALCIUM SERPL-MCNC: 9.3 MG/DL (ref 8.8–10.4)
CHLORIDE SERPL-SCNC: 100 MMOL/L (ref 98–107)
CREAT SERPL-MCNC: 0.97 MG/DL (ref 0.51–0.95)
EGFRCR SERPLBLD CKD-EPI 2021: 59 ML/MIN/1.73M2
GLUCOSE SERPL-MCNC: 82 MG/DL (ref 70–99)
HCO3 SERPL-SCNC: 27 MMOL/L (ref 22–29)
POTASSIUM SERPL-SCNC: 4.7 MMOL/L (ref 3.4–5.3)
PROT SERPL-MCNC: 6.6 G/DL (ref 6.4–8.3)
SODIUM SERPL-SCNC: 136 MMOL/L (ref 135–145)

## 2025-06-12 RX ORDER — BUDESONIDE AND FORMOTEROL FUMARATE DIHYDRATE 160; 4.5 UG/1; UG/1
2 AEROSOL RESPIRATORY (INHALATION) 2 TIMES DAILY
Refills: 1 | Status: CANCELLED | OUTPATIENT
Start: 2025-06-12

## 2025-06-12 RX ORDER — BUDESONIDE AND FORMOTEROL FUMARATE DIHYDRATE 160; 4.5 UG/1; UG/1
2 AEROSOL RESPIRATORY (INHALATION) PRN
Qty: 10.2 G | Refills: 5 | Status: SHIPPED | OUTPATIENT
Start: 2025-06-12

## 2025-06-12 RX ORDER — ALBUTEROL SULFATE 90 UG/1
2 INHALANT RESPIRATORY (INHALATION) EVERY 6 HOURS PRN
Qty: 18 G | Refills: 1 | Status: CANCELLED | OUTPATIENT
Start: 2025-06-12

## 2025-06-12 NOTE — TELEPHONE ENCOUNTER
Patient see pulmnology. Last visit was prescribed symbicort for PRN use. Does she have that? That is what would be recommended over albuterol

## 2025-06-12 NOTE — TELEPHONE ENCOUNTER
"Routing to [place]. Pt requesting albuterol as needed inhaler.     T\"d up. Please advise    April Pretty RN   [facility]    "

## 2025-06-12 NOTE — TELEPHONE ENCOUNTER
VENTOLIN IS NOT ON CURRENT MED LIST BUT PATIENT IS REQUESTING.    Carrol Hoffmann, Beth  [facility]  [address] [place] [address] [phone]

## 2025-06-12 NOTE — TELEPHONE ENCOUNTER
Pt states she does not see pulmonology anymore. States that symbicort is on her med list she has at home but does not have that medication.     Routing to [facility]. JANNETH'd up symbicort and pharmacy, pleased order if appropriate.     Pt also requesting list of current active meds. Will send a MCM with the active medications per yesterday's OV.       April Pretty RN   [facility]

## 2025-06-13 ENCOUNTER — RESULTS FOLLOW-UP (OUTPATIENT)
Dept: PEDIATRICS | Facility: CLINIC | Age: 80
End: 2025-06-13

## 2025-06-16 ENCOUNTER — PATIENT OUTREACH (OUTPATIENT)
Dept: CARE COORDINATION | Facility: CLINIC | Age: 80
End: 2025-06-16
Payer: COMMERCIAL

## 2025-06-16 ENCOUNTER — HOSPITAL ENCOUNTER (OUTPATIENT)
Dept: MRI IMAGING | Facility: CLINIC | Age: 80
Discharge: HOME | End: 2025-06-16
Attending: PSYCHIATRY & NEUROLOGY | Admitting: PSYCHIATRY & NEUROLOGY
Payer: COMMERCIAL

## 2025-06-16 DIAGNOSIS — R41.3 MEMORY CHANGE: ICD-10-CM

## 2025-06-16 PROCEDURE — 255N000002 HC RX 255 OP 636: Performed by: PSYCHIATRY & NEUROLOGY

## 2025-06-16 PROCEDURE — 70553 MRI BRAIN STEM W/O & W/DYE: CPT

## 2025-06-16 PROCEDURE — A9585 GADOBUTROL INJECTION: HCPCS | Performed by: PSYCHIATRY & NEUROLOGY

## 2025-06-16 RX ORDER — GADOBUTROL 604.72 MG/ML
5.5 INJECTION INTRAVENOUS ONCE
Status: COMPLETED | OUTPATIENT
Start: 2025-06-16 | End: 2025-06-16

## 2025-06-16 RX ADMIN — GADOBUTROL 5.5 ML: 604.72 INJECTION INTRAVENOUS at 14:16

## 2025-06-22 NOTE — PROGRESS NOTES
CC: Follow Up    Prior History:  Flora Hogan is a 79 year old  who presents today for a follow-up visit. Please see my initial encounter dated 4/16/25 for a full review of the patient's history.     Ms. Hogan has a past medical history of pancreatic cancer 2020 (s/p Whipple procedure with 6 cycles adjuvant gemcitabine), breast cancer 2023 (s/p lumpectomy, radiation, anastrazole, and tamoxifen), headache, peripheral neuropathy, and neck pain.     Memory symptoms began after chemotherapy and have progressively worsened since then. She struggles with short-term memory. Flora reports losing track of dates and time. She has significant word-finding difficulty. She feels that her ability to organize/arrange appointments has done downhill, too, and cannot remember what things on her itinerary are happening on which day. On 7/1/24 she scored 1/5 on Mini-Cog however she continues to perform all ADLs independently.      She scored 27/30 on MMSE during our initial visit. Further workup was ordered including vitamin B12, TSH, and MRI brain. Today, Flora is here to review the results of testing.     Today, Flora's daughter and  confirm progressively worsening short term memory as described above.    Review of Psychotropic and High-Risk Medications:  Citalopram  Tamoxifen  Loratadine     Exam:  Neuro: Alert, awake, good insight into her condition, able to walk independently.    Review of Outside Records:  I reviewed new external records since their last visit, which show follow-up with oncology for pancreatic and breast cancer (no med changes). She was also seen in internal medicine for intermittent loose stools (no med changes).     I reviewed new lab results since their last visit, which include:    Latest Reference Range & Units 04/16/25 09:12   TSH 0.30 - 4.20 uIU/mL 1.05   Vitamin B12 232 - 1,245 pg/mL 408        Latest Reference Range & Units 06/11/25 17:37   Sodium 135 - 145 mmol/L  136   Potassium 3.4 - 5.3 mmol/L 4.7   Chloride 98 - 107 mmol/L 100   Carbon Dioxide (CO2) 22 - 29 mmol/L 27   Urea Nitrogen 8.0 - 23.0 mg/dL 19.5   Creatinine 0.51 - 0.95 mg/dL 0.97 (H)   GFR Estimate >60 mL/min/1.73m2 59 (L)   Calcium 8.8 - 10.4 mg/dL 9.3   Anion Gap 7 - 15 mmol/L 9   Albumin 3.5 - 5.2 g/dL 4.1   Protein Total 6.4 - 8.3 g/dL 6.6   Alkaline Phosphatase 40 - 150 U/L 67   ALT 0 - 50 U/L 19   AST 0 - 45 U/L 29   Bilirubin Total <=1.2 mg/dL 0.3   Glucose 70 - 99 mg/dL 82   WBC 4.0 - 11.0 10e3/uL 4.3   Hemoglobin 11.7 - 15.7 g/dL 12.3   Hematocrit 35.0 - 47.0 % 37.7   Platelet Count 150 - 450 10e3/uL 170   RBC Count 3.80 - 5.20 10e6/uL 4.29   MCV 78 - 100 fL 88   MCH 26.5 - 33.0 pg 28.7   MCHC 31.5 - 36.5 g/dL 32.6   RDW 10.0 - 15.0 % 13.3   (H): Data is abnormally high  (L): Data is abnormally low    I reviewed new imaging and testing since their last visit, including:   TEST LOCATION RESULT DATE    [facility]     A couple foci of nonspecific T2/FLAIR hyperintensity within the white matter are of doubtful clinical significance and are within the range of expected for a patient of this age. Signal intensity of the brain parenchyma is otherwise normal. Moderate generalized cerebral atrophy. No hydrocephalus. Normal position of the cerebellar tonsils. Postcontrast imaging shows no pathologic enhancement intracranially.    6/16/25       Assessment and Care Plan:   #Slowly progressive cognitive decline  #History of cancer with chemotherapy    MRI demonstrates atrophy that appears beyond what is expected for age. Concern is high for Alzheimer's disease. We will obtain a p-Tau 217 blood test, knowing that her eGFR is baseline abnormal (just below 60, although she resides typically around the 60-70s range), which may subtly influence results.    If this is negative, we are dealing with an alternative Alzheimer's disease mimic such as LATE  (Limbic-predominant Age-related TDP-43 Encephalopathy) or Lewy body disease.     Encouraged healthy lifestyle (regular exercise 30 minutes per day, 5 days a week; representative diet; staying socially and intellectually active; staying up to date on vaccinations including Varicella zoster, Flu, COVID)     We will see her back after the above testing.    Today, I spent 23 minutes reviewing the chart, personally assessing objective testing, direct patient care, completing documentation and billing.    The longitudinal plan of care for the diagnosis(es)/condition(s) as documented were addressed during this visit. Due to the added complexity in care, I will continue to support Flora in the subsequent management and with ongoing continuity of care.

## 2025-06-23 ENCOUNTER — OFFICE VISIT (OUTPATIENT)
Dept: NEUROLOGY | Facility: CLINIC | Age: 80
End: 2025-06-23
Payer: COMMERCIAL

## 2025-06-23 VITALS
DIASTOLIC BLOOD PRESSURE: 66 MMHG | HEART RATE: 54 BPM | HEIGHT: 62 IN | WEIGHT: 119 LBS | SYSTOLIC BLOOD PRESSURE: 124 MMHG | TEMPERATURE: 98 F | BODY MASS INDEX: 21.9 KG/M2

## 2025-06-23 DIAGNOSIS — R41.3 MEMORY CHANGE: Primary | ICD-10-CM

## 2025-06-23 PROCEDURE — 84393 TAU PHOSPHORYLATED EA: CPT | Mod: ORL | Performed by: PSYCHIATRY & NEUROLOGY

## 2025-06-23 NOTE — LETTER
6/23/2025       RE: Flora Hogan  [address]  [facility] [address]     Dear Colleague,    Thank you for referring your patient, Flora Hogan, to the  [facility] at [facility]. Please see a copy of my visit note below.    CC: Follow Up    Prior History:  Flora Hogan is a 79 year old  who presents today for a follow-up visit. Please see my initial encounter dated 4/16/25 for a full review of the patient's history.     Ms. Hogan has a past medical history of pancreatic cancer 2020 (s/p Whipple procedure with 6 cycles adjuvant gemcitabine), breast cancer 2023 (s/p lumpectomy, radiation, anastrazole, and tamoxifen), headache, peripheral neuropathy, and neck pain.     Memory symptoms began after chemotherapy and have progressively worsened since then. She struggles with short-term memory. Flora reports losing track of dates and time. She has significant word-finding difficulty. She feels that her ability to organize/arrange appointments has done downhill, too, and cannot remember what things on her itinerary are happening on which day. On 7/1/24 she scored 1/5 on Mini-Cog however she continues to perform all ADLs independently.      She scored 27/30 on MMSE during our initial visit. Further workup was ordered including vitamin B12, TSH, and MRI brain. Today, Flora is here to review the results of testing.     Today, Flora's daughter and  confirm progressively worsening short term memory as described above.    Review of Psychotropic and High-Risk Medications:  Citalopram  Tamoxifen  Loratadine     Exam:  Neuro: Alert, awake, good insight into her condition, able to walk independently.    Review of Outside Records:  I reviewed new external records since their last visit, which show follow-up with oncology for pancreatic and breast cancer (no med changes). She was also seen in internal medicine  for intermittent loose stools (no med changes).     I reviewed new lab results since their last visit, which include:    Latest Reference Range & Units 04/16/25 09:12   TSH 0.30 - 4.20 uIU/mL 1.05   Vitamin B12 232 - 1,245 pg/mL 408        Latest Reference Range & Units 06/11/25 17:37   Sodium 135 - 145 mmol/L 136   Potassium 3.4 - 5.3 mmol/L 4.7   Chloride 98 - 107 mmol/L 100   Carbon Dioxide (CO2) 22 - 29 mmol/L 27   Urea Nitrogen 8.0 - 23.0 mg/dL 19.5   Creatinine 0.51 - 0.95 mg/dL 0.97 (H)   GFR Estimate >60 mL/min/1.73m2 59 (L)   Calcium 8.8 - 10.4 mg/dL 9.3   Anion Gap 7 - 15 mmol/L 9   Albumin 3.5 - 5.2 g/dL 4.1   Protein Total 6.4 - 8.3 g/dL 6.6   Alkaline Phosphatase 40 - 150 U/L 67   ALT 0 - 50 U/L 19   AST 0 - 45 U/L 29   Bilirubin Total <=1.2 mg/dL 0.3   Glucose 70 - 99 mg/dL 82   WBC 4.0 - 11.0 10e3/uL 4.3   Hemoglobin 11.7 - 15.7 g/dL 12.3   Hematocrit 35.0 - 47.0 % 37.7   Platelet Count 150 - 450 10e3/uL 170   RBC Count 3.80 - 5.20 10e6/uL 4.29   MCV 78 - 100 fL 88   MCH 26.5 - 33.0 pg 28.7   MCHC 31.5 - 36.5 g/dL 32.6   RDW 10.0 - 15.0 % 13.3   (H): Data is abnormally high  (L): Data is abnormally low    I reviewed new imaging and testing since their last visit, including:   TEST LOCATION RESULT DATE    MR Brain  [facility]     A couple foci of nonspecific T2/FLAIR hyperintensity within the white matter are of doubtful clinical significance and are within the range of expected for a patient of this age. Signal intensity of the brain parenchyma is otherwise normal. Moderate generalized cerebral atrophy. No hydrocephalus. Normal position of the cerebellar tonsils. Postcontrast imaging shows no pathologic enhancement intracranially.    6/16/25       Assessment and Care Plan:   #Slowly progressive cognitive decline  #History of cancer with chemotherapy    MRI demonstrates atrophy that appears beyond what is expected for age. Concern is high for Alzheimer's  disease. We will obtain a p-Tau 217 blood test, knowing that her eGFR is baseline abnormal (just below 60, although she resides typically around the 60-70s range), which may subtly influence results.    If this is negative, we are dealing with an alternative Alzheimer's disease mimic such as LATE (Limbic-predominant Age-related TDP-43 Encephalopathy) or Lewy body disease.     Encouraged healthy lifestyle (regular exercise 30 minutes per day, 5 days a week; representative diet; staying socially and intellectually active; staying up to date on vaccinations including Varicella zoster, Flu, COVID)     We will see her back after the above testing.    Today, I spent 23 minutes reviewing the chart, personally assessing objective testing, direct patient care, completing documentation and billing.    The longitudinal plan of care for the diagnosis(es)/condition(s) as documented were addressed during this visit. Due to the added complexity in care, I will continue to support Flora in the subsequent management and with ongoing continuity of care.      Again, thank you for allowing me to participate in the care of your patient.      Sincerely,    Vladimir Casas MD

## 2025-06-25 LAB
IMMUNOLOGIST REVIEW: ABNORMAL
P-TAU217 SERPL IA-MCNC: 0.64 PG/ML

## 2025-07-02 ENCOUNTER — OFFICE VISIT (OUTPATIENT)
Dept: NEUROLOGY | Facility: CLINIC | Age: 80
End: 2025-07-02
Payer: COMMERCIAL

## 2025-07-02 VITALS
SYSTOLIC BLOOD PRESSURE: 131 MMHG | DIASTOLIC BLOOD PRESSURE: 75 MMHG | HEIGHT: 62 IN | TEMPERATURE: 98 F | BODY MASS INDEX: 22.08 KG/M2 | WEIGHT: 120 LBS | HEART RATE: 52 BPM

## 2025-07-02 DIAGNOSIS — G30.9 ALZHEIMER'S DISEASE (H): ICD-10-CM

## 2025-07-02 DIAGNOSIS — R41.3 MEMORY CHANGE: Primary | ICD-10-CM

## 2025-07-02 DIAGNOSIS — F02.80 ALZHEIMER'S DISEASE (H): ICD-10-CM

## 2025-07-02 RX ORDER — DONEPEZIL HYDROCHLORIDE 5 MG/1
TABLET, FILM COATED ORAL
Qty: 214 TABLET | Refills: 0 | Status: SHIPPED | OUTPATIENT
Start: 2025-07-02 | End: 2025-10-24

## 2025-07-02 NOTE — PATIENT INSTRUCTIONS
#Alzheimer's disease, MCI versus early dementia stage  #History of pancreatic and breast cancer    - Donepezil 5 mg x 14 days, then, if tolerated, go up to 10 mg daily (common side effects can include bowel and bladder frequency, vivid dreams, slowed heart rate, which can manifest as lightheadedness, shortness of breath).  - Encouraged healthy lifestyle (regular exercise 30 minutes per day, 5 days a week; representative diet; staying socially and intellectually active; staying up to date on vaccinations including Varicella zoster, Flu, COVID)   - Discussed Alzheimer's disease infusion therapies, including benefits/risks (included Cyndienla on After Visit Summary).  - Recommended behind-the-wheel driving test (referral sent to Taina Dobbins)  - A well regarded book is The 36-Hour Day: A Family Guide to Caring for People Who Have Alzheimer Disease, Other Dementias, and Memory Loss  - Roon.com (great video-based FAQ from disease experts for patients and caregivers)  - Dementia Untangled Podcast  https://www.[employer]/services/alzheimers/for-caregivers/resources-for-caregivers/dementia-untangled-podcast  - https://www.[employer]/ (website to help with tips and tricks for patients and caregivers with neurodegenerative disease)  - Alz.org (the website from the Alzheimer's Association) has many great resources for patients and caregivers     *AlzConnected.org (online forum discussing a variety of questions brought up by Alzheimer's disease patients and caregivers)     *My ALZ Journey (phone norris -- can download on your [employer] --  gives patients and caregivers the tools to navigate the early stages of Alzheimer's, another dementia or mild cognitive impairment [MCI])     *24/7 Phone help line for any issue related to Alzheimer's disease and related dementias ([phone])

## 2025-07-02 NOTE — PROGRESS NOTES
CC: Follow Up    Prior History:  Flora Hogan is a 79 year old  who presents today for a follow-up visit. Please see my last encounter dated 6/23/25 and the initial encounter dated 4/16/25 for a full review of the patient's history.     Ms. Hogan has a past medical history of pancreatic cancer 2020 (s/p Whipple procedure with 6 cycles adjuvant gemcitabine), breast cancer 2023 (s/p lumpectomy, radiation, anastrazole, and tamoxifen), headache, peripheral neuropathy, and neck pain.      Memory symptoms began after chemotherapy and have progressively worsened since then. She struggles with short-term memory. Flora reports losing track of dates and time. She has significant word-finding difficulty. She feels that her ability to organize/arrange appointments has done downhill, too, and cannot remember what things on her itinerary are happening on which day. On 7/1/24 she scored 1/5 on Mini-Cog however she continues to perform all ADLs independently.      She scored 27/30 on MMSE during our initial visit.TSH and B12 were normal. MRI Brain (6/16/25) showed moderate generalized cerebral atrophy and a couple of nonspecific T2/FLAIR hyperintensities. We ordered a p-Tau 217 blood test as the concern for Alzheimer's disease is high. Today, she is here to discuss the results of testing.     Updates From This Visit:   Today, the patient presents with Mitul, her , and her daughter who are independent historians and provide collateral information included below. No clinical updates since our last visit. We discussed the results of the Alzheimer's disease blood test.    Review of Psychotropic and High-Risk Medications:  Citalopram    Exam:  Neuro: Alert, awake, fluent and logical speech, normal gait, no parkinsonism.    Review of Outside Records:  I reviewed new external records since their last visit, which show no interim visits.     I reviewed new lab results since their last visit, which  include:   Phospho-Tau 217  pg/mL 0.641 High    Comment:    -------------------REFERENCE VALUE--------------------------  Negative: < or = 0.185 pg/mL  Intermediate: 0.186 - 0.324 pg/mL  Positive: > or = 0.325 pg/mL       I reviewed new imaging and testing since their last visit, including: no  pertinent new imaging or testing.     Assessment and Care Plan:   #Alzheimer's disease, MCI versus early dementia stage  #History of pancreatic and breast cancer with chemotherapy, likely background contributors    - Donepezil 5 mg x 14 days, then, if tolerated, go up to 10 mg daily (common side effects can include bowel and bladder frequency, vivid dreams, slowed heart rate, which can manifest as lightheadedness, shortness of breath).  - Encouraged healthy lifestyle (regular exercise 30 minutes per day, 5 days a week; representative diet; staying socially and intellectually active; staying up to date on vaccinations including Varicella zoster, Flu, COVID)   - Discussed Alzheimer's disease infusion therapies, including benefits/risks (included Yuri on After Visit Summary).  - Recommended behind-the-wheel driving test (referral sent to Taina Dobbins)  - A well regarded book is The 36-Hour Day: A Family Guide to Caring for People Who Have Alzheimer Disease, Other Dementias, and Memory Loss  - Roon.com (great video-based FAQ from disease experts for patients and caregivers)  - Dementia Untangled Podcast  https://www.[employer]/services/alzheimers/for-caregivers/resources-for-caregivers/dementia-untangled-podcast  - https://www.[employer]/ (website to help with tips and tricks for patients and caregivers with neurodegenerative disease)  - Alz.org (the website from the Alzheimer's Association) has many great resources for patients and caregivers     *AlzConnected.org (online forum discussing a variety of questions brought up by Alzheimer's disease patients and caregivers)     *My ALZ Journey (phone gokul -- can  download on your [employer] --  gives patients and caregivers the tools to navigate the early stages of Alzheimer's, another dementia or mild cognitive impairment [MCI])     *24/7 Phone help line for any issue related to Alzheimer's disease and related dementias ([phone])  - Follow-up in 6 months      Today, I spent 42 minutes reviewing the chart, personally assessing objective testing, direct patient care, completing documentation and billing.    The longitudinal plan of care for the diagnosis(es)/condition(s) as documented were addressed during this visit. Due to the added complexity in care, I will continue to support Flora in the subsequent management and with ongoing continuity of care.

## 2025-07-02 NOTE — LETTER
7/2/2025       RE: Flora Hogan  [address]  [facility] [address]     Dear Colleague,    Thank you for referring your patient, Flora Hogan, to the  [facility] at [facility]. Please see a copy of my visit note below.    CC: Follow Up    Prior History:  Flora Hogan is a 79 year old  who presents today for a follow-up visit. Please see my last encounter dated 6/23/25 and the initial encounter dated 4/16/25 for a full review of the patient's history.     Ms. Hogan has a past medical history of pancreatic cancer 2020 (s/p Whipple procedure with 6 cycles adjuvant gemcitabine), breast cancer 2023 (s/p lumpectomy, radiation, anastrazole, and tamoxifen), headache, peripheral neuropathy, and neck pain.      Memory symptoms began after chemotherapy and have progressively worsened since then. She struggles with short-term memory. Flora reports losing track of dates and time. She has significant word-finding difficulty. She feels that her ability to organize/arrange appointments has done downhill, too, and cannot remember what things on her itinerary are happening on which day. On 7/1/24 she scored 1/5 on Mini-Cog however she continues to perform all ADLs independently.      She scored 27/30 on MMSE during our initial visit.TSH and B12 were normal. MRI Brain (6/16/25) showed moderate generalized cerebral atrophy and a couple of nonspecific T2/FLAIR hyperintensities. We ordered a p-Tau 217 blood test as the concern for Alzheimer's disease is high. Today, she is here to discuss the results of testing.     Updates From This Visit:   Today, the patient presents with Mitul, her , and her daughter who are independent historians and provide collateral information included below. No clinical updates since our last visit. We discussed the results of the Alzheimer's disease blood test.    Review of  Psychotropic and High-Risk Medications:  Citalopram    Exam:  Neuro: Alert, awake, fluent and logical speech, normal gait, no parkinsonism.    Review of Outside Records:  I reviewed new external records since their last visit, which show no interim visits.     I reviewed new lab results since their last visit, which include:   Phospho-Tau 217  pg/mL 0.641 High    Comment:    -------------------REFERENCE VALUE--------------------------  Negative: < or = 0.185 pg/mL  Intermediate: 0.186 - 0.324 pg/mL  Positive: > or = 0.325 pg/mL       I reviewed new imaging and testing since their last visit, including: no  pertinent new imaging or testing.     Assessment and Care Plan:   #Alzheimer's disease, MCI versus early dementia stage  #History of pancreatic and breast cancer with chemotherapy, likely background contributors    - Donepezil 5 mg x 14 days, then, if tolerated, go up to 10 mg daily (common side effects can include bowel and bladder frequency, vivid dreams, slowed heart rate, which can manifest as lightheadedness, shortness of breath).  - Encouraged healthy lifestyle (regular exercise 30 minutes per day, 5 days a week; representative diet; staying socially and intellectually active; staying up to date on vaccinations including Varicella zoster, Flu, COVID)   - Discussed Alzheimer's disease infusion therapies, including benefits/risks (included Yuri on After Visit Summary).  - Recommended behind-the-wheel driving test (referral sent to Taina Dobbins)  - A well regarded book is The 36-Hour Day: A Family Guide to Caring for People Who Have Alzheimer Disease, Other Dementias, and Memory Loss  - Roon.com (great video-based FAQ from disease experts for patients and caregivers)  - Dementia Untangled Podcast  https://www.[employer]/services/alzheimers/for-caregivers/resources-for-caregivers/dementia-untangled-podcast  - https://www.[employer]/ (website to help with tips and tricks for patients and caregivers  with neurodegenerative disease)  - Alz.org (the website from the Alzheimer's Association) has many great resources for patients and caregivers     *AlzConnected.org (online forum discussing a variety of questions brought up by Alzheimer's disease patients and caregivers)     *My ALZ Journey (phone norris -- can download on your [employer] --  gives patients and caregivers the tools to navigate the early stages of Alzheimer's, another dementia or mild cognitive impairment [MCI])     *24/7 Phone help line for any issue related to Alzheimer's disease and related dementias ([phone])  - Follow-up in 6 months      Today, I spent 42 minutes reviewing the chart, personally assessing objective testing, direct patient care, completing documentation and billing.    The longitudinal plan of care for the diagnosis(es)/condition(s) as documented were addressed during this visit. Due to the added complexity in care, I will continue to support Flora in the subsequent management and with ongoing continuity of care.          Again, thank you for allowing me to participate in the care of your patient.      Sincerely,    Vladimir Casas MD

## 2025-07-03 ENCOUNTER — OFFICE VISIT (OUTPATIENT)
Dept: FAMILY MEDICINE | Facility: CLINIC | Age: 80
End: 2025-07-03
Payer: COMMERCIAL

## 2025-07-03 VITALS
DIASTOLIC BLOOD PRESSURE: 62 MMHG | HEIGHT: 62 IN | WEIGHT: 120 LBS | TEMPERATURE: 97.7 F | OXYGEN SATURATION: 100 % | HEART RATE: 53 BPM | SYSTOLIC BLOOD PRESSURE: 99 MMHG | RESPIRATION RATE: 16 BRPM | BODY MASS INDEX: 22.08 KG/M2

## 2025-07-03 DIAGNOSIS — R32 URINARY INCONTINENCE, UNSPECIFIED TYPE: ICD-10-CM

## 2025-07-03 DIAGNOSIS — M80.00XA OSTEOPOROSIS WITH CURRENT PATHOLOGICAL FRACTURE, UNSPECIFIED OSTEOPOROSIS TYPE, INITIAL ENCOUNTER: ICD-10-CM

## 2025-07-03 DIAGNOSIS — G30.9 ALZHEIMER'S DISEASE (H): ICD-10-CM

## 2025-07-03 DIAGNOSIS — J45.30 MILD PERSISTENT ASTHMA WITHOUT COMPLICATION: ICD-10-CM

## 2025-07-03 DIAGNOSIS — C25.0 MALIGNANT NEOPLASM OF HEAD OF PANCREAS (H): ICD-10-CM

## 2025-07-03 DIAGNOSIS — Z00.00 ENCOUNTER FOR MEDICARE ANNUAL WELLNESS EXAM: Primary | ICD-10-CM

## 2025-07-03 DIAGNOSIS — F02.80 ALZHEIMER'S DISEASE (H): ICD-10-CM

## 2025-07-03 RX ORDER — ALENDRONATE SODIUM 70 MG/1
70 TABLET ORAL
Qty: 12 TABLET | Refills: 3 | Status: SHIPPED | OUTPATIENT
Start: 2025-07-03

## 2025-07-03 SDOH — HEALTH STABILITY: PHYSICAL HEALTH: ON AVERAGE, HOW MANY DAYS PER WEEK DO YOU ENGAGE IN MODERATE TO STRENUOUS EXERCISE (LIKE A BRISK WALK)?: 0 DAYS

## 2025-07-03 SDOH — HEALTH STABILITY: PHYSICAL HEALTH: ON AVERAGE, HOW MANY MINUTES DO YOU ENGAGE IN EXERCISE AT THIS LEVEL?: 0 MIN

## 2025-07-03 ASSESSMENT — SOCIAL DETERMINANTS OF HEALTH (SDOH): HOW OFTEN DO YOU GET TOGETHER WITH FRIENDS OR RELATIVES?: NEVER

## 2025-07-03 ASSESSMENT — PAIN SCALES - GENERAL: PAINLEVEL_OUTOF10: NO PAIN (0)
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
## 2025-07-03 NOTE — PATIENT INSTRUCTIONS
Oral: Administer first thing in the morning and >=30 minutes before the first food, beverage (except plain water), or other medication(s) of the day. Do not take with mineral water or with other beverages. Patients should be instructed to stay upright (not to lie down) for >=30 minutes and until after first food of the day (to reduce esophageal irritation)           Patient Education   Preventive Care Advice   This is general advice given by our system to help you stay healthy. However, your care team may have specific advice just for you. Please talk to your care team about your preventive care needs.  Nutrition  Eat 5 or more servings of fruits and vegetables each day.  Try wheat bread, brown rice and whole grain pasta (instead of white bread, rice, and pasta).  Get enough calcium and vitamin D. Check the label on foods and aim for 100% of the RDA (recommended daily allowance).  Lifestyle  Exercise at least 150 minutes each week  (30 minutes a day, 5 days a week).  Do muscle strengthening activities 2 days a week. These help control your weight and prevent disease.  No smoking.  Wear sunscreen to prevent skin cancer.  Have a dental exam and cleaning every 6 months.  Yearly exams  See your health care team every year to talk about:  Any changes in your health.  Any medicines your care team has prescribed.  Preventive care, family planning, and ways to prevent chronic diseases.  Shots (vaccines)   HPV shots (up to age 26), if you've never had them before.  Hepatitis B shots (up to age 59), if you've never had them before.  COVID-19 shot: Get this shot when it's due.  Flu shot: Get a flu shot every year.  Tetanus shot: Get a tetanus shot every 10 years.  Pneumococcal, hepatitis A, and RSV shots: Ask your care team if you need these based on your risk.  Shingles shot (for age 50 and up)  General health tests  Diabetes screening:  Starting at age 35, Get screened for diabetes at least every 3 years.  If you are younger  than age 35, ask your care team if you should be screened for diabetes.  Cholesterol test: At age 39, start having a cholesterol test every 5 years, or more often if advised.  Bone density scan (DEXA): At age 50, ask your care team if you should have this scan for osteoporosis (brittle bones).  Hepatitis C: Get tested at least once in your life.  STIs (sexually transmitted infections)  Before age 24: Ask your care team if you should be screened for STIs.  After age 24: Get screened for STIs if you're at risk. You are at risk for STIs (including HIV) if:  You are sexually active with more than one person.  You don't use condoms every time.  You or a partner was diagnosed with a sexually transmitted infection.  If you are at risk for HIV, ask about PrEP medicine to prevent HIV.  Get tested for HIV at least once in your life, whether you are at risk for HIV or not.  Cancer screening tests  Cervical cancer screening: If you have a cervix, begin getting regular cervical cancer screening tests starting at age 21.  Breast cancer scan (mammogram): If you've ever had breasts, begin having regular mammograms starting at age 40. This is a scan to check for breast cancer.  Colon cancer screening: It is important to start screening for colon cancer at age 45.  Have a colonoscopy test every 10 years (or more often if you're at risk) Or, ask your provider about stool tests like a FIT test every year or Cologuard test every 3 years.  To learn more about your testing options, visit:   .  For help making a decision, visit:   https://bit.ly/ro76570.  Prostate cancer screening test: If you have a prostate, ask your care team if a prostate cancer screening test (PSA) at age 55 is right for you.  Lung cancer screening: If you are a current or former smoker ages 50 to 80, ask your care team if ongoing lung cancer screenings are right for you.  For informational purposes only. Not to replace the advice of your health care provider.  Copyright   2023 [facility]. All rights reserved. Clinically reviewed by the [facility] Transitions Program. Chartbeat 131316 - REV 01/24.  Learning About Activities of Daily Living  What are activities of daily living?     Activities of daily living (ADLs) are the basic self-care tasks you do every day. These include eating, bathing, dressing, and moving around.  As you age, and if you have health problems, you may find that it's harder to do some of these tasks. If so, your doctor can suggest ideas that may help.  To measure what kind of help you may need, your doctor will ask how well you are able to do ADLs. Let your doctor know if there are any tasks that you are having trouble doing. This is an important first step to getting help. And when you have the help you need, you can stay as independent as possible.  How will a doctor assess your ADLs?  Asking about ADLs is part of a routine health checkup your doctor will likely do as you age. Your health check might be done in a doctor's office, in your home, or at a hospital. The goal is to find out if you are having any problems that could make it hard to care for yourself or that make it unsafe for you to be on your own.  To measure your ADLs, your doctor will ask how hard it is for you to do routine tasks. Your doctor may also want to know if you have changed the way you do a task because of a health problem. Your doctor may watch how you:  Walk back and forth.  Keep your balance while you stand or walk.  Move from sitting to standing or from a bed to a chair.  Button or unbutton a shirt or sweater.  Remove and put on your shoes.  It's common to feel a little worried or anxious if you find you can't do all the things you used to be able to do. Talking with your doctor about ADLs is a way to make sure you're as safe as possible and able to care for yourself as well as you can. You may want to bring a caregiver, friend, or family member to  your checkup. They can help you talk to your doctor.  Follow-up care is a key part of your treatment and safety. Be sure to make and go to all appointments, and call your doctor if you are having problems. It's also a good idea to know your test results and keep a list of the medicines you take.  Current as of: October 24, 2024  Content Version: 14.5    3253-5520 [employer].   Care instructions adapted under license by your healthcare professional. If you have questions about a medical condition or this instruction, always ask your healthcare professional. [employer] disclaims any warranty or liability for your use of this information.    Relationships for Good Health  Relationships are important for our health and happiness. Social isolation, loneliness and lack of support are bad for your health. Studies show that loneliness can harm health and limit your life span as much as high blood pressure and smoking.   Take some time to reflect on your relationships. Then answer these questions:  Are there people in your life that cause you stress or drain your energy? What can you do to set limits?  ________________________________________________________________________________________________________________________________________________________________________________________________________________________________________________________________________________________________________________________________________________  Who do you enjoy spending time with? Who can you go to for support?  ________________________________________________________________________________________________________________________________________________________________________________________________________________________________________________________________________________________________________________________________________________  What can you do to improve your relationships with  others?  __________________________________________________________________________________________________________________________________________________________________________________________________________________  ______________________________________________________________________________________________________________________________  What do you like most about your relationships with others?  ________________________________________________________________________________________________________________________________________________________________________________________________________________________________________________________________________________________________________________________________________________  My goal: ______________________________________________________________________  I will: ______________________________________________________________________________________________________________________________________________________________________________________________    For informational purposes only. Not to replace the advice of your health care provider. Copyright   2018 [facility]. All rights reserved. Clinically reviewed by Bariatric Health  Team. SMARTworks 330057 - Rev 06/24.

## 2025-07-22 ENCOUNTER — HOSPITAL ENCOUNTER (OUTPATIENT)
Dept: MAMMOGRAPHY | Facility: CLINIC | Age: 80
Discharge: HOME | End: 2025-07-22
Attending: PHYSICIAN ASSISTANT
Payer: COMMERCIAL

## 2025-07-22 DIAGNOSIS — Z12.31 ENCOUNTER FOR SCREENING MAMMOGRAM FOR BREAST CANCER: ICD-10-CM

## 2025-07-22 PROCEDURE — 77063 BREAST TOMOSYNTHESIS BI: CPT

## 2025-07-24 ENCOUNTER — OFFICE VISIT (OUTPATIENT)
Dept: FAMILY MEDICINE | Facility: CLINIC | Age: 80
End: 2025-07-24
Payer: COMMERCIAL

## 2025-07-24 VITALS
DIASTOLIC BLOOD PRESSURE: 74 MMHG | HEART RATE: 53 BPM | HEIGHT: 62 IN | RESPIRATION RATE: 18 BRPM | TEMPERATURE: 97.6 F | WEIGHT: 118.2 LBS | SYSTOLIC BLOOD PRESSURE: 130 MMHG | BODY MASS INDEX: 21.75 KG/M2 | OXYGEN SATURATION: 97 %

## 2025-07-24 DIAGNOSIS — Z90.49 HISTORY OF WHIPPLE PROCEDURE: ICD-10-CM

## 2025-07-24 DIAGNOSIS — R19.5 LOOSE STOOLS: Primary | ICD-10-CM

## 2025-07-24 DIAGNOSIS — Z90.410 HISTORY OF WHIPPLE PROCEDURE: ICD-10-CM

## 2025-07-24 LAB
ERYTHROCYTE [DISTWIDTH] IN BLOOD BY AUTOMATED COUNT: 13.9 % (ref 10–15)
HCT VFR BLD AUTO: 36.9 % (ref 35–47)
HGB BLD-MCNC: 12.3 G/DL (ref 11.7–15.7)
MCH RBC QN AUTO: 28.1 PG (ref 26.5–33)
MCHC RBC AUTO-ENTMCNC: 33.3 G/DL (ref 31.5–36.5)
MCV RBC AUTO: 84 FL (ref 78–100)
PLATELET # BLD AUTO: 173 10E3/UL (ref 150–450)
RBC # BLD AUTO: 4.38 10E6/UL (ref 3.8–5.2)
WBC # BLD AUTO: 2.8 10E3/UL (ref 4–11)

## 2025-07-24 NOTE — PROGRESS NOTES
"  Assessment & Plan     Loose stools  History of Whipple procedure  Overall Flora appears well today and I am dubious there is any infectious cause. We do need to think about iatrogenic etiology in regards to aricept given known side effects and the time line. If not improving after stopping this we'll need to consider colonoscopy. She will also contact her post-whipple team. Follow-up per results  - CBC with platelets; Future    The longitudinal plan of care for the diagnosis(es)/condition(s) as documented were addressed during this visit. Due to the added complexity in care, I will continue to support Flora in the subsequent management and with ongoing continuity of care.        Subjective   Flora is a 79 year old, presenting for the following health issues:  Gastrointestinal Problem        7/3/2025     8:13 AM   Additional Questions   Roomed by Ramonita MONROE CMA   Accompanied by NEGIN     History of Present Illness       Reason for visit:  Digestive concerns  Symptom onset:  1-2 weeks ago  Symptoms include:  Stomach pain, loose black stools  Symptom intensity:  Severe  Symptom progression:  Staying the same  Had these symptoms before:  No   She is taking medications regularly.        Flora Hogan is a 79 year old female who presents today for bowel changes over the past few weeks  She has noted darker stools  Additionally some changes in consistency - they can be quite loose and smaller if formed  She is not straining  She does not have stomach pain but some periodic discomfort  She continues to be on creon three times daily  She has NOT started the fosamax; has been taking donepezil       Review of Systems  Constitutional, HEENT, cardiovascular, pulmonary, gi and gu systems are negative, except as otherwise noted.      Objective    /74   Pulse 53   Temp 97.6  F (36.4  C) (Oral)   Resp 18   Ht 1.575 m (5' 2\")   Wt 53.6 kg (118 lb 3.2 oz)   LMP  (LMP Unknown)   SpO2 97%   BMI 21.62 kg/m    Body mass index " is 21.62 kg/m .  Physical Exam   GENERAL: alert and no distress  EYES: no conjunctival pallor   RESP: lungs clear to auscultation - no rales, rhonchi or wheezes  CV: regular rates and rhythm  ABDOMEN: soft, nontender, no hepatosplenomegaly, no masses and bowel sounds normal  PSYCH: affect normal/bright          Signed Electronically by: Marcial Holden PA-C

## 2025-07-24 NOTE — PATIENT INSTRUCTIONS
Lets have you STOP the donepazil for the next 2 weeks and monitor your GI symptoms; If your symptoms improve, I think best to discuss with neurology about alternative options and continue OFF of the medication. I do think it will take a few weeks after stopping to restore some normalcy if it was due to the donepazil.    If you do NOT improve at all it is reasonable to restart your donepazil and we may need to consider colonoscopy. At that point, you should also contact your oncology/post-whipple providers if symptoms persist.      Please consider the FOSAMAX (alendronate) after the bowels have improved! Its so important for the bones

## 2025-07-28 DIAGNOSIS — D05.11 DUCTAL CARCINOMA IN SITU (DCIS) OF RIGHT BREAST: ICD-10-CM

## 2025-07-28 NOTE — TELEPHONE ENCOUNTER
Tamoxifen       Last Written Prescription Date:  5/6/25  Last Fill Quantity: 90,   # refills: 3  Last Office Visit: 5/20/25  Future Office visit:       Routing refill request to provider for review/approval.    Requested Prescriptions   Pending Prescriptions Disp Refills    tamoxifen (NOLVADEX) 20 MG tablet 90 tablet 3     Sig: Take 1 tablet (20 mg) by mouth daily.       There is no refill protocol information for this order        See Routing comment.    Katie Whitmore, CMA

## 2025-07-30 ENCOUNTER — VIRTUAL VISIT (OUTPATIENT)
Dept: NEUROLOGY | Facility: CLINIC | Age: 80
End: 2025-07-30
Payer: COMMERCIAL

## 2025-07-30 DIAGNOSIS — G30.9 ALZHEIMER'S DISEASE (H): Primary | ICD-10-CM

## 2025-07-30 DIAGNOSIS — F02.80 ALZHEIMER'S DISEASE (H): Primary | ICD-10-CM

## 2025-07-30 RX ORDER — TAMOXIFEN CITRATE 20 MG/1
20 TABLET ORAL DAILY
Qty: 90 TABLET | Refills: 3 | OUTPATIENT
Start: 2025-07-30

## 2025-07-30 NOTE — PROGRESS NOTES
Virtual Visit Details    Type of service:  Telephone Visit   Phone call duration: 11 minutes   Originating Location (pt. Location): Home    Distant Location (provider location):  On-site  Telephone visit completed due to video connection not working and patient was having a very difficult time with figuring this out.

## 2025-07-30 NOTE — LETTER
7/30/2025       RE: Flora Hogan  [address]  [place] [address]     Dear Colleague,    Thank you for referring your patient, Flora Hogan, to the  [facility] at [facility]. Please see a copy of my visit note below.    CC: No chief complaint on file.        Prior History:    Flora Hogan is a 80 year old female who presents today for a follow-up visit. Ms. Hogan is followed by Dr. Casas in this clinic. Please see his last encounter dated 7/2/25 and the initial encounter dated 4/16/25 for a full review of the patient's history.     Ms. Hogan has a past medical history of pancreatic cancer 2020 (s/p Whipple procedure with 6 cycles adjuvant gemcitabine), breast cancer 2023 (s/p lumpectomy, radiation, anastrazole, and tamoxifen), headache, peripheral neuropathy, and neck pain.      Memory symptoms began after chemotherapy and have progressively worsened since then. She struggles with short-term memory. Flora reports losing track of dates and time. She has significant word-finding difficulty. She feels that her ability to organize/arrange appointments has done downhill, too, and cannot remember what things on her itinerary are happening on which day. On 7/1/24 she scored 1/5 on Mini-Cog however she continues to perform all ADLs independently.      She scored 27/30 on MMSE during our initial visit.TSH and B12 were normal. MRI Brain (6/16/25) showed moderate generalized cerebral atrophy and a couple of nonspecific T2/FLAIR hyperintensities. Dr. Casas ordered a p-Tau 217 blood test which came back elevated at 0.641. She has been given the diagnosis of Alzheimer's disease.     Updates From This Visit:   Today, the patient presents via phone call with her  Mitul who is an independent historian and provides collateral information included below.     Dr. Casas started her on donepezil 5 mg daily on 7/2/25. She took  "this for two weeks, then increased to 10 mg daily as instructed for two weeks. She notes that immediately upon starting the medication she felt fatigued and had an upset stomach. She continued to take it in hopes symptoms would subside. When she increased her dose, symptoms worsened. She had upset stomach, diarrhea, dizziness, fatigue, and overall just felt \"ishy.\"  She was seen by her PCP Marcial Holden PA-C who recommended she stop donepezil. She did not taper off the medication. She states that she last took a dose 5 days ago. Her symptoms have completely resolved and she is feeling much better now.     Review of Psychotropic and High-Risk Medications:  Citalopram  donepezil    Current Outpatient Medications   Medication Sig Dispense Refill     alendronate (FOSAMAX) 70 MG tablet Take 1 tablet (70 mg) by mouth every 7 days. 12 tablet 3     aspirin 81 MG EC tablet Take 1 tablet (81 mg) by mouth daily       budesonide-formoterol (SYMBICORT/BREYNA) 160-4.5 MCG/ACT inhaler Inhale 2 puffs into the lungs as needed (shortness of breath or wheezing). 10.2 g 5     Calcium Carb-Cholecalciferol 600-20 MG-MCG TABS Take 2 tablets by mouth daily 30 tablet 0     citalopram (CELEXA) 20 MG tablet Take 1 tablet (20 mg) by mouth daily. 90 tablet 1     donepezil (ARICEPT) 5 MG tablet Take 1 tablet (5 mg) by mouth at bedtime for 14 days, THEN 2 tablets (10 mg) at bedtime. 214 tablet 0     lipase-protease-amylase (CREON) 82126-55023-234767 units CPEP per EC capsule TAKE 1 CAPSULE BY MOUTH THREE TIMES DAILY WITH A MEAL Strength: 24,000-76,000 Units 270 capsule 3     loratadine (CLARITIN REDITABS) 10 MG dispersible tablet Take 10 mg by mouth as needed        magnesium 250 MG tablet Take 1 tablet by mouth 2 times daily       montelukast (SINGULAIR) 10 MG tablet Take 1 tablet (10 mg) by mouth at bedtime. 30 tablet 11     tamoxifen (NOLVADEX) 20 MG tablet Take 1 tablet (20 mg) by mouth daily. 90 tablet 3     No current facility-administered " "medications for this visit.         Exam:  Neuro: fluent and logical speech, good insight into her condition, asks appropriate questions    Assessment and Care Plan:   #Alzheimer's disease     Flora was experiencing significant side effects (diarrhea, upset stomach, fatigue, and dizziness) on 10 mg donepezil. She has been off the medication for 5 days now and her symptoms have completely resolved. We discussed that it is reasonable to remain off of this medication as the benefit of the drug is modest and is clearly outweighed by the side effects she experienced. We discussed how lifestyle changes can impact overall brain health. Flora and Mitul did not have any further questions.     Standard Recommendations for Brain Health    -Exercise is the only known therapy that can delay cognitive decline. We recommend you perform at least 30 minutes of exercise at least five days per week. This exercise should preferably be aerobic, but other more mild forms are OK too! The main goal is that you are getting movement each day.       - Continue to optimize cerebrovascular risk factors (blood pressure control, lipid control, glycemic control, healthy diet and exercise), your primary care provider is a great resource for help with this.     - Stay socially and intellectually active as much as possible. Participate in activities that are enjoyable to you. There is no need to perform mental quizzes or \"mind games\" to boost memory; studies have demonstrated that these specialized memory games do not actually boost the patient's cognitive performance in activities outside the game.      - Eat healthy and balanced diet with all major food groups included. There is no specific diet you need to follow in order to do this, however the Mediterranean diet is a great option if you prefer to follow a specific plan.     - Remove dangerous objects or fall hazards from the home environment.     - The Alzheimer's Association has countless " resources, support groups, and FAQs for caregivers and patients living with dementia. I would highly recommend visiting their webpage: Alz.org or calling their helpline which is available 24/7 at [phone]     - Community Resources:  [email or web address]       Virtual Visit Details    Type of service:  Telephone Visit   Phone call duration: 11 minutes   Originating Location (pt. Location): Home    Distant Location (provider location):  On-site  Telephone visit completed due to video connection not working and patient was having a very difficult time with figuring this out.    Again, thank you for allowing me to participate in the care of your patient.      Sincerely,    MICAELA TALLEY CNP

## 2025-07-30 NOTE — TELEPHONE ENCOUNTER
Writer called patient's [facility] in [place].  Pt has refills on file and they just filled it for patient yesterday.  No refills needed at this time per pharmacy.  Refill refused.    Jeny Greenwood, RN, BSN    RN Care Coordinator  [facility]  [phone]

## 2025-07-30 NOTE — PROGRESS NOTES
CC: No chief complaint on file.        Prior History:    Flora Hogan is a 80 year old female who presents today for a follow-up visit. Ms. Hogan is followed by Dr. Casas in this clinic. Please see his last encounter dated 7/2/25 and the initial encounter dated 4/16/25 for a full review of the patient's history.     Ms. Hogan has a past medical history of pancreatic cancer 2020 (s/p Whipple procedure with 6 cycles adjuvant gemcitabine), breast cancer 2023 (s/p lumpectomy, radiation, anastrazole, and tamoxifen), headache, peripheral neuropathy, and neck pain.      Memory symptoms began after chemotherapy and have progressively worsened since then. She struggles with short-term memory. Flora reports losing track of dates and time. She has significant word-finding difficulty. She feels that her ability to organize/arrange appointments has done downhill, too, and cannot remember what things on her itinerary are happening on which day. On 7/1/24 she scored 1/5 on Mini-Cog however she continues to perform all ADLs independently.      She scored 27/30 on MMSE during our initial visit.TSH and B12 were normal. MRI Brain (6/16/25) showed moderate generalized cerebral atrophy and a couple of nonspecific T2/FLAIR hyperintensities. Dr. Casas ordered a p-Tau 217 blood test which came back elevated at 0.641. She has been given the diagnosis of Alzheimer's disease.     Updates From This Visit:   Today, the patient presents via phone call with her  Mitul who is an independent historian and provides collateral information included below.     Dr. Casas started her on donepezil 5 mg daily on 7/2/25. She took this for two weeks, then increased to 10 mg daily as instructed for two weeks. She notes that immediately upon starting the medication she felt fatigued and had an upset stomach. She continued to take it in hopes symptoms would subside. When she increased her dose, symptoms worsened. She had upset stomach,  "diarrhea, dizziness, fatigue, and overall just felt \"ishy.\"  She was seen by her PCP Marcial Holden PA-C who recommended she stop donepezil. She did not taper off the medication. She states that she last took a dose 5 days ago. Her symptoms have completely resolved and she is feeling much better now.     Review of Psychotropic and High-Risk Medications:  Citalopram  donepezil    Current Outpatient Medications   Medication Sig Dispense Refill    alendronate (FOSAMAX) 70 MG tablet Take 1 tablet (70 mg) by mouth every 7 days. 12 tablet 3    aspirin 81 MG EC tablet Take 1 tablet (81 mg) by mouth daily      budesonide-formoterol (SYMBICORT/BREYNA) 160-4.5 MCG/ACT inhaler Inhale 2 puffs into the lungs as needed (shortness of breath or wheezing). 10.2 g 5    Calcium Carb-Cholecalciferol 600-20 MG-MCG TABS Take 2 tablets by mouth daily 30 tablet 0    citalopram (CELEXA) 20 MG tablet Take 1 tablet (20 mg) by mouth daily. 90 tablet 1    donepezil (ARICEPT) 5 MG tablet Take 1 tablet (5 mg) by mouth at bedtime for 14 days, THEN 2 tablets (10 mg) at bedtime. 214 tablet 0    lipase-protease-amylase (CREON) 57597-25234-796433 units CPEP per EC capsule TAKE 1 CAPSULE BY MOUTH THREE TIMES DAILY WITH A MEAL Strength: 24,000-76,000 Units 270 capsule 3    loratadine (CLARITIN REDITABS) 10 MG dispersible tablet Take 10 mg by mouth as needed       magnesium 250 MG tablet Take 1 tablet by mouth 2 times daily      montelukast (SINGULAIR) 10 MG tablet Take 1 tablet (10 mg) by mouth at bedtime. 30 tablet 11    tamoxifen (NOLVADEX) 20 MG tablet Take 1 tablet (20 mg) by mouth daily. 90 tablet 3     No current facility-administered medications for this visit.         Exam:  Neuro: fluent and logical speech, good insight into her condition, asks appropriate questions    Assessment and Care Plan:   #Alzheimer's disease     Flora was experiencing significant side effects (diarrhea, upset stomach, fatigue, and dizziness) on 10 mg donepezil. She has " "been off the medication for 5 days now and her symptoms have completely resolved. We discussed that it is reasonable to remain off of this medication as the benefit of the drug is modest and is clearly outweighed by the side effects she experienced. We discussed how lifestyle changes can impact overall brain health. Flora and Mitul did not have any further questions.     Standard Recommendations for Brain Health    -Exercise is the only known therapy that can delay cognitive decline. We recommend you perform at least 30 minutes of exercise at least five days per week. This exercise should preferably be aerobic, but other more mild forms are OK too! The main goal is that you are getting movement each day.       - Continue to optimize cerebrovascular risk factors (blood pressure control, lipid control, glycemic control, healthy diet and exercise), your primary care provider is a great resource for help with this.     - Stay socially and intellectually active as much as possible. Participate in activities that are enjoyable to you. There is no need to perform mental quizzes or \"mind games\" to boost memory; studies have demonstrated that these specialized memory games do not actually boost the patient's cognitive performance in activities outside the game.      - Eat healthy and balanced diet with all major food groups included. There is no specific diet you need to follow in order to do this, however the Mediterranean diet is a great option if you prefer to follow a specific plan.     - Remove dangerous objects or fall hazards from the home environment.     - The Alzheimer's Association has countless resources, support groups, and FAQs for caregivers and patients living with dementia. I would highly recommend visiting their webpage: Alz.org or calling their helpline which is available 24/7 at [phone]     - Community Resources:  [email or web address]     "

## 2025-08-15 DIAGNOSIS — C25.0 MALIGNANT NEOPLASM OF HEAD OF PANCREAS (H): ICD-10-CM

## 2025-08-25 ENCOUNTER — OFFICE VISIT (OUTPATIENT)
Dept: FAMILY MEDICINE | Facility: CLINIC | Age: 80
End: 2025-08-25
Payer: COMMERCIAL

## 2025-08-25 VITALS
RESPIRATION RATE: 16 BRPM | WEIGHT: 118 LBS | DIASTOLIC BLOOD PRESSURE: 80 MMHG | BODY MASS INDEX: 21.71 KG/M2 | HEIGHT: 62 IN | HEART RATE: 56 BPM | SYSTOLIC BLOOD PRESSURE: 137 MMHG | OXYGEN SATURATION: 100 %

## 2025-08-25 DIAGNOSIS — R19.4 BOWEL HABIT CHANGES: Primary | ICD-10-CM

## 2025-08-25 PROCEDURE — 3079F DIAST BP 80-89 MM HG: CPT | Performed by: PHYSICIAN ASSISTANT

## 2025-08-25 PROCEDURE — 99213 OFFICE O/P EST LOW 20 MIN: CPT | Performed by: PHYSICIAN ASSISTANT

## 2025-08-25 PROCEDURE — 3075F SYST BP GE 130 - 139MM HG: CPT | Performed by: PHYSICIAN ASSISTANT

## (undated) DEVICE — DECANTER BAG 2002S

## (undated) DEVICE — SU UMBILICAL TAPE .125X30" U11T

## (undated) DEVICE — ACQUIRE ENDOSCOPIC ULTRASOUND FINE NEEDLE BIOPSY DEVICE

## (undated) DEVICE — SUCTION CANISTER MEDIVAC LINER 3000ML W/LID 65651-530

## (undated) DEVICE — ENDO TROCAR FIRST ENTRY KII FIOS Z-THRD 05X100MM CTF03

## (undated) DEVICE — ADH SKIN CLOSURE PREMIERPRO EXOFIN MICOR HV 0.5ML 3471

## (undated) DEVICE — ENDO SCOPE WARMER SEAL  C3101

## (undated) DEVICE — SYR EAR BULB 3OZ 0035830

## (undated) DEVICE — LOCALIZER INSTRUMENT COVER KIT

## (undated) DEVICE — GLOVE BIOGEL PI ULTRATOUCH G SZ 7.5 42175

## (undated) DEVICE — SU SILK 4-0 TIE 12X30" A303H

## (undated) DEVICE — GOWN LG DISP 9515

## (undated) DEVICE — CLIP ETHICON LIGACLIP MED WHITE LT200

## (undated) DEVICE — SURGICEL HEMOSTAT 4X8" 1952

## (undated) DEVICE — DRAPE LAP W/ARMBOARD 29410

## (undated) DEVICE — DRAPE LAP PEDS DISP 29492

## (undated) DEVICE — Device

## (undated) DEVICE — PREP PAD ALCOHOL 6818

## (undated) DEVICE — ENDO TROCAR SLEEVE KII Z-THREADED 05X100MM CTS02

## (undated) DEVICE — ADH SKIN CLOSURE PREMIERPRO EXOFIN 1.0ML 3470

## (undated) DEVICE — PREP CHLORAPREP 26ML TINTED HI-LITE ORANGE 930815

## (undated) DEVICE — SUCTION MANIFOLD DORNOCH ULTRA CART UL-CL500

## (undated) DEVICE — TUBING SUCTION 12"X1/4" N612

## (undated) DEVICE — SU ETHILON 3-0 PS-1 18" 1663H

## (undated) DEVICE — LINEN TOWEL PACK X30 5481

## (undated) DEVICE — SPONGE KITTNER 30-101

## (undated) DEVICE — SOL WATER IRRIG 1000ML BOTTLE 2F7114

## (undated) DEVICE — STPL POWERED ECHELON VASC 35MM PVE35A

## (undated) DEVICE — ENDO TROCAR FIRST ENTRY KII FIOS Z-THRD 12X100MM CTF73

## (undated) DEVICE — ESU GROUND PAD ADULT W/CORD E7507

## (undated) DEVICE — TEST TUBE W/SCREW CAP 17361

## (undated) DEVICE — SU VICRYL 3-0 SH 27" UND J416H

## (undated) DEVICE — DRAPE IOBAN INCISE 23X17" 6650EZ

## (undated) DEVICE — NDL COUNTER 40CT  31142311

## (undated) DEVICE — SU MONOCRYL 4-0 PS-2 27" UND Y426H

## (undated) DEVICE — PACK MINOR CUSTOM RIDGES SBA32RMRMA

## (undated) DEVICE — PAD CHUX UNDERPAD 30X36" P3036C

## (undated) DEVICE — TUBING SUCTION 10'X3/16" N510

## (undated) DEVICE — MANIFOLD NEPTUNE 4 PORT 700-20

## (undated) DEVICE — LINEN FULL SHEET 5511

## (undated) DEVICE — ESU ENDO SCISSORS 5MM CVD 5DCS

## (undated) DEVICE — SU VICRYL 3-0 TIE 12X18" J904T

## (undated) DEVICE — LINEN HALF SHEET 5512

## (undated) DEVICE — DRSG TELFA 3X8" 1238

## (undated) DEVICE — INTRODUCER ENDOSCOPIC OASIS 10FRX205CM OA-10

## (undated) DEVICE — SU PROLENE 4-0 RB-1DA 36" 8557H

## (undated) DEVICE — CLIP APPLIER 09 3/8" SM LIGACLIP MCS20

## (undated) DEVICE — BRUSH CYTOMAX CYTOLOGY .035CM NO LEAD DLB-35-S G24741

## (undated) DEVICE — ENDO PROBE COVER ULTRASOUND BALLOON LATEX  MAJ-233

## (undated) DEVICE — ENDO PROBE COVER ULTRASOUND BALLOON LATEX  MAJ-249

## (undated) DEVICE — APPLICATOR COTTON TIP 6"X2 STERILE LF 6012

## (undated) DEVICE — LINEN TOWEL PACK X5 5464

## (undated) DEVICE — WIPES FOLEY CARE SURESTEP PROVON DFC100

## (undated) DEVICE — RAD RX ISOVUE 300 (50ML) 61% IOPAMIDOL CHARGE PER ML

## (undated) DEVICE — PROBE ELECTROSURGICAL TRUNODE GAMMA 120-807605

## (undated) DEVICE — SU SILK 2-0 PSL 18" 673H

## (undated) DEVICE — STPL LINEAR CUT 100MM THICK TCT10

## (undated) DEVICE — GLOVE PROTEXIS POWDER FREE SMT 7.5  2D72PT75X

## (undated) DEVICE — LINEN TOWEL PACK X10 5473

## (undated) DEVICE — GLOVE BIOGEL PI MICRO SZ 8.0 48580

## (undated) DEVICE — SU PROLENE 5-0 RB-1DA 36"  8556H

## (undated) DEVICE — PREP CHLORAPREP 26ML TINTED ORANGE  260815

## (undated) DEVICE — CATH TRAY FOLEY SURESTEP 16FR W/URNE MTR STLK LATEX A303316A

## (undated) DEVICE — GLOVE BIOGEL PI SZ 8.0 40880

## (undated) DEVICE — GLOVE BIOGEL PI MICRO INDICATOR UNDERGLOVE SZ 8.0 48980

## (undated) DEVICE — DRSG STERI STRIP 1/2X4" R1547

## (undated) DEVICE — GLOVE PROTEXIS BLUE W/NEU-THERA 8.0  2D73EB80

## (undated) DEVICE — WIRE GUIDE 0.035"X450CM JAGWIRE HP STR TIP M00556580

## (undated) DEVICE — SOL WATER IRRIG 1000ML BOTTLE 07139-09

## (undated) DEVICE — NDL BIOPSY TEMNO 18GAX20CM ACT18/20

## (undated) DEVICE — TAPE CLOTH ADHESIVE 3" LATEX FREE

## (undated) DEVICE — NDL BLUNT 18GA 1.5" FILTER 305211

## (undated) DEVICE — SPONGE LAP 18X18" X8435

## (undated) DEVICE — CLIP HORIZON MED BLUE 002200

## (undated) DEVICE — BAG CLEAR TRASH 1.3M 39X33" P4040C

## (undated) DEVICE — LINEN TOWEL PACK X6 WHITE 5487

## (undated) DEVICE — SU SILK 3-0 TIE 12X30" A304H

## (undated) DEVICE — STPL ENDO LINEAR CUT ECHELON GST 60MM COMPACT PCEE60A

## (undated) DEVICE — GLOVE BIOGEL PI ULTRATOUCH SZ 7.5 41175

## (undated) DEVICE — SU SILK 0 TIE 6X30" A306H

## (undated) DEVICE — LINEN GOWN XLG 5407

## (undated) DEVICE — GOWN XLG DISP 9545

## (undated) DEVICE — ESU HARMONIC FOCUS SHEARS CVD 9CM HAR9F

## (undated) DEVICE — PACK ERCP CUSTOM RIDGES

## (undated) DEVICE — CLIP HORIZON LG ORANGE 004200

## (undated) DEVICE — NDL 25GA 1.5" 305127

## (undated) DEVICE — SU SILK 3-0 SH 30" K832H

## (undated) DEVICE — SET BREAST BIOPSY LOCALIZER 20 PROBE 8MM PENCIL 09-0006

## (undated) DEVICE — ESU ELEC BLADE 2.75" COATED/INSULATED E1455

## (undated) DEVICE — SU SILK 3-0 SH CR 8X18" C013D

## (undated) DEVICE — SOL NACL 0.9% IRRIG 1000ML BOTTLE 2F7124

## (undated) DEVICE — SU PDS II 0 TP-1 60" Z991G

## (undated) DEVICE — CLIP APPLIER ENDO ROTATING 10MM MED/LG ER320

## (undated) DEVICE — GLOVE PROTEXIS MICRO 7.5  2D73PM75

## (undated) DEVICE — GOWN IMPERVIOUS BREATHABLE SMART XLG 89045

## (undated) DEVICE — STPL RELOAD REG TISSUE ECHELON 60 X 3.6MM BLUE GST60B

## (undated) DEVICE — DRAIN JACKSON PRATT RESERVOIR 100ML SU130-1305

## (undated) DEVICE — DRAIN JACKSON PRATT CHANNEL 15FR ROUND HUBLESS SIL JP-2228

## (undated) DEVICE — SU ETHILON 2-0 FS 18" 664H

## (undated) DEVICE — ESU LIGASURE IMPACT OPEN SEALER/DVDR CVD LG JAW LF4418

## (undated) DEVICE — ANTIFOG SOLUTION W/FOAM PAD 31142527

## (undated) DEVICE — SU PDS II 4-0 RB-1 27" Z304H

## (undated) DEVICE — SPHINCTEROTOME 7FRX25MM TRITOME G22555

## (undated) DEVICE — TUBING SUCTION MEDI-VAC SOFT 3/16"X20' N520A

## (undated) DEVICE — SU VICRYL 4-0 PS-2 18" UND J496H

## (undated) DEVICE — TUBING INSUFFLATION W/FILTER CPC TO LUER 620-030-301

## (undated) DEVICE — SU VICRYL 3-0 SH 27" J316H

## (undated) DEVICE — VESSEL LOOPS YELLOW MAXI 31145694

## (undated) DEVICE — TUBING SMOKE EVAC ATTACHMENT E3590

## (undated) DEVICE — WIRE GUIDE 0.025"X450CM JAGWIRE HP STR TIP 5656

## (undated) DEVICE — ESU PENCIL W/COATED BLADE E2450H

## (undated) DEVICE — SU SILK 2-0 TIE 12X30" A305H

## (undated) DEVICE — ESU PENCIL W/SMOKE EVAC CVPLP2000

## (undated) DEVICE — STPL ENDO LINEAR CUT ECHELON GST 45MM COMPACT PCEE45A

## (undated) DEVICE — SURGICEL ABSORBABLE HEMOSTAT SNOW 4"X4" 2083

## (undated) DEVICE — DRAPE SLEEVE 599

## (undated) DEVICE — NDL COUNTER 20CT 31142493

## (undated) DEVICE — LINEN DRAPE 54X72" 5467

## (undated) DEVICE — ESU ELEC NDL 1" COATED/INSULATED E1465

## (undated) DEVICE — SUCTION MANIFOLD NEPTUNE 2 SYS 4 PORT 0702-020-000

## (undated) DEVICE — DRSG PRIMAPORE 02X3" 7133

## (undated) DEVICE — GLOVE PROTEXIS POWDER FREE 8.0 ORTHOPEDIC 2D73ET80

## (undated) DEVICE — SOL NACL 0.9% INJ 250ML BAG 2B1322Q

## (undated) DEVICE — CLIP HORIZON SM RED WIDE SLOT 001201

## (undated) RX ORDER — FENTANYL CITRATE 50 UG/ML
INJECTION, SOLUTION INTRAMUSCULAR; INTRAVENOUS
Status: DISPENSED
Start: 2022-10-27

## (undated) RX ORDER — FENTANYL CITRATE 50 UG/ML
INJECTION, SOLUTION INTRAMUSCULAR; INTRAVENOUS
Status: DISPENSED
Start: 2020-05-22

## (undated) RX ORDER — GLYCOPYRROLATE 0.2 MG/ML
INJECTION INTRAMUSCULAR; INTRAVENOUS
Status: DISPENSED
Start: 2020-04-20

## (undated) RX ORDER — INDOMETHACIN 50 MG/1
SUPPOSITORY RECTAL
Status: DISPENSED
Start: 2020-04-20

## (undated) RX ORDER — DEXAMETHASONE SODIUM PHOSPHATE 4 MG/ML
INJECTION, SOLUTION INTRA-ARTICULAR; INTRALESIONAL; INTRAMUSCULAR; INTRAVENOUS; SOFT TISSUE
Status: DISPENSED
Start: 2022-10-27

## (undated) RX ORDER — ONDANSETRON 2 MG/ML
INJECTION INTRAMUSCULAR; INTRAVENOUS
Status: DISPENSED
Start: 2020-05-22

## (undated) RX ORDER — PROPOFOL 10 MG/ML
INJECTION, EMULSION INTRAVENOUS
Status: DISPENSED
Start: 2022-09-19

## (undated) RX ORDER — HYDROMORPHONE HYDROCHLORIDE 1 MG/ML
INJECTION, SOLUTION INTRAMUSCULAR; INTRAVENOUS; SUBCUTANEOUS
Status: DISPENSED
Start: 2020-05-22

## (undated) RX ORDER — EPHEDRINE SULFATE 50 MG/ML
INJECTION, SOLUTION INTRAMUSCULAR; INTRAVENOUS; SUBCUTANEOUS
Status: DISPENSED
Start: 2022-11-11

## (undated) RX ORDER — PROPOFOL 10 MG/ML
INJECTION, EMULSION INTRAVENOUS
Status: DISPENSED
Start: 2020-04-20

## (undated) RX ORDER — LIDOCAINE HYDROCHLORIDE 10 MG/ML
INJECTION, SOLUTION EPIDURAL; INFILTRATION; INTRACAUDAL; PERINEURAL
Status: DISPENSED
Start: 2022-11-11

## (undated) RX ORDER — ACETAMINOPHEN 325 MG/1
TABLET ORAL
Status: DISPENSED
Start: 2020-05-22

## (undated) RX ORDER — PHENYLEPHRINE HCL IN 0.9% NACL 1 MG/10 ML
SYRINGE (ML) INTRAVENOUS
Status: DISPENSED
Start: 2020-05-22

## (undated) RX ORDER — EPHEDRINE SULFATE 50 MG/ML
INJECTION, SOLUTION INTRAMUSCULAR; INTRAVENOUS; SUBCUTANEOUS
Status: DISPENSED
Start: 2020-05-22

## (undated) RX ORDER — LIDOCAINE HYDROCHLORIDE 10 MG/ML
INJECTION, SOLUTION EPIDURAL; INFILTRATION; INTRACAUDAL; PERINEURAL
Status: DISPENSED
Start: 2022-09-19

## (undated) RX ORDER — CLINDAMYCIN PHOSPHATE 900 MG/50ML
INJECTION, SOLUTION INTRAVENOUS
Status: DISPENSED
Start: 2022-11-11

## (undated) RX ORDER — DEXAMETHASONE SODIUM PHOSPHATE 4 MG/ML
INJECTION, SOLUTION INTRA-ARTICULAR; INTRALESIONAL; INTRAMUSCULAR; INTRAVENOUS; SOFT TISSUE
Status: DISPENSED
Start: 2022-11-11

## (undated) RX ORDER — CEFAZOLIN SODIUM/WATER 2 G/20 ML
SYRINGE (ML) INTRAVENOUS
Status: DISPENSED
Start: 2022-10-27

## (undated) RX ORDER — PHENYLEPHRINE HCL IN 0.9% NACL 1 MG/10 ML
SYRINGE (ML) INTRAVENOUS
Status: DISPENSED
Start: 2020-04-20

## (undated) RX ORDER — SODIUM CHLORIDE, SODIUM LACTATE, POTASSIUM CHLORIDE, CALCIUM CHLORIDE 600; 310; 30; 20 MG/100ML; MG/100ML; MG/100ML; MG/100ML
INJECTION, SOLUTION INTRAVENOUS
Status: DISPENSED
Start: 2020-05-22

## (undated) RX ORDER — PROPOFOL 10 MG/ML
INJECTION, EMULSION INTRAVENOUS
Status: DISPENSED
Start: 2020-05-22

## (undated) RX ORDER — ONDANSETRON 2 MG/ML
INJECTION INTRAMUSCULAR; INTRAVENOUS
Status: DISPENSED
Start: 2020-05-05

## (undated) RX ORDER — FENTANYL CITRATE 50 UG/ML
INJECTION, SOLUTION INTRAMUSCULAR; INTRAVENOUS
Status: DISPENSED
Start: 2020-04-20

## (undated) RX ORDER — ONDANSETRON 2 MG/ML
INJECTION INTRAMUSCULAR; INTRAVENOUS
Status: DISPENSED
Start: 2022-11-11

## (undated) RX ORDER — BUPIVACAINE HYDROCHLORIDE 5 MG/ML
INJECTION, SOLUTION EPIDURAL; INTRACAUDAL
Status: DISPENSED
Start: 2022-10-27

## (undated) RX ORDER — FENTANYL CITRATE 50 UG/ML
INJECTION, SOLUTION INTRAMUSCULAR; INTRAVENOUS
Status: DISPENSED
Start: 2022-11-11

## (undated) RX ORDER — LIDOCAINE HYDROCHLORIDE 10 MG/ML
INJECTION, SOLUTION EPIDURAL; INFILTRATION; INTRACAUDAL; PERINEURAL
Status: DISPENSED
Start: 2020-04-20

## (undated) RX ORDER — NEOSTIGMINE METHYLSULFATE 1 MG/ML
VIAL (ML) INJECTION
Status: DISPENSED
Start: 2022-09-19

## (undated) RX ORDER — FENTANYL CITRATE 50 UG/ML
INJECTION, SOLUTION INTRAMUSCULAR; INTRAVENOUS
Status: DISPENSED
Start: 2022-09-19

## (undated) RX ORDER — OXYCODONE HYDROCHLORIDE 5 MG/1
TABLET ORAL
Status: DISPENSED
Start: 2022-11-11

## (undated) RX ORDER — EPHEDRINE SULFATE 50 MG/ML
INJECTION, SOLUTION INTRAMUSCULAR; INTRAVENOUS; SUBCUTANEOUS
Status: DISPENSED
Start: 2022-10-27

## (undated) RX ORDER — LIDOCAINE HYDROCHLORIDE 10 MG/ML
INJECTION, SOLUTION EPIDURAL; INFILTRATION; INTRACAUDAL; PERINEURAL
Status: DISPENSED
Start: 2022-10-27

## (undated) RX ORDER — DEXAMETHASONE SODIUM PHOSPHATE 4 MG/ML
INJECTION, SOLUTION INTRA-ARTICULAR; INTRALESIONAL; INTRAMUSCULAR; INTRAVENOUS; SOFT TISSUE
Status: DISPENSED
Start: 2022-09-19

## (undated) RX ORDER — PROPOFOL 10 MG/ML
INJECTION, EMULSION INTRAVENOUS
Status: DISPENSED
Start: 2022-10-27

## (undated) RX ORDER — ONDANSETRON 2 MG/ML
INJECTION INTRAMUSCULAR; INTRAVENOUS
Status: DISPENSED
Start: 2020-04-20

## (undated) RX ORDER — GLYCOPYRROLATE 0.2 MG/ML
INJECTION INTRAMUSCULAR; INTRAVENOUS
Status: DISPENSED
Start: 2022-10-27

## (undated) RX ORDER — CEFAZOLIN SODIUM/WATER 2 G/20 ML
SYRINGE (ML) INTRAVENOUS
Status: DISPENSED
Start: 2022-09-19

## (undated) RX ORDER — BUPIVACAINE HYDROCHLORIDE 5 MG/ML
INJECTION, SOLUTION EPIDURAL; INTRACAUDAL
Status: DISPENSED
Start: 2022-09-19

## (undated) RX ORDER — PROPOFOL 10 MG/ML
INJECTION, EMULSION INTRAVENOUS
Status: DISPENSED
Start: 2022-11-11

## (undated) RX ORDER — EPHEDRINE SULFATE 50 MG/ML
INJECTION, SOLUTION INTRAMUSCULAR; INTRAVENOUS; SUBCUTANEOUS
Status: DISPENSED
Start: 2022-09-19

## (undated) RX ORDER — ONDANSETRON 2 MG/ML
INJECTION INTRAMUSCULAR; INTRAVENOUS
Status: DISPENSED
Start: 2022-10-27

## (undated) RX ORDER — LIDOCAINE HYDROCHLORIDE 20 MG/ML
INJECTION, SOLUTION EPIDURAL; INFILTRATION; INTRACAUDAL; PERINEURAL
Status: DISPENSED
Start: 2020-05-22

## (undated) RX ORDER — KETOROLAC TROMETHAMINE 30 MG/ML
INJECTION, SOLUTION INTRAMUSCULAR; INTRAVENOUS
Status: DISPENSED
Start: 2022-10-27

## (undated) RX ORDER — DEXAMETHASONE SODIUM PHOSPHATE 4 MG/ML
INJECTION, SOLUTION INTRA-ARTICULAR; INTRALESIONAL; INTRAMUSCULAR; INTRAVENOUS; SOFT TISSUE
Status: DISPENSED
Start: 2020-04-20

## (undated) RX ORDER — NEOSTIGMINE METHYLSULFATE 1 MG/ML
VIAL (ML) INJECTION
Status: DISPENSED
Start: 2020-04-20